# Patient Record
Sex: FEMALE | Race: BLACK OR AFRICAN AMERICAN | NOT HISPANIC OR LATINO | Employment: OTHER | URBAN - METROPOLITAN AREA
[De-identification: names, ages, dates, MRNs, and addresses within clinical notes are randomized per-mention and may not be internally consistent; named-entity substitution may affect disease eponyms.]

---

## 2017-02-22 ENCOUNTER — HISTORICAL (OUTPATIENT)
Dept: HEMATOLOGY/ONCOLOGY | Facility: CLINIC | Age: 52
End: 2017-02-22

## 2017-03-21 ENCOUNTER — HISTORICAL (OUTPATIENT)
Dept: HEMATOLOGY/ONCOLOGY | Facility: CLINIC | Age: 52
End: 2017-03-21

## 2017-03-22 ENCOUNTER — HISTORICAL (OUTPATIENT)
Dept: HEMATOLOGY/ONCOLOGY | Facility: CLINIC | Age: 52
End: 2017-03-22

## 2017-05-10 ENCOUNTER — HISTORICAL (OUTPATIENT)
Dept: HEMATOLOGY/ONCOLOGY | Facility: CLINIC | Age: 52
End: 2017-05-10

## 2017-05-10 LAB
ABS NEUT (OLG): 1.62 X10(3)/MCL (ref 2.1–9.2)
ANION GAP SERPL CALC-SCNC: 18 MMOL/L
BASOPHILS # BLD AUTO: 0 X10(3)/MCL (ref 0–0.2)
BASOPHILS NFR BLD AUTO: 0.7 %
BUN SERPL-MCNC: 12 MG/DL (ref 7–18)
CHLORIDE SERPL-SCNC: 99 MMOL/L (ref 98–109)
CREAT SERPL-MCNC: 1.3 MG/DL (ref 0.6–1.3)
EOSINOPHIL # BLD AUTO: 0.4 X10(3)/MCL (ref 0–0.9)
EOSINOPHIL NFR BLD AUTO: 8.9 %
ERYTHROCYTE [DISTWIDTH] IN BLOOD BY AUTOMATED COUNT: 13.6 % (ref 11.5–17)
GLUCOSE SERPL-MCNC: 109 MG/DL (ref 70–105)
HCT VFR BLD AUTO: 40.4 % (ref 37–47)
HCT VFR BLD CALC: 41 % (ref 38–51)
HGB BLD-MCNC: 12.7 GM/DL (ref 12–16)
HGB BLD-MCNC: 13.9 MG/DL (ref 12–17)
LYMPHOCYTES # BLD AUTO: 1.8 X10(3)/MCL (ref 0.6–4.6)
LYMPHOCYTES NFR BLD AUTO: 40.8 %
MCH RBC QN AUTO: 28.4 PG (ref 27–31)
MCHC RBC AUTO-ENTMCNC: 31.4 GM/DL (ref 33–36)
MCV RBC AUTO: 90.4 FL (ref 80–94)
MONOCYTES # BLD AUTO: 0.5 X10(3)/MCL (ref 0.1–1.3)
MONOCYTES NFR BLD AUTO: 11.9 %
NEUTROPHILS # BLD AUTO: 1.6 X10(3)/MCL (ref 2.1–9.2)
NEUTROPHILS NFR BLD AUTO: 37.7 %
PLATELET # BLD AUTO: 188 X10(3)/MCL (ref 130–400)
PMV BLD AUTO: 8.9 FL (ref 9.4–12.4)
POC IONIZED CALCIUM: 1.23 MMOL/L (ref 1.12–1.32)
POC TCO2: 27 MMOL/L (ref 22–27)
POTASSIUM BLD-SCNC: 3.3 MMOL/L (ref 3.5–4.9)
RBC # BLD AUTO: 4.47 X10(6)/MCL (ref 4.2–5.4)
SODIUM BLD-SCNC: 140 MMOL/L (ref 138–146)
WBC # SPEC AUTO: 4.3 X10(3)/MCL (ref 4.5–11.5)

## 2017-05-24 ENCOUNTER — HISTORICAL (OUTPATIENT)
Dept: ADMINISTRATIVE | Facility: HOSPITAL | Age: 52
End: 2017-05-24

## 2017-05-24 LAB
ABS NEUT (OLG): 1.42 X10(3)/MCL (ref 2.1–9.2)
ANION GAP SERPL CALC-SCNC: 20 MMOL/L
BASOPHILS # BLD AUTO: 0 X10(3)/MCL (ref 0–0.2)
BASOPHILS NFR BLD AUTO: 0.3 %
BUN SERPL-MCNC: 18 MG/DL (ref 7–18)
CHLORIDE SERPL-SCNC: 102 MMOL/L (ref 98–109)
CREAT SERPL-MCNC: 1.4 MG/DL (ref 0.6–1.3)
EOSINOPHIL # BLD AUTO: 0.2 X10(3)/MCL (ref 0–0.9)
EOSINOPHIL NFR BLD AUTO: 5.5 %
ERYTHROCYTE [DISTWIDTH] IN BLOOD BY AUTOMATED COUNT: 13.8 % (ref 11.5–17)
GLUCOSE SERPL-MCNC: 89 MG/DL (ref 70–105)
HCT VFR BLD AUTO: 41.5 % (ref 37–47)
HCT VFR BLD CALC: 42 % (ref 38–51)
HGB BLD-MCNC: 12.7 GM/DL (ref 12–16)
HGB BLD-MCNC: 14.3 MG/DL (ref 12–17)
LYMPHOCYTES # BLD AUTO: 1.2 X10(3)/MCL (ref 0.6–4.6)
LYMPHOCYTES NFR BLD AUTO: 37.5 %
MCH RBC QN AUTO: 28.2 PG (ref 27–31)
MCHC RBC AUTO-ENTMCNC: 30.6 GM/DL (ref 33–36)
MCV RBC AUTO: 92 FL (ref 80–94)
MONOCYTES # BLD AUTO: 0.3 X10(3)/MCL (ref 0.1–1.3)
MONOCYTES NFR BLD AUTO: 10.4 %
NEUTROPHILS # BLD AUTO: 1.4 X10(3)/MCL (ref 2.1–9.2)
NEUTROPHILS NFR BLD AUTO: 46.3 %
PLATELET # BLD AUTO: 212 X10(3)/MCL (ref 130–400)
PMV BLD AUTO: 8.4 FL (ref 9.4–12.4)
POC IONIZED CALCIUM: 1.19 MMOL/L (ref 1.12–1.32)
POC TCO2: 24 MMOL/L (ref 22–27)
POTASSIUM BLD-SCNC: 3.7 MMOL/L (ref 3.5–4.9)
RBC # BLD AUTO: 4.51 X10(6)/MCL (ref 4.2–5.4)
SODIUM BLD-SCNC: 141 MMOL/L (ref 138–146)
WBC # SPEC AUTO: 3.1 X10(3)/MCL (ref 4.5–11.5)

## 2017-07-06 ENCOUNTER — HISTORICAL (OUTPATIENT)
Dept: HEMATOLOGY/ONCOLOGY | Facility: CLINIC | Age: 52
End: 2017-07-06

## 2017-07-06 LAB
ABS NEUT (OLG): 1.14 X10(3)/MCL (ref 2.1–9.2)
ALBUMIN SERPL-MCNC: 2.8 GM/DL (ref 3.4–5)
ALBUMIN/GLOB SERPL: 0.8 {RATIO}
ALP SERPL-CCNC: 149 UNIT/L (ref 38–126)
ALT SERPL-CCNC: 26 UNIT/L (ref 12–78)
AST SERPL-CCNC: 22 UNIT/L (ref 15–37)
BASOPHILS # BLD AUTO: 0 X10(3)/MCL (ref 0–0.2)
BASOPHILS NFR BLD AUTO: 0.6 %
BILIRUB SERPL-MCNC: 0.6 MG/DL (ref 0.2–1)
BILIRUBIN DIRECT+TOT PNL SERPL-MCNC: 0.1 MG/DL (ref 0–0.2)
BILIRUBIN DIRECT+TOT PNL SERPL-MCNC: 0.5 MG/DL (ref 0–0.8)
BUN SERPL-MCNC: 11 MG/DL (ref 7–18)
CALCIUM SERPL-MCNC: 9.1 MG/DL (ref 8.5–10.1)
CHLORIDE SERPL-SCNC: 100 MMOL/L (ref 98–107)
CO2 SERPL-SCNC: 34 MMOL/L (ref 21–32)
CREAT SERPL-MCNC: 1.33 MG/DL (ref 0.55–1.02)
EOSINOPHIL # BLD AUTO: 0.5 X10(3)/MCL (ref 0–0.9)
EOSINOPHIL NFR BLD AUTO: 15.8 %
EOSINOPHIL NFR BLD MANUAL: 10 % (ref 0–8)
ERYTHROCYTE [DISTWIDTH] IN BLOOD BY AUTOMATED COUNT: 14.2 % (ref 11.5–17)
GLOBULIN SER-MCNC: 3.6 GM/DL (ref 2.4–3.5)
GLUCOSE SERPL-MCNC: 75 MG/DL (ref 74–106)
HCT VFR BLD AUTO: 34.8 % (ref 37–47)
HGB BLD-MCNC: 10.5 GM/DL (ref 12–16)
LYMPHOCYTES # BLD AUTO: 1.2 X10(3)/MCL (ref 0.6–4.6)
LYMPHOCYTES NFR BLD AUTO: 36.3 %
LYMPHOCYTES NFR BLD MANUAL: 32 % (ref 13–40)
MCH RBC QN AUTO: 28.2 PG (ref 27–31)
MCHC RBC AUTO-ENTMCNC: 30.2 GM/DL (ref 33–36)
MCV RBC AUTO: 93.5 FL (ref 80–94)
METAMYELOCYTES NFR BLD MANUAL: 4 %
MONOCYTES # BLD AUTO: 0.4 X10(3)/MCL (ref 0.1–1.3)
MONOCYTES NFR BLD AUTO: 11.4 %
MONOCYTES NFR BLD MANUAL: 8 % (ref 2–11)
NEUTROPHILS # BLD AUTO: 1.1 X10(3)/MCL (ref 2.1–9.2)
NEUTROPHILS NFR BLD AUTO: 35.9 %
NEUTROPHILS NFR BLD MANUAL: 46 % (ref 47–80)
PLATELET # BLD AUTO: 166 X10(3)/MCL (ref 130–400)
PLATELET # BLD EST: NORMAL 10*3/UL
PMV BLD AUTO: 8.1 FL (ref 9.4–12.4)
POTASSIUM SERPL-SCNC: 3.4 MMOL/L (ref 3.5–5.1)
PROT SERPL-MCNC: 6.4 GM/DL (ref 6.4–8.2)
RBC # BLD AUTO: 3.72 X10(6)/MCL (ref 4.2–5.4)
RBC MORPH BLD: NORMAL
SODIUM SERPL-SCNC: 140 MMOL/L (ref 136–145)
WBC # SPEC AUTO: 3.2 X10(3)/MCL (ref 4.5–11.5)

## 2017-07-12 ENCOUNTER — HISTORICAL (OUTPATIENT)
Dept: INFUSION THERAPY | Facility: HOSPITAL | Age: 52
End: 2017-07-12

## 2017-07-12 LAB
ABS NEUT (OLG): 0.53 X10(3)/MCL (ref 2.1–9.2)
ANION GAP SERPL CALC-SCNC: 17 MMOL/L
BASOPHILS # BLD AUTO: 0 X10(3)/MCL (ref 0–0.2)
BASOPHILS NFR BLD AUTO: 1.3 %
BUN SERPL-MCNC: 8 MG/DL (ref 7–18)
CHLORIDE SERPL-SCNC: 97 MMOL/L (ref 98–109)
CREAT SERPL-MCNC: 1.5 MG/DL (ref 0.6–1.3)
EOSINOPHIL # BLD AUTO: 0.4 X10(3)/MCL (ref 0–0.9)
EOSINOPHIL NFR BLD AUTO: 15.4 %
ERYTHROCYTE [DISTWIDTH] IN BLOOD BY AUTOMATED COUNT: 14.3 % (ref 11.5–17)
GLUCOSE SERPL-MCNC: 96 MG/DL (ref 70–105)
HCT VFR BLD AUTO: 35.2 % (ref 37–47)
HCT VFR BLD CALC: 35 % (ref 38–51)
HGB BLD-MCNC: 10.7 GM/DL (ref 12–16)
HGB BLD-MCNC: 11.9 MG/DL (ref 12–17)
LYMPHOCYTES # BLD AUTO: 1.2 X10(3)/MCL (ref 0.6–4.6)
LYMPHOCYTES NFR BLD AUTO: 49.6 %
MCH RBC QN AUTO: 28.2 PG (ref 27–31)
MCHC RBC AUTO-ENTMCNC: 30.4 GM/DL (ref 33–36)
MCV RBC AUTO: 92.6 FL (ref 80–94)
MONOCYTES # BLD AUTO: 0.3 X10(3)/MCL (ref 0.1–1.3)
MONOCYTES NFR BLD AUTO: 11.7 %
NEUTROPHILS # BLD AUTO: 0.5 X10(3)/MCL (ref 2.1–9.2)
NEUTROPHILS NFR BLD AUTO: 22 %
PLATELET # BLD AUTO: 214 X10(3)/MCL (ref 130–400)
PMV BLD AUTO: 8.4 FL (ref 9.4–12.4)
POC IONIZED CALCIUM: 1.21 MMOL/L (ref 1.12–1.32)
POC TCO2: 28 MMOL/L (ref 22–27)
POTASSIUM BLD-SCNC: 3.7 MMOL/L (ref 3.5–4.9)
RBC # BLD AUTO: 3.8 X10(6)/MCL (ref 4.2–5.4)
SODIUM BLD-SCNC: 137 MMOL/L (ref 138–146)
WBC # SPEC AUTO: 2.4 X10(3)/MCL (ref 4.5–11.5)

## 2017-07-19 ENCOUNTER — HISTORICAL (OUTPATIENT)
Dept: ADMINISTRATIVE | Facility: HOSPITAL | Age: 52
End: 2017-07-19

## 2017-07-19 LAB
ABS NEUT (OLG): 1.61 X10(3)/MCL (ref 2.1–9.2)
ANION GAP SERPL CALC-SCNC: 16 MMOL/L
BUN SERPL-MCNC: 20 MG/DL (ref 7–18)
CHLORIDE SERPL-SCNC: 101 MMOL/L (ref 98–109)
CREAT SERPL-MCNC: 1.4 MG/DL (ref 0.6–1.3)
EOSINOPHIL # BLD AUTO: 0 X10(3)/MCL (ref 0–0.9)
EOSINOPHIL NFR BLD AUTO: 0.5 %
ERYTHROCYTE [DISTWIDTH] IN BLOOD BY AUTOMATED COUNT: 14.4 % (ref 11.5–17)
GLUCOSE SERPL-MCNC: 124 MG/DL (ref 70–105)
HCT VFR BLD AUTO: 39.4 % (ref 37–47)
HCT VFR BLD CALC: 40 % (ref 38–51)
HGB BLD-MCNC: 12.1 GM/DL (ref 12–16)
HGB BLD-MCNC: 13.6 MG/DL (ref 12–17)
LYMPHOCYTES # BLD AUTO: 0.5 X10(3)/MCL (ref 0.6–4.6)
LYMPHOCYTES NFR BLD AUTO: 22.4 %
MCH RBC QN AUTO: 28.5 PG (ref 27–31)
MCHC RBC AUTO-ENTMCNC: 30.7 GM/DL (ref 33–36)
MCV RBC AUTO: 92.9 FL (ref 80–94)
MONOCYTES # BLD AUTO: 0 X10(3)/MCL (ref 0.1–1.3)
MONOCYTES NFR BLD AUTO: 1.9 %
NEUTROPHILS # BLD AUTO: 1.6 X10(3)/MCL (ref 2.1–9.2)
NEUTROPHILS NFR BLD AUTO: 75.2 %
PLATELET # BLD AUTO: 261 X10(3)/MCL (ref 130–400)
PMV BLD AUTO: 8.6 FL (ref 9.4–12.4)
POC IONIZED CALCIUM: 1.28 MMOL/L (ref 1.12–1.32)
POC TCO2: 27 MMOL/L (ref 22–27)
POTASSIUM BLD-SCNC: 5.1 MMOL/L (ref 3.5–4.9)
RBC # BLD AUTO: 4.24 X10(6)/MCL (ref 4.2–5.4)
SODIUM BLD-SCNC: 137 MMOL/L (ref 138–146)
WBC # SPEC AUTO: 2.1 X10(3)/MCL (ref 4.5–11.5)

## 2017-07-26 ENCOUNTER — HISTORICAL (OUTPATIENT)
Dept: HEMATOLOGY/ONCOLOGY | Facility: CLINIC | Age: 52
End: 2017-07-26

## 2017-07-26 LAB
ABS NEUT (OLG): 1.02 X10(3)/MCL (ref 2.1–9.2)
ANION GAP SERPL CALC-SCNC: 17 MMOL/L
BASOPHILS # BLD AUTO: 0 X10(3)/MCL (ref 0–0.2)
BASOPHILS NFR BLD AUTO: 0.3 %
BUN SERPL-MCNC: 15 MG/DL (ref 7–18)
CHLORIDE SERPL-SCNC: 101 MMOL/L (ref 98–109)
CREAT SERPL-MCNC: 1.3 MG/DL (ref 0.6–1.3)
EOSINOPHIL # BLD AUTO: 0.4 X10(3)/MCL (ref 0–0.9)
EOSINOPHIL NFR BLD AUTO: 13.9 %
ERYTHROCYTE [DISTWIDTH] IN BLOOD BY AUTOMATED COUNT: 14.9 % (ref 11.5–17)
GLUCOSE SERPL-MCNC: 119 MG/DL (ref 70–105)
HCT VFR BLD AUTO: 38.7 % (ref 37–47)
HGB BLD-MCNC: 11.8 GM/DL (ref 12–16)
LYMPHOCYTES # BLD AUTO: 1.2 X10(3)/MCL (ref 0.6–4.6)
LYMPHOCYTES NFR BLD AUTO: 40.9 %
MCH RBC QN AUTO: 28.7 PG (ref 27–31)
MCHC RBC AUTO-ENTMCNC: 30.5 GM/DL (ref 33–36)
MCV RBC AUTO: 94.2 FL (ref 80–94)
MONOCYTES # BLD AUTO: 0.3 X10(3)/MCL (ref 0.1–1.3)
MONOCYTES NFR BLD AUTO: 10.5 %
NEUTROPHILS # BLD AUTO: 1 X10(3)/MCL (ref 2.1–9.2)
NEUTROPHILS NFR BLD AUTO: 34.4 %
PLATELET # BLD AUTO: 159 X10(3)/MCL (ref 130–400)
PMV BLD AUTO: 8.6 FL (ref 9.4–12.4)
POC CREATININE: 1.3 MG/DL (ref 0.6–1.3)
POC IONIZED CALCIUM: 1.2 MMOL/L (ref 1.12–1.32)
POC TCO2: 28 MMOL/L (ref 22–27)
POTASSIUM BLD-SCNC: 4.2 MMOL/L (ref 3.5–4.9)
RBC # BLD AUTO: 4.11 X10(6)/MCL (ref 4.2–5.4)
SODIUM BLD-SCNC: 141 MMOL/L (ref 138–146)
WBC # SPEC AUTO: 3 X10(3)/MCL (ref 4.5–11.5)

## 2017-08-02 ENCOUNTER — HISTORICAL (OUTPATIENT)
Dept: HEMATOLOGY/ONCOLOGY | Facility: CLINIC | Age: 52
End: 2017-08-02

## 2017-08-02 LAB
ABS NEUT (OLG): 2.52 X10(3)/MCL (ref 2.1–9.2)
ANION GAP SERPL CALC-SCNC: 18 MMOL/L
BUN SERPL-MCNC: 14 MG/DL (ref 7–18)
CHLORIDE SERPL-SCNC: 100 MMOL/L (ref 98–109)
CREAT SERPL-MCNC: 1.4 MG/DL (ref 0.6–1.3)
EOSINOPHIL # BLD AUTO: 0 X10(3)/MCL (ref 0–0.9)
EOSINOPHIL NFR BLD AUTO: 0.9 %
ERYTHROCYTE [DISTWIDTH] IN BLOOD BY AUTOMATED COUNT: 15.1 % (ref 11.5–17)
GLUCOSE SERPL-MCNC: 104 MG/DL (ref 70–105)
HCT VFR BLD AUTO: 39.3 % (ref 37–47)
HCT VFR BLD CALC: 38 % (ref 38–51)
HGB BLD-MCNC: 11.9 GM/DL (ref 12–16)
HGB BLD-MCNC: 12.9 MG/DL (ref 12–17)
LYMPHOCYTES # BLD AUTO: 0.6 X10(3)/MCL (ref 0.6–4.6)
LYMPHOCYTES NFR BLD AUTO: 19.4 %
MCH RBC QN AUTO: 28.8 PG (ref 27–31)
MCHC RBC AUTO-ENTMCNC: 30.3 GM/DL (ref 33–36)
MCV RBC AUTO: 95.2 FL (ref 80–94)
MONOCYTES # BLD AUTO: 0.1 X10(3)/MCL (ref 0.1–1.3)
MONOCYTES NFR BLD AUTO: 2.2 %
NEUTROPHILS # BLD AUTO: 2.5 X10(3)/MCL (ref 2.1–9.2)
NEUTROPHILS NFR BLD AUTO: 77.5 %
PLATELET # BLD AUTO: 172 X10(3)/MCL (ref 130–400)
PMV BLD AUTO: 8.7 FL (ref 9.4–12.4)
POC IONIZED CALCIUM: 1.21 MMOL/L (ref 1.12–1.32)
POC TCO2: 28 MMOL/L (ref 22–27)
POTASSIUM BLD-SCNC: 4.3 MMOL/L (ref 3.5–4.9)
RBC # BLD AUTO: 4.13 X10(6)/MCL (ref 4.2–5.4)
SODIUM BLD-SCNC: 141 MMOL/L (ref 138–146)
WBC # SPEC AUTO: 3.2 X10(3)/MCL (ref 4.5–11.5)

## 2017-08-11 ENCOUNTER — HISTORICAL (OUTPATIENT)
Dept: HEMATOLOGY/ONCOLOGY | Facility: CLINIC | Age: 52
End: 2017-08-11

## 2017-08-11 LAB
ABS NEUT (OLG): 1.72 X10(3)/MCL (ref 2.1–9.2)
ANION GAP SERPL CALC-SCNC: 16 MMOL/L
BUN SERPL-MCNC: 20 MG/DL (ref 7–18)
CHLORIDE SERPL-SCNC: 103 MMOL/L (ref 98–109)
CREAT SERPL-MCNC: 1.2 MG/DL (ref 0.6–1.3)
EOSINOPHIL # BLD AUTO: 0.1 X10(3)/MCL (ref 0–0.9)
EOSINOPHIL NFR BLD AUTO: 1.4 %
ERYTHROCYTE [DISTWIDTH] IN BLOOD BY AUTOMATED COUNT: 15.6 % (ref 11.5–17)
GLUCOSE SERPL-MCNC: 90 MG/DL (ref 70–105)
HCT VFR BLD AUTO: 37 % (ref 37–47)
HCT VFR BLD CALC: 38 % (ref 38–51)
HGB BLD-MCNC: 11.5 GM/DL (ref 12–16)
HGB BLD-MCNC: 12.9 MG/DL (ref 12–17)
LYMPHOCYTES # BLD AUTO: 1.9 X10(3)/MCL (ref 0.6–4.6)
LYMPHOCYTES NFR BLD AUTO: 42.7 %
MCH RBC QN AUTO: 29.3 PG (ref 27–31)
MCHC RBC AUTO-ENTMCNC: 31.1 GM/DL (ref 33–36)
MCV RBC AUTO: 94.4 FL (ref 80–94)
MONOCYTES # BLD AUTO: 0.8 X10(3)/MCL (ref 0.1–1.3)
MONOCYTES NFR BLD AUTO: 17.2 %
NEUTROPHILS # BLD AUTO: 1.7 X10(3)/MCL (ref 2.1–9.2)
NEUTROPHILS NFR BLD AUTO: 38.7 %
PLATELET # BLD AUTO: 193 X10(3)/MCL (ref 130–400)
PMV BLD AUTO: 8.9 FL (ref 9.4–12.4)
POC IONIZED CALCIUM: 1.17 MMOL/L (ref 1.12–1.32)
POC TCO2: 28 MMOL/L (ref 22–27)
POTASSIUM BLD-SCNC: 3.6 MMOL/L (ref 3.5–4.9)
RBC # BLD AUTO: 3.92 X10(6)/MCL (ref 4.2–5.4)
SODIUM BLD-SCNC: 143 MMOL/L (ref 138–146)
WBC # SPEC AUTO: 4.4 X10(3)/MCL (ref 4.5–11.5)

## 2017-08-18 ENCOUNTER — HISTORICAL (OUTPATIENT)
Dept: HEMATOLOGY/ONCOLOGY | Facility: CLINIC | Age: 52
End: 2017-08-18

## 2017-08-18 LAB
ABS NEUT (OLG): 1.99 X10(3)/MCL (ref 2.1–9.2)
ANION GAP SERPL CALC-SCNC: 17 MMOL/L
BASOPHILS # BLD AUTO: 0 X10(3)/MCL (ref 0–0.2)
BASOPHILS NFR BLD AUTO: 0.2 %
BUN SERPL-MCNC: 18 MG/DL (ref 7–18)
CHLORIDE SERPL-SCNC: 105 MMOL/L (ref 98–109)
CREAT SERPL-MCNC: 1.2 MG/DL (ref 0.6–1.3)
EOSINOPHIL # BLD AUTO: 0 X10(3)/MCL (ref 0–0.9)
EOSINOPHIL NFR BLD AUTO: 1.1 %
ERYTHROCYTE [DISTWIDTH] IN BLOOD BY AUTOMATED COUNT: 16.1 % (ref 11.5–17)
GLUCOSE SERPL-MCNC: 78 MG/DL (ref 70–105)
HCT VFR BLD AUTO: 35.3 % (ref 37–47)
HCT VFR BLD CALC: 33 % (ref 38–51)
HGB BLD-MCNC: 10.7 GM/DL (ref 12–16)
HGB BLD-MCNC: 11.2 MG/DL (ref 12–17)
LYMPHOCYTES # BLD AUTO: 1.8 X10(3)/MCL (ref 0.6–4.6)
LYMPHOCYTES NFR BLD AUTO: 39.3 %
MCH RBC QN AUTO: 29.2 PG (ref 27–31)
MCHC RBC AUTO-ENTMCNC: 30.3 GM/DL (ref 33–36)
MCV RBC AUTO: 96.2 FL (ref 80–94)
MONOCYTES # BLD AUTO: 0.7 X10(3)/MCL (ref 0.1–1.3)
MONOCYTES NFR BLD AUTO: 15.6 %
NEUTROPHILS # BLD AUTO: 2 X10(3)/MCL (ref 2.1–9.2)
NEUTROPHILS NFR BLD AUTO: 43.8 %
PLATELET # BLD AUTO: 164 X10(3)/MCL (ref 130–400)
PMV BLD AUTO: 8.2 FL (ref 9.4–12.4)
POC IONIZED CALCIUM: 1.2 MMOL/L (ref 1.12–1.32)
POC TCO2: 28 MMOL/L (ref 22–27)
POTASSIUM BLD-SCNC: 3.5 MMOL/L (ref 3.5–4.9)
RBC # BLD AUTO: 3.67 X10(6)/MCL (ref 4.2–5.4)
SODIUM BLD-SCNC: 145 MMOL/L (ref 138–146)
WBC # SPEC AUTO: 4.6 X10(3)/MCL (ref 4.5–11.5)

## 2017-09-15 ENCOUNTER — HISTORICAL (OUTPATIENT)
Dept: HEMATOLOGY/ONCOLOGY | Facility: CLINIC | Age: 52
End: 2017-09-15

## 2017-09-15 LAB
ABS NEUT (OLG): 1.42 X10(3)/MCL (ref 2.1–9.2)
ALBUMIN SERPL-MCNC: 3 GM/DL (ref 3.4–5)
ALBUMIN/GLOB SERPL: 1.1 RATIO (ref 1.1–2)
ALP SERPL-CCNC: 142 UNIT/L (ref 38–126)
ALT SERPL-CCNC: 38 UNIT/L (ref 12–78)
AST SERPL-CCNC: 14 UNIT/L (ref 15–37)
BASOPHILS # BLD AUTO: 0 X10(3)/MCL (ref 0–0.2)
BASOPHILS NFR BLD AUTO: 0.3 %
BILIRUB SERPL-MCNC: 0.2 MG/DL (ref 0.2–1)
BILIRUBIN DIRECT+TOT PNL SERPL-MCNC: 0.1 MG/DL (ref 0–0.5)
BILIRUBIN DIRECT+TOT PNL SERPL-MCNC: 0.1 MG/DL (ref 0–0.8)
BUN SERPL-MCNC: 13 MG/DL (ref 7–18)
CALCIUM SERPL-MCNC: 8.3 MG/DL (ref 8.5–10.1)
CHLORIDE SERPL-SCNC: 108 MMOL/L (ref 98–107)
CO2 SERPL-SCNC: 27 MMOL/L (ref 21–32)
CREAT SERPL-MCNC: 1.24 MG/DL (ref 0.55–1.02)
EOSINOPHIL # BLD AUTO: 0.2 X10(3)/MCL (ref 0–0.9)
EOSINOPHIL NFR BLD AUTO: 3.8 %
ERYTHROCYTE [DISTWIDTH] IN BLOOD BY AUTOMATED COUNT: 15 % (ref 11.5–17)
GLOBULIN SER-MCNC: 2.8 GM/DL (ref 2.4–3.5)
GLUCOSE SERPL-MCNC: 81 MG/DL (ref 74–106)
HCT VFR BLD AUTO: 35.2 % (ref 37–47)
HGB BLD-MCNC: 10.7 GM/DL (ref 12–16)
LYMPHOCYTES # BLD AUTO: 1.6 X10(3)/MCL (ref 0.6–4.6)
LYMPHOCYTES NFR BLD AUTO: 41 %
MCH RBC QN AUTO: 29.9 PG (ref 27–31)
MCHC RBC AUTO-ENTMCNC: 30.4 GM/DL (ref 33–36)
MCV RBC AUTO: 98.3 FL (ref 80–94)
MONOCYTES # BLD AUTO: 0.8 X10(3)/MCL (ref 0.1–1.3)
MONOCYTES NFR BLD AUTO: 19.3 %
NEUTROPHILS # BLD AUTO: 1.4 X10(3)/MCL (ref 2.1–9.2)
NEUTROPHILS NFR BLD AUTO: 35.6 %
PLATELET # BLD AUTO: 152 X10(3)/MCL (ref 130–400)
PMV BLD AUTO: 8.7 FL (ref 9.4–12.4)
POTASSIUM SERPL-SCNC: 3.4 MMOL/L (ref 3.5–5.1)
PROT SERPL-MCNC: 5.8 GM/DL (ref 6.4–8.2)
RBC # BLD AUTO: 3.58 X10(6)/MCL (ref 4.2–5.4)
SODIUM SERPL-SCNC: 145 MMOL/L (ref 136–145)
WBC # SPEC AUTO: 4 X10(3)/MCL (ref 4.5–11.5)

## 2017-10-13 ENCOUNTER — HISTORICAL (OUTPATIENT)
Dept: HEMATOLOGY/ONCOLOGY | Facility: CLINIC | Age: 52
End: 2017-10-13

## 2017-10-13 LAB
ABS NEUT (OLG): 0.89 X10(3)/MCL (ref 2.1–9.2)
ALBUMIN SERPL-MCNC: 3.2 GM/DL (ref 3.4–5)
ALBUMIN/GLOB SERPL: 1.1 {RATIO}
ALP SERPL-CCNC: 138 UNIT/L (ref 38–126)
ALT SERPL-CCNC: 28 UNIT/L (ref 12–78)
AST SERPL-CCNC: 12 UNIT/L (ref 15–37)
BASOPHILS # BLD AUTO: 0 X10(3)/MCL (ref 0–0.2)
BASOPHILS NFR BLD AUTO: 0.3 %
BILIRUB SERPL-MCNC: 0.2 MG/DL (ref 0.2–1)
BILIRUBIN DIRECT+TOT PNL SERPL-MCNC: 0.1 MG/DL (ref 0–0.2)
BILIRUBIN DIRECT+TOT PNL SERPL-MCNC: 0.1 MG/DL (ref 0–0.8)
BUN SERPL-MCNC: 19 MG/DL (ref 7–18)
CALCIUM SERPL-MCNC: 8.6 MG/DL (ref 8.5–10.1)
CHLORIDE SERPL-SCNC: 106 MMOL/L (ref 98–107)
CO2 SERPL-SCNC: 28 MMOL/L (ref 21–32)
CREAT SERPL-MCNC: 1.16 MG/DL (ref 0.55–1.02)
EOSINOPHIL # BLD AUTO: 0 X10(3)/MCL (ref 0–0.9)
EOSINOPHIL NFR BLD AUTO: 0.9 %
ERYTHROCYTE [DISTWIDTH] IN BLOOD BY AUTOMATED COUNT: 13.9 % (ref 11.5–17)
GLOBULIN SER-MCNC: 3 GM/DL (ref 2.4–3.5)
GLUCOSE SERPL-MCNC: 80 MG/DL (ref 74–106)
HCT VFR BLD AUTO: 38.7 % (ref 37–47)
HGB BLD-MCNC: 12.1 GM/DL (ref 12–16)
LYMPHOCYTES # BLD AUTO: 2.1 X10(3)/MCL (ref 0.6–4.6)
LYMPHOCYTES NFR BLD AUTO: 61.3 %
MCH RBC QN AUTO: 30.9 PG (ref 27–31)
MCHC RBC AUTO-ENTMCNC: 31.3 GM/DL (ref 33–36)
MCV RBC AUTO: 98.7 FL (ref 80–94)
MONOCYTES # BLD AUTO: 0.4 X10(3)/MCL (ref 0.1–1.3)
MONOCYTES NFR BLD AUTO: 11.8 %
NEUTROPHILS # BLD AUTO: 0.9 X10(3)/MCL (ref 2.1–9.2)
NEUTROPHILS NFR BLD AUTO: 25.7 %
PLATELET # BLD AUTO: 160 X10(3)/MCL (ref 130–400)
PMV BLD AUTO: 8.8 FL (ref 9.4–12.4)
POTASSIUM SERPL-SCNC: 3.9 MMOL/L (ref 3.5–5.1)
PROT SERPL-MCNC: 6.2 GM/DL (ref 6.4–8.2)
RBC # BLD AUTO: 3.92 X10(6)/MCL (ref 4.2–5.4)
SODIUM SERPL-SCNC: 143 MMOL/L (ref 136–145)
WBC # SPEC AUTO: 3.5 X10(3)/MCL (ref 4.5–11.5)

## 2017-10-26 ENCOUNTER — HISTORICAL (OUTPATIENT)
Dept: HEMATOLOGY/ONCOLOGY | Facility: CLINIC | Age: 52
End: 2017-10-26

## 2017-10-26 LAB
ABS NEUT (OLG): 1.64 X10(3)/MCL (ref 2.1–9.2)
ALBUMIN SERPL-MCNC: 2.9 GM/DL (ref 3.4–5)
ALBUMIN/GLOB SERPL: 1 {RATIO}
ALP SERPL-CCNC: 152 UNIT/L (ref 38–126)
ALT SERPL-CCNC: 36 UNIT/L (ref 12–78)
AST SERPL-CCNC: 18 UNIT/L (ref 15–37)
BASOPHILS # BLD AUTO: 0 X10(3)/MCL (ref 0–0.2)
BASOPHILS NFR BLD AUTO: 0.8 %
BILIRUB SERPL-MCNC: 0.3 MG/DL (ref 0.2–1)
BILIRUBIN DIRECT+TOT PNL SERPL-MCNC: 0.1 MG/DL (ref 0–0.2)
BILIRUBIN DIRECT+TOT PNL SERPL-MCNC: 0.2 MG/DL (ref 0–0.8)
BUN SERPL-MCNC: 12 MG/DL (ref 7–18)
CALCIUM SERPL-MCNC: 8.5 MG/DL (ref 8.5–10.1)
CHLORIDE SERPL-SCNC: 104 MMOL/L (ref 98–107)
CO2 SERPL-SCNC: 30 MMOL/L (ref 21–32)
CREAT SERPL-MCNC: 1.32 MG/DL (ref 0.55–1.02)
EOSINOPHIL # BLD AUTO: 0.3 X10(3)/MCL (ref 0–0.9)
EOSINOPHIL NFR BLD AUTO: 7.8 %
ERYTHROCYTE [DISTWIDTH] IN BLOOD BY AUTOMATED COUNT: 13.7 % (ref 11.5–17)
GLOBULIN SER-MCNC: 2.8 GM/DL (ref 2.4–3.5)
GLUCOSE SERPL-MCNC: 91 MG/DL (ref 74–106)
HCT VFR BLD AUTO: 38.6 % (ref 37–47)
HGB BLD-MCNC: 12 GM/DL (ref 12–16)
LYMPHOCYTES # BLD AUTO: 1.2 X10(3)/MCL (ref 0.6–4.6)
LYMPHOCYTES NFR BLD AUTO: 32.1 %
MCH RBC QN AUTO: 30.6 PG (ref 27–31)
MCHC RBC AUTO-ENTMCNC: 31.1 GM/DL (ref 33–36)
MCV RBC AUTO: 98.5 FL (ref 80–94)
MONOCYTES # BLD AUTO: 0.5 X10(3)/MCL (ref 0.1–1.3)
MONOCYTES NFR BLD AUTO: 13.4 %
NEUTROPHILS # BLD AUTO: 1.6 X10(3)/MCL (ref 2.1–9.2)
NEUTROPHILS NFR BLD AUTO: 45.9 %
PLATELET # BLD AUTO: 157 X10(3)/MCL (ref 130–400)
PMV BLD AUTO: 8.8 FL (ref 9.4–12.4)
POTASSIUM SERPL-SCNC: 4 MMOL/L (ref 3.5–5.1)
PROT SERPL-MCNC: 5.7 GM/DL (ref 6.4–8.2)
RBC # BLD AUTO: 3.92 X10(6)/MCL (ref 4.2–5.4)
SODIUM SERPL-SCNC: 142 MMOL/L (ref 136–145)
WBC # SPEC AUTO: 3.6 X10(3)/MCL (ref 4.5–11.5)

## 2017-11-15 ENCOUNTER — HISTORICAL (OUTPATIENT)
Dept: HEMATOLOGY/ONCOLOGY | Facility: CLINIC | Age: 52
End: 2017-11-15

## 2017-11-15 LAB
ABS NEUT (OLG): 5.14 X10(3)/MCL (ref 2.1–9.2)
ALBUMIN SERPL-MCNC: 2.9 GM/DL (ref 3.4–5)
ALBUMIN/GLOB SERPL: 0.9 RATIO (ref 1.1–2)
ALP SERPL-CCNC: 141 UNIT/L (ref 38–126)
ALT SERPL-CCNC: 38 UNIT/L (ref 12–78)
AST SERPL-CCNC: 28 UNIT/L (ref 15–37)
BASOPHILS # BLD AUTO: 0 X10(3)/MCL (ref 0–0.2)
BASOPHILS NFR BLD AUTO: 0.5 %
BILIRUB SERPL-MCNC: 0.4 MG/DL (ref 0.2–1)
BILIRUBIN DIRECT+TOT PNL SERPL-MCNC: 0.1 MG/DL (ref 0–0.5)
BILIRUBIN DIRECT+TOT PNL SERPL-MCNC: 0.3 MG/DL (ref 0–0.8)
BUN SERPL-MCNC: 18 MG/DL (ref 7–18)
CALCIUM SERPL-MCNC: 8.8 MG/DL (ref 8.5–10.1)
CHLORIDE SERPL-SCNC: 105 MMOL/L (ref 98–107)
CO2 SERPL-SCNC: 29 MMOL/L (ref 21–32)
CREAT SERPL-MCNC: 1.43 MG/DL (ref 0.55–1.02)
EOSINOPHIL # BLD AUTO: 0.2 X10(3)/MCL (ref 0–0.9)
EOSINOPHIL NFR BLD AUTO: 3.9 %
ERYTHROCYTE [DISTWIDTH] IN BLOOD BY AUTOMATED COUNT: 13.2 % (ref 11.5–17)
GLOBULIN SER-MCNC: 3.3 GM/DL (ref 2.4–3.5)
GLUCOSE SERPL-MCNC: 84 MG/DL (ref 74–106)
HCT VFR BLD AUTO: 37.7 % (ref 37–47)
HGB BLD-MCNC: 11.9 GM/DL (ref 12–16)
LYMPHOCYTES # BLD AUTO: 0.6 X10(3)/MCL (ref 0.6–4.6)
LYMPHOCYTES NFR BLD AUTO: 10 %
MCH RBC QN AUTO: 31 PG (ref 27–31)
MCHC RBC AUTO-ENTMCNC: 31.6 GM/DL (ref 33–36)
MCV RBC AUTO: 98.2 FL (ref 80–94)
MONOCYTES # BLD AUTO: 0.3 X10(3)/MCL (ref 0.1–1.3)
MONOCYTES NFR BLD AUTO: 5.2 %
NEUTROPHILS # BLD AUTO: 5.1 X10(3)/MCL (ref 2.1–9.2)
NEUTROPHILS NFR BLD AUTO: 80.4 %
PLATELET # BLD AUTO: 190 X10(3)/MCL (ref 130–400)
PMV BLD AUTO: 8.8 FL (ref 9.4–12.4)
POTASSIUM SERPL-SCNC: 3.9 MMOL/L (ref 3.5–5.1)
PROT SERPL-MCNC: 6.2 GM/DL (ref 6.4–8.2)
RBC # BLD AUTO: 3.84 X10(6)/MCL (ref 4.2–5.4)
SODIUM SERPL-SCNC: 142 MMOL/L (ref 136–145)
WBC # SPEC AUTO: 6.4 X10(3)/MCL (ref 4.5–11.5)

## 2017-11-27 ENCOUNTER — HISTORICAL (OUTPATIENT)
Dept: HEMATOLOGY/ONCOLOGY | Facility: CLINIC | Age: 52
End: 2017-11-27

## 2017-11-27 LAB
ABS NEUT (OLG): 1.49 X10(3)/MCL (ref 2.1–9.2)
ALBUMIN SERPL-MCNC: 2.9 GM/DL (ref 3.4–5)
ALBUMIN/GLOB SERPL: 0.9 RATIO (ref 1.1–2)
ALP SERPL-CCNC: 130 UNIT/L (ref 38–126)
ALT SERPL-CCNC: 30 UNIT/L (ref 12–78)
AST SERPL-CCNC: 15 UNIT/L (ref 15–37)
BASOPHILS # BLD AUTO: 0 X10(3)/MCL (ref 0–0.2)
BASOPHILS NFR BLD AUTO: 0.6 %
BILIRUB SERPL-MCNC: 0.2 MG/DL (ref 0.2–1)
BILIRUBIN DIRECT+TOT PNL SERPL-MCNC: 0.1 MG/DL (ref 0–0.5)
BILIRUBIN DIRECT+TOT PNL SERPL-MCNC: 0.1 MG/DL (ref 0–0.8)
BUN SERPL-MCNC: 16 MG/DL (ref 7–18)
CALCIUM SERPL-MCNC: 8.6 MG/DL (ref 8.5–10.1)
CHLORIDE SERPL-SCNC: 108 MMOL/L (ref 98–107)
CO2 SERPL-SCNC: 29 MMOL/L (ref 21–32)
CREAT SERPL-MCNC: 1.66 MG/DL (ref 0.55–1.02)
EOSINOPHIL # BLD AUTO: 0.4 X10(3)/MCL (ref 0–0.9)
EOSINOPHIL NFR BLD AUTO: 7.2 %
ERYTHROCYTE [DISTWIDTH] IN BLOOD BY AUTOMATED COUNT: 13.5 % (ref 11.5–17)
GLOBULIN SER-MCNC: 3.2 GM/DL (ref 2.4–3.5)
GLUCOSE SERPL-MCNC: 73 MG/DL (ref 74–106)
HCT VFR BLD AUTO: 34.8 % (ref 37–47)
HGB BLD-MCNC: 10.5 GM/DL (ref 12–16)
LYMPHOCYTES # BLD AUTO: 2.3 X10(3)/MCL (ref 0.6–4.6)
LYMPHOCYTES NFR BLD AUTO: 46.4 %
MCH RBC QN AUTO: 30.4 PG (ref 27–31)
MCHC RBC AUTO-ENTMCNC: 30.2 GM/DL (ref 33–36)
MCV RBC AUTO: 100.9 FL (ref 80–94)
MONOCYTES # BLD AUTO: 0.8 X10(3)/MCL (ref 0.1–1.3)
MONOCYTES NFR BLD AUTO: 15.9 %
NEUTROPHILS # BLD AUTO: 1.5 X10(3)/MCL (ref 2.1–9.2)
NEUTROPHILS NFR BLD AUTO: 29.9 %
PLATELET # BLD AUTO: 239 X10(3)/MCL (ref 130–400)
PMV BLD AUTO: 8.6 FL (ref 9.4–12.4)
POTASSIUM SERPL-SCNC: 3.9 MMOL/L (ref 3.5–5.1)
PROT SERPL-MCNC: 6.1 GM/DL (ref 6.4–8.2)
RBC # BLD AUTO: 3.45 X10(6)/MCL (ref 4.2–5.4)
SODIUM SERPL-SCNC: 144 MMOL/L (ref 136–145)
WBC # SPEC AUTO: 5 X10(3)/MCL (ref 4.5–11.5)

## 2017-12-08 ENCOUNTER — HISTORICAL (OUTPATIENT)
Dept: HEMATOLOGY/ONCOLOGY | Facility: CLINIC | Age: 52
End: 2017-12-08

## 2017-12-08 LAB
ABS NEUT (OLG): 3.53 X10(3)/MCL (ref 2.1–9.2)
ALBUMIN SERPL-MCNC: 3.3 GM/DL (ref 3.4–5)
ALBUMIN/GLOB SERPL: 1.1 RATIO (ref 1.1–2)
ALP SERPL-CCNC: 138 UNIT/L (ref 38–126)
ALT SERPL-CCNC: 38 UNIT/L (ref 12–78)
AST SERPL-CCNC: 27 UNIT/L (ref 15–37)
BILIRUB SERPL-MCNC: 0.4 MG/DL (ref 0.2–1)
BILIRUBIN DIRECT+TOT PNL SERPL-MCNC: 0.2 MG/DL (ref 0–0.5)
BILIRUBIN DIRECT+TOT PNL SERPL-MCNC: 0.2 MG/DL (ref 0–0.8)
BUN SERPL-MCNC: 24 MG/DL (ref 7–18)
CALCIUM SERPL-MCNC: 9.1 MG/DL (ref 8.5–10.1)
CHLORIDE SERPL-SCNC: 104 MMOL/L (ref 98–107)
CO2 SERPL-SCNC: 32 MMOL/L (ref 21–32)
CREAT SERPL-MCNC: 1.34 MG/DL (ref 0.55–1.02)
ERYTHROCYTE [DISTWIDTH] IN BLOOD BY AUTOMATED COUNT: 13.7 % (ref 11.5–17)
GLOBULIN SER-MCNC: 3.1 GM/DL (ref 2.4–3.5)
GLUCOSE SERPL-MCNC: 91 MG/DL (ref 74–106)
HCT VFR BLD AUTO: 36.8 % (ref 37–47)
HGB BLD-MCNC: 11.3 GM/DL (ref 12–16)
LYMPHOCYTES # BLD AUTO: 1.2 X10(3)/MCL (ref 0.6–4.6)
LYMPHOCYTES NFR BLD AUTO: 22.7 %
MCH RBC QN AUTO: 30.3 PG (ref 27–31)
MCHC RBC AUTO-ENTMCNC: 30.7 GM/DL (ref 33–36)
MCV RBC AUTO: 98.7 FL (ref 80–94)
MONOCYTES # BLD AUTO: 0.5 X10(3)/MCL (ref 0.1–1.3)
MONOCYTES NFR BLD AUTO: 9.9 %
NEUTROPHILS # BLD AUTO: 3.5 X10(3)/MCL (ref 2.1–9.2)
NEUTROPHILS NFR BLD AUTO: 67.4 %
PLATELET # BLD AUTO: 223 X10(3)/MCL (ref 130–400)
PMV BLD AUTO: 8.6 FL (ref 9.4–12.4)
POTASSIUM SERPL-SCNC: 4.2 MMOL/L (ref 3.5–5.1)
PROT SERPL-MCNC: 6.4 GM/DL (ref 6.4–8.2)
RBC # BLD AUTO: 3.73 X10(6)/MCL (ref 4.2–5.4)
SODIUM SERPL-SCNC: 142 MMOL/L (ref 136–145)
WBC # SPEC AUTO: 5.2 X10(3)/MCL (ref 4.5–11.5)

## 2017-12-20 ENCOUNTER — HISTORICAL (OUTPATIENT)
Dept: HEMATOLOGY/ONCOLOGY | Facility: CLINIC | Age: 52
End: 2017-12-20

## 2017-12-20 LAB
ABS NEUT (OLG): 1.44 X10(3)/MCL (ref 2.1–9.2)
ANION GAP SERPL CALC-SCNC: 16 MMOL/L
BASOPHILS # BLD AUTO: 0 X10(3)/MCL (ref 0–0.2)
BASOPHILS NFR BLD AUTO: 0.7 %
BUN SERPL-MCNC: 14 MG/DL (ref 7–18)
CHLORIDE SERPL-SCNC: 102 MMOL/L (ref 98–109)
CREAT SERPL-MCNC: 1.6 MG/DL (ref 0.6–1.3)
EOSINOPHIL # BLD AUTO: 0.3 X10(3)/MCL (ref 0–0.9)
EOSINOPHIL NFR BLD AUTO: 7.9 %
ERYTHROCYTE [DISTWIDTH] IN BLOOD BY AUTOMATED COUNT: 13.5 % (ref 11.5–17)
GLUCOSE SERPL-MCNC: 93 MG/DL (ref 70–105)
HCT VFR BLD AUTO: 37.4 % (ref 37–47)
HCT VFR BLD CALC: 36 % (ref 38–51)
HGB BLD-MCNC: 11.9 GM/DL (ref 12–16)
HGB BLD-MCNC: 12.2 MG/DL (ref 12–17)
LYMPHOCYTES # BLD AUTO: 1.8 X10(3)/MCL (ref 0.6–4.6)
LYMPHOCYTES NFR BLD AUTO: 44.1 %
MCH RBC QN AUTO: 30.9 PG (ref 27–31)
MCHC RBC AUTO-ENTMCNC: 31.8 GM/DL (ref 33–36)
MCV RBC AUTO: 97.1 FL (ref 80–94)
MONOCYTES # BLD AUTO: 0.5 X10(3)/MCL (ref 0.1–1.3)
MONOCYTES NFR BLD AUTO: 11.6 %
NEUTROPHILS # BLD AUTO: 1.4 X10(3)/MCL (ref 2.1–9.2)
NEUTROPHILS NFR BLD AUTO: 35.7 %
PLATELET # BLD AUTO: 173 X10(3)/MCL (ref 130–400)
PMV BLD AUTO: 8.4 FL (ref 9.4–12.4)
POC IONIZED CALCIUM: 1.23 MMOL/L (ref 1.12–1.32)
POC TCO2: 30 MMOL/L (ref 22–27)
POTASSIUM BLD-SCNC: 3.4 MMOL/L (ref 3.5–4.9)
RBC # BLD AUTO: 3.85 X10(6)/MCL (ref 4.2–5.4)
SODIUM BLD-SCNC: 143 MMOL/L (ref 138–146)
WBC # SPEC AUTO: 4 X10(3)/MCL (ref 4.5–11.5)

## 2018-01-05 LAB
INFLUENZA A ANTIGEN, POC: NEGATIVE
INFLUENZA B ANTIGEN, POC: NEGATIVE
RAPID GROUP A STREP (OHS): POSITIVE

## 2018-01-22 ENCOUNTER — HISTORICAL (OUTPATIENT)
Dept: HEMATOLOGY/ONCOLOGY | Facility: CLINIC | Age: 53
End: 2018-01-22

## 2018-01-22 LAB
ABS NEUT (OLG): 1.22 X10(3)/MCL (ref 2.1–9.2)
ANION GAP SERPL CALC-SCNC: 17 MMOL/L
BASOPHILS # BLD AUTO: 0 X10(3)/MCL (ref 0–0.2)
BASOPHILS NFR BLD AUTO: 1.1 %
BUN SERPL-MCNC: 19 MG/DL (ref 7–18)
CHLORIDE SERPL-SCNC: 102 MMOL/L (ref 98–109)
CREAT SERPL-MCNC: 1.6 MG/DL (ref 0.6–1.3)
EOSINOPHIL # BLD AUTO: 0.4 X10(3)/MCL (ref 0–0.9)
EOSINOPHIL NFR BLD AUTO: 7.7 %
ERYTHROCYTE [DISTWIDTH] IN BLOOD BY AUTOMATED COUNT: 14.3 % (ref 11.5–17)
GLUCOSE SERPL-MCNC: 63 MG/DL (ref 70–105)
HCT VFR BLD AUTO: 36.5 % (ref 37–47)
HCT VFR BLD CALC: 36 % (ref 38–51)
HGB BLD-MCNC: 11.4 GM/DL (ref 12–16)
HGB BLD-MCNC: 12.2 MG/DL (ref 12–17)
LYMPHOCYTES # BLD AUTO: 2.4 X10(3)/MCL (ref 0.6–4.6)
LYMPHOCYTES NFR BLD AUTO: 50.9 %
MCH RBC QN AUTO: 30.6 PG (ref 27–31)
MCHC RBC AUTO-ENTMCNC: 31.2 GM/DL (ref 33–36)
MCV RBC AUTO: 98.1 FL (ref 80–94)
MONOCYTES # BLD AUTO: 0.7 X10(3)/MCL (ref 0.1–1.3)
MONOCYTES NFR BLD AUTO: 14.3 %
NEUTROPHILS # BLD AUTO: 1.2 X10(3)/MCL (ref 2.1–9.2)
NEUTROPHILS NFR BLD AUTO: 26 %
PLATELET # BLD AUTO: 168 X10(3)/MCL (ref 130–400)
PMV BLD AUTO: 8.3 FL (ref 9.4–12.4)
POC IONIZED CALCIUM: 1.18 MMOL/L (ref 1.12–1.32)
POC TCO2: 30 MMOL/L (ref 22–27)
POTASSIUM BLD-SCNC: 3.3 MMOL/L (ref 3.5–4.9)
RBC # BLD AUTO: 3.72 X10(6)/MCL (ref 4.2–5.4)
SODIUM BLD-SCNC: 144 MMOL/L (ref 138–146)
WBC # SPEC AUTO: 4.7 X10(3)/MCL (ref 4.5–11.5)

## 2018-02-15 ENCOUNTER — HISTORICAL (OUTPATIENT)
Dept: ADMINISTRATIVE | Facility: HOSPITAL | Age: 53
End: 2018-02-15

## 2018-02-15 LAB
ABS NEUT (OLG): 1.94 X10(3)/MCL (ref 2.1–9.2)
ANION GAP SERPL CALC-SCNC: 15 MMOL/L
BASOPHILS # BLD AUTO: 0 X10(3)/MCL (ref 0–0.2)
BASOPHILS NFR BLD AUTO: 0.5 %
BUN SERPL-MCNC: 17 MG/DL (ref 7–18)
CHLORIDE SERPL-SCNC: 103 MMOL/L (ref 98–109)
CREAT SERPL-MCNC: 1.6 MG/DL (ref 0.6–1.3)
EOSINOPHIL # BLD AUTO: 0.2 X10(3)/MCL (ref 0–0.9)
EOSINOPHIL NFR BLD AUTO: 5.2 %
ERYTHROCYTE [DISTWIDTH] IN BLOOD BY AUTOMATED COUNT: 13.6 % (ref 11.5–17)
GLUCOSE SERPL-MCNC: 81 MG/DL (ref 70–105)
HCT VFR BLD AUTO: 36.7 % (ref 37–47)
HCT VFR BLD CALC: 33 % (ref 38–51)
HGB BLD-MCNC: 11.2 MG/DL (ref 12–17)
HGB BLD-MCNC: 11.6 GM/DL (ref 12–16)
LYMPHOCYTES # BLD AUTO: 1.4 X10(3)/MCL (ref 0.6–4.6)
LYMPHOCYTES NFR BLD AUTO: 35.4 %
MCH RBC QN AUTO: 30.1 PG (ref 27–31)
MCHC RBC AUTO-ENTMCNC: 31.6 GM/DL (ref 33–36)
MCV RBC AUTO: 95.1 FL (ref 80–94)
MONOCYTES # BLD AUTO: 0.3 X10(3)/MCL (ref 0.1–1.3)
MONOCYTES NFR BLD AUTO: 8.3 %
NEUTROPHILS # BLD AUTO: 1.9 X10(3)/MCL (ref 2.1–9.2)
NEUTROPHILS NFR BLD AUTO: 50.6 %
PLATELET # BLD AUTO: 209 X10(3)/MCL (ref 130–400)
PMV BLD AUTO: 8.4 FL (ref 9.4–12.4)
POC IONIZED CALCIUM: 1.29 MMOL/L (ref 1.12–1.32)
POC TCO2: 30 MMOL/L (ref 22–27)
POTASSIUM BLD-SCNC: 3.6 MMOL/L (ref 3.5–4.9)
RBC # BLD AUTO: 3.86 X10(6)/MCL (ref 4.2–5.4)
SODIUM BLD-SCNC: 143 MMOL/L (ref 138–146)
WBC # SPEC AUTO: 3.8 X10(3)/MCL (ref 4.5–11.5)

## 2018-02-19 ENCOUNTER — HISTORICAL (OUTPATIENT)
Dept: CARDIOLOGY | Facility: HOSPITAL | Age: 53
End: 2018-02-19

## 2018-04-19 ENCOUNTER — HISTORICAL (OUTPATIENT)
Dept: INFUSION THERAPY | Facility: HOSPITAL | Age: 53
End: 2018-04-19

## 2018-04-19 LAB
ABS NEUT (OLG): 4.21 X10(3)/MCL (ref 2.1–9.2)
ANION GAP SERPL CALC-SCNC: 14 MMOL/L
BASOPHILS # BLD AUTO: 0 X10(3)/MCL (ref 0–0.2)
BASOPHILS NFR BLD AUTO: 0.1 %
BUN SERPL-MCNC: 20 MG/DL (ref 7–18)
CHLORIDE SERPL-SCNC: 108 MMOL/L (ref 98–109)
CREAT SERPL-MCNC: 1.3 MG/DL (ref 0.6–1.3)
EOSINOPHIL # BLD AUTO: 0.2 X10(3)/MCL (ref 0–0.9)
EOSINOPHIL NFR BLD AUTO: 2.4 %
ERYTHROCYTE [DISTWIDTH] IN BLOOD BY AUTOMATED COUNT: 13.9 % (ref 11.5–17)
GLUCOSE SERPL-MCNC: 100 MG/DL (ref 70–105)
HCT VFR BLD AUTO: 37.9 % (ref 37–47)
HCT VFR BLD CALC: 36 % (ref 38–51)
HGB BLD-MCNC: 11.8 GM/DL (ref 12–16)
HGB BLD-MCNC: 12.2 MG/DL (ref 12–17)
LYMPHOCYTES # BLD AUTO: 2.8 X10(3)/MCL (ref 0.6–4.6)
LYMPHOCYTES NFR BLD AUTO: 35.1 %
MCH RBC QN AUTO: 30.1 PG (ref 27–31)
MCHC RBC AUTO-ENTMCNC: 31.1 GM/DL (ref 33–36)
MCV RBC AUTO: 96.7 FL (ref 80–94)
MONOCYTES # BLD AUTO: 0.8 X10(3)/MCL (ref 0.1–1.3)
MONOCYTES NFR BLD AUTO: 10.3 %
NEUTROPHILS # BLD AUTO: 4.2 X10(3)/MCL (ref 2.1–9.2)
NEUTROPHILS NFR BLD AUTO: 52.1 %
PLATELET # BLD AUTO: 196 X10(3)/MCL (ref 130–400)
PMV BLD AUTO: 8.5 FL (ref 9.4–12.4)
POC IONIZED CALCIUM: 1.17 MMOL/L (ref 1.12–1.32)
POC TCO2: 24 MMOL/L (ref 22–27)
POTASSIUM BLD-SCNC: 3.8 MMOL/L (ref 3.5–4.9)
RBC # BLD AUTO: 3.92 X10(6)/MCL (ref 4.2–5.4)
SODIUM BLD-SCNC: 141 MMOL/L (ref 138–146)
WBC # SPEC AUTO: 8.1 X10(3)/MCL (ref 4.5–11.5)

## 2018-06-07 ENCOUNTER — HISTORICAL (OUTPATIENT)
Dept: HEMATOLOGY/ONCOLOGY | Facility: CLINIC | Age: 53
End: 2018-06-07

## 2018-06-07 LAB
ABS NEUT (OLG): 2.22 X10(3)/MCL (ref 2.1–9.2)
ALBUMIN SERPL-MCNC: 2.9 GM/DL (ref 3.4–5)
ALBUMIN/GLOB SERPL: 1 RATIO (ref 1.1–2)
ALP SERPL-CCNC: 101 UNIT/L (ref 38–126)
ALT SERPL-CCNC: 32 UNIT/L (ref 12–78)
AST SERPL-CCNC: 14 UNIT/L (ref 15–37)
BASOPHILS # BLD AUTO: 0 X10(3)/MCL (ref 0–0.2)
BASOPHILS NFR BLD AUTO: 1.1 %
BILIRUB SERPL-MCNC: 0.2 MG/DL (ref 0.2–1)
BILIRUBIN DIRECT+TOT PNL SERPL-MCNC: 0.1 MG/DL (ref 0–0.5)
BILIRUBIN DIRECT+TOT PNL SERPL-MCNC: 0.1 MG/DL (ref 0–0.8)
BUN SERPL-MCNC: 20 MG/DL (ref 7–18)
CALCIUM SERPL-MCNC: 9.5 MG/DL (ref 8.5–10.1)
CHLORIDE SERPL-SCNC: 105 MMOL/L (ref 98–107)
CO2 SERPL-SCNC: 31 MMOL/L (ref 21–32)
CREAT SERPL-MCNC: 1.78 MG/DL (ref 0.55–1.02)
EOSINOPHIL # BLD AUTO: 0.2 X10(3)/MCL (ref 0–0.9)
EOSINOPHIL NFR BLD AUTO: 4 %
ERYTHROCYTE [DISTWIDTH] IN BLOOD BY AUTOMATED COUNT: 12.9 % (ref 11.5–17)
GLOBULIN SER-MCNC: 2.9 GM/DL (ref 2.4–3.5)
GLUCOSE SERPL-MCNC: 86 MG/DL (ref 74–106)
HCT VFR BLD AUTO: 37.2 % (ref 37–47)
HGB BLD-MCNC: 11.8 GM/DL (ref 12–16)
LYMPHOCYTES # BLD AUTO: 1.4 X10(3)/MCL (ref 0.6–4.6)
LYMPHOCYTES NFR BLD AUTO: 32.1 %
MCH RBC QN AUTO: 30.2 PG (ref 27–31)
MCHC RBC AUTO-ENTMCNC: 31.7 GM/DL (ref 33–36)
MCV RBC AUTO: 95.1 FL (ref 80–94)
MONOCYTES # BLD AUTO: 0.6 X10(3)/MCL (ref 0.1–1.3)
MONOCYTES NFR BLD AUTO: 13.4 %
NEUTROPHILS # BLD AUTO: 2.2 X10(3)/MCL (ref 2.1–9.2)
NEUTROPHILS NFR BLD AUTO: 49.4 %
PLATELET # BLD AUTO: 171 X10(3)/MCL (ref 130–400)
PMV BLD AUTO: 8.4 FL (ref 9.4–12.4)
POTASSIUM SERPL-SCNC: 4.2 MMOL/L (ref 3.5–5.1)
PROT SERPL-MCNC: 5.8 GM/DL (ref 6.4–8.2)
RBC # BLD AUTO: 3.91 X10(6)/MCL (ref 4.2–5.4)
SODIUM SERPL-SCNC: 140 MMOL/L (ref 136–145)
WBC # SPEC AUTO: 4.5 X10(3)/MCL (ref 4.5–11.5)

## 2018-06-14 ENCOUNTER — HISTORICAL (OUTPATIENT)
Dept: ADMINISTRATIVE | Facility: HOSPITAL | Age: 53
End: 2018-06-14

## 2018-06-14 LAB
ABS NEUT (OLG): 5.4 X10(3)/MCL (ref 2.1–9.2)
ANION GAP SERPL CALC-SCNC: 17 MMOL/L
BASOPHILS # BLD AUTO: 0 X10(3)/MCL (ref 0–0.2)
BASOPHILS NFR BLD AUTO: 0.1 %
BUN SERPL-MCNC: 24 MG/DL (ref 7–18)
CHLORIDE SERPL-SCNC: 103 MMOL/L (ref 98–109)
CREAT SERPL-MCNC: 1.5 MG/DL (ref 0.6–1.3)
ERYTHROCYTE [DISTWIDTH] IN BLOOD BY AUTOMATED COUNT: 13.1 % (ref 11.5–17)
GLUCOSE SERPL-MCNC: 81 MG/DL (ref 70–105)
HCT VFR BLD AUTO: 34.7 % (ref 37–47)
HCT VFR BLD CALC: 32 % (ref 38–51)
HGB BLD-MCNC: 10.9 GM/DL (ref 12–16)
HGB BLD-MCNC: 10.9 MG/DL (ref 12–17)
LYMPHOCYTES # BLD AUTO: 1.8 X10(3)/MCL (ref 0.6–4.6)
LYMPHOCYTES NFR BLD AUTO: 21.2 %
MCH RBC QN AUTO: 29.9 PG (ref 27–31)
MCHC RBC AUTO-ENTMCNC: 31.4 GM/DL (ref 33–36)
MCV RBC AUTO: 95.3 FL (ref 80–94)
MONOCYTES # BLD AUTO: 1.2 X10(3)/MCL (ref 0.1–1.3)
MONOCYTES NFR BLD AUTO: 14.4 %
NEUTROPHILS # BLD AUTO: 5.4 X10(3)/MCL (ref 2.1–9.2)
NEUTROPHILS NFR BLD AUTO: 64.3 %
PLATELET # BLD AUTO: 174 X10(3)/MCL (ref 130–400)
PMV BLD AUTO: 8.7 FL (ref 9.4–12.4)
POC IONIZED CALCIUM: 1.22 MMOL/L (ref 1.12–1.32)
POC TCO2: 26 MMOL/L (ref 22–27)
POTASSIUM BLD-SCNC: 3.8 MMOL/L (ref 3.5–4.9)
RBC # BLD AUTO: 3.64 X10(6)/MCL (ref 4.2–5.4)
SODIUM BLD-SCNC: 141 MMOL/L (ref 138–146)
WBC # SPEC AUTO: 8.4 X10(3)/MCL (ref 4.5–11.5)

## 2018-06-20 ENCOUNTER — HISTORICAL (OUTPATIENT)
Dept: HEMATOLOGY/ONCOLOGY | Facility: CLINIC | Age: 53
End: 2018-06-20

## 2018-06-20 LAB
ABS NEUT (OLG): 1.52 X10(3)/MCL (ref 2.1–9.2)
ALBUMIN SERPL-MCNC: 3 GM/DL (ref 3.4–5)
ALBUMIN/GLOB SERPL: 1 {RATIO}
ALP SERPL-CCNC: 110 UNIT/L (ref 38–126)
ALT SERPL-CCNC: 26 UNIT/L (ref 12–78)
AST SERPL-CCNC: 18 UNIT/L (ref 15–37)
BASOPHILS # BLD AUTO: 0 X10(3)/MCL (ref 0–0.2)
BASOPHILS NFR BLD AUTO: 0.6 %
BILIRUB SERPL-MCNC: 0.3 MG/DL (ref 0.2–1)
BILIRUBIN DIRECT+TOT PNL SERPL-MCNC: 0.1 MG/DL (ref 0–0.2)
BILIRUBIN DIRECT+TOT PNL SERPL-MCNC: 0.2 MG/DL (ref 0–0.8)
BUN SERPL-MCNC: 16 MG/DL (ref 7–18)
CALCIUM SERPL-MCNC: 9.3 MG/DL (ref 8.5–10.1)
CHLORIDE SERPL-SCNC: 106 MMOL/L (ref 98–107)
CO2 SERPL-SCNC: 31 MMOL/L (ref 21–32)
CREAT SERPL-MCNC: 1.48 MG/DL (ref 0.55–1.02)
EOSINOPHIL # BLD AUTO: 0.2 X10(3)/MCL (ref 0–0.9)
EOSINOPHIL NFR BLD AUTO: 7.1 %
ERYTHROCYTE [DISTWIDTH] IN BLOOD BY AUTOMATED COUNT: 13.3 % (ref 11.5–17)
GLOBULIN SER-MCNC: 3 GM/DL (ref 2.4–3.5)
GLUCOSE SERPL-MCNC: 95 MG/DL (ref 74–106)
HCT VFR BLD AUTO: 38.7 % (ref 37–47)
HGB BLD-MCNC: 12.1 GM/DL (ref 12–16)
LYMPHOCYTES # BLD AUTO: 1.2 X10(3)/MCL (ref 0.6–4.6)
LYMPHOCYTES NFR BLD AUTO: 33.1 %
MCH RBC QN AUTO: 30.3 PG (ref 27–31)
MCHC RBC AUTO-ENTMCNC: 31.3 GM/DL (ref 33–36)
MCV RBC AUTO: 96.8 FL (ref 80–94)
MONOCYTES # BLD AUTO: 0.6 X10(3)/MCL (ref 0.1–1.3)
MONOCYTES NFR BLD AUTO: 15.7 %
NEUTROPHILS # BLD AUTO: 1.5 X10(3)/MCL (ref 2.1–9.2)
NEUTROPHILS NFR BLD AUTO: 43.5 %
PLATELET # BLD AUTO: 161 X10(3)/MCL (ref 130–400)
PMV BLD AUTO: 8.9 FL (ref 9.4–12.4)
POTASSIUM SERPL-SCNC: 3.5 MMOL/L (ref 3.5–5.1)
PROT SERPL-MCNC: 6 GM/DL (ref 6.4–8.2)
RBC # BLD AUTO: 4 X10(6)/MCL (ref 4.2–5.4)
SODIUM SERPL-SCNC: 144 MMOL/L (ref 136–145)
WBC # SPEC AUTO: 3.5 X10(3)/MCL (ref 4.5–11.5)

## 2018-07-05 ENCOUNTER — HISTORICAL (OUTPATIENT)
Dept: ADMINISTRATIVE | Facility: HOSPITAL | Age: 53
End: 2018-07-05

## 2018-07-05 LAB
ABS NEUT (OLG): 1.51 X10(3)/MCL (ref 2.1–9.2)
ANION GAP SERPL CALC-SCNC: 18 MMOL/L
ANISOCYTOSIS BLD QL SMEAR: 0
BASOPHILS # BLD AUTO: 0 X10(3)/MCL (ref 0–0.2)
BASOPHILS NFR BLD AUTO: 0 %
BASOPHILS NFR BLD MANUAL: 1 % (ref 0–2)
BUN SERPL-MCNC: 20 MG/DL (ref 8–26)
CHLORIDE SERPL-SCNC: 104 MMOL/L (ref 98–109)
CREAT SERPL-MCNC: 1.8 MG/DL (ref 0.6–1.3)
EOSINOPHIL # BLD AUTO: 0.2 X10(3)/MCL (ref 0–0.9)
EOSINOPHIL NFR BLD AUTO: 5 %
EOSINOPHIL NFR BLD MANUAL: 6 % (ref 0–8)
ERYTHROCYTE [DISTWIDTH] IN BLOOD BY AUTOMATED COUNT: 13.2 % (ref 11.5–17)
GLUCOSE SERPL-MCNC: 91 MG/DL (ref 70–105)
HCT VFR BLD AUTO: 36.4 % (ref 37–47)
HCT VFR BLD CALC: 33 % (ref 38–51)
HGB BLD-MCNC: 11.2 MG/DL (ref 12–17)
HGB BLD-MCNC: 11.5 GM/DL (ref 12–16)
HYPOCHROMIA BLD QL SMEAR: 0
LYMPHOCYTES # BLD AUTO: 1 X10(3)/MCL (ref 0.6–4.6)
LYMPHOCYTES NFR BLD AUTO: 31.8 %
LYMPHOCYTES NFR BLD MANUAL: 33 % (ref 13–40)
MACROCYTES BLD QL SMEAR: 0
MCH RBC QN AUTO: 30.2 PG (ref 27–31)
MCHC RBC AUTO-ENTMCNC: 31.6 GM/DL (ref 33–36)
MCV RBC AUTO: 95.5 FL (ref 80–94)
MICROCYTES BLD QL SMEAR: 0
MONOCYTES # BLD AUTO: 0.4 X10(3)/MCL (ref 0.1–1.3)
MONOCYTES NFR BLD AUTO: 11.9 %
MONOCYTES NFR BLD MANUAL: 10 % (ref 2–11)
NEUTROPHILS # BLD AUTO: 1.5 X10(3)/MCL (ref 2.1–9.2)
NEUTROPHILS NFR BLD AUTO: 48.6 %
NEUTROPHILS NFR BLD MANUAL: 49 % (ref 47–80)
PLATELET # BLD AUTO: 210 X10(3)/MCL (ref 130–400)
PLATELET # BLD EST: NORMAL 10*3/UL
PMV BLD AUTO: 9.3 FL (ref 9.4–12.4)
POC IONIZED CALCIUM: 1.15 MMOL/L (ref 1.12–1.32)
POC TCO2: 24 MMOL/L (ref 24–29)
POIKILOCYTOSIS BLD QL SMEAR: 0
POLYCHROMASIA BLD QL SMEAR: 0
POTASSIUM BLD-SCNC: 3.4 MMOL/L (ref 3.5–4.9)
RBC # BLD AUTO: 3.81 X10(6)/MCL (ref 4.2–5.4)
SODIUM BLD-SCNC: 142 MMOL/L (ref 138–146)
WBC # SPEC AUTO: 3.1 X10(3)/MCL (ref 4.5–11.5)

## 2018-07-12 ENCOUNTER — HISTORICAL (OUTPATIENT)
Dept: HEMATOLOGY/ONCOLOGY | Facility: CLINIC | Age: 53
End: 2018-07-12

## 2018-07-12 LAB
ABS NEUT (OLG): 1.87 X10(3)/MCL (ref 2.1–9.2)
ALBUMIN SERPL-MCNC: 2.9 GM/DL (ref 3.4–5)
ALBUMIN/GLOB SERPL: 1 {RATIO}
ALP SERPL-CCNC: 108 UNIT/L (ref 38–126)
ALT SERPL-CCNC: 26 UNIT/L (ref 12–78)
AST SERPL-CCNC: 18 UNIT/L (ref 15–37)
BASOPHILS # BLD AUTO: 0 X10(3)/MCL (ref 0–0.2)
BASOPHILS NFR BLD AUTO: 0.7 %
BILIRUB SERPL-MCNC: 0.5 MG/DL (ref 0.2–1)
BILIRUBIN DIRECT+TOT PNL SERPL-MCNC: 0.1 MG/DL (ref 0–0.2)
BILIRUBIN DIRECT+TOT PNL SERPL-MCNC: 0.4 MG/DL (ref 0–0.8)
BUN SERPL-MCNC: 16 MG/DL (ref 7–18)
CALCIUM SERPL-MCNC: 8.8 MG/DL (ref 8.5–10.1)
CHLORIDE SERPL-SCNC: 108 MMOL/L (ref 98–107)
CO2 SERPL-SCNC: 29 MMOL/L (ref 21–32)
CREAT SERPL-MCNC: 1.66 MG/DL (ref 0.55–1.02)
EOSINOPHIL # BLD AUTO: 0.1 X10(3)/MCL (ref 0–0.9)
EOSINOPHIL NFR BLD AUTO: 3.2 %
ERYTHROCYTE [DISTWIDTH] IN BLOOD BY AUTOMATED COUNT: 13.3 % (ref 11.5–17)
GLOBULIN SER-MCNC: 3 GM/DL (ref 2.4–3.5)
GLUCOSE SERPL-MCNC: 78 MG/DL (ref 74–106)
HCT VFR BLD AUTO: 38.3 % (ref 37–47)
HGB BLD-MCNC: 11.9 GM/DL (ref 12–16)
LYMPHOCYTES # BLD AUTO: 1.4 X10(3)/MCL (ref 0.6–4.6)
LYMPHOCYTES NFR BLD AUTO: 35 %
MCH RBC QN AUTO: 29.8 PG (ref 27–31)
MCHC RBC AUTO-ENTMCNC: 31.1 GM/DL (ref 33–36)
MCV RBC AUTO: 96 FL (ref 80–94)
MONOCYTES # BLD AUTO: 0.6 X10(3)/MCL (ref 0.1–1.3)
MONOCYTES NFR BLD AUTO: 15.2 %
NEUTROPHILS # BLD AUTO: 1.9 X10(3)/MCL (ref 2.1–9.2)
NEUTROPHILS NFR BLD AUTO: 45.9 %
PLATELET # BLD AUTO: 203 X10(3)/MCL (ref 130–400)
PMV BLD AUTO: 9.2 FL (ref 9.4–12.4)
POTASSIUM SERPL-SCNC: 3.6 MMOL/L (ref 3.5–5.1)
PROT SERPL-MCNC: 5.9 GM/DL (ref 6.4–8.2)
RBC # BLD AUTO: 3.99 X10(6)/MCL (ref 4.2–5.4)
SODIUM SERPL-SCNC: 146 MMOL/L (ref 136–145)
WBC # SPEC AUTO: 4.1 X10(3)/MCL (ref 4.5–11.5)

## 2018-07-19 ENCOUNTER — HISTORICAL (OUTPATIENT)
Dept: HEMATOLOGY/ONCOLOGY | Facility: CLINIC | Age: 53
End: 2018-07-19

## 2018-07-19 LAB
ABS NEUT (OLG): 2.66 X10(3)/MCL (ref 2.1–9.2)
ALBUMIN SERPL-MCNC: 2.7 GM/DL (ref 3.4–5)
ALBUMIN/GLOB SERPL: 0.9 {RATIO}
ALP SERPL-CCNC: 100 UNIT/L (ref 38–126)
ALT SERPL-CCNC: 25 UNIT/L (ref 12–78)
AST SERPL-CCNC: 13 UNIT/L (ref 15–37)
BASOPHILS # BLD AUTO: 0 X10(3)/MCL (ref 0–0.2)
BASOPHILS NFR BLD AUTO: 0.5 %
BILIRUB SERPL-MCNC: 0.3 MG/DL (ref 0.2–1)
BILIRUBIN DIRECT+TOT PNL SERPL-MCNC: 0.1 MG/DL (ref 0–0.2)
BILIRUBIN DIRECT+TOT PNL SERPL-MCNC: 0.2 MG/DL (ref 0–0.8)
BUN SERPL-MCNC: 14 MG/DL (ref 7–18)
CALCIUM SERPL-MCNC: 8.9 MG/DL (ref 8.5–10.1)
CHLORIDE SERPL-SCNC: 109 MMOL/L (ref 98–107)
CO2 SERPL-SCNC: 31 MMOL/L (ref 21–32)
CREAT SERPL-MCNC: 1.62 MG/DL (ref 0.55–1.02)
EOSINOPHIL # BLD AUTO: 0.2 X10(3)/MCL (ref 0–0.9)
EOSINOPHIL NFR BLD AUTO: 3.8 %
ERYTHROCYTE [DISTWIDTH] IN BLOOD BY AUTOMATED COUNT: 13.2 % (ref 11.5–17)
GLOBULIN SER-MCNC: 2.9 GM/DL (ref 2.4–3.5)
GLUCOSE SERPL-MCNC: 94 MG/DL (ref 74–106)
HCT VFR BLD AUTO: 35.9 % (ref 37–47)
HGB BLD-MCNC: 11.4 GM/DL (ref 12–16)
LYMPHOCYTES # BLD AUTO: 1 X10(3)/MCL (ref 0.6–4.6)
LYMPHOCYTES NFR BLD AUTO: 24.8 %
MCH RBC QN AUTO: 30.2 PG (ref 27–31)
MCHC RBC AUTO-ENTMCNC: 31.8 GM/DL (ref 33–36)
MCV RBC AUTO: 95 FL (ref 80–94)
MONOCYTES # BLD AUTO: 0.3 X10(3)/MCL (ref 0.1–1.3)
MONOCYTES NFR BLD AUTO: 7.4 %
NEUTROPHILS # BLD AUTO: 2.7 X10(3)/MCL (ref 2.1–9.2)
NEUTROPHILS NFR BLD AUTO: 63.5 %
PLATELET # BLD AUTO: 189 X10(3)/MCL (ref 130–400)
PMV BLD AUTO: 9.1 FL (ref 9.4–12.4)
POTASSIUM SERPL-SCNC: 3.8 MMOL/L (ref 3.5–5.1)
PROT SERPL-MCNC: 5.6 GM/DL (ref 6.4–8.2)
PROT SERPL-MCNC: 5.7 GM/DL
RBC # BLD AUTO: 3.78 X10(6)/MCL (ref 4.2–5.4)
SODIUM SERPL-SCNC: 144 MMOL/L (ref 136–145)
WBC # SPEC AUTO: 4.2 X10(3)/MCL (ref 4.5–11.5)

## 2018-07-27 ENCOUNTER — HISTORICAL (OUTPATIENT)
Dept: HEMATOLOGY/ONCOLOGY | Facility: CLINIC | Age: 53
End: 2018-07-27

## 2018-07-27 LAB
ABS NEUT (OLG): 2.34 X10(3)/MCL (ref 2.1–9.2)
ALBUMIN SERPL-MCNC: 3.2 GM/DL (ref 3.4–5)
ALBUMIN/GLOB SERPL: 1.1 RATIO (ref 1.1–2)
ALP SERPL-CCNC: 96 UNIT/L (ref 38–126)
ALT SERPL-CCNC: 25 UNIT/L (ref 12–78)
AST SERPL-CCNC: 16 UNIT/L (ref 15–37)
BASOPHILS # BLD AUTO: 0 X10(3)/MCL (ref 0–0.2)
BASOPHILS NFR BLD AUTO: 0.2 %
BILIRUB SERPL-MCNC: 0.3 MG/DL (ref 0.2–1)
BILIRUBIN DIRECT+TOT PNL SERPL-MCNC: 0.1 MG/DL (ref 0–0.5)
BILIRUBIN DIRECT+TOT PNL SERPL-MCNC: 0.2 MG/DL (ref 0–0.8)
BUN SERPL-MCNC: 30 MG/DL (ref 7–18)
CALCIUM SERPL-MCNC: 9.2 MG/DL (ref 8.5–10.1)
CHLORIDE SERPL-SCNC: 108 MMOL/L (ref 98–107)
CO2 SERPL-SCNC: 29 MMOL/L (ref 21–32)
CREAT SERPL-MCNC: 1.95 MG/DL (ref 0.55–1.02)
EOSINOPHIL # BLD AUTO: 0.1 X10(3)/MCL (ref 0–0.9)
EOSINOPHIL NFR BLD AUTO: 1.4 %
ERYTHROCYTE [DISTWIDTH] IN BLOOD BY AUTOMATED COUNT: 13.5 % (ref 11.5–17)
GLOBULIN SER-MCNC: 2.9 GM/DL (ref 2.4–3.5)
GLUCOSE SERPL-MCNC: 62 MG/DL (ref 74–106)
HCT VFR BLD AUTO: 35.3 % (ref 37–47)
HGB BLD-MCNC: 11 GM/DL (ref 12–16)
LYMPHOCYTES # BLD AUTO: 1.9 X10(3)/MCL (ref 0.6–4.6)
LYMPHOCYTES NFR BLD AUTO: 37.1 %
MCH RBC QN AUTO: 30.1 PG (ref 27–31)
MCHC RBC AUTO-ENTMCNC: 31.2 GM/DL (ref 33–36)
MCV RBC AUTO: 96.4 FL (ref 80–94)
MONOCYTES # BLD AUTO: 0.8 X10(3)/MCL (ref 0.1–1.3)
MONOCYTES NFR BLD AUTO: 16.2 %
NEUTROPHILS # BLD AUTO: 2.3 X10(3)/MCL (ref 2.1–9.2)
NEUTROPHILS NFR BLD AUTO: 45.1 %
PLATELET # BLD AUTO: 200 X10(3)/MCL (ref 130–400)
PMV BLD AUTO: 8.6 FL (ref 9.4–12.4)
POTASSIUM SERPL-SCNC: 3.7 MMOL/L (ref 3.5–5.1)
PROT SERPL-MCNC: 6.1 GM/DL (ref 6.4–8.2)
RBC # BLD AUTO: 3.66 X10(6)/MCL (ref 4.2–5.4)
SODIUM SERPL-SCNC: 145 MMOL/L (ref 136–145)
WBC # SPEC AUTO: 5.2 X10(3)/MCL (ref 4.5–11.5)

## 2018-08-08 ENCOUNTER — HISTORICAL (OUTPATIENT)
Dept: HEMATOLOGY/ONCOLOGY | Facility: CLINIC | Age: 53
End: 2018-08-08

## 2018-08-08 LAB
ABS NEUT (OLG): 1.58 X10(3)/MCL (ref 2.1–9.2)
ALBUMIN SERPL-MCNC: 3 GM/DL (ref 3.4–5)
ALBUMIN/GLOB SERPL: 0.9 RATIO (ref 1.1–2)
ALP SERPL-CCNC: 88 UNIT/L (ref 38–126)
ALT SERPL-CCNC: 20 UNIT/L (ref 12–78)
AST SERPL-CCNC: 14 UNIT/L (ref 15–37)
BASOPHILS # BLD AUTO: 0 X10(3)/MCL (ref 0–0.2)
BASOPHILS NFR BLD AUTO: 0.6 %
BILIRUB SERPL-MCNC: 0.3 MG/DL (ref 0.2–1)
BILIRUBIN DIRECT+TOT PNL SERPL-MCNC: 0.1 MG/DL (ref 0–0.5)
BILIRUBIN DIRECT+TOT PNL SERPL-MCNC: 0.2 MG/DL (ref 0–0.8)
BUN SERPL-MCNC: 26 MG/DL (ref 7–18)
CALCIUM SERPL-MCNC: 9.2 MG/DL (ref 8.5–10.1)
CHLORIDE SERPL-SCNC: 107 MMOL/L (ref 98–107)
CO2 SERPL-SCNC: 31 MMOL/L (ref 21–32)
CREAT SERPL-MCNC: 1.48 MG/DL (ref 0.55–1.02)
EOSINOPHIL # BLD AUTO: 0.1 X10(3)/MCL (ref 0–0.9)
EOSINOPHIL NFR BLD AUTO: 3 %
ERYTHROCYTE [DISTWIDTH] IN BLOOD BY AUTOMATED COUNT: 13.4 % (ref 11.5–17)
GLOBULIN SER-MCNC: 3.2 GM/DL (ref 2.4–3.5)
GLUCOSE SERPL-MCNC: 80 MG/DL (ref 74–106)
HCT VFR BLD AUTO: 36.3 % (ref 37–47)
HGB BLD-MCNC: 11.5 GM/DL (ref 12–16)
LYMPHOCYTES # BLD AUTO: 2 X10(3)/MCL (ref 0.6–4.6)
LYMPHOCYTES NFR BLD AUTO: 42.6 %
MCH RBC QN AUTO: 30.1 PG (ref 27–31)
MCHC RBC AUTO-ENTMCNC: 31.7 GM/DL (ref 33–36)
MCV RBC AUTO: 95 FL (ref 80–94)
MONOCYTES # BLD AUTO: 0.9 X10(3)/MCL (ref 0.1–1.3)
MONOCYTES NFR BLD AUTO: 20 %
NEUTROPHILS # BLD AUTO: 1.6 X10(3)/MCL (ref 2.1–9.2)
NEUTROPHILS NFR BLD AUTO: 33.8 %
PLATELET # BLD AUTO: 221 X10(3)/MCL (ref 130–400)
PMV BLD AUTO: 8.6 FL (ref 9.4–12.4)
POTASSIUM SERPL-SCNC: 3.8 MMOL/L (ref 3.5–5.1)
PROT SERPL-MCNC: 6.2 GM/DL (ref 6.4–8.2)
RBC # BLD AUTO: 3.82 X10(6)/MCL (ref 4.2–5.4)
SODIUM SERPL-SCNC: 143 MMOL/L (ref 136–145)
WBC # SPEC AUTO: 4.7 X10(3)/MCL (ref 4.5–11.5)

## 2018-08-15 ENCOUNTER — HISTORICAL (OUTPATIENT)
Dept: ADMINISTRATIVE | Facility: HOSPITAL | Age: 53
End: 2018-08-15

## 2018-08-15 LAB
ABS NEUT (OLG): 1.54 X10(3)/MCL (ref 2.1–9.2)
ALBUMIN SERPL-MCNC: 3.1 GM/DL (ref 3.4–5)
ALBUMIN/GLOB SERPL: 0.9 RATIO (ref 1.1–2)
ALP SERPL-CCNC: 88 UNIT/L (ref 38–126)
ALT SERPL-CCNC: 20 UNIT/L (ref 12–78)
AST SERPL-CCNC: 16 UNIT/L (ref 15–37)
BASOPHILS # BLD AUTO: 0 X10(3)/MCL (ref 0–0.2)
BASOPHILS NFR BLD AUTO: 1 %
BILIRUB SERPL-MCNC: 0.4 MG/DL (ref 0.2–1)
BILIRUBIN DIRECT+TOT PNL SERPL-MCNC: 0.1 MG/DL (ref 0–0.5)
BILIRUBIN DIRECT+TOT PNL SERPL-MCNC: 0.3 MG/DL (ref 0–0.8)
BUN SERPL-MCNC: 16 MG/DL (ref 7–18)
CALCIUM SERPL-MCNC: 9.3 MG/DL (ref 8.5–10.1)
CHLORIDE SERPL-SCNC: 108 MMOL/L (ref 98–107)
CO2 SERPL-SCNC: 32 MMOL/L (ref 21–32)
CREAT SERPL-MCNC: 1.59 MG/DL (ref 0.55–1.02)
EOSINOPHIL # BLD AUTO: 0.3 X10(3)/MCL (ref 0–0.9)
EOSINOPHIL NFR BLD AUTO: 6.9 %
ERYTHROCYTE [DISTWIDTH] IN BLOOD BY AUTOMATED COUNT: 13.4 % (ref 11.5–17)
GLOBULIN SER-MCNC: 3.6 GM/DL (ref 2.4–3.5)
GLUCOSE SERPL-MCNC: 82 MG/DL (ref 74–106)
HCT VFR BLD AUTO: 40.8 % (ref 37–47)
HGB BLD-MCNC: 12.8 GM/DL (ref 12–16)
LYMPHOCYTES # BLD AUTO: 1.5 X10(3)/MCL (ref 0.6–4.6)
LYMPHOCYTES NFR BLD AUTO: 37.7 %
MCH RBC QN AUTO: 29.9 PG (ref 27–31)
MCHC RBC AUTO-ENTMCNC: 31.4 GM/DL (ref 33–36)
MCV RBC AUTO: 95.3 FL (ref 80–94)
MONOCYTES # BLD AUTO: 0.6 X10(3)/MCL (ref 0.1–1.3)
MONOCYTES NFR BLD AUTO: 14.9 %
NEUTROPHILS # BLD AUTO: 1.5 X10(3)/MCL (ref 2.1–9.2)
NEUTROPHILS NFR BLD AUTO: 39.5 %
PLATELET # BLD AUTO: 196 X10(3)/MCL (ref 130–400)
PMV BLD AUTO: 8.7 FL (ref 9.4–12.4)
POTASSIUM SERPL-SCNC: 4.2 MMOL/L (ref 3.5–5.1)
PROT SERPL-MCNC: 6.7 GM/DL (ref 6.4–8.2)
RBC # BLD AUTO: 4.28 X10(6)/MCL (ref 4.2–5.4)
SODIUM SERPL-SCNC: 145 MMOL/L (ref 136–145)
WBC # SPEC AUTO: 3.9 X10(3)/MCL (ref 4.5–11.5)

## 2018-08-24 ENCOUNTER — HISTORICAL (OUTPATIENT)
Dept: HEMATOLOGY/ONCOLOGY | Facility: CLINIC | Age: 53
End: 2018-08-24

## 2018-08-24 LAB
ABS NEUT (OLG): 3.68 X10(3)/MCL (ref 2.1–9.2)
ALBUMIN SERPL-MCNC: 3.2 GM/DL (ref 3.4–5)
ALBUMIN/GLOB SERPL: 1 {RATIO}
ALP SERPL-CCNC: 106 UNIT/L (ref 38–126)
ALT SERPL-CCNC: 54 UNIT/L (ref 12–78)
AST SERPL-CCNC: 38 UNIT/L (ref 15–37)
BILIRUB SERPL-MCNC: 0.3 MG/DL (ref 0.2–1)
BILIRUBIN DIRECT+TOT PNL SERPL-MCNC: 0.1 MG/DL (ref 0–0.2)
BILIRUBIN DIRECT+TOT PNL SERPL-MCNC: 0.2 MG/DL (ref 0–0.8)
BUN SERPL-MCNC: 25 MG/DL (ref 7–18)
CALCIUM SERPL-MCNC: 9.2 MG/DL (ref 8.5–10.1)
CHLORIDE SERPL-SCNC: 103 MMOL/L (ref 98–107)
CO2 SERPL-SCNC: 25 MMOL/L (ref 21–32)
CREAT SERPL-MCNC: 1.75 MG/DL (ref 0.55–1.02)
ERYTHROCYTE [DISTWIDTH] IN BLOOD BY AUTOMATED COUNT: 13.3 % (ref 11.5–17)
GLOBULIN SER-MCNC: 3.3 GM/DL (ref 2.4–3.5)
GLUCOSE SERPL-MCNC: 165 MG/DL (ref 74–106)
HCT VFR BLD AUTO: 39.1 % (ref 37–47)
HGB BLD-MCNC: 12.5 GM/DL (ref 12–16)
LYMPHOCYTES # BLD AUTO: 0.6 X10(3)/MCL (ref 0.6–4.6)
LYMPHOCYTES NFR BLD AUTO: 13.2 %
MCH RBC QN AUTO: 29.8 PG (ref 27–31)
MCHC RBC AUTO-ENTMCNC: 32 GM/DL (ref 33–36)
MCV RBC AUTO: 93.1 FL (ref 80–94)
MONOCYTES # BLD AUTO: 0.1 X10(3)/MCL (ref 0.1–1.3)
MONOCYTES NFR BLD AUTO: 3.2 %
NEUTROPHILS # BLD AUTO: 3.7 X10(3)/MCL (ref 2.1–9.2)
NEUTROPHILS NFR BLD AUTO: 83.6 %
PLATELET # BLD AUTO: 230 X10(3)/MCL (ref 130–400)
PMV BLD AUTO: 8.9 FL (ref 9.4–12.4)
POTASSIUM SERPL-SCNC: 4.2 MMOL/L (ref 3.5–5.1)
PROT SERPL-MCNC: 6.5 GM/DL (ref 6.4–8.2)
RBC # BLD AUTO: 4.2 X10(6)/MCL (ref 4.2–5.4)
SODIUM SERPL-SCNC: 140 MMOL/L (ref 136–145)
WBC # SPEC AUTO: 4.4 X10(3)/MCL (ref 4.5–11.5)

## 2018-08-28 ENCOUNTER — HISTORICAL (OUTPATIENT)
Dept: ADMINISTRATIVE | Facility: HOSPITAL | Age: 53
End: 2018-08-28

## 2018-08-28 LAB
ABS NEUT (OLG): 2.14 X10(3)/MCL (ref 2.1–9.2)
ALBUMIN SERPL-MCNC: 3 GM/DL (ref 3.4–5)
ALBUMIN/GLOB SERPL: 1 {RATIO}
ALP SERPL-CCNC: 125 UNIT/L (ref 38–126)
ALT SERPL-CCNC: 36 UNIT/L (ref 12–78)
AST SERPL-CCNC: 19 UNIT/L (ref 15–37)
BASOPHILS # BLD AUTO: 0.1 X10(3)/MCL (ref 0–0.2)
BASOPHILS NFR BLD AUTO: 1.3 %
BILIRUB SERPL-MCNC: 0.3 MG/DL (ref 0.2–1)
BILIRUBIN DIRECT+TOT PNL SERPL-MCNC: 0.1 MG/DL (ref 0–0.2)
BILIRUBIN DIRECT+TOT PNL SERPL-MCNC: 0.2 MG/DL (ref 0–0.8)
BUN SERPL-MCNC: 17 MG/DL (ref 7–18)
CALCIUM SERPL-MCNC: 9.3 MG/DL (ref 8.5–10.1)
CHLORIDE SERPL-SCNC: 105 MMOL/L (ref 98–107)
CO2 SERPL-SCNC: 32 MMOL/L (ref 21–32)
CREAT SERPL-MCNC: 1.56 MG/DL (ref 0.55–1.02)
EOSINOPHIL # BLD AUTO: 0.4 X10(3)/MCL (ref 0–0.9)
EOSINOPHIL NFR BLD AUTO: 8.1 %
ERYTHROCYTE [DISTWIDTH] IN BLOOD BY AUTOMATED COUNT: 13.5 % (ref 11.5–17)
GLOBULIN SER-MCNC: 3.1 GM/DL (ref 2.4–3.5)
GLUCOSE SERPL-MCNC: 76 MG/DL (ref 74–106)
HCT VFR BLD AUTO: 39.2 % (ref 37–47)
HGB BLD-MCNC: 12.5 GM/DL (ref 12–16)
LYMPHOCYTES # BLD AUTO: 1.5 X10(3)/MCL (ref 0.6–4.6)
LYMPHOCYTES NFR BLD AUTO: 32.6 %
MCH RBC QN AUTO: 30.1 PG (ref 27–31)
MCHC RBC AUTO-ENTMCNC: 31.9 GM/DL (ref 33–36)
MCV RBC AUTO: 94.5 FL (ref 80–94)
MONOCYTES # BLD AUTO: 0.6 X10(3)/MCL (ref 0.1–1.3)
MONOCYTES NFR BLD AUTO: 12.4 %
NEUTROPHILS # BLD AUTO: 2.1 X10(3)/MCL (ref 2.1–9.2)
NEUTROPHILS NFR BLD AUTO: 45.6 %
PLATELET # BLD AUTO: 215 X10(3)/MCL (ref 130–400)
PMV BLD AUTO: 9.3 FL (ref 9.4–12.4)
POTASSIUM SERPL-SCNC: 3.8 MMOL/L (ref 3.5–5.1)
PROT SERPL-MCNC: 6.1 GM/DL (ref 6.4–8.2)
RBC # BLD AUTO: 4.15 X10(6)/MCL (ref 4.2–5.4)
SODIUM SERPL-SCNC: 142 MMOL/L (ref 136–145)
WBC # SPEC AUTO: 4.7 X10(3)/MCL (ref 4.5–11.5)

## 2018-09-14 ENCOUNTER — HISTORICAL (OUTPATIENT)
Dept: HEMATOLOGY/ONCOLOGY | Facility: CLINIC | Age: 53
End: 2018-09-14

## 2018-09-14 LAB
ABS NEUT (OLG): 1.72 X10(3)/MCL (ref 2.1–9.2)
ALBUMIN SERPL-MCNC: 2.7 GM/DL (ref 3.4–5)
ALBUMIN/GLOB SERPL: 0.9 RATIO (ref 1.1–2)
ALP SERPL-CCNC: 88 UNIT/L (ref 38–126)
ALT SERPL-CCNC: 28 UNIT/L (ref 12–78)
AST SERPL-CCNC: 21 UNIT/L (ref 15–37)
BASOPHILS # BLD AUTO: 0 X10(3)/MCL (ref 0–0.2)
BASOPHILS NFR BLD AUTO: 0.5 %
BILIRUB SERPL-MCNC: 0.2 MG/DL (ref 0.2–1)
BILIRUBIN DIRECT+TOT PNL SERPL-MCNC: 0.1 MG/DL (ref 0–0.5)
BILIRUBIN DIRECT+TOT PNL SERPL-MCNC: 0.1 MG/DL (ref 0–0.8)
BUN SERPL-MCNC: 15 MG/DL (ref 7–18)
CALCIUM SERPL-MCNC: 8.8 MG/DL (ref 8.5–10.1)
CHLORIDE SERPL-SCNC: 107 MMOL/L (ref 98–107)
CO2 SERPL-SCNC: 30 MMOL/L (ref 21–32)
CREAT SERPL-MCNC: 1.63 MG/DL (ref 0.55–1.02)
EOSINOPHIL # BLD AUTO: 0.1 X10(3)/MCL (ref 0–0.9)
EOSINOPHIL NFR BLD AUTO: 3.2 %
ERYTHROCYTE [DISTWIDTH] IN BLOOD BY AUTOMATED COUNT: 13.3 % (ref 11.5–17)
GLOBULIN SER-MCNC: 3.1 GM/DL (ref 2.4–3.5)
GLUCOSE SERPL-MCNC: 85 MG/DL (ref 74–106)
HCT VFR BLD AUTO: 37.1 % (ref 37–47)
HGB BLD-MCNC: 11.7 GM/DL (ref 12–16)
LYMPHOCYTES # BLD AUTO: 1.9 X10(3)/MCL (ref 0.6–4.6)
LYMPHOCYTES NFR BLD AUTO: 43.1 %
MCH RBC QN AUTO: 30 PG (ref 27–31)
MCHC RBC AUTO-ENTMCNC: 31.5 GM/DL (ref 33–36)
MCV RBC AUTO: 95.1 FL (ref 80–94)
MONOCYTES # BLD AUTO: 0.6 X10(3)/MCL (ref 0.1–1.3)
MONOCYTES NFR BLD AUTO: 14.4 %
NEUTROPHILS # BLD AUTO: 1.7 X10(3)/MCL (ref 2.1–9.2)
NEUTROPHILS NFR BLD AUTO: 38.8 %
PLATELET # BLD AUTO: 201 X10(3)/MCL (ref 130–400)
PMV BLD AUTO: 8.9 FL (ref 9.4–12.4)
POTASSIUM SERPL-SCNC: 4.1 MMOL/L (ref 3.5–5.1)
PROT SERPL-MCNC: 5.7 GM/DL
PROT SERPL-MCNC: 5.8 GM/DL (ref 6.4–8.2)
RBC # BLD AUTO: 3.9 X10(6)/MCL (ref 4.2–5.4)
SODIUM SERPL-SCNC: 143 MMOL/L (ref 136–145)
WBC # SPEC AUTO: 4.4 X10(3)/MCL (ref 4.5–11.5)

## 2018-10-03 ENCOUNTER — HISTORICAL (OUTPATIENT)
Dept: INFUSION THERAPY | Facility: HOSPITAL | Age: 53
End: 2018-10-03

## 2018-10-03 LAB
ANION GAP SERPL CALC-SCNC: 17 MMOL/L
BUN SERPL-MCNC: 23 MG/DL (ref 8–26)
CHLORIDE SERPL-SCNC: 104 MMOL/L (ref 98–109)
CREAT SERPL-MCNC: 1.5 MG/DL (ref 0.6–1.3)
GLUCOSE SERPL-MCNC: 113 MG/DL (ref 70–105)
HCT VFR BLD CALC: 33 % (ref 38–51)
HGB BLD-MCNC: 11.2 MG/DL (ref 12–17)
POC IONIZED CALCIUM: 1.19 MMOL/L (ref 1.12–1.32)
POC TCO2: 26 MMOL/L (ref 24–29)
POTASSIUM BLD-SCNC: 3 MMOL/L (ref 3.5–4.9)
SODIUM BLD-SCNC: 143 MMOL/L (ref 138–146)

## 2018-10-17 ENCOUNTER — HISTORICAL (OUTPATIENT)
Dept: HEMATOLOGY/ONCOLOGY | Facility: CLINIC | Age: 53
End: 2018-10-17

## 2018-10-17 LAB
ABS NEUT (OLG): 1.67 X10(3)/MCL (ref 2.1–9.2)
ALBUMIN SERPL-MCNC: 2.9 GM/DL (ref 3.4–5)
ALBUMIN/GLOB SERPL: 0.9 {RATIO}
ALP SERPL-CCNC: 80 UNIT/L (ref 38–126)
ALT SERPL-CCNC: 23 UNIT/L (ref 12–78)
AST SERPL-CCNC: 19 UNIT/L (ref 15–37)
BASOPHILS # BLD AUTO: 0 X10(3)/MCL (ref 0–0.2)
BASOPHILS NFR BLD AUTO: 1.4 %
BILIRUB SERPL-MCNC: 0.4 MG/DL (ref 0.2–1)
BILIRUBIN DIRECT+TOT PNL SERPL-MCNC: 0.1 MG/DL (ref 0–0.2)
BILIRUBIN DIRECT+TOT PNL SERPL-MCNC: 0.3 MG/DL (ref 0–0.8)
BUN SERPL-MCNC: 16 MG/DL (ref 7–18)
CALCIUM SERPL-MCNC: 9.6 MG/DL (ref 8.5–10.1)
CHLORIDE SERPL-SCNC: 107 MMOL/L (ref 98–107)
CO2 SERPL-SCNC: 30 MMOL/L (ref 21–32)
CREAT SERPL-MCNC: 1.35 MG/DL (ref 0.55–1.02)
EOSINOPHIL # BLD AUTO: 0.1 X10(3)/MCL (ref 0–0.9)
EOSINOPHIL NFR BLD AUTO: 3.7 %
ERYTHROCYTE [DISTWIDTH] IN BLOOD BY AUTOMATED COUNT: 13.8 % (ref 11.5–17)
GLOBULIN SER-MCNC: 3.1 GM/DL (ref 2.4–3.5)
GLUCOSE SERPL-MCNC: 92 MG/DL (ref 74–106)
HCT VFR BLD AUTO: 37.2 % (ref 37–47)
HGB BLD-MCNC: 11.7 GM/DL (ref 12–16)
LYMPHOCYTES # BLD AUTO: 1.1 X10(3)/MCL (ref 0.6–4.6)
LYMPHOCYTES NFR BLD AUTO: 32.9 %
MCH RBC QN AUTO: 29.8 PG (ref 27–31)
MCHC RBC AUTO-ENTMCNC: 31.5 GM/DL (ref 33–36)
MCV RBC AUTO: 94.9 FL (ref 80–94)
MONOCYTES # BLD AUTO: 0.5 X10(3)/MCL (ref 0.1–1.3)
MONOCYTES NFR BLD AUTO: 13.8 %
NEUTROPHILS # BLD AUTO: 1.7 X10(3)/MCL (ref 2.1–9.2)
NEUTROPHILS NFR BLD AUTO: 48.2 %
PLATELET # BLD AUTO: 205 X10(3)/MCL (ref 130–400)
PMV BLD AUTO: 8.5 FL (ref 9.4–12.4)
POTASSIUM SERPL-SCNC: 4 MMOL/L (ref 3.5–5.1)
PROT SERPL-MCNC: 6 GM/DL
PROT SERPL-MCNC: 6 GM/DL (ref 6.4–8.2)
RBC # BLD AUTO: 3.92 X10(6)/MCL (ref 4.2–5.4)
SODIUM SERPL-SCNC: 144 MMOL/L (ref 136–145)
WBC # SPEC AUTO: 3.5 X10(3)/MCL (ref 4.5–11.5)

## 2018-10-31 ENCOUNTER — HISTORICAL (OUTPATIENT)
Dept: INFUSION THERAPY | Facility: HOSPITAL | Age: 53
End: 2018-10-31

## 2018-10-31 LAB
ANION GAP SERPL CALC-SCNC: 16 MMOL/L
BUN SERPL-MCNC: 22 MG/DL (ref 8–26)
CHLORIDE SERPL-SCNC: 104 MMOL/L (ref 98–109)
CREAT SERPL-MCNC: 1.5 MG/DL (ref 0.6–1.3)
GLUCOSE SERPL-MCNC: 97 MG/DL (ref 70–105)
HCT VFR BLD CALC: 35 % (ref 38–51)
HGB BLD-MCNC: 11.9 MG/DL (ref 12–17)
POC IONIZED CALCIUM: 1.21 MMOL/L (ref 1.12–1.32)
POC TCO2: 24 MMOL/L (ref 24–29)
POTASSIUM BLD-SCNC: 4.3 MMOL/L (ref 3.5–4.9)
SODIUM BLD-SCNC: 139 MMOL/L (ref 138–146)

## 2018-11-12 ENCOUNTER — HISTORICAL (OUTPATIENT)
Dept: HEMATOLOGY/ONCOLOGY | Facility: CLINIC | Age: 53
End: 2018-11-12

## 2018-11-12 LAB
ABS NEUT (OLG): 2.65 X10(3)/MCL (ref 2.1–9.2)
ALBUMIN SERPL-MCNC: 3.1 GM/DL (ref 3.4–5)
ALBUMIN/GLOB SERPL: 1 RATIO (ref 1.1–2)
ALP SERPL-CCNC: 83 UNIT/L (ref 38–126)
ALT SERPL-CCNC: 29 UNIT/L (ref 12–78)
AST SERPL-CCNC: 15 UNIT/L (ref 15–37)
BASOPHILS # BLD AUTO: 0 X10(3)/MCL (ref 0–0.2)
BASOPHILS NFR BLD AUTO: 0.6 %
BILIRUB SERPL-MCNC: 0.2 MG/DL (ref 0.2–1)
BILIRUBIN DIRECT+TOT PNL SERPL-MCNC: 0.1 MG/DL (ref 0–0.5)
BILIRUBIN DIRECT+TOT PNL SERPL-MCNC: 0.1 MG/DL (ref 0–0.8)
BUN SERPL-MCNC: 24 MG/DL (ref 7–18)
CALCIUM SERPL-MCNC: 9 MG/DL (ref 8.5–10.1)
CHLORIDE SERPL-SCNC: 106 MMOL/L (ref 98–107)
CO2 SERPL-SCNC: 29 MMOL/L (ref 21–32)
CREAT SERPL-MCNC: 1.36 MG/DL (ref 0.55–1.02)
EOSINOPHIL # BLD AUTO: 0.1 X10(3)/MCL (ref 0–0.9)
EOSINOPHIL NFR BLD AUTO: 2.5 %
ERYTHROCYTE [DISTWIDTH] IN BLOOD BY AUTOMATED COUNT: 13.4 % (ref 11.5–17)
GLOBULIN SER-MCNC: 3.1 GM/DL (ref 2.4–3.5)
GLUCOSE SERPL-MCNC: 86 MG/DL (ref 74–106)
HCT VFR BLD AUTO: 38.2 % (ref 37–47)
HGB BLD-MCNC: 12.1 GM/DL (ref 12–16)
LYMPHOCYTES # BLD AUTO: 1.8 X10(3)/MCL (ref 0.6–4.6)
LYMPHOCYTES NFR BLD AUTO: 34.7 %
MCH RBC QN AUTO: 30.1 PG (ref 27–31)
MCHC RBC AUTO-ENTMCNC: 31.7 GM/DL (ref 33–36)
MCV RBC AUTO: 95 FL (ref 80–94)
MONOCYTES # BLD AUTO: 0.6 X10(3)/MCL (ref 0.1–1.3)
MONOCYTES NFR BLD AUTO: 11.6 %
NEUTROPHILS # BLD AUTO: 2.6 X10(3)/MCL (ref 2.1–9.2)
NEUTROPHILS NFR BLD AUTO: 50.4 %
PLATELET # BLD AUTO: 208 X10(3)/MCL (ref 130–400)
PMV BLD AUTO: 9 FL (ref 9.4–12.4)
POTASSIUM SERPL-SCNC: 3.7 MMOL/L (ref 3.5–5.1)
PROT SERPL-MCNC: 6 GM/DL
PROT SERPL-MCNC: 6.2 GM/DL (ref 6.4–8.2)
RBC # BLD AUTO: 4.02 X10(6)/MCL (ref 4.2–5.4)
SODIUM SERPL-SCNC: 144 MMOL/L (ref 136–145)
WBC # SPEC AUTO: 5.2 X10(3)/MCL (ref 4.5–11.5)

## 2018-12-11 LAB — CRC RECOMMENDATION EXT: NORMAL

## 2018-12-19 ENCOUNTER — HISTORICAL (OUTPATIENT)
Dept: ADMINISTRATIVE | Facility: HOSPITAL | Age: 53
End: 2018-12-19

## 2018-12-19 LAB
ANION GAP SERPL CALC-SCNC: 16 MMOL/L
BUN SERPL-MCNC: 21 MG/DL (ref 8–26)
CHLORIDE SERPL-SCNC: 103 MMOL/L (ref 98–109)
CREAT SERPL-MCNC: 1.6 MG/DL (ref 0.6–1.3)
GLUCOSE SERPL-MCNC: 91 MG/DL (ref 70–105)
HCT VFR BLD CALC: 35 % (ref 38–51)
HGB BLD-MCNC: 11.9 MG/DL (ref 12–17)
POC IONIZED CALCIUM: 1.23 MMOL/L (ref 1.12–1.32)
POC TCO2: 28 MMOL/L (ref 24–29)
POTASSIUM BLD-SCNC: 4.4 MMOL/L (ref 3.5–4.9)
SODIUM BLD-SCNC: 143 MMOL/L (ref 138–146)

## 2018-12-26 ENCOUNTER — HISTORICAL (OUTPATIENT)
Dept: INFUSION THERAPY | Facility: HOSPITAL | Age: 53
End: 2018-12-26

## 2018-12-26 LAB
ANION GAP SERPL CALC-SCNC: 13 MMOL/L
BUN SERPL-MCNC: 24 MG/DL (ref 8–26)
CHLORIDE SERPL-SCNC: 106 MMOL/L (ref 98–109)
CREAT SERPL-MCNC: 2 MG/DL (ref 0.6–1.3)
GLUCOSE SERPL-MCNC: 110 MG/DL (ref 70–105)
HCT VFR BLD CALC: 41 % (ref 38–51)
HGB BLD-MCNC: 13.9 MG/DL (ref 12–17)
POC IONIZED CALCIUM: 1.14 MMOL/L (ref 1.12–1.32)
POC TCO2: 26 MMOL/L (ref 24–29)
POTASSIUM BLD-SCNC: 4.9 MMOL/L (ref 3.5–4.9)
SODIUM BLD-SCNC: 139 MMOL/L (ref 138–146)

## 2019-01-07 ENCOUNTER — HISTORICAL (OUTPATIENT)
Dept: HEMATOLOGY/ONCOLOGY | Facility: CLINIC | Age: 54
End: 2019-01-07

## 2019-01-07 LAB
ABS NEUT (OLG): 2.15 X10(3)/MCL (ref 2.1–9.2)
ALBUMIN SERPL-MCNC: 2.8 GM/DL (ref 3.4–5)
ALBUMIN/GLOB SERPL: 0.9 {RATIO}
ALP SERPL-CCNC: 96 UNIT/L (ref 38–126)
ALT SERPL-CCNC: 50 UNIT/L (ref 12–78)
AST SERPL-CCNC: 17 UNIT/L (ref 15–37)
BASOPHILS # BLD AUTO: 0 X10(3)/MCL (ref 0–0.2)
BASOPHILS NFR BLD AUTO: 0.2 %
BILIRUB SERPL-MCNC: 0.3 MG/DL (ref 0.2–1)
BILIRUBIN DIRECT+TOT PNL SERPL-MCNC: 0.1 MG/DL (ref 0–0.2)
BILIRUBIN DIRECT+TOT PNL SERPL-MCNC: 0.2 MG/DL (ref 0–0.8)
BUN SERPL-MCNC: 20 MG/DL (ref 7–18)
CALCIUM SERPL-MCNC: 8.3 MG/DL (ref 8.5–10.1)
CHLORIDE SERPL-SCNC: 110 MMOL/L (ref 98–107)
CO2 SERPL-SCNC: 27 MMOL/L (ref 21–32)
CREAT SERPL-MCNC: 1.54 MG/DL (ref 0.55–1.02)
EOSINOPHIL # BLD AUTO: 0.2 X10(3)/MCL (ref 0–0.9)
EOSINOPHIL NFR BLD AUTO: 5.6 %
EOSINOPHIL NFR BLD MANUAL: 3 % (ref 0–8)
ERYTHROCYTE [DISTWIDTH] IN BLOOD BY AUTOMATED COUNT: 13.7 % (ref 11.5–17)
GLOBULIN SER-MCNC: 3 GM/DL (ref 2.4–3.5)
GLUCOSE SERPL-MCNC: 95 MG/DL (ref 74–106)
HCT VFR BLD AUTO: 36.3 % (ref 37–47)
HGB BLD-MCNC: 11.3 GM/DL (ref 12–16)
LYMPHOCYTES # BLD AUTO: 1.2 X10(3)/MCL (ref 0.6–4.6)
LYMPHOCYTES NFR BLD AUTO: 29.4 %
LYMPHOCYTES NFR BLD MANUAL: 2 /MCL
LYMPHOCYTES NFR BLD MANUAL: 28 % (ref 13–40)
MCH RBC QN AUTO: 29.7 PG (ref 27–31)
MCHC RBC AUTO-ENTMCNC: 31.1 GM/DL (ref 33–36)
MCV RBC AUTO: 95.3 FL (ref 80–94)
MONOCYTES # BLD AUTO: 0.5 X10(3)/MCL (ref 0.1–1.3)
MONOCYTES NFR BLD AUTO: 12.1 %
MONOCYTES NFR BLD MANUAL: 8 % (ref 2–11)
NEUTROPHILS # BLD AUTO: 2.2 X10(3)/MCL (ref 2.1–9.2)
NEUTROPHILS NFR BLD AUTO: 52.2 %
NEUTROPHILS NFR BLD MANUAL: 61 % (ref 47–80)
PLATELET # BLD AUTO: 205 X10(3)/MCL (ref 130–400)
PLATELET # BLD EST: NORMAL 10*3/UL
PMV BLD AUTO: 8.6 FL (ref 9.4–12.4)
POTASSIUM SERPL-SCNC: 3.6 MMOL/L (ref 3.5–5.1)
PROT SERPL-MCNC: 5.8 GM/DL
PROT SERPL-MCNC: 5.8 GM/DL (ref 6.4–8.2)
RBC # BLD AUTO: 3.81 X10(6)/MCL (ref 4.2–5.4)
RBC MORPH BLD: NORMAL
SODIUM SERPL-SCNC: 142 MMOL/L (ref 136–145)
WBC # SPEC AUTO: 4.1 X10(3)/MCL (ref 4.5–11.5)

## 2019-01-23 ENCOUNTER — HISTORICAL (OUTPATIENT)
Dept: INFUSION THERAPY | Facility: HOSPITAL | Age: 54
End: 2019-01-23

## 2019-01-23 LAB
ANION GAP SERPL CALC-SCNC: 15 MMOL/L
BUN SERPL-MCNC: 24 MG/DL (ref 8–26)
CHLORIDE SERPL-SCNC: 103 MMOL/L (ref 98–109)
CREAT SERPL-MCNC: 1.6 MG/DL (ref 0.6–1.3)
GLUCOSE SERPL-MCNC: 92 MG/DL (ref 70–105)
HCT VFR BLD CALC: 36 % (ref 38–51)
HGB BLD-MCNC: 12.2 MG/DL (ref 12–17)
POC IONIZED CALCIUM: 1.19 MMOL/L (ref 1.12–1.32)
POC TCO2: 26 MMOL/L (ref 24–29)
POTASSIUM BLD-SCNC: 3.7 MMOL/L (ref 3.5–4.9)
SODIUM BLD-SCNC: 140 MMOL/L (ref 138–146)

## 2019-02-07 ENCOUNTER — HISTORICAL (OUTPATIENT)
Dept: HEMATOLOGY/ONCOLOGY | Facility: CLINIC | Age: 54
End: 2019-02-07

## 2019-02-07 LAB
ABS NEUT (OLG): 2.41 X10(3)/MCL (ref 2.1–9.2)
ALBUMIN SERPL-MCNC: 3.1 GM/DL (ref 3.4–5)
ALBUMIN/GLOB SERPL: 1 RATIO (ref 1.1–2)
ALP SERPL-CCNC: 100 UNIT/L (ref 38–126)
ALT SERPL-CCNC: 25 UNIT/L (ref 12–78)
AST SERPL-CCNC: 12 UNIT/L (ref 15–37)
BASOPHILS # BLD AUTO: 0 X10(3)/MCL (ref 0–0.2)
BASOPHILS NFR BLD AUTO: 0.4 %
BILIRUB SERPL-MCNC: 0.2 MG/DL (ref 0.2–1)
BILIRUBIN DIRECT+TOT PNL SERPL-MCNC: 0.1 MG/DL (ref 0–0.5)
BILIRUBIN DIRECT+TOT PNL SERPL-MCNC: 0.1 MG/DL (ref 0–0.8)
BUN SERPL-MCNC: 25 MG/DL (ref 7–18)
CALCIUM SERPL-MCNC: 9.4 MG/DL (ref 8.5–10.1)
CHLORIDE SERPL-SCNC: 105 MMOL/L (ref 98–107)
CO2 SERPL-SCNC: 31 MMOL/L (ref 21–32)
CREAT SERPL-MCNC: 1.74 MG/DL (ref 0.55–1.02)
EOSINOPHIL # BLD AUTO: 0.2 X10(3)/MCL (ref 0–0.9)
EOSINOPHIL NFR BLD AUTO: 3.7 %
ERYTHROCYTE [DISTWIDTH] IN BLOOD BY AUTOMATED COUNT: 13.9 % (ref 11.5–17)
GLOBULIN SER-MCNC: 3.2 GM/DL (ref 2.4–3.5)
GLUCOSE SERPL-MCNC: 73 MG/DL (ref 74–106)
HCT VFR BLD AUTO: 38 % (ref 37–47)
HGB BLD-MCNC: 11.5 GM/DL (ref 12–16)
LYMPHOCYTES # BLD AUTO: 2 X10(3)/MCL (ref 0.6–4.6)
LYMPHOCYTES NFR BLD AUTO: 36.2 %
MCH RBC QN AUTO: 29.5 PG (ref 27–31)
MCHC RBC AUTO-ENTMCNC: 30.3 GM/DL (ref 33–36)
MCV RBC AUTO: 97.4 FL (ref 80–94)
MONOCYTES # BLD AUTO: 0.8 X10(3)/MCL (ref 0.1–1.3)
MONOCYTES NFR BLD AUTO: 14.9 %
NEUTROPHILS # BLD AUTO: 2.4 X10(3)/MCL (ref 2.1–9.2)
NEUTROPHILS NFR BLD AUTO: 44.2 %
PLATELET # BLD AUTO: 213 X10(3)/MCL (ref 130–400)
PMV BLD AUTO: 8.8 FL (ref 9.4–12.4)
POTASSIUM SERPL-SCNC: 3.6 MMOL/L (ref 3.5–5.1)
PROT SERPL-MCNC: 6.1 GM/DL
PROT SERPL-MCNC: 6.3 GM/DL (ref 6.4–8.2)
RBC # BLD AUTO: 3.9 X10(6)/MCL (ref 4.2–5.4)
SODIUM SERPL-SCNC: 144 MMOL/L (ref 136–145)
WBC # SPEC AUTO: 5.4 X10(3)/MCL (ref 4.5–11.5)

## 2019-02-25 ENCOUNTER — HISTORICAL (OUTPATIENT)
Dept: INFUSION THERAPY | Facility: HOSPITAL | Age: 54
End: 2019-02-25

## 2019-04-08 ENCOUNTER — HISTORICAL (OUTPATIENT)
Dept: HEMATOLOGY/ONCOLOGY | Facility: CLINIC | Age: 54
End: 2019-04-08

## 2019-04-08 LAB
ABS NEUT (OLG): 1.62 X10(3)/MCL (ref 2.1–9.2)
ALBUMIN SERPL-MCNC: 3.1 GM/DL (ref 3.4–5)
ALBUMIN/GLOB SERPL: 1 {RATIO}
ALP SERPL-CCNC: 80 UNIT/L (ref 38–126)
ALT SERPL-CCNC: 23 UNIT/L (ref 12–78)
AST SERPL-CCNC: 19 UNIT/L (ref 15–37)
BASOPHILS # BLD AUTO: 0 X10(3)/MCL (ref 0–0.2)
BASOPHILS NFR BLD AUTO: 0.3 %
BILIRUB SERPL-MCNC: 0.3 MG/DL (ref 0.2–1)
BILIRUBIN DIRECT+TOT PNL SERPL-MCNC: 0 MG/DL (ref 0–0.2)
BILIRUBIN DIRECT+TOT PNL SERPL-MCNC: 0.3 MG/DL (ref 0–0.8)
BUN SERPL-MCNC: 12 MG/DL (ref 7–18)
CALCIUM SERPL-MCNC: 9.1 MG/DL (ref 8.5–10.1)
CHLORIDE SERPL-SCNC: 107 MMOL/L (ref 98–107)
CO2 SERPL-SCNC: 27 MMOL/L (ref 21–32)
CREAT SERPL-MCNC: 1.79 MG/DL (ref 0.55–1.02)
EOSINOPHIL # BLD AUTO: 0.2 X10(3)/MCL (ref 0–0.9)
EOSINOPHIL NFR BLD AUTO: 6.8 %
ERYTHROCYTE [DISTWIDTH] IN BLOOD BY AUTOMATED COUNT: 13.1 % (ref 11.5–17)
GLOBULIN SER-MCNC: 3.1 GM/DL (ref 2.4–3.5)
GLUCOSE SERPL-MCNC: 85 MG/DL (ref 74–106)
HCT VFR BLD AUTO: 35.9 % (ref 37–47)
HGB BLD-MCNC: 11.2 GM/DL (ref 12–16)
LYMPHOCYTES # BLD AUTO: 1 X10(3)/MCL (ref 0.6–4.6)
LYMPHOCYTES NFR BLD AUTO: 30.2 %
MCH RBC QN AUTO: 29.9 PG (ref 27–31)
MCHC RBC AUTO-ENTMCNC: 31.2 GM/DL (ref 33–36)
MCV RBC AUTO: 96 FL (ref 80–94)
MONOCYTES # BLD AUTO: 0.4 X10(3)/MCL (ref 0.1–1.3)
MONOCYTES NFR BLD AUTO: 12 %
NEUTROPHILS # BLD AUTO: 1.6 X10(3)/MCL (ref 2.1–9.2)
NEUTROPHILS NFR BLD AUTO: 50.1 %
PLATELET # BLD AUTO: 194 X10(3)/MCL (ref 130–400)
PMV BLD AUTO: 8.8 FL (ref 9.4–12.4)
POTASSIUM SERPL-SCNC: 3.5 MMOL/L (ref 3.5–5.1)
PROT SERPL-MCNC: 6.2 GM/DL
PROT SERPL-MCNC: 6.2 GM/DL (ref 6.4–8.2)
RBC # BLD AUTO: 3.74 X10(6)/MCL (ref 4.2–5.4)
SODIUM SERPL-SCNC: 142 MMOL/L (ref 136–145)
WBC # SPEC AUTO: 3.2 X10(3)/MCL (ref 4.5–11.5)

## 2019-05-13 ENCOUNTER — HISTORICAL (OUTPATIENT)
Dept: ADMINISTRATIVE | Facility: HOSPITAL | Age: 54
End: 2019-05-13

## 2019-05-13 LAB
ABS NEUT (OLG): 2.38 X10(3)/MCL (ref 2.1–9.2)
ALBUMIN SERPL-MCNC: 2.8 GM/DL (ref 3.4–5)
ALBUMIN/GLOB SERPL: 0.9 RATIO (ref 1.1–2)
ALP SERPL-CCNC: 72 UNIT/L (ref 38–126)
ALT SERPL-CCNC: 31 UNIT/L (ref 12–78)
AST SERPL-CCNC: 25 UNIT/L (ref 15–37)
BASOPHILS # BLD AUTO: 0 X10(3)/MCL (ref 0–0.2)
BASOPHILS NFR BLD AUTO: 0.5 %
BILIRUB SERPL-MCNC: 0.2 MG/DL (ref 0.2–1)
BILIRUBIN DIRECT+TOT PNL SERPL-MCNC: 0.1 MG/DL (ref 0–0.5)
BILIRUBIN DIRECT+TOT PNL SERPL-MCNC: 0.1 MG/DL (ref 0–0.8)
BUN SERPL-MCNC: 16 MG/DL (ref 7–18)
CALCIUM SERPL-MCNC: 9.4 MG/DL (ref 8.5–10.1)
CHLORIDE SERPL-SCNC: 108 MMOL/L (ref 98–107)
CO2 SERPL-SCNC: 29 MMOL/L (ref 21–32)
CREAT SERPL-MCNC: 1.51 MG/DL (ref 0.55–1.02)
EOSINOPHIL # BLD AUTO: 0.2 X10(3)/MCL (ref 0–0.9)
EOSINOPHIL NFR BLD AUTO: 5.9 %
EOSINOPHIL NFR BLD MANUAL: 7 % (ref 0–8)
ERYTHROCYTE [DISTWIDTH] IN BLOOD BY AUTOMATED COUNT: 13.5 % (ref 11.5–17)
GLOBULIN SER-MCNC: 3.2 GM/DL (ref 2.4–3.5)
GLUCOSE SERPL-MCNC: 91 MG/DL (ref 74–106)
HCT VFR BLD AUTO: 34.2 % (ref 37–47)
HGB BLD-MCNC: 10.6 GM/DL (ref 12–16)
LYMPHOCYTES # BLD AUTO: 0.9 X10(3)/MCL (ref 0.6–4.6)
LYMPHOCYTES NFR BLD AUTO: 20.1 %
LYMPHOCYTES NFR BLD MANUAL: 30 % (ref 13–40)
MCH RBC QN AUTO: 29.9 PG (ref 27–31)
MCHC RBC AUTO-ENTMCNC: 31 GM/DL (ref 33–36)
MCV RBC AUTO: 96.6 FL (ref 80–94)
MONOCYTES # BLD AUTO: 0.7 X10(3)/MCL (ref 0.1–1.3)
MONOCYTES NFR BLD AUTO: 17.3 %
MONOCYTES NFR BLD MANUAL: 13 % (ref 2–11)
NEUTROPHILS # BLD AUTO: 2.4 X10(3)/MCL (ref 2.1–9.2)
NEUTROPHILS NFR BLD AUTO: 55.7 %
NEUTROPHILS NFR BLD MANUAL: 50 % (ref 47–80)
PLATELET # BLD AUTO: 184 X10(3)/MCL (ref 130–400)
PLATELET # BLD EST: NORMAL 10*3/UL
PMV BLD AUTO: 8.5 FL (ref 9.4–12.4)
POTASSIUM SERPL-SCNC: 3.3 MMOL/L (ref 3.5–5.1)
PROT SERPL-MCNC: 6 GM/DL (ref 6.4–8.2)
RBC # BLD AUTO: 3.54 X10(6)/MCL (ref 4.2–5.4)
RBC MORPH BLD: NORMAL
SODIUM SERPL-SCNC: 143 MMOL/L (ref 136–145)
WBC # SPEC AUTO: 4.3 X10(3)/MCL (ref 4.5–11.5)

## 2019-05-28 ENCOUNTER — HISTORICAL (OUTPATIENT)
Dept: INFUSION THERAPY | Facility: HOSPITAL | Age: 54
End: 2019-05-28

## 2019-05-28 LAB
ANION GAP SERPL CALC-SCNC: 17 MMOL/L
BUN SERPL-MCNC: 18 MG/DL (ref 8–26)
CHLORIDE SERPL-SCNC: 107 MMOL/L (ref 98–109)
CREAT SERPL-MCNC: 1.7 MG/DL (ref 0.6–1.3)
GLUCOSE SERPL-MCNC: 109 MG/DL (ref 70–105)
HCT VFR BLD CALC: 35 % (ref 38–51)
HGB BLD-MCNC: 11.9 MG/DL (ref 12–17)
POC IONIZED CALCIUM: 1.2 MMOL/L (ref 1.12–1.32)
POC TCO2: 22 MMOL/L (ref 24–29)
POTASSIUM BLD-SCNC: 4.1 MMOL/L (ref 3.5–4.9)
SODIUM BLD-SCNC: 141 MMOL/L (ref 138–146)

## 2019-06-03 ENCOUNTER — HISTORICAL (OUTPATIENT)
Dept: HEMATOLOGY/ONCOLOGY | Facility: CLINIC | Age: 54
End: 2019-06-03

## 2019-06-03 LAB
ABS NEUT (OLG): 4.61 X10(3)/MCL (ref 2.1–9.2)
ALBUMIN SERPL-MCNC: 3.1 GM/DL (ref 3.4–5)
ALBUMIN/GLOB SERPL: 0.9 {RATIO}
ALP SERPL-CCNC: 99 UNIT/L (ref 38–126)
ALT SERPL-CCNC: 55 UNIT/L (ref 12–78)
AST SERPL-CCNC: 39 UNIT/L (ref 15–37)
BASOPHILS # BLD AUTO: 0 X10(3)/MCL (ref 0–0.2)
BASOPHILS NFR BLD AUTO: 0.2 %
BILIRUB SERPL-MCNC: 0.2 MG/DL (ref 0.2–1)
BILIRUBIN DIRECT+TOT PNL SERPL-MCNC: 0.1 MG/DL (ref 0–0.2)
BILIRUBIN DIRECT+TOT PNL SERPL-MCNC: 0.1 MG/DL (ref 0–0.8)
BUN SERPL-MCNC: 15 MG/DL (ref 7–18)
CALCIUM SERPL-MCNC: 9 MG/DL (ref 8.5–10.1)
CHLORIDE SERPL-SCNC: 108 MMOL/L (ref 98–107)
CO2 SERPL-SCNC: 26 MMOL/L (ref 21–32)
CREAT SERPL-MCNC: 1.47 MG/DL (ref 0.55–1.02)
ERYTHROCYTE [DISTWIDTH] IN BLOOD BY AUTOMATED COUNT: 13.4 % (ref 11.5–17)
GLOBULIN SER-MCNC: 3.6 GM/DL (ref 2.4–3.5)
GLUCOSE SERPL-MCNC: 102 MG/DL (ref 74–106)
HCT VFR BLD AUTO: 36.2 % (ref 37–47)
HGB BLD-MCNC: 11.1 GM/DL (ref 12–16)
LYMPHOCYTES # BLD AUTO: 0.4 X10(3)/MCL (ref 0.6–4.6)
LYMPHOCYTES NFR BLD AUTO: 8.3 %
MCH RBC QN AUTO: 29.1 PG (ref 27–31)
MCHC RBC AUTO-ENTMCNC: 30.7 GM/DL (ref 33–36)
MCV RBC AUTO: 95 FL (ref 80–94)
MONOCYTES # BLD AUTO: 0.2 X10(3)/MCL (ref 0.1–1.3)
MONOCYTES NFR BLD AUTO: 3.6 %
NEUTROPHILS # BLD AUTO: 4.6 X10(3)/MCL (ref 2.1–9.2)
NEUTROPHILS NFR BLD AUTO: 87.3 %
PLATELET # BLD AUTO: 244 X10(3)/MCL (ref 130–400)
PMV BLD AUTO: 8.8 FL (ref 9.4–12.4)
POTASSIUM SERPL-SCNC: 4.7 MMOL/L (ref 3.5–5.1)
PROT SERPL-MCNC: 6.7 GM/DL
PROT SERPL-MCNC: 6.7 GM/DL (ref 6.4–8.2)
RBC # BLD AUTO: 3.81 X10(6)/MCL (ref 4.2–5.4)
SODIUM SERPL-SCNC: 139 MMOL/L (ref 136–145)
WBC # SPEC AUTO: 5.3 X10(3)/MCL (ref 4.5–11.5)

## 2019-06-25 ENCOUNTER — HISTORICAL (OUTPATIENT)
Dept: INFUSION THERAPY | Facility: HOSPITAL | Age: 54
End: 2019-06-25

## 2019-06-25 LAB
ANION GAP SERPL CALC-SCNC: 16 MMOL/L
BUN SERPL-MCNC: 18 MG/DL (ref 8–26)
CHLORIDE SERPL-SCNC: 103 MMOL/L (ref 98–109)
CREAT SERPL-MCNC: 1.6 MG/DL (ref 0.6–1.3)
GLUCOSE SERPL-MCNC: 70 MG/DL (ref 70–105)
HCT VFR BLD CALC: 33 % (ref 38–51)
HGB BLD-MCNC: 11.2 MG/DL (ref 12–17)
POC IONIZED CALCIUM: 1.31 MMOL/L (ref 1.12–1.32)
POC TCO2: 28 MMOL/L (ref 24–29)
POTASSIUM BLD-SCNC: 3.6 MMOL/L (ref 3.5–4.9)
SODIUM BLD-SCNC: 143 MMOL/L (ref 138–146)

## 2019-07-02 ENCOUNTER — HISTORICAL (OUTPATIENT)
Dept: HEMATOLOGY/ONCOLOGY | Facility: CLINIC | Age: 54
End: 2019-07-02

## 2019-07-02 LAB
ABS NEUT (OLG): 2.34 X10(3)/MCL (ref 2.1–9.2)
ALBUMIN SERPL-MCNC: 3.4 GM/DL (ref 3.4–5)
ALBUMIN/GLOB SERPL: 1.1 RATIO (ref 1.1–2)
ALP SERPL-CCNC: 74 UNIT/L (ref 38–126)
ALT SERPL-CCNC: 25 UNIT/L (ref 12–78)
AST SERPL-CCNC: 20 UNIT/L (ref 15–37)
BASOPHILS # BLD AUTO: 0 X10(3)/MCL (ref 0–0.2)
BASOPHILS NFR BLD AUTO: 0.2 %
BILIRUB SERPL-MCNC: 0.2 MG/DL (ref 0.2–1)
BILIRUBIN DIRECT+TOT PNL SERPL-MCNC: 0.1 MG/DL (ref 0–0.5)
BILIRUBIN DIRECT+TOT PNL SERPL-MCNC: 0.1 MG/DL (ref 0–0.8)
BUN SERPL-MCNC: 19 MG/DL (ref 7–18)
CALCIUM SERPL-MCNC: 9.4 MG/DL (ref 8.5–10.1)
CHLORIDE SERPL-SCNC: 107 MMOL/L (ref 98–107)
CO2 SERPL-SCNC: 30 MMOL/L (ref 21–32)
CREAT SERPL-MCNC: 1.67 MG/DL (ref 0.55–1.02)
EOSINOPHIL # BLD AUTO: 0.1 X10(3)/MCL (ref 0–0.9)
EOSINOPHIL NFR BLD AUTO: 1.7 %
EOSINOPHIL NFR BLD MANUAL: 1 % (ref 0–8)
ERYTHROCYTE [DISTWIDTH] IN BLOOD BY AUTOMATED COUNT: 13.5 % (ref 11.5–17)
GLOBULIN SER-MCNC: 3.2 GM/DL (ref 2.4–3.5)
GLUCOSE SERPL-MCNC: 68 MG/DL (ref 74–106)
HCT VFR BLD AUTO: 35.8 % (ref 37–47)
HGB BLD-MCNC: 10.8 GM/DL (ref 12–16)
LYMPHOCYTES # BLD AUTO: 1.4 X10(3)/MCL (ref 0.6–4.6)
LYMPHOCYTES NFR BLD AUTO: 29.8 %
LYMPHOCYTES NFR BLD MANUAL: 29 % (ref 13–40)
MCH RBC QN AUTO: 29.1 PG (ref 27–31)
MCHC RBC AUTO-ENTMCNC: 30.2 GM/DL (ref 33–36)
MCV RBC AUTO: 96.5 FL (ref 80–94)
MONOCYTES # BLD AUTO: 0.8 X10(3)/MCL (ref 0.1–1.3)
MONOCYTES NFR BLD AUTO: 17.8 %
MONOCYTES NFR BLD MANUAL: 16 % (ref 2–11)
NEUTROPHILS # BLD AUTO: 2.3 X10(3)/MCL (ref 2.1–9.2)
NEUTROPHILS NFR BLD AUTO: 50.1 %
NEUTROPHILS NFR BLD MANUAL: 54 % (ref 47–80)
PLATELET # BLD AUTO: 223 X10(3)/MCL (ref 130–400)
PLATELET # BLD EST: NORMAL 10*3/UL
PMV BLD AUTO: 8.8 FL (ref 9.4–12.4)
POTASSIUM SERPL-SCNC: 3.3 MMOL/L (ref 3.5–5.1)
PROT SERPL-MCNC: 6.5 GM/DL
PROT SERPL-MCNC: 6.6 GM/DL (ref 6.4–8.2)
RBC # BLD AUTO: 3.71 X10(6)/MCL (ref 4.2–5.4)
RBC MORPH BLD: NORMAL
SODIUM SERPL-SCNC: 143 MMOL/L (ref 136–145)
WBC # SPEC AUTO: 4.7 X10(3)/MCL (ref 4.5–11.5)

## 2019-07-16 ENCOUNTER — HISTORICAL (OUTPATIENT)
Dept: INFUSION THERAPY | Facility: HOSPITAL | Age: 54
End: 2019-07-16

## 2019-08-12 ENCOUNTER — HISTORICAL (OUTPATIENT)
Dept: INFUSION THERAPY | Facility: HOSPITAL | Age: 54
End: 2019-08-12

## 2019-08-12 LAB
ANION GAP SERPL CALC-SCNC: 18 MMOL/L
BUN SERPL-MCNC: 17 MG/DL (ref 8–26)
CHLORIDE SERPL-SCNC: 105 MMOL/L (ref 98–109)
CREAT SERPL-MCNC: 1.7 MG/DL (ref 0.6–1.3)
GLUCOSE SERPL-MCNC: 76 MG/DL (ref 70–105)
HCT VFR BLD CALC: 31 % (ref 38–51)
HGB BLD-MCNC: 10.5 MG/DL (ref 12–17)
POC IONIZED CALCIUM: 1.13 MMOL/L (ref 1.12–1.32)
POC TCO2: 23 MMOL/L (ref 24–29)
POTASSIUM BLD-SCNC: 3.5 MMOL/L (ref 3.5–4.9)
SODIUM BLD-SCNC: 142 MMOL/L (ref 138–146)

## 2019-08-23 ENCOUNTER — HISTORICAL (OUTPATIENT)
Dept: HEPATOLOGY | Facility: HOSPITAL | Age: 54
End: 2019-08-23

## 2019-09-09 ENCOUNTER — HISTORICAL (OUTPATIENT)
Dept: HEMATOLOGY/ONCOLOGY | Facility: CLINIC | Age: 54
End: 2019-09-09

## 2019-09-10 ENCOUNTER — HISTORICAL (OUTPATIENT)
Dept: HEMATOLOGY/ONCOLOGY | Facility: CLINIC | Age: 54
End: 2019-09-10

## 2019-09-10 LAB
ABS NEUT (OLG): 4.04 X10(3)/MCL (ref 2.1–9.2)
ALBUMIN SERPL-MCNC: 3.2 GM/DL (ref 3.4–5)
ALBUMIN/GLOB SERPL: 0.9 RATIO (ref 1.1–2)
ALP SERPL-CCNC: 98 UNIT/L (ref 38–126)
ALT SERPL-CCNC: 37 UNIT/L (ref 12–78)
AST SERPL-CCNC: 28 UNIT/L (ref 15–37)
BILIRUB SERPL-MCNC: 0.2 MG/DL (ref 0.2–1)
BILIRUBIN DIRECT+TOT PNL SERPL-MCNC: 0.1 MG/DL (ref 0–0.5)
BILIRUBIN DIRECT+TOT PNL SERPL-MCNC: 0.1 MG/DL (ref 0–0.8)
BUN SERPL-MCNC: 19 MG/DL (ref 7–18)
CALCIUM SERPL-MCNC: 9.8 MG/DL (ref 8.5–10.1)
CHLORIDE SERPL-SCNC: 107 MMOL/L (ref 98–107)
CO2 SERPL-SCNC: 28 MMOL/L (ref 21–32)
CREAT SERPL-MCNC: 1.88 MG/DL (ref 0.55–1.02)
ERYTHROCYTE [DISTWIDTH] IN BLOOD BY AUTOMATED COUNT: 14.6 % (ref 11.5–17)
GLOBULIN SER-MCNC: 3.4 GM/DL (ref 2.4–3.5)
GLUCOSE SERPL-MCNC: 107 MG/DL (ref 74–106)
HCT VFR BLD AUTO: 34.2 % (ref 37–47)
HGB BLD-MCNC: 10.4 GM/DL (ref 12–16)
LYMPHOCYTES # BLD AUTO: 0.5 X10(3)/MCL (ref 0.6–4.6)
LYMPHOCYTES NFR BLD AUTO: 10.5 %
MCH RBC QN AUTO: 28.8 PG (ref 27–31)
MCHC RBC AUTO-ENTMCNC: 30.4 GM/DL (ref 33–36)
MCV RBC AUTO: 94.7 FL (ref 80–94)
MONOCYTES # BLD AUTO: 0.4 X10(3)/MCL (ref 0.1–1.3)
MONOCYTES NFR BLD AUTO: 7.3 %
NEUTROPHILS # BLD AUTO: 4 X10(3)/MCL (ref 2.1–9.2)
NEUTROPHILS NFR BLD AUTO: 81.4 %
PLATELET # BLD AUTO: 223 X10(3)/MCL (ref 130–400)
PMV BLD AUTO: 8.8 FL (ref 9.4–12.4)
POTASSIUM SERPL-SCNC: 4 MMOL/L (ref 3.5–5.1)
PROT SERPL-MCNC: 6.6 GM/DL (ref 6.4–8.2)
RBC # BLD AUTO: 3.61 X10(6)/MCL (ref 4.2–5.4)
SODIUM SERPL-SCNC: 145 MMOL/L (ref 136–145)
WBC # SPEC AUTO: 5 X10(3)/MCL (ref 4.5–11.5)

## 2019-09-16 ENCOUNTER — HISTORICAL (OUTPATIENT)
Dept: INFUSION THERAPY | Facility: HOSPITAL | Age: 54
End: 2019-09-16

## 2019-09-19 ENCOUNTER — HISTORICAL (OUTPATIENT)
Dept: ADMINISTRATIVE | Facility: HOSPITAL | Age: 54
End: 2019-09-19

## 2019-10-08 ENCOUNTER — HISTORICAL (OUTPATIENT)
Dept: ADMINISTRATIVE | Facility: HOSPITAL | Age: 54
End: 2019-10-08

## 2019-10-08 LAB
ABS NEUT (OLG): 2.45 X10(3)/MCL (ref 2.1–9.2)
ALBUMIN SERPL-MCNC: 3 GM/DL (ref 3.4–5)
ALBUMIN/GLOB SERPL: 0.9 RATIO (ref 1.1–2)
ALP SERPL-CCNC: 93 UNIT/L (ref 38–126)
ALT SERPL-CCNC: 26 UNIT/L (ref 12–78)
ANISOCYTOSIS BLD QL SMEAR: 0
AST SERPL-CCNC: 29 UNIT/L (ref 15–37)
BASOPHILS # BLD AUTO: 0 X10(3)/MCL (ref 0–0.2)
BASOPHILS NFR BLD AUTO: 0.4 %
BASOPHILS NFR BLD MANUAL: 1 % (ref 0–2)
BILIRUB SERPL-MCNC: 0.1 MG/DL (ref 0.2–1)
BILIRUBIN DIRECT+TOT PNL SERPL-MCNC: 0 MG/DL (ref 0–0.8)
BILIRUBIN DIRECT+TOT PNL SERPL-MCNC: 0.1 MG/DL (ref 0–0.5)
BUN SERPL-MCNC: 22 MG/DL (ref 7–18)
CALCIUM SERPL-MCNC: 9 MG/DL (ref 8.5–10.1)
CHLORIDE SERPL-SCNC: 107 MMOL/L (ref 98–107)
CO2 SERPL-SCNC: 31 MMOL/L (ref 21–32)
CREAT SERPL-MCNC: 1.58 MG/DL (ref 0.55–1.02)
EOSINOPHIL # BLD AUTO: 0.1 X10(3)/MCL (ref 0–0.9)
EOSINOPHIL NFR BLD AUTO: 2 %
EOSINOPHIL NFR BLD MANUAL: 1 % (ref 0–8)
ERYTHROCYTE [DISTWIDTH] IN BLOOD BY AUTOMATED COUNT: 14.5 % (ref 11.5–17)
GLOBULIN SER-MCNC: 3.2 GM/DL (ref 2.4–3.5)
GLUCOSE SERPL-MCNC: 84 MG/DL (ref 74–106)
HCT VFR BLD AUTO: 34 % (ref 37–47)
HGB BLD-MCNC: 10.4 GM/DL (ref 12–16)
HYPOCHROMIA BLD QL SMEAR: 0
LYMPHOCYTES # BLD AUTO: 1.6 X10(3)/MCL (ref 0.6–4.6)
LYMPHOCYTES NFR BLD AUTO: 30.9 %
LYMPHOCYTES NFR BLD MANUAL: 35 % (ref 13–40)
MACROCYTES BLD QL SMEAR: 1
MCH RBC QN AUTO: 29.5 PG (ref 27–31)
MCHC RBC AUTO-ENTMCNC: 30.6 GM/DL (ref 33–36)
MCV RBC AUTO: 96.6 FL (ref 80–94)
MICROCYTES BLD QL SMEAR: 0
MONOCYTES # BLD AUTO: 0.9 X10(3)/MCL (ref 0.1–1.3)
MONOCYTES NFR BLD AUTO: 17.5 %
MONOCYTES NFR BLD MANUAL: 11 % (ref 2–11)
NEUTROPHILS # BLD AUTO: 2.4 X10(3)/MCL (ref 2.1–9.2)
NEUTROPHILS NFR BLD AUTO: 48.8 %
NEUTROPHILS NFR BLD MANUAL: 52 % (ref 47–80)
PLATELET # BLD AUTO: 219 X10(3)/MCL (ref 130–400)
PLATELET # BLD EST: NORMAL 10*3/UL
PMV BLD AUTO: 9 FL (ref 9.4–12.4)
POIKILOCYTOSIS BLD QL SMEAR: 0
POLYCHROMASIA BLD QL SMEAR: 0
POTASSIUM SERPL-SCNC: 3.7 MMOL/L (ref 3.5–5.1)
PROT SERPL-MCNC: 6.1 GM/DL
PROT SERPL-MCNC: 6.2 GM/DL (ref 6.4–8.2)
RBC # BLD AUTO: 3.52 X10(6)/MCL (ref 4.2–5.4)
SCHISTOCYTES BLD QL AUTO: 0
SODIUM SERPL-SCNC: 144 MMOL/L (ref 136–145)
SPHEROCYTES BLD QL SMEAR: 0
TARGETS BLD QL SMEAR: 0
WBC # SPEC AUTO: 5 X10(3)/MCL (ref 4.5–11.5)

## 2019-12-03 ENCOUNTER — HISTORICAL (OUTPATIENT)
Dept: HEMATOLOGY/ONCOLOGY | Facility: CLINIC | Age: 54
End: 2019-12-03

## 2019-12-03 LAB
ABS NEUT (OLG): 2.62 X10(3)/MCL (ref 2.1–9.2)
ALBUMIN SERPL-MCNC: 3.4 GM/DL (ref 3.4–5)
ALBUMIN/GLOB SERPL: 1 RATIO (ref 1.1–2)
ALP SERPL-CCNC: 96 UNIT/L (ref 38–126)
ALT SERPL-CCNC: 37 UNIT/L (ref 12–78)
AST SERPL-CCNC: 27 UNIT/L (ref 15–37)
BASOPHILS # BLD AUTO: 0 X10(3)/MCL (ref 0–0.2)
BASOPHILS NFR BLD AUTO: 0.2 %
BILIRUB SERPL-MCNC: 0.1 MG/DL (ref 0.2–1)
BILIRUBIN DIRECT+TOT PNL SERPL-MCNC: 0 MG/DL (ref 0–0.8)
BILIRUBIN DIRECT+TOT PNL SERPL-MCNC: 0.1 MG/DL (ref 0–0.5)
BUN SERPL-MCNC: 25 MG/DL (ref 7–18)
CALCIUM SERPL-MCNC: 9.3 MG/DL (ref 8.5–10.1)
CHLORIDE SERPL-SCNC: 107 MMOL/L (ref 98–107)
CO2 SERPL-SCNC: 27 MMOL/L (ref 21–32)
CREAT SERPL-MCNC: 1.69 MG/DL (ref 0.55–1.02)
EOSINOPHIL # BLD AUTO: 0.1 X10(3)/MCL (ref 0–0.9)
EOSINOPHIL NFR BLD AUTO: 1.9 %
ERYTHROCYTE [DISTWIDTH] IN BLOOD BY AUTOMATED COUNT: 13.3 % (ref 11.5–17)
GLOBULIN SER-MCNC: 3.5 GM/DL (ref 2.4–3.5)
GLUCOSE SERPL-MCNC: 88 MG/DL (ref 74–106)
HCT VFR BLD AUTO: 38 % (ref 37–47)
HGB BLD-MCNC: 11.7 GM/DL (ref 12–16)
LYMPHOCYTES # BLD AUTO: 1.8 X10(3)/MCL (ref 0.6–4.6)
LYMPHOCYTES NFR BLD AUTO: 33.8 %
MCH RBC QN AUTO: 29.7 PG (ref 27–31)
MCHC RBC AUTO-ENTMCNC: 30.8 GM/DL (ref 33–36)
MCV RBC AUTO: 96.4 FL (ref 80–94)
MONOCYTES # BLD AUTO: 0.7 X10(3)/MCL (ref 0.1–1.3)
MONOCYTES NFR BLD AUTO: 13.8 %
NEUTROPHILS # BLD AUTO: 2.6 X10(3)/MCL (ref 2.1–9.2)
NEUTROPHILS NFR BLD AUTO: 50.1 %
PLATELET # BLD AUTO: 232 X10(3)/MCL (ref 130–400)
PMV BLD AUTO: 8.9 FL (ref 9.4–12.4)
POTASSIUM SERPL-SCNC: 3.8 MMOL/L (ref 3.5–5.1)
PROT SERPL-MCNC: 6.9 GM/DL (ref 6.4–8.2)
RBC # BLD AUTO: 3.94 X10(6)/MCL (ref 4.2–5.4)
SODIUM SERPL-SCNC: 143 MMOL/L (ref 136–145)
WBC # SPEC AUTO: 5.2 X10(3)/MCL (ref 4.5–11.5)

## 2020-01-10 ENCOUNTER — HISTORICAL (OUTPATIENT)
Dept: ADMINISTRATIVE | Facility: HOSPITAL | Age: 55
End: 2020-01-10

## 2020-01-10 LAB
ABS NEUT (OLG): 2 X10(3)/MCL (ref 2.1–9.2)
ALBUMIN SERPL-MCNC: 3.1 GM/DL (ref 3.4–5)
ALBUMIN/GLOB SERPL: 0.9 {RATIO}
ALP SERPL-CCNC: 96 UNIT/L (ref 38–126)
ALT SERPL-CCNC: 28 UNIT/L (ref 12–78)
AST SERPL-CCNC: 20 UNIT/L (ref 15–37)
BASOPHILS # BLD AUTO: 0 X10(3)/MCL (ref 0–0.2)
BASOPHILS NFR BLD AUTO: 1 %
BILIRUB SERPL-MCNC: 0.2 MG/DL (ref 0.2–1)
BILIRUBIN DIRECT+TOT PNL SERPL-MCNC: 0.1 MG/DL (ref 0–0.2)
BILIRUBIN DIRECT+TOT PNL SERPL-MCNC: 0.1 MG/DL (ref 0–0.8)
BUN SERPL-MCNC: 16 MG/DL (ref 7–18)
CALCIUM SERPL-MCNC: 9.3 MG/DL (ref 8.5–10.1)
CHLORIDE SERPL-SCNC: 108 MMOL/L (ref 98–107)
CO2 SERPL-SCNC: 26 MMOL/L (ref 21–32)
CREAT SERPL-MCNC: 1.45 MG/DL (ref 0.55–1.02)
EOSINOPHIL # BLD AUTO: 0.2 X10(3)/MCL (ref 0–0.9)
EOSINOPHIL NFR BLD AUTO: 4.6 %
ERYTHROCYTE [DISTWIDTH] IN BLOOD BY AUTOMATED COUNT: 13.3 % (ref 11.5–17)
GLOBULIN SER-MCNC: 3.3 GM/DL (ref 2.4–3.5)
GLUCOSE SERPL-MCNC: 81 MG/DL (ref 74–106)
HCT VFR BLD AUTO: 38 % (ref 37–47)
HGB BLD-MCNC: 11.9 GM/DL (ref 12–16)
LYMPHOCYTES # BLD AUTO: 1 X10(3)/MCL (ref 0.6–4.6)
LYMPHOCYTES NFR BLD AUTO: 25.1 %
MCH RBC QN AUTO: 29.2 PG (ref 27–31)
MCHC RBC AUTO-ENTMCNC: 31.3 GM/DL (ref 33–36)
MCV RBC AUTO: 93.4 FL (ref 80–94)
MONOCYTES # BLD AUTO: 0.7 X10(3)/MCL (ref 0.1–1.3)
MONOCYTES NFR BLD AUTO: 17.2 %
NEUTROPHILS # BLD AUTO: 2 X10(3)/MCL (ref 2.1–9.2)
NEUTROPHILS NFR BLD AUTO: 51.3 %
PLATELET # BLD AUTO: 218 X10(3)/MCL (ref 130–400)
PMV BLD AUTO: 9 FL (ref 9.4–12.4)
POTASSIUM SERPL-SCNC: 3.3 MMOL/L (ref 3.5–5.1)
PROT SERPL-MCNC: 6.4 GM/DL
PROT SERPL-MCNC: 6.4 GM/DL (ref 6.4–8.2)
RBC # BLD AUTO: 4.07 X10(6)/MCL (ref 4.2–5.4)
SODIUM SERPL-SCNC: 141 MMOL/L (ref 136–145)
WBC # SPEC AUTO: 3.9 X10(3)/MCL (ref 4.5–11.5)

## 2020-02-10 ENCOUNTER — HISTORICAL (OUTPATIENT)
Dept: HEMATOLOGY/ONCOLOGY | Facility: CLINIC | Age: 55
End: 2020-02-10

## 2020-02-10 LAB
ABS NEUT (OLG): 3.46 X10(3)/MCL (ref 2.1–9.2)
ALBUMIN SERPL-MCNC: 3.1 GM/DL (ref 3.4–5)
ALBUMIN/GLOB SERPL: 1 {RATIO}
ALP SERPL-CCNC: 113 UNIT/L (ref 38–126)
ALT SERPL-CCNC: 53 UNIT/L (ref 12–78)
AST SERPL-CCNC: 28 UNIT/L (ref 15–37)
BASOPHILS # BLD AUTO: 0 X10(3)/MCL (ref 0–0.2)
BASOPHILS NFR BLD AUTO: 0.3 %
BILIRUB SERPL-MCNC: 0.5 MG/DL (ref 0.2–1)
BILIRUBIN DIRECT+TOT PNL SERPL-MCNC: 0.1 MG/DL (ref 0–0.2)
BILIRUBIN DIRECT+TOT PNL SERPL-MCNC: 0.4 MG/DL (ref 0–0.8)
BUN SERPL-MCNC: 15 MG/DL (ref 7–18)
CALCIUM SERPL-MCNC: 9 MG/DL (ref 8.5–10.1)
CHLORIDE SERPL-SCNC: 108 MMOL/L (ref 98–107)
CO2 SERPL-SCNC: 27 MMOL/L (ref 21–32)
CREAT SERPL-MCNC: 1.66 MG/DL (ref 0.55–1.02)
ERYTHROCYTE [DISTWIDTH] IN BLOOD BY AUTOMATED COUNT: 13.8 % (ref 11.5–17)
GLOBULIN SER-MCNC: 3.1 GM/DL (ref 2.4–3.5)
GLUCOSE SERPL-MCNC: 173 MG/DL (ref 74–106)
HCT VFR BLD AUTO: 35.9 % (ref 37–47)
HGB BLD-MCNC: 11.2 GM/DL (ref 12–16)
LYMPHOCYTES # BLD AUTO: 0.3 X10(3)/MCL (ref 0.6–4.6)
LYMPHOCYTES NFR BLD AUTO: 7.8 %
MCH RBC QN AUTO: 29.2 PG (ref 27–31)
MCHC RBC AUTO-ENTMCNC: 31.2 GM/DL (ref 33–36)
MCV RBC AUTO: 93.7 FL (ref 80–94)
MONOCYTES # BLD AUTO: 0.1 X10(3)/MCL (ref 0.1–1.3)
MONOCYTES NFR BLD AUTO: 1.8 %
NEUTROPHILS # BLD AUTO: 3.5 X10(3)/MCL (ref 2.1–9.2)
NEUTROPHILS NFR BLD AUTO: 89.6 %
PLATELET # BLD AUTO: 236 X10(3)/MCL (ref 130–400)
PMV BLD AUTO: 8.3 FL (ref 9.4–12.4)
POTASSIUM SERPL-SCNC: 3.3 MMOL/L (ref 3.5–5.1)
PROT SERPL-MCNC: 6.2 GM/DL
PROT SERPL-MCNC: 6.2 GM/DL (ref 6.4–8.2)
RBC # BLD AUTO: 3.83 X10(6)/MCL (ref 4.2–5.4)
SODIUM SERPL-SCNC: 142 MMOL/L (ref 136–145)
WBC # SPEC AUTO: 3.9 X10(3)/MCL (ref 4.5–11.5)

## 2020-02-18 LAB
HPV 16: NEGATIVE
HPV 18: NEGATIVE
HPV16+18+H RISK 12 DNA CVX-IMP: NEGATIVE
PAP RECOMMENDATION EXT: NORMAL
PAP SMEAR: NORMAL

## 2020-06-10 ENCOUNTER — HISTORICAL (OUTPATIENT)
Dept: HEMATOLOGY/ONCOLOGY | Facility: CLINIC | Age: 55
End: 2020-06-10

## 2020-06-10 LAB
ABS NEUT (OLG): 3.74 X10(3)/MCL (ref 2.1–9.2)
ALBUMIN SERPL-MCNC: 3.4 GM/DL (ref 3.5–5)
ALBUMIN/GLOB SERPL: 1.2 RATIO (ref 1.1–2)
ALP SERPL-CCNC: 84 UNIT/L (ref 40–150)
ALT SERPL-CCNC: 22 UNIT/L (ref 0–55)
AST SERPL-CCNC: 20 UNIT/L (ref 5–34)
BASOPHILS # BLD AUTO: 0 X10(3)/MCL (ref 0–0.2)
BASOPHILS NFR BLD AUTO: 0.1 %
BILIRUB SERPL-MCNC: 0.2 MG/DL
BILIRUBIN DIRECT+TOT PNL SERPL-MCNC: 0.1 MG/DL (ref 0–0.5)
BILIRUBIN DIRECT+TOT PNL SERPL-MCNC: 0.1 MG/DL (ref 0–0.8)
BUN SERPL-MCNC: 24 MG/DL (ref 9.8–20.1)
CALCIUM SERPL-MCNC: 9 MG/DL (ref 8.4–10.2)
CHLORIDE SERPL-SCNC: 109 MMOL/L (ref 98–107)
CO2 SERPL-SCNC: 23 MMOL/L (ref 22–29)
CREAT SERPL-MCNC: 1.59 MG/DL (ref 0.55–1.02)
EOSINOPHIL # BLD AUTO: 0.1 X10(3)/MCL (ref 0–0.9)
EOSINOPHIL NFR BLD AUTO: 2 %
ERYTHROCYTE [DISTWIDTH] IN BLOOD BY AUTOMATED COUNT: 13.5 % (ref 11.5–17)
GLOBULIN SER-MCNC: 2.9 GM/DL (ref 2.4–3.5)
GLUCOSE SERPL-MCNC: 86 MG/DL (ref 74–100)
HCT VFR BLD AUTO: 34 % (ref 37–47)
HGB BLD-MCNC: 10.6 GM/DL (ref 12–16)
LYMPHOCYTES # BLD AUTO: 1.7 X10(3)/MCL (ref 0.6–4.6)
LYMPHOCYTES NFR BLD AUTO: 24.9 %
MCH RBC QN AUTO: 29.6 PG (ref 27–31)
MCHC RBC AUTO-ENTMCNC: 31.2 GM/DL (ref 33–36)
MCV RBC AUTO: 95 FL (ref 80–94)
MONOCYTES # BLD AUTO: 1.2 X10(3)/MCL (ref 0.1–1.3)
MONOCYTES NFR BLD AUTO: 17.7 %
NEUTROPHILS # BLD AUTO: 3.7 X10(3)/MCL (ref 2.1–9.2)
NEUTROPHILS NFR BLD AUTO: 54.9 %
PLATELET # BLD AUTO: 224 X10(3)/MCL (ref 130–400)
PMV BLD AUTO: 8.7 FL (ref 9.4–12.4)
POTASSIUM SERPL-SCNC: 3 MMOL/L (ref 3.5–5.1)
PROT SERPL-MCNC: 6.2 GM/DL
PROT SERPL-MCNC: 6.3 GM/DL (ref 6.4–8.3)
RBC # BLD AUTO: 3.58 X10(6)/MCL (ref 4.2–5.4)
SODIUM SERPL-SCNC: 144 MMOL/L (ref 136–145)
WBC # SPEC AUTO: 6.8 X10(3)/MCL (ref 4.5–11.5)

## 2020-07-16 ENCOUNTER — HISTORICAL (OUTPATIENT)
Dept: HEMATOLOGY/ONCOLOGY | Facility: CLINIC | Age: 55
End: 2020-07-16

## 2020-07-17 ENCOUNTER — HISTORICAL (OUTPATIENT)
Dept: HEMATOLOGY/ONCOLOGY | Facility: CLINIC | Age: 55
End: 2020-07-17

## 2020-07-17 LAB — POTASSIUM SERPL-SCNC: 4.8 MMOL/L (ref 3.5–5.1)

## 2020-09-14 ENCOUNTER — HISTORICAL (OUTPATIENT)
Dept: INFUSION THERAPY | Facility: HOSPITAL | Age: 55
End: 2020-09-14

## 2020-09-14 LAB
ABS NEUT (OLG): 3.95 X10(3)/MCL (ref 2.1–9.2)
ALBUMIN SERPL-MCNC: 3.1 GM/DL (ref 3.5–5)
ALP SERPL-CCNC: 87 UNIT/L (ref 40–150)
ALT SERPL-CCNC: 20 UNIT/L (ref 0–55)
ANION GAP SERPL CALC-SCNC: 15 MMOL/L
AST SERPL-CCNC: 18 UNIT/L (ref 5–34)
BILIRUB SERPL-MCNC: 0.2 MG/DL
BILIRUBIN DIRECT+TOT PNL SERPL-MCNC: 0.1 MG/DL (ref 0–0.5)
BILIRUBIN DIRECT+TOT PNL SERPL-MCNC: 0.1 MG/DL (ref 0–0.8)
BUN SERPL-MCNC: 22 MG/DL (ref 8–26)
CHLORIDE SERPL-SCNC: 108 MMOL/L (ref 98–109)
CREAT SERPL-MCNC: 1.7 MG/DL (ref 0.6–1.3)
EOSINOPHIL # BLD AUTO: 0 X10(3)/MCL (ref 0–0.9)
EOSINOPHIL NFR BLD AUTO: 0.2 %
ERYTHROCYTE [DISTWIDTH] IN BLOOD BY AUTOMATED COUNT: 14.1 % (ref 11.5–17)
GLUCOSE SERPL-MCNC: 127 MG/DL (ref 70–105)
HCT VFR BLD AUTO: 34.1 % (ref 37–47)
HCT VFR BLD CALC: 33 % (ref 38–51)
HGB BLD-MCNC: 10.7 GM/DL (ref 12–16)
HGB BLD-MCNC: 11.2 MG/DL (ref 12–17)
LIVER PROFILE INTERP: ABNORMAL
LYMPHOCYTES # BLD AUTO: 0.3 X10(3)/MCL (ref 0.6–4.6)
LYMPHOCYTES NFR BLD AUTO: 7.3 %
MCH RBC QN AUTO: 29.6 PG (ref 27–31)
MCHC RBC AUTO-ENTMCNC: 31.4 GM/DL (ref 33–36)
MCV RBC AUTO: 94.5 FL (ref 80–94)
MONOCYTES # BLD AUTO: 0 X10(3)/MCL (ref 0.1–1.3)
MONOCYTES NFR BLD AUTO: 1.1 %
NEUTROPHILS # BLD AUTO: 4 X10(3)/MCL (ref 2.1–9.2)
NEUTROPHILS NFR BLD AUTO: 90.7 %
PLATELET # BLD AUTO: 171 X10(3)/MCL (ref 130–400)
PMV BLD AUTO: 9.9 FL (ref 9.4–12.4)
POC IONIZED CALCIUM: 1.16 MMOL/L (ref 1.12–1.32)
POC TCO2: 23 MMOL/L (ref 24–29)
POTASSIUM BLD-SCNC: 4.5 MMOL/L (ref 3.5–4.9)
PROT SERPL-MCNC: 6 GM/DL
PROT SERPL-MCNC: 6.3 GM/DL (ref 6.4–8.3)
RBC # BLD AUTO: 3.61 X10(6)/MCL (ref 4.2–5.4)
SODIUM BLD-SCNC: 141 MMOL/L (ref 138–146)
WBC # SPEC AUTO: 4.4 X10(3)/MCL (ref 4.5–11.5)

## 2020-10-08 ENCOUNTER — HISTORICAL (OUTPATIENT)
Dept: ADMINISTRATIVE | Facility: HOSPITAL | Age: 55
End: 2020-10-08

## 2020-10-08 LAB
ABS NEUT (OLG): 1.54 X10(3)/MCL (ref 2.1–9.2)
ALBUMIN SERPL-MCNC: 3.1 GM/DL (ref 3.5–5)
ALBUMIN/GLOB SERPL: 1 RATIO (ref 1.1–2)
ALP SERPL-CCNC: 98 UNIT/L (ref 40–150)
ALT SERPL-CCNC: 25 UNIT/L (ref 0–55)
ANION GAP SERPL CALC-SCNC: 14 MMOL/L
AST SERPL-CCNC: 24 UNIT/L (ref 5–34)
B2 MICROGLOB SERPL-MCNC: 4.66 MG/L
BASOPHILS # BLD AUTO: 0 X10(3)/MCL (ref 0–0.2)
BASOPHILS NFR BLD AUTO: 0.8 %
BILIRUB SERPL-MCNC: 0.1 MG/DL
BILIRUBIN DIRECT+TOT PNL SERPL-MCNC: 0 MG/DL (ref 0–0.8)
BILIRUBIN DIRECT+TOT PNL SERPL-MCNC: 0.1 MG/DL (ref 0–0.5)
BUN SERPL-MCNC: 24 MG/DL (ref 8–26)
BUN SERPL-MCNC: 24.7 MG/DL (ref 9.8–20.1)
CALCIUM SERPL-MCNC: 8.3 MG/DL (ref 8.4–10.2)
CHLORIDE SERPL-SCNC: 104 MMOL/L (ref 98–109)
CHLORIDE SERPL-SCNC: 106 MMOL/L (ref 98–107)
CO2 SERPL-SCNC: 30 MMOL/L (ref 22–29)
CREAT SERPL-MCNC: 2.13 MG/DL (ref 0.55–1.02)
CREAT SERPL-MCNC: 2.3 MG/DL (ref 0.6–1.3)
EOSINOPHIL # BLD AUTO: 0.1 X10(3)/MCL (ref 0–0.9)
EOSINOPHIL NFR BLD AUTO: 3.9 %
ERYTHROCYTE [DISTWIDTH] IN BLOOD BY AUTOMATED COUNT: 13.7 % (ref 11.5–17)
GLOBULIN SER-MCNC: 3.2 GM/DL (ref 2.4–3.5)
GLUCOSE SERPL-MCNC: 85 MG/DL (ref 74–100)
GLUCOSE SERPL-MCNC: 90 MG/DL (ref 70–105)
HCT VFR BLD AUTO: 32.4 % (ref 37–47)
HCT VFR BLD CALC: 33 % (ref 38–51)
HGB BLD-MCNC: 10.3 GM/DL (ref 12–16)
HGB BLD-MCNC: 11.2 MG/DL (ref 12–17)
LYMPHOCYTES # BLD AUTO: 0.5 X10(3)/MCL (ref 0.6–4.6)
LYMPHOCYTES NFR BLD AUTO: 20.3 %
MCH RBC QN AUTO: 30 PG (ref 27–31)
MCHC RBC AUTO-ENTMCNC: 31.8 GM/DL (ref 33–36)
MCV RBC AUTO: 94.5 FL (ref 80–94)
MONOCYTES # BLD AUTO: 0.4 X10(3)/MCL (ref 0.1–1.3)
MONOCYTES NFR BLD AUTO: 14.1 %
NEUTROPHILS # BLD AUTO: 1.5 X10(3)/MCL (ref 2.1–9.2)
NEUTROPHILS NFR BLD AUTO: 60.1 %
PLATELET # BLD AUTO: 182 X10(3)/MCL (ref 130–400)
PMV BLD AUTO: 9 FL (ref 9.4–12.4)
POC IONIZED CALCIUM: 1.21 MMOL/L (ref 1.12–1.32)
POC TCO2: 26 MMOL/L (ref 24–29)
POTASSIUM BLD-SCNC: 3.6 MMOL/L (ref 3.5–4.9)
POTASSIUM SERPL-SCNC: 3.9 MMOL/L (ref 3.5–5.1)
PROT SERPL-MCNC: 6.1 GM/DL
PROT SERPL-MCNC: 6.3 GM/DL (ref 6.4–8.3)
RBC # BLD AUTO: 3.43 X10(6)/MCL (ref 4.2–5.4)
SODIUM BLD-SCNC: 140 MMOL/L (ref 138–146)
SODIUM SERPL-SCNC: 144 MMOL/L (ref 136–145)
WBC # SPEC AUTO: 2.6 X10(3)/MCL (ref 4.5–11.5)

## 2020-11-04 ENCOUNTER — HISTORICAL (OUTPATIENT)
Dept: HEMATOLOGY/ONCOLOGY | Facility: CLINIC | Age: 55
End: 2020-11-04

## 2020-11-04 LAB
ABS NEUT (OLG): 2.56 X10(3)/MCL (ref 2.1–9.2)
ALBUMIN SERPL-MCNC: 3.7 GM/DL (ref 3.5–5)
ALBUMIN/GLOB SERPL: 1.2 RATIO (ref 1.1–2)
ALP SERPL-CCNC: 80 UNIT/L (ref 40–150)
ALT SERPL-CCNC: 15 UNIT/L (ref 0–55)
AST SERPL-CCNC: 17 UNIT/L (ref 5–34)
B2 MICROGLOB SERPL-MCNC: 3.43 MG/L
BASOPHILS # BLD AUTO: 0 X10(3)/MCL (ref 0–0.2)
BASOPHILS NFR BLD AUTO: 0.2 %
BILIRUB SERPL-MCNC: 0.2 MG/DL
BILIRUBIN DIRECT+TOT PNL SERPL-MCNC: 0.1 MG/DL (ref 0–0.5)
BILIRUBIN DIRECT+TOT PNL SERPL-MCNC: 0.1 MG/DL (ref 0–0.8)
BUN SERPL-MCNC: 29 MG/DL (ref 9.8–20.1)
CALCIUM SERPL-MCNC: 9.3 MG/DL (ref 8.4–10.2)
CEA SERPL-MCNC: 3.24 NG/ML (ref 0–3)
CHLORIDE SERPL-SCNC: 99 MMOL/L (ref 98–107)
CO2 SERPL-SCNC: 32 MMOL/L (ref 22–29)
CREAT SERPL-MCNC: 1.98 MG/DL (ref 0.55–1.02)
EOSINOPHIL # BLD AUTO: 0.1 X10(3)/MCL (ref 0–0.9)
EOSINOPHIL NFR BLD AUTO: 1.8 %
EOSINOPHIL NFR BLD MANUAL: 2 % (ref 0–8)
ERYTHROCYTE [DISTWIDTH] IN BLOOD BY AUTOMATED COUNT: 14.5 % (ref 11.5–17)
GLOBULIN SER-MCNC: 3 GM/DL (ref 2.4–3.5)
GLUCOSE SERPL-MCNC: 69 MG/DL (ref 74–100)
GRANULOCYTES NFR BLD MANUAL: 62 % (ref 47–80)
HCT VFR BLD AUTO: 34.6 % (ref 37–47)
HGB BLD-MCNC: 10.7 GM/DL (ref 12–16)
LYMPHOCYTES # BLD AUTO: 1.1 X10(3)/MCL (ref 0.6–4.6)
LYMPHOCYTES NFR BLD AUTO: 22.8 %
LYMPHOCYTES NFR BLD MANUAL: 19 % (ref 13–40)
MCH RBC QN AUTO: 29.7 PG (ref 27–31)
MCHC RBC AUTO-ENTMCNC: 30.9 GM/DL (ref 33–36)
MCV RBC AUTO: 96.1 FL (ref 80–94)
MONOCYTES # BLD AUTO: 1.1 X10(3)/MCL (ref 0.1–1.3)
MONOCYTES NFR BLD AUTO: 22 %
MONOCYTES NFR BLD MANUAL: 17 % (ref 2–11)
NEUTROPHILS # BLD AUTO: 2.6 X10(3)/MCL (ref 2.1–9.2)
NEUTROPHILS NFR BLD AUTO: 52.6 %
NEUTS BAND NFR BLD MANUAL: 0 % (ref 0–11)
PLATELET # BLD AUTO: 239 X10(3)/MCL (ref 130–400)
PMV BLD AUTO: 9.4 FL (ref 9.4–12.4)
POTASSIUM SERPL-SCNC: 3.1 MMOL/L (ref 3.5–5.1)
PROT SERPL-MCNC: 6.4 GM/DL
PROT SERPL-MCNC: 6.7 GM/DL (ref 6.4–8.3)
RBC # BLD AUTO: 3.6 X10(6)/MCL (ref 4.2–5.4)
SODIUM SERPL-SCNC: 144 MMOL/L (ref 136–145)
WBC # SPEC AUTO: 4.9 X10(3)/MCL (ref 4.5–11.5)

## 2021-01-08 ENCOUNTER — HISTORICAL (OUTPATIENT)
Dept: ADMINISTRATIVE | Facility: HOSPITAL | Age: 56
End: 2021-01-08

## 2021-01-08 LAB
FLUAV AG UPPER RESP QL IA.RAPID: NEGATIVE
FLUBV AG UPPER RESP QL IA.RAPID: NEGATIVE

## 2021-01-12 ENCOUNTER — HISTORICAL (OUTPATIENT)
Dept: ADMINISTRATIVE | Facility: HOSPITAL | Age: 56
End: 2021-01-12

## 2021-01-12 LAB
ABS NEUT (OLG): 2.77 X10(3)/MCL (ref 2.1–9.2)
ALBUMIN SERPL-MCNC: 3.5 GM/DL (ref 3.5–5)
ALBUMIN/GLOB SERPL: 1.1 RATIO (ref 1.1–2)
ALP SERPL-CCNC: 95 UNIT/L (ref 40–150)
ALT SERPL-CCNC: 27 UNIT/L (ref 0–55)
AST SERPL-CCNC: 28 UNIT/L (ref 5–34)
B2 MICROGLOB SERPL-MCNC: 4.1 MG/L
BASOPHILS # BLD AUTO: 0 X10(3)/MCL (ref 0–0.2)
BASOPHILS NFR BLD AUTO: 0.4 %
BILIRUB SERPL-MCNC: 0.1 MG/DL
BILIRUBIN DIRECT+TOT PNL SERPL-MCNC: 0 MG/DL (ref 0–0.8)
BILIRUBIN DIRECT+TOT PNL SERPL-MCNC: 0.1 MG/DL (ref 0–0.5)
BUN SERPL-MCNC: 26.1 MG/DL (ref 9.8–20.1)
CALCIUM SERPL-MCNC: 9.5 MG/DL (ref 8.4–10.2)
CHLORIDE SERPL-SCNC: 99 MMOL/L (ref 98–107)
CO2 SERPL-SCNC: 31 MMOL/L (ref 22–29)
CREAT SERPL-MCNC: 1.89 MG/DL (ref 0.55–1.02)
EOSINOPHIL # BLD AUTO: 0.2 X10(3)/MCL (ref 0–0.9)
EOSINOPHIL NFR BLD AUTO: 2.8 %
ERYTHROCYTE [DISTWIDTH] IN BLOOD BY AUTOMATED COUNT: 14 % (ref 11.5–17)
GLOBULIN SER-MCNC: 3.2 GM/DL (ref 2.4–3.5)
GLUCOSE SERPL-MCNC: 68 MG/DL (ref 74–100)
HCT VFR BLD AUTO: 36.5 % (ref 37–47)
HGB BLD-MCNC: 11.5 GM/DL (ref 12–16)
LYMPHOCYTES # BLD AUTO: 1.6 X10(3)/MCL (ref 0.6–4.6)
LYMPHOCYTES NFR BLD AUTO: 30.2 %
MCH RBC QN AUTO: 29.9 PG (ref 27–31)
MCHC RBC AUTO-ENTMCNC: 31.5 GM/DL (ref 33–36)
MCV RBC AUTO: 94.8 FL (ref 80–94)
MONOCYTES # BLD AUTO: 0.7 X10(3)/MCL (ref 0.1–1.3)
MONOCYTES NFR BLD AUTO: 13.8 %
NEUTROPHILS # BLD AUTO: 2.8 X10(3)/MCL (ref 2.1–9.2)
NEUTROPHILS NFR BLD AUTO: 52.2 %
PLATELET # BLD AUTO: 208 X10(3)/MCL (ref 130–400)
PMV BLD AUTO: 9.9 FL (ref 9.4–12.4)
POTASSIUM SERPL-SCNC: 2.7 MMOL/L (ref 3.5–5.1)
PROT SERPL-MCNC: 6.7 GM/DL (ref 6.4–8.3)
RBC # BLD AUTO: 3.85 X10(6)/MCL (ref 4.2–5.4)
SODIUM SERPL-SCNC: 145 MMOL/L (ref 136–145)
WBC # SPEC AUTO: 5.3 X10(3)/MCL (ref 4.5–11.5)

## 2021-01-15 ENCOUNTER — HISTORICAL (OUTPATIENT)
Dept: INFUSION THERAPY | Facility: HOSPITAL | Age: 56
End: 2021-01-15

## 2021-01-18 ENCOUNTER — HISTORICAL (OUTPATIENT)
Dept: HEMATOLOGY/ONCOLOGY | Facility: CLINIC | Age: 56
End: 2021-01-18

## 2021-01-18 LAB — PROT 24H UR-MCNC: 131.2 MG/24HR (ref 0–165)

## 2021-02-26 ENCOUNTER — HISTORICAL (OUTPATIENT)
Dept: HEMATOLOGY/ONCOLOGY | Facility: CLINIC | Age: 56
End: 2021-02-26

## 2021-02-26 LAB
ABS NEUT (OLG): 2.93 X10(3)/MCL (ref 2.1–9.2)
ALBUMIN SERPL-MCNC: 3.6 GM/DL (ref 3.5–5)
ALBUMIN/GLOB SERPL: 1.1 RATIO (ref 1.1–2)
ALP SERPL-CCNC: 125 UNIT/L (ref 40–150)
ALT SERPL-CCNC: 20 UNIT/L (ref 0–55)
AST SERPL-CCNC: 20 UNIT/L (ref 5–34)
B2 MICROGLOB SERPL-MCNC: 3.74 MG/L
BASOPHILS # BLD AUTO: 0 X10(3)/MCL (ref 0–0.2)
BASOPHILS NFR BLD AUTO: 0.2 %
BILIRUB SERPL-MCNC: 0.2 MG/DL
BILIRUBIN DIRECT+TOT PNL SERPL-MCNC: 0.1 MG/DL (ref 0–0.5)
BILIRUBIN DIRECT+TOT PNL SERPL-MCNC: 0.1 MG/DL (ref 0–0.8)
BUN SERPL-MCNC: 30.1 MG/DL (ref 9.8–20.1)
CALCIUM SERPL-MCNC: 9.3 MG/DL (ref 8.4–10.2)
CHLORIDE SERPL-SCNC: 97 MMOL/L (ref 98–107)
CO2 SERPL-SCNC: 33 MMOL/L (ref 22–29)
CREAT SERPL-MCNC: 2.19 MG/DL (ref 0.55–1.02)
EOSINOPHIL # BLD AUTO: 0.2 X10(3)/MCL (ref 0–0.9)
EOSINOPHIL NFR BLD AUTO: 4.3 %
ERYTHROCYTE [DISTWIDTH] IN BLOOD BY AUTOMATED COUNT: 14.2 % (ref 11.5–17)
GLOBULIN SER-MCNC: 3.3 GM/DL (ref 2.4–3.5)
GLUCOSE SERPL-MCNC: 117 MG/DL (ref 74–100)
HCT VFR BLD AUTO: 38.5 % (ref 37–47)
HGB BLD-MCNC: 12.1 GM/DL (ref 12–16)
LYMPHOCYTES # BLD AUTO: 0.7 X10(3)/MCL (ref 0.6–4.6)
LYMPHOCYTES NFR BLD AUTO: 16.1 %
MCH RBC QN AUTO: 30.5 PG (ref 27–31)
MCHC RBC AUTO-ENTMCNC: 31.4 GM/DL (ref 33–36)
MCV RBC AUTO: 97 FL (ref 80–94)
MONOCYTES # BLD AUTO: 0.6 X10(3)/MCL (ref 0.1–1.3)
MONOCYTES NFR BLD AUTO: 13.2 %
NEUTROPHILS # BLD AUTO: 2.9 X10(3)/MCL (ref 2.1–9.2)
NEUTROPHILS NFR BLD AUTO: 65.5 %
PLATELET # BLD AUTO: 256 X10(3)/MCL (ref 130–400)
PMV BLD AUTO: 8.9 FL (ref 9.4–12.4)
POTASSIUM SERPL-SCNC: 3 MMOL/L (ref 3.5–5.1)
PROT SERPL-MCNC: 6.9 GM/DL
PROT SERPL-MCNC: 6.9 GM/DL (ref 6.4–8.3)
RBC # BLD AUTO: 3.97 X10(6)/MCL (ref 4.2–5.4)
SODIUM SERPL-SCNC: 141 MMOL/L (ref 136–145)
WBC # SPEC AUTO: 4.5 X10(3)/MCL (ref 4.5–11.5)

## 2021-03-04 ENCOUNTER — HISTORICAL (OUTPATIENT)
Dept: INFUSION THERAPY | Facility: HOSPITAL | Age: 56
End: 2021-03-04

## 2021-04-27 ENCOUNTER — HISTORICAL (OUTPATIENT)
Dept: ADMINISTRATIVE | Facility: HOSPITAL | Age: 56
End: 2021-04-27

## 2021-04-27 LAB
ABS NEUT (OLG): 1.61 X10(3)/MCL (ref 2.1–9.2)
ALBUMIN % SPEP (OHS): 49.37 % (ref 48.1–59.5)
ALBUMIN SERPL-MCNC: 3.3 GM/DL (ref 3.5–5)
ALBUMIN SERPL-MCNC: 3.3 GM/DL (ref 3.5–5)
ALBUMIN/GLOB SERPL: 1.2 RATIO (ref 1.1–2)
ALBUMIN/GLOB SERPL: 1.3 RATIO (ref 1.1–2)
ALP SERPL-CCNC: 69 UNIT/L (ref 40–150)
ALPHA 1 GLOB (OHS): 0.23 GM/DL (ref 0–0.4)
ALPHA 1 GLOB% (OHS): 4.03 % (ref 2.3–4.9)
ALPHA 2 GLOB % (OHS): 16.27 % (ref 6.9–13)
ALPHA 2 GLOB (OHS): 0.94 GM/DL (ref 0.4–1)
ALT SERPL-CCNC: 8 UNIT/L (ref 0–55)
AST SERPL-CCNC: 18 UNIT/L (ref 5–34)
B2 MICROGLOB SERPL-MCNC: 3.77 MG/L
BASOPHILS # BLD AUTO: 0 X10(3)/MCL (ref 0–0.2)
BASOPHILS NFR BLD AUTO: 0.3 %
BETA GLOB (OHS): 0.89 GM/DL (ref 0.7–1.3)
BETA GLOB% (OHS): 15.36 % (ref 13.8–19.7)
BILIRUB SERPL-MCNC: 0.2 MG/DL
BILIRUBIN DIRECT+TOT PNL SERPL-MCNC: 0.1 MG/DL (ref 0–0.5)
BILIRUBIN DIRECT+TOT PNL SERPL-MCNC: 0.1 MG/DL (ref 0–0.8)
BUN SERPL-MCNC: 15.1 MG/DL (ref 9.8–20.1)
CALCIUM SERPL-MCNC: 9.1 MG/DL (ref 8.4–10.2)
CHLORIDE SERPL-SCNC: 107 MMOL/L (ref 98–107)
CO2 SERPL-SCNC: 30 MMOL/L (ref 22–29)
CREAT SERPL-MCNC: 1.81 MG/DL (ref 0.55–1.02)
EOSINOPHIL # BLD AUTO: 0.1 X10(3)/MCL (ref 0–0.9)
EOSINOPHIL NFR BLD AUTO: 2.9 %
EOSINOPHIL NFR BLD MANUAL: 2 % (ref 0–8)
ERYTHROCYTE [DISTWIDTH] IN BLOOD BY AUTOMATED COUNT: 14 % (ref 11.5–17)
GAMMA GLOBULIN % (OHS): 14.96 % (ref 10.1–21.9)
GAMMA GLOBULIN (OHS): 0.87 GM/DL (ref 0.4–1.8)
GLOBULIN SER-MCNC: 2.5 GM/DL (ref 2.4–3.5)
GLOBULIN SER-MCNC: 2.7 GM/DL (ref 2.4–3.5)
GLUCOSE SERPL-MCNC: 74 MG/DL (ref 74–100)
HCT VFR BLD AUTO: 31.7 % (ref 37–47)
HGB BLD-MCNC: 10 GM/DL (ref 12–16)
IFE INTERPRETATION (OHS): ABNORMAL
IGA SERPL-MCNC: 115 MG/DL (ref 65–421)
IGG SERPL-MCNC: 920 MG/DL (ref 552–1632)
IGM SERPL-MCNC: 14 MG/DL (ref 33–293)
LYMPHOCYTES # BLD AUTO: 1 X10(3)/MCL (ref 0.6–4.6)
LYMPHOCYTES NFR BLD AUTO: 29.7 %
LYMPHOCYTES NFR BLD MANUAL: 31 % (ref 13–40)
M SPIKE % (OHS): ABNORMAL
M SPIKE (OHS): ABNORMAL
MCH RBC QN AUTO: 29.9 PG (ref 27–31)
MCHC RBC AUTO-ENTMCNC: 31.5 GM/DL (ref 33–36)
MCV RBC AUTO: 94.9 FL (ref 80–94)
MONOCYTES # BLD AUTO: 0.6 X10(3)/MCL (ref 0.1–1.3)
MONOCYTES NFR BLD AUTO: 18.8 %
MONOCYTES NFR BLD MANUAL: 19 % (ref 2–11)
NEUTROPHILS # BLD AUTO: 1.6 X10(3)/MCL (ref 2.1–9.2)
NEUTROPHILS NFR BLD AUTO: 47.4 %
NEUTROPHILS NFR BLD MANUAL: 48 % (ref 47–80)
PEP GRAPH (OHS): ABNORMAL
PLATELET # BLD AUTO: 179 X10(3)/MCL (ref 130–400)
PLATELET # BLD EST: NORMAL 10*3/UL
PMV BLD AUTO: 8.9 FL (ref 9.4–12.4)
POTASSIUM SERPL-SCNC: 3.6 MMOL/L (ref 3.5–5.1)
PROT SERPL-MCNC: 5.8 GM/DL (ref 6.4–8.3)
PROT SERPL-MCNC: 6 GM/DL (ref 6.4–8.3)
RBC # BLD AUTO: 3.34 X10(6)/MCL (ref 4.2–5.4)
RBC MORPH BLD: NORMAL
SODIUM SERPL-SCNC: 145 MMOL/L (ref 136–145)
WBC # SPEC AUTO: 3.4 X10(3)/MCL (ref 4.5–11.5)

## 2021-06-21 ENCOUNTER — HISTORICAL (OUTPATIENT)
Dept: ADMINISTRATIVE | Facility: HOSPITAL | Age: 56
End: 2021-06-21

## 2021-06-21 LAB
ABS NEUT (OLG): 2.15 X10(3)/MCL (ref 2.1–9.2)
ALBUMIN % SPEP (OHS): 46.15 % (ref 48.1–59.5)
ALBUMIN SERPL-MCNC: 3 GM/DL (ref 3.5–5)
ALBUMIN SERPL-MCNC: 3.1 GM/DL (ref 3.5–5)
ALBUMIN/GLOB SERPL: 0.8 RATIO (ref 1.1–2)
ALBUMIN/GLOB SERPL: 0.9 RATIO (ref 1.1–2)
ALP SERPL-CCNC: 77 UNIT/L (ref 40–150)
ALPHA 1 GLOB (OHS): 0.22 GM/DL (ref 0–0.4)
ALPHA 1 GLOB% (OHS): 3.32 % (ref 2.3–4.9)
ALPHA 2 GLOB % (OHS): 15.4 % (ref 6.9–13)
ALPHA 2 GLOB (OHS): 1.03 GM/DL (ref 0.4–1)
ALT SERPL-CCNC: 12 UNIT/L (ref 0–55)
AST SERPL-CCNC: 25 UNIT/L (ref 5–34)
BASOPHILS # BLD AUTO: 0 X10(3)/MCL (ref 0–0.2)
BASOPHILS NFR BLD AUTO: 0.3 %
BETA GLOB (OHS): 0.98 GM/DL (ref 0.7–1.3)
BETA GLOB% (OHS): 14.59 % (ref 13.8–19.7)
BILIRUB SERPL-MCNC: 0.2 MG/DL
BILIRUBIN DIRECT+TOT PNL SERPL-MCNC: 0.1 MG/DL (ref 0–0.5)
BILIRUBIN DIRECT+TOT PNL SERPL-MCNC: 0.1 MG/DL (ref 0–0.8)
BUN SERPL-MCNC: 15.7 MG/DL (ref 9.8–20.1)
CALCIUM SERPL-MCNC: 9.5 MG/DL (ref 8.4–10.2)
CHLORIDE SERPL-SCNC: 106 MMOL/L (ref 98–107)
CO2 SERPL-SCNC: 29 MMOL/L (ref 22–29)
CREAT SERPL-MCNC: 1.79 MG/DL (ref 0.55–1.02)
EOSINOPHIL # BLD AUTO: 0.1 X10(3)/MCL (ref 0–0.9)
EOSINOPHIL NFR BLD AUTO: 2.6 %
ERYTHROCYTE [DISTWIDTH] IN BLOOD BY AUTOMATED COUNT: 13.6 % (ref 11.5–17)
GAMMA GLOBULIN % (OHS): 20.55 % (ref 10.1–21.9)
GAMMA GLOBULIN (OHS): 1.38 GM/DL (ref 0.4–1.8)
GLOBULIN SER-MCNC: 3.6 GM/DL (ref 2.4–3.5)
GLOBULIN SER-MCNC: 3.7 GM/DL (ref 2.4–3.5)
GLUCOSE SERPL-MCNC: 75 MG/DL (ref 74–100)
HCT VFR BLD AUTO: 33.1 % (ref 37–47)
HGB BLD-MCNC: 10.5 GM/DL (ref 12–16)
IFE INTERPRETATION (OHS): ABNORMAL
IGA SERPL-MCNC: 149 MG/DL (ref 65–421)
IGG SERPL-MCNC: 1347 MG/DL (ref 552–1632)
IGM SERPL-MCNC: <25 MG/DL (ref 33–293)
LYMPHOCYTES # BLD AUTO: 0.8 X10(3)/MCL (ref 0.6–4.6)
LYMPHOCYTES NFR BLD AUTO: 21.6 %
M SPIKE % (OHS): ABNORMAL
M SPIKE (OHS): ABNORMAL
MCH RBC QN AUTO: 28.5 PG (ref 27–31)
MCHC RBC AUTO-ENTMCNC: 31.7 GM/DL (ref 33–36)
MCV RBC AUTO: 89.9 FL (ref 80–94)
MONOCYTES # BLD AUTO: 0.5 X10(3)/MCL (ref 0.1–1.3)
MONOCYTES NFR BLD AUTO: 13.3 %
NEUTROPHILS # BLD AUTO: 2.2 X10(3)/MCL (ref 2.1–9.2)
NEUTROPHILS NFR BLD AUTO: 61.9 %
PEP GRAPH (OHS): ABNORMAL
PLATELET # BLD AUTO: 228 X10(3)/MCL (ref 130–400)
PMV BLD AUTO: 8.5 FL (ref 9.4–12.4)
POTASSIUM SERPL-SCNC: 3.4 MMOL/L (ref 3.5–5.1)
PROT SERPL-MCNC: 6.7 GM/DL (ref 6.4–8.3)
PROT SERPL-MCNC: 6.7 GM/DL (ref 6.4–8.3)
RBC # BLD AUTO: 3.68 X10(6)/MCL (ref 4.2–5.4)
SODIUM SERPL-SCNC: 143 MMOL/L (ref 136–145)
WBC # SPEC AUTO: 3.5 X10(3)/MCL (ref 4.5–11.5)

## 2021-09-01 ENCOUNTER — HISTORICAL (OUTPATIENT)
Dept: ADMINISTRATIVE | Facility: HOSPITAL | Age: 56
End: 2021-09-01

## 2021-09-01 LAB
ABS NEUT (OLG): 1.65 X10(3)/MCL (ref 2.1–9.2)
ALBUMIN % SPEP (OHS): 42.99 % (ref 48.1–59.5)
ALBUMIN SERPL-MCNC: 3.2 GM/DL (ref 3.5–5)
ALBUMIN SERPL-MCNC: 3.2 GM/DL (ref 3.5–5)
ALBUMIN/GLOB SERPL: 0.9 RATIO (ref 1.1–2)
ALBUMIN/GLOB SERPL: 0.9 RATIO (ref 1.1–2)
ALP SERPL-CCNC: 79 UNIT/L (ref 40–150)
ALPHA 1 GLOB (OHS): 0.22 GM/DL (ref 0–0.4)
ALPHA 1 GLOB% (OHS): 3.16 % (ref 2.3–4.9)
ALPHA 2 GLOB % (OHS): 13.82 % (ref 6.9–13)
ALPHA 2 GLOB (OHS): 0.95 GM/DL (ref 0.4–1)
ALT SERPL-CCNC: 13 UNIT/L (ref 0–55)
AST SERPL-CCNC: 24 UNIT/L (ref 5–34)
B2 MICROGLOB SERPL-MCNC: 5.02 MG/L
BASOPHILS # BLD AUTO: 0 X10(3)/MCL (ref 0–0.2)
BASOPHILS NFR BLD AUTO: 1 %
BETA GLOB (OHS): 1.04 GM/DL (ref 0.7–1.3)
BETA GLOB% (OHS): 15.14 % (ref 13.8–19.7)
BILIRUB SERPL-MCNC: 0.1 MG/DL
BILIRUBIN DIRECT+TOT PNL SERPL-MCNC: <0.1 MG/DL (ref 0–0.5)
BILIRUBIN DIRECT+TOT PNL SERPL-MCNC: >0 MG/DL (ref 0–0.8)
BUN SERPL-MCNC: 16.9 MG/DL (ref 9.8–20.1)
CALCIUM SERPL-MCNC: 9.2 MG/DL (ref 8.4–10.2)
CHLORIDE SERPL-SCNC: 99 MMOL/L (ref 98–107)
CO2 SERPL-SCNC: 32 MMOL/L (ref 22–29)
CREAT SERPL-MCNC: 2 MG/DL (ref 0.55–1.02)
EOSINOPHIL # BLD AUTO: 0.2 X10(3)/MCL (ref 0–0.9)
EOSINOPHIL NFR BLD AUTO: 5 %
ERYTHROCYTE [DISTWIDTH] IN BLOOD BY AUTOMATED COUNT: 14.6 % (ref 11.5–17)
GAMMA GLOBULIN % (OHS): 24.88 % (ref 10.1–21.9)
GAMMA GLOBULIN (OHS): 1.72 GM/DL (ref 0.4–1.8)
GLOBULIN SER-MCNC: 3.7 GM/DL (ref 2.4–3.5)
GLOBULIN SER-MCNC: 3.7 GM/DL (ref 2.4–3.5)
GLUCOSE SERPL-MCNC: 86 MG/DL (ref 74–100)
HCT VFR BLD AUTO: 32.9 % (ref 37–47)
HGB BLD-MCNC: 10.2 GM/DL (ref 12–16)
IFE INTERPRETATION (OHS): ABNORMAL
IGA SERPL-MCNC: 180 MG/DL (ref 65–421)
IGG SERPL-MCNC: 1636 MG/DL (ref 552–1632)
IGM SERPL-MCNC: 21 MG/DL (ref 33–293)
LYMPHOCYTES # BLD AUTO: 0.8 X10(3)/MCL (ref 0.6–4.6)
LYMPHOCYTES NFR BLD AUTO: 25 %
M SPIKE % (OHS): ABNORMAL
M SPIKE (OHS): ABNORMAL
MCH RBC QN AUTO: 28.7 PG (ref 27–31)
MCHC RBC AUTO-ENTMCNC: 31 GM/DL (ref 33–36)
MCV RBC AUTO: 92.7 FL (ref 80–94)
MONOCYTES # BLD AUTO: 0.4 X10(3)/MCL (ref 0.1–1.3)
MONOCYTES NFR BLD AUTO: 14 %
NEUTROPHILS # BLD AUTO: 1.65 X10(3)/MCL (ref 2.1–9.2)
NEUTROPHILS NFR BLD AUTO: 55 %
PEP GRAPH (OHS): ABNORMAL
PLATELET # BLD AUTO: 222 X10(3)/MCL (ref 130–400)
PMV BLD AUTO: 10.2 FL (ref 9.4–12.4)
POTASSIUM SERPL-SCNC: 3.2 MMOL/L (ref 3.5–5.1)
PROT SERPL-MCNC: 6.9 GM/DL (ref 6.4–8.3)
PROT SERPL-MCNC: 6.9 GM/DL (ref 6.4–8.3)
RBC # BLD AUTO: 3.55 X10(6)/MCL (ref 4.2–5.4)
SODIUM SERPL-SCNC: 143 MMOL/L (ref 136–145)
WBC # SPEC AUTO: 3 X10(3)/MCL (ref 4.5–11.5)

## 2021-09-24 ENCOUNTER — HOSPITAL ENCOUNTER (OUTPATIENT)
Dept: EMERGENCY MEDICINE | Facility: HOSPITAL | Age: 56
End: 2021-09-24
Attending: INTERNAL MEDICINE | Admitting: INTERNAL MEDICINE

## 2021-09-24 LAB
ABS NEUT (OLG): 2.25 X10(3)/MCL (ref 2.1–9.2)
ALBUMIN SERPL-MCNC: 3.1 GM/DL (ref 3.5–5)
ALBUMIN/GLOB SERPL: 0.7 RATIO (ref 1.1–2)
ALP SERPL-CCNC: 86 UNIT/L (ref 40–150)
ALT SERPL-CCNC: 17 UNIT/L (ref 0–55)
AST SERPL-CCNC: 33 UNIT/L (ref 5–34)
BASOPHILS # BLD AUTO: 0 X10(3)/MCL (ref 0–0.2)
BASOPHILS NFR BLD AUTO: 1 %
BILIRUB SERPL-MCNC: 0.1 MG/DL
BILIRUBIN DIRECT+TOT PNL SERPL-MCNC: <0.1 MG/DL (ref 0–0.5)
BILIRUBIN DIRECT+TOT PNL SERPL-MCNC: >0 MG/DL (ref 0–0.8)
BUN SERPL-MCNC: 25.6 MG/DL (ref 9.8–20.1)
CALCIUM SERPL-MCNC: 10.3 MG/DL (ref 8.4–10.2)
CHLORIDE SERPL-SCNC: 99 MMOL/L (ref 98–107)
CK MB SERPL-MCNC: 1.6 NG/ML
CK SERPL-CCNC: 145 U/L (ref 29–168)
CO2 SERPL-SCNC: 30 MMOL/L (ref 22–29)
CREAT SERPL-MCNC: 1.71 MG/DL (ref 0.55–1.02)
EOSINOPHIL # BLD AUTO: 0.1 X10(3)/MCL (ref 0–0.9)
EOSINOPHIL NFR BLD AUTO: 3 %
ERYTHROCYTE [DISTWIDTH] IN BLOOD BY AUTOMATED COUNT: 14.4 % (ref 11.5–17)
GLOBULIN SER-MCNC: 4.7 GM/DL (ref 2.4–3.5)
GLUCOSE SERPL-MCNC: 112 MG/DL (ref 74–100)
HCT VFR BLD AUTO: 33.7 % (ref 37–47)
HGB BLD-MCNC: 10.5 GM/DL (ref 12–16)
LYMPHOCYTES # BLD AUTO: 0.7 X10(3)/MCL (ref 0.6–4.6)
LYMPHOCYTES NFR BLD AUTO: 19 %
MCH RBC QN AUTO: 28.9 PG (ref 27–31)
MCHC RBC AUTO-ENTMCNC: 31.2 GM/DL (ref 33–36)
MCV RBC AUTO: 92.8 FL (ref 80–94)
MONOCYTES # BLD AUTO: 0.5 X10(3)/MCL (ref 0.1–1.3)
MONOCYTES NFR BLD AUTO: 13 %
NEUTROPHILS # BLD AUTO: 2.25 X10(3)/MCL (ref 2.1–9.2)
NEUTROPHILS NFR BLD AUTO: 64 %
PLATELET # BLD AUTO: 229 X10(3)/MCL (ref 130–400)
PMV BLD AUTO: 11.2 FL (ref 9.4–12.4)
POC TROPONIN: 0.01 NG/ML (ref 0–0.08)
POTASSIUM SERPL-SCNC: 3 MMOL/L (ref 3.5–5.1)
PROT SERPL-MCNC: 7.8 GM/DL (ref 6.4–8.3)
RBC # BLD AUTO: 3.63 X10(6)/MCL (ref 4.2–5.4)
SARS-COV-2 AG RESP QL IA.RAPID: NEGATIVE
SODIUM SERPL-SCNC: 141 MMOL/L (ref 136–145)
TROPONIN I SERPL-MCNC: 0.01 NG/ML (ref 0–0.04)
TROPONIN I SERPL-MCNC: 0.01 NG/ML (ref 0–0.04)
WBC # SPEC AUTO: 3.5 X10(3)/MCL (ref 4.5–11.5)

## 2021-10-06 ENCOUNTER — HISTORICAL (OUTPATIENT)
Dept: ADMINISTRATIVE | Facility: HOSPITAL | Age: 56
End: 2021-10-06

## 2021-11-15 ENCOUNTER — HISTORICAL (OUTPATIENT)
Dept: HEMATOLOGY/ONCOLOGY | Facility: CLINIC | Age: 56
End: 2021-11-15

## 2021-11-15 LAB
ABS NEUT (OLG): 1.48 X10(3)/MCL (ref 2.1–9.2)
ALBUMIN % SPEP (OHS): 41.64 % (ref 48.1–59.5)
ALBUMIN SERPL-MCNC: 3.2 GM/DL (ref 3.5–5)
ALBUMIN SERPL-MCNC: 3.2 GM/DL (ref 3.5–5)
ALBUMIN/GLOB SERPL: 0.8 RATIO (ref 1.1–2)
ALBUMIN/GLOB SERPL: 0.9 RATIO (ref 1.1–2)
ALP SERPL-CCNC: 72 UNIT/L (ref 40–150)
ALPHA 1 GLOB (OHS): 0.22 GM/DL (ref 0–0.4)
ALPHA 1 GLOB% (OHS): 3.29 % (ref 2.3–4.9)
ALPHA 2 GLOB % (OHS): 15.1 % (ref 6.9–13)
ALPHA 2 GLOB (OHS): 1.03 GM/DL (ref 0.4–1)
ALT SERPL-CCNC: 14 UNIT/L (ref 0–55)
AST SERPL-CCNC: 23 UNIT/L (ref 5–34)
B2 MICROGLOB SERPL-MCNC: 4.17 MG/L
BASOPHILS # BLD AUTO: 0 X10(3)/MCL (ref 0–0.2)
BASOPHILS NFR BLD AUTO: 0.4 %
BETA GLOB (OHS): 1.01 GM/DL (ref 0.7–1.3)
BETA GLOB% (OHS): 14.86 % (ref 13.8–19.7)
BILIRUB SERPL-MCNC: 0.2 MG/DL
BILIRUBIN DIRECT+TOT PNL SERPL-MCNC: 0.1 MG/DL (ref 0–0.5)
BILIRUBIN DIRECT+TOT PNL SERPL-MCNC: 0.1 MG/DL (ref 0–0.8)
BUN SERPL-MCNC: 15.3 MG/DL (ref 9.8–20.1)
CALCIUM SERPL-MCNC: 9 MG/DL (ref 8.7–10.5)
CHLORIDE SERPL-SCNC: 103 MMOL/L (ref 98–107)
CO2 SERPL-SCNC: 29 MMOL/L (ref 22–29)
CREAT SERPL-MCNC: 1.84 MG/DL (ref 0.55–1.02)
EOSINOPHIL # BLD AUTO: 0.1 X10(3)/MCL (ref 0–0.9)
EOSINOPHIL NFR BLD AUTO: 3.5 %
ERYTHROCYTE [DISTWIDTH] IN BLOOD BY AUTOMATED COUNT: 13.7 % (ref 11.5–17)
GAMMA GLOBULIN % (OHS): 25.11 % (ref 10.1–21.9)
GAMMA GLOBULIN (OHS): 1.71 GM/DL (ref 0.4–1.8)
GLOBULIN SER-MCNC: 3.6 GM/DL (ref 2.4–3.5)
GLOBULIN SER-MCNC: 3.9 GM/DL (ref 2.4–3.5)
GLUCOSE SERPL-MCNC: 84 MG/DL (ref 74–100)
HCT VFR BLD AUTO: 34.9 % (ref 37–47)
HGB BLD-MCNC: 10.8 GM/DL (ref 12–16)
IFE INTERPRETATION (OHS): ABNORMAL
IGA SERPL-MCNC: 218 MG/DL (ref 65–421)
IGA SERPL-MCNC: 228 MG/DL (ref 65–421)
IGG SERPL-MCNC: 1618 MG/DL (ref 552–1632)
IGG SERPL-MCNC: 1714 MG/DL (ref 552–1632)
IGM SERPL-MCNC: 25 MG/DL (ref 33–293)
IGM SERPL-MCNC: 26 MG/DL (ref 33–293)
LYMPHOCYTES # BLD AUTO: 0.8 X10(3)/MCL (ref 0.6–4.6)
LYMPHOCYTES NFR BLD AUTO: 29.3 %
M SPIKE % (OHS): ABNORMAL
M SPIKE (OHS): ABNORMAL
MCH RBC QN AUTO: 29.8 PG (ref 27–31)
MCHC RBC AUTO-ENTMCNC: 30.9 GM/DL (ref 33–36)
MCV RBC AUTO: 96.4 FL (ref 80–94)
MONOCYTES # BLD AUTO: 0.4 X10(3)/MCL (ref 0.1–1.3)
MONOCYTES NFR BLD AUTO: 14.5 %
NEUTROPHILS # BLD AUTO: 1.5 X10(3)/MCL (ref 2.1–9.2)
NEUTROPHILS NFR BLD AUTO: 52.3 %
PEP GRAPH (OHS): ABNORMAL
PLATELET # BLD AUTO: 177 X10(3)/MCL (ref 130–400)
PMV BLD AUTO: 8.3 FL (ref 9.4–12.4)
POTASSIUM SERPL-SCNC: 3.3 MMOL/L (ref 3.5–5.1)
PROT SERPL-MCNC: 6.8 GM/DL (ref 6.4–8.3)
PROT SERPL-MCNC: 7.1 GM/DL (ref 6.4–8.3)
RBC # BLD AUTO: 3.62 X10(6)/MCL (ref 4.2–5.4)
SODIUM SERPL-SCNC: 143 MMOL/L (ref 136–145)
WBC # SPEC AUTO: 2.8 X10(3)/MCL (ref 4.5–11.5)

## 2021-12-06 ENCOUNTER — HISTORICAL (OUTPATIENT)
Dept: INFUSION THERAPY | Facility: HOSPITAL | Age: 56
End: 2021-12-06

## 2021-12-06 LAB
ANION GAP SERPL CALC-SCNC: 17 MMOL/L
BUN SERPL-MCNC: 20 MG/DL (ref 8–26)
CHLORIDE SERPL-SCNC: 97 MMOL/L (ref 98–109)
CREAT SERPL-MCNC: 2.2 MG/DL (ref 0.6–1.3)
GLUCOSE SERPL-MCNC: 88 MG/DL (ref 70–105)
HCT VFR BLD CALC: 33 % (ref 38–51)
HGB BLD-MCNC: 11.2 MG/DL (ref 12–17)
POC IONIZED CALCIUM: 1.26 MMOL/L (ref 1.12–1.32)
POC TCO2: 31 MMOL/L (ref 24–29)
POTASSIUM BLD-SCNC: 2.7 MMOL/L (ref 3.5–4.9)
SODIUM BLD-SCNC: 141 MMOL/L (ref 138–146)

## 2021-12-07 ENCOUNTER — HISTORICAL (OUTPATIENT)
Dept: INFUSION THERAPY | Facility: HOSPITAL | Age: 56
End: 2021-12-07

## 2022-02-28 ENCOUNTER — HISTORICAL (OUTPATIENT)
Dept: HEMATOLOGY/ONCOLOGY | Facility: CLINIC | Age: 57
End: 2022-02-28

## 2022-02-28 LAB
ABS NEUT (OLG): 1.55 (ref 2.1–9.2)
ALBUMIN SERPL-MCNC: 3.1 G/DL (ref 3.5–5)
ALBUMIN/GLOB SERPL: 0.8 {RATIO} (ref 1.1–2)
ALP SERPL-CCNC: 85 U/L (ref 40–150)
ALT SERPL-CCNC: 9 U/L (ref 0–55)
AST SERPL-CCNC: 22 U/L (ref 5–34)
B2 MICROGLOB SERPL-MCNC: 5.11
BASOPHILS # BLD AUTO: 0 10*3/UL (ref 0–0.2)
BASOPHILS NFR BLD AUTO: 0.4 %
BILIRUB SERPL-MCNC: 0.2 MG/DL
BILIRUBIN DIRECT+TOT PNL SERPL-MCNC: <0.1 (ref 0–0.5)
BILIRUBIN DIRECT+TOT PNL SERPL-MCNC: >0.1 (ref 0–0.8)
BUN SERPL-MCNC: 19.2 MG/DL (ref 9.8–20.1)
CALCIUM SERPL-MCNC: 8.9 MG/DL (ref 8.7–10.5)
CHLORIDE SERPL-SCNC: 103 MMOL/L (ref 98–107)
CO2 SERPL-SCNC: 31 MMOL/L (ref 22–29)
CREAT SERPL-MCNC: 2.33 MG/DL (ref 0.55–1.02)
EOSINOPHIL # BLD AUTO: 0.1 10*3/UL (ref 0–0.9)
EOSINOPHIL NFR BLD AUTO: 2.5 %
ERYTHROCYTE [DISTWIDTH] IN BLOOD BY AUTOMATED COUNT: 14 % (ref 11.5–17)
GLOBULIN SER-MCNC: 4 G/DL (ref 2.4–3.5)
GLUCOSE SERPL-MCNC: 96 MG/DL (ref 74–100)
HCT VFR BLD AUTO: 32.9 % (ref 37–47)
HEMOLYSIS INTERF INDEX SERPL-ACNC: 0
HGB BLD-MCNC: 10.4 G/DL (ref 12–16)
ICTERIC INTERF INDEX SERPL-ACNC: 0
LIPEMIC INTERF INDEX SERPL-ACNC: 40
LYMPHOCYTES # BLD AUTO: 0.7 10*3/UL (ref 0.6–4.6)
LYMPHOCYTES NFR BLD AUTO: 25.3 %
MANUAL DIFF? (OHS): YES
MCH RBC QN AUTO: 29.5 PG (ref 27–31)
MCHC RBC AUTO-ENTMCNC: 31.6 G/DL (ref 33–36)
MCV RBC AUTO: 93.5 FL (ref 80–94)
MONOCYTES # BLD AUTO: 0.5 10*3/UL (ref 0.1–1.3)
MONOCYTES NFR BLD AUTO: 17.5 %
NEUTROPHILS # BLD AUTO: 1.6 10*3/UL (ref 2.1–9.2)
NEUTROPHILS NFR BLD AUTO: 54.3 %
PLATELET # BLD AUTO: 195 10*3/UL (ref 130–400)
PMV BLD AUTO: 9.7 FL (ref 9.4–12.4)
POTASSIUM SERPL-SCNC: 3.8 MMOL/L (ref 3.5–5.1)
PROT SERPL-MCNC: 7.1 G/DL (ref 6.4–8.3)
RBC # BLD AUTO: 3.52 10*6/UL (ref 4.2–5.4)
SODIUM SERPL-SCNC: 144 MMOL/L (ref 136–145)
WBC # SPEC AUTO: 2.8 10*3/UL (ref 4.5–11.5)

## 2022-03-16 ENCOUNTER — HISTORICAL (OUTPATIENT)
Dept: HEMATOLOGY/ONCOLOGY | Facility: CLINIC | Age: 57
End: 2022-03-16

## 2022-03-17 LAB
ABS NEUT (OLG): 1.69 (ref 2.1–9.2)
BASOPHILS # BLD AUTO: 0 10*3/UL (ref 0–0.2)
BASOPHILS NFR BLD AUTO: 0.3 %
EOSINOPHIL # BLD AUTO: 0.1 10*3/UL (ref 0–0.9)
EOSINOPHIL NFR BLD AUTO: 2.3 %
ERYTHROCYTE [DISTWIDTH] IN BLOOD BY AUTOMATED COUNT: 14.2 % (ref 11.5–17)
HCT VFR BLD AUTO: 36 % (ref 37–47)
HGB BLD-MCNC: 11.3 G/DL (ref 12–16)
LYMPHOCYTES # BLD AUTO: 0.8 10*3/UL (ref 0.6–4.6)
LYMPHOCYTES NFR BLD AUTO: 26.7 %
MANUAL DIFF? (OHS): NO
MCH RBC QN AUTO: 29.6 PG (ref 27–31)
MCHC RBC AUTO-ENTMCNC: 31.4 G/DL (ref 33–36)
MCV RBC AUTO: 94.2 FL (ref 80–94)
MONOCYTES # BLD AUTO: 0.5 10*3/UL (ref 0.1–1.3)
MONOCYTES NFR BLD AUTO: 15.6 %
NEUTROPHILS # BLD AUTO: 1.7 10*3/UL (ref 2.1–9.2)
NEUTROPHILS NFR BLD AUTO: 55.1 %
PLATELET # BLD AUTO: 168 10*3/UL (ref 130–400)
PMV BLD AUTO: 9.6 FL (ref 9.4–12.4)
RBC # BLD AUTO: 3.82 10*6/UL (ref 4.2–5.4)
WBC # SPEC AUTO: 3.1 10*3/UL (ref 4.5–11.5)

## 2022-03-21 LAB — BCS RECOMMENDATION EXT: NORMAL

## 2022-04-11 ENCOUNTER — HISTORICAL (OUTPATIENT)
Dept: ADMINISTRATIVE | Facility: HOSPITAL | Age: 57
End: 2022-04-11
Payer: COMMERCIAL

## 2022-04-20 ENCOUNTER — HISTORICAL (OUTPATIENT)
Dept: HEMATOLOGY/ONCOLOGY | Facility: CLINIC | Age: 57
End: 2022-04-20
Payer: COMMERCIAL

## 2022-04-20 LAB
ABS NEUT (OLG): 1.44 (ref 2.1–9.2)
ALBUMIN SERPL-MCNC: 3.2 G/DL (ref 3.5–5)
ALBUMIN/GLOB SERPL: 0.8 {RATIO} (ref 1.1–2)
ALP SERPL-CCNC: 78 U/L (ref 40–150)
ALT SERPL-CCNC: 9 U/L (ref 0–55)
AST SERPL-CCNC: 19 U/L (ref 5–34)
B2 MICROGLOB SERPL-MCNC: 4.97
BASOPHILS # BLD AUTO: 0 10*3/UL (ref 0–0.2)
BASOPHILS NFR BLD AUTO: 0.4 %
BILIRUB SERPL-MCNC: 0.3 MG/DL
BILIRUBIN DIRECT+TOT PNL SERPL-MCNC: 0.1 (ref 0–0.5)
BILIRUBIN DIRECT+TOT PNL SERPL-MCNC: 0.2 (ref 0–0.8)
BUN SERPL-MCNC: 24.8 MG/DL (ref 9.8–20.1)
CALCIUM SERPL-MCNC: 9.3 MG/DL (ref 8.7–10.5)
CHLORIDE SERPL-SCNC: 100 MMOL/L (ref 98–107)
CO2 SERPL-SCNC: 30 MMOL/L (ref 22–29)
CREAT SERPL-MCNC: 2.17 MG/DL (ref 0.55–1.02)
EOSINOPHIL # BLD AUTO: 0.1 10*3/UL (ref 0–0.9)
EOSINOPHIL NFR BLD AUTO: 3.5 %
ERYTHROCYTE [DISTWIDTH] IN BLOOD BY AUTOMATED COUNT: 14.1 % (ref 11.5–17)
GLOBULIN SER-MCNC: 4.1 G/DL (ref 2.4–3.5)
GLUCOSE SERPL-MCNC: 85 MG/DL (ref 74–100)
HCT VFR BLD AUTO: 33.3 % (ref 37–47)
HEMOLYSIS INTERF INDEX SERPL-ACNC: 13
HGB BLD-MCNC: 10.6 G/DL (ref 12–16)
ICTERIC INTERF INDEX SERPL-ACNC: 0
LIPEMIC INTERF INDEX SERPL-ACNC: 15
LYMPHOCYTES # BLD AUTO: 0.5 10*3/UL (ref 0.6–4.6)
LYMPHOCYTES NFR BLD AUTO: 20.1 %
MANUAL DIFF? (OHS): NO
MCH RBC QN AUTO: 29.9 PG (ref 27–31)
MCHC RBC AUTO-ENTMCNC: 31.8 G/DL (ref 33–36)
MCV RBC AUTO: 94.1 FL (ref 80–94)
MONOCYTES # BLD AUTO: 0.5 10*3/UL (ref 0.1–1.3)
MONOCYTES NFR BLD AUTO: 19.3 %
NEUTROPHILS # BLD AUTO: 1.4 10*3/UL (ref 2.1–9.2)
NEUTROPHILS NFR BLD AUTO: 56.7 %
PLATELET # BLD AUTO: 162 10*3/UL (ref 130–400)
PMV BLD AUTO: 9.2 FL (ref 9.4–12.4)
POTASSIUM SERPL-SCNC: 3.6 MMOL/L (ref 3.5–5.1)
PROT SERPL-MCNC: 7.3 G/DL (ref 6.4–8.3)
RBC # BLD AUTO: 3.54 10*6/UL (ref 4.2–5.4)
SODIUM SERPL-SCNC: 139 MMOL/L (ref 136–145)
WBC # SPEC AUTO: 2.5 10*3/UL (ref 4.5–11.5)

## 2022-04-24 VITALS
WEIGHT: 156.75 LBS | SYSTOLIC BLOOD PRESSURE: 112 MMHG | DIASTOLIC BLOOD PRESSURE: 78 MMHG | BODY MASS INDEX: 27.77 KG/M2 | HEIGHT: 63 IN | OXYGEN SATURATION: 95 %

## 2022-04-30 NOTE — PROGRESS NOTES
Patient:   Ninfa Laws            MRN: [id number]            FIN: [id number]               Age:   52 years     Sex:  Female     :  1965   Associated Diagnoses:   None   Author:   Heide Kamara NP      Referring Physician: DR. Cardona  PCP: Dr. Chacon  GI: Dr. Johnny Bhardwaj  Rheumatologist: Dr. Luis Rea  Immunologist: Dr. Daniel Nava  ENT: Dr. Brice Williamson  Pain management: Dr. Arben Xie  [facility] Transplant: Dr. Adrian Naylor  [facility] Med Onc: Dr. 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 Sx risks do not outweigh benefits. Pain meds given and encouraged Physical Therpay  8) Amyloid plaques, evaluated by Derm at [facility], recommend watchful waiting. Patient to follow up 2017  9) Newly diagnosed Stage IA grade 3, ER+, HER2 BERNA +,  IDC/DCIS of the left breast --- 18 at [facility]; s/p lumpectomy with SLNB , pathology pending   10) Progressive swelling of R lower extremity--- US NIVA done 18 negative for evidence of DVT  11). Paroxysmal Afib (18) diagnosed at [facility]; ECHO noted EF 58%  12. Mild CKD with GFR 55 - managed per [facility] nephrology  13). Pulmonary nodules noted on pre-operative chest CT scan (3/12/18) noted new bilateral pulmonary nodules are nonspecific -- seen by Pulmonology at [facility] (3/21/18) thought to be inflammatory/infectious in nature, recommended close CT follow  up in 3 months   14) Sinusitis       Current Treatment:   Maintenance Revlimid 5mg PO QOD, started 02/16/17-- CURRENTLY ON HOLD  Dexamethasone 20 mg po weekly added early July 2017--> reduced to 12 mg PO weekly October 4, 2017---> increased to 16mg PO weekly 2/15/18--- CURRENTLY ON HOLD  Left breast lumpectomy with SLNB at [facility] 4/12/18-- pathology pending with f/u scheduled for 4/24/18    Treatment History:   1) CyBorD x5 cycles 09/28/15-12/24/15  2) Autologous stem cell transplant 02/08/16, done at [facility]  3) Exploratory laparotomy with subtotal colectomy and end ileostomy done August 2, 2016--pathology specimen showed advanced colonic amyloidosis extensive involving the muscular wall submucosa and mucosa. Appendix also involvement by amyloidosis with fibrous obliteration of the distal lumen.  4) Maintenance therapy with Revlimid 5mg-started 06/01/16--Discontinued shortly after 2/2 cytopenias    HPI/Clinical History:  Ms. Cabrera Joseph was admitted on 08/16/15 for ileus versus partial SBO. CT A/P done 08/17/15 showed sigmoid colitis and a zone of transition at the junction of the descending and sigmoid colon which could indicate some degree of obstruction and an obstructing mass cannot be excluded. Numerous skeletal lytic lesions noted. CT chest done 08/19/15 showed Multiple skeletal lytic lesions involving the thoracic spine, left rib sternum consistent with either metastases or multiple myeloma. There is no evidence of pulmonary or mediastinal mass. Dr. Farr was consulted for possible MM/anemia.     Nuclear Bone scan done 8/20/15 showed mild to moderate increased activity in left rib and right second rib which correlates with fractures seen on CT.  Skeletal survey done 8/20/15showed lytic lesions in the calvarium and probably a lytic lesion in the left scapula. Lytic areas in the spine and pelvis seen on recent CT are not well defined on skeletal survey. Work up labs done 08/20/15: Negative for sickle cell  and Thalessemia (reported history of thalessemia). Iron 54, Transferrin 118.0, Iron sat 34.6%, Folate 26.5, Vit B12 1,779  Peripheral smear resulted slightly macrocytic normochromic anemia without signifcant anisocytosis may reflect early B12/folate deficiency or marrow dysfunction, among others. No immature myeloid cells or blasts. Platlets adequate. Beta 2 mircoglobulin = 3.2.  SPEP/NADIA: M-spike in the beta region. The monoclonal protein peak accounts for 1.13 g/dL. Hypogammaglobulinemia. NADIA pattern shows the presence of a free lambda light chain monoclonal protein with additional faint band in IgA.  All immunoglobulin levels decreased. IgA=26, IgG=307, IgM<5.  Kappa Qnt 0.16, Lambda Qnt 4600.00, Ratio <0.01.  24hr Urine for Bence Mendez: Kappa 2.75, Lambda 1250.00, Ratio <0.01. NADIA: Urine is POSITIVE for monoclonal Free Lambda Light chains    Patient then opted to go to [facility] where she underwent a bone marrow biopsy on 09/18/15. BMBx showed 80% plasma cells, 13p deletion and normal karyotype. She underwent fat pad biopsy which came back positive for Congo red consistent with amyloidosis. Cardiac workup revealed no amyloid deposition within the heart.  PET/CT and skeletal survey done September 18, 2015 showed multiple lytic osseous lesions  The patient was started on Velcade while at [facility] with the plan to transition to autologous stem cell transplantation. They asked if we could give her could continue treatment here.   She completed CyborD x5 cycles on 12/24/15    Patient admitted 01/06/16 for colitis and C. Diff. Pt treated with Flagyl. Discharged home 01/08/16    Repeat BMBx done at [facility] 01/2016 did not show any morphologic or immnophenotypic evidence of plasama cell neoplasm. Patient underwent Autologous stem cell transplant with Busulfan and melphalan on 02/08/16 at [facility]. The only complication was recurrent C.diff infection for which she completed 10 days of Fidaxomycin.    Follow-up bone marrow biopsy  done at [facility] done 5/16/16 showed cellular 30-40% bone marrow with trilineage hematopoiesis. No morphologic or immunophenotypic support for plasma cell neoplasm. Started maintenance Revlimid 5mg. Unable to continue therapy due to cytopenias.    On 08/02/16 patient underwent  Exploratory laparotomy with subtotal colectomy and end ileostomy for what was thought to be toxic megacolon. Pathology showed extensive advanced colonic amyloidosis involving the muscular wall, submucosa and mucosa: With the appendix also involved with amyloidosis.  Positive lambda light chains were noted.    Follow-up at [facility] 8/31/16, Per Dr. Adrian Naylor, 6 months post autologous transplant. She has engrafted. Based on the latest restaging she is near complete remission with possible IgA lambda on serum immunofixation. Patient was instructed to discontinue prophylactic antibiotics. She received her 1st series of immunizations while at [facility]. Patient had a bilateral venous ultrasound to rule out DVT, negative B/L. In regards to amyloidosis the patient feels that her tongue is smaller in size and her BNP has come down some.  Patient had a follow-up appointment at [facility] November 30, 2016.  Dr. Parnell documented the patient's free lambda light chain remains at a lower level.  Therefore in light of her recent surgery they will continue with observation only.  The plan to see the patient back in 2-3 months with repeat restaging labs.  They recommended regular CBC checks to monitor anemia.  Patient returned to [facility] in December 2016 to meet with dermatology for numerous papillary lesions on eyelids, chest and posterior neck consistent with amyloid deposits. Recommendations were for watchful waiting.  Patient is less than 1 year out from bone marrow transplant and further improvement can be expected.  She will see the patient back in 1 year to discuss possible surgical resection of the plaques especially around the  eyelid region.  Rogaine recommended for persistent hair loss. Retin-A recommended for acne vulgaris.  Patient returned to [facility] on 2/8/2017 for neurosurgery consult for chronic low back pain and difficulty walking.  Imaging showed extensive DJD with discovertebral bulges and thickening of the spinal laminar tissues creating spinal stenosis throughout, but greatest at L2/L3.  Neurosurgery felt that surgery risks outweighed the benefits.  Patient was prescribed pain medicine and encouraged to participate in physical therapy.  Patient also met with Dr. Parnell on 02/08/17, who noted an elevation in free light chains (kappa 52.60/lambda 27.77/ratio 1.89).  Recommendation is to resume maintenance therapy with Revlimid at a low dose of 5 mg every other day.  Patient went back to [facility] first week of April 2017.  I do not have these notes.  Reportedly she was told to continue on every other day Revlimid at 5 mg.  She reportedly had repeat myeloma labs done as well.  Again I do not have these results.  Patient went back to [facility] and saw Dr. Parnell June 29, 2017.  Myeloma labs were done with serum protein electrophoresis showing irregularity in the fast gamma region.  IgG of 1291.  Free kappa light chains of 84.6 with lambda light chains of 66.9.  Beta-2 microglobulin of 4.5  CT scan of lumbar spine showed multiple lytic lesions once again in the lumbar spine and bony pelvis.  Ultrasound of the left leg showed no evidence of DVT.  It was recommended based on the slight increase in her And lambda light chains to start weekly dexamethasone 20 mg p.o. weekly.  Follow-up visit and labs at [facility] on September 29, 2017 with Dr. Parnell.  Repeat beta-2 microglobulin came back at 2.4.  Serum IgG of 761, IgA 117 and IgM of 29.  Serum free kappa light chains of 24.9 and lambda of 23.5.  Counts were stable with hemoglobin of 11.1, WBC 4.9 and platelets of 210,000.  Recommendations were for continued Revlimid every  other day with weekly dexamethasone along with aspirin for DVT prophylaxis.  Bilateral diagnostic MMG 12/19/17  noted 1.6cm coarse heterogeneous calcifications in posterior region of L breast at 3 o'clock position. Patient was scheduled for FNA which confirmed ID grade 3, single focus measuring 1.7cm with 2nd focus microinvasion DCIS grade 2 measuring 0.3cm. Left axillary LN biopsy showed no evidence of metastatic carcinoma. Complete staging: Stage IA, grade 3 ER+, Her 2 Niki + IDC/DCIS of left breast. Ki-67 <17%.  Repeat myeloma labs showed rise in free lambda light chains noted at 68.69, kappa light chains at 27.22,  beta 2 microglobulin came back at 2.9. Serum protein electrophoresis showed no definitive evidence of an M protein peak. The pattern is consistent with an acute inflammatory reaction. Recommendations were made to maintain Revlimid at current dose of 5mg every other day to be taken continuously increase weekly dexamethasone to 16 mg.   Patient seen at [facility] with tentative plan for L breast lumpectomy on 3/13/18. 2/16/18- Prior to surgery she was seen by genetic counselor who ran genetic testing per patient reques, all of which were negative. She was then seen by cardiology at [facility] 2/16/18 for further evaluation of persistent bilateral lower extremity swelling-ECHO noted EF of 58%; diagnosed with paroxysmal atrial fibrillation and instructed to begin daily ASA. During preoperative asssessment per Rad/Onc 3/12/18, corresponding chest CT noted new bilateral pulmonary nodules concerning for metastatic disease- surgery was subsequently postponed pending pulmonary evaluation. Seen by Pulmonology on 3/21/18, PFTs within normal limits and cleared patient for surgery with recommended close CT follow up of nodules in 3 months. 3/21/18 seen by nephrology for management of mild CKD (GFR 55)-cleared for surgery with recommended follow up in 3 months. Left breast lumpectomy and SLNB performed per Dr. Washburn  on 4/12/18- plan for follow up in clinic on 4/24/18  for postoperative assessment. Follow up with Dr. Poole (Med/Onc) on 4/25/18. Follow up with Dr. Parnell 4/26/18.           PMHx: Anemia, IBS, carpal tunnel, sciatica, hypertension, hyperlipidemia, acid reflux, atrial fibrillation, chronic constipation, UTI, lumbar disc problem, thalassemia  PSHx: Bone marrow transplant, Carpal tunnel release, excision of lipoma left elbow  Social Hx: Lives at home with his sister.  Works in the  post office.  Regular diet.  No exercise.  Denies alcohol, substance and tobacco use.  Family Hx: Fatheresophageal cancer         Chief Complaint   Follow Up Schedule      Interval History   Patient here for scheduled follow up- recently seen at [facility]. She is s/p L breast lumpectomy 4/12/18 per [facility] breast surgical oncology. Details of events leading up to her procedure noted in above HPI. Recovering well overall, pain is well controlled at this time. She denies fever, chills or other signs of infection. She was reported placed back on daily ASA despite her CKD for anticoagulation secondary to her recently diagnosed paroxsymal Afib. She denies increased bruising or bleeding noted. Maintenance Revlimid is currently on HOLD due to recent surgery. She notes she has been experiencing some intermittent reflux, diarrhea, and GI upset lately but admits that she had stopped taking her PPIs as prescribed- she has since restarted and has noticed some improvement in her symptoms and her stools have become more formed over last 24hrs. Denies HA, SOB, CP, N/V/C. Bilateral lower extremity swelling has improved significantly since previous clinic visit. US NIVA done 2/19/18 was negative for evidence of DVT. She reports some nasal congestion and productive cough over the last few days. She has chronic allergic rhinitis and is prescribed a daily antihistamine but has been unable to get her prescription filled the last month due to  insurance changes. Labs today are unremarkable. Appetite is stable.She is scheduled to return to [facility] on 4/24/18 to discuss surgical pathology results and discuss plan of care with regards to both her multiple myeloma and her breast cancer moving forward.          Review of Systems   14 point review of systems done in full with pertinent positives as described in interval history. Remainder of review of systems done in full and unremarkable.      Health Status   Allergies:    Allergic Reactions (Selected)  Severity Not Documented  Ace Inhibotors- No reactions were documented.  Baclofen- No reactions were documented.  Contrast Dye- No reactions were documented.  Mobic- No reactions were documented.  NSAIDs- No reactions were documented.  PCN- No reactions were documented.  Penicillins- Rash.  Shrimp- No reactions were documented.  Tramadol- No reactions were documented.  Zythromycin- No reactions were documented.,    Allergies (10) Active Reaction  Ace Inhibotors None Documented  baclofen None Documented  Contrast Dye None Documented  Mobic None Documented  NSAIDs None Documented  PCN None Documented  penicillins rash  shrimp None Documented  Tramadol None Documented  Zythromycin None Documented     Current medications:  (Selected)   Outpatient Medications  Ordered  Zofran: 4 mg, form: Injection, IV Push, Once, first dose 01/09/17 20:27:00 CST, stop date 01/09/17 20:27:00 CST, STAT, 24  Prescriptions  Prescribed  Augmentin 875 mg-125 mg oral tablet: = 1 tab(s), Oral, q12hr, with food or milk, X 7 day(s), # 14 tab(s), 0 Refill(s), Pharmacy: [facility]  Lomotil 2.5 mg-0.025 mg oral tablet: 1 tab(s), Oral, QID, PRN PRN for loose stool, # 24 tab(s), 0 Refill(s), [id number]  Revlimid 5 mg oral capsule: See Instructions, TAKE 1 CAPSULE BY MOUTH EVERY OTHER DAY FOR 21 DAYS, # 11 cap(s), eRx: [facility]  carbinoxamine 4 mg oral tablet: 4 mg = 1 tab(s), Oral, BID, PRN PRN  allergy symptoms, # 60 tab(s), 1 Refill(s), Pharmacy: [id number]  dexamethasone 4 mg oral tablet: See Instructions, Take 4 tab(s) by mouth weekly, # 16 tab(s), 3 Refill(s), Pharmacy: [facility] #[id number]  diphenhydrAMINE/lidocaine/nystatin 0.2 g-1.6 g-1.6 g/237 mL mucous membrane suspension: 1 dylan, TOP, QID, Swish and swallow, # 240 mL, 0 Refill(s), Pharmacy: [id number]  linaclotide 145 mcg oral capsule: 145 mcg = 1 cap(s), Oral, q24hr, PRN PRN constipation, # 30 cap(s), 1 Refill(s), Pharmacy: [id number]  Documented Medications  Documented  Bactrim  mg-160 mg oral tablet: one po bid x 5 days strength unknown, 0 Refill(s)  Caltrate 600 + D oral tablet: 1 tab(s), Oral, BID, # 60 tab(s), 0 Refill(s)  Colace 100 mg oral capsule: 100 mg = 1 cap(s), Oral, Daily, PRN PRN for constipation, 0 Refill(s)  Fe C: 1 tab(s), Oral, Daily, 0 Refill(s)  Mugard: Mugard, as directed on bottle, 0 Refill(s)  Pantoprazole 40 mg ORAL EC-Tablet: 40 mg = 1 tab(s), Oral, Daily, # 30 tab(s), 0 Refill(s)  Zofran 4 mg oral tablet: 4 mg = 1 tab(s), Oral, q6hr, 0 Refill(s)  aspirin 81 mg oral tablet: 81 mg = 1 tab(s), Oral, Daily, 0 Refill(s)  carvedilol 6.25 mg oral tablet: 6.25 mg = 1 tab(s), Oral, BID, # 180 tab(s), 0 Refill(s)  dexamethasone: 12 mg, Oral, qWeek, 0 Refill(s)  fentanyl 12 mcg/hr transdermal film, extended release: = 1 patch(es), TOP, q72hr, 0 Refill(s)  gabapentin 300 mg oral capsule: 600 mg = 2 cap(s), Oral, TID, 0 Refill(s)  hydrocortisone: cream apply to rectum PRN, 0 Refill(s)  oxyCODONE 10 mg oral tablet: 10 mg = 1 tab(s), Oral, BID, prn, 0 Refill(s)   Problem list:    All Problems  Amyloidosis / SNOMED CT 70291722 / Confirmed  Anemia / SNOMED CT 655999670 / Confirmed  Constipation / SNOMED CT [id number] / Confirmed  Impaired mobility / SNOMED CT 698402467 / Confirmed  Lytic lesion of bone on x-ray / SNOMED CT 8189376485 / Confirmed  Multiple  myeloma / SNOMED CT 972122190 / Confirmed  Primary cancer of left female breast / SNOMED CT 717474244 / Confirmed  Resolved: Acid reflux / SNOMED CT 1306278449  Resolved: Acute myeloid leukemia / SNOMED CT [id number]  Resolved: Alteration in nutrition / SNOMED CT 186003488  Problem automatically updated based on documentation on Nutritional Risk Factors or  Unintentional Weight Loss.  Resolved: At risk for aspiration / SNOMED CT 3691726825  Problem automatically updated based on documentation on Cough, Oxygen Therapy, Aspiration Risk, CN IX, X Swallowing Gag Reflex, GI Symptoms, Nutrition Risk Factors, and/or Enteral Tube Type.  Resolved: At risk for falls / SNOMED CT 368696038  Problem automatically updated based on documentation on History of Falls, History of Falls in last 3 months Krishnamurthy, Krishnamurthy Fall Risk Score and/or ADLs.  Problem automatically updated based on documentation on History of Falls, History of Falls in last 3 months Krishnamurthy, Krishnamurthy Fall Risk Score and/or ADLs.  Resolved: At risk for infection / SNOMED CT 034922022  Problem automatically updated based on documentation on WBC and/or Neutro Auto.  Resolved: At risk for nutritional problem / SNOMED CT 167626347  Problem automatically updated based on documentation on Nutritional Risk Factors or  Unintentional Weight Loss.  Resolved: At risk of pressure sore / SNOMED CT 625058288  Resolved: Bowel dysfunction / SNOMED CT 280516800  Problem added automatically by system based on documentation of Bowel Sounds as Hyperactive, Hyperactive, or Absent;  GI Symptoms as Hyperactive, Constipation, or Diarrhea and/or Passing Flatus as No.  Resolved: Cancer chemotherapy / SNOMED CT 259031462  Resolved: Decreased cardiac output / SNOMED CT 091916391  Problem automatically updated based on documentation on Jugular Venous Distention, Heart Sounds ICU, Edema Anasarca, Generalized Edema, Bilateral Lower Leg Edema, Bilateral Pretibial Edema,  Bilateral Ankle Edema, Bilateral Pedal Edema, and/or Cardiovascular Symptoms.  Resolved: Disorder of fluid balance / SNOMED CT 685828966  Problem automatically updated based on documentation on Edema, Anasarca, Edema, Generalized, Edema, Bilateral Periorbital, Edema, Face, Edema,, Bilateral Arm, Edema, Left Arm, Edema, Right Arm, Edema, Bilateral Hand, Edema, Left Hand, Edema, Right Hand, Edema, Labia, Edema, Scrotum, Edema, Penile, Edema, Bilateral Upper Leg, Edema, Bilateral Lower Leg, Edema, Bilateral Pretibial, Edema, Bilateral Ankle, Edema, Bilateral Pedal, GI Symptoms, Skin Turgor General, and/or Gestational Age Method.  Problem automatically updated based on documentation on Edema, Anasarca, Edema, Generalized, Edema, Bilateral Periorbital, Edema, Face, Edema,, Bilateral Arm, Edema, Left Arm, Edema, Right Arm, Edema, Bilateral Hand, Edema, Left Hand, Edema, Right Hand, Edema, Labia, Edema, Scrotum, Edema, Penile, Edema, Bilateral Upper Leg, Edema, Bilateral Lower Leg, Edema, Bilateral Pretibial, Edema, Bilateral Ankle, Edema, Bilateral Pedal, GI Symptoms, Skin Turgor General, and/or Gestational Age Method.  Resolved: FHx: multiple myeloma / SNOMED CT [id number]  Resolved: Glasses / SNOMED CT 6791930593  reading  Resolved: H/O: rheumatoid arthritis / SNOMED CT 056042269  Resolved: Hoarseness / SNOMED CT 98108325  Resolved: HT - Hypertension / SNOMED CT 585863452  Resolved: Hx: UTI (urinary tract infection) / SNOMED CT [id number]  Resolved: Hyperlipidemia / SNOMED CT 80776405  Resolved: IBS - Irritable bowel syndrome / SNOMED CT 5927931361  Resolved: Review of care plan / SNOMED CT 7672618757  Resolved: Sciatica / SNOMED CT 13593444  Resolved: Tendonitis / SNOMED CT [id number]  rt hand,    Active Problems (7)  Amyloidosis   Anemia   Constipation   Impaired mobility   Lytic lesion of bone on x-ray   Multiple myeloma   Primary cancer of  left female breast         Physical Examination   Vital Signs   4/19/2018 15:02 CDT      Temperature Oral          36.4 DegC                             Temperature Oral (calculated)             97.52 DegF                             Peripheral Pulse Rate     90 bpm                             Systolic Blood Pressure   115 mmHg                             Diastolic Blood Pressure  70 mmHg     Measurements from flowsheet : Measurements   4/19/2018 15:02 CDT      Weight Dosing             73.5 kg                             Weight Measured           73.5 kg                             Weight Measured and Calculated in Lbs     162.04 lb                             Weight Estimated          73.5 kg                             Height/Length Dosing      160 cm                             Height/Length Measured    160 cm                             Height/Length Estimated   160 cm                             BSA Measured              1.81 m2                             BSA Estimated             1.81 m2                             Body Mass Index Estimated 28.71 kg/m2                             Body Mass Index Measured  28.71 kg/m2     General:  Alert and oriented, No acute distress.    Eye:  Extraocular movements are intact, Normal conjunctiva.    HENT:  Normocephalic, No pharyngeal erythema, macroglossia, No significant erythema or exudate, positive sinus tenderness.    Neck:  Supple.    Respiratory:  Respirations are non-labored, moderate wheezing noted bilaterally with inspiration.    Cardiovascular:  Normal rate, Regular rhythm,  1+ edema to bilateral lower extremities.    Breast:  No tenderness, s/p lumpectomy site to left breast healing well- no redness, drainage or swelling present.    Gastrointestinal:  Soft, Non-tender, Non-distended, Ostomy unable to be visualized- no redness or tenderness to area.    Musculoskeletal:  Generalized weakness, Ambulating with walker.    Integumentary:  Warm, Dry, plaques to  eyelids.    Neurologic:  Alert, Oriented.    Cognition and Speech:  Oriented.    Psychiatric:  Cooperative.    ECOG Performance Scale: 2 - Capable of all self-care but unable to carry out any work activities. Up and about greater than 50 percent of waking hours.      Review / Management   Results review:  Lab results   4/19/2018 15:05 CDT      POC TCO2                  24.0 mmol/L                             POC Hb                    12.2 mg/dL                             POC Hct                   36.0 %  LOW                             POC Sodium                141 mmol/L                             POC Potassium             3.8 mmol/L                             POC Chloride              108 mmol/L                             POC Ion Calcium           1.17 mmol/L                             POC Glucose               100 mg/dL                             POC BUN                   20.0 mg/dL  HI                             POC Creatinine            1.3 mg/dL                             POC AGAP                  14.0  NA    4/19/2018 14:55 CDT      WBC                       8.1 x10(3)/mcL                             RBC                       3.92 x10(6)/mcL  LOW                             Hgb                       11.8 gm/dL  LOW                             Hct                       37.9 %                             Platelet                  196 x10(3)/mcL                             MCV                       96.7 fL  HI                             MCH                       30.1 pg                             MCHC                      31.1 gm/dL  LOW                             RDW                       13.9 %                             MPV                       8.5 fL  LOW                             Abs Neut                  4.21 x10(3)/mcL                             NEUT%                     52.1 %  NA                             NEUT#                     4.2 x10(3)/mcL                             LYMPH%                     35.1 %  NA                             LYMPH#                    2.8 x10(3)/mcL                             MONO%                     10.3 %  NA                             MONO#                     0.8 x10(3)/mcL                             EOS%                      2.4 %  NA                             EOS#                      0.2 x10(3)/mcL                             BASO%                     0.1 %  NA                             BASO#                     0.0 x10(3)/mcL  .       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 Sx risks do not outweigh benefits. Pain meds given and encouraged Physical Therpay  8) Amyloid plaques, evaluated by Derm at [facility], recommend watchful waiting. Patient to follow up 12/2017  9) URI- currently on antibiotics   10) Newly diagnosed Stage IA grade 3, ER+, HER2 BERNA +,  IDC/DCIS of the left breast --- 2/7/18 at [facility]; s/p lumpectomy with SLNB 4/12, pathology pending   11) Progressive swelling of R lower extremity--- US NIVA done 2/19/18 negative for evidence of DVT  12). Paroxysmal Afib (2/18/18) diagnosed at [facility]; ECHO noted EF 58%  13. Mild CKD with GFR 55 - managed per [facility] nephrology  14). Pulmonary nodules noted on pre-operative chest CT scan (3/12/18) noted new bilateral pulmonary nodules are nonspecific -- seen by Pulmonology at [facility] (3/21/18) thought to be inflammatory/infectious in nature, recommended close CT follow up in 3 months   15) Sinusitis        Plan  Prescription sent for Augmentin 875 PO daily x7 days   Neb treatment to be give today x1  Follow up at [facility] next week for scheduled appts to determine plan of care in managing both her breast cancer as well as her myeloma  Continue daily ASA for anticoagulation for paroxysmal Afib per [facility] Cardiology- follow up as scheduled  Follow up with [facility] Nephrology as scheduled  Follow up with [facility] Pulmonology as scheduled- plans for short term chest CT for follow up of lung nodules  Continue to hold Revlimid 5 mg while she heals from surgery per [facility] recommendation  RTC in 3 months for OV  CBC, CMP prior to appt  Instructed to call clinic with any questions or concerns      Heide LINDSEY, FNP-C

## 2022-04-30 NOTE — ED PROVIDER NOTES
Patient:   Ninfa Laws            MRN: [id number]            FIN: [id number]               Age:   55 years     Sex:  Female     :  1965   Associated Diagnoses:   Chest pain, rule out acute myocardial infarction   Author:   Meri Clifford DO      Basic Information   Time seen: Date & time 2021 00:57:00.   History source: Patient.   Arrival mode: Ambulance.   History limitation: None.   Additional information: Patient's physician(s): Luigi LONG, Marlene Deras MD, Adry Acosta, Chief Complaint from Nursing Triage Note : Chief Complaint   2021 0:46 CDT       Chief Complaint           Pt. c/o CP. Pt. currently being treated for Multiple Myeloma.  .      History of Present Illness   The patient presents with     56 y/o female, with a history of HTN and being treated for multiple myeloma, presents to the ED via EMS with complaints of chest pressure since 1700 along with anxiety and mild SOB. Pt notes the pain began while sitting and the pain fluctuates in intensity, rating her current chest pain as 4/10. She denies nausea/vomiting and notes chronic LE swelling, more in left. .  The onset was 2021 17:00:00 .  The course/duration of symptoms is constant and fluctuating in intensity.  Location: chest. Radiating pain: none. The character of symptoms is pressure.  The degree at onset was minimal.  The degree at maximum was moderate.  The degree at present is 4 /10.  The exacerbating factor is none.  The relieving factor is none.  Risk factors consist of hypertension.  Prior episodes: none.  Therapy today EMS.  Associated symptoms: shortness of breath, anxiety, denies nausea and denies vomiting.        Review of Systems   Constitutional symptoms:  No fever, no chills.    Skin symptoms:  No rash, no lesion.    Eye symptoms:  Vision unchanged.   ENMT symptoms:  No sore throat, no nasal congestion.    Respiratory symptoms:  Shortness of breath, No cough,    Cardiovascular symptoms:   Chest pain, pressure, no palpitations, no syncope, no diaphoresis, no peripheral edema.    Gastrointestinal symptoms:  No abdominal pain, no nausea, no vomiting, no diarrhea.    Genitourinary symptoms:  No dysuria, no hematuria.    Neurologic symptoms:  No headache, no numbness, no weakness.    Psychiatric symptoms:  Anxiety.   Allergy/immunologic symptoms:  No recurrent infections, no impaired immunity.       Health Status   Allergies:    Allergic Reactions (Selected)  Severity Not Documented  Ace Inhibotors- Cough.  Baclofen- No reactions were documented.  Contrast Dye- No reactions were documented.  Mobic- No reactions were documented.  NSAIDs- No reactions were documented.  PCN- No reactions were documented.  Penicillins- Rash.  Shrimp- No reactions were documented.  Tramadol- No reactions were documented.  Zythromycin- No reactions were documented..   Medications:  (Selected)   Inpatient Medications  Ordered  Zofran: 4 mg, form: Injection, IV Push, Once, first dose 01/09/17 20:27:00 CST, stop date 01/09/17 20:27:00 CST, STAT, 24  nitroglycerin 0.4 mg sublingual TAB: 0.4 mg, form: Tab-SL, SL, q5min PRN for chest pain, first dose 09/24/21 1:05:00 CDT, STAT, hold for SBP < 100  Future  CCA FLU VACCINE for IM inj. (Pt. > 5 y/o): 0.5 mL, form: Injection, IM, Once-chemo, *Est. first dose 12/04/20 9:37:00 CST, *Est. stop date 12/04/20 9:37:00 CST, Future Order, Waste Code BKC  Zometa: 3 mg, form: Injection, IV Piggyback, Once-chemo, Infuse over: 20 minute(s), *Est. first dose 06/04/21 8:00:00 CDT, *Est. stop date 06/04/21 8:00:00 CDT, Routine, Months 4, Future Order  Zometa: 3 mg, form: Injection, IV Piggyback, Once-chemo, Infuse over: 20 minute(s), *Est. first dose 09/04/21 8:00:00 CDT, *Est. stop date 09/04/21 8:00:00 CDT, Routine, Months 7, Future Order  Zometa: 3.3 mg, form: Injection, IV Piggyback, Once-chemo, Infuse over: 20 minute(s), first dose 03/04/21 13:00:00 CST, stop date 03/04/21 13:00:00 CST, Routine,  Months 7, Future Order  Zometa: 3.3 mg, form: Injection, IV Piggyback, Once-chemo, Infuse over: 20 minute(s), first dose 12/10/20 14:00:00 CST, stop date 12/10/20 14:00:00 CST, Routine, Months 4, Future Order  Zometa: 4 mg, form: Injection, IV Piggyback, Once-chemo, Infuse over: 20 minute(s), *Est. first dose 03/01/21 8:00:00 CST, *Est. stop date 03/01/21 8:00:00 CST, Routine, Months 7, Future Order  Zometa: 4 mg, form: Injection, IV Piggyback, Once-chemo, Infuse over: 20 minute(s), *Est. first dose 12/07/20 8:00:00 CST, *Est. stop date 12/07/20 8:00:00 CST, Routine, Months 4, Future Order  Prescriptions  Prescribed  Anusol-HC 2.5% rectal cream with applicator: See Instructions, apply to affected area 2-4 times daily prn hemorrhoids, # 30 gm, 0 Refill(s), Pharmacy: [id number]  Ativan 0.5 mg oral tablet: See Instructions, 1 tab(s) po 30 minutes before scan; may repeat dose if needed, # 2 tab(s), 0 Refill(s), Pharmacy: [facility] #[id number]  Decadron 4 mg oral tablet: See Instructions, TAKE 4 TABLETS BY MOUTH WEEKLY, # 16 tab(s), 2 Refill(s), Pharmacy: [facility] #[id number], 162, cm, Height/Length Dosing, 01/15/21 9:40:00 CST, 82.8, kg, Weight Dosing, 01/15/21 9:40:00 CST  Eliquis 2.5 mg oral tablet: 2.5 mg = 1 tab(s), Oral, BID, # 60 tab(s), 5 Refill(s), Pharmacy: [facility] #[id number], 162, cm, Height/Length Dosing, 10/08/20 11:17:00 CDT, 81.6, kg, Weight Dosing, 10/08/20 11:17:00 CDT  Femara 2.5 mg oral tablet: 2.5 mg = 1 tab(s), Oral, Daily, # 30 tab(s), 11 Refill(s), other reason (Rx)  Ninlaro 3 mg oral capsule: 3 mg = 1 cap(s), Oral, q7day, on day 1, 8 and 15 of a 28-day cycle, # 3 cap(s), 2 Refill(s), Pharmacy: [facility], 162, cm, Height/Length Dosing, 04/27/21 11:37:00 CDT, 84.6, kg, Weight Dosing, 04/27/21 11:37:00 CDT  Ninlaro 4 mg oral capsule: 4 mg = 1 cap(s), Oral, q7day, on day 1, 8 and 15 of a 28-day cycle, # 3 cap(s), 4 Refill(s), Pharmacy: [facility], 162, cm, Height/Length Dosing, 01/15/21 9:40:00 CST, 82.8, kg, Weight Dosing, 01/15/21 9:40:00 CST  Ninlaro 4 mg oral capsule: 4 mg = 1 cap(s), Oral, q7day, on day 1, 8 and 15 of a 28-day cycle, # 3 cap(s), 4 Refill(s), Pharmacy: [facility], 162, cm, Height/Length Dosing, 08/25/20 10:29:00 CDT, 81.6, kg, Weight Dosing, 08/25/20 10:29:00 CDT  Ninlaro 4 mg oral capsule: 4 mg = 1 cap(s), Oral, q7day, on day 1, 8 and 15 of a 28-day cycle, # 3 cap(s), 7 Refill(s), Pharmacy: [facility], 162, cm, Height/Length Dosing, 04/27/21 11:37:00 CDT, 77.6, kg, Weight Dosing, 06/21/21 15:48:00 CDT  Pantoprazole 40 mg ORAL EC-Tablet: 40 mg = 1 tab(s), Oral, Daily, # 30 tab(s), 2 Refill(s), [id number], 162, cm, Height/Length Dosing, 01/15/20 13:00:00 CST, 81.1, kg, Weight Dosing, 01/15/20 13:00:00 CST  Retin-A 0.05% topical cream: 1 dylan, TOP, Once a day (at bedtime), # 20 gm, 1 Refill(s), Pharmacy: [facility] #[id number], 162, cm, Height/Length Dosing, 01/15/20 13:00:00 CST, 81.1, kg, Weight Dosing, 01/15/20 13:00:00 CST  Revlimid 5 mg oral capsule: See Instructions, 1 cap po every other day . [facility] number-[id number]. adult female not of reproductive potential, # 14 cap(s), 0 Refill(s), Pharmacy: [facility], 162, cm, Height/Length Dosing, 04/27/21 11:37:00 CDT, 84.6, kg, Weight Dosing,...  Revlimid 5 mg oral capsule: See Instructions, 1 cap po every other day. [facility] number-[id number]. adult femaile not of reproductive potential, # 14 cap(s), 0 Refill(s), Pharmacy: [facility], 162, cm, Height/Length Dosing, 04/27/21 11:37:00 CDT, 77.6, kg, Weight Dosing,...  Revlimid 5 mg oral capsule: See Instructions, 1 tab every other day. [facility] number-[id number] adult female not of reproductive potential, # 14 cap(s), 0 Refill(s), Pharmacy: [facility], 162, cm, Height/Length Dosing, 04/27/21 11:37:00 CDT, 77.6, kg, Weight Dosing, 06/2...  Revlimid 5 mg  oral capsule: See Instructions, 1cap every other day. [facility]-[id number]. adult female not of reproductive status, # 14 cap(s), 2 Refill(s), Pharmacy: [facility], 162, cm, Height/Length Dosing, 04/27/21 11:37:00 CDT, 84.6, kg, Weight Dosing, 04/27...  Revlimid 5 mg oral capsule: See Instructions, take 1 po every other day. [facility] number-[id number]. adult female not of child bearing potential, # 14 cap(s), 0 Refill(s), Pharmacy: [facility], 162, cm, Height/Length Dosing, 04/27/21 11:37:00 CDT, 77.6, kg, Weight Dosin...  benzoyl peroxide 2.5% topical liquid: See Instructions, 1 dylan TOP 1-2 times daily.   If excessive drying, reduce dose.  keep away from eyes and mucous membranes  clean affected area before application, # 240 mL, 0 Refill(s), Pharmacy: [facility] #[id number], 162, cm, Height/Length Do...  carbinoxamine 4 mg oral tablet: 8 mg = 2 tab(s), Oral, BID, PRN PRN for sinus symptoms, # 60 tab(s), 1 Refill(s), Pharmacy: [facility] #[id number], 162, cm, Height/Length Dosing, 10/08/20 11:17:00 CDT, 81.6, kg, Weight Dosing, 10/08/20 11:17:00 CDT  diphenhydrAMINE/lidocaine/nystatin 0.2 g-1.6 g-1.6 g/237 mL mucous membrane suspension: 1 dylan, TOP, QID, Swish and swallow, # 240 mL, 0 Refill(s), Pharmacy: [facility] [id number]  fluticasone 50 mcg/inh nasal spray: See Instructions, SHAKE LIQUID AND USE 1 SPRAY IN EACH NOSTRIL TWICE DAILY AS NEEDED FOR ALLERGY SYMPTOMS, # 16 gm, 3 Refill(s), Pharmacy: [facility] #[id number], 162, cm, Height/Length Dosing, 01/15/21 9:40:00 CST, 82.8, kg, Weight Dosing, 01/15...  furosemide 20 mg oral tablet: See Instructions, TAKE 1 TABLET BY MOUTH DAILY, # 90 tab(s), 0 Refill(s), Pharmacy: [facility] #[id number], 162, cm, Height/Length Dosing, 01/15/21 9:40:00 CST, 82.8, kg, Weight Dosing, 01/15/21 9:40:00 CST  gabapentin 100 mg oral capsule: 200 mg = 2 cap(s), Oral, TID, # 180 cap(s), 4 Refill(s), [facility]  [id number], 162, cm, Height/Length Dosing, 10/08/20 11:17:00 CDT, 81.6, kg, Weight Dosing, 10/08/20 11:17:00 CDT  hydrochlorothiazide 12.5 mg oral tablet: 12.5 mg = 1 tab(s), Oral, Daily, # 90 tab(s), 1 Refill(s), Pharmacy: [id number], 162, cm, Height/Length Dosing, 10/08/20 11:17:00 CDT, 81.6, kg, Weight Dosing, 10/08/20 11:17:00 CDT  losartan 50 mg oral tablet: 50 mg = 1 tab(s), Oral, Daily, # 30 tab(s), 2 Refill(s), Pharmacy: [facility] #[id number], 162, cm, Height/Length Dosing, 03/16/20 15:08:00 CDT, 81.6, kg, Weight Dosing, 03/16/20 15:08:00 CDT  ondansetron 8 mg oral tablet: See Instructions, TAKE 1 TABLET BY MOUTH THREE TIMES DAILY AS NEEDED FOR NAUSEA AND VOMITING, # 30 tab(s), 0 Refill(s), [id number], 162, cm, Height/Length Dosing, 03/16/20 15:08:00 CDT, 81.6, kg, Weight Dosing, 03/16/20 15:0...  potassium chloride 20 mEq oral TABLET extended release: 20 mEq = 1 tab(s), Oral, Daily, # 30 tab(s), 2 Refill(s), Pharmacy: [facility] #[id number], 162, cm, Height/Length Dosing, 03/16/20 15:08:00 CDT, 81.6, kg, Weight Dosing, 03/16/20 15:08:00 CDT  Documented Medications  Documented  Flucelvax PF Quadrivalent [id number] intramuscular suspension: 0.5 mL, IM, Once  Shingrix intramuscular injection: 0.5 mL, IM, As Directed  benzonatate 200 mg oral capsule: 200 mg = 1 cap(s), Oral, TID  carvedilol 25 mg oral tablet: 25 mg = 1 tab(s), Oral, BID  chlorhexidine 0.12% mucous membrane liquid: 15 mL, Oral, BID  hydrochlorothiazide 25 mg oral tablet: 25 mg = 1 tab(s), Oral, Daily  losartan 25 mg oral tablet: 25 mg = 1 tab(s), Oral, Daily  methadone 10 mg oral tablet: 10 mg = 1 tab(s), Oral, BID  oxyCODONE 10 mg oral tablet: 10 mg = 1 tab(s), Oral, BID, prn, 0 Refill(s)  pregabalin 50 mg oral capsule: 50 mg = 1 cap(s), Oral, BID  rosuvastatin 10 mg oral capsule: 10 mg = 1 cap(s), Oral, Daily, # 30 cap(s), 0 Refill(s)  venlafaxine 150 mg oral capsule, extended release: 150 mg =  1 cap(s), Oral, Daily.      Past Medical/ Family/ Social History   Medical history:    Active  Anemia ([id number])  Resolved  Glasses (8920440958):  Resolved.  Comments:  2/19/2015 CST 9:46 Shannon Reddy RN  reading  HT - Hypertension ([id number]):  Resolved.  Hyperlipidemia (47412303):  Resolved.  Acid reflux (2351236652):  Resolved.  Hoarseness (73756064):  Resolved.  Hx: UTI (urinary tract infection) ([id number]):  Resolved.  H/O: rheumatoid arthritis (892620101):  Resolved.  Sciatica (24117625):  Resolved.  Tendonitis ([id number]):  Resolved.  Comments:  2/19/2015 CST 9:56 Shannon Reddy RN  rt hand  IBS - Irritable bowel syndrome ([id number]):  Resolved.  FHx: multiple myeloma ([id number]):  Resolved.  Acute myeloid leukemia ([id number]):  Resolved.  Cancer chemotherapy (200766845):  Resolved.  Neuropathy (6219515367):  Resolved..   Surgical history:    Mammogram ([id number]) on 12/1/2020 at 55 Years.  Comments:  3/3/2021 14:13 Doreen Steiner MD [place]  Exploration Laparotomy (.) on 8/2/2016 at 50 Years.  Comments:  8/2/2016 14:37 ADRIANNA Guillory RN, Edwar SANCHEZ  auto-populated from documented surgical case  Medi-port placement and removal on 3/15/2016 at 50 Years.  Bone marrow transplant on 2/8/2016 at 50 Years.  Esophageal Motility on 5/18/2015 at 49 Years.  Comments:  5/18/2015 8:34 Tim Golden RN  auto-populated from documented surgical case  PH Study 24 Hour on 2/25/2015 at 49 Years.  Comments:  2/25/2015 12:41 Shannon Reddy RN B.  auto-populated from documented surgical case  Carpal tunnel ([id number]).  Release of trigger finger ([id number]).  Excision Lipoma left elbow.  Flex Sig- 06/2019..   Family history:    Cancer  Father  Hypertension  Mother  Sister  Brother  Sister  .   Social history: Alcohol use: Denies, Tobacco use: Denies, Occupation: On  disability.      Physical Examination               Vital Signs   Vital Signs   9/24/2021 1:15 CDT       Systolic Blood Pressure   130 mmHg                             Diastolic Blood Pressure  64 mmHg    9/24/2021 0:46 CDT       Temperature Oral          36.4 DegC                             Temperature Oral (calculated)             97.52 DegF                             Peripheral Pulse Rate     67 bpm                             Respiratory Rate          20 br/min                             SpO2                      99 %                             Oxygen Therapy            Room air                             Systolic Blood Pressure   112 mmHg                             Diastolic Blood Pressure  63 mmHg  .      Vital Signs (last 24 hrs)_____  Last Charted___________  Temp Oral     36.4 DegC  (SEP 24 00:46)  Heart Rate Peripheral   67 bpm  (SEP 24 00:46)  Resp Rate         20 br/min  (SEP 24 00:46)  SBP      130 mmHg  (SEP 24 01:15)  DBP      64 mmHg  (SEP 24 01:15)  SpO2      99 %  (SEP 24 00:46)  .   Basic Oxygen Information   9/24/2021 0:46 CDT       SpO2                      99 %                             Oxygen Therapy            Room air  .   General:  Alert, no acute distress.    [place] coma scale:  Eye response: 4 /4, verbal response: 5 /5, motor response: 6 /6, Total score: Total score: 15.    Neurological:  Alert and oriented to person, place, time, and situation, No focal neurological deficit observed, normal motor observed, normal speech observed.    Skin:  Warm, dry, no pallor.    Head:  Normocephalic, atraumatic.    Neck:  Supple, trachea midline.    Eye:  Pupils are equal, round and reactive to light, extraocular movements are intact, normal conjunctiva.    Ears, nose, mouth and throat:  External ear: Bilateral, normal, Nose: Bilateral nares, normal, Mouth: Normal, oral mucosa moist.    Cardiovascular:  Regular rate and rhythm, No murmur, Normal peripheral perfusion, No edema, Arterial pulses:  Trace pitting edema in RLE; 1+ pitting edema in LLE, chronic discrepancy per patient .    Respiratory:  Lungs are clear to auscultation, respirations are non-labored.    Gastrointestinal:  Soft, Nontender, Non distended, Guarding: Negative, Rebound: Negative.    Musculoskeletal:  Normal ROM.   Lymphatics:  No lymphadenopathy.   Psychiatric:  Cooperative, appropriate mood & affect.       Medical Decision Making   Documents reviewed:  Emergency department nurses' notes.   Orders  Launch Order Profile (Selected)   Inpatient Orders  Ordered  Cardiac Monitorin21 1:05:00 CDT, Constant Order  EK21 1:05:00 CDT, Stat, Once, 21 1:05:00 CDT, Ambulatory, Patient Has IV, Standard Precautions, -1, -1, 21 1:05:00 CDT  nitroglycerin 0.4 mg sublingual TAB: 0.4 mg, form: Tab-SL, SL, q5min PRN for chest pain, first dose 21 1:05:00 CDT, STAT, hold for SBP < 100  Ordered (Dispatched)  CBC - includes Diff: Stat collect, 21 1:05:00 CDT, Blood, Stop date 21 1:05:00 CDT, Lab Collect, Print Label By Order Location, 21 1:05:00 CDT  CMP: Now collect, 21 1:05:00 CDT, Blood, Once, Stop date 21 1:05:00 CDT, Lab Collect, Print Label By Order Location, 21 1:05:00 CDT  Troponin-I: Stat collect, 21 1:05:00 CDT, Blood, Stop date 21 1:05:00 CDT, Lab Collect, Print Label By Order Location, 21 1:05:00 CDT  Ordered (Exam Started)  CXR 1 View: Stat, 21 1:05:00 CDT, Chest Pain, None, Ambulatory, Patient Has IV?, Rad Type, Not Scheduled, 21 1:05:00 CDT  Ordered (In-Lab)  POC iSTAT ER Troponin request: Blood, Stat collect, 21 1:05:00 CDT, Stop date 21 1:05:00 CDT, Lab Collect, Print Label By Order Location  Completed  aspirin 81 mg oral tablet, CHEWABLE: 324 mg, form: Tab-Chew, Oral, Once, first dose 21 1:05:00 CDT, stop date 21 1:05:00 CDT, STAT, 4 chew tab = 5 grain ASA  aspirin enteric coated: 324 mg, 4 tab(s), Tab-EC, N/A, Once,  Stop date 09/24/21 1:14:36 CDT, Physician Stop, 09/24/21 1:14:36 CDT  nitroglycerin: 0.4 mg, 1 tab(s), Tab-SL, N/A, Once, Stop date 09/24/21 1:14:24 CDT, Physician Stop, 09/24/21 1:14:24 CDT.   Electrocardiogram:  Time 9/24/2021 00:47:00, rate 62, normal sinus rhythm, No ST-T changes, no ectopy, EP Interp, P wave and ID interval 1st degree atrioventricular block.    Cardiac monitor:  Time 9/24/2021 02:52:00, Rate 62, normal sinus rhythm, cardiac monitor in place to monitor for ST changes due to pt's reported chest pain, interpreted by myself .    Results review:  Lab results : Lab View   9/24/2021 1:11 CDT       POC Troponin              0.01 ng/mL    9/24/2021 1:06 CDT       Sodium Lvl                141 mmol/L                             Potassium Lvl             3.0 mmol/L  LOW                             Chloride                  99 mmol/L                             CO2                       30 mmol/L  HI                             Calcium Lvl               10.3 mg/dL  HI                             Glucose Lvl               112 mg/dL  HI                             BUN                       25.6 mg/dL  HI                             Creatinine                1.71 mg/dL  HI                             eGFR-AA                   40  NA                             eGFR-CHRIS                  33 mL/min/1.73 m2  NA                             Bili Total                0.1 mg/dL                             Bili Direct               <0.1 mg/dL                             Bili Indirect             >0.00 mg/dL                             AST                       33 unit/L                             ALT                       17 unit/L                             Alk Phos                  86 unit/L                             Total Protein             7.8 gm/dL                             Albumin Lvl               3.1 gm/dL  LOW                             Globulin                  4.7 gm/dL  HI                              A/G Ratio                 0.7 ratio  LOW                             Troponin-I                0.010 ng/mL    9/24/2021 1:05 CDT       WBC                       3.5 x10(3)/mcL  LOW                             RBC                       3.63 x10(6)/mcL  LOW                             Hgb                       10.5 gm/dL  LOW                             Hct                       33.7 %  LOW                             Platelet                  229 x10(3)/mcL                             MCV                       92.8 fL                             MCH                       28.9 pg                             MCHC                      31.2 gm/dL  LOW                             RDW                       14.4 %                             MPV                       11.2 fL                             Abs Neut                  2.25 x10(3)/mcL                             Neutro Auto               64 %  NA                             Lymph Auto                19 %  NA                             Mono Auto                 13 %  NA                             Eos Auto                  3 %  NA                             Abs Eos                   0.1 x10(3)/mcL                             Basophil Auto             1 %  NA                             Abs Neutro                2.25 x10(3)/mcL                             Abs Lymph                 0.7 x10(3)/mcL                             Abs Mono                  0.5 x10(3)/mcL                             Abs Baso                  0.0 x10(3)/mcL  .   Chest X-Ray:  No acute disease process, interpretation by Emergency Physician.    Notes:  55-year-old female presenting with chest pressure.  Given aspirin and nitroglycerin with relief of pain.  Labs, chest x-ray and EKG are unremarkable.  We will admit for her primary care physician for serial cardiac enzymes..      Impression and Plan   Diagnosis   Chest pain, rule out acute myocardial infarction ([id number] R07.9)   Plan    Disposition: Place in Observation Telemetry Unit, Marlene LONG, Adry Acosta.    Counseled: Patient, Regarding diagnosis, Regarding diagnostic results, Regarding treatment plan, Patient indicated understanding of instructions.    Notes: I, Amarilys Hernadez, acted solely as a scribe for and in the presence of Dr. Clifford who performed this service., I, Dr. Clifford, personally performed the services described in this documentation as scribed in my presence, and it is both accurate and complete..

## 2022-05-04 ENCOUNTER — HOSPITAL ENCOUNTER (OUTPATIENT)
Dept: RADIOLOGY | Facility: HOSPITAL | Age: 57
Discharge: HOME | End: 2022-05-04
Attending: INTERNAL MEDICINE
Payer: MEDICARE

## 2022-05-04 DIAGNOSIS — Z78.0 ASYMPTOMATIC AGE-RELATED POSTMENOPAUSAL STATE: ICD-10-CM

## 2022-05-04 PROCEDURE — 77080 DEXA BONE DENSITY SPINE HIP: ICD-10-PCS | Mod: 26,,, | Performed by: STUDENT IN AN ORGANIZED HEALTH CARE EDUCATION/TRAINING PROGRAM

## 2022-05-04 PROCEDURE — 77080 DXA BONE DENSITY AXIAL: CPT | Mod: 26,,, | Performed by: STUDENT IN AN ORGANIZED HEALTH CARE EDUCATION/TRAINING PROGRAM

## 2022-05-04 PROCEDURE — 77080 DXA BONE DENSITY AXIAL: CPT | Mod: TC

## 2022-05-06 DIAGNOSIS — C90.00 MULTIPLE MYELOMA, REMISSION STATUS UNSPECIFIED: Primary | ICD-10-CM

## 2022-05-06 RX ORDER — POTASSIUM CHLORIDE 750 MG/1
TABLET, EXTENDED RELEASE ORAL
Qty: 180 TABLET | Refills: 3 | Status: SHIPPED | OUTPATIENT
Start: 2022-05-06 | End: 2023-05-22 | Stop reason: SDUPTHER

## 2022-05-11 ENCOUNTER — OFFICE VISIT (OUTPATIENT)
Dept: HEMATOLOGY/ONCOLOGY | Facility: CLINIC | Age: 57
End: 2022-05-11
Payer: COMMERCIAL

## 2022-05-11 DIAGNOSIS — R19.7 DIARRHEA, UNSPECIFIED TYPE: ICD-10-CM

## 2022-05-11 DIAGNOSIS — C90.01 MULTIPLE MYELOMA IN REMISSION: Primary | ICD-10-CM

## 2022-05-11 PROCEDURE — 99212 PR OFFICE/OUTPT VISIT, EST, LEVL II, 10-19 MIN: ICD-10-PCS | Mod: 95,,, | Performed by: NURSE PRACTITIONER

## 2022-05-11 PROCEDURE — 99212 OFFICE O/P EST SF 10 MIN: CPT | Mod: 95,,, | Performed by: NURSE PRACTITIONER

## 2022-05-11 RX ORDER — CARVEDILOL 3.12 MG/1
3.12 TABLET ORAL
COMMUNITY
Start: 2021-10-04 | End: 2022-06-30 | Stop reason: SDUPTHER

## 2022-05-11 RX ORDER — BENZONATATE 200 MG/1
200 CAPSULE ORAL
COMMUNITY
Start: 2021-03-04 | End: 2022-06-30

## 2022-05-11 RX ORDER — FUROSEMIDE 20 MG/1
TABLET ORAL
COMMUNITY
Start: 2021-02-18 | End: 2022-06-30

## 2022-05-11 RX ORDER — HYDROCHLOROTHIAZIDE 12.5 MG/1
TABLET ORAL
Status: ON HOLD | COMMUNITY
Start: 2021-09-28 | End: 2024-02-29 | Stop reason: HOSPADM

## 2022-05-11 RX ORDER — FLUTICASONE PROPIONATE 50 MCG
SPRAY, SUSPENSION (ML) NASAL
COMMUNITY
Start: 2022-03-28 | End: 2023-04-25

## 2022-05-11 RX ORDER — DULOXETIN HYDROCHLORIDE 60 MG/1
60 CAPSULE, DELAYED RELEASE ORAL
COMMUNITY
Start: 2022-03-29

## 2022-05-11 RX ORDER — FAMOTIDINE 40 MG/1
TABLET, FILM COATED ORAL
COMMUNITY
Start: 2021-10-04 | End: 2022-10-03

## 2022-05-11 RX ORDER — GABAPENTIN 300 MG/1
300 CAPSULE ORAL
COMMUNITY
Start: 2022-03-24 | End: 2023-02-14

## 2022-05-11 RX ORDER — CARBINOXAMINE MALEATE 4 MG/1
8 TABLET ORAL
COMMUNITY
Start: 2021-10-04 | End: 2022-06-30

## 2022-05-11 NOTE — PROGRESS NOTES
"Audio Visit     The patient location is: her home  The chief complaint leading to consultation is: "lab results"  Visit type: Virtual visit with audio only (telephone)  Total time spent with patient: 12 minutes       The reason for the audio only service rather than synchronous audio and video virtual visit was related to technical difficulties or patient preference/necessity.     Each patient to whom I provide medical services by telemedicine is:  (1) informed of the relationship between the physician and patient and the respective role of any other health care provider with respect to management of the patient; and (2) notified that they may decline to receive medical services by telemedicine and may withdraw from such care at any time. Patient verbally consented to receive this service via voice-only telephone call.       HPI:     Referring Physician: DR. Cardona  PCP: Dr. Chacon  GI: Dr. Johnny Bhardwaj  Rheumatologist: Dr. Luis Rea  Immunologist: Dr. Daniel Nava  ENT: Dr. Brice Williamson  Pain management: Dr. Arben Rivera Transplant: Dr. Adrian Rivera Med Onc: Dr. Zulema Rivera Breast Surg Onc: Dr.DeSnyder [place]: Dr. 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 Sx risks do not outweigh benefits. Pain meds given and encouraged Physical Therpay  8) Amyloid plaques, evaluated by Derm at [facility], recommend " watchful waiting. Patient to follow up 12/2017  9) Stage IA grade 3, ER+, HER2 BERNA +,  IDC/DCIS of the left breast --- 2/7/18 at [facility]; s/p lumpectomy with SLNB 4/12, Grade 2 IDC measuring 4mm with second focus of microinvasive carcinoma measuring  0.4mm .IG DCIS, 0.3mm to posterior margin; 2 SLN negative for malignancy    10) Progressive swelling of R lower extremity--- US NIVA done 2/19/18 negative for evidence of DVT  11). Paroxysmal Afib (2/18/18) diagnosed at [facility]; ECHO noted EF 58%  12. Mild CKD with GFR 55 - managed per [facility] nephrology  13). Pulmonary nodules noted on pre-operative chest CT scan (3/12/18) noted new bilateral pulmonary nodules are nonspecific -- seen by Pulmonology at [facility] (3/21/18) thought to be inflammatory/infectious in nature, repeat Chest CT 6/28/18 noted resolution of nodules  14). Treatment induced neutropenia    Current Treatment:   Maintenance Revlimid 5mg PO QOD, started 02/16/17-- was held for a few months while undergoing treatment for her breast cancer 02/18-05/18; Restarted 6/15/18   Dexamethasone 20 mg po weekly added early July 2017--> reduced to 12 mg PO weekly October 4, 2017---> increased to 16mg PO weekly 2/15/18---restarted 6/15/18   Ninlaro day 1,8,15 q28 days.   Femara 2.5 mg PO daily   Eliquis 2.5mg PO BID    Treatment History:   1) CyBorD x5 cycles 09/28/15-12/24/15  2) Autologous stem cell transplant 02/08/16, done at [facility]  3) Exploratory laparotomy with subtotal colectomy and end ileostomy done August 2, 2016--pathology specimen showed advanced colonic amyloidosis extensive involving the muscular wall submucosa and mucosa.   Appendix also involvement by amyloidosis with fibrous obliteration of the distal lumen.  4) Maintenance therapy with Revlimid 5mg-started 06/01/16--Discontinued shortly after 2/2 cytopenias  5) Left breast lumpectomy with SLNB at [facility] 4/12/18  6) Left partial breast RT x10 fractions  5/21/18-6/4/18     PMHx: Anemia, IBS, carpal tunnel,  sciatica, hypertension, hyperlipidemia, acid reflux, atrial fibrillation, chronic constipation, UTI, lumbar disc problem, thalassemia  PSHx: Bone marrow transplant, Carpal tunnel release, excision of lipoma left elbow  Social Hx: Lives at home with his sister.  Works in the [email or web address].  Regular diet.  No exercise.  Denies alcohol, substance and tobacco use.  Family Hx: Father-esophageal cancer      5/11/22   Interval history:  Patient's visit today via telephone.  She had repeat multiple myeloma labs on 04/22/2022.  No M spike.  Polyclonal gammopathy on immunofixation.  Her creatinine has been above 2 for the past 3 months.  She is following closely with Dr. Mendieta , nephrology at [facility].  She will see them later this month (May 2022).  She is complaining of diarrhea x1 week, up to 4 times per day.         Assessment and plan:     1) Multiple Myeloma, IgA Lambda, del13p, normal karyotype, ISS stg II; S/p Autologous stem cell transplant 02/08/16--slowly progressing  2) Amyloidosis, Lambda light chain type, organ involvement with macroglossia and colon  and heart ? 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 Anemia of chronic disease/malignancy and treatment related  6) deconditioning  7) DJD creating spinal stenosis, evaluated by neurosurgery at [facility]. Sx risks do not outweigh benefits.  8) Amyloid plaques, evaluated by Derm at [facility], recommend watchful waiting.   9) Stage IA grade 3, ER+, HER2 BERNA +,  IDC/DCIS of the left breast ---2/17/18  at [facility]; s/p lumpectomy with SLNB 4/12; Grade 2 IDC measuring 4mm with second focus of microinvasive carcinoma measuring  0.4mm .IG DCIS, 0.3mm to posterior margin; 2 SLN negative for malignancy    10). Paroxysmal Afib (2/18/18) diagnosed at [facility]; ECHO noted EF 58%  11. Mild CKD with GFR 55 - managed per [facility]  nephrology and was seen 3/21/22  12). Pulmonary nodules noted on pre-operative chest CT scan (3/12/18) noted new bilateral pulmonary nodules are nonspecific -- seen by Pulmonology at [facility] (3/21/18) thought to be inflammatory/infectious in nature- Repeat Chest CT 6/28/18 noted resolution    Plan    For c/o diarrhea x 7 days, ordered stool culture. She will  stool sample containers tomorrow.     Continue present treatment and management.   Continue Nilaro 4mg days 1,8,15 q 28 days  Cont low dose Revlimid 5 mg q other day  Dexamethasone on hold by MD Rivera  Cont Femara  Bone density- done on 5/4/22- shows osteopenia.   Mammograms done at [facility]  Keep appts at [facility]- next is on 5/25/22  RTC in 8 weeks with MM work up prior to OV         This service was not originating from a related E/M service provided within the previous 7 days nor will  to an E/M service or procedure within the next 24 hours or my soonest available appointment.  Prevailing standard of care was able to be met in this audio-only visit.

## 2022-05-16 ENCOUNTER — IMMUNIZATION (OUTPATIENT)
Dept: PRIMARY CARE CLINIC | Facility: CLINIC | Age: 57
End: 2022-05-16
Payer: COMMERCIAL

## 2022-05-16 DIAGNOSIS — Z23 NEED FOR VACCINATION: Primary | ICD-10-CM

## 2022-05-16 PROCEDURE — 91305 COVID-19, MRNA, LNP-S, PF, 30 MCG/0.3 ML DOSE VACCINE (PFIZER): CPT | Mod: PBBFAC | Performed by: INTERNAL MEDICINE

## 2022-05-19 ENCOUNTER — TELEPHONE (OUTPATIENT)
Dept: HEMATOLOGY/ONCOLOGY | Facility: CLINIC | Age: 57
End: 2022-05-19
Payer: COMMERCIAL

## 2022-05-25 ENCOUNTER — DOCUMENTATION ONLY (OUTPATIENT)
Dept: HEMATOLOGY/ONCOLOGY | Facility: CLINIC | Age: 57
End: 2022-05-25
Payer: COMMERCIAL

## 2022-05-25 DIAGNOSIS — C90.01 MULTIPLE MYELOMA IN REMISSION: Primary | ICD-10-CM

## 2022-05-25 RX ORDER — LENALIDOMIDE 5 MG/1
5 CAPSULE ORAL EVERY OTHER DAY
COMMUNITY
Start: 2021-03-16 | End: 2022-05-25 | Stop reason: SDUPTHER

## 2022-05-25 NOTE — PROGRESS NOTES
Patient's sister left voicemail wanting to s/w team about stool cup. She sent her daughter to get one today and stated that no one knew what she was talking about. Voice message forwarded to Dr. Meyers's team.

## 2022-05-26 RX ORDER — LENALIDOMIDE 5 MG/1
5 CAPSULE ORAL EVERY OTHER DAY
Qty: 28 EACH | Refills: 0 | Status: SHIPPED | OUTPATIENT
Start: 2022-05-26 | End: 2022-06-22 | Stop reason: SDUPTHER

## 2022-05-31 ENCOUNTER — LAB VISIT (OUTPATIENT)
Dept: LAB | Facility: HOSPITAL | Age: 57
End: 2022-05-31
Attending: INTERNAL MEDICINE
Payer: MEDICARE

## 2022-05-31 DIAGNOSIS — R19.7 DIARRHEA, UNSPECIFIED TYPE: ICD-10-CM

## 2022-05-31 DIAGNOSIS — C90.01 MULTIPLE MYELOMA IN REMISSION: ICD-10-CM

## 2022-05-31 DIAGNOSIS — R19.7 DIARRHEA, UNSPECIFIED TYPE: Primary | ICD-10-CM

## 2022-05-31 PROCEDURE — 87045 FECES CULTURE AEROBIC BACT: CPT

## 2022-06-03 LAB — BACTERIA SPEC CULT: NORMAL

## 2022-06-06 ENCOUNTER — PATIENT MESSAGE (OUTPATIENT)
Dept: HEMATOLOGY/ONCOLOGY | Facility: CLINIC | Age: 57
End: 2022-06-06
Payer: COMMERCIAL

## 2022-06-20 ENCOUNTER — PATIENT MESSAGE (OUTPATIENT)
Dept: HEMATOLOGY/ONCOLOGY | Facility: CLINIC | Age: 57
End: 2022-06-20
Payer: COMMERCIAL

## 2022-06-21 ENCOUNTER — PATIENT OUTREACH (OUTPATIENT)
Dept: ADMINISTRATIVE | Facility: HOSPITAL | Age: 57
End: 2022-06-21
Payer: COMMERCIAL

## 2022-06-21 DIAGNOSIS — C90.01 MULTIPLE MYELOMA IN REMISSION: ICD-10-CM

## 2022-06-21 NOTE — PROGRESS NOTES
Population Health Outreach.Records Received, hyper-linked into chart at this time. The following record(s)  below were uploaded for Health Maintenance .              12/11/2018 COLONOSCOPY

## 2022-06-22 RX ORDER — LENALIDOMIDE 5 MG/1
5 CAPSULE ORAL EVERY OTHER DAY
Qty: 14 EACH | Refills: 0 | Status: SHIPPED | OUTPATIENT
Start: 2022-06-22 | End: 2022-07-19 | Stop reason: SDUPTHER

## 2022-06-24 ENCOUNTER — TELEPHONE (OUTPATIENT)
Dept: HEMATOLOGY/ONCOLOGY | Facility: CLINIC | Age: 57
End: 2022-06-24
Payer: COMMERCIAL

## 2022-06-24 NOTE — TELEPHONE ENCOUNTER
Spoke with pt, she stated Dr. Parnell is recommending a medication to help with diarrhea and wanted to make sure Dr. Meyers was okay with it. She also stated Dr. Parnell spoke with Dr. Meyers on 5/31/22 regarding this.  I advised pt Dr. Meyers was out of office today, working at another location, but I'd send her a message and discuss with her on Monday when I see her. Pt stated understanding and that she'd get Dr. Parnell to call her again.

## 2022-06-28 PROBLEM — I10 HYPERTENSION: Status: ACTIVE | Noted: 2017-04-05

## 2022-06-28 PROBLEM — N18.9 CHRONIC KIDNEY DISEASE: Status: ACTIVE | Noted: 2018-03-21

## 2022-06-28 PROBLEM — E78.2 MIXED HYPERLIPIDEMIA: Status: ACTIVE | Noted: 2021-03-02

## 2022-06-28 PROBLEM — R91.8 MULTIPLE NODULES OF LUNG: Status: ACTIVE | Noted: 2018-03-21

## 2022-06-28 PROBLEM — I48.0 PAROXYSMAL ATRIAL FIBRILLATION: Status: ACTIVE | Noted: 2017-04-05

## 2022-06-28 PROBLEM — G89.29 CHRONIC LOW BACK PAIN: Status: ACTIVE | Noted: 2017-09-29

## 2022-06-28 PROBLEM — M54.50 CHRONIC LOW BACK PAIN: Status: ACTIVE | Noted: 2017-09-29

## 2022-06-28 PROBLEM — E85.9 AMYLOIDOSIS: Status: ACTIVE | Noted: 2022-06-28

## 2022-06-28 PROBLEM — Z79.891 LONG TERM CURRENT USE OF OPIATE ANALGESIC: Status: ACTIVE | Noted: 2017-09-29

## 2022-06-28 PROBLEM — C50.819 OVERLAPPING MALIGNANT NEOPLASM OF FEMALE BREAST: Status: ACTIVE | Noted: 2022-06-28

## 2022-06-28 PROBLEM — F41.8 MIXED ANXIETY DEPRESSIVE DISORDER: Status: ACTIVE | Noted: 2022-06-28

## 2022-06-28 NOTE — TELEPHONE ENCOUNTER
I called the patient to let her know that I spoke with Dr perry. She did not  so I left voicemail. Dr Perry and I  will be talking again Thursday but in the meantime I placed the order for Darzalex. She will cont Revlimid and Dex 20 mg weekly. She is schedule for first week of July. This may change Thursday after I talk to Dr Perry again, but so far that is the plan.

## 2022-06-29 ENCOUNTER — TELEPHONE (OUTPATIENT)
Dept: HEMATOLOGY/ONCOLOGY | Facility: CLINIC | Age: 57
End: 2022-06-29
Payer: COMMERCIAL

## 2022-06-30 ENCOUNTER — LAB VISIT (OUTPATIENT)
Dept: LAB | Facility: HOSPITAL | Age: 57
End: 2022-06-30
Attending: INTERNAL MEDICINE
Payer: MEDICARE

## 2022-06-30 ENCOUNTER — OFFICE VISIT (OUTPATIENT)
Dept: INTERNAL MEDICINE | Facility: CLINIC | Age: 57
End: 2022-06-30
Payer: MEDICARE

## 2022-06-30 VITALS
HEART RATE: 74 BPM | OXYGEN SATURATION: 95 % | BODY MASS INDEX: 28.71 KG/M2 | SYSTOLIC BLOOD PRESSURE: 121 MMHG | DIASTOLIC BLOOD PRESSURE: 74 MMHG | WEIGHT: 156 LBS | TEMPERATURE: 98 F | HEIGHT: 62 IN

## 2022-06-30 DIAGNOSIS — D63.8 ANEMIA OF CHRONIC DISEASE: ICD-10-CM

## 2022-06-30 DIAGNOSIS — I48.0 PAROXYSMAL ATRIAL FIBRILLATION: ICD-10-CM

## 2022-06-30 DIAGNOSIS — C90.01 MULTIPLE MYELOMA IN REMISSION: ICD-10-CM

## 2022-06-30 DIAGNOSIS — I10 HYPERTENSION, UNSPECIFIED TYPE: ICD-10-CM

## 2022-06-30 DIAGNOSIS — K21.9 GASTROESOPHAGEAL REFLUX DISEASE, UNSPECIFIED WHETHER ESOPHAGITIS PRESENT: ICD-10-CM

## 2022-06-30 DIAGNOSIS — C90.00 MULTIPLE MYELOMA, REMISSION STATUS UNSPECIFIED: Primary | ICD-10-CM

## 2022-06-30 DIAGNOSIS — Z79.891 LONG TERM CURRENT USE OF OPIATE ANALGESIC: ICD-10-CM

## 2022-06-30 DIAGNOSIS — C90.00 MULTIPLE MYELOMA, REMISSION STATUS UNSPECIFIED: ICD-10-CM

## 2022-06-30 DIAGNOSIS — Z91.09 ENVIRONMENTAL ALLERGIES: ICD-10-CM

## 2022-06-30 DIAGNOSIS — Z00.00 MEDICARE ANNUAL WELLNESS VISIT, SUBSEQUENT: Primary | ICD-10-CM

## 2022-06-30 DIAGNOSIS — E78.2 MIXED HYPERLIPIDEMIA: ICD-10-CM

## 2022-06-30 DIAGNOSIS — F41.8 MIXED ANXIETY DEPRESSIVE DISORDER: ICD-10-CM

## 2022-06-30 LAB
ALBUMIN SERPL-MCNC: 3.5 GM/DL (ref 3.5–5)
ALBUMIN/GLOB SERPL: 0.9 RATIO (ref 1.1–2)
ALP SERPL-CCNC: 81 UNIT/L (ref 40–150)
ALT SERPL-CCNC: 11 UNIT/L (ref 0–55)
AST SERPL-CCNC: 22 UNIT/L (ref 5–34)
BASOPHILS # BLD AUTO: 0.02 X10(3)/MCL (ref 0–0.2)
BASOPHILS NFR BLD AUTO: 0.8 %
BILIRUBIN DIRECT+TOT PNL SERPL-MCNC: 0.2 MG/DL
BUN SERPL-MCNC: 19.3 MG/DL (ref 9.8–20.1)
CALCIUM SERPL-MCNC: 9.5 MG/DL (ref 8.4–10.2)
CHLORIDE SERPL-SCNC: 103 MMOL/L (ref 98–107)
CO2 SERPL-SCNC: 27 MMOL/L (ref 22–29)
CREAT SERPL-MCNC: 2.21 MG/DL (ref 0.55–1.02)
EOSINOPHIL # BLD AUTO: 0.12 X10(3)/MCL (ref 0–0.9)
EOSINOPHIL NFR BLD AUTO: 4.6 %
ERYTHROCYTE [DISTWIDTH] IN BLOOD BY AUTOMATED COUNT: 14.2 % (ref 11.5–17)
GLOBULIN SER-MCNC: 3.8 GM/DL (ref 2.4–3.5)
GLUCOSE SERPL-MCNC: 79 MG/DL (ref 74–100)
HCT VFR BLD AUTO: 33.8 % (ref 37–47)
HGB BLD-MCNC: 10.9 GM/DL (ref 12–16)
IGA SERPL-MCNC: 210 MG/DL (ref 65–421)
IGG SERPL-MCNC: 1739 MG/DL (ref 522–1631)
IGM SERPL-MCNC: 20 MG/DL (ref 33–293)
IMM GRANULOCYTES # BLD AUTO: 0.01 X10(3)/MCL (ref 0–0.04)
IMM GRANULOCYTES NFR BLD AUTO: 0.4 %
LYMPHOCYTES # BLD AUTO: 0.61 X10(3)/MCL (ref 0.6–4.6)
LYMPHOCYTES NFR BLD AUTO: 23.6 %
MCH RBC QN AUTO: 30.4 PG (ref 27–31)
MCHC RBC AUTO-ENTMCNC: 32.2 MG/DL (ref 33–36)
MCV RBC AUTO: 94.4 FL (ref 80–94)
MONOCYTES # BLD AUTO: 0.46 X10(3)/MCL (ref 0.1–1.3)
MONOCYTES NFR BLD AUTO: 17.8 %
NEUTROPHILS # BLD AUTO: 1.4 X10(3)/MCL (ref 2.1–9.2)
NEUTROPHILS NFR BLD AUTO: 52.8 %
PLATELET # BLD AUTO: 230 X10(3)/MCL (ref 130–400)
PMV BLD AUTO: 9.2 FL (ref 7.4–10.4)
POTASSIUM SERPL-SCNC: 3.7 MMOL/L (ref 3.5–5.1)
PROT SERPL-MCNC: 7.3 GM/DL (ref 6.4–8.3)
RBC # BLD AUTO: 3.58 X10(6)/MCL (ref 4.2–5.4)
SODIUM SERPL-SCNC: 141 MMOL/L (ref 136–145)
WBC # SPEC AUTO: 2.6 X10(3)/MCL (ref 4.5–11.5)

## 2022-06-30 PROCEDURE — 82784 ASSAY IGA/IGD/IGG/IGM EACH: CPT

## 2022-06-30 PROCEDURE — 85025 COMPLETE CBC W/AUTO DIFF WBC: CPT

## 2022-06-30 PROCEDURE — 84165 PROTEIN E-PHORESIS SERUM: CPT | Mod: 91

## 2022-06-30 PROCEDURE — 80053 COMPREHEN METABOLIC PANEL: CPT

## 2022-06-30 PROCEDURE — 99396 PREV VISIT EST AGE 40-64: CPT | Mod: GY,,,

## 2022-06-30 PROCEDURE — 99396 PR PREVENTIVE VISIT,EST,40-64: ICD-10-PCS | Mod: GY,,,

## 2022-06-30 PROCEDURE — 82784 ASSAY IGA/IGD/IGG/IGM EACH: CPT | Mod: 91

## 2022-06-30 PROCEDURE — 36415 COLL VENOUS BLD VENIPUNCTURE: CPT

## 2022-06-30 PROCEDURE — 83883 ASSAY NEPHELOMETRY NOT SPEC: CPT | Mod: 91

## 2022-06-30 RX ORDER — LEVOCETIRIZINE DIHYDROCHLORIDE 5 MG/1
5 TABLET, FILM COATED ORAL NIGHTLY
Qty: 30 TABLET | Refills: 11 | Status: SHIPPED | OUTPATIENT
Start: 2022-06-30 | End: 2023-06-30

## 2022-06-30 RX ORDER — OMEPRAZOLE 20 MG/1
20 CAPSULE, DELAYED RELEASE ORAL DAILY
Qty: 30 CAPSULE | Refills: 11 | Status: SHIPPED | OUTPATIENT
Start: 2022-06-30 | End: 2023-06-21

## 2022-06-30 RX ORDER — CARVEDILOL 3.12 MG/1
3.12 TABLET ORAL 2 TIMES DAILY WITH MEALS
Qty: 180 TABLET | Refills: 2 | Status: SHIPPED | OUTPATIENT
Start: 2022-06-30 | End: 2024-04-01

## 2022-06-30 NOTE — PROGRESS NOTES
Subjective:       Patient ID: Ninfa Candelario is a 56 y.o. female.    Chief Complaint: Annual Exam    Ninfa is a 56-year-old female with HTN, multiple myeloma, breast cancer, amyloidosis, anemia of chronic disease, poorly controlled hypertension, hyperlipidemia, chronic back pain, atrial fibrillation and lumbar disc problems that is seen seen today for Medicare wellness visit. 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  Being followed by oncology closely.  Since last visit patient reports she has been fairly well without any recent injuries or illnesses.  Is requesting new reflux medicine, currently utilizing Pepcid.  Also with worsened seasonal allergies as of lately, requested allergy medication.  Of note patient currently utilizing Claritin and fluticasone nasal.  Did not obtain wellness labs prior to visit, however reports will be obtaining after visit.  No other acute medical concerns noted.    MCW:  Today 06/30/2022    Rheumatologist: Dr. Luis Rea   Immunologist: Dr. Daniel Nava   ENT: Dr. Brice Williamson   Pain management: Dr. Arben Xie   ENT: Dr. Brice Williamson MD Miguel : Dr Poole   Oncologist: Dr. Natalie Meyers          Health Maintenance Due   Topic Date Due    Hepatitis C Screening  Never done    HIV Screening  Never done    TETANUS VACCINE  08/22/2013    Shingles Vaccine (1 of 2) 02/12/2021         Review of Systems   Constitutional: Negative for activity change, chills, fatigue and fever.   HENT: Negative for nasal congestion, ear discharge, ear pain, hearing loss, postnasal drip, rhinorrhea, sinus pressure/congestion, sneezing, sore throat and trouble swallowing.    Eyes: Negative for photophobia, pain, discharge, redness, itching and visual disturbance.   Respiratory: Negative for cough, chest tightness, shortness of breath and wheezing.    Cardiovascular: Negative for chest pain, palpitations and leg swelling.    Gastrointestinal: Negative for abdominal distention, abdominal pain, change in bowel habit, constipation, diarrhea, nausea, vomiting and change in bowel habit.   Endocrine: Negative.  Negative for cold intolerance, heat intolerance, polydipsia, polyphagia and polyuria.   Genitourinary: Negative for difficulty urinating, dysuria, frequency, hematuria and urgency.   Musculoskeletal: Positive for back pain (Chronic) and gait problem (Rolling walker). Negative for arthralgias, joint swelling, leg pain and myalgias.   Integumentary:  Negative for color change, rash, wound and mole/lesion.   Neurological: Negative for dizziness, tremors, speech difficulty, weakness, light-headedness, numbness, headaches, disturbances in coordination, memory loss and coordination difficulties.   Psychiatric/Behavioral: Negative for agitation, behavioral problems, confusion, decreased concentration, hallucinations and sleep disturbance. The patient is not nervous/anxious.          Objective:      Physical Exam  Constitutional:       General: She is not in acute distress.     Appearance: Normal appearance.   HENT:      Right Ear: Tympanic membrane, ear canal and external ear normal.      Left Ear: Tympanic membrane, ear canal and external ear normal.      Nose: Nose normal.      Mouth/Throat:      Mouth: Mucous membranes are moist.      Pharynx: Oropharynx is clear.   Eyes:      Extraocular Movements: Extraocular movements intact.      Conjunctiva/sclera: Conjunctivae normal.      Pupils: Pupils are equal, round, and reactive to light.   Cardiovascular:      Rate and Rhythm: Normal rate and regular rhythm.      Pulses: Normal pulses.      Heart sounds: Normal heart sounds. No murmur heard.    No gallop.   Pulmonary:      Effort: Pulmonary effort is normal.      Breath sounds: Normal breath sounds. No wheezing.   Abdominal:      General: Bowel sounds are normal. There is no distension.      Palpations: Abdomen is soft. There is no mass.       Tenderness: There is no abdominal tenderness. There is no guarding.   Musculoskeletal:         General: Normal range of motion.   Skin:     General: Skin is warm and dry.   Neurological:      Mental Status: She is alert. Mental status is at baseline.      Sensory: No sensory deficit.      Motor: No weakness.         Assessment:       Problem List Items Addressed This Visit        Psychiatric    Long term current use of opiate analgesic     -established with Pain Management  -currently on methadone, oxycodone, gabapentin, and Cymbalta           Mixed anxiety depressive disorder     -currently on Cymbalta              Cardiac/Vascular    Hypertension     -/74  -currently on HCTZ 12.5, carvedilol 3.125, continue  - low-sodium diet           Relevant Medications    carvediloL (COREG) 3.125 MG tablet    Mixed hyperlipidemia     -currently on rosuvastatin 10 mg daily, continue  -low-cholesterol diet avoid fatty and fried foods           Paroxysmal atrial fibrillation     -asymptomatic currently  -currently on carvedilol 3.125 and Eliquis 2.5 b.i.d., continue              Oncology    Anemia of chronic disease       GI    Gastroesophageal reflux disease     -currently on famotidine 40 mg daily, continue  -reports persistent reflux still, and initiate on omeprazole 20 mg daily as well  -avoid food triggers  -last meal a day to 3 hours before bedtime           Relevant Medications    omeprazole (PRILOSEC) 20 MG capsule       Other    Medicare annual wellness visit, subsequent - Primary     -patient feeling generally well today  -will obtain wellness labs within next few days  -age-appropriate screenings up-to-date  -immunizations up-to-date  -encourage routine aerobic exercise 2 to 3 times a week  -increase fluid hydration               Other Visit Diagnoses     Environmental allergies        Relevant Medications    levocetirizine (XYZAL) 5 MG tablet          Plan:   Follow up in about 4 months (around 10/30/2022) for  with Dr Rosario.    Orders Placed This Encounter    omeprazole (PRILOSEC) 20 MG capsule    levocetirizine (XYZAL) 5 MG tablet    carvediloL (COREG) 3.125 MG tablet

## 2022-06-30 NOTE — ASSESSMENT & PLAN NOTE
-patient feeling generally well today  -will obtain wellness labs within next few days  -age-appropriate screenings up-to-date  -immunizations up-to-date  -encourage routine aerobic exercise 2 to 3 times a week  -increase fluid hydration

## 2022-06-30 NOTE — ASSESSMENT & PLAN NOTE
-currently on famotidine 40 mg daily, continue  -reports persistent reflux still, and initiate on omeprazole 20 mg daily as well  -avoid food triggers  -last meal a day to 3 hours before bedtime

## 2022-06-30 NOTE — ASSESSMENT & PLAN NOTE
-currently on rosuvastatin 10 mg daily, continue  -low-cholesterol diet avoid fatty and fried foods

## 2022-07-01 LAB
ALBUMIN % SPEP (OHS): 42.9 (ref 48.1–59.5)
ALBUMIN SERPL-MCNC: 3.4 GM/DL (ref 3.5–5)
ALBUMIN/GLOB SERPL: 0.9 RATIO (ref 1.1–2)
ALPHA 1 GLOB (OHS): 0.23 GM/DL (ref 0–0.4)
ALPHA 1 GLOB% (OHS): 3.13 (ref 2.3–4.9)
ALPHA 2 GLOB % (OHS): 13.51 (ref 6.9–13)
ALPHA 2 GLOB (OHS): 0.97 GM/DL (ref 0.4–1)
BETA GLOB (OHS): 1.12 GM/DL (ref 0.7–1.3)
BETA GLOB% (OHS): 15.57 (ref 13.8–19.7)
GAMMA GLOBULIN % (OHS): 24.88 (ref 10.1–21.9)
GAMMA GLOBULIN (OHS): 1.79 GM/DL (ref 0.4–1.8)
GLOBULIN SER-MCNC: 3.8 GM/DL (ref 2.4–3.5)
M SPIKE % (OHS): ABNORMAL
M SPIKE (OHS): ABNORMAL
PATH REV: NORMAL
PROT SERPL-MCNC: 7.2 GM/DL (ref 6.4–8.3)

## 2022-07-05 LAB
ALBUMIN SERPL ELPH-MCNC: 3.52 G/DL
ALPHA1 GLOB SERPL ELPH-MCNC: 0.33 G/DL
ALPHA2 GLOB SERPL ELPH-MCNC: 1.07 G/DL
AR EER MONOCLONAL PROTEIN AND FLC, SERUM: ABNORMAL
B-GLOBULIN SERPL ELPH-MCNC: 0.82 G/DL
GAMMA GLOB SERPL ELPH-MCNC: 1.67 G/DL
IGA SERPL-MCNC: 196 MG/DL
IGG SERPL-MCNC: 1685 MG/DL
IGM SERPL-MCNC: <10 MG/DL
INTERPRETATION SERPL IFE-IMP: ABNORMAL
INTERPRETATION SERPL IFE-IMP: ABNORMAL
KAPPA LC FREE SER-MCNC: 10.6 MG/DL (ref 0.33–1.94)
KAPPA LC FREE SER-MCNC: 130.47 MG/L
KAPPA LC FREE/LAMBDA FREE SER NEPH: 3.66 {RATIO}
KAPPA LC FREE/LAMBDA FREE SER: 3.29 {RATIO} (ref 0.26–1.65)
LAMBDA LC FREE SERPL-MCNC: 3.22 MG/DL (ref 0.57–2.63)
LAMBDA LC FREE SERPL-MCNC: 35.66 MG/L
PROT SERPL-MCNC: 7.4 G/DL

## 2022-07-06 ENCOUNTER — OFFICE VISIT (OUTPATIENT)
Dept: HEMATOLOGY/ONCOLOGY | Facility: CLINIC | Age: 57
End: 2022-07-06
Payer: MEDICARE

## 2022-07-06 VITALS
BODY MASS INDEX: 30.97 KG/M2 | SYSTOLIC BLOOD PRESSURE: 112 MMHG | WEIGHT: 169.31 LBS | OXYGEN SATURATION: 99 % | DIASTOLIC BLOOD PRESSURE: 70 MMHG | TEMPERATURE: 98 F | HEART RATE: 77 BPM

## 2022-07-06 DIAGNOSIS — E85.9 AMYLOIDOSIS, UNSPECIFIED TYPE: ICD-10-CM

## 2022-07-06 DIAGNOSIS — D63.8 ANEMIA OF CHRONIC DISEASE: ICD-10-CM

## 2022-07-06 DIAGNOSIS — C50.819 OVERLAPPING MALIGNANT NEOPLASM OF FEMALE BREAST, UNSPECIFIED ESTROGEN RECEPTOR STATUS, UNSPECIFIED LATERALITY: ICD-10-CM

## 2022-07-06 DIAGNOSIS — Z79.891 LONG TERM CURRENT USE OF OPIATE ANALGESIC: ICD-10-CM

## 2022-07-06 DIAGNOSIS — C90.00 MULTIPLE MYELOMA, REMISSION STATUS UNSPECIFIED: Primary | ICD-10-CM

## 2022-07-06 DIAGNOSIS — N18.30 STAGE 3 CHRONIC KIDNEY DISEASE, UNSPECIFIED WHETHER STAGE 3A OR 3B CKD: ICD-10-CM

## 2022-07-06 LAB
ALBUMIN 24H UR ELPH-MRATE: 22.1 MG/24 H
ALBUMIN/GLOB 24H UR ELPH: 0.21 {RATIO}
ALPHA1 GLOB 24H UR ELPH-MRATE: 3.9 MG/24 H
ALPHA2 GLOB 24H UR ELPH-MRATE: 24.7 MG/24 H
B-GLOBULIN 24H UR ELPH-MRATE: 26 MG/24 H
COLLECT DURATION TIME UR: 24 H
GAMMA GLOB 24H UR ELPH-MRATE: 52 MG/24 H
PROT 24H UR-MRATE: 130 MG/24 H
PROT PATTERN 24H UR ELPH-IMP: NORMAL
SPECIMEN VOL ?TM UR: 650 ML

## 2022-07-06 PROCEDURE — 99215 OFFICE O/P EST HI 40 MIN: CPT | Mod: PBBFAC | Performed by: INTERNAL MEDICINE

## 2022-07-06 PROCEDURE — 99999 PR PBB SHADOW E&M-EST. PATIENT-LVL V: ICD-10-PCS | Mod: PBBFAC,,, | Performed by: INTERNAL MEDICINE

## 2022-07-06 PROCEDURE — 99215 OFFICE O/P EST HI 40 MIN: CPT | Mod: S$PBB,,, | Performed by: INTERNAL MEDICINE

## 2022-07-06 PROCEDURE — 99999 PR PBB SHADOW E&M-EST. PATIENT-LVL V: CPT | Mod: PBBFAC,,, | Performed by: INTERNAL MEDICINE

## 2022-07-06 PROCEDURE — 99215 PR OFFICE/OUTPT VISIT, EST, LEVL V, 40-54 MIN: ICD-10-PCS | Mod: S$PBB,,, | Performed by: INTERNAL MEDICINE

## 2022-07-06 NOTE — PROGRESS NOTES
HEMATOLOGY/ONCOLOGY OFFICE CLINIC VISIT    NO SHOW 08/21/2018--> Rescheduled        11/05/2019--> No Show/Reschedule       06/24/2020--> No Show/Reschedule       09/24/2020--> No Show/Reschedule       03/15/2021--> No Show/Reschedule       03/23/2021--> No Show/Reschedule       04/13/2021--> No Show       05/27/2021--> No Show       08/19/2021--> No Show       08/26/2021--> No Show/Rescheduled       04/26/2022--> No Show     Visit Information:  Referring Physician: DR. Cardona  PCP: Dr. Chacon  GI: Dr. Johnny Bhardwaj  Rheumatologist: Dr. Luis Rea  Immunologist: Dr. Daniel Nava  ENT: Dr. Brice Williamson  [facility] Pain management: Dr.Chai [facility] Transplant: Dr. Adrian Naylor [facility] Med Onc: Dr. Zulema Rivera Breast Surg Onc: Dr.DeSnyder LONG [place]: Dr. 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 Sx risks do not outweigh benefits. Pain meds given and encouraged Physical Therpay  8) Amyloid plaques, evaluated by Derm at [facility], recommend watchful waiting. Patient to follow up 12/2017  9) Stage IA grade 3, ER+, HER2 BERNA +,  IDC/DCIS of the left breast --- 2/7/18 at [facility]; s/p lumpectomy with SLNB 4/12, Grade 2 IDC measuring 4mm with second focus of microinvasive carcinoma measuring  0.4mm .IG DCIS, 0.3mm to posterior margin; 2 SLN negative for  malignancy    10) Progressive swelling of R lower extremity--- US NIVA done 2/19/18 negative for evidence of DVT  11). Paroxysmal Afib (2/18/18) diagnosed at [facility]; ECHO noted EF 58%  12. Mild CKD with GFR 55 - managed per [facility] nephrology  13). Pulmonary nodules noted on pre-operative chest CT scan (3/12/18) noted new bilateral pulmonary nodules are nonspecific -- seen by Pulmonology at [facility] (3/21/18) thought to be inflammatory/infectious in nature, repeat Chest CT 6/28/18 noted resolution of nodules  14). Treatment induced neutropenia        Present treatment:  Maintenance Revlimid 5mg PO QOD, started 02/16/17-- was held for a few months while undergoing treatment for her breast cancer 02/18-05/18; Restarted 6/15/18   Dexamethasone 20 mg po weekly added early July 2017--> reduced to 12 mg PO weekly October 4, 2017---> increased to 16mg PO weekly 2/15/18---restarted 6/15/18   Femara 2.5 mg PO daily   Eliquis 2.5mg PO BID      Treatment/Oncology history:  1) CyBorD x5 cycles 09/28/15-12/24/15  2) Autologous stem cell transplant 02/08/16, done at [facility]  3) Exploratory laparotomy with subtotal colectomy and end ileostomy done August 2, 2016--pathology specimen showed advanced colonic amyloidosis extensive involving the muscular wall submucosa and mucosa.   Appendix also involvement by amyloidosis with fibrous obliteration of the distal lumen.  4) Maintenance therapy with Revlimid 5mg-started 06/01/16--Discontinued shortly after 2/2 cytopenias  5) Left breast lumpectomy with SLNB at [facility] 4/12/18  6) Left partial breast RT x10 fractions  5/21/18-6/4/1      Plan of care:     Imaging:    Pathology:      CLINICAL HISTORY:       Patient: Ninfa Candelario is a 56 y.o. female.  Ms. Cabrera Joseph was admitted on 08/16/15 for ileus versus partial SBO. CT A/P done 08/17/15 showed sigmoid colitis and a zone of transition at the junction of the descending and sigmoid colon which could indicate some degree of obstruction and  an obstructing mass cannot be excluded. Numerous skeletal lytic lesions noted. CT chest done 08/19/15 showed Multiple skeletal lytic lesions involving the thoracic spine, left rib sternum consistent with either metastases or multiple myeloma. There is no evidence of pulmonary or mediastinal mass. Dr. Farr was consulted for possible MM/anemia.     Nuclear Bone scan done 8/20/15 showed mild to moderate increased activity in left rib and right second rib which correlates with fractures seen on CT.  Skeletal survey done 8/20/15showed lytic lesions in the calvarium and probably a lytic lesion in the left scapula. Lytic areas in the spine and pelvis seen on recent CT are not well defined on skeletal survey. Work up labs done 08/20/15: Negative for sickle cell and Thalessemia (reported history of thalessemia). Iron 54, Transferrin 118.0, Iron sat 34.6%, Folate 26.5, Vit B12 1,779  Peripheral smear resulted slightly macrocytic normochromic anemia without signifcant anisocytosis may reflect early B12/folate deficiency or marrow dysfunction, among others. No immature myeloid cells or blasts. Platlets adequate. Beta 2 mircoglobulin = 3.2.  SPEP/NADIA: M-spike in the beta region. The monoclonal protein peak accounts for 1.13 g/dL. Hypogammaglobulinemia. NADIA pattern shows the presence of a free lambda light chain monoclonal protein with additional faint band in IgA.  All immunoglobulin levels decreased. IgA=26, IgG=307, IgM<5.  Kappa Qnt 0.16, Lambda Qnt 4600.00, Ratio <0.01.  24hr Urine for Bence Mendez: Kappa 2.75, Lambda 1250.00, Ratio <0.01. NADIA: Urine is POSITIVE for monoclonal Free Lambda Light chains    [facility] on [facility]/15. BMBx showed 80% plasma cells, 13p deletion and normal karyotype. She underwent fat pad biopsy which came back positive for Congo red consistent with amyloidosis. Cardiac workup revealed no amyloid deposition within the  heart.  PET/CT and skeletal survey done September 18, 2015 showed multiple lytic osseous lesions  The patient was started on Velcade while at [facility] with the plan to transition to autologous stem cell transplantation. They asked if we could give her could continue treatment here.   She completed CyborD x5 cycles on 12/24/15    Patient admitted 01/06/16 for colitis and C. Diff. Pt treated with Flagyl. Discharged home 01/08/16    Repeat BMBx done at [facility] 01/2016 did not show any morphologic or immnophenotypic evidence of plasama cell neoplasm. Patient underwent Autologous stem cell transplant with Busulfan and melphalan on 02/08/16 at [facility]. The only complication was recurrent C.diff infection for which she completed 10 days of Fidaxomycin.    Follow-up bone marrow biopsy done at [facility] done 5/16/16 showed cellular 30-40% bone marrow with trilineage hematopoiesis. No morphologic or immunophenotypic support for plasma cell neoplasm. Started maintenance Revlimid 5mg. Unable to continue therapy due to cytopenias.    On 08/02/16 patient underwent  Exploratory laparotomy with subtotal colectomy and end ileostomy for what was thought to be toxic megacolon. Pathology showed extensive advanced colonic amyloidosis involving the muscular wall, submucosa and mucosa: With the appendix also involved with amyloidosis.  Positive lambda light chains were noted.    Follow-up at [facility] 8/31/16, Per Dr. Adrian Naylor, 6 months post autologous transplant. She has engrafted. Based on the latest restaging she is near complete remission with possible IgA lambda on serum immunofixation. Patient was instructed to discontinue prophylactic antibiotics. She received her 1st series of immunizations while at [facility]. Patient had a bilateral venous ultrasound to rule out DVT, negative B/L. In regards to amyloidosis the patient feels that her tongue is smaller in size and her BNP has come down some.  Patient had a follow-up appointment at  [facility] November 30, 2016.  Dr. Parnell documented the patient's free lambda light chain remains at a lower level.  Therefore in light of her recent surgery they will continue with observation only.  The plan to see the patient back in 2-3 months with repeat restaging labs.  They recommended regular CBC checks to monitor anemia.  Patient returned to [facility] in December 2016 to meet with dermatology for numerous papillary lesions on eyelids, chest and posterior neck consistent with amyloid deposits. Recommendations were for watchful waiting.  Patient is less than 1 year out from bone marrow transplant and further improvement can be expected.  She will see the patient back in 1 year to discuss possible surgical resection of the plaques especially around the eyelid region.  Rogaine recommended for persistent hair loss. Retin-A recommended for acne vulgaris.  Patient returned to [facility] on 2/8/2017 for neurosurgery consult for chronic low back pain and difficulty walking.  Imaging showed extensive DJD with discovertebral bulges and thickening of the spinal laminar tissues creating spinal stenosis throughout, but greatest at L2/L3.  Neurosurgery felt that surgery risks outweighed the benefits.  Patient was prescribed pain medicine and encouraged to participate in physical therapy.  Patient also met with Dr. Parnell on 02/08/17, who noted an elevation in free light chains (kappa 52.60/lambda 27.77/ratio 1.89).  Recommendation is to resume maintenance therapy with Revlimid at a low dose of 5 mg every other day.  Patient went back to [facility] first week of April 2017.  I do not have these notes.  Reportedly she was told to continue on every other day Revlimid at 5 mg.  She reportedly had repeat myeloma labs done as well.  Again I do not have these results.  Patient went back to [facility] and saw Dr. Parnell June 29, 2017.  Myeloma labs were done with serum protein electrophoresis showing irregularity in the fast  gamma region.  IgG of 1291.  Free kappa light chains of 84.6 with lambda light chains of 66.9.  Beta-2 microglobulin of 4.5  CT scan of lumbar spine showed multiple lytic lesions once again in the lumbar spine and bony pelvis.  Ultrasound of the left leg showed no evidence of DVT.  It was recommended based on the slight increase in her And lambda light chains to start weekly dexamethasone 20 mg p.o. weekly.  Follow-up visit and labs at [facility] on September 29, 2017 with Dr. Parnell.  Repeat beta-2 microglobulin came back at 2.4.  Serum IgG of 761, IgA 117 and IgM of 29.  Serum free kappa light chains of 24.9 and lambda of 23.5.  Counts were stable with hemoglobin of 11.1, WBC 4.9 and platelets of 210,000.  Recommendations were for continued Revlimid every other day with weekly dexamethasone along with aspirin for DVT prophylaxis.  Bilateral diagnostic MMG 12/19/17  noted 1.6cm coarse heterogeneous calcifications in posterior region of L breast at 3 o'clock position. Patient was scheduled for FNA which confirmed ID grade 3, single focus measuring 1.7cm with 2nd focus microinvasion DCIS grade 2 measuring 0.3cm. Left axillary LN biopsy showed no evidence of metastatic carcinoma. Complete staging: Stage IA, grade 3 ER+, Her 2 Niki + IDC/DCIS of left breast. Ki-67 <17%.  Repeat myeloma labs showed rise in free lambda light chains noted at 68.69, kappa light chains at 27.22,  beta 2 microglobulin came back at 2.9. Serum protein electrophoresis showed no definitive evidence of an M protein peak. The pattern is consistent with an acute inflammatory reaction. Recommendations were made to maintain Revlimid at current dose of 5mg every other day to be taken continuously increase weekly dexamethasone to 16 mg.   Patient seen at [facility] with tentative plan for L breast lumpectomy on 3/13/18. 2/16/18- Prior to surgery she was seen by genetic counselor who ran genetic testing per patient reques, all of which were negative.  She was then seen by cardiology at [facility] 2/16/18 for further evaluation of persistent bilateral lower extremity swelling-ECHO noted EF of 58%; diagnosed with paroxysmal atrial fibrillation and instructed to begin daily ASA. During preoperative asssessment per Rad/Onc 3/12/18, corresponding chest CT noted new bilateral pulmonary nodules concerning for metastatic disease- surgery was subsequently postponed pending pulmonary evaluation. Seen by Pulmonology on 3/21/18, PFTs within normal limits and cleared patient for surgery with recommended close CT follow up of nodules in 3 months. 3/21/18 seen by nephrology for management of mild CKD (GFR 55)-cleared for surgery with recommended follow up in 3 months. Left breast lumpectomy and SLNB performed per Dr. Washburn on 4/12/18- plan for follow up in clinic on 4/24/18  for postoperative assessment. Follow up with Dr. Poole (Med/Onc) on 4/25/18. Follow up with Dr. Parnell 4/26/18.   Patient seen at [facility] s/p Left breast lumpectomy with SLNB on 4/12/18. Following surgery she completed Left partial breast radiation x 10 fractions. She was then started on endocrine therapy with Femara after been deemedan inappropriate candidate for systemic chemotherapy/Herceptin.     She was seen by neurosugery at [facility] on 4/26/18 following repeat MRI of cervical thoracic lumbar spine which noted focal enhancement of T2 hyperintensity at the C2 level which may be due to degenerative changes. Signal abnormality within  the T12 and L1 may be secondary to myeloma. Plan to continue with observation for now with F/U scheduled in October 2018.     She was seen by Dr. Parnell at [facility] for management of her MM on 4/26/18. Patient was recommended to restart Revlimid 5mg PO every other day with notes that these recommendations would be communicated to collaborating Oncologist. Patient was also recommended to start on Zometa for her bone disease.     Seen by Dr. Parnell at [facility] for  "management of her MM on 6/28/18. Repeat light chains noted lambda 33.36 (previously 80.80), K/L ratio 0.91 (previously 0.49). Due to slight improvement in light chains, plan to continue on lenalidomide maintenance. Recommendations to continue anticoagulation with Eliquis. BLE venous doppler negative for DVT.   Seen by pulmonolgy on 6/28/18- repeat CT chest done 6/28/18 noted resolution of previous pulmonary nodules. Thought to be infectious in nature. Recommendations for discharge from pulmonary clinic.  Should MRI of her cervical thoracic lumbar spine on 2/12/2019 that demonstrated cervical spondylosis with canal stenosis most notable at C1 and 2. Cord signal abnormality at C1 and 2 with enhancement is stable. Lumbar spondylosis with central canal stenosis at multiple levels. No imaging features of bony metastatic disease.    For amyloidosis (Light chain type).    Patient presented with macroglossia and now with improvement of the swelling of her tongue. Her cardiac parameters are also better.   8/10/2020 proBNP  250   2/17/2020    616  8/9/2019     190               Chief Complaint: Follow-up      Interval History:  Patient is here today in follow-up of her above problems.  She is currently on Femara for breast cancer and Ninlaro/Rev  for MM s/p transplant in 2016. She has neuropathy symptoms that she contributes to Ninlaro but this is tolerable at this time. Patient was seen at [facility] and planned was to wean off Effexor and start cymbalta to see if helps with neuropathy. She missed her appointment often because does not "feels good". She can  not be more specific though.  I explained to her that if she feels bad that is difficult for her to come to the visit to call us and we can convert her visit to telemedicine.  She is okay with this.    No fever, chills, sweats.  No chest pain or shortness of breath.  She uses a walker for mobility.  She continues to do her mammograms and other imaging studies at [facility] " and had it about 3-4  Months ago.  Discussed in detail labs.  Discussed with her importance of compliance.    Her kidney function with a creatinine of 2.21.  She goes to [facility] every 3 to 4 months. She says that nephrologist, cardiologist, pain management and myeloma doctor are all  at [facility].     Explained to the patient that I spoke with Dr Parnell and her free light chain are increasing and her amyloidosis is worsening. She is recommending to add daratumumab to her regimen and add Dexamethasone 20 mg weekly with daratumumab.    She is also having diarrhea and she was prescribed a medication that she will like me to prescribe for her. She will get the name and let me know.      Past Medical History:   Diagnosis Date    Amyloidosis     Anemia     Anxiety and depression     Diplopia     Hyperlipidemia     Hypertension     Impaired mobility     Lytic lesion of bone on x-ray     Multiple myeloma     Neuropathy     Obesity, unspecified     Paroxysmal atrial fibrillation     Primary cancer of left female breast       Past Surgical History:   Procedure Laterality Date    BONE MARROW TRANSPLANT  02/08/2016    CARPAL TUNNEL RELEASE      COLONOSCOPY  12/11/2018    Dr. Eddie Jimenez    ENDOSCOPIC RELEASE OF TRIGGER FINGER      ESOPHAGEAL MOTILITY STUDY  2015    Excision Lipoma left elbow      Exploration Laparotomy  2016    FLEXIBLE SIGMOIDOSCOPY      MEDIPORT INSERTION, SINGLE  03/15/2016    PH Study 24 Hour  2015     Family History   Problem Relation Age of Onset    Hypertension Mother     Cancer Father     Hypertension Sister     Hypertension Brother      Social Connections: Not on file       Review of patient's allergies indicates:   Allergen Reactions    Baclofen Shortness Of Breath     Difficulty breathing      Penicillins Hives, Rash and Swelling    Shellfish containing products Hives, Rash and Swelling     shell  She can shrimp      Nsaids (non-steroidal anti-inflammatory drug)      Penicillin      Other reaction(s): Unknown    Ace inhibitors Other (See Comments)     cough    Azithromycin Nausea Only     Upset stomach    Meloxicam Tinitus    Prochlorperazine Anxiety     Restlessness/anxious    Tramadol Other (See Comments)     Increased Heart Rate        Current Outpatient Medications on File Prior to Visit   Medication Sig Dispense Refill    apixaban (ELIQUIS) 2.5 mg Tab Take 1 tablet by mouth 2 (two) times daily.      carvediloL (COREG) 3.125 MG tablet Take 1 tablet (3.125 mg total) by mouth 2 (two) times daily with meals. 180 tablet 2    DULoxetine (CYMBALTA) 60 MG capsule Take 60 mg by mouth.      famotidine (PEPCID) 40 MG tablet   See Instructions, TAKE 1 TABLET BY MOUTH EVERY DAY AT BEDTIME, # 90 tab(s), 1 Refill(s), Pharmacy: [facility] ([place] STORE #[id number], 161, cm, Height/Length Dosing, 03/30/22 15:00:00 CDT, 74.2, kg, Weight Dosing, 03/30/22 15:00:00 CDT      fluticasone propionate (FLONASE) 50 mcg/actuation nasal spray   See Instructions, SHAKE LIQUID AND USE 1 SPRAY IN EACH NOSTRIL TWICE DAILY AS NEEDED FOR ALLERGY SYMPTOMS, # 16 gm, 0 Refill(s), Pharmacy: [facility] #[id number], 161, cm, Height/Length Dosing, 02/07/22 8:48:00 CST, 74.2, kg, Weight Dosing, 02/07...      gabapentin (NEURONTIN) 300 MG capsule Take 300 mg by mouth.      hydroCHLOROthiazide (HYDRODIURIL) 12.5 MG Tab TAKE 1 TABLET(12.5 MG) BY MOUTH DAILY      ixazomib (NINLARO) 3 mg Cap Take 3 mg by mouth every 7 days.      lenalidomide 5 mg Cap Take 5 mg by mouth every other day Adult Female [id number]. 14 each 0    letrozole (FEMARA) 2.5 mg Tab Take 1 tablet by mouth once daily.      levocetirizine (XYZAL) 5 MG tablet Take 1 tablet (5 mg total) by mouth every evening. 30 tablet 11    methadone (DOLOPHINE) 10 MG tablet Take 10 mg by mouth every 8 (eight) hours while awake.      multivitamin (THERAGRAN) per tablet Take 1 tablet by mouth once daily.      omeprazole (PRILOSEC) 20 MG capsule Take 1  capsule (20 mg total) by mouth once daily. 30 capsule 11    ondansetron (ZOFRAN) 8 MG tablet Take 8 mg by mouth every 8 (eight) hours as needed.      oxyCODONE (ROXICODONE) 10 mg Tab immediate release tablet Take 10 mg by mouth 3 (three) times daily as needed.      potassium chloride (KLOR-CON) 10 MEQ TbSR TAKE 1 TABLET BY MOUTH TWICE DAILY 180 tablet 3    rosuvastatin (CRESTOR) 10 MG tablet Take 10 mg by mouth Daily.      tretinoin (RETIN-A) 0.1 % cream Apply 1 application topically every evening.       No current facility-administered medications on file prior to visit.      Review of Systems   Constitutional: Positive for fatigue. Negative for appetite change and unexpected weight change.   HENT: Negative for mouth sores.    Eyes: Negative for visual disturbance.   Respiratory: Negative for cough and shortness of breath.    Cardiovascular: Negative for chest pain.   Gastrointestinal: Positive for diarrhea. Negative for abdominal pain.   Genitourinary: Negative for frequency.   Musculoskeletal: Positive for back pain.   Integumentary:  Negative for rash.   Neurological: Positive for weakness. Negative for headaches.   Hematological: Negative for adenopathy.   Psychiatric/Behavioral: The patient is not nervous/anxious.               Vitals:    07/06/22 1543   BP: 112/70   Pulse: 77   Temp: 98.2 °F (36.8 °C)   SpO2: 99%   Weight: 76.8 kg (169 lb 4.8 oz)      Physical Exam  Vitals and nursing note reviewed.   Constitutional:       General: She is not in acute distress.     Appearance: She is ill-appearing.   HENT:      Head: Normocephalic and atraumatic.      Mouth/Throat:      Mouth: Mucous membranes are moist.   Eyes:      General: No scleral icterus.     Extraocular Movements: Extraocular movements intact.      Conjunctiva/sclera: Conjunctivae normal.      Pupils: Pupils are equal, round, and reactive to light.   Neck:      Vascular: No JVD.   Cardiovascular:      Rate and Rhythm: Normal rate and regular  rhythm.      Heart sounds: No murmur heard.  Pulmonary:      Effort: Pulmonary effort is normal.      Breath sounds: Decreased breath sounds present. No wheezing or rhonchi.   Chest:      Chest wall: No deformity or tenderness.   Breasts:      Right: No axillary adenopathy or supraclavicular adenopathy.      Left: No axillary adenopathy or supraclavicular adenopathy.       Abdominal:      General: Bowel sounds are normal. There is no distension.      Palpations: Abdomen is soft. There is no mass.      Tenderness: There is no abdominal tenderness.   Musculoskeletal:         General: No swelling or deformity.      Cervical back: Neck supple.   Lymphadenopathy:      Cervical: No cervical adenopathy.      Upper Body:      Right upper body: No supraclavicular or axillary adenopathy.      Left upper body: No supraclavicular or axillary adenopathy.      Lower Body: No right inguinal adenopathy. No left inguinal adenopathy.   Skin:     General: Skin is warm.      Coloration: Skin is not jaundiced.      Findings: No lesion or rash.      Nails: There is no clubbing.   Neurological:      General: No focal deficit present.      Mental Status: She is alert and oriented to person, place, and time.      Sensory: Sensation is intact.      Motor: Motor function is intact.      Gait: Gait abnormal.      Comments: Uses walker for mobility   Psychiatric:         Attention and Perception: Attention normal.         Mood and Affect: Mood and affect normal.         Speech: Speech normal.         Behavior: Behavior is cooperative.         Thought Content: Thought content normal.         Cognition and Memory: Cognition normal.         Judgment: Judgment normal.       ECOG SCORE    2 - Capable of all selfcare but unable to carry out any work activities, active > 50% of hours         Laboratory:  CBC with Differential:  Lab Results   Component Value Date    WBC 2.6 (L) 06/30/2022    RBC 3.58 (L) 06/30/2022    HGB 10.9 (L) 06/30/2022    HCT  33.8 (L) 06/30/2022    MCV 94.4 (H) 06/30/2022    MCH 30.4 06/30/2022    MCHC 32.2 (L) 06/30/2022    RDW 14.2 06/30/2022     06/30/2022    MPV 9.2 06/30/2022        CMP:  Sodium Level   Date Value Ref Range Status   06/30/2022 141 136 - 145 mmol/L Final     Potassium Level   Date Value Ref Range Status   06/30/2022 3.7 3.5 - 5.1 mmol/L Final     Carbon Dioxide   Date Value Ref Range Status   06/30/2022 27 22 - 29 mmol/L Final     Blood Urea Nitrogen   Date Value Ref Range Status   06/30/2022 19.3 9.8 - 20.1 mg/dL Final     Creatinine   Date Value Ref Range Status   06/30/2022 2.21 (H) 0.55 - 1.02 mg/dL Final     Calcium Level Total   Date Value Ref Range Status   06/30/2022 9.5 8.4 - 10.2 mg/dL Final     Albumin Level   Date Value Ref Range Status   06/30/2022 3.5 3.5 - 5.0 gm/dL Final   06/30/2022 3.4 (L) 3.5 - 5.0 gm/dL Final     Bilirubin Total   Date Value Ref Range Status   06/30/2022 0.2 <=1.5 mg/dL Final     Alkaline Phosphatase   Date Value Ref Range Status   06/30/2022 81 40 - 150 unit/L Final     Aspartate Aminotransferase   Date Value Ref Range Status   06/30/2022 22 5 - 34 unit/L Final     Alanine Aminotransferase   Date Value Ref Range Status   06/30/2022 11 0 - 55 unit/L Final     Estimated GFR-Non    Date Value Ref Range Status   04/20/2022 25               Assessment:       1. Multiple myeloma, remission status unspecified    2. Overlapping malignant neoplasm of female breast, unspecified estrogen receptor status, unspecified laterality    3. Amyloidosis, unspecified type    4. Anemia of chronic disease    5. Stage 3 chronic kidney disease, unspecified whether stage 3a or 3b CKD    6. 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 Anemia of chronic disease/malignancy and treatment related  6) deconditioning  7) DJD creating spinal stenosis, evaluated by neurosurgery at [facility]. Sx risks do not outweigh benefits.  8) Amyloid plaques, evaluated by Derm at [facility], recommend watchful waiting.   9) Stage IA grade 3, ER+, HER2 BERNA +,  IDC/DCIS of the left breast --- 2/7/18 at [facility]; s/p lumpectomy with SLNB 4/12; Grade 2 IDC measuring 4mm with second focus of microinvasive carcinoma measuring  0.4mm .IG DCIS, 0.3mm to posterior margin; 2 SLN negative for malignancy  -- On Femara  10). Paroxysmal Afib (2/18/18) diagnosed at [facility]; ECHO noted EF 58%  11. Mild CKD with GFR 55 - managed per [facility] nephrology and was seen 8/20  12). Pulmonary nodules noted on pre-operative chest CT scan (3/12/18) noted new bilateral pulmonary nodules are nonspecific -- seen by Pulmonology at [facility] (3/21/18) thought to be inflammatory/infectious in nature- Repeat Chest CT 6/28/18 noted resolution  13) Chronic pain-- On methadone, oxycodone, Pregabalin, and Duloxetine. Follows with pain clinic.  14) Osteopenia: seen on DXA scan. Recommend dental clearance prior to starting bone targeted therapy. Consider use of Denosumab.             Plan:         Continue present treatment and management.  Encouraged to be compliant with her visits and her labs.  I-STAT today with a potassium of 2.7.  We will replace.    RTC in 4 weeks with MM work up prior to OV  Continue Nilaro 4mg days 1,8,15 q 28 days  Cont Zometa Q3 months -on hold for now  Cont low dose Revlimid 5 mg q other day  Dexamethasone 20 mg weekly with daratumumab  Patient with cell daratumumab next week-has been approved by her insurance.  Cont Femara  Keep appt at [facility]  Instructed to call clinic with any questions or concerns    Mammograms  done at [facility]    Encourage to call or message us for any questions or problems  The patient was given ample opportunity to ask questions, and to the best of my abilities, all questions answered to satisfaction; patient demonstrated understanding of what we discussed and agreeable to the plan.           EMEKA RAMIREZ MD

## 2022-07-11 ENCOUNTER — LAB VISIT (OUTPATIENT)
Dept: LAB | Facility: HOSPITAL | Age: 57
End: 2022-07-11
Attending: INTERNAL MEDICINE
Payer: MEDICARE

## 2022-07-11 ENCOUNTER — PATIENT MESSAGE (OUTPATIENT)
Dept: HEMATOLOGY/ONCOLOGY | Facility: CLINIC | Age: 57
End: 2022-07-11
Payer: COMMERCIAL

## 2022-07-11 ENCOUNTER — LAB VISIT (OUTPATIENT)
Dept: FAMILY MEDICINE | Facility: CLINIC | Age: 57
End: 2022-07-11
Payer: MEDICARE

## 2022-07-11 ENCOUNTER — INFUSION (OUTPATIENT)
Dept: INFUSION THERAPY | Facility: HOSPITAL | Age: 57
End: 2022-07-11
Attending: INTERNAL MEDICINE
Payer: MEDICARE

## 2022-07-11 VITALS
WEIGHT: 169.31 LBS | BODY MASS INDEX: 31.16 KG/M2 | DIASTOLIC BLOOD PRESSURE: 71 MMHG | RESPIRATION RATE: 20 BRPM | SYSTOLIC BLOOD PRESSURE: 131 MMHG | TEMPERATURE: 98 F | HEART RATE: 71 BPM | HEIGHT: 62 IN

## 2022-07-11 DIAGNOSIS — E85.9 AMYLOIDOSIS, UNSPECIFIED TYPE: Primary | ICD-10-CM

## 2022-07-11 DIAGNOSIS — E85.9 AMYLOIDOSIS, UNSPECIFIED TYPE: ICD-10-CM

## 2022-07-11 LAB
CTP QC/QA: YES
SARS-COV-2 AG RESP QL IA.RAPID: NEGATIVE

## 2022-07-11 PROCEDURE — 86704 HEP B CORE ANTIBODY TOTAL: CPT

## 2022-07-11 PROCEDURE — 87340 HEPATITIS B SURFACE AG IA: CPT

## 2022-07-11 PROCEDURE — 36415 COLL VENOUS BLD VENIPUNCTURE: CPT

## 2022-07-11 RX ORDER — FAMOTIDINE 20 MG/1
20 TABLET, FILM COATED ORAL
Status: DISCONTINUED | OUTPATIENT
Start: 2022-07-11 | End: 2022-07-11

## 2022-07-11 RX ORDER — DIPHENHYDRAMINE HYDROCHLORIDE 50 MG/ML
50 INJECTION INTRAMUSCULAR; INTRAVENOUS ONCE AS NEEDED
Status: CANCELLED | OUTPATIENT
Start: 2022-07-11

## 2022-07-11 RX ORDER — DEXAMETHASONE 4 MG/1
20 TABLET ORAL
Status: DISCONTINUED | OUTPATIENT
Start: 2022-07-11 | End: 2022-07-11

## 2022-07-11 RX ORDER — SODIUM CHLORIDE 0.9 % (FLUSH) 0.9 %
10 SYRINGE (ML) INJECTION
Status: CANCELLED | OUTPATIENT
Start: 2022-07-11

## 2022-07-11 RX ORDER — ACETAMINOPHEN 325 MG/1
650 TABLET ORAL
Status: DISCONTINUED | OUTPATIENT
Start: 2022-07-11 | End: 2022-07-11

## 2022-07-11 RX ORDER — DIPHENHYDRAMINE HCL 25 MG
25 CAPSULE ORAL
Status: DISCONTINUED | OUTPATIENT
Start: 2022-07-11 | End: 2022-07-11

## 2022-07-11 RX ORDER — MONTELUKAST SODIUM 10 MG/1
10 TABLET ORAL
Status: DISCONTINUED | OUTPATIENT
Start: 2022-07-11 | End: 2022-07-11

## 2022-07-11 RX ORDER — HEPARIN 100 UNIT/ML
500 SYRINGE INTRAVENOUS
Status: CANCELLED | OUTPATIENT
Start: 2022-07-11

## 2022-07-11 RX ORDER — EPINEPHRINE 0.3 MG/.3ML
0.3 INJECTION SUBCUTANEOUS ONCE AS NEEDED
Status: CANCELLED | OUTPATIENT
Start: 2022-07-11

## 2022-07-12 ENCOUNTER — PATIENT MESSAGE (OUTPATIENT)
Dept: HEMATOLOGY/ONCOLOGY | Facility: CLINIC | Age: 57
End: 2022-07-12
Payer: COMMERCIAL

## 2022-07-12 ENCOUNTER — TELEPHONE (OUTPATIENT)
Dept: HEMATOLOGY/ONCOLOGY | Facility: CLINIC | Age: 57
End: 2022-07-12
Payer: COMMERCIAL

## 2022-07-12 LAB — HBV CORE AB SERPL QL IA: NONREACTIVE

## 2022-07-13 LAB — HBV SURFACE AG SERPL QL IA: NONREACTIVE

## 2022-07-13 RX ORDER — DEXAMETHASONE 4 MG/1
TABLET ORAL
Qty: 16 TABLET | OUTPATIENT
Start: 2022-07-13

## 2022-07-14 ENCOUNTER — PATIENT MESSAGE (OUTPATIENT)
Dept: HEMATOLOGY/ONCOLOGY | Facility: CLINIC | Age: 57
End: 2022-07-14
Payer: COMMERCIAL

## 2022-07-15 DIAGNOSIS — R19.7 DIARRHEA, UNSPECIFIED TYPE: ICD-10-CM

## 2022-07-15 DIAGNOSIS — C90.00 MULTIPLE MYELOMA, REMISSION STATUS UNSPECIFIED: Primary | ICD-10-CM

## 2022-07-15 DIAGNOSIS — C50.819 OVERLAPPING MALIGNANT NEOPLASM OF FEMALE BREAST, UNSPECIFIED ESTROGEN RECEPTOR STATUS, UNSPECIFIED LATERALITY: ICD-10-CM

## 2022-07-15 DIAGNOSIS — C90.00 MULTIPLE MYELOMA, REMISSION STATUS UNSPECIFIED: ICD-10-CM

## 2022-07-15 RX ORDER — MONTELUKAST SODIUM 4 MG/1
1 TABLET, CHEWABLE ORAL 2 TIMES DAILY
Qty: 60 TABLET | Refills: 0 | Status: SHIPPED | OUTPATIENT
Start: 2022-07-15 | End: 2022-08-29

## 2022-07-15 RX ORDER — MONTELUKAST SODIUM 4 MG/1
1 TABLET, CHEWABLE ORAL 2 TIMES DAILY
Qty: 60 TABLET | Refills: 0 | Status: SHIPPED | OUTPATIENT
Start: 2022-07-15 | End: 2022-07-15 | Stop reason: CLARIF

## 2022-07-18 ENCOUNTER — INFUSION (OUTPATIENT)
Dept: INFUSION THERAPY | Facility: HOSPITAL | Age: 57
End: 2022-07-18
Attending: INTERNAL MEDICINE
Payer: MEDICARE

## 2022-07-18 VITALS
TEMPERATURE: 98 F | SYSTOLIC BLOOD PRESSURE: 111 MMHG | HEIGHT: 62 IN | BODY MASS INDEX: 31.16 KG/M2 | WEIGHT: 169.31 LBS | RESPIRATION RATE: 20 BRPM | HEART RATE: 71 BPM | DIASTOLIC BLOOD PRESSURE: 78 MMHG

## 2022-07-18 DIAGNOSIS — E85.9 AMYLOIDOSIS, UNSPECIFIED TYPE: Primary | ICD-10-CM

## 2022-07-18 PROCEDURE — 63600175 PHARM REV CODE 636 W HCPCS: Performed by: INTERNAL MEDICINE

## 2022-07-18 PROCEDURE — 25000003 PHARM REV CODE 250: Performed by: INTERNAL MEDICINE

## 2022-07-18 PROCEDURE — 96401 CHEMO ANTI-NEOPL SQ/IM: CPT

## 2022-07-18 RX ORDER — MONTELUKAST SODIUM 10 MG/1
10 TABLET ORAL
Status: CANCELLED | OUTPATIENT
Start: 2022-07-25

## 2022-07-18 RX ORDER — SODIUM CHLORIDE 0.9 % (FLUSH) 0.9 %
10 SYRINGE (ML) INJECTION
Status: CANCELLED | OUTPATIENT
Start: 2022-07-25

## 2022-07-18 RX ORDER — DIPHENHYDRAMINE HCL 25 MG
25 CAPSULE ORAL
Status: COMPLETED | OUTPATIENT
Start: 2022-07-18 | End: 2022-07-18

## 2022-07-18 RX ORDER — DEXAMETHASONE 4 MG/1
20 TABLET ORAL
Status: CANCELLED | OUTPATIENT
Start: 2022-07-25

## 2022-07-18 RX ORDER — EPINEPHRINE 0.3 MG/.3ML
0.3 INJECTION SUBCUTANEOUS ONCE AS NEEDED
Status: CANCELLED | OUTPATIENT
Start: 2022-07-18

## 2022-07-18 RX ORDER — HEPARIN 100 UNIT/ML
500 SYRINGE INTRAVENOUS
Status: CANCELLED | OUTPATIENT
Start: 2022-07-25

## 2022-07-18 RX ORDER — HEPARIN 100 UNIT/ML
500 SYRINGE INTRAVENOUS
Status: CANCELLED | OUTPATIENT
Start: 2022-07-18

## 2022-07-18 RX ORDER — SODIUM CHLORIDE 0.9 % (FLUSH) 0.9 %
10 SYRINGE (ML) INJECTION
Status: CANCELLED | OUTPATIENT
Start: 2022-07-18

## 2022-07-18 RX ORDER — MONTELUKAST SODIUM 10 MG/1
10 TABLET ORAL
Status: COMPLETED | OUTPATIENT
Start: 2022-07-18 | End: 2022-07-18

## 2022-07-18 RX ORDER — SODIUM CHLORIDE 0.9 % (FLUSH) 0.9 %
10 SYRINGE (ML) INJECTION
Status: DISCONTINUED | OUTPATIENT
Start: 2022-07-18 | End: 2022-07-18 | Stop reason: HOSPADM

## 2022-07-18 RX ORDER — FAMOTIDINE 20 MG/1
20 TABLET, FILM COATED ORAL
Status: COMPLETED | OUTPATIENT
Start: 2022-07-18 | End: 2022-07-18

## 2022-07-18 RX ORDER — DIPHENHYDRAMINE HCL 25 MG
25 CAPSULE ORAL
Status: CANCELLED | OUTPATIENT
Start: 2022-07-25

## 2022-07-18 RX ORDER — DIPHENHYDRAMINE HYDROCHLORIDE 50 MG/ML
50 INJECTION INTRAMUSCULAR; INTRAVENOUS ONCE AS NEEDED
Status: DISCONTINUED | OUTPATIENT
Start: 2022-07-18 | End: 2022-07-18 | Stop reason: HOSPADM

## 2022-07-18 RX ORDER — EPINEPHRINE 0.3 MG/.3ML
0.3 INJECTION SUBCUTANEOUS ONCE AS NEEDED
Status: CANCELLED | OUTPATIENT
Start: 2022-07-25

## 2022-07-18 RX ORDER — ACETAMINOPHEN 325 MG/1
650 TABLET ORAL
Status: COMPLETED | OUTPATIENT
Start: 2022-07-18 | End: 2022-07-18

## 2022-07-18 RX ORDER — DIPHENHYDRAMINE HYDROCHLORIDE 50 MG/ML
50 INJECTION INTRAMUSCULAR; INTRAVENOUS ONCE AS NEEDED
Status: CANCELLED | OUTPATIENT
Start: 2022-07-25

## 2022-07-18 RX ORDER — ACETAMINOPHEN 325 MG/1
650 TABLET ORAL
Status: CANCELLED | OUTPATIENT
Start: 2022-07-18

## 2022-07-18 RX ORDER — DEXAMETHASONE 4 MG/1
20 TABLET ORAL
Status: CANCELLED | OUTPATIENT
Start: 2022-07-18

## 2022-07-18 RX ORDER — DIPHENHYDRAMINE HYDROCHLORIDE 50 MG/ML
50 INJECTION INTRAMUSCULAR; INTRAVENOUS ONCE AS NEEDED
Status: CANCELLED | OUTPATIENT
Start: 2022-07-18

## 2022-07-18 RX ORDER — MONTELUKAST SODIUM 10 MG/1
10 TABLET ORAL
Status: CANCELLED | OUTPATIENT
Start: 2022-07-18

## 2022-07-18 RX ORDER — FAMOTIDINE 20 MG/1
20 TABLET, FILM COATED ORAL
Status: CANCELLED | OUTPATIENT
Start: 2022-07-18

## 2022-07-18 RX ORDER — EPINEPHRINE 0.3 MG/.3ML
0.3 INJECTION SUBCUTANEOUS ONCE AS NEEDED
Status: DISCONTINUED | OUTPATIENT
Start: 2022-07-18 | End: 2022-07-18 | Stop reason: HOSPADM

## 2022-07-18 RX ORDER — DEXAMETHASONE 4 MG/1
20 TABLET ORAL
Status: COMPLETED | OUTPATIENT
Start: 2022-07-18 | End: 2022-07-18

## 2022-07-18 RX ORDER — DIPHENHYDRAMINE HCL 25 MG
25 CAPSULE ORAL
Status: CANCELLED | OUTPATIENT
Start: 2022-07-18

## 2022-07-18 RX ORDER — ACETAMINOPHEN 325 MG/1
650 TABLET ORAL
Status: CANCELLED | OUTPATIENT
Start: 2022-07-25

## 2022-07-18 RX ORDER — HEPARIN 100 UNIT/ML
500 SYRINGE INTRAVENOUS
Status: DISCONTINUED | OUTPATIENT
Start: 2022-07-18 | End: 2022-07-18 | Stop reason: HOSPADM

## 2022-07-18 RX ADMIN — MONTELUKAST SODIUM 10 MG: 10 TABLET, COATED ORAL at 10:07

## 2022-07-18 RX ADMIN — DEXAMETHASONE 20 MG: 4 TABLET ORAL at 10:07

## 2022-07-18 RX ADMIN — ACETAMINOPHEN 650 MG: 325 TABLET, FILM COATED ORAL at 10:07

## 2022-07-18 RX ADMIN — DARATUMUMAB AND HYALURONIDASE-FIHJ (HUMAN RECOMBINANT) 1800 MG: 1800; 30000 INJECTION SUBCUTANEOUS at 10:07

## 2022-07-18 RX ADMIN — FAMOTIDINE 20 MG: 20 TABLET, FILM COATED ORAL at 10:07

## 2022-07-18 RX ADMIN — DIPHENHYDRAMINE HYDROCHLORIDE 25 MG: 25 CAPSULE ORAL at 10:07

## 2022-07-19 ENCOUNTER — TELEPHONE (OUTPATIENT)
Dept: INTERNAL MEDICINE | Facility: CLINIC | Age: 57
End: 2022-07-19

## 2022-07-19 DIAGNOSIS — C50.819 OVERLAPPING MALIGNANT NEOPLASM OF FEMALE BREAST, UNSPECIFIED ESTROGEN RECEPTOR STATUS, UNSPECIFIED LATERALITY: ICD-10-CM

## 2022-07-19 DIAGNOSIS — C90.00 MULTIPLE MYELOMA, REMISSION STATUS UNSPECIFIED: Primary | ICD-10-CM

## 2022-07-19 DIAGNOSIS — C90.01 MULTIPLE MYELOMA IN REMISSION: ICD-10-CM

## 2022-07-19 RX ORDER — LENALIDOMIDE 5 MG/1
5 CAPSULE ORAL EVERY OTHER DAY
Qty: 14 EACH | Refills: 0 | Status: SHIPPED | OUTPATIENT
Start: 2022-07-19 | End: 2022-08-18

## 2022-07-19 NOTE — TELEPHONE ENCOUNTER
----- Message from Refugio Aguiar MA sent at 7/19/2022  8:51 AM CDT -----  Regarding: FW: referral  Please advise.  ----- Message -----  From: Gladys Velazquez  Sent: 7/19/2022   8:34 AM CDT  To: [facility]  Subject: referral                                         CANCER PATIENT    ONCOLOGIST IN [place] WANTS HER TO GET A REFERRAL TO A LOCAL CARDIOLOGIST    DX; A FIB, HBP

## 2022-07-25 DIAGNOSIS — C50.819 OVERLAPPING MALIGNANT NEOPLASM OF FEMALE BREAST, UNSPECIFIED ESTROGEN RECEPTOR STATUS, UNSPECIFIED LATERALITY: ICD-10-CM

## 2022-07-25 DIAGNOSIS — C90.00 MULTIPLE MYELOMA, REMISSION STATUS UNSPECIFIED: Primary | ICD-10-CM

## 2022-07-28 ENCOUNTER — INFUSION (OUTPATIENT)
Dept: INFUSION THERAPY | Facility: HOSPITAL | Age: 57
End: 2022-07-28
Attending: INTERNAL MEDICINE
Payer: MEDICARE

## 2022-07-28 ENCOUNTER — TELEPHONE (OUTPATIENT)
Dept: HEMATOLOGY/ONCOLOGY | Facility: HOSPITAL | Age: 57
End: 2022-07-28
Payer: COMMERCIAL

## 2022-07-28 ENCOUNTER — LAB VISIT (OUTPATIENT)
Dept: LAB | Facility: HOSPITAL | Age: 57
End: 2022-07-28
Attending: INTERNAL MEDICINE
Payer: MEDICARE

## 2022-07-28 VITALS
WEIGHT: 174 LBS | HEIGHT: 63 IN | SYSTOLIC BLOOD PRESSURE: 114 MMHG | HEART RATE: 100 BPM | DIASTOLIC BLOOD PRESSURE: 74 MMHG | RESPIRATION RATE: 18 BRPM | OXYGEN SATURATION: 95 % | TEMPERATURE: 98 F | BODY MASS INDEX: 30.83 KG/M2

## 2022-07-28 DIAGNOSIS — E85.9 AMYLOIDOSIS, UNSPECIFIED TYPE: Primary | ICD-10-CM

## 2022-07-28 DIAGNOSIS — C50.819 OVERLAPPING MALIGNANT NEOPLASM OF FEMALE BREAST, UNSPECIFIED ESTROGEN RECEPTOR STATUS, UNSPECIFIED LATERALITY: ICD-10-CM

## 2022-07-28 DIAGNOSIS — C90.00 MULTIPLE MYELOMA, REMISSION STATUS UNSPECIFIED: ICD-10-CM

## 2022-07-28 LAB
ALBUMIN SERPL-MCNC: 3.4 GM/DL (ref 3.5–5)
ALBUMIN/GLOB SERPL: 0.9 RATIO (ref 1.1–2)
ALP SERPL-CCNC: 82 UNIT/L (ref 40–150)
ALT SERPL-CCNC: 10 UNIT/L (ref 0–55)
AST SERPL-CCNC: 20 UNIT/L (ref 5–34)
B2 MICROGLOB SERPL-MCNC: 4.69 MG/L
BASOPHILS # BLD AUTO: 0.01 X10(3)/MCL (ref 0–0.2)
BASOPHILS NFR BLD AUTO: 0.3 %
BILIRUBIN DIRECT+TOT PNL SERPL-MCNC: 0.3 MG/DL
BUN SERPL-MCNC: 27.1 MG/DL (ref 9.8–20.1)
CALCIUM SERPL-MCNC: 9.3 MG/DL (ref 8.4–10.2)
CHLORIDE SERPL-SCNC: 99 MMOL/L (ref 98–107)
CO2 SERPL-SCNC: 26 MMOL/L (ref 22–29)
CREAT SERPL-MCNC: 2.51 MG/DL (ref 0.55–1.02)
EOSINOPHIL # BLD AUTO: 0.12 X10(3)/MCL (ref 0–0.9)
EOSINOPHIL NFR BLD AUTO: 3.4 %
ERYTHROCYTE [DISTWIDTH] IN BLOOD BY AUTOMATED COUNT: 14.1 % (ref 11.5–17)
GLOBULIN SER-MCNC: 3.7 GM/DL (ref 2.4–3.5)
GLUCOSE SERPL-MCNC: 108 MG/DL (ref 74–100)
HCT VFR BLD AUTO: 34.9 % (ref 37–47)
HGB BLD-MCNC: 11.4 GM/DL (ref 12–16)
IGA SERPL-MCNC: 113 MG/DL (ref 65–421)
IGG SERPL-MCNC: 1319 MG/DL (ref 522–1631)
IGM SERPL-MCNC: 15 MG/DL (ref 33–293)
IMM GRANULOCYTES # BLD AUTO: 0.01 X10(3)/MCL (ref 0–0.04)
IMM GRANULOCYTES NFR BLD AUTO: 0.3 %
LYMPHOCYTES # BLD AUTO: 0.51 X10(3)/MCL (ref 0.6–4.6)
LYMPHOCYTES NFR BLD AUTO: 14.7 %
MCH RBC QN AUTO: 30.3 PG (ref 27–31)
MCHC RBC AUTO-ENTMCNC: 32.7 MG/DL (ref 33–36)
MCV RBC AUTO: 92.8 FL (ref 80–94)
MONOCYTES # BLD AUTO: 0.57 X10(3)/MCL (ref 0.1–1.3)
MONOCYTES NFR BLD AUTO: 16.4 %
NEUTROPHILS # BLD AUTO: 2.3 X10(3)/MCL (ref 2.1–9.2)
NEUTROPHILS NFR BLD AUTO: 64.9 %
PLATELET # BLD AUTO: 232 X10(3)/MCL (ref 130–400)
PMV BLD AUTO: 8.8 FL (ref 7.4–10.4)
POTASSIUM SERPL-SCNC: 3.4 MMOL/L (ref 3.5–5.1)
PROT SERPL-MCNC: 7.1 GM/DL (ref 6.4–8.3)
RBC # BLD AUTO: 3.76 X10(6)/MCL (ref 4.2–5.4)
SODIUM SERPL-SCNC: 139 MMOL/L (ref 136–145)
WBC # SPEC AUTO: 3.5 X10(3)/MCL (ref 4.5–11.5)

## 2022-07-28 PROCEDURE — 80053 COMPREHEN METABOLIC PANEL: CPT

## 2022-07-28 PROCEDURE — 84165 PROTEIN E-PHORESIS SERUM: CPT

## 2022-07-28 PROCEDURE — 96401 CHEMO ANTI-NEOPL SQ/IM: CPT

## 2022-07-28 PROCEDURE — 86146 BETA-2 GLYCOPROTEIN ANTIBODY: CPT

## 2022-07-28 PROCEDURE — 63600175 PHARM REV CODE 636 W HCPCS: Mod: TB | Performed by: INTERNAL MEDICINE

## 2022-07-28 PROCEDURE — 85025 COMPLETE CBC W/AUTO DIFF WBC: CPT

## 2022-07-28 PROCEDURE — 96417 CHEMO IV INFUS EACH ADDL SEQ: CPT

## 2022-07-28 PROCEDURE — 25000003 PHARM REV CODE 250: Performed by: INTERNAL MEDICINE

## 2022-07-28 PROCEDURE — 36415 COLL VENOUS BLD VENIPUNCTURE: CPT

## 2022-07-28 PROCEDURE — 82784 ASSAY IGA/IGD/IGG/IGM EACH: CPT

## 2022-07-28 RX ORDER — DIPHENHYDRAMINE HCL 25 MG
25 CAPSULE ORAL
Status: COMPLETED | OUTPATIENT
Start: 2022-07-28 | End: 2022-07-28

## 2022-07-28 RX ORDER — ACETAMINOPHEN 325 MG/1
650 TABLET ORAL
Status: COMPLETED | OUTPATIENT
Start: 2022-07-28 | End: 2022-07-28

## 2022-07-28 RX ORDER — EPINEPHRINE 0.3 MG/.3ML
0.3 INJECTION SUBCUTANEOUS ONCE AS NEEDED
Status: DISCONTINUED | OUTPATIENT
Start: 2022-07-28 | End: 2022-07-28 | Stop reason: HOSPADM

## 2022-07-28 RX ORDER — DIPHENHYDRAMINE HYDROCHLORIDE 50 MG/ML
50 INJECTION INTRAMUSCULAR; INTRAVENOUS ONCE AS NEEDED
Status: DISCONTINUED | OUTPATIENT
Start: 2022-07-28 | End: 2022-07-28 | Stop reason: HOSPADM

## 2022-07-28 RX ORDER — HEPARIN 100 UNIT/ML
500 SYRINGE INTRAVENOUS
Status: DISCONTINUED | OUTPATIENT
Start: 2022-07-28 | End: 2022-07-28 | Stop reason: HOSPADM

## 2022-07-28 RX ORDER — SODIUM CHLORIDE 0.9 % (FLUSH) 0.9 %
10 SYRINGE (ML) INJECTION
Status: DISCONTINUED | OUTPATIENT
Start: 2022-07-28 | End: 2022-07-28 | Stop reason: HOSPADM

## 2022-07-28 RX ORDER — MONTELUKAST SODIUM 10 MG/1
10 TABLET ORAL
Status: COMPLETED | OUTPATIENT
Start: 2022-07-28 | End: 2022-07-28

## 2022-07-28 RX ORDER — DEXAMETHASONE 4 MG/1
20 TABLET ORAL
Status: COMPLETED | OUTPATIENT
Start: 2022-07-28 | End: 2022-07-28

## 2022-07-28 RX ADMIN — ACETAMINOPHEN 650 MG: 325 TABLET, FILM COATED ORAL at 12:07

## 2022-07-28 RX ADMIN — DIPHENHYDRAMINE HYDROCHLORIDE 25 MG: 25 CAPSULE ORAL at 12:07

## 2022-07-28 RX ADMIN — DEXAMETHASONE 20 MG: 4 TABLET ORAL at 12:07

## 2022-07-28 RX ADMIN — DARATUMUMAB AND HYALURONIDASE-FIHJ (HUMAN RECOMBINANT) 1800 MG: 1800; 30000 INJECTION SUBCUTANEOUS at 12:07

## 2022-07-28 RX ADMIN — MONTELUKAST SODIUM 10 MG: 10 TABLET, COATED ORAL at 12:07

## 2022-07-28 NOTE — TELEPHONE ENCOUNTER
----- Message from Whitley May RN sent at 7/28/2022 11:44 AM CDT -----  Regarding: TD on 8/4  This patient is here today for C1D8. Her next treatment will be due when she sees you on 8/4. Her appt with you is at 3:20. Do we need to have her scheduled for treatment on the Friday 8/5?    Whitley CAMPBELL RN inf

## 2022-07-29 LAB — PATH REV: NORMAL

## 2022-08-01 LAB
KAPPA LC FREE SER-MCNC: 3.51 MG/DL (ref 0.33–1.94)
KAPPA LC FREE/LAMBDA FREE SER: 2.44 {RATIO} (ref 0.26–1.65)
LAMBDA LC FREE SERPL-MCNC: 1.44 MG/DL (ref 0.57–2.63)

## 2022-08-03 LAB
ALBUMIN % SPEP (OHS): 43.18 (ref 48.1–59.5)
ALBUMIN SERPL-MCNC: 3.1 GM/DL (ref 3.5–5)
ALBUMIN/GLOB SERPL: 0.8 RATIO (ref 1.1–2)
ALPHA 1 GLOB (OHS): 0.23 GM/DL (ref 0–0.4)
ALPHA 1 GLOB% (OHS): 3.27 (ref 2.3–4.9)
ALPHA 2 GLOB % (OHS): 17.96 (ref 6.9–13)
ALPHA 2 GLOB (OHS): 1.28 GM/DL (ref 0.4–1)
BETA GLOB (OHS): 1.09 GM/DL (ref 0.7–1.3)
BETA GLOB% (OHS): 15.4 (ref 13.8–19.7)
GAMMA GLOBULIN % (OHS): 20.19 (ref 10.1–21.9)
GAMMA GLOBULIN (OHS): 1.43 GM/DL (ref 0.4–1.8)
GLOBULIN SER-MCNC: 4 GM/DL (ref 2.4–3.5)
M SPIKE % (OHS): ABNORMAL
M SPIKE (OHS): ABNORMAL
PROT SERPL-MCNC: 7.1 GM/DL (ref 6.4–8.3)

## 2022-08-04 ENCOUNTER — OFFICE VISIT (OUTPATIENT)
Dept: HEMATOLOGY/ONCOLOGY | Facility: CLINIC | Age: 57
End: 2022-08-04
Payer: MEDICARE

## 2022-08-04 ENCOUNTER — PATIENT MESSAGE (OUTPATIENT)
Dept: HEMATOLOGY/ONCOLOGY | Facility: CLINIC | Age: 57
End: 2022-08-04

## 2022-08-04 DIAGNOSIS — Z79.811 AROMATASE INHIBITOR USE: ICD-10-CM

## 2022-08-04 DIAGNOSIS — M85.80 OSTEOPENIA, UNSPECIFIED LOCATION: ICD-10-CM

## 2022-08-04 DIAGNOSIS — D63.8 ANEMIA OF CHRONIC DISEASE: ICD-10-CM

## 2022-08-04 DIAGNOSIS — C90.00 MULTIPLE MYELOMA, REMISSION STATUS UNSPECIFIED: ICD-10-CM

## 2022-08-04 DIAGNOSIS — E85.9 AMYLOIDOSIS, UNSPECIFIED TYPE: ICD-10-CM

## 2022-08-04 DIAGNOSIS — C50.819 OVERLAPPING MALIGNANT NEOPLASM OF FEMALE BREAST, UNSPECIFIED ESTROGEN RECEPTOR STATUS, UNSPECIFIED LATERALITY: Primary | ICD-10-CM

## 2022-08-04 PROCEDURE — 99215 OFFICE O/P EST HI 40 MIN: CPT | Mod: 95,,, | Performed by: INTERNAL MEDICINE

## 2022-08-04 PROCEDURE — 99215 PR OFFICE/OUTPT VISIT, EST, LEVL V, 40-54 MIN: ICD-10-PCS | Mod: 95,,, | Performed by: INTERNAL MEDICINE

## 2022-08-04 RX ORDER — MONTELUKAST SODIUM 10 MG/1
10 TABLET ORAL
Status: CANCELLED | OUTPATIENT
Start: 2022-08-05

## 2022-08-04 RX ORDER — DIPHENHYDRAMINE HCL 25 MG
25 CAPSULE ORAL
Status: CANCELLED | OUTPATIENT
Start: 2022-08-05

## 2022-08-04 RX ORDER — SODIUM CHLORIDE 0.9 % (FLUSH) 0.9 %
10 SYRINGE (ML) INJECTION
Status: CANCELLED | OUTPATIENT
Start: 2022-08-05

## 2022-08-04 RX ORDER — EPINEPHRINE 0.3 MG/.3ML
0.3 INJECTION SUBCUTANEOUS ONCE AS NEEDED
Status: CANCELLED | OUTPATIENT
Start: 2022-08-05

## 2022-08-04 RX ORDER — ACETAMINOPHEN 325 MG/1
650 TABLET ORAL
Status: CANCELLED | OUTPATIENT
Start: 2022-08-05

## 2022-08-04 RX ORDER — DEXAMETHASONE 4 MG/1
12 TABLET ORAL
Status: CANCELLED | OUTPATIENT
Start: 2022-08-05

## 2022-08-04 RX ORDER — DIPHENHYDRAMINE HYDROCHLORIDE 50 MG/ML
50 INJECTION INTRAMUSCULAR; INTRAVENOUS ONCE AS NEEDED
Status: CANCELLED | OUTPATIENT
Start: 2022-08-05

## 2022-08-04 RX ORDER — HEPARIN 100 UNIT/ML
500 SYRINGE INTRAVENOUS
Status: CANCELLED | OUTPATIENT
Start: 2022-08-05

## 2022-08-04 NOTE — PROGRESS NOTES
HEMATOLOGY/ONCOLOGY OFFICE CLINIC VISIT    This is a telemedicine note.  Patient was treated using telemedicine, real-time audio and video, according to [facility] protocols.      IDr. Natalie, distant provided, conducted the visit from location identified below.  The patient participated in the visit at a non-[facility] location selected by the patient (or patient's representative), identified below.  I am licensed in the [facility] where the patient stated they are located.  The patient, (or patient's representative) stated that they understood and accepted privacy and security risk to the information and her location.   I have reviewed the patient name and date of birth  I have verbally reviewed the past medical history, active medication list, and allergies with the patient (parent/ legal guardian for a patient under the age of 18 years old) and verified with picture ID if applicable . I have verified that this patient is a resident in the [facility].  I have verbally obtained review of systems    Patient was located at [ ]  I, Dr. Natalie Meyers, distant provider, was located at [facility] .    Face-to-face time spent with the patient exceeds 25 minutes, over 50% of which was used for education and counseling regarding medical conditions, current medications including risk/benefits and side effects/adverse events, over-the-counter medications uses/doses, home self-care and contact precautions, red flags and indication for immediate medical attention.  The patient is receptive, expresses understanding and is agreeable to the plan.  All questions answered.        Visit Information:    NO SHOW  08/21/2018--> Rescheduled                              11/05/2019--> No Show/Reschedule                           06/24/2020--> No Show/Reschedule                           09/24/2020--> No Show/Reschedule                           03/15/2021--> No Show/Reschedule                           03/23/2021--> No  Show/Reschedule                           04/13/2021--> No Show                           05/27/2021--> No Show                           08/19/2021--> No Show                           08/26/2021--> No Show/Rescheduled                           04/26/2022--> No Show  Referring Physician: DR. Cardona  PCP: Dr. Chacon  GI: Dr. Johnny Bhardwaj  Rheumatologist: Dr. Luis Rea  Immunologist: Dr. Daniel Nava  ENT: Dr. Brice Williamson  [facility] Pain management: Dr.Chai LONG [place] Transplant: Dr. Adrian Naylor MD [facility] Med Onc: Dr. Zulema LONG [place] Breast Surg Onc: Dr.DeSnyder LONG [place]: Dr. 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 Sx risks do not outweigh benefits. Pain meds given and encouraged Physical Therpay  8) Amyloid plaques, evaluated by Derm at [facility], recommend watchful waiting. Patient to follow up 12/2017  9) Stage IA grade 3, ER+, HER2 BERNA +,  IDC/DCIS of the left breast --- 2/7/18 at [facility]; s/p lumpectomy with SLNB 4/12, Grade 2 IDC measuring 4mm with second focus of microinvasive carcinoma measuring  0.4mm .IG DCIS, 0.3mm to posterior margin; 2 SLN negative for malignancy    10) Progressive swelling of R lower extremity--- US NIVA done 2/19/18 negative for evidence of DVT  11). Paroxysmal Afib (2/18/18) diagnosed at [facility]; ECHO noted EF 58%  12. Mild CKD with GFR 55 - managed per [facility] nephrology  13). Pulmonary  nodules noted on pre-operative chest CT scan (3/12/18) noted new bilateral pulmonary nodules are nonspecific -- seen by Pulmonology at [facility] (3/21/18) thought to be inflammatory/infectious in nature, repeat Chest CT 6/28/18 noted resolution of nodules  14). Treatment induced neutropenia           Present treatment:  Maintenance Revlimid 5mg PO QOD, started 02/16/17-- was held for a few months while undergoing treatment for her breast cancer 02/18-05/18; Restarted 6/15/18   Dexamethasone 20 mg po weekly added early July 2017--> reduced to 12 mg PO weekly October 4, 2017---> increased to 16mg PO weekly 2/15/18---restarted 6/15/18 --> 20 mg weekly 7//8/2022  Darzalex 7/8/2022-present  Femara 2.5 mg PO daily   Eliquis 2.5mg PO BID        Treatment/Oncology history:  1) CyBorD x5 cycles 09/28/15-12/24/15  2) Autologous stem cell transplant 02/08/16, done at [facility]  3) Exploratory laparotomy with subtotal colectomy and end ileostomy done August 2, 2016--pathology specimen showed advanced colonic amyloidosis extensive involving the muscular wall submucosa and mucosa.  Appendix also involvement by amyloidosis with fibrous obliteration of the distal lumen.  4) Maintenance therapy with Revlimid 5mg-started 06/01/16--Discontinued shortly after 2/2 cytopenias  5) Left breast lumpectomy with SLNB at [facility] 4/12/18  6) Left partial breast RT x10 fractions  5/21/18-6/4/1    Plan of care:       Imaging:      Pathology:    CLINICAL HISTORY:       Patient: Ninfa Candelario is a 56 y.o. female.  Ms. Cabrera Joseph was admitted on 08/16/15 for ileus versus partial SBO. CT A/P done 08/17/15 showed sigmoid colitis and a zone of transition at the junction of the descending and sigmoid colon which could indicate some degree of obstruction and an obstructing mass cannot be excluded. Numerous skeletal lytic lesions noted. CT chest done 08/19/15 showed Multiple skeletal lytic lesions involving the thoracic spine, left rib sternum consistent  with either metastases or multiple myeloma. There is no evidence of pulmonary or mediastinal mass. Dr. Farr was consulted for possible MM/anemia.     Nuclear Bone scan done 8/20/15 showed mild to moderate increased activity in left rib and right second rib which correlates with fractures seen on CT.  Skeletal survey done 8/20/15showed lytic lesions in the calvarium and probably a lytic lesion in the left scapula. Lytic areas in the spine and pelvis seen on recent CT are not well defined on skeletal survey. Work up labs done 08/20/15: Negative for sickle cell and Thalessemia (reported history of thalessemia). Iron 54, Transferrin 118.0, Iron sat 34.6%, Folate 26.5, Vit B12 1,779  Peripheral smear resulted slightly macrocytic normochromic anemia without signifcant anisocytosis may reflect early B12/folate deficiency or marrow dysfunction, among others. No immature myeloid cells or blasts. Platlets adequate. Beta 2 mircoglobulin = 3.2.  SPEP/NADIA: M-spike in the beta region. The monoclonal protein peak accounts for 1.13 g/dL. Hypogammaglobulinemia. NADIA pattern shows the presence of a free lambda light chain monoclonal protein with additional faint band in IgA.  All immunoglobulin levels decreased. IgA=26, IgG=307, IgM<5.  Kappa Qnt 0.16, Lambda Qnt 4600.00, Ratio <0.01.  24hr Urine for Bence Mendez: Kappa 2.75, Lambda 1250.00, Ratio <0.01. NADIA: Urine is POSITIVE for monoclonal Free Lambda Light chains     [facility] she underwent a bone marrow biopsy on 09/18/15. BMBx showed 80% plasma cells, 13p deletion and normal karyotype. She underwent fat pad biopsy which came back positive for Congo red consistent with amyloidosis. Cardiac workup revealed no amyloid deposition within the heart.  PET/CT and skeletal survey done September 18, 2015 showed multiple lytic osseous lesions  The patient was started on Velcade while at [facility] with the plan to transition to autologous stem cell  transplantation. They asked if we could give her could continue treatment here.   She completed CyborD x5 cycles on 12/24/15     Patient admitted 01/06/16 for colitis and C. Diff. Pt treated with Flagyl. Discharged home 01/08/16     Repeat BMBx done at [facility] 01/2016 did not show any morphologic or immnophenotypic evidence of plasama cell neoplasm. Patient underwent Autologous stem cell transplant with Busulfan and melphalan on 02/08/16 at [facility]. The only complication was recurrent C.diff infection for which she completed 10 days of Fidaxomycin.     Follow-up bone marrow biopsy done at [facility] done 5/16/16 showed cellular 30-40% bone marrow with trilineage hematopoiesis. No morphologic or immunophenotypic support for plasma cell neoplasm. Started maintenance Revlimid 5mg. Unable to continue therapy due to cytopenias.     On 08/02/16 patient underwent  Exploratory laparotomy with subtotal colectomy and end ileostomy for what was thought to be toxic megacolon. Pathology showed extensive advanced colonic amyloidosis involving the muscular wall, submucosa and mucosa: With the appendix also involved with amyloidosis.  Positive lambda light chains were noted.     Follow-up at [facility] 8/31/16, Per Dr. Adrian Naylor, 6 months post autologous transplant. She has engrafted. Based on the latest restaging she is near complete remission with possible IgA lambda on serum immunofixation. Patient was instructed to discontinue prophylactic antibiotics. She received her 1st series of immunizations while at [facility]. Patient had a bilateral venous ultrasound to rule out DVT, negative B/L. In regards to amyloidosis the patient feels that her tongue is smaller in size and her BNP has come down some.  Patient had a follow-up appointment at [facility] November 30, 2016.  Dr. Parnell documented the patient's free lambda light chain remains at a lower level.  Therefore in light of her recent surgery they will continue with observation  only.  The plan to see the patient back in 2-3 months with repeat restaging labs.  They recommended regular CBC checks to monitor anemia.  Patient returned to [facility] in December 2016 to meet with dermatology for numerous papillary lesions on eyelids, chest and posterior neck consistent with amyloid deposits. Recommendations were for watchful waiting.  Patient is less than 1 year out from bone marrow transplant and further improvement can be expected.  She will see the patient back in 1 year to discuss possible surgical resection of the plaques especially around the eyelid region.  Rogaine recommended for persistent hair loss. Retin-A recommended for acne vulgaris.  Patient returned to [facility] on 2/8/2017 for neurosurgery consult for chronic low back pain and difficulty walking.  Imaging showed extensive DJD with discovertebral bulges and thickening of the spinal laminar tissues creating spinal stenosis throughout, but greatest at L2/L3.  Neurosurgery felt that surgery risks outweighed the benefits.  Patient was prescribed pain medicine and encouraged to participate in physical therapy.  Patient also met with Dr. Parnell on 02/08/17, who noted an elevation in free light chains (kappa 52.60/lambda 27.77/ratio 1.89).  Recommendation is to resume maintenance therapy with Revlimid at a low dose of 5 mg every other day.  Patient went back to [facility] first week of April 2017.  I do not have these notes.  Reportedly she was told to continue on every other day Revlimid at 5 mg.  She reportedly had repeat myeloma labs done as well.  Again I do not have these results.  Patient went back to [facility] and saw Dr. Parnell June 29, 2017.  Myeloma labs were done with serum protein electrophoresis showing irregularity in the fast gamma region.  IgG of 1291.  Free kappa light chains of 84.6 with lambda light chains of 66.9.  Beta-2 microglobulin of 4.5  CT scan of lumbar spine showed multiple lytic lesions once again in  the lumbar spine and bony pelvis.  Ultrasound of the left leg showed no evidence of DVT.  It was recommended based on the slight increase in her And lambda light chains to start weekly dexamethasone 20 mg p.o. weekly.  Follow-up visit and labs at [facility] on September 29, 2017 with Dr. Parnell.  Repeat beta-2 microglobulin came back at 2.4.  Serum IgG of 761, IgA 117 and IgM of 29.  Serum free kappa light chains of 24.9 and lambda of 23.5.  Counts were stable with hemoglobin of 11.1, WBC 4.9 and platelets of 210,000.  Recommendations were for continued Revlimid every other day with weekly dexamethasone along with aspirin for DVT prophylaxis.  Bilateral diagnostic MMG 12/19/17  noted 1.6cm coarse heterogeneous calcifications in posterior region of L breast at 3 o'clock position. Patient was scheduled for FNA which confirmed ID grade 3, single focus measuring 1.7cm with 2nd focus microinvasion DCIS grade 2 measuring 0.3cm. Left axillary LN biopsy showed no evidence of metastatic carcinoma. Complete staging: Stage IA, grade 3 ER+, Her 2 Niki + IDC/DCIS of left breast. Ki-67 <17%.  Repeat myeloma labs showed rise in free lambda light chains noted at 68.69, kappa light chains at 27.22,  beta 2 microglobulin came back at 2.9. Serum protein electrophoresis showed no definitive evidence of an M protein peak. The pattern is consistent with an acute inflammatory reaction. Recommendations were made to maintain Revlimid at current dose of 5mg every other day to be taken continuously increase weekly dexamethasone to 16 mg.   Patient seen at [facility] with tentative plan for L breast lumpectomy on 3/13/18. 2/16/18- Prior to surgery she was seen by genetic counselor who ran genetic testing per patient reques, all of which were negative. She was then seen by cardiology at [facility] 2/16/18 for further evaluation of persistent bilateral lower extremity swelling-ECHO noted EF of 58%; diagnosed with paroxysmal atrial fibrillation and  instructed to begin daily ASA. During preoperative asssessment per Rad/Onc 3/12/18, corresponding chest CT noted new bilateral pulmonary nodules concerning for metastatic disease- surgery was subsequently postponed pending pulmonary evaluation. Seen by Pulmonology on 3/21/18, PFTs within normal limits and cleared patient for surgery with recommended close CT follow up of nodules in 3 months. 3/21/18 seen by nephrology for management of mild CKD (GFR 55)-cleared for surgery with recommended follow up in 3 months. Left breast lumpectomy and SLNB performed per Dr. Washburn on 4/12/18- plan for follow up in clinic on 4/24/18  for postoperative assessment. Follow up with Dr. Poole (Med/Onc) on 4/25/18. Follow up with Dr. Parnell 4/26/18.   Patient seen at [facility] s/p Left breast lumpectomy with SLNB on 4/12/18. Following surgery she completed Left partial breast radiation x 10 fractions. She was then started on endocrine therapy with Femara after been deemedan inappropriate candidate for systemic chemotherapy/Herceptin.      She was seen by neurosugery at [facility] on 4/26/18 following repeat MRI of cervical thoracic lumbar spine which noted focal enhancement of T2 hyperintensity at the C2 level which may be due to degenerative changes. Signal abnormality within  the T12 and L1 may be secondary to myeloma. Plan to continue with observation for now with F/U scheduled in October 2018.      She was seen by Dr. Parnell at [facility] for management of her MM on 4/26/18. Patient was recommended to restart Revlimid 5mg PO every other day with notes that these recommendations would be communicated to collaborating Oncologist. Patient was also recommended to start on Zometa for her bone disease.      Seen by Dr. Parnell at [facility] for management of her MM on 6/28/18. Repeat light chains noted lambda 33.36 (previously 80.80), K/L ratio 0.91 (previously 0.49). Due to slight improvement in light chains, plan to continue on  lenalidomide maintenance. Recommendations to continue anticoagulation with Eliquis. BLE venous doppler negative for DVT.   Seen by pulmonolgy on 6/28/18- repeat CT chest done 6/28/18 noted resolution of previous pulmonary nodules. Thought to be infectious in nature. Recommendations for discharge from pulmonary clinic.  Should MRI of her cervical thoracic lumbar spine on 2/12/2019 that demonstrated cervical spondylosis with canal stenosis most notable at C1 and 2. Cord signal abnormality at C1 and 2 with enhancement is stable. Lumbar spondylosis with central canal stenosis at multiple levels. No imaging features of bony metastatic disease.     For amyloidosis (Light chain type).    Patient presented with macroglossia and now with improvement of the swelling of her tongue. Her cardiac parameters are also better.   8/10/2020 proBNP  250   2/17/2020                616  8/9/2019                  190             Chief Complaint: Virtual Visit      Interval History:  Patient is evaluated via telemedicine today in follow-up of her above problems.  She is currently on Femara for breast cancer and Ninlaro/Rev/Dex/Darzalex  for MM  s/p transplant in 2016. She has neuropathy symptoms that she contributes to Ninlaro but this is tolerable at this time.No fever, chills, sweats.  No chest pain or shortness of breath.  She uses a walker for mobility.  She continues to do her mammograms and other imaging studies at [facility].  Discussed in detail labs.  Discussed with her importance of compliance.  She is due for treatment tomorrow         Explained to the patient that I spoke with Dr Parnell and her free light chain are increasing and her amyloidosis is worsening. She is recommending to add daratumumab to her regimen and add Dexamethasone 20 mg weekly with daratumumab.    She is also having diarrhea and she was prescribed a medication that she will like me to prescribe for her. She will get the name and let me know.     ROS:  All 14  points ROS taken and as per Interval History    Histories:  PMH/PSH/FH/SOCIAL/ALLERGIES AND MEDS REVIEWED AND UPDATED AS APPROPRIATE       There were no vitals filed for this visit.   Physical Exam  Constitutional:       Appearance: Normal appearance.   HENT:      Head: Normocephalic.   Eyes:      Extraocular Movements: Extraocular movements intact.   Pulmonary:      Effort: Pulmonary effort is normal.   Musculoskeletal:      Cervical back: Neck supple.   Neurological:      Mental Status: She is alert.   Psychiatric:         Mood and Affect: Mood normal.         Behavior: Behavior normal.         Thought Content: Thought content normal.         Judgment: Judgment normal.       ECOG SCORE    2 - Capable of all selfcare but unable to carry out any work activities, active > 50% of hours         Laboratory:  CBC with Differential:  Lab Results   Component Value Date    WBC 3.5 (L) 07/28/2022    RBC 3.76 (L) 07/28/2022    HGB 11.4 (L) 07/28/2022    HCT 34.9 (L) 07/28/2022    MCV 92.8 07/28/2022    MCH 30.3 07/28/2022    MCHC 32.7 (L) 07/28/2022    RDW 14.1 07/28/2022     07/28/2022    MPV 8.8 07/28/2022        CMP:  Sodium Level   Date Value Ref Range Status   07/28/2022 139 136 - 145 mmol/L Final     Potassium Level   Date Value Ref Range Status   07/28/2022 3.4 (L) 3.5 - 5.1 mmol/L Final     Carbon Dioxide   Date Value Ref Range Status   07/28/2022 26 22 - 29 mmol/L Final     Blood Urea Nitrogen   Date Value Ref Range Status   07/28/2022 27.1 (H) 9.8 - 20.1 mg/dL Final     Creatinine   Date Value Ref Range Status   07/28/2022 2.51 (H) 0.55 - 1.02 mg/dL Final     Calcium Level Total   Date Value Ref Range Status   07/28/2022 9.3 8.4 - 10.2 mg/dL Final     Albumin Level   Date Value Ref Range Status   07/28/2022 3.4 (L) 3.5 - 5.0 gm/dL Final   07/28/2022 3.1 (L) 3.5 - 5.0 gm/dL Final     Bilirubin Total   Date Value Ref Range Status   07/28/2022 0.3 <=1.5 mg/dL Final     Alkaline Phosphatase   Date Value Ref Range  Status   07/28/2022 82 40 - 150 unit/L Final     Aspartate Aminotransferase   Date Value Ref Range Status   07/28/2022 20 5 - 34 unit/L Final     Alanine Aminotransferase   Date Value Ref Range Status   07/28/2022 10 0 - 55 unit/L Final     Estimated GFR-Non    Date Value Ref Range Status   04/20/2022 25               Assessment:       1. Overlapping malignant neoplasm of female breast, unspecified estrogen receptor status, unspecified laterality    2. Multiple myeloma, remission status unspecified    3. Amyloidosis, unspecified type    4. 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 Sx risks do not outweigh benefits. Pain meds given and encouraged Physical Therpay  8) Amyloid plaques, evaluated by Derm at [facility], recommend watchful waiting. Patient to follow up 12/2017  9) Stage IA grade 3, ER+, HER2 BERNA +,  IDC/DCIS of the left breast --- 2/7/18 at [facility]; s/p lumpectomy with SLNB 4/12, Grade 2 IDC measuring 4mm with second focus of microinvasive carcinoma measuring  0.4mm .IG DCIS, 0.3mm to posterior margin; 2 SLN negative for malignancy    10) Progressive swelling of R lower extremity--- US NIVA done 2/19/18 negative for evidence of DVT  11). Paroxysmal Afib (2/18/18) diagnosed at [facility]; ECHO noted EF 58%  12. Mild CKD with GFR 55 - managed per [facility] nephrology  13). Pulmonary nodules noted on pre-operative chest CT scan (3/12/18) noted  new bilateral pulmonary nodules are nonspecific -- seen by Pulmonology at [facility] (3/21/18) thought to be inflammatory/infectious in nature, repeat Chest CT 6/28/18 noted resolution of nodules  14). Treatment induced neutropenia        Plan:         Continue present treatment and management.  Encouraged to be compliant with her visits and her labs.  I-STAT today with a potassium of 2.7.  We will replace.     RTC in 4 weeks with MM work up prior to OV  Continue Nilaro 4mg days 1,8,15 q 28 days  Cont Zometa Q3 months -on hold for now  Cont low dose Revlimid 5 mg q other day  Dexamethasone 20 mg weekly with daratumumab  Ok for daratumumab tomorrowCont Jackie  Keep appt at [facility]  Instructed to call clinic with any questions or concerns     Mammograms done at [facility]  Bone density with osteopenia--will start Prolia once she has dental clearance   Encourage to call or message us for any questions or problems  The patient was given ample opportunity to ask questions, and to the best of my abilities, all questions answered to satisfaction; patient demonstrated understanding of what we discussed and agreeable to the plan.     EMEKA RAMIREZ MD         Answers for HPI/ROS submitted by the patient on 8/4/2022  appetite change : Yes  unexpected weight change: No  mouth sores: No  visual disturbance: No  cough: No  shortness of breath: No  chest pain: No  abdominal pain: Yes  diarrhea: Yes  frequency: No  back pain: Yes  rash: No  headaches: No  adenopathy: No  nervous/ anxious: No

## 2022-08-05 ENCOUNTER — INFUSION (OUTPATIENT)
Dept: INFUSION THERAPY | Facility: HOSPITAL | Age: 57
End: 2022-08-05
Attending: INTERNAL MEDICINE
Payer: MEDICARE

## 2022-08-05 ENCOUNTER — LAB VISIT (OUTPATIENT)
Dept: LAB | Facility: HOSPITAL | Age: 57
End: 2022-08-05
Attending: INTERNAL MEDICINE
Payer: MEDICARE

## 2022-08-05 VITALS — DIASTOLIC BLOOD PRESSURE: 71 MMHG | SYSTOLIC BLOOD PRESSURE: 114 MMHG | TEMPERATURE: 98 F | HEART RATE: 103 BPM

## 2022-08-05 DIAGNOSIS — C50.819 OVERLAPPING MALIGNANT NEOPLASM OF FEMALE BREAST, UNSPECIFIED ESTROGEN RECEPTOR STATUS, UNSPECIFIED LATERALITY: ICD-10-CM

## 2022-08-05 DIAGNOSIS — E85.9 AMYLOIDOSIS, UNSPECIFIED TYPE: ICD-10-CM

## 2022-08-05 DIAGNOSIS — C90.00 MULTIPLE MYELOMA, REMISSION STATUS UNSPECIFIED: ICD-10-CM

## 2022-08-05 DIAGNOSIS — M85.80 OSTEOPENIA, UNSPECIFIED LOCATION: ICD-10-CM

## 2022-08-05 DIAGNOSIS — E85.9 AMYLOIDOSIS, UNSPECIFIED TYPE: Primary | ICD-10-CM

## 2022-08-05 DIAGNOSIS — D63.8 ANEMIA OF CHRONIC DISEASE: ICD-10-CM

## 2022-08-05 DIAGNOSIS — Z79.811 AROMATASE INHIBITOR USE: ICD-10-CM

## 2022-08-05 LAB
ABS NEUT CALC (OHS): 1.65 X10(3)/MCL (ref 2.1–9.2)
ALBUMIN SERPL-MCNC: 3 GM/DL (ref 3.5–5)
ALBUMIN/GLOB SERPL: 0.9 RATIO (ref 1.1–2)
ALP SERPL-CCNC: 100 UNIT/L (ref 40–150)
ALT SERPL-CCNC: 16 UNIT/L (ref 0–55)
AST SERPL-CCNC: 20 UNIT/L (ref 5–34)
BILIRUBIN DIRECT+TOT PNL SERPL-MCNC: 0.2 MG/DL
BUN SERPL-MCNC: 21.9 MG/DL (ref 9.8–20.1)
CALCIUM SERPL-MCNC: 8.5 MG/DL (ref 8.4–10.2)
CHLORIDE SERPL-SCNC: 103 MMOL/L (ref 98–107)
CO2 SERPL-SCNC: 30 MMOL/L (ref 22–29)
CREAT SERPL-MCNC: 2.23 MG/DL (ref 0.55–1.02)
EOSINOPHIL NFR BLD MANUAL: 0.14 X10(3)/MCL (ref 0–0.9)
EOSINOPHIL NFR BLD MANUAL: 5 % (ref 0–8)
ERYTHROCYTE [DISTWIDTH] IN BLOOD BY AUTOMATED COUNT: 14.1 % (ref 11.5–17)
GLOBULIN SER-MCNC: 3.3 GM/DL (ref 2.4–3.5)
GLUCOSE SERPL-MCNC: 95 MG/DL (ref 74–100)
HCT VFR BLD AUTO: 32.5 % (ref 37–47)
HGB BLD-MCNC: 10.4 GM/DL (ref 12–16)
IMM GRANULOCYTES # BLD AUTO: 0.01 X10(3)/MCL (ref 0–0.04)
IMM GRANULOCYTES NFR BLD AUTO: 0.4 %
LYMPHOCYTES NFR BLD MANUAL: 0.51 X10(3)/MCL
LYMPHOCYTES NFR BLD MANUAL: 19 % (ref 13–40)
MCH RBC QN AUTO: 30.2 PG (ref 27–31)
MCHC RBC AUTO-ENTMCNC: 32 MG/DL (ref 33–36)
MCV RBC AUTO: 94.5 FL (ref 80–94)
MONOCYTES NFR BLD MANUAL: 0.41 X10(3)/MCL (ref 0.1–1.3)
MONOCYTES NFR BLD MANUAL: 15 % (ref 2–11)
NEUTROPHILS NFR BLD MANUAL: 61 % (ref 47–80)
PLATELET # BLD AUTO: 141 X10(3)/MCL (ref 130–400)
PLATELET # BLD EST: NORMAL 10*3/UL
PMV BLD AUTO: 9.3 FL (ref 7.4–10.4)
POTASSIUM SERPL-SCNC: 4 MMOL/L (ref 3.5–5.1)
PROT SERPL-MCNC: 6.3 GM/DL (ref 6.4–8.3)
RBC # BLD AUTO: 3.44 X10(6)/MCL (ref 4.2–5.4)
RBC MORPH BLD: NORMAL
SODIUM SERPL-SCNC: 141 MMOL/L (ref 136–145)
WBC # SPEC AUTO: 2.7 X10(3)/MCL (ref 4.5–11.5)

## 2022-08-05 PROCEDURE — 80053 COMPREHEN METABOLIC PANEL: CPT

## 2022-08-05 PROCEDURE — 85027 COMPLETE CBC AUTOMATED: CPT

## 2022-08-05 PROCEDURE — 25000003 PHARM REV CODE 250: Performed by: INTERNAL MEDICINE

## 2022-08-05 PROCEDURE — 36415 COLL VENOUS BLD VENIPUNCTURE: CPT

## 2022-08-05 PROCEDURE — 63600175 PHARM REV CODE 636 W HCPCS: Mod: TB | Performed by: INTERNAL MEDICINE

## 2022-08-05 PROCEDURE — 96401 CHEMO ANTI-NEOPL SQ/IM: CPT

## 2022-08-05 RX ORDER — EPINEPHRINE 0.3 MG/.3ML
0.3 INJECTION SUBCUTANEOUS ONCE AS NEEDED
Status: DISCONTINUED | OUTPATIENT
Start: 2022-08-05 | End: 2022-08-05 | Stop reason: HOSPADM

## 2022-08-05 RX ORDER — SODIUM CHLORIDE 0.9 % (FLUSH) 0.9 %
10 SYRINGE (ML) INJECTION
Status: DISCONTINUED | OUTPATIENT
Start: 2022-08-05 | End: 2022-08-05 | Stop reason: HOSPADM

## 2022-08-05 RX ORDER — ACETAMINOPHEN 325 MG/1
650 TABLET ORAL
Status: COMPLETED | OUTPATIENT
Start: 2022-08-05 | End: 2022-08-05

## 2022-08-05 RX ORDER — DEXAMETHASONE 4 MG/1
12 TABLET ORAL
Status: COMPLETED | OUTPATIENT
Start: 2022-08-05 | End: 2022-08-05

## 2022-08-05 RX ORDER — DIPHENHYDRAMINE HCL 25 MG
25 CAPSULE ORAL
Status: COMPLETED | OUTPATIENT
Start: 2022-08-05 | End: 2022-08-05

## 2022-08-05 RX ORDER — HEPARIN 100 UNIT/ML
500 SYRINGE INTRAVENOUS
Status: DISCONTINUED | OUTPATIENT
Start: 2022-08-05 | End: 2022-08-05 | Stop reason: HOSPADM

## 2022-08-05 RX ORDER — MONTELUKAST SODIUM 10 MG/1
10 TABLET ORAL
Status: COMPLETED | OUTPATIENT
Start: 2022-08-05 | End: 2022-08-05

## 2022-08-05 RX ORDER — DIPHENHYDRAMINE HYDROCHLORIDE 50 MG/ML
50 INJECTION INTRAMUSCULAR; INTRAVENOUS ONCE AS NEEDED
Status: DISCONTINUED | OUTPATIENT
Start: 2022-08-05 | End: 2022-08-05 | Stop reason: HOSPADM

## 2022-08-05 RX ADMIN — MONTELUKAST SODIUM 10 MG: 10 TABLET, COATED ORAL at 12:08

## 2022-08-05 RX ADMIN — DEXAMETHASONE 12 MG: 4 TABLET ORAL at 12:08

## 2022-08-05 RX ADMIN — ACETAMINOPHEN 650 MG: 325 TABLET, FILM COATED ORAL at 12:08

## 2022-08-05 RX ADMIN — DIPHENHYDRAMINE HYDROCHLORIDE 25 MG: 25 CAPSULE ORAL at 12:08

## 2022-08-05 RX ADMIN — DARATUMUMAB AND HYALURONIDASE-FIHJ (HUMAN RECOMBINANT) 1800 MG: 1800; 30000 INJECTION SUBCUTANEOUS at 12:08

## 2022-08-11 DIAGNOSIS — C90.00 MULTIPLE MYELOMA, REMISSION STATUS UNSPECIFIED: Primary | ICD-10-CM

## 2022-08-11 RX ORDER — ACETAMINOPHEN 325 MG/1
650 TABLET ORAL
Status: CANCELLED | OUTPATIENT
Start: 2022-08-12

## 2022-08-11 RX ORDER — DIPHENHYDRAMINE HYDROCHLORIDE 50 MG/ML
50 INJECTION INTRAMUSCULAR; INTRAVENOUS ONCE AS NEEDED
Status: CANCELLED | OUTPATIENT
Start: 2022-08-12

## 2022-08-11 RX ORDER — HEPARIN 100 UNIT/ML
500 SYRINGE INTRAVENOUS
Status: CANCELLED | OUTPATIENT
Start: 2022-08-12

## 2022-08-11 RX ORDER — DIPHENHYDRAMINE HCL 25 MG
25 CAPSULE ORAL
Status: CANCELLED | OUTPATIENT
Start: 2022-08-12

## 2022-08-11 RX ORDER — SODIUM CHLORIDE 0.9 % (FLUSH) 0.9 %
10 SYRINGE (ML) INJECTION
Status: CANCELLED | OUTPATIENT
Start: 2022-08-12

## 2022-08-11 RX ORDER — EPINEPHRINE 0.3 MG/.3ML
0.3 INJECTION SUBCUTANEOUS ONCE AS NEEDED
Status: CANCELLED | OUTPATIENT
Start: 2022-08-12

## 2022-08-11 RX ORDER — DEXAMETHASONE 4 MG/1
12 TABLET ORAL
Status: CANCELLED | OUTPATIENT
Start: 2022-08-12

## 2022-08-12 ENCOUNTER — INFUSION (OUTPATIENT)
Dept: INFUSION THERAPY | Facility: HOSPITAL | Age: 57
End: 2022-08-12
Attending: INTERNAL MEDICINE
Payer: MEDICARE

## 2022-08-12 VITALS — DIASTOLIC BLOOD PRESSURE: 72 MMHG | SYSTOLIC BLOOD PRESSURE: 111 MMHG | HEART RATE: 83 BPM | TEMPERATURE: 98 F

## 2022-08-12 DIAGNOSIS — E85.9 AMYLOIDOSIS, UNSPECIFIED TYPE: Primary | ICD-10-CM

## 2022-08-12 LAB
BASOPHILS # BLD AUTO: 0.03 X10(3)/MCL (ref 0–0.2)
BASOPHILS NFR BLD AUTO: 0.8 %
EOSINOPHIL # BLD AUTO: 0.12 X10(3)/MCL (ref 0–0.9)
EOSINOPHIL NFR BLD AUTO: 3.1 %
ERYTHROCYTE [DISTWIDTH] IN BLOOD BY AUTOMATED COUNT: 14.3 % (ref 11.5–17)
HCT VFR BLD AUTO: 34.1 % (ref 37–47)
HGB BLD-MCNC: 10.9 GM/DL (ref 12–16)
IMM GRANULOCYTES # BLD AUTO: 0.02 X10(3)/MCL (ref 0–0.04)
IMM GRANULOCYTES NFR BLD AUTO: 0.5 %
LYMPHOCYTES # BLD AUTO: 0.66 X10(3)/MCL (ref 0.6–4.6)
LYMPHOCYTES NFR BLD AUTO: 17.3 %
MCH RBC QN AUTO: 29.7 PG (ref 27–31)
MCHC RBC AUTO-ENTMCNC: 32 MG/DL (ref 33–36)
MCV RBC AUTO: 92.9 FL (ref 80–94)
MONOCYTES # BLD AUTO: 0.62 X10(3)/MCL (ref 0.1–1.3)
MONOCYTES NFR BLD AUTO: 16.2 %
NEUTROPHILS # BLD AUTO: 2.4 X10(3)/MCL (ref 2.1–9.2)
NEUTROPHILS NFR BLD AUTO: 62.1 %
PLATELET # BLD AUTO: 149 X10(3)/MCL (ref 130–400)
PMV BLD AUTO: 10.5 FL (ref 7.4–10.4)
RBC # BLD AUTO: 3.67 X10(6)/MCL (ref 4.2–5.4)
WBC # SPEC AUTO: 3.8 X10(3)/MCL (ref 4.5–11.5)

## 2022-08-12 PROCEDURE — 96402 CHEMO HORMON ANTINEOPL SQ/IM: CPT

## 2022-08-12 PROCEDURE — 36415 COLL VENOUS BLD VENIPUNCTURE: CPT | Performed by: INTERNAL MEDICINE

## 2022-08-12 PROCEDURE — 63600175 PHARM REV CODE 636 W HCPCS: Performed by: INTERNAL MEDICINE

## 2022-08-12 PROCEDURE — 85025 COMPLETE CBC W/AUTO DIFF WBC: CPT | Performed by: INTERNAL MEDICINE

## 2022-08-12 PROCEDURE — 25000003 PHARM REV CODE 250: Performed by: INTERNAL MEDICINE

## 2022-08-12 RX ORDER — DIPHENHYDRAMINE HCL 25 MG
25 CAPSULE ORAL
Status: COMPLETED | OUTPATIENT
Start: 2022-08-12 | End: 2022-08-12

## 2022-08-12 RX ORDER — DEXAMETHASONE 4 MG/1
12 TABLET ORAL
Status: COMPLETED | OUTPATIENT
Start: 2022-08-12 | End: 2022-08-12

## 2022-08-12 RX ORDER — ACETAMINOPHEN 325 MG/1
650 TABLET ORAL
Status: COMPLETED | OUTPATIENT
Start: 2022-08-12 | End: 2022-08-12

## 2022-08-12 RX ADMIN — DIPHENHYDRAMINE HYDROCHLORIDE 25 MG: 25 CAPSULE ORAL at 01:08

## 2022-08-12 RX ADMIN — ACETAMINOPHEN 650 MG: 325 TABLET, FILM COATED ORAL at 01:08

## 2022-08-12 RX ADMIN — DEXAMETHASONE 12 MG: 4 TABLET ORAL at 01:08

## 2022-08-12 RX ADMIN — DARATUMUMAB AND HYALURONIDASE-FIHJ (HUMAN RECOMBINANT) 1800 MG: 1800; 30000 INJECTION SUBCUTANEOUS at 01:08

## 2022-08-17 DIAGNOSIS — I48.91 ATRIAL FIBRILLATION AND FLUTTER: ICD-10-CM

## 2022-08-17 DIAGNOSIS — I48.92 ATRIAL FIBRILLATION AND FLUTTER: ICD-10-CM

## 2022-08-17 DIAGNOSIS — I10 HYPERTENSION, UNSPECIFIED TYPE: Primary | ICD-10-CM

## 2022-08-18 ENCOUNTER — TELEPHONE (OUTPATIENT)
Dept: INTERNAL MEDICINE | Facility: CLINIC | Age: 57
End: 2022-08-18
Payer: COMMERCIAL

## 2022-08-18 NOTE — TELEPHONE ENCOUNTER
----- Message from Ronda Saucedo sent at 8/18/2022  3:20 PM CDT -----  Regarding: cardiology referral  Patient said she was supposed to be referred to cardiologist about a month ago and she has not heard anything back. Call her at [phone]

## 2022-08-22 ENCOUNTER — TELEPHONE (OUTPATIENT)
Dept: INFUSION THERAPY | Facility: HOSPITAL | Age: 57
End: 2022-08-22
Payer: COMMERCIAL

## 2022-08-23 ENCOUNTER — TELEPHONE (OUTPATIENT)
Dept: INTERNAL MEDICINE | Facility: CLINIC | Age: 57
End: 2022-08-23
Payer: COMMERCIAL

## 2022-08-23 NOTE — TELEPHONE ENCOUNTER
----- Message from Gladys Velazquez sent at 8/23/2022 11:16 AM CDT -----  Regarding: REFERRAL  ON LAST VISIT A DISCUSSION WAS MADE ABOUT MAKING HER A REFERRAL TO DR. GUERRERO.      PT CALLED [facility] OFFICE AND WAS TOLD NO REFERRAL WAS MADE.      PLEASE SEND AS SOON AS POSSIBLE

## 2022-08-25 ENCOUNTER — LAB VISIT (OUTPATIENT)
Dept: LAB | Facility: HOSPITAL | Age: 57
End: 2022-08-25
Attending: INTERNAL MEDICINE
Payer: MEDICARE

## 2022-08-25 ENCOUNTER — INFUSION (OUTPATIENT)
Dept: INFUSION THERAPY | Facility: HOSPITAL | Age: 57
End: 2022-08-25
Attending: INTERNAL MEDICINE
Payer: MEDICARE

## 2022-08-25 VITALS
OXYGEN SATURATION: 93 % | RESPIRATION RATE: 16 BRPM | DIASTOLIC BLOOD PRESSURE: 67 MMHG | TEMPERATURE: 98 F | WEIGHT: 174 LBS | BODY MASS INDEX: 30.82 KG/M2 | SYSTOLIC BLOOD PRESSURE: 113 MMHG | HEART RATE: 101 BPM

## 2022-08-25 DIAGNOSIS — E85.9 AMYLOIDOSIS, UNSPECIFIED TYPE: ICD-10-CM

## 2022-08-25 DIAGNOSIS — D63.8 ANEMIA OF CHRONIC DISEASE: ICD-10-CM

## 2022-08-25 DIAGNOSIS — C90.00 MULTIPLE MYELOMA, REMISSION STATUS UNSPECIFIED: ICD-10-CM

## 2022-08-25 DIAGNOSIS — C50.819 OVERLAPPING MALIGNANT NEOPLASM OF FEMALE BREAST, UNSPECIFIED ESTROGEN RECEPTOR STATUS, UNSPECIFIED LATERALITY: ICD-10-CM

## 2022-08-25 DIAGNOSIS — M85.80 OSTEOPENIA, UNSPECIFIED LOCATION: ICD-10-CM

## 2022-08-25 DIAGNOSIS — E85.9 AMYLOIDOSIS, UNSPECIFIED TYPE: Primary | ICD-10-CM

## 2022-08-25 DIAGNOSIS — Z79.811 AROMATASE INHIBITOR USE: ICD-10-CM

## 2022-08-25 LAB
ALBUMIN SERPL-MCNC: 3.2 GM/DL (ref 3.5–5)
ALBUMIN/GLOB SERPL: 1 RATIO (ref 1.1–2)
ALP SERPL-CCNC: 93 UNIT/L (ref 40–150)
ALT SERPL-CCNC: 18 UNIT/L (ref 0–55)
AST SERPL-CCNC: 24 UNIT/L (ref 5–34)
BASOPHILS # BLD AUTO: 0.03 X10(3)/MCL (ref 0–0.2)
BASOPHILS NFR BLD AUTO: 0.9 %
BILIRUBIN DIRECT+TOT PNL SERPL-MCNC: 0.2 MG/DL
BUN SERPL-MCNC: 24.7 MG/DL (ref 9.8–20.1)
CALCIUM SERPL-MCNC: 9.2 MG/DL (ref 8.4–10.2)
CHLORIDE SERPL-SCNC: 105 MMOL/L (ref 98–107)
CO2 SERPL-SCNC: 25 MMOL/L (ref 22–29)
CREAT SERPL-MCNC: 2.31 MG/DL (ref 0.55–1.02)
EOSINOPHIL # BLD AUTO: 0.13 X10(3)/MCL (ref 0–0.9)
EOSINOPHIL NFR BLD AUTO: 3.9 %
ERYTHROCYTE [DISTWIDTH] IN BLOOD BY AUTOMATED COUNT: 14.2 % (ref 11.5–17)
GFR SERPLBLD CREATININE-BSD FMLA CKD-EPI: 24 MLS/MIN/1.73/M2
GLOBULIN SER-MCNC: 3.1 GM/DL (ref 2.4–3.5)
GLUCOSE SERPL-MCNC: 91 MG/DL (ref 74–100)
HCT VFR BLD AUTO: 34 % (ref 37–47)
HGB BLD-MCNC: 10.8 GM/DL (ref 12–16)
IMM GRANULOCYTES # BLD AUTO: 0.01 X10(3)/MCL (ref 0–0.04)
IMM GRANULOCYTES NFR BLD AUTO: 0.3 %
LYMPHOCYTES # BLD AUTO: 0.57 X10(3)/MCL (ref 0.6–4.6)
LYMPHOCYTES NFR BLD AUTO: 16.9 %
MCH RBC QN AUTO: 29.9 PG (ref 27–31)
MCHC RBC AUTO-ENTMCNC: 31.8 MG/DL (ref 33–36)
MCV RBC AUTO: 94.2 FL (ref 80–94)
MONOCYTES # BLD AUTO: 0.46 X10(3)/MCL (ref 0.1–1.3)
MONOCYTES NFR BLD AUTO: 13.6 %
NEUTROPHILS # BLD AUTO: 2.2 X10(3)/MCL (ref 2.1–9.2)
NEUTROPHILS NFR BLD AUTO: 64.4 %
PLATELET # BLD AUTO: 243 X10(3)/MCL (ref 130–400)
PMV BLD AUTO: 9.9 FL (ref 7.4–10.4)
POTASSIUM SERPL-SCNC: 3.5 MMOL/L (ref 3.5–5.1)
PROT SERPL-MCNC: 6.3 GM/DL (ref 6.4–8.3)
RBC # BLD AUTO: 3.61 X10(6)/MCL (ref 4.2–5.4)
SODIUM SERPL-SCNC: 141 MMOL/L (ref 136–145)
WBC # SPEC AUTO: 3.4 X10(3)/MCL (ref 4.5–11.5)

## 2022-08-25 PROCEDURE — 36415 COLL VENOUS BLD VENIPUNCTURE: CPT | Performed by: INTERNAL MEDICINE

## 2022-08-25 PROCEDURE — 96401 CHEMO ANTI-NEOPL SQ/IM: CPT

## 2022-08-25 PROCEDURE — 84165 PROTEIN E-PHORESIS SERUM: CPT

## 2022-08-25 PROCEDURE — 80053 COMPREHEN METABOLIC PANEL: CPT

## 2022-08-25 PROCEDURE — 85025 COMPLETE CBC W/AUTO DIFF WBC: CPT

## 2022-08-25 PROCEDURE — 63600175 PHARM REV CODE 636 W HCPCS: Performed by: INTERNAL MEDICINE

## 2022-08-25 PROCEDURE — 36415 COLL VENOUS BLD VENIPUNCTURE: CPT

## 2022-08-25 PROCEDURE — 25000003 PHARM REV CODE 250: Performed by: INTERNAL MEDICINE

## 2022-08-25 RX ORDER — SODIUM CHLORIDE 0.9 % (FLUSH) 0.9 %
10 SYRINGE (ML) INJECTION
Status: DISCONTINUED | OUTPATIENT
Start: 2022-08-25 | End: 2022-08-25 | Stop reason: HOSPADM

## 2022-08-25 RX ORDER — EPINEPHRINE 0.3 MG/.3ML
0.3 INJECTION SUBCUTANEOUS ONCE AS NEEDED
Status: CANCELLED | OUTPATIENT
Start: 2022-08-25

## 2022-08-25 RX ORDER — ACETAMINOPHEN 325 MG/1
650 TABLET ORAL
Status: CANCELLED | OUTPATIENT
Start: 2022-09-21

## 2022-08-25 RX ORDER — DEXAMETHASONE 4 MG/1
12 TABLET ORAL
Status: CANCELLED | OUTPATIENT
Start: 2022-08-25

## 2022-08-25 RX ORDER — DIPHENHYDRAMINE HYDROCHLORIDE 50 MG/ML
50 INJECTION INTRAMUSCULAR; INTRAVENOUS ONCE AS NEEDED
Status: CANCELLED | OUTPATIENT
Start: 2022-08-25

## 2022-08-25 RX ORDER — HEPARIN 100 UNIT/ML
500 SYRINGE INTRAVENOUS
Status: DISCONTINUED | OUTPATIENT
Start: 2022-08-25 | End: 2022-08-25 | Stop reason: HOSPADM

## 2022-08-25 RX ORDER — DEXAMETHASONE 4 MG/1
12 TABLET ORAL
Status: CANCELLED | OUTPATIENT
Start: 2022-09-08

## 2022-08-25 RX ORDER — EPINEPHRINE 0.3 MG/.3ML
0.3 INJECTION SUBCUTANEOUS ONCE AS NEEDED
Status: DISCONTINUED | OUTPATIENT
Start: 2022-08-25 | End: 2022-08-25 | Stop reason: HOSPADM

## 2022-08-25 RX ORDER — SODIUM CHLORIDE 0.9 % (FLUSH) 0.9 %
10 SYRINGE (ML) INJECTION
Status: CANCELLED | OUTPATIENT
Start: 2022-09-01

## 2022-08-25 RX ORDER — ACETAMINOPHEN 325 MG/1
650 TABLET ORAL
Status: COMPLETED | OUTPATIENT
Start: 2022-08-25 | End: 2022-08-25

## 2022-08-25 RX ORDER — HEPARIN 100 UNIT/ML
500 SYRINGE INTRAVENOUS
Status: CANCELLED | OUTPATIENT
Start: 2022-09-21

## 2022-08-25 RX ORDER — ACETAMINOPHEN 325 MG/1
650 TABLET ORAL
Status: CANCELLED | OUTPATIENT
Start: 2022-09-01

## 2022-08-25 RX ORDER — SODIUM CHLORIDE 0.9 % (FLUSH) 0.9 %
10 SYRINGE (ML) INJECTION
Status: CANCELLED | OUTPATIENT
Start: 2022-08-25

## 2022-08-25 RX ORDER — DEXAMETHASONE 4 MG/1
12 TABLET ORAL
Status: CANCELLED | OUTPATIENT
Start: 2022-09-15

## 2022-08-25 RX ORDER — DIPHENHYDRAMINE HYDROCHLORIDE 50 MG/ML
50 INJECTION INTRAMUSCULAR; INTRAVENOUS ONCE AS NEEDED
Status: DISCONTINUED | OUTPATIENT
Start: 2022-08-25 | End: 2022-08-25 | Stop reason: HOSPADM

## 2022-08-25 RX ORDER — EPINEPHRINE 0.3 MG/.3ML
0.3 INJECTION SUBCUTANEOUS ONCE AS NEEDED
Status: CANCELLED | OUTPATIENT
Start: 2022-09-08

## 2022-08-25 RX ORDER — DEXAMETHASONE 4 MG/1
12 TABLET ORAL
Status: CANCELLED | OUTPATIENT
Start: 2022-09-01

## 2022-08-25 RX ORDER — DIPHENHYDRAMINE HYDROCHLORIDE 50 MG/ML
50 INJECTION INTRAMUSCULAR; INTRAVENOUS ONCE AS NEEDED
Status: CANCELLED | OUTPATIENT
Start: 2022-09-21

## 2022-08-25 RX ORDER — ACETAMINOPHEN 325 MG/1
650 TABLET ORAL
Status: CANCELLED | OUTPATIENT
Start: 2022-08-25

## 2022-08-25 RX ORDER — HEPARIN 100 UNIT/ML
500 SYRINGE INTRAVENOUS
Status: CANCELLED | OUTPATIENT
Start: 2022-09-01

## 2022-08-25 RX ORDER — HEPARIN 100 UNIT/ML
500 SYRINGE INTRAVENOUS
Status: CANCELLED | OUTPATIENT
Start: 2022-08-25

## 2022-08-25 RX ORDER — ACETAMINOPHEN 325 MG/1
650 TABLET ORAL
Status: CANCELLED | OUTPATIENT
Start: 2022-09-08

## 2022-08-25 RX ORDER — DEXAMETHASONE 4 MG/1
12 TABLET ORAL
Status: COMPLETED | OUTPATIENT
Start: 2022-08-25 | End: 2022-08-25

## 2022-08-25 RX ORDER — DIPHENHYDRAMINE HCL 25 MG
25 CAPSULE ORAL
Status: CANCELLED | OUTPATIENT
Start: 2022-09-21

## 2022-08-25 RX ORDER — SODIUM CHLORIDE 0.9 % (FLUSH) 0.9 %
10 SYRINGE (ML) INJECTION
Status: CANCELLED | OUTPATIENT
Start: 2022-09-08

## 2022-08-25 RX ORDER — EPINEPHRINE 0.3 MG/.3ML
0.3 INJECTION SUBCUTANEOUS ONCE AS NEEDED
Status: CANCELLED | OUTPATIENT
Start: 2022-09-01

## 2022-08-25 RX ORDER — DIPHENHYDRAMINE HYDROCHLORIDE 50 MG/ML
50 INJECTION INTRAMUSCULAR; INTRAVENOUS ONCE AS NEEDED
Status: CANCELLED | OUTPATIENT
Start: 2022-09-08

## 2022-08-25 RX ORDER — DIPHENHYDRAMINE HCL 25 MG
25 CAPSULE ORAL
Status: CANCELLED | OUTPATIENT
Start: 2022-09-08

## 2022-08-25 RX ORDER — DIPHENHYDRAMINE HCL 25 MG
25 CAPSULE ORAL
Status: COMPLETED | OUTPATIENT
Start: 2022-08-25 | End: 2022-08-25

## 2022-08-25 RX ORDER — DIPHENHYDRAMINE HCL 25 MG
25 CAPSULE ORAL
Status: CANCELLED | OUTPATIENT
Start: 2022-08-25

## 2022-08-25 RX ORDER — DIPHENHYDRAMINE HYDROCHLORIDE 50 MG/ML
50 INJECTION INTRAMUSCULAR; INTRAVENOUS ONCE AS NEEDED
Status: CANCELLED | OUTPATIENT
Start: 2022-09-01

## 2022-08-25 RX ORDER — EPINEPHRINE 0.3 MG/.3ML
0.3 INJECTION SUBCUTANEOUS ONCE AS NEEDED
Status: CANCELLED | OUTPATIENT
Start: 2022-09-21

## 2022-08-25 RX ORDER — HEPARIN 100 UNIT/ML
500 SYRINGE INTRAVENOUS
Status: CANCELLED | OUTPATIENT
Start: 2022-09-08

## 2022-08-25 RX ORDER — DIPHENHYDRAMINE HCL 25 MG
25 CAPSULE ORAL
Status: CANCELLED | OUTPATIENT
Start: 2022-09-01

## 2022-08-25 RX ORDER — SODIUM CHLORIDE 0.9 % (FLUSH) 0.9 %
10 SYRINGE (ML) INJECTION
Status: CANCELLED | OUTPATIENT
Start: 2022-09-21

## 2022-08-25 RX ADMIN — DIPHENHYDRAMINE HYDROCHLORIDE 25 MG: 25 CAPSULE ORAL at 04:08

## 2022-08-25 RX ADMIN — DEXAMETHASONE 12 MG: 4 TABLET ORAL at 04:08

## 2022-08-25 RX ADMIN — ACETAMINOPHEN 650 MG: 325 TABLET, FILM COATED ORAL at 04:08

## 2022-08-25 RX ADMIN — DARATUMUMAB AND HYALURONIDASE-FIHJ (HUMAN RECOMBINANT) 1800 MG: 1800; 30000 INJECTION SUBCUTANEOUS at 04:08

## 2022-08-25 NOTE — NURSING
"Ambulatory with walker  Speech clear aaox4  States "has a cold"  Coughing  Here for chemo    Chips and coke provided    "

## 2022-08-26 LAB
ALBUMIN % SPEP (OHS): 50.6 (ref 48.1–59.5)
ALBUMIN SERPL-MCNC: 3.1 GM/DL (ref 3.5–5)
ALBUMIN/GLOB SERPL: 1 RATIO (ref 1.1–2)
ALPHA 1 GLOB (OHS): 0.27 GM/DL (ref 0–0.4)
ALPHA 1 GLOB% (OHS): 4.24 (ref 2.3–4.9)
ALPHA 2 GLOB % (OHS): 19.56 (ref 6.9–13)
ALPHA 2 GLOB (OHS): 1.23 GM/DL (ref 0.4–1)
BETA GLOB (OHS): 0.92 GM/DL (ref 0.7–1.3)
BETA GLOB% (OHS): 14.53 (ref 13.8–19.7)
GAMMA GLOBULIN % (OHS): 11.07 (ref 10.1–21.9)
GAMMA GLOBULIN (OHS): 0.7 GM/DL (ref 0.4–1.8)
GLOBULIN SER-MCNC: 3.2 GM/DL (ref 2.4–3.5)
M SPIKE % (OHS): ABNORMAL
M SPIKE (OHS): ABNORMAL
PATH REV: NORMAL
PROT SERPL-MCNC: 6.3 GM/DL (ref 6.4–8.3)

## 2022-09-01 DIAGNOSIS — D63.8 ANEMIA OF CHRONIC DISEASE: Primary | ICD-10-CM

## 2022-09-01 DIAGNOSIS — C90.00 MULTIPLE MYELOMA, REMISSION STATUS UNSPECIFIED: ICD-10-CM

## 2022-09-02 ENCOUNTER — TELEPHONE (OUTPATIENT)
Dept: HEMATOLOGY/ONCOLOGY | Facility: CLINIC | Age: 57
End: 2022-09-02
Payer: COMMERCIAL

## 2022-09-02 NOTE — TELEPHONE ENCOUNTER
Late entry for 9/1/2022:    I called patient because she had an appt to be evaluated prior to her treatment as she has missed some of the appts because she was not feeling good.    She called us to see if can do via Telemedicine but her request was denied as she has missed/no show  few times. She was due for treatment same day as well.  (9/1/2022)  She wanted her treatment next day 9/2/2022 but infusion was booked due to holiday weekend.    She told me that she can not come because she has an appt at 1:30 with people that will put counter tops in her house and can not come. I asked if her sister can help her in that regard and she comes for her visit but she refused.    Will reschedule for 9/6/2022.    She knows that if she miss appts. She will need to be seen and physical exam done before any further treatment as we need to be sure she is OK to receive Tx. She verbalized understanding.    Natalie Meyers MD.

## 2022-09-06 ENCOUNTER — OFFICE VISIT (OUTPATIENT)
Dept: HEMATOLOGY/ONCOLOGY | Facility: CLINIC | Age: 57
End: 2022-09-06
Payer: MEDICARE

## 2022-09-06 ENCOUNTER — INFUSION (OUTPATIENT)
Dept: INFUSION THERAPY | Facility: HOSPITAL | Age: 57
End: 2022-09-06
Attending: NURSE PRACTITIONER
Payer: MEDICARE

## 2022-09-06 VITALS
HEIGHT: 65 IN | SYSTOLIC BLOOD PRESSURE: 130 MMHG | BODY MASS INDEX: 28.49 KG/M2 | HEART RATE: 98 BPM | OXYGEN SATURATION: 97 % | DIASTOLIC BLOOD PRESSURE: 81 MMHG | TEMPERATURE: 99 F | WEIGHT: 171 LBS

## 2022-09-06 DIAGNOSIS — C90.00 MULTIPLE MYELOMA NOT HAVING ACHIEVED REMISSION: Primary | ICD-10-CM

## 2022-09-06 DIAGNOSIS — C90.00 MULTIPLE MYELOMA NOT HAVING ACHIEVED REMISSION: ICD-10-CM

## 2022-09-06 DIAGNOSIS — C50.819 OVERLAPPING MALIGNANT NEOPLASM OF FEMALE BREAST, UNSPECIFIED ESTROGEN RECEPTOR STATUS, UNSPECIFIED LATERALITY: ICD-10-CM

## 2022-09-06 DIAGNOSIS — E85.9 AMYLOIDOSIS, UNSPECIFIED TYPE: Primary | ICD-10-CM

## 2022-09-06 PROCEDURE — 99215 OFFICE O/P EST HI 40 MIN: CPT | Mod: S$PBB,,, | Performed by: NURSE PRACTITIONER

## 2022-09-06 PROCEDURE — 25000003 PHARM REV CODE 250: Performed by: INTERNAL MEDICINE

## 2022-09-06 PROCEDURE — 96401 CHEMO ANTI-NEOPL SQ/IM: CPT

## 2022-09-06 PROCEDURE — 63600175 PHARM REV CODE 636 W HCPCS: Mod: TB | Performed by: INTERNAL MEDICINE

## 2022-09-06 PROCEDURE — 99215 PR OFFICE/OUTPT VISIT, EST, LEVL V, 40-54 MIN: ICD-10-PCS | Mod: S$PBB,,, | Performed by: NURSE PRACTITIONER

## 2022-09-06 PROCEDURE — 99999 PR PBB SHADOW E&M-EST. PATIENT-LVL V: CPT | Mod: PBBFAC,,, | Performed by: NURSE PRACTITIONER

## 2022-09-06 PROCEDURE — 99999 PR PBB SHADOW E&M-EST. PATIENT-LVL V: ICD-10-PCS | Mod: PBBFAC,,, | Performed by: NURSE PRACTITIONER

## 2022-09-06 PROCEDURE — 99215 OFFICE O/P EST HI 40 MIN: CPT | Mod: PBBFAC,25 | Performed by: NURSE PRACTITIONER

## 2022-09-06 PROCEDURE — 63600175 PHARM REV CODE 636 W HCPCS: Performed by: NURSE PRACTITIONER

## 2022-09-06 RX ORDER — DEXAMETHASONE 4 MG/1
20 TABLET ORAL
Status: CANCELLED | OUTPATIENT
Start: 2022-09-06

## 2022-09-06 RX ORDER — DIPHENHYDRAMINE HCL 25 MG
25 CAPSULE ORAL
Status: COMPLETED | OUTPATIENT
Start: 2022-09-06 | End: 2022-09-06

## 2022-09-06 RX ORDER — ACETAMINOPHEN 325 MG/1
650 TABLET ORAL
Status: COMPLETED | OUTPATIENT
Start: 2022-09-06 | End: 2022-09-06

## 2022-09-06 RX ORDER — DEXAMETHASONE 4 MG/1
20 TABLET ORAL
Status: COMPLETED | OUTPATIENT
Start: 2022-09-06 | End: 2022-09-06

## 2022-09-06 RX ORDER — SODIUM CHLORIDE 0.9 % (FLUSH) 0.9 %
10 SYRINGE (ML) INJECTION
Status: DISCONTINUED | OUTPATIENT
Start: 2022-09-06 | End: 2022-09-06 | Stop reason: HOSPADM

## 2022-09-06 RX ORDER — DIPHENHYDRAMINE HYDROCHLORIDE 50 MG/ML
50 INJECTION INTRAMUSCULAR; INTRAVENOUS ONCE AS NEEDED
Status: DISCONTINUED | OUTPATIENT
Start: 2022-09-06 | End: 2022-09-06 | Stop reason: HOSPADM

## 2022-09-06 RX ORDER — EPINEPHRINE 0.3 MG/.3ML
0.3 INJECTION SUBCUTANEOUS ONCE AS NEEDED
Status: DISCONTINUED | OUTPATIENT
Start: 2022-09-06 | End: 2022-09-06 | Stop reason: HOSPADM

## 2022-09-06 RX ORDER — HEPARIN 100 UNIT/ML
500 SYRINGE INTRAVENOUS
Status: DISCONTINUED | OUTPATIENT
Start: 2022-09-06 | End: 2022-09-06 | Stop reason: HOSPADM

## 2022-09-06 RX ADMIN — DARATUMUMAB AND HYALURONIDASE-FIHJ (HUMAN RECOMBINANT) 1800 MG: 1800; 30000 INJECTION SUBCUTANEOUS at 12:09

## 2022-09-06 RX ADMIN — DEXAMETHASONE 20 MG: 4 TABLET ORAL at 12:09

## 2022-09-06 RX ADMIN — DIPHENHYDRAMINE HYDROCHLORIDE 25 MG: 25 CAPSULE ORAL at 12:09

## 2022-09-06 RX ADMIN — ACETAMINOPHEN 650 MG: 325 TABLET, FILM COATED ORAL at 12:09

## 2022-09-06 NOTE — PROGRESS NOTES
HEMATOLOGY/ONCOLOGY OFFICE CLINIC VISIT    Visit Information:    NO SHOW  08/21/2018--> Rescheduled                              11/05/2019--> No Show/Reschedule                           06/24/2020--> No Show/Reschedule                           09/24/2020--> No Show/Reschedule                           03/15/2021--> No Show/Reschedule                           03/23/2021--> No Show/Reschedule                           04/13/2021--> No Show                           05/27/2021--> No Show                           08/19/2021--> No Show                           08/26/2021--> No Show/Rescheduled                           04/26/2022--> No Show  Referring Physician: DR. Cardona  PCP: Dr. Chacon  GI: Dr. Johnny Bhardwaj  Rheumatologist: Dr. Luis Rea  Immunologist: Dr. Daniel Nava  ENT: Dr. Brice Williamson  [facility] Pain management: Dr.Chai [facility] Transplant: Dr. Adrian Naylor [facility] Med Onc: Dr. Zulema Rivera Breast Surg Onc: Dr.DeSnyder LONG [place]: Dr. 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 Sx risks do not outweigh benefits. Pain meds given and encouraged Physical Therpay  8) Amyloid plaques, evaluated by Derm at [facility], recommend watchful waiting. Patient to follow up 12/2017  9) Stage IA grade 3, ER+, HER2 BERNA +,  IDC/DCIS of the left breast --- 2/7/18 at [facility]; s/p lumpectomy with SLNB 4/12, Grade 2 IDC measuring  4mm with second focus of microinvasive carcinoma measuring  0.4mm .IG DCIS, 0.3mm to posterior margin; 2 SLN negative for malignancy    10) Progressive swelling of R lower extremity--- US NIVA done 2/19/18 negative for evidence of DVT  11). Paroxysmal Afib (2/18/18) diagnosed at [facility]; ECHO noted EF 58%  12. Mild CKD with GFR 55 - managed per [facility] nephrology  13). Pulmonary nodules noted on pre-operative chest CT scan (3/12/18) noted new bilateral pulmonary nodules are nonspecific -- seen by Pulmonology at [facility] (3/21/18) thought to be inflammatory/infectious in nature, repeat Chest CT 6/28/18 noted resolution of nodules  14). Treatment induced neutropenia           Present treatment:  Maintenance Revlimid 5mg PO QOD, started 02/16/17-- was held for a few months while undergoing treatment for her breast cancer 02/18-05/18; Restarted 6/15/18   Dexamethasone 20 mg po weekly added early July 2017--> reduced to 12 mg PO weekly October 4, 2017---> increased to 16mg PO weekly 2/15/18---restarted 6/15/18 --> 20 mg weekly 7//8/2022  Darzalex 7/8/2022-present  Femara 2.5 mg PO daily   Eliquis 2.5mg PO BID        Treatment/Oncology history:  1) CyBorD x5 cycles 09/28/15-12/24/15  2) Autologous stem cell transplant 02/08/16, done at [facility]  3) Exploratory laparotomy with subtotal colectomy and end ileostomy done August 2, 2016--pathology specimen showed advanced colonic amyloidosis extensive involving the muscular wall submucosa and mucosa.  Appendix also involvement by amyloidosis with fibrous obliteration of the distal lumen.  4) Maintenance therapy with Revlimid 5mg-started 06/01/16--Discontinued shortly after 2/2 cytopenias  5) Left breast lumpectomy with SLNB at [facility] 4/12/18  6) Left partial breast RT x10 fractions  5/21/18-6/4/1    Plan of care:       Imaging:      Pathology:    CLINICAL HISTORY:       Patient: Ninfa Candelario is a 56 y.o. female.  Ms. Cabrera Joseph was admitted on 08/16/15 for ileus versus partial  SBO. CT A/P done 08/17/15 showed sigmoid colitis and a zone of transition at the junction of the descending and sigmoid colon which could indicate some degree of obstruction and an obstructing mass cannot be excluded. Numerous skeletal lytic lesions noted. CT chest done 08/19/15 showed Multiple skeletal lytic lesions involving the thoracic spine, left rib sternum consistent with either metastases or multiple myeloma. There is no evidence of pulmonary or mediastinal mass. Dr. Farr was consulted for possible MM/anemia.     Nuclear Bone scan done 8/20/15 showed mild to moderate increased activity in left rib and right second rib which correlates with fractures seen on CT.  Skeletal survey done 8/20/15showed lytic lesions in the calvarium and probably a lytic lesion in the left scapula. Lytic areas in the spine and pelvis seen on recent CT are not well defined on skeletal survey. Work up labs done 08/20/15: Negative for sickle cell and Thalessemia (reported history of thalessemia). Iron 54, Transferrin 118.0, Iron sat 34.6%, Folate 26.5, Vit B12 1,779  Peripheral smear resulted slightly macrocytic normochromic anemia without signifcant anisocytosis may reflect early B12/folate deficiency or marrow dysfunction, among others. No immature myeloid cells or blasts. Platlets adequate. Beta 2 mircoglobulin = 3.2.  SPEP/NADIA: M-spike in the beta region. The monoclonal protein peak accounts for 1.13 g/dL. Hypogammaglobulinemia. NADIA pattern shows the presence of a free lambda light chain monoclonal protein with additional faint band in IgA.  All immunoglobulin levels decreased. IgA=26, IgG=307, IgM<5.  Kappa Qnt 0.16, Lambda Qnt 4600.00, Ratio <0.01.  24hr Urine for Bence Mendez: Kappa 2.75, Lambda 1250.00, Ratio <0.01. NADIA: Urine is POSITIVE for monoclonal Free Lambda Light chains     Patient then opted to go to [facility] where she underwent a bone marrow biopsy on 09/18/15. BMBx showed 80% plasma cells, 13p deletion and  normal karyotype. She underwent fat pad biopsy which came back positive for Congo red consistent with amyloidosis. Cardiac workup revealed no amyloid deposition within the heart.  PET/CT and skeletal survey done September 18, 2015 showed multiple lytic osseous lesions  The patient was started on Velcade while at [facility] with the plan to transition to autologous stem cell transplantation. They asked if we could give her could continue treatment here.   She completed CyborD x5 cycles on 12/24/15     Patient admitted 01/06/16 for colitis and C. Diff. Pt treated with Flagyl. Discharged home 01/08/16     Repeat BMBx done at [facility] 01/2016 did not show any morphologic or immnophenotypic evidence of plasama cell neoplasm. Patient underwent Autologous stem cell transplant with Busulfan and melphalan on 02/08/16 at [facility]. The only complication was recurrent C.diff infection for which she completed 10 days of Fidaxomycin.     Follow-up bone marrow biopsy done at [facility] done 5/16/16 showed cellular 30-40% bone marrow with trilineage hematopoiesis. No morphologic or immunophenotypic support for plasma cell neoplasm. Started maintenance Revlimid 5mg. Unable to continue therapy due to cytopenias.     On 08/02/16 patient underwent  Exploratory laparotomy with subtotal colectomy and end ileostomy for what was thought to be toxic megacolon. Pathology showed extensive advanced colonic amyloidosis involving the muscular wall, submucosa and mucosa: With the appendix also involved with amyloidosis.  Positive lambda light chains were noted.     Follow-up at [facility] 8/31/16, Per Dr. Adrian Naylor, 6 months post autologous transplant. She has engrafted. Based on the latest restaging she is near complete remission with possible IgA lambda on serum immunofixation. Patient was instructed to discontinue prophylactic antibiotics. She received her 1st series of immunizations while at [facility]. Patient had a bilateral venous ultrasound to  rule out DVT, negative B/L. In regards to amyloidosis the patient feels that her tongue is smaller in size and her BNP has come down some.  Patient had a follow-up appointment at [facility] November 30, 2016.  Dr. Parnell documented the patient's free lambda light chain remains at a lower level.  Therefore in light of her recent surgery they will continue with observation only.  The plan to see the patient back in 2-3 months with repeat restaging labs.  They recommended regular CBC checks to monitor anemia.  Patient returned to [facility] in December 2016 to meet with dermatology for numerous papillary lesions on eyelids, chest and posterior neck consistent with amyloid deposits. Recommendations were for watchful waiting.  Patient is less than 1 year out from bone marrow transplant and further improvement can be expected.  She will see the patient back in 1 year to discuss possible surgical resection of the plaques especially around the eyelid region.  Rogaine recommended for persistent hair loss. Retin-A recommended for acne vulgaris.  Patient returned to [facility] on 2/8/2017 for neurosurgery consult for chronic low back pain and difficulty walking.  Imaging showed extensive DJD with discovertebral bulges and thickening of the spinal laminar tissues creating spinal stenosis throughout, but greatest at L2/L3.  Neurosurgery felt that surgery risks outweighed the benefits.  Patient was prescribed pain medicine and encouraged to participate in physical therapy.  Patient also met with Dr. Parnell on 02/08/17, who noted an elevation in free light chains (kappa 52.60/lambda 27.77/ratio 1.89).  Recommendation is to resume maintenance therapy with Revlimid at a low dose of 5 mg every other day.  Patient went back to [facility] first week of April 2017.  I do not have these notes.  Reportedly she was told to continue on every other day Revlimid at 5 mg.  She reportedly had repeat myeloma labs done as well.  Again I do not  have these results.  Patient went back to [facility] and saw Dr. Parnell June 29, 2017.  Myeloma labs were done with serum protein electrophoresis showing irregularity in the fast gamma region.  IgG of 1291.  Free kappa light chains of 84.6 with lambda light chains of 66.9.  Beta-2 microglobulin of 4.5  CT scan of lumbar spine showed multiple lytic lesions once again in the lumbar spine and bony pelvis.  Ultrasound of the left leg showed no evidence of DVT.  It was recommended based on the slight increase in her And lambda light chains to start weekly dexamethasone 20 mg p.o. weekly.  Follow-up visit and labs at [facility] on September 29, 2017 with Dr. Parnell.  Repeat beta-2 microglobulin came back at 2.4.  Serum IgG of 761, IgA 117 and IgM of 29.  Serum free kappa light chains of 24.9 and lambda of 23.5.  Counts were stable with hemoglobin of 11.1, WBC 4.9 and platelets of 210,000.  Recommendations were for continued Revlimid every other day with weekly dexamethasone along with aspirin for DVT prophylaxis.  Bilateral diagnostic MMG 12/19/17  noted 1.6cm coarse heterogeneous calcifications in posterior region of L breast at 3 o'clock position. Patient was scheduled for FNA which confirmed ID grade 3, single focus measuring 1.7cm with 2nd focus microinvasion DCIS grade 2 measuring 0.3cm. Left axillary LN biopsy showed no evidence of metastatic carcinoma. Complete staging: Stage IA, grade 3 ER+, Her 2 Niki + IDC/DCIS of left breast. Ki-67 <17%.  Repeat myeloma labs showed rise in free lambda light chains noted at 68.69, kappa light chains at 27.22,  beta 2 microglobulin came back at 2.9. Serum protein electrophoresis showed no definitive evidence of an M protein peak. The pattern is consistent with an acute inflammatory reaction. Recommendations were made to maintain Revlimid at current dose of 5mg every other day to be taken continuously increase weekly dexamethasone to 16 mg.   Patient seen at [facility] with  tentative plan for L breast lumpectomy on 3/13/18. 2/16/18- Prior to surgery she was seen by genetic counselor who ran genetic testing per patient reques, all of which were negative. She was then seen by cardiology at [facility] 2/16/18 for further evaluation of persistent bilateral lower extremity swelling-ECHO noted EF of 58%; diagnosed with paroxysmal atrial fibrillation and instructed to begin daily ASA. During preoperative asssessment per Rad/Onc 3/12/18, corresponding chest CT noted new bilateral pulmonary nodules concerning for metastatic disease- surgery was subsequently postponed pending pulmonary evaluation. Seen by Pulmonology on 3/21/18, PFTs within normal limits and cleared patient for surgery with recommended close CT follow up of nodules in 3 months. 3/21/18 seen by nephrology for management of mild CKD (GFR 55)-cleared for surgery with recommended follow up in 3 months. Left breast lumpectomy and SLNB performed per Dr. Washburn on 4/12/18- plan for follow up in clinic on 4/24/18  for postoperative assessment. Follow up with Dr. Poole (Med/Onc) on 4/25/18. Follow up with Dr. Parnell 4/26/18.   Patient seen at [facility] s/p Left breast lumpectomy with SLNB on 4/12/18. Following surgery she completed Left partial breast radiation x 10 fractions. She was then started on endocrine therapy with Femara after been deemedan inappropriate candidate for systemic chemotherapy/Herceptin.      She was seen by neurosugery at [facility] on 4/26/18 following repeat MRI of cervical thoracic lumbar spine which noted focal enhancement of T2 hyperintensity at the C2 level which may be due to degenerative changes. Signal abnormality within  the T12 and L1 may be secondary to myeloma. Plan to continue with observation for now with F/U scheduled in October 2018.      She was seen by Dr. Parnell at [facility] for management of her MM on 4/26/18. Patient was recommended to restart Revlimid 5mg PO every other day with notes that  these recommendations would be communicated to collaborating Oncologist. Patient was also recommended to start on Zometa for her bone disease.      Seen by Dr. Parnell at [facility] for management of her MM on 6/28/18. Repeat light chains noted lambda 33.36 (previously 80.80), K/L ratio 0.91 (previously 0.49). Due to slight improvement in light chains, plan to continue on lenalidomide maintenance. Recommendations to continue anticoagulation with Eliquis. BLE venous doppler negative for DVT.   Seen by pulmonolgy on 6/28/18- repeat CT chest done 6/28/18 noted resolution of previous pulmonary nodules. Thought to be infectious in nature. Recommendations for discharge from pulmonary clinic.  Should MRI of her cervical thoracic lumbar spine on 2/12/2019 that demonstrated cervical spondylosis with canal stenosis most notable at C1 and 2. Cord signal abnormality at C1 and 2 with enhancement is stable. Lumbar spondylosis with central canal stenosis at multiple levels. No imaging features of bony metastatic disease.     For amyloidosis (Light chain type).    Patient presented with macroglossia and now with improvement of the swelling of her tongue. Her cardiac parameters are also better.   8/10/2020 proBNP  250   2/17/2020                616  8/9/2019                  190             Chief Complaint: No Concerns today      Interval History:  Patient presents today for TD and labs for 6 of 8 weekly daratumumab. (Then she will get daratumumab q2 weeks x 4 doses then q4 weeks). This and Dex 20 mg weekly was added to her maintenance regimen in 7/2022 due to an increase in her serum light chains. She is complaining of swelling to her left mandible/cervical area. I offered to order imaging but she is going to [facility] next week and would like to wait until she sees them.     She is currently on Femara for breast cancer and Ninlaro/Rev/Dex/Darzalex  for MM  s/p transplant in 2016. She has neuropathy symptoms that she contributes to  "Ninlaro but this is tolerable at this time.No fever, chills, sweats.  No chest pain or shortness of breath.  She uses a walker for mobility.  She continues to do her mammograms and other imaging studies at [facility].            ROS:  All 14 points ROS taken and as per Interval History    Histories:  PMH/PSH/FH/SOCIAL/ALLERGIES AND MEDS REVIEWED AND UPDATED AS APPROPRIATE       Vitals:    09/06/22 1149   BP: 130/81   BP Location: Left arm   Patient Position: Sitting   Pulse: 98   Temp: 98.6 °F (37 °C)   TempSrc: Oral   SpO2: 97%   Weight: 77.6 kg (171 lb)   Height: 5' 5" (1.651 m)      Physical Exam  Constitutional:       Appearance: Normal appearance. She is ill-appearing.   HENT:      Head: Normocephalic.   Eyes:      Extraocular Movements: Extraocular movements intact.   Neck:        Comments: swelling  Pulmonary:      Effort: Pulmonary effort is normal.   Musculoskeletal:      Cervical back: Neck supple.   Neurological:      Mental Status: She is alert.   Psychiatric:         Mood and Affect: Mood normal.         Behavior: Behavior normal.         Thought Content: Thought content normal.         Judgment: Judgment normal.     ECOG SCORE    2 - Capable of all selfcare but unable to carry out any work activities, active > 50% of hours         Laboratory:  CBC with Differential:  Lab Results   Component Value Date    WBC 3.4 (L) 09/06/2022    RBC 3.66 (L) 09/06/2022    HGB 10.8 (L) 09/06/2022    HCT 35.1 (L) 09/06/2022    MCV 95.9 (H) 09/06/2022    MCH 29.5 09/06/2022    MCHC 30.8 (L) 09/06/2022    RDW 14.2 09/06/2022     09/06/2022    MPV 8.7 09/06/2022        CMP:  Sodium Level   Date Value Ref Range Status   08/25/2022 141 136 - 145 mmol/L Final     Potassium Level   Date Value Ref Range Status   08/25/2022 3.5 3.5 - 5.1 mmol/L Final     Carbon Dioxide   Date Value Ref Range Status   08/25/2022 25 22 - 29 mmol/L Final     Blood Urea Nitrogen   Date Value Ref Range Status   08/25/2022 24.7 (H) 9.8 - 20.1 " mg/dL Final     Creatinine   Date Value Ref Range Status   08/25/2022 2.31 (H) 0.55 - 1.02 mg/dL Final     Calcium Level Total   Date Value Ref Range Status   08/25/2022 9.2 8.4 - 10.2 mg/dL Final     Albumin Level   Date Value Ref Range Status   08/25/2022 3.2 (L) 3.5 - 5.0 gm/dL Final   08/25/2022 3.1 (L) 3.5 - 5.0 gm/dL Final     Bilirubin Total   Date Value Ref Range Status   08/25/2022 0.2 <=1.5 mg/dL Final     Alkaline Phosphatase   Date Value Ref Range Status   08/25/2022 93 40 - 150 unit/L Final     Aspartate Aminotransferase   Date Value Ref Range Status   08/25/2022 24 5 - 34 unit/L Final     Alanine Aminotransferase   Date Value Ref Range Status   08/25/2022 18 0 - 55 unit/L Final     Estimated GFR-Non    Date Value Ref Range Status   04/20/2022 25               Assessment:       1. Multiple myeloma not having achieved remission    2. 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 Sx risks do not outweigh benefits. Pain meds given and encouraged Physical Therpay  8) Amyloid plaques, evaluated by Derm at [facility], recommend watchful waiting. Patient to follow up 12/2017  9) Stage IA grade 3, ER+, HER2 BERNA +,  IDC/DCIS of the left breast --- 2/7/18 at [facility]; s/p lumpectomy with SLNB 4/12, Grade 2 IDC measuring 4mm with second focus of microinvasive  carcinoma measuring  0.4mm .IG DCIS, 0.3mm to posterior margin; 2 SLN negative for malignancy    10) Progressive swelling of R lower extremity--- US NIVA done 2/19/18 negative for evidence of DVT  11). Paroxysmal Afib (2/18/18) diagnosed at [facility]; ECHO noted EF 58%  12. Mild CKD with GFR 55 - managed per [facility] nephrology  13). Pulmonary nodules noted on pre-operative chest CT scan (3/12/18) noted new bilateral pulmonary nodules are nonspecific -- seen by Pulmonology at [facility] (3/21/18) thought to be inflammatory/infectious in nature, repeat Chest CT 6/28/18 noted resolution of nodules  14). Treatment induced neutropenia        Plan:         Daratumumab weekly x 8 weeks then q2 weeks x 4 doses then q4 weeks. May proceed with #6/8 weekly doses today. Next week, she will be at [facility] so she will not be here for #7   Continue Nilaro 4mg days 1,8,15 q 28 days  Zometa is on hold for now  Cont low dose Revlimid 5 mg q other day  Dexamethasone 20 mg weekly with daratumumab    Keep appt at [facility]  Instructed to call clinic with any questions or concerns     Mammograms done at [facility]  Bone density with osteopenia--will start Prolia once she has dental clearance.She is going to [facility] next week and will see dentist.     Encourage to call or message us for any questions or problems  The patient was given ample opportunity to ask questions, and to the best of my abilities, all questions answered to satisfaction; patient demonstrated understanding of what we discussed and agreeable to the plan.     CARLEEN Huddleston

## 2022-09-20 ENCOUNTER — LAB VISIT (OUTPATIENT)
Dept: LAB | Facility: HOSPITAL | Age: 57
End: 2022-09-20
Attending: INTERNAL MEDICINE
Payer: COMMERCIAL

## 2022-09-20 ENCOUNTER — INFUSION (OUTPATIENT)
Dept: INFUSION THERAPY | Facility: HOSPITAL | Age: 57
End: 2022-09-20
Attending: INTERNAL MEDICINE
Payer: COMMERCIAL

## 2022-09-20 VITALS — SYSTOLIC BLOOD PRESSURE: 108 MMHG | HEART RATE: 89 BPM | TEMPERATURE: 99 F | DIASTOLIC BLOOD PRESSURE: 65 MMHG

## 2022-09-20 DIAGNOSIS — C50.819 OVERLAPPING MALIGNANT NEOPLASM OF FEMALE BREAST, UNSPECIFIED ESTROGEN RECEPTOR STATUS, UNSPECIFIED LATERALITY: ICD-10-CM

## 2022-09-20 DIAGNOSIS — C90.00 MULTIPLE MYELOMA NOT HAVING ACHIEVED REMISSION: ICD-10-CM

## 2022-09-20 DIAGNOSIS — E85.9 AMYLOIDOSIS, UNSPECIFIED TYPE: Primary | ICD-10-CM

## 2022-09-20 LAB
ALBUMIN SERPL-MCNC: 3.5 GM/DL (ref 3.5–5)
ALBUMIN/GLOB SERPL: 1.3 RATIO (ref 1.1–2)
ALP SERPL-CCNC: 114 UNIT/L (ref 40–150)
ALT SERPL-CCNC: 10 UNIT/L (ref 0–55)
AST SERPL-CCNC: 18 UNIT/L (ref 5–34)
BASOPHILS # BLD AUTO: 0.02 X10(3)/MCL (ref 0–0.2)
BASOPHILS NFR BLD AUTO: 0.5 %
BILIRUBIN DIRECT+TOT PNL SERPL-MCNC: 0.2 MG/DL
BUN SERPL-MCNC: 22.7 MG/DL (ref 9.8–20.1)
CALCIUM SERPL-MCNC: 9.2 MG/DL (ref 8.4–10.2)
CHLORIDE SERPL-SCNC: 105 MMOL/L (ref 98–107)
CO2 SERPL-SCNC: 28 MMOL/L (ref 22–29)
CREAT SERPL-MCNC: 2.19 MG/DL (ref 0.55–1.02)
EOSINOPHIL # BLD AUTO: 0.13 X10(3)/MCL (ref 0–0.9)
EOSINOPHIL NFR BLD AUTO: 3.5 %
ERYTHROCYTE [DISTWIDTH] IN BLOOD BY AUTOMATED COUNT: 14.4 % (ref 11.5–17)
GFR SERPLBLD CREATININE-BSD FMLA CKD-EPI: 26 MLS/MIN/1.73/M2
GLOBULIN SER-MCNC: 2.8 GM/DL (ref 2.4–3.5)
GLUCOSE SERPL-MCNC: 86 MG/DL (ref 74–100)
HCT VFR BLD AUTO: 33.7 % (ref 37–47)
HGB BLD-MCNC: 10.5 GM/DL (ref 12–16)
IMM GRANULOCYTES # BLD AUTO: 0.02 X10(3)/MCL (ref 0–0.04)
IMM GRANULOCYTES NFR BLD AUTO: 0.5 %
LYMPHOCYTES # BLD AUTO: 0.49 X10(3)/MCL (ref 0.6–4.6)
LYMPHOCYTES NFR BLD AUTO: 13.3 %
MCH RBC QN AUTO: 29.7 PG (ref 27–31)
MCHC RBC AUTO-ENTMCNC: 31.2 MG/DL (ref 33–36)
MCV RBC AUTO: 95.5 FL (ref 80–94)
MONOCYTES # BLD AUTO: 0.46 X10(3)/MCL (ref 0.1–1.3)
MONOCYTES NFR BLD AUTO: 12.5 %
NEUTROPHILS # BLD AUTO: 2.6 X10(3)/MCL (ref 2.1–9.2)
NEUTROPHILS NFR BLD AUTO: 69.7 %
PLATELET # BLD AUTO: 215 X10(3)/MCL (ref 130–400)
PMV BLD AUTO: 10 FL (ref 7.4–10.4)
POTASSIUM SERPL-SCNC: 3.9 MMOL/L (ref 3.5–5.1)
PROT SERPL-MCNC: 6.3 GM/DL (ref 6.4–8.3)
RBC # BLD AUTO: 3.53 X10(6)/MCL (ref 4.2–5.4)
SODIUM SERPL-SCNC: 141 MMOL/L (ref 136–145)
WBC # SPEC AUTO: 3.7 X10(3)/MCL (ref 4.5–11.5)

## 2022-09-20 PROCEDURE — 85025 COMPLETE CBC W/AUTO DIFF WBC: CPT

## 2022-09-20 PROCEDURE — 80053 COMPREHEN METABOLIC PANEL: CPT

## 2022-09-20 PROCEDURE — 83883 ASSAY NEPHELOMETRY NOT SPEC: CPT

## 2022-09-20 PROCEDURE — 82784 ASSAY IGA/IGD/IGG/IGM EACH: CPT

## 2022-09-20 PROCEDURE — 96401 CHEMO ANTI-NEOPL SQ/IM: CPT

## 2022-09-20 PROCEDURE — 63600175 PHARM REV CODE 636 W HCPCS: Mod: TB | Performed by: INTERNAL MEDICINE

## 2022-09-20 PROCEDURE — 36415 COLL VENOUS BLD VENIPUNCTURE: CPT

## 2022-09-20 PROCEDURE — 25000003 PHARM REV CODE 250: Performed by: INTERNAL MEDICINE

## 2022-09-20 RX ORDER — EPINEPHRINE 0.3 MG/.3ML
0.3 INJECTION SUBCUTANEOUS ONCE AS NEEDED
Status: DISCONTINUED | OUTPATIENT
Start: 2022-09-20 | End: 2022-09-20 | Stop reason: HOSPADM

## 2022-09-20 RX ORDER — DIPHENHYDRAMINE HYDROCHLORIDE 50 MG/ML
50 INJECTION INTRAMUSCULAR; INTRAVENOUS ONCE AS NEEDED
Status: DISCONTINUED | OUTPATIENT
Start: 2022-09-20 | End: 2022-09-20 | Stop reason: HOSPADM

## 2022-09-20 RX ORDER — ACETAMINOPHEN 325 MG/1
650 TABLET ORAL
Status: COMPLETED | OUTPATIENT
Start: 2022-09-20 | End: 2022-09-20

## 2022-09-20 RX ORDER — DIPHENHYDRAMINE HCL 25 MG
25 CAPSULE ORAL
Status: COMPLETED | OUTPATIENT
Start: 2022-09-20 | End: 2022-09-20

## 2022-09-20 RX ORDER — HEPARIN 100 UNIT/ML
500 SYRINGE INTRAVENOUS
Status: DISCONTINUED | OUTPATIENT
Start: 2022-09-20 | End: 2022-09-20 | Stop reason: HOSPADM

## 2022-09-20 RX ORDER — DEXAMETHASONE 4 MG/1
20 TABLET ORAL
Status: COMPLETED | OUTPATIENT
Start: 2022-09-20 | End: 2022-09-20

## 2022-09-20 RX ORDER — SODIUM CHLORIDE 0.9 % (FLUSH) 0.9 %
10 SYRINGE (ML) INJECTION
Status: DISCONTINUED | OUTPATIENT
Start: 2022-09-20 | End: 2022-09-20 | Stop reason: HOSPADM

## 2022-09-20 RX ADMIN — DIPHENHYDRAMINE HYDROCHLORIDE 25 MG: 25 CAPSULE ORAL at 02:09

## 2022-09-20 RX ADMIN — DEXAMETHASONE 20 MG: 4 TABLET ORAL at 02:09

## 2022-09-20 RX ADMIN — ACETAMINOPHEN 650 MG: 325 TABLET, FILM COATED ORAL at 02:09

## 2022-09-20 RX ADMIN — DARATUMUMAB AND HYALURONIDASE-FIHJ (HUMAN RECOMBINANT) 1800 MG: 1800; 30000 INJECTION SUBCUTANEOUS at 02:09

## 2022-09-21 ENCOUNTER — HISTORICAL (OUTPATIENT)
Dept: ADMINISTRATIVE | Facility: HOSPITAL | Age: 57
End: 2022-09-21
Payer: COMMERCIAL

## 2022-09-21 RX ORDER — DEXAMETHASONE 4 MG/1
20 TABLET ORAL
Status: CANCELLED | OUTPATIENT
Start: 2022-09-21

## 2022-09-27 ENCOUNTER — TELEPHONE (OUTPATIENT)
Dept: HEMATOLOGY/ONCOLOGY | Facility: CLINIC | Age: 57
End: 2022-09-27
Payer: MEDICARE

## 2022-09-27 NOTE — TELEPHONE ENCOUNTER
PT SISTER CALLED TO CANCEL PT'S LAB AND INFUSION APPT TODAY DUE TO BAD DIARRHEA AND WEAKNESS. SHE STATED SHE WILL BRING HER TO ER FOR EVAL. I ADVISED HER I'D LET MD KNOW AND WILL CALL HER BACK TO R/S APPTS. PT DOES PLAN ON KEEPING APPT WITH MD ON 10/3.    MD NOTIFIED, PT WILL BE EVALUATED ON OV 10/3/22

## 2022-09-30 DIAGNOSIS — C90.00 MULTIPLE MYELOMA NOT HAVING ACHIEVED REMISSION: Primary | ICD-10-CM

## 2022-10-03 ENCOUNTER — TELEPHONE (OUTPATIENT)
Dept: HEMATOLOGY/ONCOLOGY | Facility: CLINIC | Age: 57
End: 2022-10-03
Payer: MEDICARE

## 2022-10-03 PROBLEM — Z00.00 MEDICARE ANNUAL WELLNESS VISIT, SUBSEQUENT: Status: RESOLVED | Noted: 2022-06-30 | Resolved: 2022-10-03

## 2022-10-03 NOTE — TELEPHONE ENCOUNTER
Spoke with patient's sister, Stephanie. States she has been having continuous diarrhea and decreased urinary output. Advised to take patient into ED. She voiced understanding. Appointment today as well as infusion tomorrow has been cancelled.

## 2022-10-04 ENCOUNTER — OFFICE VISIT (OUTPATIENT)
Dept: URGENT CARE | Facility: CLINIC | Age: 57
End: 2022-10-04
Payer: MEDICARE

## 2022-10-04 VITALS
DIASTOLIC BLOOD PRESSURE: 72 MMHG | OXYGEN SATURATION: 100 % | RESPIRATION RATE: 16 BRPM | HEIGHT: 63 IN | WEIGHT: 171 LBS | HEART RATE: 94 BPM | SYSTOLIC BLOOD PRESSURE: 115 MMHG | BODY MASS INDEX: 30.3 KG/M2 | TEMPERATURE: 98 F

## 2022-10-04 DIAGNOSIS — K52.1 DIARRHEA DUE TO DRUG: Primary | ICD-10-CM

## 2022-10-04 DIAGNOSIS — R39.11 URINARY HESITANCY: ICD-10-CM

## 2022-10-04 LAB
ABS NEUT (OLG): 2.06 X10(3)/MCL (ref 2.1–9.2)
ALBUMIN SERPL-MCNC: 3.2 GM/DL (ref 3.5–5)
ALBUMIN/GLOB SERPL: 1.1 RATIO (ref 1.1–2)
ALP SERPL-CCNC: 75 UNIT/L (ref 40–150)
ALT SERPL-CCNC: 9 UNIT/L (ref 0–55)
AST SERPL-CCNC: 20 UNIT/L (ref 5–34)
BILIRUB UR QL STRIP: NEGATIVE
BILIRUBIN DIRECT+TOT PNL SERPL-MCNC: 0.2 MG/DL
BUN SERPL-MCNC: 17 MG/DL (ref 9.8–20.1)
CALCIUM SERPL-MCNC: 9.2 MG/DL (ref 8.4–10.2)
CHLORIDE SERPL-SCNC: 106 MMOL/L (ref 98–107)
CO2 SERPL-SCNC: 22 MMOL/L (ref 22–29)
CREAT SERPL-MCNC: 2.23 MG/DL (ref 0.55–1.02)
ERYTHROCYTE [DISTWIDTH] IN BLOOD BY AUTOMATED COUNT: 14.8 % (ref 11.5–17)
GFR SERPLBLD CREATININE-BSD FMLA CKD-EPI: 25 MLS/MIN/1.73/M2
GLOBULIN SER-MCNC: 2.9 GM/DL (ref 2.4–3.5)
GLUCOSE SERPL-MCNC: 104 MG/DL (ref 74–100)
GLUCOSE UR QL STRIP: NEGATIVE
HCT VFR BLD AUTO: 37.2 % (ref 37–47)
HGB BLD-MCNC: 11.6 GM/DL (ref 12–16)
IMM GRANULOCYTES # BLD AUTO: 0.01 X10(3)/MCL (ref 0–0.04)
IMM GRANULOCYTES NFR BLD AUTO: 0.3 %
INSTRUMENT WBC (OLG): 2.9 X10(3)/MCL
KETONES UR QL STRIP: NEGATIVE
LEUKOCYTE ESTERASE UR QL STRIP: NEGATIVE
LYMPHOCYTES NFR BLD MANUAL: 0.46 X10(3)/MCL
LYMPHOCYTES NFR BLD MANUAL: 16 %
MCH RBC QN AUTO: 29.7 PG (ref 27–31)
MCHC RBC AUTO-ENTMCNC: 31.2 MG/DL (ref 33–36)
MCV RBC AUTO: 95.4 FL (ref 80–94)
MONOCYTES NFR BLD MANUAL: 0.38 X10(3)/MCL (ref 0.1–1.3)
MONOCYTES NFR BLD MANUAL: 13 %
NEUTROPHILS NFR BLD MANUAL: 71 %
NRBC BLD AUTO-RTO: 0 %
PH, POC UA: 5
PLATELET # BLD AUTO: 152 X10(3)/MCL (ref 130–400)
PLATELET # BLD EST: NORMAL 10*3/UL
PMV BLD AUTO: 11.1 FL (ref 7.4–10.4)
POC BLOOD, URINE: POSITIVE
POC NITRATES, URINE: NEGATIVE
POTASSIUM SERPL-SCNC: 3.7 MMOL/L (ref 3.5–5.1)
PROT SERPL-MCNC: 6.1 GM/DL (ref 6.4–8.3)
PROT UR QL STRIP: POSITIVE
RBC # BLD AUTO: 3.9 X10(6)/MCL (ref 4.2–5.4)
RBC MORPH BLD: NORMAL
SODIUM SERPL-SCNC: 138 MMOL/L (ref 136–145)
SP GR UR STRIP: 1.03 (ref 1–1.03)
UROBILINOGEN UR STRIP-ACNC: ABNORMAL (ref 0.1–1.1)
WBC # SPEC AUTO: 2.9 X10(3)/MCL (ref 4.5–11.5)

## 2022-10-04 PROCEDURE — 99214 PR OFFICE/OUTPT VISIT, EST, LEVL IV, 30-39 MIN: ICD-10-PCS | Mod: S$PBB,25,, | Performed by: FAMILY MEDICINE

## 2022-10-04 PROCEDURE — 85027 COMPLETE CBC AUTOMATED: CPT | Performed by: FAMILY MEDICINE

## 2022-10-04 PROCEDURE — 81003 POCT URINALYSIS, DIPSTICK, MANUAL, W/O SCOPE: ICD-10-PCS | Mod: QW,,, | Performed by: FAMILY MEDICINE

## 2022-10-04 PROCEDURE — 36415 COLL VENOUS BLD VENIPUNCTURE: CPT | Performed by: FAMILY MEDICINE

## 2022-10-04 PROCEDURE — 80053 COMPREHEN METABOLIC PANEL: CPT | Performed by: FAMILY MEDICINE

## 2022-10-04 PROCEDURE — 85025 COMPLETE CBC W/AUTO DIFF WBC: CPT | Performed by: FAMILY MEDICINE

## 2022-10-04 PROCEDURE — 81003 URINALYSIS AUTO W/O SCOPE: CPT | Mod: QW,,, | Performed by: FAMILY MEDICINE

## 2022-10-04 PROCEDURE — 96372 PR INJECTION,THERAP/PROPH/DIAG2ST, IM OR SUBCUT: ICD-10-PCS | Mod: S$PBB,,, | Performed by: FAMILY MEDICINE

## 2022-10-04 PROCEDURE — 99214 OFFICE O/P EST MOD 30 MIN: CPT | Mod: S$PBB,25,, | Performed by: FAMILY MEDICINE

## 2022-10-04 PROCEDURE — 96372 THER/PROPH/DIAG INJ SC/IM: CPT | Mod: S$PBB,,, | Performed by: FAMILY MEDICINE

## 2022-10-04 RX ORDER — SODIUM CHLORIDE, SODIUM LACTATE, POTASSIUM CHLORIDE, CALCIUM CHLORIDE 600; 310; 30; 20 MG/100ML; MG/100ML; MG/100ML; MG/100ML
INJECTION, SOLUTION INTRAVENOUS
Status: COMPLETED | OUTPATIENT
Start: 2022-10-04 | End: 2022-10-04

## 2022-10-04 RX ADMIN — SODIUM CHLORIDE, SODIUM LACTATE, POTASSIUM CHLORIDE, CALCIUM CHLORIDE: 600; 310; 30; 20 INJECTION, SOLUTION INTRAVENOUS at 04:10

## 2022-10-04 NOTE — PATIENT INSTRUCTIONS
Monitor urine output and bowel movements.  Continue to check for blood or mucus in the bowel movements.  Will call with blood work results in 1 day.    ER precautions for continued or worsening symptoms.

## 2022-10-06 ENCOUNTER — TELEPHONE (OUTPATIENT)
Dept: URGENT CARE | Facility: CLINIC | Age: 57
End: 2022-10-06
Payer: MEDICARE

## 2022-10-11 ENCOUNTER — TELEPHONE (OUTPATIENT)
Dept: HEMATOLOGY/ONCOLOGY | Facility: CLINIC | Age: 57
End: 2022-10-11
Payer: MEDICARE

## 2022-10-11 NOTE — TELEPHONE ENCOUNTER
Patient was schedule to see me today but she cancelled and is on her way to [facility].     Of note: PT R/S APPT ON 9/27/22, THEN CANCELED APPTS ON 10/3/22 AND 10/4/22 (SISTER CALLED, STATED PT WAS NOT FEELING WELL AND WAS GOING TO ER).      This happen often . At one point she was not coming to doctor appt at all but was coming to her treatment. I had a long conversation with her regarding importance of doctors appts and how important this appts are as is the way we can examined and evaluate the patient and response/toxicities to treatment. I told her that before every cycle of I will need to see her and if she does not come because was not feeling good, or any other reason, then treatment will be held and reschedule as well until evaluated by provider. This includes prescription medications as well. She was not happy but verbalized understanding.      Since that conversation she has cancelled, rescheduled and cancel again     Natalie Meyers MD  Hematology/Oncology

## 2022-10-11 NOTE — TELEPHONE ENCOUNTER
RECEIVED MESSAGE FROM  THAT PT CALLED TO CANCEL APPT TODAY BECAUSE SHE WAS ON HER WAY TO [facility].    PT R/S APPT ON 9/27/22, THEN CANCELED APPTS ON 10/3/22 AND 10/4/22 (SISTER CALLED, STATED PT WAS NOT FEELING WELL AND WAS GOING TO ER).

## 2022-10-14 LAB
ALBUMIN SERPL ELPH-MCNC: 3.42 G/DL
ALPHA1 GLOB SERPL ELPH-MCNC: 0.33 G/DL
ALPHA2 GLOB SERPL ELPH-MCNC: 1.17 G/DL
AR EER MONOCLONAL PROTEIN AND FLC, SERUM: NORMAL
B-GLOBULIN SERPL ELPH-MCNC: 0.68 G/DL
GAMMA GLOB SERPL ELPH-MCNC: 0.6 G/DL
IGA SERPL-MCNC: 35 MG/DL
IGG SERPL-MCNC: 595 MG/DL
IGM SERPL-MCNC: <10 MG/DL
INTERPRETATION SERPL IFE-IMP: NORMAL
INTERPRETATION SERPL IFE-IMP: NORMAL
KAPPA LC FREE SER-MCNC: 21.9 MG/L
KAPPA LC FREE/LAMBDA FREE SER NEPH: 2.25 {RATIO}
LAMBDA LC FREE SERPL-MCNC: 9.75 MG/L
PROT SERPL-MCNC: 6.2 G/DL

## 2022-10-18 DIAGNOSIS — C90.00 MULTIPLE MYELOMA NOT HAVING ACHIEVED REMISSION: Primary | ICD-10-CM

## 2022-10-18 RX ORDER — ONDANSETRON HYDROCHLORIDE 8 MG/1
8 TABLET, FILM COATED ORAL EVERY 8 HOURS PRN
Qty: 90 TABLET | Refills: 0 | Status: SHIPPED | OUTPATIENT
Start: 2022-10-18 | End: 2022-11-21

## 2022-10-24 ENCOUNTER — OFFICE VISIT (OUTPATIENT)
Dept: HEMATOLOGY/ONCOLOGY | Facility: CLINIC | Age: 57
End: 2022-10-24
Payer: MEDICARE

## 2022-10-24 ENCOUNTER — LAB VISIT (OUTPATIENT)
Dept: LAB | Facility: HOSPITAL | Age: 57
End: 2022-10-24
Attending: INTERNAL MEDICINE
Payer: COMMERCIAL

## 2022-10-24 VITALS
TEMPERATURE: 98 F | SYSTOLIC BLOOD PRESSURE: 96 MMHG | OXYGEN SATURATION: 97 % | WEIGHT: 175 LBS | BODY MASS INDEX: 31.01 KG/M2 | HEIGHT: 63 IN | DIASTOLIC BLOOD PRESSURE: 62 MMHG | HEART RATE: 89 BPM

## 2022-10-24 DIAGNOSIS — C50.819 OVERLAPPING MALIGNANT NEOPLASM OF FEMALE BREAST, UNSPECIFIED ESTROGEN RECEPTOR STATUS, UNSPECIFIED LATERALITY: ICD-10-CM

## 2022-10-24 DIAGNOSIS — C90.00 MULTIPLE MYELOMA NOT HAVING ACHIEVED REMISSION: ICD-10-CM

## 2022-10-24 DIAGNOSIS — C90.00 MULTIPLE MYELOMA NOT HAVING ACHIEVED REMISSION: Primary | ICD-10-CM

## 2022-10-24 LAB
ABS NEUT CALC (OHS): 1.49 X10(3)/MCL (ref 2.1–9.2)
BASOPHILS NFR BLD MANUAL: 0.02 X10(3)/MCL (ref 0–0.2)
BASOPHILS NFR BLD MANUAL: 1 % (ref 0–2)
EOSINOPHIL NFR BLD MANUAL: 0.1 X10(3)/MCL (ref 0–0.9)
EOSINOPHIL NFR BLD MANUAL: 4 % (ref 0–8)
ERYTHROCYTE [DISTWIDTH] IN BLOOD BY AUTOMATED COUNT: 14.2 % (ref 11.5–17)
HCT VFR BLD AUTO: 33 % (ref 37–47)
HGB BLD-MCNC: 10 GM/DL (ref 12–16)
IMM GRANULOCYTES # BLD AUTO: 0 X10(3)/MCL (ref 0–0.04)
IMM GRANULOCYTES NFR BLD AUTO: 0 %
LYMPHOCYTES NFR BLD MANUAL: 0.58 X10(3)/MCL
LYMPHOCYTES NFR BLD MANUAL: 24 % (ref 13–40)
MCH RBC QN AUTO: 28.8 PG (ref 27–31)
MCHC RBC AUTO-ENTMCNC: 30.3 MG/DL (ref 33–36)
MCV RBC AUTO: 95.1 FL (ref 80–94)
MONOCYTES NFR BLD MANUAL: 0.22 X10(3)/MCL (ref 0.1–1.3)
MONOCYTES NFR BLD MANUAL: 9 % (ref 2–11)
NEUTROPHILS NFR BLD MANUAL: 62 % (ref 47–80)
PLATELET # BLD AUTO: 173 X10(3)/MCL (ref 130–400)
PLATELET # BLD EST: NORMAL 10*3/UL
PMV BLD AUTO: 9.3 FL (ref 7.4–10.4)
RBC # BLD AUTO: 3.47 X10(6)/MCL (ref 4.2–5.4)
RBC MORPH BLD: NORMAL
WBC # SPEC AUTO: 2.4 X10(3)/MCL (ref 4.5–11.5)

## 2022-10-24 PROCEDURE — 36415 COLL VENOUS BLD VENIPUNCTURE: CPT

## 2022-10-24 PROCEDURE — 99215 OFFICE O/P EST HI 40 MIN: CPT | Mod: PBBFAC | Performed by: INTERNAL MEDICINE

## 2022-10-24 PROCEDURE — 99999 PR PBB SHADOW E&M-EST. PATIENT-LVL V: CPT | Mod: PBBFAC,,, | Performed by: INTERNAL MEDICINE

## 2022-10-24 PROCEDURE — 85025 COMPLETE CBC W/AUTO DIFF WBC: CPT

## 2022-10-24 PROCEDURE — 99215 OFFICE O/P EST HI 40 MIN: CPT | Mod: S$PBB,,, | Performed by: INTERNAL MEDICINE

## 2022-10-24 PROCEDURE — 99215 PR OFFICE/OUTPT VISIT, EST, LEVL V, 40-54 MIN: ICD-10-PCS | Mod: S$PBB,,, | Performed by: INTERNAL MEDICINE

## 2022-10-24 PROCEDURE — 85027 COMPLETE CBC AUTOMATED: CPT

## 2022-10-24 PROCEDURE — 99999 PR PBB SHADOW E&M-EST. PATIENT-LVL V: ICD-10-PCS | Mod: PBBFAC,,, | Performed by: INTERNAL MEDICINE

## 2022-10-24 RX ORDER — DEXAMETHASONE 4 MG/1
12 TABLET ORAL
Status: CANCELLED | OUTPATIENT
Start: 2022-11-23

## 2022-10-24 RX ORDER — EPINEPHRINE 0.3 MG/.3ML
0.3 INJECTION SUBCUTANEOUS ONCE AS NEEDED
Status: CANCELLED | OUTPATIENT
Start: 2022-11-23

## 2022-10-24 RX ORDER — DIPHENHYDRAMINE HCL 25 MG
25 CAPSULE ORAL
Status: CANCELLED | OUTPATIENT
Start: 2022-11-02

## 2022-10-24 RX ORDER — SODIUM CHLORIDE 0.9 % (FLUSH) 0.9 %
10 SYRINGE (ML) INJECTION
Status: CANCELLED | OUTPATIENT
Start: 2022-11-02

## 2022-10-24 RX ORDER — DIPHENHYDRAMINE HYDROCHLORIDE 50 MG/ML
50 INJECTION INTRAMUSCULAR; INTRAVENOUS ONCE AS NEEDED
Status: CANCELLED | OUTPATIENT
Start: 2022-11-23

## 2022-10-24 RX ORDER — DEXAMETHASONE 4 MG/1
12 TABLET ORAL
Status: CANCELLED | OUTPATIENT
Start: 2022-11-02

## 2022-10-24 RX ORDER — DIPHENHYDRAMINE HCL 25 MG
25 CAPSULE ORAL
Status: CANCELLED | OUTPATIENT
Start: 2022-11-23

## 2022-10-24 RX ORDER — ACETAMINOPHEN 325 MG/1
650 TABLET ORAL
Status: CANCELLED | OUTPATIENT
Start: 2022-11-02

## 2022-10-24 RX ORDER — SODIUM CHLORIDE 0.9 % (FLUSH) 0.9 %
10 SYRINGE (ML) INJECTION
Status: CANCELLED | OUTPATIENT
Start: 2022-11-23

## 2022-10-24 RX ORDER — DIPHENHYDRAMINE HYDROCHLORIDE 50 MG/ML
50 INJECTION INTRAMUSCULAR; INTRAVENOUS ONCE AS NEEDED
Status: CANCELLED | OUTPATIENT
Start: 2022-11-02

## 2022-10-24 RX ORDER — HEPARIN 100 UNIT/ML
500 SYRINGE INTRAVENOUS
Status: CANCELLED | OUTPATIENT
Start: 2022-11-23

## 2022-10-24 RX ORDER — HEPARIN 100 UNIT/ML
500 SYRINGE INTRAVENOUS
Status: CANCELLED | OUTPATIENT
Start: 2022-11-02

## 2022-10-24 RX ORDER — ACETAMINOPHEN 325 MG/1
650 TABLET ORAL
Status: CANCELLED | OUTPATIENT
Start: 2022-11-23

## 2022-10-24 RX ORDER — EPINEPHRINE 0.3 MG/.3ML
0.3 INJECTION SUBCUTANEOUS ONCE AS NEEDED
Status: CANCELLED | OUTPATIENT
Start: 2022-11-02

## 2022-10-24 NOTE — PROGRESS NOTES
HEMATOLOGY/ONCOLOGY OFFICE CLINIC VISIT    Visit Information:    NO SHOW  08/21/2018--> Rescheduled                              11/05/2019--> No Show/Reschedule                           06/24/2020--> No Show/Reschedule                           09/24/2020--> No Show/Reschedule                           03/15/2021--> No Show/Reschedule                           03/23/2021--> No Show/Reschedule                           04/13/2021--> No Show                           05/27/2021--> No Show                           08/19/2021--> No Show                           08/26/2021--> No Show/Rescheduled                           04/26/2022--> No Show                           10/03/2022--> Rescheduled                           10/11/2022--> Rescheduled    Referring Physician: DR. Cardona  PCP: Dr. Chacon  GI: Dr. Johnny Bhardwaj  Rheumatologist: Dr. Luis Rea  Immunologist: Dr. Daniel Nava  ENT: Dr. Brice Williamson  [facility] Pain management: Dr.Chai LONG [place] Transplant: Dr. Adrian Naylor MD [place] [facility] Onc: Dr. Zulema LONG [facility]: Dr.DeSnyder LONG [place]: Dr. Carrasco     Present treatment:  Maintenance Revlimid 5mg PO QOD, started 02/16/17-- was held for a few months while undergoing treatment for her breast cancer 02/18-05/18; Restarted 6/15/18   Dexamethasone 20 mg po weekly added early July 2017--> reduced to 12 mg PO weekly October 4, 2017---> increased to 16mg PO weekly 2/15/18---restarted 6/15/18 --> 20 mg weekly 7//8/2022  Darzalex 7/8/2022-present  Femara 2.5 mg PO daily   Eliquis 2.5mg PO BID      Treatment/Oncology history:  1) CyBorD x5 cycles 09/28/15-12/24/15  2) Autologous stem cell transplant 02/08/16, done at [facility]  3) Exploratory laparotomy with subtotal colectomy and end ileostomy done August 2, 2016--pathology specimen showed advanced colonic amyloidosis extensive involving the muscular wall submucosa and mucosa.  Appendix also involvement by amyloidosis with fibrous  obliteration of the distal lumen.  4) Maintenance therapy with Revlimid 5mg-started 06/01/16--Discontinued shortly after 2/2 cytopenias  5) Left breast lumpectomy with SLNB at [facility] 4/12/18  6) Left partial breast RT x10 fractions  5/21/18-6/4/1    Plan of care:       Imaging:      Pathology:    CLINICAL HISTORY:       Patient: Ninfa Candelario is a 57 y.o. female.  Ms. Cabrera Joseph was admitted on 08/16/15 for ileus versus partial SBO. CT A/P done 08/17/15 showed sigmoid colitis and a zone of transition at the junction of the descending and sigmoid colon which could indicate some degree of obstruction and an obstructing mass cannot be excluded. Numerous skeletal lytic lesions noted. CT chest done 08/19/15 showed Multiple skeletal lytic lesions involving the thoracic spine, left rib sternum consistent with either metastases or multiple myeloma. There is no evidence of pulmonary or mediastinal mass. Dr. Farr was consulted for possible MM/anemia.     Nuclear Bone scan done 8/20/15 showed mild to moderate increased activity in left rib and right second rib which correlates with fractures seen on CT.  Skeletal survey done 8/20/15showed lytic lesions in the calvarium and probably a lytic lesion in the left scapula. Lytic areas in the spine and pelvis seen on recent CT are not well defined on skeletal survey. Work up labs done 08/20/15: Negative for sickle cell and Thalessemia (reported history of thalessemia). Iron 54, Transferrin 118.0, Iron sat 34.6%, Folate 26.5, Vit B12 1,779  Peripheral smear resulted slightly macrocytic normochromic anemia without signifcant anisocytosis may reflect early B12/folate deficiency or marrow dysfunction, among others. No immature myeloid cells or blasts. Platlets adequate. Beta 2 mircoglobulin = 3.2.  SPEP/NADIA: M-spike in the beta region. The monoclonal protein peak accounts for 1.13 g/dL. Hypogammaglobulinemia. NADIA pattern shows the presence of a free lambda light chain monoclonal  protein with additional faint band in IgA.  All immunoglobulin levels decreased. IgA=26, IgG=307, IgM<5.  Kappa Qnt 0.16, Lambda Qnt 4600.00, Ratio <0.01.  24hr Urine for Bence Mendez: Kappa 2.75, Lambda 1250.00, Ratio <0.01. NADIA: Urine is POSITIVE for monoclonal Free Lambda Light chains     Patient then opted to go to [facility] where she underwent a bone marrow biopsy on 09/18/15. BMBx showed 80% plasma cells, 13p deletion and normal karyotype. She underwent fat pad biopsy which came back positive for Congo red consistent with amyloidosis. Cardiac workup revealed no amyloid deposition within the heart.  PET/CT and skeletal survey done September 18, 2015 showed multiple lytic osseous lesions  The patient was started on Velcade while at [facility] with the plan to transition to autologous stem cell transplantation. They asked if we could give her could continue treatment here.   She completed CyborD x5 cycles on 12/24/15     Patient admitted 01/06/16 for colitis and C. Diff. Pt treated with Flagyl. Discharged home 01/08/16     Repeat BMBx done at [facility] 01/2016 did not show any morphologic or immnophenotypic evidence of plasama cell neoplasm. Patient underwent Autologous stem cell transplant with Busulfan and melphalan on 02/08/16 at [facility]. The only complication was recurrent C.diff infection for which she completed 10 days of Fidaxomycin.     Follow-up bone marrow biopsy done at [facility] done 5/16/16 showed cellular 30-40% bone marrow with trilineage hematopoiesis. No morphologic or immunophenotypic support for plasma cell neoplasm. Started maintenance Revlimid 5mg. Unable to continue therapy due to cytopenias.     On 08/02/16 patient underwent  Exploratory laparotomy with subtotal colectomy and end ileostomy for what was thought to be toxic megacolon. Pathology showed extensive advanced colonic amyloidosis involving the muscular wall, submucosa and mucosa: With the appendix also involved with amyloidosis.  Positive lambda light  chains were noted.     Follow-up at [facility] 8/31/16, Per Dr. Adrian Naylor, 6 months post autologous transplant. She has engrafted. Based on the latest restaging she is near complete remission with possible IgA lambda on serum immunofixation. Patient was instructed to discontinue prophylactic antibiotics. She received her 1st series of immunizations while at [facility]. Patient had a bilateral venous ultrasound to rule out DVT, negative B/L. In regards to amyloidosis the patient feels that her tongue is smaller in size and her BNP has come down some.  Patient had a follow-up appointment at [facility] November 30, 2016.  Dr. Parnell documented the patient's free lambda light chain remains at a lower level.  Therefore in light of her recent surgery they will continue with observation only.  The plan to see the patient back in 2-3 months with repeat restaging labs.  They recommended regular CBC checks to monitor anemia.  Patient returned to [facility] in December 2016 to meet with dermatology for numerous papillary lesions on eyelids, chest and posterior neck consistent with amyloid deposits. Recommendations were for watchful waiting.  Patient is less than 1 year out from bone marrow transplant and further improvement can be expected.  She will see the patient back in 1 year to discuss possible surgical resection of the plaques especially around the eyelid region.  Rogaine recommended for persistent hair loss. Retin-A recommended for acne vulgaris.  Patient returned to [facility] on 2/8/2017 for neurosurgery consult for chronic low back pain and difficulty walking.  Imaging showed extensive DJD with discovertebral bulges and thickening of the spinal laminar tissues creating spinal stenosis throughout, but greatest at L2/L3.  Neurosurgery felt that surgery risks outweighed the benefits.  Patient was prescribed pain medicine and encouraged to participate in physical therapy.  Patient also met with   Parmjit on 02/08/17, who noted an elevation in free light chains (kappa 52.60/lambda 27.77/ratio 1.89).  Recommendation is to resume maintenance therapy with Revlimid at a low dose of 5 mg every other day.  Patient went back to [facility] first week of April 2017.  I do not have these notes.  Reportedly she was told to continue on every other day Revlimid at 5 mg.  She reportedly had repeat myeloma labs done as well.  Again I do not have these results.  Patient went back to [facility] and saw Dr. Parnell June 29, 2017.  Myeloma labs were done with serum protein electrophoresis showing irregularity in the fast gamma region.  IgG of 1291.  Free kappa light chains of 84.6 with lambda light chains of 66.9.  Beta-2 microglobulin of 4.5  CT scan of lumbar spine showed multiple lytic lesions once again in the lumbar spine and bony pelvis.  Ultrasound of the left leg showed no evidence of DVT.  It was recommended based on the slight increase in her And lambda light chains to start weekly dexamethasone 20 mg p.o. weekly.  Follow-up visit and labs at [facility] on September 29, 2017 with Dr. Parnell.  Repeat beta-2 microglobulin came back at 2.4.  Serum IgG of 761, IgA 117 and IgM of 29.  Serum free kappa light chains of 24.9 and lambda of 23.5.  Counts were stable with hemoglobin of 11.1, WBC 4.9 and platelets of 210,000.  Recommendations were for continued Revlimid every other day with weekly dexamethasone along with aspirin for DVT prophylaxis.  Bilateral diagnostic MMG 12/19/17  noted 1.6cm coarse heterogeneous calcifications in posterior region of L breast at 3 o'clock position. Patient was scheduled for FNA which confirmed ID grade 3, single focus measuring 1.7cm with 2nd focus microinvasion DCIS grade 2 measuring 0.3cm. Left axillary LN biopsy showed no evidence of metastatic carcinoma. Complete staging: Stage IA, grade 3 ER+, Her 2 Niki + IDC/DCIS of left breast. Ki-67 <17%.  Repeat myeloma labs showed rise in free lambda  light chains noted at 68.69, kappa light chains at 27.22,  beta 2 microglobulin came back at 2.9. Serum protein electrophoresis showed no definitive evidence of an M protein peak. The pattern is consistent with an acute inflammatory reaction. Recommendations were made to maintain Revlimid at current dose of 5mg every other day to be taken continuously increase weekly dexamethasone to 16 mg.   Patient seen at [facility] with tentative plan for L breast lumpectomy on 3/13/18. 2/16/18- Prior to surgery she was seen by genetic counselor who ran genetic testing per patient reques, all of which were negative. She was then seen by cardiology at [facility] 2/16/18 for further evaluation of persistent bilateral lower extremity swelling-ECHO noted EF of 58%; diagnosed with paroxysmal atrial fibrillation and instructed to begin daily ASA. During preoperative asssessment per Rad/Onc 3/12/18, corresponding chest CT noted new bilateral pulmonary nodules concerning for metastatic disease- surgery was subsequently postponed pending pulmonary evaluation. Seen by Pulmonology on 3/21/18, PFTs within normal limits and cleared patient for surgery with recommended close CT follow up of nodules in 3 months. 3/21/18 seen by nephrology for management of mild CKD (GFR 55)-cleared for surgery with recommended follow up in 3 months. Left breast lumpectomy and SLNB performed per Dr. Washburn on 4/12/18- plan for follow up in clinic on 4/24/18  for postoperative assessment. Follow up with Dr. Poole (Med/Onc) on 4/25/18. Follow up with Dr. Parnell 4/26/18.   Patient seen at [facility] s/p Left breast lumpectomy with SLNB on 4/12/18. Following surgery she completed Left partial breast radiation x 10 fractions. She was then started on endocrine therapy with Femara after been deemedan inappropriate candidate for systemic chemotherapy/Herceptin.      She was seen by neurosugery at [facility] on 4/26/18 following repeat MRI of cervical thoracic lumbar  spine which noted focal enhancement of T2 hyperintensity at the C2 level which may be due to degenerative changes. Signal abnormality within  the T12 and L1 may be secondary to myeloma. Plan to continue with observation for now with F/U scheduled in October 2018.      She was seen by Dr. Parnell at [facility] for management of her MM on 4/26/18. Patient was recommended to restart Revlimid 5mg PO every other day with notes that these recommendations would be communicated to collaborating Oncologist. Patient was also recommended to start on Zometa for her bone disease.      Seen by Dr. Parnell at [facility] for management of her MM on 6/28/18. Repeat light chains noted lambda 33.36 (previously 80.80), K/L ratio 0.91 (previously 0.49). Due to slight improvement in light chains, plan to continue on lenalidomide maintenance. Recommendations to continue anticoagulation with Eliquis. BLE venous doppler negative for DVT.   Seen by pulmonolgy on 6/28/18- repeat CT chest done 6/28/18 noted resolution of previous pulmonary nodules. Thought to be infectious in nature. Recommendations for discharge from pulmonary clinic.  Should MRI of her cervical thoracic lumbar spine on 2/12/2019 that demonstrated cervical spondylosis with canal stenosis most notable at C1 and 2. Cord signal abnormality at C1 and 2 with enhancement is stable. Lumbar spondylosis with central canal stenosis at multiple levels. No imaging features of bony metastatic disease.     For amyloidosis (Light chain type).    Patient presented with macroglossia and now with improvement of the swelling of her tongue. Her cardiac parameters are also better.   8/10/2020 proBNP  250   2/17/2020                616  8/9/2019                  190        Chief Complaint: Requesting Physical Therapy      Interval History:  Patient presents today for TD and labs for daratumumab. (Then she will get daratumumab q2 weeks x 4 doses then q4 weeks). She is currently on Femara for breast  "cancer and Ninlaro/Rev/Dex/Darzalex  for MM  s/p transplant in 2016. Her last visit at [facility] was on 10/12/22, Ninlaro was discontinued due to it causing severe diarrhea and leukopenia. She went to urgent care for IVF. She looks better today and is doing well. Diarrhea resolved after stopping Ninlaro (she admits to stopping prior to visit @ [facility]). No fever, chills or sweats.  No chest pain or shortness of breath.  She uses a walker for mobility.  She continues to do her mammograms and other imaging studies at [facility].          ROS:  All 14 points ROS taken and as per Interval History    Histories:  PMH/PSH/FH/SOCIAL/ALLERGIES AND MEDS REVIEWED AND UPDATED AS APPROPRIATE       Vitals:    10/24/22 1506   BP: 96/62   BP Location: Left arm   Patient Position: Sitting   Pulse: 89   Temp: 98 °F (36.7 °C)   TempSrc: Oral   SpO2: 97%   Weight: 79.4 kg (175 lb)   Height: 5' 3" (1.6 m)        Physical Exam  Constitutional:       Appearance: She is ill-appearing.      Comments: Uses walker for mobility   HENT:      Head: Normocephalic.   Eyes:      Extraocular Movements: Extraocular movements intact.   Neck:      Comments: swelling  Cardiovascular:      Rate and Rhythm: Normal rate and regular rhythm.   Pulmonary:      Effort: Pulmonary effort is normal.      Breath sounds: Normal breath sounds.   Abdominal:      General: Bowel sounds are normal.      Palpations: Abdomen is soft.      Tenderness: There is no abdominal tenderness.   Musculoskeletal:      Cervical back: Neck supple.   Skin:     Coloration: Skin is not jaundiced.      Findings: No rash.   Neurological:      Mental Status: She is alert.      Cranial Nerves: Cranial nerves 2-12 are intact.      Motor: Weakness present.      Gait: Gait abnormal.   Psychiatric:         Attention and Perception: Attention normal.         Cognition and Memory: Cognition normal.     ECOG SCORE    2 - Capable of all selfcare but unable to carry out any work activities, active > 50% " of hours         Laboratory:  CBC with Differential:  Lab Results   Component Value Date    WBC 2.4 (L) 10/24/2022    RBC 3.47 (L) 10/24/2022    HGB 10.0 (L) 10/24/2022    HCT 33.0 (L) 10/24/2022    MCV 95.1 (H) 10/24/2022    MCH 28.8 10/24/2022    MCHC 30.3 (L) 10/24/2022    RDW 14.2 10/24/2022     10/24/2022    MPV 9.3 10/24/2022        CMP:  Sodium Level   Date Value Ref Range Status   10/04/2022 138 136 - 145 mmol/L Final     Potassium Level   Date Value Ref Range Status   10/04/2022 3.7 3.5 - 5.1 mmol/L Final     Carbon Dioxide   Date Value Ref Range Status   10/04/2022 22 22 - 29 mmol/L Final     Blood Urea Nitrogen   Date Value Ref Range Status   10/04/2022 17.0 9.8 - 20.1 mg/dL Final     Creatinine   Date Value Ref Range Status   10/04/2022 2.23 (H) 0.55 - 1.02 mg/dL Final     Calcium Level Total   Date Value Ref Range Status   10/04/2022 9.2 8.4 - 10.2 mg/dL Final     Albumin Level   Date Value Ref Range Status   10/04/2022 3.2 (L) 3.5 - 5.0 gm/dL Final     Bilirubin Total   Date Value Ref Range Status   10/04/2022 0.2 <=1.5 mg/dL Final     Alkaline Phosphatase   Date Value Ref Range Status   10/04/2022 75 40 - 150 unit/L Final     Aspartate Aminotransferase   Date Value Ref Range Status   10/04/2022 20 5 - 34 unit/L Final     Alanine Aminotransferase   Date Value Ref Range Status   10/04/2022 9 0 - 55 unit/L Final     Estimated GFR-Non    Date Value Ref Range Status   04/20/2022 25               Assessment:       1. Multiple myeloma not having achieved remission    2. Overlapping malignant neoplasm of female breast, unspecified estrogen receptor status, unspecified laterality      1) Multiple Myeloma, IgA Lambda, FISH with del 13p, normal karyotype, ISS stg II Dx 2015; S/p Autologous stem cell transplant 02/08/16-remission-->slight rise in free light chains 02/08/17  2) Amyloidosis, Lambda light chain type, organ involvement with macroglossia and colon involvement. 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. Sx risks do not outweigh benefits. Pain meds given and encouraged Physical Therpay  8) Amyloid plaques, evaluated by Derm at [facility], recommend watchful waiting. Patient to follow up 12/2017  9) Stage IA grade 3, ER+, HER2 BERNA +,  IDC/DCIS of the left breast --- 2/7/18 at [facility]; s/p lumpectomy with SLNB 4/12, Grade 2 IDC measuring 4mm with second focus of microinvasive carcinoma measuring  0.4mm .IG DCIS, 0.3mm to posterior margin; 2 SLN negative for malignancy    10) Progressive swelling of R lower extremity--- US NIVA done 2/19/18 negative for evidence of DVT  11). Paroxysmal Afib (2/18/18) diagnosed at [facility]; ECHO noted EF 58%  12. Mild CKD with GFR 55 - managed per [facility] nephrology  13). Pulmonary nodules noted on pre-operative chest CT scan (3/12/18) noted new bilateral pulmonary nodules are nonspecific -- seen by Pulmonology at [facility] (3/21/18) thought to be inflammatory/infectious in nature, repeat Chest CT 6/28/18 noted resolution of nodules  14). Treatment induced neutropenia        Plan:       Daratumumab weekly x 8 weeks then q2 weeks x 4 doses then q4 weeks. May proceed with #6/8 weekly doses today. Next week, she will be at [facility] so she will not be here for #7   Agree with [facility] to discontinue Ninlaro as it caused diarrhea, diarrhea now resolved  Zometa is on hold for now  Cont low dose Revlimid 5 mg q other day  Dexamethasone 20 mg weekly with daratumumab  RTC in 4 weeks for TD with labs  MM labs + urine  Will need to be scheduled for Darzalex this week, then every 2 weeks  Keep appts at [facility]  Instructed to call clinic with any questions or concerns   Mammograms done at [facility]  Bone density with osteopenia--will start Prolia once she  has dental clearance.She is going to [facility] next week and will see dentist.   Patient to contact office via phone or patient portal with name of outpatient PT facility  Encourage to call or message us for any questions or problems  The patient was given ample opportunity to ask questions, and to the best of my abilities, all questions answered to satisfaction; patient demonstrated understanding of what we discussed and agreeable to the plan.     Natalie Meyers MD    Professional Services   I, Jagruti Booker LPN, acted solely as a scribe for and in the presence of Dr. Natalie Meyers, who performed these services.

## 2022-10-26 ENCOUNTER — INFUSION (OUTPATIENT)
Dept: INFUSION THERAPY | Facility: HOSPITAL | Age: 57
End: 2022-10-26
Attending: INTERNAL MEDICINE
Payer: COMMERCIAL

## 2022-10-26 VITALS
DIASTOLIC BLOOD PRESSURE: 72 MMHG | HEART RATE: 98 BPM | SYSTOLIC BLOOD PRESSURE: 124 MMHG | RESPIRATION RATE: 16 BRPM | TEMPERATURE: 99 F | OXYGEN SATURATION: 96 %

## 2022-10-26 DIAGNOSIS — E85.9 AMYLOIDOSIS, UNSPECIFIED TYPE: Primary | ICD-10-CM

## 2022-10-26 DIAGNOSIS — C90.00 MULTIPLE MYELOMA NOT HAVING ACHIEVED REMISSION: ICD-10-CM

## 2022-10-26 PROCEDURE — 25000003 PHARM REV CODE 250: Performed by: INTERNAL MEDICINE

## 2022-10-26 PROCEDURE — 63600175 PHARM REV CODE 636 W HCPCS: Mod: TB | Performed by: INTERNAL MEDICINE

## 2022-10-26 PROCEDURE — 63600175 PHARM REV CODE 636 W HCPCS: Performed by: NURSE PRACTITIONER

## 2022-10-26 PROCEDURE — 96401 CHEMO ANTI-NEOPL SQ/IM: CPT

## 2022-10-26 RX ORDER — HEPARIN 100 UNIT/ML
500 SYRINGE INTRAVENOUS
Status: DISCONTINUED | OUTPATIENT
Start: 2022-10-26 | End: 2022-10-26 | Stop reason: HOSPADM

## 2022-10-26 RX ORDER — ACETAMINOPHEN 325 MG/1
650 TABLET ORAL
Status: COMPLETED | OUTPATIENT
Start: 2022-10-26 | End: 2022-10-26

## 2022-10-26 RX ORDER — DIPHENHYDRAMINE HCL 25 MG
25 CAPSULE ORAL
Status: COMPLETED | OUTPATIENT
Start: 2022-10-26 | End: 2022-10-26

## 2022-10-26 RX ORDER — EPINEPHRINE 0.3 MG/.3ML
0.3 INJECTION SUBCUTANEOUS ONCE AS NEEDED
Status: DISCONTINUED | OUTPATIENT
Start: 2022-10-26 | End: 2022-10-26 | Stop reason: HOSPADM

## 2022-10-26 RX ORDER — DIPHENHYDRAMINE HYDROCHLORIDE 50 MG/ML
50 INJECTION INTRAMUSCULAR; INTRAVENOUS ONCE AS NEEDED
Status: DISCONTINUED | OUTPATIENT
Start: 2022-10-26 | End: 2022-10-26 | Stop reason: HOSPADM

## 2022-10-26 RX ORDER — DEXAMETHASONE 4 MG/1
20 TABLET ORAL
Status: COMPLETED | OUTPATIENT
Start: 2022-10-26 | End: 2022-10-26

## 2022-10-26 RX ORDER — SODIUM CHLORIDE 0.9 % (FLUSH) 0.9 %
10 SYRINGE (ML) INJECTION
Status: DISCONTINUED | OUTPATIENT
Start: 2022-10-26 | End: 2022-10-26 | Stop reason: HOSPADM

## 2022-10-26 RX ADMIN — DIPHENHYDRAMINE HYDROCHLORIDE 25 MG: 25 CAPSULE ORAL at 03:10

## 2022-10-26 RX ADMIN — DARATUMUMAB AND HYALURONIDASE-FIHJ (HUMAN RECOMBINANT) 1800 MG: 1800; 30000 INJECTION SUBCUTANEOUS at 03:10

## 2022-10-26 RX ADMIN — DEXAMETHASONE 20 MG: 4 TABLET ORAL at 03:10

## 2022-10-26 RX ADMIN — ACETAMINOPHEN 650 MG: 325 TABLET, FILM COATED ORAL at 03:10

## 2022-10-28 ENCOUNTER — TELEPHONE (OUTPATIENT)
Dept: ADMINISTRATIVE | Facility: HOSPITAL | Age: 57
End: 2022-10-28
Payer: MEDICARE

## 2022-11-05 ENCOUNTER — DOCUMENTATION ONLY (OUTPATIENT)
Dept: INTERNAL MEDICINE | Facility: CLINIC | Age: 57
End: 2022-11-05
Payer: MEDICARE

## 2022-11-08 ENCOUNTER — TELEPHONE (OUTPATIENT)
Dept: ADMINISTRATIVE | Facility: HOSPITAL | Age: 57
End: 2022-11-08
Payer: MEDICARE

## 2022-11-11 ENCOUNTER — LAB VISIT (OUTPATIENT)
Dept: LAB | Facility: HOSPITAL | Age: 57
End: 2022-11-11
Attending: INTERNAL MEDICINE
Payer: COMMERCIAL

## 2022-11-11 ENCOUNTER — INFUSION (OUTPATIENT)
Dept: INFUSION THERAPY | Facility: HOSPITAL | Age: 57
End: 2022-11-11
Attending: INTERNAL MEDICINE
Payer: COMMERCIAL

## 2022-11-11 VITALS
HEART RATE: 99 BPM | OXYGEN SATURATION: 95 % | DIASTOLIC BLOOD PRESSURE: 86 MMHG | RESPIRATION RATE: 16 BRPM | SYSTOLIC BLOOD PRESSURE: 159 MMHG | TEMPERATURE: 98 F

## 2022-11-11 DIAGNOSIS — E85.9 AMYLOIDOSIS, UNSPECIFIED TYPE: Primary | ICD-10-CM

## 2022-11-11 DIAGNOSIS — C50.819 OVERLAPPING MALIGNANT NEOPLASM OF FEMALE BREAST, UNSPECIFIED ESTROGEN RECEPTOR STATUS, UNSPECIFIED LATERALITY: ICD-10-CM

## 2022-11-11 DIAGNOSIS — C90.00 MULTIPLE MYELOMA NOT HAVING ACHIEVED REMISSION: ICD-10-CM

## 2022-11-11 LAB
ALBUMIN SERPL-MCNC: 3.3 GM/DL (ref 3.5–5)
ALBUMIN/GLOB SERPL: 1.1 RATIO (ref 1.1–2)
ALP SERPL-CCNC: 94 UNIT/L (ref 40–150)
ALT SERPL-CCNC: 15 UNIT/L (ref 0–55)
AST SERPL-CCNC: 23 UNIT/L (ref 5–34)
BASOPHILS # BLD AUTO: 0.01 X10(3)/MCL (ref 0–0.2)
BASOPHILS NFR BLD AUTO: 0.4 %
BILIRUBIN DIRECT+TOT PNL SERPL-MCNC: 0.2 MG/DL
BUN SERPL-MCNC: 16 MG/DL (ref 9.8–20.1)
CALCIUM SERPL-MCNC: 9.2 MG/DL (ref 8.4–10.2)
CHLORIDE SERPL-SCNC: 104 MMOL/L (ref 98–107)
CO2 SERPL-SCNC: 30 MMOL/L (ref 22–29)
CREAT SERPL-MCNC: 2.38 MG/DL (ref 0.55–1.02)
EOSINOPHIL # BLD AUTO: 0.14 X10(3)/MCL (ref 0–0.9)
EOSINOPHIL NFR BLD AUTO: 5.4 %
ERYTHROCYTE [DISTWIDTH] IN BLOOD BY AUTOMATED COUNT: 13.7 % (ref 11.5–17)
GFR SERPLBLD CREATININE-BSD FMLA CKD-EPI: 23 MLS/MIN/1.73/M2
GLOBULIN SER-MCNC: 2.9 GM/DL (ref 2.4–3.5)
GLUCOSE SERPL-MCNC: 96 MG/DL (ref 74–100)
HCT VFR BLD AUTO: 33.6 % (ref 37–47)
HGB BLD-MCNC: 10.5 GM/DL (ref 12–16)
IGA SERPL-MCNC: 47 MG/DL (ref 65–421)
IGG SERPL-MCNC: 709 MG/DL (ref 522–1631)
IGM SERPL-MCNC: 7 MG/DL (ref 33–293)
IMM GRANULOCYTES # BLD AUTO: 0.01 X10(3)/MCL (ref 0–0.04)
IMM GRANULOCYTES NFR BLD AUTO: 0.4 %
LYMPHOCYTES # BLD AUTO: 0.61 X10(3)/MCL (ref 0.6–4.6)
LYMPHOCYTES NFR BLD AUTO: 23.6 %
MCH RBC QN AUTO: 29.6 PG (ref 27–31)
MCHC RBC AUTO-ENTMCNC: 31.3 MG/DL (ref 33–36)
MCV RBC AUTO: 94.6 FL (ref 80–94)
MONOCYTES # BLD AUTO: 0.45 X10(3)/MCL (ref 0.1–1.3)
MONOCYTES NFR BLD AUTO: 17.4 %
NEUTROPHILS # BLD AUTO: 1.4 X10(3)/MCL (ref 2.1–9.2)
NEUTROPHILS NFR BLD AUTO: 52.8 %
PLATELET # BLD AUTO: 186 X10(3)/MCL (ref 130–400)
PMV BLD AUTO: 8.6 FL (ref 7.4–10.4)
POTASSIUM SERPL-SCNC: 3.2 MMOL/L (ref 3.5–5.1)
PROT SERPL-MCNC: 6.2 GM/DL (ref 6.4–8.3)
RBC # BLD AUTO: 3.55 X10(6)/MCL (ref 4.2–5.4)
SODIUM SERPL-SCNC: 142 MMOL/L (ref 136–145)
WBC # SPEC AUTO: 2.6 X10(3)/MCL (ref 4.5–11.5)

## 2022-11-11 PROCEDURE — 36415 COLL VENOUS BLD VENIPUNCTURE: CPT

## 2022-11-11 PROCEDURE — 96401 CHEMO ANTI-NEOPL SQ/IM: CPT

## 2022-11-11 PROCEDURE — 84165 PROTEIN E-PHORESIS SERUM: CPT

## 2022-11-11 PROCEDURE — 82784 ASSAY IGA/IGD/IGG/IGM EACH: CPT

## 2022-11-11 PROCEDURE — 83521 IG LIGHT CHAINS FREE EACH: CPT | Mod: 91

## 2022-11-11 PROCEDURE — 25000003 PHARM REV CODE 250: Performed by: INTERNAL MEDICINE

## 2022-11-11 PROCEDURE — 85025 COMPLETE CBC W/AUTO DIFF WBC: CPT

## 2022-11-11 PROCEDURE — 63600175 PHARM REV CODE 636 W HCPCS: Performed by: INTERNAL MEDICINE

## 2022-11-11 PROCEDURE — 80053 COMPREHEN METABOLIC PANEL: CPT

## 2022-11-11 RX ORDER — DEXAMETHASONE 4 MG/1
12 TABLET ORAL
Status: COMPLETED | OUTPATIENT
Start: 2022-11-11 | End: 2022-11-11

## 2022-11-11 RX ORDER — ACETAMINOPHEN 325 MG/1
650 TABLET ORAL
Status: COMPLETED | OUTPATIENT
Start: 2022-11-11 | End: 2022-11-11

## 2022-11-11 RX ORDER — DIPHENHYDRAMINE HCL 25 MG
25 CAPSULE ORAL
Status: COMPLETED | OUTPATIENT
Start: 2022-11-11 | End: 2022-11-11

## 2022-11-11 RX ORDER — SODIUM CHLORIDE 0.9 % (FLUSH) 0.9 %
10 SYRINGE (ML) INJECTION
Status: DISCONTINUED | OUTPATIENT
Start: 2022-11-11 | End: 2022-11-11 | Stop reason: HOSPADM

## 2022-11-11 RX ORDER — EPINEPHRINE 0.3 MG/.3ML
0.3 INJECTION SUBCUTANEOUS ONCE AS NEEDED
Status: DISCONTINUED | OUTPATIENT
Start: 2022-11-11 | End: 2022-11-11 | Stop reason: HOSPADM

## 2022-11-11 RX ORDER — DIPHENHYDRAMINE HYDROCHLORIDE 50 MG/ML
50 INJECTION INTRAMUSCULAR; INTRAVENOUS ONCE AS NEEDED
Status: DISCONTINUED | OUTPATIENT
Start: 2022-11-11 | End: 2022-11-11 | Stop reason: HOSPADM

## 2022-11-11 RX ORDER — HEPARIN 100 UNIT/ML
500 SYRINGE INTRAVENOUS
Status: DISCONTINUED | OUTPATIENT
Start: 2022-11-11 | End: 2022-11-11 | Stop reason: HOSPADM

## 2022-11-11 RX ADMIN — DEXAMETHASONE 12 MG: 4 TABLET ORAL at 11:11

## 2022-11-11 RX ADMIN — ACETAMINOPHEN 650 MG: 325 TABLET, FILM COATED ORAL at 11:11

## 2022-11-11 RX ADMIN — DIPHENHYDRAMINE HYDROCHLORIDE 25 MG: 25 CAPSULE ORAL at 11:11

## 2022-11-11 RX ADMIN — DARATUMUMAB AND HYALURONIDASE-FIHJ (HUMAN RECOMBINANT) 1800 MG: 1800; 30000 INJECTION SUBCUTANEOUS at 11:11

## 2022-11-12 ENCOUNTER — TELEPHONE (OUTPATIENT)
Dept: HEMATOLOGY/ONCOLOGY | Facility: HOSPITAL | Age: 57
End: 2022-11-12
Payer: MEDICARE

## 2022-11-12 NOTE — TELEPHONE ENCOUNTER
Call the patient and instructed to take extra 10 mEq of Kcl today and tomorrow (3 doses) and Monday go back to 10 mEq bid

## 2022-11-14 LAB
ALBUMIN % SPEP (OHS): 48.62 (ref 48.1–59.5)
ALBUMIN SERPL-MCNC: 3 GM/DL (ref 3.5–5)
ALBUMIN/GLOB SERPL: 1 RATIO (ref 1.1–2)
ALPHA 1 GLOB (OHS): 0.22 GM/DL (ref 0–0.4)
ALPHA 1 GLOB% (OHS): 3.62 (ref 2.3–4.9)
ALPHA 2 GLOB % (OHS): 18.79 (ref 6.9–13)
ALPHA 2 GLOB (OHS): 1.15 GM/DL (ref 0.4–1)
BETA GLOB (OHS): 1.12 GM/DL (ref 0.7–1.3)
BETA GLOB% (OHS): 18.32 (ref 13.8–19.7)
GAMMA GLOBULIN % (OHS): 10.65 (ref 10.1–21.9)
GAMMA GLOBULIN (OHS): 0.65 GM/DL (ref 0.4–1.8)
GLOBULIN SER-MCNC: 3.1 GM/DL (ref 2.4–3.5)
KAPPA LC FREE SER-MCNC: 2.86 MG/DL (ref 0.33–1.94)
KAPPA LC FREE/LAMBDA FREE SER: 1.8 {RATIO} (ref 0.26–1.65)
LAMBDA LC FREE SERPL-MCNC: 1.59 MG/DL (ref 0.57–2.63)
M SPIKE % (OHS): ABNORMAL
M SPIKE (OHS): ABNORMAL
PATH REV: NORMAL
PROT SERPL-MCNC: 6.1 GM/DL (ref 6.4–8.3)

## 2022-11-23 ENCOUNTER — INFUSION (OUTPATIENT)
Dept: INFUSION THERAPY | Facility: HOSPITAL | Age: 57
End: 2022-11-23
Attending: INTERNAL MEDICINE
Payer: MEDICARE

## 2022-11-23 ENCOUNTER — OFFICE VISIT (OUTPATIENT)
Dept: HEMATOLOGY/ONCOLOGY | Facility: CLINIC | Age: 57
End: 2022-11-23
Payer: MEDICARE

## 2022-11-23 VITALS — WEIGHT: 217 LBS | BODY MASS INDEX: 38.45 KG/M2 | HEIGHT: 63 IN

## 2022-11-23 VITALS
BODY MASS INDEX: 38.45 KG/M2 | SYSTOLIC BLOOD PRESSURE: 124 MMHG | WEIGHT: 217 LBS | OXYGEN SATURATION: 97 % | TEMPERATURE: 98 F | HEART RATE: 100 BPM | DIASTOLIC BLOOD PRESSURE: 77 MMHG | HEIGHT: 63 IN

## 2022-11-23 DIAGNOSIS — C50.819 OVERLAPPING MALIGNANT NEOPLASM OF FEMALE BREAST, UNSPECIFIED ESTROGEN RECEPTOR STATUS, UNSPECIFIED LATERALITY: ICD-10-CM

## 2022-11-23 DIAGNOSIS — C90.00 MULTIPLE MYELOMA NOT HAVING ACHIEVED REMISSION: Primary | ICD-10-CM

## 2022-11-23 DIAGNOSIS — E85.9 AMYLOIDOSIS, UNSPECIFIED TYPE: Primary | ICD-10-CM

## 2022-11-23 PROCEDURE — 99215 PR OFFICE/OUTPT VISIT, EST, LEVL V, 40-54 MIN: ICD-10-PCS | Mod: S$PBB,,, | Performed by: NURSE PRACTITIONER

## 2022-11-23 PROCEDURE — 25000003 PHARM REV CODE 250: Performed by: INTERNAL MEDICINE

## 2022-11-23 PROCEDURE — 63600175 PHARM REV CODE 636 W HCPCS: Mod: TB | Performed by: INTERNAL MEDICINE

## 2022-11-23 PROCEDURE — 99215 OFFICE O/P EST HI 40 MIN: CPT | Mod: S$PBB,,, | Performed by: NURSE PRACTITIONER

## 2022-11-23 PROCEDURE — 99999 PR PBB SHADOW E&M-EST. PATIENT-LVL V: CPT | Mod: PBBFAC,,, | Performed by: NURSE PRACTITIONER

## 2022-11-23 PROCEDURE — 96401 CHEMO ANTI-NEOPL SQ/IM: CPT

## 2022-11-23 PROCEDURE — 99215 OFFICE O/P EST HI 40 MIN: CPT | Mod: PBBFAC,25 | Performed by: NURSE PRACTITIONER

## 2022-11-23 PROCEDURE — 99999 PR PBB SHADOW E&M-EST. PATIENT-LVL V: ICD-10-PCS | Mod: PBBFAC,,, | Performed by: NURSE PRACTITIONER

## 2022-11-23 RX ORDER — ONDANSETRON HYDROCHLORIDE 8 MG/1
8 TABLET, FILM COATED ORAL EVERY 8 HOURS PRN
Qty: 90 TABLET | Refills: 0 | Status: SHIPPED | OUTPATIENT
Start: 2022-11-23 | End: 2023-01-10 | Stop reason: SDUPTHER

## 2022-11-23 RX ORDER — ACETAMINOPHEN 325 MG/1
650 TABLET ORAL
Status: COMPLETED | OUTPATIENT
Start: 2022-11-23 | End: 2022-11-23

## 2022-11-23 RX ORDER — HEPARIN 100 UNIT/ML
500 SYRINGE INTRAVENOUS
Status: DISCONTINUED | OUTPATIENT
Start: 2022-11-23 | End: 2022-11-23 | Stop reason: HOSPADM

## 2022-11-23 RX ORDER — EPINEPHRINE 0.3 MG/.3ML
0.3 INJECTION SUBCUTANEOUS ONCE AS NEEDED
Status: DISCONTINUED | OUTPATIENT
Start: 2022-11-23 | End: 2022-11-23 | Stop reason: HOSPADM

## 2022-11-23 RX ORDER — DIPHENHYDRAMINE HCL 25 MG
25 CAPSULE ORAL
Status: COMPLETED | OUTPATIENT
Start: 2022-11-23 | End: 2022-11-23

## 2022-11-23 RX ORDER — SODIUM CHLORIDE 0.9 % (FLUSH) 0.9 %
10 SYRINGE (ML) INJECTION
Status: DISCONTINUED | OUTPATIENT
Start: 2022-11-23 | End: 2022-11-23 | Stop reason: HOSPADM

## 2022-11-23 RX ORDER — DIPHENHYDRAMINE HYDROCHLORIDE 50 MG/ML
50 INJECTION INTRAMUSCULAR; INTRAVENOUS ONCE AS NEEDED
Status: DISCONTINUED | OUTPATIENT
Start: 2022-11-23 | End: 2022-11-23 | Stop reason: HOSPADM

## 2022-11-23 RX ADMIN — DARATUMUMAB AND HYALURONIDASE-FIHJ (HUMAN RECOMBINANT) 1800 MG: 1800; 30000 INJECTION SUBCUTANEOUS at 03:11

## 2022-11-23 RX ADMIN — DIPHENHYDRAMINE HYDROCHLORIDE 25 MG: 25 CAPSULE ORAL at 03:11

## 2022-11-23 RX ADMIN — ACETAMINOPHEN 650 MG: 325 TABLET, FILM COATED ORAL at 03:11

## 2022-11-23 NOTE — PROGRESS NOTES
HEMATOLOGY/ONCOLOGY OFFICE CLINIC VISIT    Visit Information:    NO SHOW  08/21/2018--> Rescheduled                              11/05/2019--> No Show/Reschedule                           06/24/2020--> No Show/Reschedule                           09/24/2020--> No Show/Reschedule                           03/15/2021--> No Show/Reschedule                           03/23/2021--> No Show/Reschedule                           04/13/2021--> No Show                           05/27/2021--> No Show                           08/19/2021--> No Show                           08/26/2021--> No Show/Rescheduled                           04/26/2022--> No Show                           10/03/2022--> Rescheduled                           10/11/2022--> Rescheduled    Referring Physician: DR. Cardona  PCP: Dr. Chacon  GI: Dr. Johnny Bhardwaj  Rheumatologist: Dr. Luis Rea  Immunologist: Dr. Daniel Nava  ENT: Dr. Brice Williamson  [facility] Pain management: Dr.Chai LONG Bogard Transplant: Dr. Adrian Naylor MD Bogard Med Onc: Dr. Zulema LONG Miguel Breast Surg Onc: Dr.DeSnyder LONG Bogard: Dr. Carrasco     Present treatment:  Maintenance Revlimid 5mg PO QOD, started 02/16/17-- was held for a few months while undergoing treatment for her breast cancer 02/18-05/18; Restarted 6/15/18   Dexamethasone 20 mg po weekly added early July 2017--> reduced to 12 mg PO weekly October 4, 2017---> increased to 16mg PO weekly 2/15/18---restarted 6/15/18 --> 20 mg weekly 7//8/2022  Darzalex 7/8/2022-present  Femara 2.5 mg PO daily   Eliquis 2.5mg PO BID      Treatment/Oncology history:  1) CyBorD x5 cycles 09/28/15-12/24/15  2) Autologous stem cell transplant 02/08/16, done at [facility]  3) Exploratory laparotomy with subtotal colectomy and end ileostomy done August 2, 2016--pathology specimen showed advanced colonic amyloidosis extensive involving the muscular wall submucosa and mucosa.  Appendix also involvement by amyloidosis with fibrous  obliteration of the distal lumen.  4) Maintenance therapy with Revlimid 5mg-started 06/01/16--Discontinued shortly after 2/2 cytopenias  5) Left breast lumpectomy with SLNB at [facility] 4/12/18  6) Left partial breast RT x10 fractions  5/21/18-6/4/1    Plan of care:       Imaging:      Pathology:    CLINICAL HISTORY:       Patient: Ninfa Candelario is a 57 y.o. female.  Ms. Cabrera Joseph was admitted on 08/16/15 for ileus versus partial SBO. CT A/P done 08/17/15 showed sigmoid colitis and a zone of transition at the junction of the descending and sigmoid colon which could indicate some degree of obstruction and an obstructing mass cannot be excluded. Numerous skeletal lytic lesions noted. CT chest done 08/19/15 showed Multiple skeletal lytic lesions involving the thoracic spine, left rib sternum consistent with either metastases or multiple myeloma. There is no evidence of pulmonary or mediastinal mass. Dr. Farr was consulted for possible MM/anemia.     Nuclear Bone scan done 8/20/15 showed mild to moderate increased activity in left rib and right second rib which correlates with fractures seen on CT.  Skeletal survey done 8/20/15showed lytic lesions in the calvarium and probably a lytic lesion in the left scapula. Lytic areas in the spine and pelvis seen on recent CT are not well defined on skeletal survey. Work up labs done 08/20/15: Negative for sickle cell and Thalessemia (reported history of thalessemia). Iron 54, Transferrin 118.0, Iron sat 34.6%, Folate 26.5, Vit B12 1,779  Peripheral smear resulted slightly macrocytic normochromic anemia without signifcant anisocytosis may reflect early B12/folate deficiency or marrow dysfunction, among others. No immature myeloid cells or blasts. Platlets adequate. Beta 2 mircoglobulin = 3.2.  SPEP/NADIA: M-spike in the beta region. The monoclonal protein peak accounts for 1.13 g/dL. Hypogammaglobulinemia. NADIA pattern shows the presence of a free lambda light chain monoclonal  protein with additional faint band in IgA.  All immunoglobulin levels decreased. IgA=26, IgG=307, IgM<5.  Kappa Qnt 0.16, Lambda Qnt 4600.00, Ratio <0.01.  24hr Urine for Bence Mendez: Kappa 2.75, Lambda 1250.00, Ratio <0.01. NADIA: Urine is POSITIVE for monoclonal Free Lambda Light chains     Patient then opted to go to [facility] where she underwent a bone marrow biopsy on 09/18/15. BMBx showed 80% plasma cells, 13p deletion and normal karyotype. She underwent fat pad biopsy which came back positive for Congo red consistent with amyloidosis. Cardiac workup revealed no amyloid deposition within the heart.  PET/CT and skeletal survey done September 18, 2015 showed multiple lytic osseous lesions  The patient was started on Velcade while at [facility] with the plan to transition to autologous stem cell transplantation. They asked if we could give her could continue treatment here.   She completed CyborD x5 cycles on 12/24/15     Patient admitted 01/06/16 for colitis and C. Diff. Pt treated with Flagyl. Discharged home 01/08/16     Repeat BMBx done at [facility] 01/2016 did not show any morphologic or immnophenotypic evidence of plasama cell neoplasm. Patient underwent Autologous stem cell transplant with Busulfan and melphalan on 02/08/16 at [facility]. The only complication was recurrent C.diff infection for which she completed 10 days of Fidaxomycin.     Follow-up bone marrow biopsy done at [facility] done 5/16/16 showed cellular 30-40% bone marrow with trilineage hematopoiesis. No morphologic or immunophenotypic support for plasma cell neoplasm. Started maintenance Revlimid 5mg. Unable to continue therapy due to cytopenias.     On 08/02/16 patient underwent  Exploratory laparotomy with subtotal colectomy and end ileostomy for what was thought to be toxic megacolon. Pathology showed extensive advanced colonic amyloidosis involving the muscular wall, submucosa and mucosa: With the appendix also involved with amyloidosis.  Positive lambda light  chains were noted.     Follow-up at [facility] 8/31/16, Per Dr. Adrian Naylor, 6 months post autologous transplant. She has engrafted. Based on the latest restaging she is near complete remission with possible IgA lambda on serum immunofixation. Patient was instructed to discontinue prophylactic antibiotics. She received her 1st series of immunizations while at [facility]. Patient had a bilateral venous ultrasound to rule out DVT, negative B/L. In regards to amyloidosis the patient feels that her tongue is smaller in size and her BNP has come down some.  Patient had a follow-up appointment at [facility] November 30, 2016.  Dr. Parnell documented the patient's free lambda light chain remains at a lower level.  Therefore in light of her recent surgery they will continue with observation only.  The plan to see the patient back in 2-3 months with repeat restaging labs.  They recommended regular CBC checks to monitor anemia.  Patient returned to [facility] in December 2016 to meet with dermatology for numerous papillary lesions on eyelids, chest and posterior neck consistent with amyloid deposits. Recommendations were for watchful waiting.  Patient is less than 1 year out from bone marrow transplant and further improvement can be expected.  She will see the patient back in 1 year to discuss possible surgical resection of the plaques especially around the eyelid region.  Rogaine recommended for persistent hair loss. Retin-A recommended for acne vulgaris.  Patient returned to [facility] on 2/8/2017 for neurosurgery consult for chronic low back pain and difficulty walking.  Imaging showed extensive DJD with discovertebral bulges and thickening of the spinal laminar tissues creating spinal stenosis throughout, but greatest at L2/L3.  Neurosurgery felt that surgery risks outweighed the benefits.  Patient was prescribed pain medicine and encouraged to participate in physical therapy.  Patient also met with   Parmjit on 02/08/17, who noted an elevation in free light chains (kappa 52.60/lambda 27.77/ratio 1.89).  Recommendation is to resume maintenance therapy with Revlimid at a low dose of 5 mg every other day.  Patient went back to [facility] first week of April 2017.  I do not have these notes.  Reportedly she was told to continue on every other day Revlimid at 5 mg.  She reportedly had repeat myeloma labs done as well.  Again I do not have these results.  Patient went back to [facility] and saw Dr. Parnell June 29, 2017.  Myeloma labs were done with serum protein electrophoresis showing irregularity in the fast gamma region.  IgG of 1291.  Free kappa light chains of 84.6 with lambda light chains of 66.9.  Beta-2 microglobulin of 4.5  CT scan of lumbar spine showed multiple lytic lesions once again in the lumbar spine and bony pelvis.  Ultrasound of the left leg showed no evidence of DVT.  It was recommended based on the slight increase in her And lambda light chains to start weekly dexamethasone 20 mg p.o. weekly.  Follow-up visit and labs at [facility] on September 29, 2017 with Dr. Parnell.  Repeat beta-2 microglobulin came back at 2.4.  Serum IgG of 761, IgA 117 and IgM of 29.  Serum free kappa light chains of 24.9 and lambda of 23.5.  Counts were stable with hemoglobin of 11.1, WBC 4.9 and platelets of 210,000.  Recommendations were for continued Revlimid every other day with weekly dexamethasone along with aspirin for DVT prophylaxis.  Bilateral diagnostic MMG 12/19/17  noted 1.6cm coarse heterogeneous calcifications in posterior region of L breast at 3 o'clock position. Patient was scheduled for FNA which confirmed ID grade 3, single focus measuring 1.7cm with 2nd focus microinvasion DCIS grade 2 measuring 0.3cm. Left axillary LN biopsy showed no evidence of metastatic carcinoma. Complete staging: Stage IA, grade 3 ER+, Her 2 Niki + IDC/DCIS of left breast. Ki-67 <17%.  Repeat myeloma labs showed rise in free lambda  light chains noted at 68.69, kappa light chains at 27.22,  beta 2 microglobulin came back at 2.9. Serum protein electrophoresis showed no definitive evidence of an M protein peak. The pattern is consistent with an acute inflammatory reaction. Recommendations were made to maintain Revlimid at current dose of 5mg every other day to be taken continuously increase weekly dexamethasone to 16 mg.   Patient seen at [facility] with tentative plan for L breast lumpectomy on 3/13/18. 2/16/18- Prior to surgery she was seen by genetic counselor who ran genetic testing per patient reques, all of which were negative. She was then seen by cardiology at [facility] 2/16/18 for further evaluation of persistent bilateral lower extremity swelling-ECHO noted EF of 58%; diagnosed with paroxysmal atrial fibrillation and instructed to begin daily ASA. During preoperative asssessment per Rad/Onc 3/12/18, corresponding chest CT noted new bilateral pulmonary nodules concerning for metastatic disease- surgery was subsequently postponed pending pulmonary evaluation. Seen by Pulmonology on 3/21/18, PFTs within normal limits and cleared patient for surgery with recommended close CT follow up of nodules in 3 months. 3/21/18 seen by nephrology for management of mild CKD (GFR 55)-cleared for surgery with recommended follow up in 3 months. Left breast lumpectomy and SLNB performed per Dr. Washburn on 4/12/18- plan for follow up in clinic on 4/24/18  for postoperative assessment. Follow up with Dr. Poole (Med/Onc) on 4/25/18. Follow up with Dr. Parnell 4/26/18.   Patient seen at [facility] s/p Left breast lumpectomy with SLNB on 4/12/18. Following surgery she completed Left partial breast radiation x 10 fractions. She was then started on endocrine therapy with Femara after been deemedan inappropriate candidate for systemic chemotherapy/Herceptin.      She was seen by neurosugery at [facility] on 4/26/18 following repeat MRI of cervical thoracic lumbar  spine which noted focal enhancement of T2 hyperintensity at the C2 level which may be due to degenerative changes. Signal abnormality within  the T12 and L1 may be secondary to myeloma. Plan to continue with observation for now with F/U scheduled in October 2018.      She was seen by Dr. Parnell at [facility] for management of her MM on 4/26/18. Patient was recommended to restart Revlimid 5mg PO every other day with notes that these recommendations would be communicated to collaborating Oncologist. Patient was also recommended to start on Zometa for her bone disease.      Seen by Dr. Parnell at [facility] for management of her MM on 6/28/18. Repeat light chains noted lambda 33.36 (previously 80.80), K/L ratio 0.91 (previously 0.49). Due to slight improvement in light chains, plan to continue on lenalidomide maintenance. Recommendations to continue anticoagulation with Eliquis. BLE venous doppler negative for DVT.   Seen by pulmonolgy on 6/28/18- repeat CT chest done 6/28/18 noted resolution of previous pulmonary nodules. Thought to be infectious in nature. Recommendations for discharge from pulmonary clinic.  Should MRI of her cervical thoracic lumbar spine on 2/12/2019 that demonstrated cervical spondylosis with canal stenosis most notable at C1 and 2. Cord signal abnormality at C1 and 2 with enhancement is stable. Lumbar spondylosis with central canal stenosis at multiple levels. No imaging features of bony metastatic disease.     For amyloidosis (Light chain type).    Patient presented with macroglossia and now with improvement of the swelling of her tongue. Her cardiac parameters are also better.   8/10/2020 proBNP  250   2/17/2020                616  8/9/2019                  190        Chief Complaint: Would like to start PT at [facility] physical therapy.      Interval History:  Patient presents today for TD and labs for daratumumab. (Then she will get daratumumab q2 weeks x 4 doses then q4 weeks). She is  "currently on Femara for breast cancer and Ninlaro/Rev/Dex/Darzalex  for MM  s/p transplant in 2016. Her last visit at [facility] was on 10/12/22, Ninlaro was discontinued due to it causing severe diarrhea and leukopenia. She went to urgent care for IVF. She looks better today and is doing well. Diarrhea resolved after stopping Ninlaro (she admits to stopping prior to visit @ [facility]). No fever, chills or sweats.  No chest pain or shortness of breath.  She uses a walker for mobility.  She continues to do her mammograms and other imaging studies at [facility].          ROS:  All 14 points ROS taken and as per Interval History    Histories:  PMH/PSH/FH/SOCIAL/ALLERGIES AND MEDS REVIEWED AND UPDATED AS APPROPRIATE       Vitals:    11/23/22 1417   BP: 124/77   BP Location: Left arm   Patient Position: Sitting   Pulse: 100   Temp: 98.2 °F (36.8 °C)   TempSrc: Oral   SpO2: 97%   Weight: 98.4 kg (217 lb)   Height: 5' 3" (1.6 m)        Physical Exam  Constitutional:       Appearance: She is ill-appearing.      Comments: Uses walker for mobility   HENT:      Head: Normocephalic.   Eyes:      Extraocular Movements: Extraocular movements intact.   Neck:      Comments: swelling  Cardiovascular:      Rate and Rhythm: Normal rate and regular rhythm.   Pulmonary:      Effort: Pulmonary effort is normal.      Breath sounds: Normal breath sounds.   Abdominal:      General: Bowel sounds are normal.      Palpations: Abdomen is soft.      Tenderness: There is no abdominal tenderness.   Musculoskeletal:      Cervical back: Neck supple.   Skin:     Coloration: Skin is not jaundiced.      Findings: No rash.   Neurological:      Mental Status: She is alert.      Cranial Nerves: Cranial nerves 2-12 are intact.      Motor: Weakness present.      Gait: Gait abnormal.   Psychiatric:         Attention and Perception: Attention normal.         Cognition and Memory: Cognition normal.     ECOG SCORE             Laboratory:  CBC with Differential:  Lab " Results   Component Value Date    WBC 3.6 (L) 11/23/2022    RBC 3.61 (L) 11/23/2022    HGB 10.6 (L) 11/23/2022    HCT 34.4 (L) 11/23/2022    MCV 95.3 (H) 11/23/2022    MCH 29.4 11/23/2022    MCHC 30.8 (L) 11/23/2022    RDW 13.7 11/23/2022     11/23/2022    MPV 8.7 11/23/2022        CMP:  Sodium Level   Date Value Ref Range Status   11/11/2022 142 136 - 145 mmol/L Final     Potassium Level   Date Value Ref Range Status   11/11/2022 3.2 (L) 3.5 - 5.1 mmol/L Final     Carbon Dioxide   Date Value Ref Range Status   11/11/2022 30 (H) 22 - 29 mmol/L Final     Blood Urea Nitrogen   Date Value Ref Range Status   11/11/2022 16.0 9.8 - 20.1 mg/dL Final     Creatinine   Date Value Ref Range Status   11/11/2022 2.38 (H) 0.55 - 1.02 mg/dL Final     Calcium Level Total   Date Value Ref Range Status   11/11/2022 9.2 8.4 - 10.2 mg/dL Final     Albumin Level   Date Value Ref Range Status   11/11/2022 3.3 (L) 3.5 - 5.0 gm/dL Final   11/11/2022 3.0 (L) 3.5 - 5.0 gm/dL Final     Bilirubin Total   Date Value Ref Range Status   11/11/2022 0.2 <=1.5 mg/dL Final     Alkaline Phosphatase   Date Value Ref Range Status   11/11/2022 94 40 - 150 unit/L Final     Aspartate Aminotransferase   Date Value Ref Range Status   11/11/2022 23 5 - 34 unit/L Final     Alanine Aminotransferase   Date Value Ref Range Status   11/11/2022 15 0 - 55 unit/L Final     Estimated GFR-Non    Date Value Ref Range Status   04/20/2022 25               Assessment:       1. Multiple myeloma not having achieved remission    2. 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 Sx risks do not outweigh benefits. Pain meds given and encouraged Physical Therpay  8) Amyloid plaques, evaluated by Derm at [facility], recommend watchful waiting. Patient to follow up 12/2017  9) Stage IA grade 3, ER+, HER2 BERNA +,  IDC/DCIS of the left breast --- 2/7/18 at [facility]; s/p lumpectomy with SLNB 4/12, Grade 2 IDC measuring 4mm with second focus of microinvasive carcinoma measuring  0.4mm .IG DCIS, 0.3mm to posterior margin; 2 SLN negative for malignancy    10) Progressive swelling of R lower extremity--- US NIVA done 2/19/18 negative for evidence of DVT  11). Paroxysmal Afib (2/18/18) diagnosed at [facility]; ECHO noted EF 58%  12. Mild CKD with GFR 55 - managed per [facility] nephrology  13). Pulmonary nodules noted on pre-operative chest CT scan (3/12/18) noted new bilateral pulmonary nodules are nonspecific -- seen by Pulmonology at [facility] (3/21/18) thought to be inflammatory/infectious in nature, repeat Chest CT 6/28/18 noted resolution of nodules  14). Treatment induced neutropenia        Plan:       Daratumumab weekly x 8 weeks then q2 weeks x 4 doses (8 weeks) then q4 weeks.   TWO more q2week doses (after today) then q4 weeks  Agree with [facility] to discontinue Ninlaro as it caused diarrhea, diarrhea now resolved  Zometa is on hold for now  Cont low dose Revlimid 5 mg q other day  Dexamethasone 20 mg weekly - eprescribed.   RTC in 8 weeks for TD with labs- she will be at [facility] next month.    Mammograms done at [facility]  Bone density with osteopenia--will start Prolia once she has dental clearance. Will see dentist at [facility]. Still needs another root canal- has to pay out of pocket for it.     Encourage to call or message us for any questions or  problems  The patient was given ample opportunity to ask questions, and to the best of my abilities, all questions answered to satisfaction; patient demonstrated understanding of what we discussed and agreeable to the plan.     RORY HuddlestonP

## 2022-12-05 RX ORDER — ACETAMINOPHEN 325 MG/1
650 TABLET ORAL
Status: CANCELLED | OUTPATIENT
Start: 2023-01-09

## 2022-12-05 RX ORDER — DIPHENHYDRAMINE HCL 25 MG
25 CAPSULE ORAL
Status: CANCELLED | OUTPATIENT
Start: 2022-12-07

## 2022-12-05 RX ORDER — HEPARIN 100 UNIT/ML
500 SYRINGE INTRAVENOUS
Status: CANCELLED | OUTPATIENT
Start: 2023-01-09

## 2022-12-05 RX ORDER — EPINEPHRINE 0.3 MG/.3ML
0.3 INJECTION SUBCUTANEOUS ONCE AS NEEDED
Status: CANCELLED | OUTPATIENT
Start: 2022-12-07

## 2022-12-05 RX ORDER — SODIUM CHLORIDE 0.9 % (FLUSH) 0.9 %
10 SYRINGE (ML) INJECTION
Status: CANCELLED | OUTPATIENT
Start: 2022-12-07

## 2022-12-05 RX ORDER — HEPARIN 100 UNIT/ML
500 SYRINGE INTRAVENOUS
Status: CANCELLED | OUTPATIENT
Start: 2022-12-07

## 2022-12-05 RX ORDER — DIPHENHYDRAMINE HCL 25 MG
25 CAPSULE ORAL
Status: CANCELLED | OUTPATIENT
Start: 2023-01-09

## 2022-12-05 RX ORDER — DIPHENHYDRAMINE HYDROCHLORIDE 50 MG/ML
50 INJECTION INTRAMUSCULAR; INTRAVENOUS ONCE AS NEEDED
Status: CANCELLED | OUTPATIENT
Start: 2022-12-07

## 2022-12-05 RX ORDER — ACETAMINOPHEN 325 MG/1
650 TABLET ORAL
Status: CANCELLED | OUTPATIENT
Start: 2022-12-07

## 2022-12-05 RX ORDER — DIPHENHYDRAMINE HYDROCHLORIDE 50 MG/ML
50 INJECTION INTRAMUSCULAR; INTRAVENOUS ONCE AS NEEDED
Status: CANCELLED | OUTPATIENT
Start: 2023-01-09

## 2022-12-05 RX ORDER — SODIUM CHLORIDE 0.9 % (FLUSH) 0.9 %
10 SYRINGE (ML) INJECTION
Status: CANCELLED | OUTPATIENT
Start: 2023-01-09

## 2022-12-05 RX ORDER — EPINEPHRINE 0.3 MG/.3ML
0.3 INJECTION SUBCUTANEOUS ONCE AS NEEDED
Status: CANCELLED | OUTPATIENT
Start: 2023-01-09

## 2022-12-07 ENCOUNTER — TELEPHONE (OUTPATIENT)
Dept: INFUSION THERAPY | Facility: HOSPITAL | Age: 57
End: 2022-12-07

## 2022-12-09 ENCOUNTER — INFUSION (OUTPATIENT)
Dept: INFUSION THERAPY | Facility: HOSPITAL | Age: 57
End: 2022-12-09
Attending: INTERNAL MEDICINE
Payer: MEDICARE

## 2022-12-09 ENCOUNTER — LAB VISIT (OUTPATIENT)
Dept: LAB | Facility: HOSPITAL | Age: 57
End: 2022-12-09
Attending: INTERNAL MEDICINE
Payer: COMMERCIAL

## 2022-12-09 VITALS
HEIGHT: 63 IN | WEIGHT: 216.94 LBS | DIASTOLIC BLOOD PRESSURE: 77 MMHG | SYSTOLIC BLOOD PRESSURE: 119 MMHG | HEART RATE: 100 BPM | TEMPERATURE: 98 F | BODY MASS INDEX: 38.44 KG/M2 | RESPIRATION RATE: 18 BRPM

## 2022-12-09 DIAGNOSIS — C90.00 MULTIPLE MYELOMA NOT HAVING ACHIEVED REMISSION: ICD-10-CM

## 2022-12-09 DIAGNOSIS — C50.819 OVERLAPPING MALIGNANT NEOPLASM OF FEMALE BREAST, UNSPECIFIED ESTROGEN RECEPTOR STATUS, UNSPECIFIED LATERALITY: ICD-10-CM

## 2022-12-09 DIAGNOSIS — C90.00 MULTIPLE MYELOMA NOT HAVING ACHIEVED REMISSION: Primary | ICD-10-CM

## 2022-12-09 LAB
ALBUMIN SERPL-MCNC: 3.4 GM/DL (ref 3.5–5)
ALBUMIN/GLOB SERPL: 1.2 RATIO (ref 1.1–2)
ALP SERPL-CCNC: 96 UNIT/L (ref 40–150)
ALT SERPL-CCNC: 17 UNIT/L (ref 0–55)
AST SERPL-CCNC: 23 UNIT/L (ref 5–34)
BASOPHILS # BLD AUTO: 0.03 X10(3)/MCL (ref 0–0.2)
BASOPHILS NFR BLD AUTO: 0.8 %
BILIRUBIN DIRECT+TOT PNL SERPL-MCNC: 0.3 MG/DL
BUN SERPL-MCNC: 23.6 MG/DL (ref 9.8–20.1)
CALCIUM SERPL-MCNC: 9.6 MG/DL (ref 8.4–10.2)
CHLORIDE SERPL-SCNC: 102 MMOL/L (ref 98–107)
CO2 SERPL-SCNC: 24 MMOL/L (ref 22–29)
CREAT SERPL-MCNC: 2.8 MG/DL (ref 0.55–1.02)
EOSINOPHIL # BLD AUTO: 0.2 X10(3)/MCL (ref 0–0.9)
EOSINOPHIL NFR BLD AUTO: 5.3 %
ERYTHROCYTE [DISTWIDTH] IN BLOOD BY AUTOMATED COUNT: 13.4 % (ref 11.5–17)
GFR SERPLBLD CREATININE-BSD FMLA CKD-EPI: 19 MLS/MIN/1.73/M2
GLOBULIN SER-MCNC: 2.8 GM/DL (ref 2.4–3.5)
GLUCOSE SERPL-MCNC: 107 MG/DL (ref 74–100)
HCT VFR BLD AUTO: 36.2 % (ref 37–47)
HGB BLD-MCNC: 11.2 GM/DL (ref 12–16)
IMM GRANULOCYTES # BLD AUTO: 0.01 X10(3)/MCL (ref 0–0.04)
IMM GRANULOCYTES NFR BLD AUTO: 0.3 %
LYMPHOCYTES # BLD AUTO: 0.6 X10(3)/MCL (ref 0.6–4.6)
LYMPHOCYTES NFR BLD AUTO: 15.9 %
MCH RBC QN AUTO: 29.1 PG (ref 27–31)
MCHC RBC AUTO-ENTMCNC: 30.9 MG/DL (ref 33–36)
MCV RBC AUTO: 94 FL (ref 80–94)
MONOCYTES # BLD AUTO: 0.86 X10(3)/MCL (ref 0.1–1.3)
MONOCYTES NFR BLD AUTO: 22.8 %
NEUTROPHILS # BLD AUTO: 2.1 X10(3)/MCL (ref 2.1–9.2)
NEUTROPHILS NFR BLD AUTO: 54.9 %
PLATELET # BLD AUTO: 208 X10(3)/MCL (ref 130–400)
PMV BLD AUTO: 8.9 FL (ref 7.4–10.4)
POTASSIUM SERPL-SCNC: 3.9 MMOL/L (ref 3.5–5.1)
PROT SERPL-MCNC: 6.2 GM/DL (ref 6.4–8.3)
RBC # BLD AUTO: 3.85 X10(6)/MCL (ref 4.2–5.4)
SODIUM SERPL-SCNC: 139 MMOL/L (ref 136–145)
WBC # SPEC AUTO: 3.8 X10(3)/MCL (ref 4.5–11.5)

## 2022-12-09 PROCEDURE — 85025 COMPLETE CBC W/AUTO DIFF WBC: CPT

## 2022-12-09 PROCEDURE — 36415 COLL VENOUS BLD VENIPUNCTURE: CPT

## 2022-12-09 PROCEDURE — 96401 CHEMO ANTI-NEOPL SQ/IM: CPT

## 2022-12-09 PROCEDURE — 63600175 PHARM REV CODE 636 W HCPCS: Mod: TB | Performed by: NURSE PRACTITIONER

## 2022-12-09 PROCEDURE — 80053 COMPREHEN METABOLIC PANEL: CPT

## 2022-12-09 PROCEDURE — 25000003 PHARM REV CODE 250: Performed by: NURSE PRACTITIONER

## 2022-12-09 RX ORDER — DIPHENHYDRAMINE HCL 25 MG
25 CAPSULE ORAL
Status: COMPLETED | OUTPATIENT
Start: 2022-12-09 | End: 2022-12-09

## 2022-12-09 RX ORDER — HEPARIN 100 UNIT/ML
500 SYRINGE INTRAVENOUS
Status: DISCONTINUED | OUTPATIENT
Start: 2022-12-09 | End: 2022-12-09 | Stop reason: HOSPADM

## 2022-12-09 RX ORDER — ACETAMINOPHEN 325 MG/1
650 TABLET ORAL
Status: COMPLETED | OUTPATIENT
Start: 2022-12-09 | End: 2022-12-09

## 2022-12-09 RX ORDER — DIPHENHYDRAMINE HYDROCHLORIDE 50 MG/ML
50 INJECTION INTRAMUSCULAR; INTRAVENOUS ONCE AS NEEDED
Status: DISCONTINUED | OUTPATIENT
Start: 2022-12-09 | End: 2022-12-09 | Stop reason: HOSPADM

## 2022-12-09 RX ORDER — EPINEPHRINE 0.3 MG/.3ML
0.3 INJECTION SUBCUTANEOUS ONCE AS NEEDED
Status: DISCONTINUED | OUTPATIENT
Start: 2022-12-09 | End: 2022-12-09 | Stop reason: HOSPADM

## 2022-12-09 RX ORDER — SODIUM CHLORIDE 0.9 % (FLUSH) 0.9 %
10 SYRINGE (ML) INJECTION
Status: DISCONTINUED | OUTPATIENT
Start: 2022-12-09 | End: 2022-12-09 | Stop reason: HOSPADM

## 2022-12-09 RX ADMIN — ACETAMINOPHEN 650 MG: 325 TABLET, FILM COATED ORAL at 01:12

## 2022-12-09 RX ADMIN — DIPHENHYDRAMINE HYDROCHLORIDE 25 MG: 25 CAPSULE ORAL at 01:12

## 2022-12-09 RX ADMIN — DARATUMUMAB AND HYALURONIDASE-FIHJ (HUMAN RECOMBINANT) 1800 MG: 1800; 30000 INJECTION SUBCUTANEOUS at 01:12

## 2022-12-09 NOTE — PLAN OF CARE
Day 1 Cycle 4 Daratumumab sq. Tolerated treatment without adverse events. Discharged in stable condition.

## 2022-12-22 ENCOUNTER — TELEPHONE (OUTPATIENT)
Dept: HEMATOLOGY/ONCOLOGY | Facility: CLINIC | Age: 57
End: 2022-12-22
Payer: MEDICARE

## 2022-12-22 NOTE — TELEPHONE ENCOUNTER
Patient not feeling well. I have cancelled her infusion appt today. She will keep the infusion appt on 01/04/23.

## 2023-01-09 ENCOUNTER — INFUSION (OUTPATIENT)
Dept: INFUSION THERAPY | Facility: HOSPITAL | Age: 58
End: 2023-01-09
Attending: INTERNAL MEDICINE
Payer: MEDICARE

## 2023-01-09 ENCOUNTER — LAB VISIT (OUTPATIENT)
Dept: LAB | Facility: HOSPITAL | Age: 58
End: 2023-01-09
Attending: INTERNAL MEDICINE
Payer: MEDICARE

## 2023-01-09 VITALS
DIASTOLIC BLOOD PRESSURE: 81 MMHG | RESPIRATION RATE: 18 BRPM | TEMPERATURE: 98 F | OXYGEN SATURATION: 99 % | HEART RATE: 66 BPM | SYSTOLIC BLOOD PRESSURE: 134 MMHG

## 2023-01-09 DIAGNOSIS — C50.819 OVERLAPPING MALIGNANT NEOPLASM OF FEMALE BREAST, UNSPECIFIED ESTROGEN RECEPTOR STATUS, UNSPECIFIED LATERALITY: ICD-10-CM

## 2023-01-09 DIAGNOSIS — C90.00 MULTIPLE MYELOMA NOT HAVING ACHIEVED REMISSION: Primary | ICD-10-CM

## 2023-01-09 DIAGNOSIS — E85.9 AMYLOIDOSIS, UNSPECIFIED TYPE: ICD-10-CM

## 2023-01-09 DIAGNOSIS — C90.00 MULTIPLE MYELOMA NOT HAVING ACHIEVED REMISSION: ICD-10-CM

## 2023-01-09 LAB
ALBUMIN SERPL-MCNC: 3 G/DL (ref 3.5–5)
ALBUMIN/GLOB SERPL: 1 RATIO (ref 1.1–2)
ALP SERPL-CCNC: 86 UNIT/L (ref 40–150)
ALT SERPL-CCNC: 20 UNIT/L (ref 0–55)
AST SERPL-CCNC: 20 UNIT/L (ref 5–34)
BASOPHILS # BLD AUTO: 0.01 X10(3)/MCL (ref 0–0.2)
BASOPHILS NFR BLD AUTO: 0.3 %
BILIRUBIN DIRECT+TOT PNL SERPL-MCNC: 0.2 MG/DL
BUN SERPL-MCNC: 20.9 MG/DL (ref 9.8–20.1)
CALCIUM SERPL-MCNC: 9.2 MG/DL (ref 8.4–10.2)
CHLORIDE SERPL-SCNC: 107 MMOL/L (ref 98–107)
CO2 SERPL-SCNC: 27 MMOL/L (ref 22–29)
CREAT SERPL-MCNC: 1.99 MG/DL (ref 0.55–1.02)
EOSINOPHIL # BLD AUTO: 0 X10(3)/MCL (ref 0–0.9)
EOSINOPHIL NFR BLD AUTO: 0 %
ERYTHROCYTE [DISTWIDTH] IN BLOOD BY AUTOMATED COUNT: 13.7 % (ref 11.5–17)
GFR SERPLBLD CREATININE-BSD FMLA CKD-EPI: 28 MLS/MIN/1.73/M2
GLOBULIN SER-MCNC: 2.9 GM/DL (ref 2.4–3.5)
GLUCOSE SERPL-MCNC: 134 MG/DL (ref 74–100)
HCT VFR BLD AUTO: 33 % (ref 37–47)
HGB BLD-MCNC: 10.4 GM/DL (ref 12–16)
IGA SERPL-MCNC: 31 MG/DL (ref 65–421)
IGG SERPL-MCNC: 565 MG/DL (ref 522–1631)
IGM SERPL-MCNC: 5 MG/DL (ref 33–293)
IMM GRANULOCYTES # BLD AUTO: 0.02 X10(3)/MCL (ref 0–0.04)
IMM GRANULOCYTES NFR BLD AUTO: 0.7 %
LYMPHOCYTES # BLD AUTO: 0.22 X10(3)/MCL (ref 0.6–4.6)
LYMPHOCYTES NFR BLD AUTO: 7.5 %
MCH RBC QN AUTO: 29.2 PG
MCHC RBC AUTO-ENTMCNC: 31.5 MG/DL (ref 33–36)
MCV RBC AUTO: 92.7 FL (ref 80–94)
MONOCYTES # BLD AUTO: 0.09 X10(3)/MCL (ref 0.1–1.3)
MONOCYTES NFR BLD AUTO: 3.1 %
NEUTROPHILS # BLD AUTO: 2.6 X10(3)/MCL (ref 2.1–9.2)
NEUTROPHILS NFR BLD AUTO: 88.4 %
PLATELET # BLD AUTO: 197 X10(3)/MCL (ref 130–400)
PMV BLD AUTO: 9.3 FL (ref 7.4–10.4)
POTASSIUM SERPL-SCNC: 4.5 MMOL/L (ref 3.5–5.1)
PROT SERPL-MCNC: 5.9 GM/DL (ref 6.4–8.3)
RBC # BLD AUTO: 3.56 X10(6)/MCL (ref 4.2–5.4)
SODIUM SERPL-SCNC: 141 MMOL/L (ref 136–145)
WBC # SPEC AUTO: 2.9 X10(3)/MCL (ref 4.5–11.5)

## 2023-01-09 PROCEDURE — 83521 IG LIGHT CHAINS FREE EACH: CPT

## 2023-01-09 PROCEDURE — 85025 COMPLETE CBC W/AUTO DIFF WBC: CPT

## 2023-01-09 PROCEDURE — 36415 COLL VENOUS BLD VENIPUNCTURE: CPT

## 2023-01-09 PROCEDURE — 63600175 PHARM REV CODE 636 W HCPCS: Mod: TB | Performed by: NURSE PRACTITIONER

## 2023-01-09 PROCEDURE — 82784 ASSAY IGA/IGD/IGG/IGM EACH: CPT

## 2023-01-09 PROCEDURE — 80053 COMPREHEN METABOLIC PANEL: CPT

## 2023-01-09 PROCEDURE — 86334 IMMUNOFIX E-PHORESIS SERUM: CPT

## 2023-01-09 PROCEDURE — 96409 CHEMO IV PUSH SNGL DRUG: CPT

## 2023-01-09 PROCEDURE — 25000003 PHARM REV CODE 250: Performed by: NURSE PRACTITIONER

## 2023-01-09 PROCEDURE — 84165 PROTEIN E-PHORESIS SERUM: CPT

## 2023-01-09 RX ORDER — ACETAMINOPHEN 325 MG/1
650 TABLET ORAL
Status: COMPLETED | OUTPATIENT
Start: 2023-01-09 | End: 2023-01-09

## 2023-01-09 RX ORDER — EPINEPHRINE 0.3 MG/.3ML
0.3 INJECTION SUBCUTANEOUS ONCE AS NEEDED
Status: DISCONTINUED | OUTPATIENT
Start: 2023-01-09 | End: 2023-01-09 | Stop reason: HOSPADM

## 2023-01-09 RX ORDER — DIPHENHYDRAMINE HCL 25 MG
25 CAPSULE ORAL
Status: COMPLETED | OUTPATIENT
Start: 2023-01-09 | End: 2023-01-09

## 2023-01-09 RX ORDER — SODIUM CHLORIDE 0.9 % (FLUSH) 0.9 %
10 SYRINGE (ML) INJECTION
Status: DISCONTINUED | OUTPATIENT
Start: 2023-01-09 | End: 2023-01-09 | Stop reason: HOSPADM

## 2023-01-09 RX ORDER — DIPHENHYDRAMINE HYDROCHLORIDE 50 MG/ML
50 INJECTION INTRAMUSCULAR; INTRAVENOUS ONCE AS NEEDED
Status: DISCONTINUED | OUTPATIENT
Start: 2023-01-09 | End: 2023-01-09 | Stop reason: HOSPADM

## 2023-01-09 RX ORDER — HEPARIN 100 UNIT/ML
500 SYRINGE INTRAVENOUS
Status: DISCONTINUED | OUTPATIENT
Start: 2023-01-09 | End: 2023-01-09 | Stop reason: HOSPADM

## 2023-01-09 RX ADMIN — ACETAMINOPHEN 650 MG: 325 TABLET, FILM COATED ORAL at 03:01

## 2023-01-09 RX ADMIN — DARATUMUMAB AND HYALURONIDASE-FIHJ (HUMAN RECOMBINANT) 1800 MG: 1800; 30000 INJECTION SUBCUTANEOUS at 03:01

## 2023-01-09 RX ADMIN — DIPHENHYDRAMINE HYDROCHLORIDE 25 MG: 25 CAPSULE ORAL at 03:01

## 2023-01-10 DIAGNOSIS — C90.00 MULTIPLE MYELOMA NOT HAVING ACHIEVED REMISSION: ICD-10-CM

## 2023-01-10 LAB
ALBUMIN % SPEP (OHS): 47.04
ALBUMIN SERPL-MCNC: 2.8 G/DL (ref 3.5–5)
ALBUMIN/GLOB SERPL: 0.9 RATIO (ref 1.1–2)
ALPHA 1 GLOB (OHS): 0.35 GM/DL
ALPHA 1 GLOB% (OHS): 5.86
ALPHA 2 GLOB % (OHS): 19.86
ALPHA 2 GLOB (OHS): 1.17 GM/DL
BETA GLOB (OHS): 1.04 GM/DL
BETA GLOB% (OHS): 17.69
GAMMA GLOBULIN % (OHS): 9.55
GAMMA GLOBULIN (OHS): 0.56 GM/DL
GLOBULIN SER-MCNC: 3.1 GM/DL (ref 2.4–3.5)
M SPIKE % (OHS): ABNORMAL
M SPIKE (OHS): ABNORMAL
PATH REV: NORMAL
PROT SERPL-MCNC: 5.9 GM/DL (ref 6.4–8.3)

## 2023-01-10 RX ORDER — ONDANSETRON HYDROCHLORIDE 8 MG/1
8 TABLET, FILM COATED ORAL EVERY 8 HOURS PRN
Qty: 90 TABLET | Refills: 0 | Status: ON HOLD | OUTPATIENT
Start: 2023-01-10 | End: 2023-01-27 | Stop reason: HOSPADM

## 2023-01-11 LAB
KAPPA LC FREE SER-MCNC: 1.49 MG/DL (ref 0.33–1.94)
KAPPA LC FREE/LAMBDA FREE SER: 2.26 {RATIO} (ref 0.26–1.65)
LAMBDA LC FREE SERPL-MCNC: 0.66 MG/DL (ref 0.57–2.63)

## 2023-01-13 NOTE — PROGRESS NOTES
"Subjective:       Patient ID: Ninfa Candelario is a 57 y.o. female.    Vitals:  height is 5' 3" (1.6 m) and weight is 77.6 kg (171 lb). Her temperature is 97.7 °F (36.5 °C). Her blood pressure is 115/72 and her pulse is 94. Her respiration is 16 and oxygen saturation is 100%.     Chief Complaint: Diarrhea (Recent changes to chemo treatment. Since Sunday diarrhea, weak, stomach discomfort, nausea, vomit x1, not expelling much urine. Tried taking Imodium. )    2 days of loose BM after chemotherapy, about 10 loose BM originally seen in bag.  Started Imodium with reduced BM to about 5 per day with increased generalized abd pain and x 1 vomit, She also reports reduced appetite due to pressure sensation when eating and reduced urine output.  About 2 urination today.  Multiple chronic problems including MM, amyloidosis, Afib.  No melena or hematochezia. No fever. No syncope, no SOB, no chest pain, no leg swelling.     This has happened with treatment in the recent past. Main concern today is the increased frequency of BM and reduced urine output.        Constitution: Positive for fatigue. Negative for fever.   Neck: Negative for neck pain.   Cardiovascular:  Negative for chest pain.   Gastrointestinal:  Positive for abdominal pain, nausea, vomiting and diarrhea. Negative for bright red blood in stool, dark colored stools and rectal bleeding.   Skin:  Negative for rash.   Neurological:  Negative for dizziness and altered mental status.   Psychiatric/Behavioral:  Negative for altered mental status.      Objective:      Physical Exam   Constitutional: She is oriented to person, place, and time. She appears well-developed.   HENT:   Head: Normocephalic and atraumatic.   Ears:   Right Ear: External ear normal.   Left Ear: External ear normal.   Nose: Nose normal.   Mouth/Throat: Mucous membranes are dry. No oropharyngeal exudate or posterior oropharyngeal erythema.   Eyes: Conjunctivae and lids are normal.   Neck: Trachea " Lab Results   Component Value Date    EGFR 29 10/04/2022    EGFR 30 10/03/2022    EGFR 28 10/02/2022    CREATININE 1 65 (H) 10/04/2022    CREATININE 1 62 (H) 10/03/2022    CREATININE 1 69 (H) 10/02/2022   Appears that renal function is at its baseline  Personally reviewed graph of renal function trends with patient  Patient is not on an ACE-I, ARB or SGLT-2 inhibitor  Patient was advised to avoid nephrotoxins  Continue management of healthy weight, blood glucose and blood pressures  Please renally dose medication for a creatinine clearance of 29 L/min  Patient has not yet undergone [facility] education  Refer today  She reports that she makes medical decisions  No medical POA listed on [facility] paperwork  Continue to monitor renal function, anemia and bone-mineral parameters  normal. Neck supple.   Cardiovascular: Normal rate, regular rhythm and normal heart sounds.   Pulmonary/Chest: Effort normal and breath sounds normal. No respiratory distress.   Abdominal: Normal appearance and bowel sounds are normal. She exhibits no distension and no mass. Soft. There is no abdominal tenderness.   Musculoskeletal: Normal range of motion.         General: Normal range of motion.   Neurological: She is alert and oriented to person, place, and time. She has normal strength.   Skin: Skin is warm, dry, intact, not diaphoretic and not pale.   Psychiatric: Her speech is normal and behavior is normal. Judgment and thought content normal.   Nursing note and vitals reviewed.      Assessment:       1. Diarrhea due to drug    2. Urinary hesitancy          Plan:         Diarrhea due to drug  -     lactated ringers infusion  -     CBC Auto Differential  -     Comprehensive Metabolic Panel    Urinary hesitancy  -     POCT Urinalysis, Dipstick, Manual, W/O Scope          IV fluids given today. Took 1 hour to complete.  Improved after completed.  Left in stable fashion.

## 2023-01-19 ENCOUNTER — OFFICE VISIT (OUTPATIENT)
Dept: INTERNAL MEDICINE | Facility: CLINIC | Age: 58
End: 2023-01-19
Payer: MEDICARE

## 2023-01-19 VITALS — WEIGHT: 171 LBS | HEIGHT: 62 IN | BODY MASS INDEX: 31.47 KG/M2

## 2023-01-19 DIAGNOSIS — R09.81 SINUS CONGESTION: ICD-10-CM

## 2023-01-19 DIAGNOSIS — J20.9 ACUTE BRONCHITIS, UNSPECIFIED ORGANISM: Primary | ICD-10-CM

## 2023-01-19 PROCEDURE — 1159F MED LIST DOCD IN RCRD: CPT | Mod: CPTII,95,,

## 2023-01-19 PROCEDURE — 1160F RVW MEDS BY RX/DR IN RCRD: CPT | Mod: CPTII,95,,

## 2023-01-19 PROCEDURE — 1160F PR REVIEW ALL MEDS BY PRESCRIBER/CLIN PHARMACIST DOCUMENTED: ICD-10-PCS | Mod: CPTII,95,,

## 2023-01-19 PROCEDURE — 3008F BODY MASS INDEX DOCD: CPT | Mod: CPTII,95,,

## 2023-01-19 PROCEDURE — 99213 PR OFFICE/OUTPT VISIT, EST, LEVL III, 20-29 MIN: ICD-10-PCS | Mod: 95,,,

## 2023-01-19 PROCEDURE — 1159F PR MEDICATION LIST DOCUMENTED IN MEDICAL RECORD: ICD-10-PCS | Mod: CPTII,95,,

## 2023-01-19 PROCEDURE — 99213 OFFICE O/P EST LOW 20 MIN: CPT | Mod: 95,,,

## 2023-01-19 PROCEDURE — 3008F PR BODY MASS INDEX (BMI) DOCUMENTED: ICD-10-PCS | Mod: CPTII,95,,

## 2023-01-19 RX ORDER — BENZONATATE 100 MG/1
100 CAPSULE ORAL 3 TIMES DAILY PRN
Qty: 30 CAPSULE | Refills: 0 | Status: SHIPPED | OUTPATIENT
Start: 2023-01-19 | End: 2023-01-29

## 2023-01-19 RX ORDER — METHYLPREDNISOLONE 4 MG/1
TABLET ORAL
Qty: 21 EACH | Refills: 0 | Status: ON HOLD | OUTPATIENT
Start: 2023-01-19 | End: 2024-02-29 | Stop reason: HOSPADM

## 2023-01-20 PROBLEM — J20.9 ACUTE BRONCHITIS: Status: ACTIVE | Noted: 2023-01-20

## 2023-01-20 NOTE — ASSESSMENT & PLAN NOTE
-s/p recent URI   -afebrile with mild symptoms  -currently utilizing OTC antihistamine, continue   -okay to incorporate OTC fluticasone nasal b.i.d.  -encourage vitamin-C, vitamin-D, and increase the hydration  -worsening dry cough, initiate Medrol Dosepak and Tessalon Perles

## 2023-01-20 NOTE — ASSESSMENT & PLAN NOTE
-s/p recent URI   -afebrile with mild symptoms  -currently utilizing OTC antihistamine, continue   -worsening dry cough, initiate Medrol Dosepak and Tessalon Perles

## 2023-01-23 ENCOUNTER — HOSPITAL ENCOUNTER (INPATIENT)
Facility: HOSPITAL | Age: 58
LOS: 3 days | Discharge: HOME HEALTH | DRG: 194 | End: 2023-01-27
Attending: EMERGENCY MEDICINE | Admitting: INTERNAL MEDICINE
Payer: MEDICARE

## 2023-01-23 DIAGNOSIS — J18.9 PNEUMONIA OF BOTH LOWER LOBES DUE TO INFECTIOUS ORGANISM: ICD-10-CM

## 2023-01-23 DIAGNOSIS — A41.9 ACUTE ONSET SEPSIS: Primary | ICD-10-CM

## 2023-01-23 DIAGNOSIS — R06.02 SOB (SHORTNESS OF BREATH): ICD-10-CM

## 2023-01-23 LAB
ABS NEUT (OLG): 4.16 X10(3)/MCL (ref 2.1–9.2)
ALBUMIN SERPL-MCNC: 2.7 G/DL (ref 3.5–5)
ALBUMIN/GLOB SERPL: 0.7 RATIO (ref 1.1–2)
ALP SERPL-CCNC: 225 UNIT/L (ref 40–150)
ALT SERPL-CCNC: 335 UNIT/L (ref 0–55)
AST SERPL-CCNC: 179 UNIT/L (ref 5–34)
BILIRUBIN DIRECT+TOT PNL SERPL-MCNC: 0.4 MG/DL
BNP BLD-MCNC: 39.5 PG/ML
BUN SERPL-MCNC: 36.4 MG/DL (ref 9.8–20.1)
CALCIUM SERPL-MCNC: 9.6 MG/DL (ref 8.4–10.2)
CHLORIDE SERPL-SCNC: 102 MMOL/L (ref 98–107)
CO2 SERPL-SCNC: 27 MMOL/L (ref 22–29)
CREAT SERPL-MCNC: 2.47 MG/DL (ref 0.55–1.02)
ERYTHROCYTE [DISTWIDTH] IN BLOOD BY AUTOMATED COUNT: 15.1 % (ref 11.5–17)
FLUAV AG UPPER RESP QL IA.RAPID: NOT DETECTED
FLUBV AG UPPER RESP QL IA.RAPID: NOT DETECTED
GFR SERPLBLD CREATININE-BSD FMLA CKD-EPI: 22 MLS/MIN/1.73/M2
GLOBULIN SER-MCNC: 3.7 GM/DL (ref 2.4–3.5)
GLUCOSE SERPL-MCNC: 87 MG/DL (ref 74–100)
HCT VFR BLD AUTO: 32.4 % (ref 37–47)
HGB BLD-MCNC: 10.2 GM/DL (ref 12–16)
IMM GRANULOCYTES # BLD AUTO: 0.14 X10(3)/MCL (ref 0–0.04)
IMM GRANULOCYTES NFR BLD AUTO: 2.6 %
INR BLD: 1.27 (ref 0–1.3)
INSTRUMENT WBC (OLG): 5.4 X10(3)/MCL
LYMPHOCYTES NFR BLD MANUAL: 0.7 X10(3)/MCL
LYMPHOCYTES NFR BLD MANUAL: 13 %
MCH RBC QN AUTO: 28.9 PG
MCHC RBC AUTO-ENTMCNC: 31.5 MG/DL (ref 33–36)
MCV RBC AUTO: 91.8 FL (ref 80–94)
METAMYELOCYTES NFR BLD MANUAL: 2 %
MONOCYTES NFR BLD MANUAL: 0.54 X10(3)/MCL (ref 0.1–1.3)
MONOCYTES NFR BLD MANUAL: 10 %
NEUTROPHILS NFR BLD MANUAL: 75 %
NRBC BLD AUTO-RTO: 0.7 %
NRBC BLD MANUAL-RTO: 2 %
PLATELET # BLD AUTO: 277 X10(3)/MCL (ref 130–400)
PLATELET # BLD EST: NORMAL 10*3/UL
PMV BLD AUTO: 10.3 FL (ref 7.4–10.4)
POTASSIUM SERPL-SCNC: 4.2 MMOL/L (ref 3.5–5.1)
PROT SERPL-MCNC: 6.4 GM/DL (ref 6.4–8.3)
PROTHROMBIN TIME: 15.8 SECONDS (ref 12.5–14.5)
RBC # BLD AUTO: 3.53 X10(6)/MCL (ref 4.2–5.4)
RBC MORPH BLD: NORMAL
SARS-COV-2 RNA RESP QL NAA+PROBE: NOT DETECTED
SODIUM SERPL-SCNC: 142 MMOL/L (ref 136–145)
TROPONIN I SERPL-MCNC: <0.01 NG/ML (ref 0–0.04)
WBC # SPEC AUTO: 5.4 X10(3)/MCL (ref 4.5–11.5)

## 2023-01-23 PROCEDURE — 0240U COVID/FLU A&B PCR: CPT | Performed by: STUDENT IN AN ORGANIZED HEALTH CARE EDUCATION/TRAINING PROGRAM

## 2023-01-23 PROCEDURE — 96375 TX/PRO/DX INJ NEW DRUG ADDON: CPT

## 2023-01-23 PROCEDURE — 85610 PROTHROMBIN TIME: CPT | Performed by: STUDENT IN AN ORGANIZED HEALTH CARE EDUCATION/TRAINING PROGRAM

## 2023-01-23 PROCEDURE — 93010 EKG 12-LEAD: ICD-10-PCS | Mod: ,,, | Performed by: STUDENT IN AN ORGANIZED HEALTH CARE EDUCATION/TRAINING PROGRAM

## 2023-01-23 PROCEDURE — 84484 ASSAY OF TROPONIN QUANT: CPT | Performed by: STUDENT IN AN ORGANIZED HEALTH CARE EDUCATION/TRAINING PROGRAM

## 2023-01-23 PROCEDURE — 80053 COMPREHEN METABOLIC PANEL: CPT | Performed by: STUDENT IN AN ORGANIZED HEALTH CARE EDUCATION/TRAINING PROGRAM

## 2023-01-23 PROCEDURE — 93005 ELECTROCARDIOGRAM TRACING: CPT

## 2023-01-23 PROCEDURE — 93010 ELECTROCARDIOGRAM REPORT: CPT | Mod: ,,, | Performed by: STUDENT IN AN ORGANIZED HEALTH CARE EDUCATION/TRAINING PROGRAM

## 2023-01-23 PROCEDURE — 96365 THER/PROPH/DIAG IV INF INIT: CPT

## 2023-01-23 PROCEDURE — 83880 ASSAY OF NATRIURETIC PEPTIDE: CPT | Performed by: STUDENT IN AN ORGANIZED HEALTH CARE EDUCATION/TRAINING PROGRAM

## 2023-01-23 PROCEDURE — 85027 COMPLETE CBC AUTOMATED: CPT | Performed by: STUDENT IN AN ORGANIZED HEALTH CARE EDUCATION/TRAINING PROGRAM

## 2023-01-23 PROCEDURE — 99285 EMERGENCY DEPT VISIT HI MDM: CPT | Mod: 25

## 2023-01-24 PROBLEM — J18.9 PNEUMONIA OF BOTH LUNGS DUE TO INFECTIOUS ORGANISM: Status: ACTIVE | Noted: 2023-01-24

## 2023-01-24 LAB
LACTATE SERPL-SCNC: 1.7 MMOL/L (ref 0.5–2.2)
PROCALCITONIN SERPL-MCNC: 0.72 NG/ML

## 2023-01-24 PROCEDURE — 83605 ASSAY OF LACTIC ACID: CPT | Performed by: EMERGENCY MEDICINE

## 2023-01-24 PROCEDURE — 99223 PR INITIAL HOSPITAL CARE,LEVL III: ICD-10-PCS | Mod: ,,, | Performed by: INTERNAL MEDICINE

## 2023-01-24 PROCEDURE — 21400001 HC TELEMETRY ROOM

## 2023-01-24 PROCEDURE — 84145 PROCALCITONIN (PCT): CPT | Performed by: EMERGENCY MEDICINE

## 2023-01-24 PROCEDURE — 25000003 PHARM REV CODE 250: Performed by: INTERNAL MEDICINE

## 2023-01-24 PROCEDURE — 25000003 PHARM REV CODE 250: Performed by: EMERGENCY MEDICINE

## 2023-01-24 PROCEDURE — 87040 BLOOD CULTURE FOR BACTERIA: CPT | Performed by: EMERGENCY MEDICINE

## 2023-01-24 PROCEDURE — 11000001 HC ACUTE MED/SURG PRIVATE ROOM

## 2023-01-24 PROCEDURE — 63600175 PHARM REV CODE 636 W HCPCS: Performed by: EMERGENCY MEDICINE

## 2023-01-24 PROCEDURE — 99223 1ST HOSP IP/OBS HIGH 75: CPT | Mod: ,,, | Performed by: INTERNAL MEDICINE

## 2023-01-24 RX ORDER — LEVOCETIRIZINE DIHYDROCHLORIDE 5 MG/1
1 TABLET, FILM COATED ORAL NIGHTLY
COMMUNITY
Start: 2022-08-26 | End: 2023-07-21

## 2023-01-24 RX ORDER — ACETAMINOPHEN 325 MG/1
650 TABLET ORAL EVERY 8 HOURS PRN
Status: DISCONTINUED | OUTPATIENT
Start: 2023-01-24 | End: 2023-01-27 | Stop reason: HOSPADM

## 2023-01-24 RX ORDER — LENALIDOMIDE 5 MG/1
5 CAPSULE ORAL DAILY
Status: DISCONTINUED | OUTPATIENT
Start: 2023-01-24 | End: 2023-01-27 | Stop reason: HOSPADM

## 2023-01-24 RX ORDER — FAMOTIDINE 20 MG/1
20 TABLET, FILM COATED ORAL DAILY
Status: DISCONTINUED | OUTPATIENT
Start: 2023-01-24 | End: 2023-01-27 | Stop reason: HOSPADM

## 2023-01-24 RX ORDER — ROSUVASTATIN CALCIUM 10 MG/1
10 TABLET, COATED ORAL NIGHTLY
Status: ON HOLD | COMMUNITY
Start: 2022-11-15 | End: 2023-01-27 | Stop reason: HOSPADM

## 2023-01-24 RX ORDER — ONDANSETRON 2 MG/ML
4 INJECTION INTRAMUSCULAR; INTRAVENOUS EVERY 6 HOURS PRN
Status: DISCONTINUED | OUTPATIENT
Start: 2023-01-24 | End: 2023-01-27 | Stop reason: HOSPADM

## 2023-01-24 RX ORDER — METHADONE HYDROCHLORIDE 10 MG/1
10 TABLET ORAL EVERY 8 HOURS
COMMUNITY
Start: 2023-01-04 | End: 2024-02-07 | Stop reason: SDUPTHER

## 2023-01-24 RX ORDER — CODEINE PHOSPHATE AND GUAIFENESIN 10; 100 MG/5ML; MG/5ML
5 SOLUTION ORAL EVERY 4 HOURS PRN
Status: DISCONTINUED | OUTPATIENT
Start: 2023-01-24 | End: 2023-01-27 | Stop reason: HOSPADM

## 2023-01-24 RX ORDER — MONTELUKAST SODIUM 4 MG/1
1 TABLET, CHEWABLE ORAL 2 TIMES DAILY
Status: DISCONTINUED | OUTPATIENT
Start: 2023-01-24 | End: 2023-01-27 | Stop reason: HOSPADM

## 2023-01-24 RX ORDER — CARVEDILOL 3.12 MG/1
3.12 TABLET ORAL 2 TIMES DAILY
Status: ON HOLD | COMMUNITY
Start: 2022-12-13 | End: 2023-01-27 | Stop reason: HOSPADM

## 2023-01-24 RX ORDER — HYDROCHLOROTHIAZIDE 12.5 MG/1
12.5 TABLET ORAL DAILY
Status: ON HOLD | COMMUNITY
Start: 2022-12-13 | End: 2023-01-27 | Stop reason: HOSPADM

## 2023-01-24 RX ORDER — ONDANSETRON HYDROCHLORIDE 8 MG/1
1 TABLET, FILM COATED ORAL EVERY 8 HOURS PRN
Status: ON HOLD | COMMUNITY
Start: 2022-09-13 | End: 2023-01-27 | Stop reason: HOSPADM

## 2023-01-24 RX ORDER — OFLOXACIN 3 MG/ML
1 SOLUTION/ DROPS OPHTHALMIC 4 TIMES DAILY
Status: DISCONTINUED | OUTPATIENT
Start: 2023-01-24 | End: 2023-01-27 | Stop reason: HOSPADM

## 2023-01-24 RX ORDER — LETROZOLE 2.5 MG/1
2.5 TABLET, FILM COATED ORAL DAILY
Status: DISCONTINUED | OUTPATIENT
Start: 2023-01-24 | End: 2023-01-27 | Stop reason: HOSPADM

## 2023-01-24 RX ORDER — BENZONATATE 100 MG/1
100 CAPSULE ORAL 3 TIMES DAILY PRN
Status: DISCONTINUED | OUTPATIENT
Start: 2023-01-24 | End: 2023-01-27 | Stop reason: HOSPADM

## 2023-01-24 RX ORDER — ACETAMINOPHEN 325 MG/1
650 TABLET ORAL
Status: COMPLETED | OUTPATIENT
Start: 2023-01-24 | End: 2023-01-24

## 2023-01-24 RX ORDER — SODIUM CHLORIDE 0.9 % (FLUSH) 0.9 %
10 SYRINGE (ML) INJECTION
Status: DISCONTINUED | OUTPATIENT
Start: 2023-01-24 | End: 2023-01-27 | Stop reason: HOSPADM

## 2023-01-24 RX ORDER — DULOXETIN HYDROCHLORIDE 30 MG/1
60 CAPSULE, DELAYED RELEASE ORAL DAILY
Status: DISCONTINUED | OUTPATIENT
Start: 2023-01-24 | End: 2023-01-27 | Stop reason: HOSPADM

## 2023-01-24 RX ORDER — HYDROCHLOROTHIAZIDE 12.5 MG/1
12.5 TABLET ORAL DAILY
Status: DISCONTINUED | OUTPATIENT
Start: 2023-01-24 | End: 2023-01-27 | Stop reason: HOSPADM

## 2023-01-24 RX ORDER — DULOXETIN HYDROCHLORIDE 60 MG/1
60 CAPSULE, DELAYED RELEASE ORAL DAILY
Status: ON HOLD | COMMUNITY
Start: 2022-07-26 | End: 2023-01-27 | Stop reason: HOSPADM

## 2023-01-24 RX ORDER — TALC
6 POWDER (GRAM) TOPICAL NIGHTLY PRN
Status: DISCONTINUED | OUTPATIENT
Start: 2023-01-24 | End: 2023-01-27 | Stop reason: HOSPADM

## 2023-01-24 RX ORDER — DEXAMETHASONE 4 MG/1
4 TABLET ORAL WEEKLY
Status: ON HOLD | COMMUNITY
Start: 2022-11-27 | End: 2023-01-27 | Stop reason: HOSPADM

## 2023-01-24 RX ORDER — GABAPENTIN 300 MG/1
300 CAPSULE ORAL 2 TIMES DAILY
Status: DISCONTINUED | OUTPATIENT
Start: 2023-01-24 | End: 2023-01-27 | Stop reason: HOSPADM

## 2023-01-24 RX ORDER — CARVEDILOL 3.12 MG/1
3.12 TABLET ORAL 2 TIMES DAILY WITH MEALS
Status: DISCONTINUED | OUTPATIENT
Start: 2023-01-24 | End: 2023-01-27 | Stop reason: HOSPADM

## 2023-01-24 RX ADMIN — OFLOXACIN 1 DROP: 3 SOLUTION OPHTHALMIC at 02:01

## 2023-01-24 RX ADMIN — BENZONATATE 100 MG: 100 CAPSULE ORAL at 08:01

## 2023-01-24 RX ADMIN — BENZONATATE 100 MG: 100 CAPSULE ORAL at 09:01

## 2023-01-24 RX ADMIN — DOXYCYCLINE 100 MG: 100 INJECTION, POWDER, LYOPHILIZED, FOR SOLUTION INTRAVENOUS at 08:01

## 2023-01-24 RX ADMIN — CARVEDILOL 3.12 MG: 3.12 TABLET, FILM COATED ORAL at 05:01

## 2023-01-24 RX ADMIN — MONTELUKAST SODIUM 1 G: 4 TABLET, CHEWABLE ORAL at 08:01

## 2023-01-24 RX ADMIN — FAMOTIDINE 20 MG: 20 TABLET, FILM COATED ORAL at 02:01

## 2023-01-24 RX ADMIN — CEFTRIAXONE 1 G: 1 INJECTION, POWDER, FOR SOLUTION INTRAMUSCULAR; INTRAVENOUS at 06:01

## 2023-01-24 RX ADMIN — CEFTRIAXONE 1 G: 1 INJECTION, POWDER, FOR SOLUTION INTRAMUSCULAR; INTRAVENOUS at 11:01

## 2023-01-24 RX ADMIN — HYDROCHLOROTHIAZIDE 12.5 MG: 12.5 TABLET ORAL at 02:01

## 2023-01-24 RX ADMIN — OFLOXACIN 1 DROP: 3 SOLUTION OPHTHALMIC at 08:01

## 2023-01-24 RX ADMIN — DOXYCYCLINE 100 MG: 100 INJECTION, POWDER, LYOPHILIZED, FOR SOLUTION INTRAVENOUS at 09:01

## 2023-01-24 RX ADMIN — SODIUM CHLORIDE, POTASSIUM CHLORIDE, SODIUM LACTATE AND CALCIUM CHLORIDE 1000 ML: 600; 310; 30; 20 INJECTION, SOLUTION INTRAVENOUS at 06:01

## 2023-01-24 RX ADMIN — GUAIFENESIN AND CODEINE PHOSPHATE 5 ML: 10; 100 LIQUID ORAL at 08:01

## 2023-01-24 RX ADMIN — BENZONATATE 100 MG: 100 CAPSULE ORAL at 02:01

## 2023-01-24 RX ADMIN — DULOXETINE 60 MG: 30 CAPSULE, DELAYED RELEASE ORAL at 02:01

## 2023-01-24 RX ADMIN — LETROZOLE 2.5 MG: 2.5 TABLET ORAL at 02:01

## 2023-01-24 RX ADMIN — GUAIFENESIN AND CODEINE PHOSPHATE 5 ML: 10; 100 LIQUID ORAL at 02:01

## 2023-01-24 RX ADMIN — BENZONATATE 100 MG: 100 CAPSULE ORAL at 12:01

## 2023-01-24 RX ADMIN — APIXABAN 2.5 MG: 2.5 TABLET, FILM COATED ORAL at 08:01

## 2023-01-24 RX ADMIN — ACETAMINOPHEN 650 MG: 325 TABLET, FILM COATED ORAL at 06:01

## 2023-01-24 NOTE — H&P
[facility] Medicine History & Physical Examination       Patient Name: Ninfa Candelario  MRN: [id number]  Patient Class: IP- Inpatient   Admission Date: 1/23/2023   Admitting Physician: AG Service   Length of Stay: 0  Attending Physician: Adry Rosario MD  Primary Care Provider: Adry Rosario MD  Face-to-Face encounter date: 01/24/2023  Code Status:Full    Chief Complaint: Shortness of Breath (Everyone in household has covid/flu symp - pt w/ productive cough fever shortness of breath chills, aasi reports intial spo2 94% - pt currently 100% on 4L nc, currently on oral chemo for breast ca)        Patient information was obtained from patient, patient's family, past medical records and ER records.     HISTORY OF PRESENT ILLNESS:   Ninfa Candelario is a 57 y.o. female who  has a past medical history of Amyloidosis, Anemia, Anxiety and depression, Diplopia, Hyperlipidemia, Hypertension, Impaired mobility, Lytic lesion of bone on x-ray, Multiple myeloma, Neuropathy, Obesity, unspecified, Paroxysmal atrial fibrillation, and Primary cancer of left female breast.. The patient presented to [facility] on 1/23/2023 with a primary complaint of cough and SOB    Ninfa is a 57-year-old female known to me with history of breast cancer on oral chemotherapy, atrial fibrillation on Eliquis  , mellitus is an anemia of chronic disease as well as atrial fibrillation that presented to the hospital with fever, chills, cough and a small amount of sputum production.  Also has ongoing shortness of breath.  Other family members affected with similar symptoms.   Recently seen by nurse practitioner via telemedicine and given a dose of Medrol Dosepak.  At the time patient did not have any complaints of fever or chills.  She had completed a course of antibiotics with levofloxacin on December 14th.    Workup in the ED included a CT chest which showed a right greater than left lower lobe  opacification consistent with an infectious process.  Cultures were drawn and patient was initiated on Rocephin and doxycycline.    Because of transaminitis noted on the labs, abdominal ultrasound was completed which was noted to be negative.   At the time of my visit this morning, patient was still boarding in the ED with her nephew at bedside.  She had an intractable cough for which cough suppressant with codeine was ordered as well as Tessalon Perles.    PAST MEDICAL HISTORY:     Past Medical History:   Diagnosis Date    Amyloidosis     Anemia     Anxiety and depression     Diplopia     Hyperlipidemia     Hypertension     Impaired mobility     Lytic lesion of bone on x-ray     Multiple myeloma     Neuropathy     Obesity, unspecified     Paroxysmal atrial fibrillation     Primary cancer of left female breast        PAST SURGICAL HISTORY:     Past Surgical History:   Procedure Laterality Date    BONE MARROW TRANSPLANT  02/08/2016    CARPAL TUNNEL RELEASE      COLONOSCOPY  12/11/2018    Dr. Eddie Jimenez    ENDOSCOPIC RELEASE OF TRIGGER FINGER      ESOPHAGEAL MOTILITY STUDY  2015    Excision Lipoma left elbow      Exploration Laparotomy  2016    FLEXIBLE SIGMOIDOSCOPY      MEDIPORT INSERTION, SINGLE  03/15/2016    PH Study 24 Hour  2015       ALLERGIES:   Baclofen, Penicillins, Shellfish containing products, Nsaids (non-steroidal anti-inflammatory drug), Penicillin, Ace inhibitors, Azithromycin, Meloxicam, Prochlorperazine, and Tramadol    FAMILY HISTORY:   Reviewed and negative    SOCIAL HISTORY:     Social History     Tobacco Use    Smoking status: Never    Smokeless tobacco: Never   Substance Use Topics    Alcohol use: Never        HOME MEDICATIONS:     Prior to Admission medications    Medication Sig Start Date End Date Taking? Authorizing Provider   apixaban (ELIQUIS) 2.5 mg Tab Take 1 tablet by mouth 2 (two) times daily. 8/25/21  Yes Historical Provider   benzonatate (TESSALON) 100 MG capsule Take 1 capsule  (100 mg total) by mouth 3 (three) times daily as needed for Cough. 1/19/23 1/29/23 Yes AVA Eddy   carvediloL (COREG) 3.125 MG tablet Take 1 tablet (3.125 mg total) by mouth 2 (two) times daily with meals. 6/30/22 3/27/23 Yes AVA Eddy   carvediloL (COREG) 3.125 MG tablet Take 3.125 mg by mouth 2 (two) times a day. 12/13/22  Yes Historical Provider   colestipoL (COLESTID) 1 gram Tab TAKE 1 TABLET TWICE A DAY 12/28/22  Yes AVA Carmen   dexAMETHasone 20 mg Tab Take 20 mg by mouth once a week. 11/23/22  Yes AVA Carmen   DULoxetine (CYMBALTA) 60 MG capsule Take 60 mg by mouth once daily. 7/26/22  Yes Historical Provider   hydroCHLOROthiazide (HYDRODIURIL) 12.5 MG Tab TAKE 1 TABLET(12.5 MG) BY MOUTH DAILY 9/28/21  Yes Historical Provider   hydroCHLOROthiazide (HYDRODIURIL) 12.5 MG Tab Take 12.5 mg by mouth once daily. 12/13/22  Yes Historical Provider   levocetirizine (XYZAL) 5 MG tablet Take 1 tablet (5 mg total) by mouth every evening. 6/30/22 6/30/23 Yes AVA Eddy   levocetirizine (XYZAL) 5 MG tablet Take 1 tablet by mouth every evening. 8/26/22  Yes Historical Provider   methadone (DOLOPHINE) 10 MG tablet Take 10 mg by mouth every 8 (eight) hours while awake. 10/4/21  Yes Historical Provider   methadone (DOLOPHINE) 10 MG tablet Take 10 mg by mouth every 8 (eight) hours. 1/4/23  Yes Historical Provider   methylPREDNISolone (MEDROL DOSEPACK) 4 mg tablet use as directed 1/19/23  Yes AVA Eddy   omeprazole (PRILOSEC) 20 MG capsule Take 1 capsule (20 mg total) by mouth once daily. 6/30/22 6/30/23 Yes AVA Eddy   ondansetron (ZOFRAN) 8 MG tablet Take 1 tablet (8 mg total) by mouth every 8 (eight) hours as needed for Nausea. 1/10/23  Yes AVA Carmen   ondansetron (ZOFRAN) 8 MG tablet Take 1 tablet by mouth every 8 (eight) hours as needed. 9/13/22  Yes Historical Provider   oxyCODONE (ROXICODONE) 10 mg Tab immediate release tablet Take 10 mg  by mouth 3 (three) times daily as needed. 10/4/21  Yes Historical Provider   potassium chloride (KLOR-CON) 10 MEQ TbSR TAKE 1 TABLET BY MOUTH TWICE DAILY 5/6/22  Yes Natalie Meyers MD   REVLIMID 5 mg Cap TAKE 1 CAPSULE BY MOUTH EVERY OTHER DAY 12/19/22  Yes AVA Carmen   rosuvastatin (CRESTOR) 10 MG tablet Take 10 mg by mouth Daily. 8/25/21  Yes Historical Provider   rosuvastatin (CRESTOR) 10 MG tablet Take 10 mg by mouth every evening. 11/15/22  Yes Historical Provider   tretinoin (RETIN-A) 0.1 % cream Apply 1 application topically every evening. 1/4/22  Yes Historical Provider   dexAMETHasone (DECADRON) 4 MG Tab Take 4 mg by mouth once a week. 11/27/22   Historical Provider   DULoxetine (CYMBALTA) 60 MG capsule Take 60 mg by mouth. 3/29/22   Historical Provider   fluticasone propionate (FLONASE) 50 mcg/actuation nasal spray   See Instructions, SHAKE LIQUID AND USE 1 SPRAY IN EACH NOSTRIL TWICE DAILY AS NEEDED FOR ALLERGY SYMPTOMS, # 16 gm, 0 Refill(s), Pharmacy: [id number], 161, cm, Height/Length Dosing, 02/07/22 8:48:00 CST, 74.2, kg, Weight Dosing, 02/07... 3/28/22   Historical Provider   gabapentin (NEURONTIN) 300 MG capsule Take 300 mg by mouth. 3/24/22   Historical Provider   ixazomib (NINLARO) 4 mg Cap Take 4 mg by mouth every 7 days. 9/30/22   AVA Carmen   letrozole (FEMARA) 2.5 mg Tab Take 1 tablet by mouth once daily. 8/23/21   Historical Provider   multivitamin (THERAGRAN) per tablet Take 1 tablet by mouth once daily.    Historical Provider   ondansetron (ZOFRAN) 8 MG tablet Take 8 mg by mouth every 8 (eight) hours as needed. 4/20/22   Historical Provider       REVIEW OF SYSTEMS:   Except as documented, all other systems reviewed and negative     PHYSICAL EXAM:     VITAL SIGNS: 24 HRS MIN & MAX LAST   Temp  Min: 99 °F (37.2 °C)  Max: 100 °F (37.8 °C) 100 °F (37.8 °C)   BP  Min: 131/87  Max: 156/101 131/87   Pulse  Min: 85  Max: 120  85   Resp  Min: 16  Max: 24 (!)  21     SpO2  Min: 95 %  Max: 100 % 95 %       General appearance:   Alert oriented, moderate distress, sitting up in bed in the ED   HENT: Atraumatic head. Moist mucous membranes of oral cavity.  Eyes: Normal extraocular movements.   Neck: Supple.   Lungs: Scattered rhonchi noted in bilateral lung fields, left lower lobe more than right, no wheeze or crackles  Heart:  he tachycardia, regular rhythm, no murmurs   Abdomen: Soft, non-distended, non-tender. No rebound tenderness/guarding. Bowel sounds are normal.   Extremities: No cyanosis, clubbing, or edema.  Skin: No Rash.   Neuro: Alert and oriented, nonfocal   Psych/mental status: Appropriate mood and affect. Responds appropriately to questions.     LABS AND IMAGING:     Recent Labs   Lab 01/23/23 2046   WBC 5.4   RBC 3.53*   HGB 10.2*   HCT 32.4*   MCV 91.8   MCH 28.9   MCHC 31.5*   RDW 15.1      MPV 10.3       Recent Labs   Lab 01/23/23 2046      K 4.2   CO2 27   BUN 36.4*   CREATININE 2.47*   CALCIUM 9.6   ALBUMIN 2.7*   ALKPHOS 225*   *   *   BILITOT 0.4       Microbiology Results (last 7 days)       Procedure Component Value Units Date/Time    Blood Culture #1 **CANNOT BE ORDERED STAT** [[id number]] Collected: 01/24/23 0654    Order Status: Resulted Specimen: Blood Updated: 01/24/23 0657    Blood Culture #2 **CANNOT BE ORDERED STAT** [[id number]] Collected: 01/24/23 0640    Order Status: Resulted Specimen: Blood Updated: 01/24/23 0648             US Abdomen Limited  Narrative: EXAMINATION:  US ABDOMEN LIMITED    CLINICAL HISTORY:  transaminitis;, .    TECHNIQUE:  Multiple transverse and sagittal grayscale sonographic images were acquired through the abdomen.    FINDINGS:  Liver: Unremarkable appearing liver which measures 13.7 cmin greatest dimension.    Biliary ducts:    Billiary ducts: The common bile duct measures 3.1 mmin diameter.    Gallbladder: The gallbladder appears unremarkable. The sonographic Miller's sign is negative.  The gallbladder wall measures 1.8 mm in thickness which is within normal limits.    Pancreas: The pancreas is obscured by gas.    IVC: The IVC is not visualized.    Portal vein: There is normal hepatopetal flow.    Kidneys:    Right kidney: Unremarkable appearing right kidney.    Dimensions:    the right kidney measures 9.1 x 5.5 x 4.2 cm  Impression: No significant abnormalities identified    Electronically signed by: Prakash Vásquez  Date:    01/24/2023  Time:    12:47  CT Chest Without Contrast  Narrative: EXAMINATION:  CT CHEST WITHOUT CONTRAST    CLINICAL HISTORY:  Cough, persistent;    TECHNIQUE:  Axial images of the chest were obtained without contrast. Sagittal and coronal reconstructed images were available for review.    Automatic dose control was utilized to reduce patient radiation dose.    DLP = 478    COMPARISON:  08/19/2015    FINDINGS:  AORTA: Limited evaluation secondary to lack of IV contrast.  Grossly normal in course and caliber.    THYROID GLAND: The visualized thyroid is unremarkable.    AIRWAYS: Trachea is midline and tracheobronchial tree is patent.    HEART: Right greater than left lower lobe opacification consistent with infectious process.    LUNGS: There are no new masses or nodules identified. No pleural effusion or pneumothorax.    LYMPH NODES: There is no significant mediastinal, axillary or hilar lymphadenopathy.    BONES: Lucent lesions throughout the vertebral body similar to 2015.    UPPER ABDOMEN:  The visualized abdomen is unremarkable.  Impression: Right greater than left lower lobe opacification consistent with infectious process.  Lucent lesions throughout the osseous structures similar to 2015.    Electronically signed by: Glen Galindo  Date:    01/24/2023  Time:    08:06        ASSESSMENT & PLAN:   1.  Community-acquired pneumonia, present on admission   -Monitor blood cultures   -Bibiana p.r.n.   -Oxygen to keep sats over 90 percent   - continue with Rocephin and  doxycycline, patient allergic to azithromycin   -Recently completed steroids     2.Immunocompromised status   -Monitor  hemodynamic status closely  - COVID-19 negative     3.  History of amyloidosis    4.  History of breast cancer   - on oral Femara, continue     5.  Atrial fibrillation   -anticoagulated with Eliquis, being renally dosed, continue     6.  CKD 4  -Monitor renal indices closely, avoid nephrotoxic medications          VTE Prophylaxis: Eliquis, renal dose     Patient condition:  Fair __________________________________________________________________________  INPATIENT LIST OF MEDICATIONS     Scheduled Meds:   apixaban  2.5 mg Oral BID    carvediloL  3.125 mg Oral BID WM    [START ON 1/25/2023] cefTRIAXone (ROCEPHIN) IVPB  1 g Intravenous Q24H    colestipoL  1 g Oral BID    doxycycline (VIBRAMYCIN) IVPB  100 mg Intravenous Q12H    DULoxetine  60 mg Oral Daily    famotidine  20 mg Oral BID    gabapentin  300 mg Oral BID    hydroCHLOROthiazide  12.5 mg Oral Daily    lenalidomide  5 mg Oral Daily    letrozole  2.5 mg Oral Daily    ofloxacin  1 drop Both Eyes QID     Continuous Infusions:  PRN Meds:.acetaminophen, acetaminophen, benzonatate, benzonatate, guaiFENesin-codeine 100-10 mg/5 ml, iopamidoL, melatonin, ondansetron, sodium chloride 0.9%      All diagnosis and differential diagnosis have been reviewed; assessment and plan has been documented; I have personally reviewed the labs and test results that are presently available; I have reviewed the patients medication list; I have reviewed the consulting providers response and recommendations. I have reviewed or attempted to review medical records based upon their availability.    All of the patient and family questions have been addressed and answered. Patient's is agreeable to the above stated plan. I will continue to monitor closely and make adjustments to medical management as needed.      Adry Rosario MD   01/24/2023

## 2023-01-24 NOTE — ED NOTES
Pt to ed from home for cough, weakness, and eye swelling. IV start. 2 blood cultures. Slightly febrile. Tylenol given. IVF started and abx started

## 2023-01-24 NOTE — H&P
[facility] Medicine History & Physical Examination       Patient Name: Ninfa Candelario  MRN: [id number]  Patient Class: IP- Inpatient   Admission Date: 1/23/2023   Admitting Physician:  Service   Length of Stay: 0  Attending Physician: Adry Rosario MD  Primary Care Provider: Adry Rosario MD  Face-to-Face encounter date: 01/24/2023  Code Status: Full     Chief Complaint: Shortness of Breath (Everyone in household has covid/flu symp - pt w/ productive cough fever shortness of breath chills, aasi reports intial spo2 94% - pt currently 100% on 4L nc, currently on oral chemo for breast ca)        Patient information was obtained from patient, patient's family, past medical records and ER records.     HISTORY OF PRESENT ILLNESS:   Ninfa Candelario is a 57 y.o. female who  has a past medical history of Amyloidosis, Anemia, Anxiety and depression, Diplopia, Hyperlipidemia, Hypertension, Impaired mobility, Lytic lesion of bone on x-ray, Multiple myeloma, Neuropathy, Obesity, unspecified, Paroxysmal atrial fibrillation, and Primary cancer of left female breast.. The patient presented to [facility] on 1/23/2023 with a primary complaint of cough and weakness        PAST MEDICAL HISTORY:     Past Medical History:   Diagnosis Date    Amyloidosis     Anemia     Anxiety and depression     Diplopia     Hyperlipidemia     Hypertension     Impaired mobility     Lytic lesion of bone on x-ray     Multiple myeloma     Neuropathy     Obesity, unspecified     Paroxysmal atrial fibrillation     Primary cancer of left female breast        PAST SURGICAL HISTORY:     Past Surgical History:   Procedure Laterality Date    BONE MARROW TRANSPLANT  02/08/2016    CARPAL TUNNEL RELEASE      COLONOSCOPY  12/11/2018    Dr. Eddie Jimenez    ENDOSCOPIC RELEASE OF TRIGGER FINGER      ESOPHAGEAL MOTILITY STUDY  2015    Excision Lipoma left elbow      Exploration Laparotomy  2016    FLEXIBLE  SIGMOIDOSCOPY      MEDIPORT INSERTION, SINGLE  03/15/2016    PH Study 24 Hour  2015       ALLERGIES:   Baclofen, Penicillins, Shellfish containing products, Nsaids (non-steroidal anti-inflammatory drug), Penicillin, Ace inhibitors, Azithromycin, Meloxicam, Prochlorperazine, and Tramadol    FAMILY HISTORY:   Reviewed and negative    SOCIAL HISTORY:     Social History     Tobacco Use    Smoking status: Never    Smokeless tobacco: Never   Substance Use Topics    Alcohol use: Never        HOME MEDICATIONS:     Prior to Admission medications    Medication Sig Start Date End Date Taking? Authorizing Provider   apixaban (ELIQUIS) 2.5 mg Tab Take 1 tablet by mouth 2 (two) times daily. 8/25/21  Yes Historical Provider   benzonatate (TESSALON) 100 MG capsule Take 1 capsule (100 mg total) by mouth 3 (three) times daily as needed for Cough. 1/19/23 1/29/23 Yes AVA Eddy   carvediloL (COREG) 3.125 MG tablet Take 1 tablet (3.125 mg total) by mouth 2 (two) times daily with meals. 6/30/22 3/27/23 Yes AVA Eddy   carvediloL (COREG) 3.125 MG tablet Take 3.125 mg by mouth 2 (two) times a day. 12/13/22  Yes Historical Provider   colestipoL (COLESTID) 1 gram Tab TAKE 1 TABLET TWICE A DAY 12/28/22  Yes AVA Carmen   dexAMETHasone 20 mg Tab Take 20 mg by mouth once a week. 11/23/22  Yes AVA Carmen   DULoxetine (CYMBALTA) 60 MG capsule Take 60 mg by mouth once daily. 7/26/22  Yes Historical Provider   hydroCHLOROthiazide (HYDRODIURIL) 12.5 MG Tab TAKE 1 TABLET(12.5 MG) BY MOUTH DAILY 9/28/21  Yes Historical Provider   hydroCHLOROthiazide (HYDRODIURIL) 12.5 MG Tab Take 12.5 mg by mouth once daily. 12/13/22  Yes Historical Provider   levocetirizine (XYZAL) 5 MG tablet Take 1 tablet (5 mg total) by mouth every evening. 6/30/22 6/30/23 Yes AVA Eddy   levocetirizine (XYZAL) 5 MG tablet Take 1 tablet by mouth every evening. 8/26/22  Yes Historical Provider   methadone (DOLOPHINE) 10 MG tablet  Take 10 mg by mouth every 8 (eight) hours while awake. 10/4/21  Yes Historical Provider   methadone (DOLOPHINE) 10 MG tablet Take 10 mg by mouth every 8 (eight) hours. 1/4/23  Yes Historical Provider   methylPREDNISolone (MEDROL DOSEPACK) 4 mg tablet use as directed 1/19/23  Yes AVA Eddy   omeprazole (PRILOSEC) 20 MG capsule Take 1 capsule (20 mg total) by mouth once daily. 6/30/22 6/30/23 Yes AVA Eddy   ondansetron (ZOFRAN) 8 MG tablet Take 1 tablet (8 mg total) by mouth every 8 (eight) hours as needed for Nausea. 1/10/23  Yes AVA Carmen   ondansetron (ZOFRAN) 8 MG tablet Take 1 tablet by mouth every 8 (eight) hours as needed. 9/13/22  Yes Historical Provider   oxyCODONE (ROXICODONE) 10 mg Tab immediate release tablet Take 10 mg by mouth 3 (three) times daily as needed. 10/4/21  Yes Historical Provider   potassium chloride (KLOR-CON) 10 MEQ TbSR TAKE 1 TABLET BY MOUTH TWICE DAILY 5/6/22  Yes Natalie Meyers MD   REVLIMID 5 mg Cap TAKE 1 CAPSULE BY MOUTH EVERY OTHER DAY 12/19/22  Yes AVA Carmen   rosuvastatin (CRESTOR) 10 MG tablet Take 10 mg by mouth Daily. 8/25/21  Yes Historical Provider   rosuvastatin (CRESTOR) 10 MG tablet Take 10 mg by mouth every evening. 11/15/22  Yes Historical Provider   tretinoin (RETIN-A) 0.1 % cream Apply 1 application topically every evening. 1/4/22  Yes Historical Provider   dexAMETHasone (DECADRON) 4 MG Tab Take 4 mg by mouth once a week. 11/27/22   Historical Provider   DULoxetine (CYMBALTA) 60 MG capsule Take 60 mg by mouth. 3/29/22   Historical Provider   fluticasone propionate (FLONASE) 50 mcg/actuation nasal spray   See Instructions, SHAKE LIQUID AND USE 1 SPRAY IN EACH NOSTRIL TWICE DAILY AS NEEDED FOR ALLERGY SYMPTOMS, # 16 gm, 0 Refill(s), Pharmacy: [id number], 161, cm, Height/Length Dosing, 02/07/22 8:48:00 CST, 74.2, kg, Weight Dosing, 02/07... 3/28/22   Historical Provider   gabapentin (NEURONTIN) 300 MG  capsule Take 300 mg by mouth. 3/24/22   Historical Provider   ixazomib (NINLARO) 4 mg Cap Take 4 mg by mouth every 7 days. 9/30/22   AVA Carmen   letrozole (FEMARA) 2.5 mg Tab Take 1 tablet by mouth once daily. 8/23/21   Historical Provider   multivitamin (THERAGRAN) per tablet Take 1 tablet by mouth once daily.    Historical Provider   ondansetron (ZOFRAN) 8 MG tablet Take 8 mg by mouth every 8 (eight) hours as needed. 4/20/22   Historical Provider       REVIEW OF SYSTEMS:   Except as documented, all other systems reviewed and negative     PHYSICAL EXAM:     VITAL SIGNS: 24 HRS MIN & MAX LAST   Temp  Min: 99 °F (37.2 °C)  Max: 100 °F (37.8 °C) 100 °F (37.8 °C)   BP  Min: 131/87  Max: 156/101 131/87   Pulse  Min: 85  Max: 120  85   Resp  Min: 16  Max: 24 (!) 21     SpO2  Min: 95 %  Max: 100 % 95 %       General appearance: Well-developed, well-nourished female in no apparent distress.  HENT: Atraumatic head. Moist mucous membranes of oral cavity.  Eyes: Normal extraocular movements.   Neck: Supple.   Lungs: Clear to auscultation bilaterally. No wheezing present.   Heart: Regular rate and rhythm. S1 and S2 present with no murmurs/gallop/rub. No pedal edema. No JVD present.   Abdomen: Soft, non-distended, non-tender. No rebound tenderness/guarding. Bowel sounds are normal.   Extremities: No cyanosis, clubbing, or edema.  Skin: No Rash.   Neuro: Motor and sensory exams grossly intact. Good tone. Muscle strength 5/5 in all 4 extremities  Psych/mental status: Appropriate mood and affect. Responds appropriately to questions.     LABS AND IMAGING:     Recent Labs   Lab 01/23/23 2046   WBC 5.4   RBC 3.53*   HGB 10.2*   HCT 32.4*   MCV 91.8   MCH 28.9   MCHC 31.5*   RDW 15.1      MPV 10.3       Recent Labs   Lab 01/23/23 2046      K 4.2   CO2 27   BUN 36.4*   CREATININE 2.47*   CALCIUM 9.6   ALBUMIN 2.7*   ALKPHOS 225*   *   *   BILITOT 0.4       Microbiology Results (last 7 days)        Procedure Component Value Units Date/Time    Blood Culture #1 **CANNOT BE ORDERED STAT** [[id number]] Collected: 01/24/23 0654    Order Status: Resulted Specimen: Blood Updated: 01/24/23 0657    Blood Culture #2 **CANNOT BE ORDERED STAT** [[id number]] Collected: 01/24/23 0640    Order Status: Resulted Specimen: Blood Updated: 01/24/23 0648             US Abdomen Limited  Narrative: EXAMINATION:  US ABDOMEN LIMITED    CLINICAL HISTORY:  transaminitis;, .    TECHNIQUE:  Multiple transverse and sagittal grayscale sonographic images were acquired through the abdomen.    FINDINGS:  Liver: Unremarkable appearing liver which measures 13.7 cmin greatest dimension.    Biliary ducts:    Billiary ducts: The common bile duct measures 3.1 mmin diameter.    Gallbladder: The gallbladder appears unremarkable. The sonographic Miller's sign is negative. The gallbladder wall measures 1.8 mm in thickness which is within normal limits.    Pancreas: The pancreas is obscured by gas.    IVC: The IVC is not visualized.    Portal vein: There is normal hepatopetal flow.    Kidneys:    Right kidney: Unremarkable appearing right kidney.    Dimensions:    the right kidney measures 9.1 x 5.5 x 4.2 cm  Impression: No significant abnormalities identified    Electronically signed by: Prakash Vásquez  Date:    01/24/2023  Time:    12:47  CT Chest Without Contrast  Narrative: EXAMINATION:  CT CHEST WITHOUT CONTRAST    CLINICAL HISTORY:  Cough, persistent;    TECHNIQUE:  Axial images of the chest were obtained without contrast. Sagittal and coronal reconstructed images were available for review.    Automatic dose control was utilized to reduce patient radiation dose.    DLP = 478    COMPARISON:  08/19/2015    FINDINGS:  AORTA: Limited evaluation secondary to lack of IV contrast.  Grossly normal in course and caliber.    THYROID GLAND: The visualized thyroid is unremarkable.    AIRWAYS: Trachea is midline and tracheobronchial tree is patent.    HEART:  Right greater than left lower lobe opacification consistent with infectious process.    LUNGS: There are no new masses or nodules identified. No pleural effusion or pneumothorax.    LYMPH NODES: There is no significant mediastinal, axillary or hilar lymphadenopathy.    BONES: Lucent lesions throughout the vertebral body similar to 2015.    UPPER ABDOMEN:  The visualized abdomen is unremarkable.  Impression: Right greater than left lower lobe opacification consistent with infectious process.  Lucent lesions throughout the osseous structures similar to 2015.    Electronically signed by: Glen Galindo  Date:    01/24/2023  Time:    08:06        ASSESSMENT & PLAN:           VTE Prophylaxis: will be placed on Heparin/Lovenox/ Xarelto/ SCD for DVT prophylaxis and will be advised to be as mobile as possible and sit in a chair as tolerated    Patient condition:  Stable/Fair/Gaurded/ Serious/ Critical    __________________________________________________________________________  INPATIENT LIST OF MEDICATIONS     Scheduled Meds:   apixaban  2.5 mg Oral BID    carvediloL  3.125 mg Oral BID WM    [START ON 1/25/2023] cefTRIAXone (ROCEPHIN) IVPB  1 g Intravenous Q24H    colestipoL  1 g Oral BID    doxycycline (VIBRAMYCIN) IVPB  100 mg Intravenous Q12H    DULoxetine  60 mg Oral Daily    famotidine  20 mg Oral BID    gabapentin  300 mg Oral BID    hydroCHLOROthiazide  12.5 mg Oral Daily    lenalidomide  5 mg Oral Daily    letrozole  2.5 mg Oral Daily    ofloxacin  1 drop Both Eyes QID     Continuous Infusions:  PRN Meds:.acetaminophen, acetaminophen, benzonatate, benzonatate, guaiFENesin-codeine 100-10 mg/5 ml, iopamidoL, melatonin, ondansetron, sodium chloride 0.9%      All diagnosis and differential diagnosis have been reviewed; assessment and plan has been documented; I have personally reviewed the labs and test results that are presently available; I have reviewed the patients medication list; I have reviewed the consulting  providers response and recommendations. I have reviewed or attempted to review medical records based upon their availability.    All of the patient and family questions have been addressed and answered. Patient's is agreeable to the above stated plan. I will continue to monitor closely and make adjustments to medical management as needed.      Adry Rosario MD   01/24/2023

## 2023-01-24 NOTE — ED PROVIDER NOTES
Encounter Date: 1/23/2023       History     Chief Complaint   Patient presents with    Shortness of Breath     Everyone in household has covid/flu symp - pt w/ productive cough fever shortness of breath chills, aasi reports intial spo2 94% - pt currently 100% on 4L nc, currently on oral chemo for breast ca     56 yo female with hx of breast cancer on oral chemo, A Fib on Eliquis presenting with fever, chills, cough with small amount of sputum and shortness of breath x 2 weeks.     The history is provided by the patient.   Review of patient's allergies indicates:   Allergen Reactions    Baclofen Shortness Of Breath     Difficulty breathing      Penicillins Hives, Rash and Swelling    Shellfish containing products Hives, Rash and Swelling     shell  She can shrimp      Nsaids (non-steroidal anti-inflammatory drug)     Penicillin      Other reaction(s): Unknown    Ace inhibitors Other (See Comments)     cough    Azithromycin Nausea Only     Upset stomach    Meloxicam Tinitus    Prochlorperazine Anxiety     Restlessness/anxious    Tramadol Other (See Comments)     Increased Heart Rate       Past Medical History:   Diagnosis Date    Amyloidosis     Anemia     Anxiety and depression     Diplopia     Hyperlipidemia     Hypertension     Impaired mobility     Lytic lesion of bone on x-ray     Multiple myeloma     Neuropathy     Obesity, unspecified     Paroxysmal atrial fibrillation     Primary cancer of left female breast      Past Surgical History:   Procedure Laterality Date    BONE MARROW TRANSPLANT  02/08/2016    CARPAL TUNNEL RELEASE      COLONOSCOPY  12/11/2018    Dr. Eddie Jimenez    ENDOSCOPIC RELEASE OF TRIGGER FINGER      ESOPHAGEAL MOTILITY STUDY  2015    Excision Lipoma left elbow      Exploration Laparotomy  2016    FLEXIBLE SIGMOIDOSCOPY      MEDIPORT INSERTION, SINGLE  03/15/2016    PH Study 24 Hour  2015     Family History   Problem Relation Age of Onset    Hypertension Mother     Cancer Father      Hypertension Sister     Hypertension Brother      Social History     Tobacco Use    Smoking status: Never    Smokeless tobacco: Never   Substance Use Topics    Alcohol use: Never    Drug use: Never     Review of Systems   Constitutional:  Positive for chills and fever.   Respiratory:  Positive for cough and shortness of breath.    Cardiovascular:  Negative for chest pain and leg swelling.   Gastrointestinal:  Negative for nausea and vomiting.   Allergic/Immunologic: Positive for immunocompromised state.   Neurological:  Negative for syncope.     Physical Exam     Initial Vitals [01/23/23 1943]   BP Pulse Resp Temp SpO2   (!) 140/70 110 (!) 24 99 °F (37.2 °C) 100 %      MAP       --         Physical Exam    Nursing note and vitals reviewed.  Constitutional: She appears well-developed and well-nourished. She is not diaphoretic. She does not appear ill.   Moaning    HENT:   Head: Normocephalic and atraumatic.   Right Ear: External ear normal.   Left Ear: External ear normal.   Mouth/Throat: Oropharynx is clear and moist.   Eyes: EOM are normal. Pupils are equal, round, and reactive to light.   Small amount of discharge in left eye    Neck: Neck supple. No tracheal deviation present.   Cardiovascular:  Regular rhythm, normal heart sounds and intact distal pulses.           No murmur heard.  Tachycardic    Pulmonary/Chest: No respiratory distress. She has no wheezes. She has rhonchi in the left lower field. She has no rales.   Abdominal: Abdomen is soft. Bowel sounds are normal. She exhibits no distension. There is no abdominal tenderness.   No right CVA tenderness.  No left CVA tenderness.   Musculoskeletal:         General: No edema. Normal range of motion.      Cervical back: Neck supple.     Neurological: She is alert and oriented to person, place, and time. She has normal strength. No cranial nerve deficit or sensory deficit. GCS score is 15. GCS eye subscore is 4. GCS verbal subscore is 5. GCS motor subscore is 6.    Skin: Skin is warm and dry. Capillary refill takes less than 2 seconds. No rash noted. No pallor.   Psychiatric: She has a normal mood and affect. Her behavior is normal.       ED Course   Procedures  Labs Reviewed   COMPREHENSIVE METABOLIC PANEL - Abnormal; Notable for the following components:       Result Value    Blood Urea Nitrogen 36.4 (*)     Creatinine 2.47 (*)     Albumin Level 2.7 (*)     Globulin 3.7 (*)     Albumin/Globulin Ratio 0.7 (*)     Alkaline Phosphatase 225 (*)     Alanine Aminotransferase 335 (*)     Aspartate Aminotransferase 179 (*)     All other components within normal limits   PROTIME-INR - Abnormal; Notable for the following components:    PT 15.8 (*)     All other components within normal limits   CBC WITH DIFFERENTIAL - Abnormal; Notable for the following components:    RBC 3.53 (*)     Hgb 10.2 (*)     Hct 32.4 (*)     MCHC 31.5 (*)     IG# 0.14 (*)     All other components within normal limits   TROPONIN I - Normal   B-TYPE NATRIURETIC PEPTIDE - Normal   COVID/FLU A&B PCR - Normal    Narrative:     The Xpert Xpress SARS-CoV-2/FLU/RSV plus is a rapid, multiplexed real-time PCR test intended for the simultaneous qualitative detection and differentiation of SARS-CoV-2, Influenza A, Influenza B, and respiratory syncytial virus (RSV) viral RNA in either nasopharyngeal swab or nasal swab specimens.         LACTIC ACID, PLASMA - Normal   BLOOD CULTURE OLG   BLOOD CULTURE [facility]   CBC W/ AUTO DIFFERENTIAL    Narrative:     The following orders were created for panel order CBC auto differential.  Procedure                               Abnormality         Status                     ---------                               -----------         ------                     CBC with Differential[[id number]]        Abnormal            Final result               Manual Differential[[id number]]                              Final result                 Please view results for these tests on the individual  orders.   MANUAL DIFFERENTIAL   PROCALCITONIN (PCT)     EKG Readings: (Independently Interpreted)   Initial Reading: No STEMI. Rhythm: Normal Sinus Rhythm. Heart Rate: 100. Ectopy: No Ectopy. Conduction: Normal. ST Segments: Normal ST Segments. T Waves: Normal. Axis: Normal.   Performed 01/23/2023 at 7:47 p.m.   ECG Results              EKG 12-lead (Final result)  Result time 01/23/23 21:34:10      Final result by Interface, Lab In [facility] (01/23/23 21:34:10)                   Narrative:    Test Reason : R06.02,    Vent. Rate : 100 BPM     Atrial Rate : 100 BPM     P-R Int : 150 ms          QRS Dur : 070 ms      QT Int : 328 ms       P-R-T Axes : 066 026 036 degrees     QTc Int : 423 ms    Normal sinus rhythm  Normal ECG  No previous ECGs available  Confirmed by Elvis Sheikh MD ([id number]) on 1/23/2023 9:34:03 PM    Referred By:             Confirmed By:Elvis Sheikh MD                                  Imaging Results              CT Chest Without Contrast (Final result)  Result time 01/24/23 08:06:08      Final result by Glen Galindo MD (01/24/23 08:06:08)                   Impression:      Right greater than left lower lobe opacification consistent with infectious process.  Lucent lesions throughout the osseous structures similar to 2015.      Electronically signed by: Glen Galindo  Date:    01/24/2023  Time:    08:06               Narrative:    EXAMINATION:  CT CHEST WITHOUT CONTRAST    CLINICAL HISTORY:  Cough, persistent;    TECHNIQUE:  Axial images of the chest were obtained without contrast. Sagittal and coronal reconstructed images were available for review.    Automatic dose control was utilized to reduce patient radiation dose.    DLP = 478    COMPARISON:  08/19/2015    FINDINGS:  AORTA: Limited evaluation secondary to lack of IV contrast.  Grossly normal in course and caliber.    THYROID GLAND: The visualized thyroid is unremarkable.    AIRWAYS: Trachea is midline and tracheobronchial tree is  patent.    HEART: Right greater than left lower lobe opacification consistent with infectious process.    LUNGS: There are no new masses or nodules identified. No pleural effusion or pneumothorax.    LYMPH NODES: There is no significant mediastinal, axillary or hilar lymphadenopathy.    BONES: Lucent lesions throughout the vertebral body similar to 2015.    UPPER ABDOMEN:  The visualized abdomen is unremarkable.                                         US Abdomen Limited (Preliminary result)  Result time 01/24/23 03:57:37      Preliminary result by Hayden Bernstein MD (01/24/23 03:57:37)                   Narrative:    START OF REPORT:  Technique: Limited abdominal ultrasound.    Comparison: None.    Clinical history: Transaminitis.    Findings:  Liver: Unremarkable appearing liver which measures 13.7 cmin greatest dimension.  Biliary ducts:  Billiary ducts: The common bile duct measures 3.1 mmin diameter.  Gallbladder: The gallbladder appears unremarkable. The sonographic Miller's sign is negative. The gallbladder wall measures 1.8 mm in thickness which is within normal limits.  Pancreas: The pancreas is obscured by gas.  IVC: The IVC is not visualized.  Portal vein: There is normal hepatopetal flow.  Kidneys:  Right kidney: Unremarkable appearing right kidney.  Dimensions:  the right kidney measures X by X by X centimeters Sagittal: 9.1 cm.      Impression:  1. Unremarkable limited abdominal ultrasound. Details as above.                          Preliminary result by thesweetlink, Rad Results In (01/24/23 03:57:37)                   Narrative:    START OF REPORT:  Technique: Limited abdominal ultrasound.    Comparison: None.    Clinical history: Transaminitis.    Findings:  Liver: Unremarkable appearing liver which measures 13.7 cmin greatest dimension.  Biliary ducts:  Billiary ducts: The common bile duct measures 3.1 mmin diameter.  Gallbladder: The gallbladder appears unremarkable. The sonographic Miller's sign is  negative. The gallbladder wall measures 1.8 mm in thickness which is within normal limits.  Pancreas: The pancreas is obscured by gas.  IVC: The IVC is not visualized.  Portal vein: There is normal hepatopetal flow.  Kidneys:  Right kidney: Unremarkable appearing right kidney.  Dimensions:  the right kidney measures X by X by X centimeters Sagittal: 9.1 cm.      Impression:  1. Unremarkable limited abdominal ultrasound. Details as above.                                         X-Ray Chest AP Portable (Final result)  Result time 01/23/23 20:18:18      Final result by Elvis Mckeon MD (01/23/23 20:18:18)                   Impression:      No acute cardiopulmonary abnormality.      Electronically signed by: Elvis Mckeon MD  Date:    01/23/2023  Time:    20:18               Narrative:    EXAMINATION:  Single  view chest radiograph.    CLINICAL HISTORY:  sob;    TECHNIQUE:  Single view of the chest.    COMPARISON:  Chest radiograph 09/24/2021.    FINDINGS:  The lungs are clear without consolidation or effusion.  There is no pneumothorax.  The cardiac silhouette is normal in size.  There is no acute osseous abnormality.                                    X-Rays:   Independently Interpreted Readings:   Chest X-Ray: Normal heart size. There is an infiltrate in the LLL.   Other Readings:  CT chest: bilateral lower lobe consolidations   Medications   iopamidoL (ISOVUE-370) injection 100 mL (has no administration in time range)   benzonatate capsule 100 mg (has no administration in time range)   guaiFENesin-codeine 100-10 mg/5 ml syrup 5 mL (5 mLs Oral Given 1/24/23 0841)   acetaminophen tablet 650 mg (650 mg Oral Given 1/24/23 0645)   lactated ringers bolus 1,000 mL (1,000 mLs Intravenous New Bag 1/24/23 0644)   cefTRIAXone (ROCEPHIN) 1 g in dextrose 5 % in water (D5W) 5 % 50 mL IVPB (MB+) (0 g Intravenous Stopped 1/24/23 0728)   doxycycline (VIBRAMYCIN) 100 mg in dextrose 5 % (D5W) 250 mL IVPB (100 mg Intravenous New Bag  1/24/23 0807)        Additional MDM:   Sepsis - SIRS Criteria: Temperature: Temp Normal. Heart Rate: HR > 90. Tachypnea: RR > 20. PaCO2: PaCO2 Normal. White Count: WBC Normal. .  Sepsis: Airway: The patient's airway was good (normal gag reflex and breathing). The lactic acid was: normal (<3.0). Consultants: Internal Medicine. stable Doxy and Rocephin for presumed pneumonia, 1L LR bolus         ED Course as of 01/24/23 0919 Tue Jan 24, 2023   0733 Lactate, Ramírez: 1.7 [KM]   0733 Procalcitonin: 0.72 [KM]      ED Course User Index  [KM] Meri Clifford MD          Medical Decision Making  Initial assessment: Appears to feel unwell, no respiratory distress. Patient has been in waiting room xavier 10 hour due to ED boarding.     Judging by the patient's chief complaint and pertinent history, the patient has the following possible differential diagnoses, including but not limited to the following.  Some of these are deemed to be lower likelihood and some more likely based on my physical exam and history combined with possible lab work and/or imaging studies.   Please see the pertinent studies, and refer to the HPI.  Some of these diagnoses will take further evaluation to fully rule out, perhaps as an outpatient and the patient was encouraged to follow up when discharged for more comprehensive evaluation.    ACS, pneumonia, COVID/Flu, congestive heart failure, asthma, COPD, pleural effusion, pulmonary edema, acute bronchitis, PE, pneumothorax, hemothorax, aortic dissection, electrolyte abnormalities, anemia, anxiety       ED Course:  Tylenol for fever  1L LR bolus with improvement of HR   Rocephin and Azithromycin   CXR with left side infiltrate, interpreted by myself   CT without contrast with bilateral infiltrates, interpreted by myself   Discussed with patient and I think she will benefit from admission and she is agreeable   Spoke with Nina for admission       Problems Addressed:  Acute onset sepsis: acute illness  or injury that poses a threat to life or bodily functions  Pneumonia of both lower lobes due to infectious organism: acute illness or injury that poses a threat to life or bodily functions  SOB (shortness of breath): acute illness or injury that poses a threat to life or bodily functions    Amount and/or Complexity of Data Reviewed  External Data Reviewed: notes.     Details: ED visit in [place] 12/14/23- Patient is a well-appearing 57-year-old female with improving symptoms, no true fevers, max of 99.4 at home, stable vitals here in the ER. Clear breath sounds.  +enterovirus-has URI  CXR with questionable infectious process- will cover with levaquin      Tele med visit 1/19/23-       Acute bronchitis - Primary   -s/p recent URI   -afebrile with mild symptoms  -currently utilizing OTC antihistamine, continue   -worsening dry cough, initiate Medrol Dosepak and Tessalon Perles  Labs: ordered. Decision-making details documented in ED Course.     Details: stable CKD, transaminitis  Radiology: ordered and independent interpretation performed. Decision-making details documented in ED Course.  ECG/medicine tests: ordered and independent interpretation performed. Decision-making details documented in ED Course.    Risk  Prescription drug management.  Decision regarding hospitalization.             Clinical Impression:   Final diagnoses:  [R06.02] SOB (shortness of breath)  [J18.9] Pneumonia of both lower lobes due to infectious organism  [A41.9] Acute onset sepsis (Primary)        ED Disposition Condition    Admit Stable                Meri Clifford MD  01/24/23 2377

## 2023-01-25 PROCEDURE — 63600175 PHARM REV CODE 636 W HCPCS: Performed by: EMERGENCY MEDICINE

## 2023-01-25 PROCEDURE — 99233 SBSQ HOSP IP/OBS HIGH 50: CPT | Mod: ,,, | Performed by: INTERNAL MEDICINE

## 2023-01-25 PROCEDURE — 25000003 PHARM REV CODE 250: Performed by: EMERGENCY MEDICINE

## 2023-01-25 PROCEDURE — 21400001 HC TELEMETRY ROOM

## 2023-01-25 PROCEDURE — 94761 N-INVAS EAR/PLS OXIMETRY MLT: CPT

## 2023-01-25 PROCEDURE — 25000242 PHARM REV CODE 250 ALT 637 W/ HCPCS: Performed by: INTERNAL MEDICINE

## 2023-01-25 PROCEDURE — 94640 AIRWAY INHALATION TREATMENT: CPT

## 2023-01-25 PROCEDURE — 99233 PR SUBSEQUENT HOSPITAL CARE,LEVL III: ICD-10-PCS | Mod: ,,, | Performed by: INTERNAL MEDICINE

## 2023-01-25 PROCEDURE — 25000003 PHARM REV CODE 250: Performed by: INTERNAL MEDICINE

## 2023-01-25 RX ORDER — DEXAMETHASONE SODIUM PHOSPHATE 4 MG/ML
4 INJECTION, SOLUTION INTRA-ARTICULAR; INTRALESIONAL; INTRAMUSCULAR; INTRAVENOUS; SOFT TISSUE EVERY 24 HOURS
Status: DISCONTINUED | OUTPATIENT
Start: 2023-01-26 | End: 2023-01-27 | Stop reason: HOSPADM

## 2023-01-25 RX ORDER — IPRATROPIUM BROMIDE AND ALBUTEROL SULFATE 2.5; .5 MG/3ML; MG/3ML
3 SOLUTION RESPIRATORY (INHALATION) EVERY 6 HOURS
Status: DISCONTINUED | OUTPATIENT
Start: 2023-01-25 | End: 2023-01-26

## 2023-01-25 RX ADMIN — APIXABAN 2.5 MG: 2.5 TABLET, FILM COATED ORAL at 09:01

## 2023-01-25 RX ADMIN — GUAIFENESIN AND CODEINE PHOSPHATE 5 ML: 10; 100 LIQUID ORAL at 01:01

## 2023-01-25 RX ADMIN — ACETAMINOPHEN 650 MG: 325 TABLET, FILM COATED ORAL at 09:01

## 2023-01-25 RX ADMIN — GABAPENTIN 300 MG: 300 CAPSULE ORAL at 09:01

## 2023-01-25 RX ADMIN — OFLOXACIN 1 DROP: 3 SOLUTION OPHTHALMIC at 09:01

## 2023-01-25 RX ADMIN — GABAPENTIN 300 MG: 300 CAPSULE ORAL at 10:01

## 2023-01-25 RX ADMIN — ACETAMINOPHEN 650 MG: 325 TABLET, FILM COATED ORAL at 01:01

## 2023-01-25 RX ADMIN — ONDANSETRON 4 MG: 2 INJECTION INTRAMUSCULAR; INTRAVENOUS at 02:01

## 2023-01-25 RX ADMIN — DOXYCYCLINE 100 MG: 100 INJECTION, POWDER, LYOPHILIZED, FOR SOLUTION INTRAVENOUS at 12:01

## 2023-01-25 RX ADMIN — ONDANSETRON 4 MG: 2 INJECTION INTRAMUSCULAR; INTRAVENOUS at 06:01

## 2023-01-25 RX ADMIN — ONDANSETRON 4 MG: 2 INJECTION INTRAMUSCULAR; INTRAVENOUS at 09:01

## 2023-01-25 RX ADMIN — MONTELUKAST SODIUM 1 G: 4 TABLET, CHEWABLE ORAL at 10:01

## 2023-01-25 RX ADMIN — CARVEDILOL 3.12 MG: 3.12 TABLET, FILM COATED ORAL at 07:01

## 2023-01-25 RX ADMIN — DOXYCYCLINE 100 MG: 100 INJECTION, POWDER, LYOPHILIZED, FOR SOLUTION INTRAVENOUS at 09:01

## 2023-01-25 RX ADMIN — GUAIFENESIN AND CODEINE PHOSPHATE 5 ML: 10; 100 LIQUID ORAL at 10:01

## 2023-01-25 RX ADMIN — DULOXETINE 60 MG: 30 CAPSULE, DELAYED RELEASE ORAL at 10:01

## 2023-01-25 RX ADMIN — CARVEDILOL 3.12 MG: 3.12 TABLET, FILM COATED ORAL at 05:01

## 2023-01-25 RX ADMIN — LETROZOLE 2.5 MG: 2.5 TABLET ORAL at 10:01

## 2023-01-25 RX ADMIN — GUAIFENESIN AND CODEINE PHOSPHATE 5 ML: 10; 100 LIQUID ORAL at 06:01

## 2023-01-25 RX ADMIN — HYDROCHLOROTHIAZIDE 12.5 MG: 12.5 TABLET ORAL at 10:01

## 2023-01-25 RX ADMIN — BENZONATATE 100 MG: 100 CAPSULE ORAL at 06:01

## 2023-01-25 RX ADMIN — FAMOTIDINE 20 MG: 20 TABLET, FILM COATED ORAL at 10:01

## 2023-01-25 RX ADMIN — IPRATROPIUM BROMIDE AND ALBUTEROL SULFATE 3 ML: 2.5; .5 SOLUTION RESPIRATORY (INHALATION) at 07:01

## 2023-01-25 RX ADMIN — MONTELUKAST SODIUM 1 G: 4 TABLET, CHEWABLE ORAL at 09:01

## 2023-01-25 RX ADMIN — GUAIFENESIN AND CODEINE PHOSPHATE 5 ML: 10; 100 LIQUID ORAL at 09:01

## 2023-01-25 NOTE — PROGRESS NOTES
[facility] Medicine Progress Note        Chief Complaint: Inpatient Follow-up for cough and pneumonia     HPI:      Ninfa is a 57-year-old female known to me with history of breast cancer on oral chemotherapy, atrial fibrillation on Eliquis  , mellitus is an anemia of chronic disease as well as atrial fibrillation that presented to the hospital with fever, chills, cough and a small amount of sputum production.  Also has ongoing shortness of breath.  Other family members affected with similar symptoms.  Workup in the ED included a CT chest which showed a right greater than left lower lobe opacification consistent with an infectious process.  Cultures were drawn and patient was initiated on Rocephin and doxycycline.    Because of transaminitis noted on the labs, abdominal ultrasound was completed which was noted to be negative.       Interval Hx:   The patient was seen and examined.  Her sister was at bedside at the time.  No acute needs or complaints as such except the fact that the cough is still unremitting.  We discussed getting a started on low-dose of steroids since she had had not completed her Medrol Dosepak.        Objective/physical exam:  General:  Alert, mild distress from coughing    Chest:  Bibasilar rhonchi   Heart: RRR, +S1, S2, no appreciable murmur  Abdomen: Soft, nontender, BS +  MSK: Warm, no lower extremity edema, no clubbing or cyanosis  Neurologic: Alert and oriented x4, nonfocal    VITAL SIGNS: 24 HRS MIN & MAX LAST   Temp  Min: 97.1 °F (36.2 °C)  Max: 98.4 °F (36.9 °C) 98.3 °F (36.8 °C)   BP  Min: 126/78  Max: 137/84 137/84   Pulse  Min: 83  Max: 109  109   Resp  Min: 18  Max: 22 18   SpO2  Min: 94 %  Max: 98 % (!) 94 %         Recent Labs   Lab 01/23/23 2046   WBC 5.4   RBC 3.53*   HGB 10.2*   HCT 32.4*   MCV 91.8   MCH 28.9   MCHC 31.5*   RDW 15.1      MPV 10.3       Recent Labs   Lab 01/23/23 2046      K 4.2   CO2 27   BUN 36.4*    CREATININE 2.47*   CALCIUM 9.6   ALBUMIN 2.7*   ALKPHOS 225*   *   *   BILITOT 0.4          Microbiology Results (last 7 days)       Procedure Component Value Units Date/Time    Blood Culture #1 **CANNOT BE ORDERED STAT** [[id number]]  (Normal) Collected: 01/24/23 0654    Order Status: Completed Specimen: Blood Updated: 01/25/23 1000     CULTURE, BLOOD ([facility]) No Growth At 24 Hours    Blood Culture #2 **CANNOT BE ORDERED STAT** [[id number]]  (Normal) Collected: 01/24/23 0640    Order Status: Completed Specimen: Blood Updated: 01/25/23 1000     CULTURE, BLOOD ([facility]) No Growth At 24 Hours             See below for Radiology    Scheduled Med:   albuterol-ipratropium  3 mL Nebulization Q6H    apixaban  2.5 mg Oral BID    carvediloL  3.125 mg Oral BID WM    cefTRIAXone (ROCEPHIN) IVPB  1 g Intravenous Q24H    colestipoL  1 g Oral BID    [START ON 1/26/2023] dexAMETHasone  4 mg Intravenous Daily    doxycycline (VIBRAMYCIN) IVPB  100 mg Intravenous Q12H    DULoxetine  60 mg Oral Daily    famotidine  20 mg Oral Daily    gabapentin  300 mg Oral BID    hydroCHLOROthiazide  12.5 mg Oral Daily    lenalidomide  5 mg Oral Daily    letrozole  2.5 mg Oral Daily    ofloxacin  1 drop Both Eyes QID        Continuous Infusions:       PRN Meds:  acetaminophen, acetaminophen, benzonatate, benzonatate, guaiFENesin-codeine 100-10 mg/5 ml, iopamidoL, melatonin, ondansetron, sodium chloride 0.9%       Assessment/Plan:  1.  Community-acquired pneumonia, present on admission   -Monitor blood cultures   -Bibiana p.r.n. , ordered today   -adding dexamethasone 4 mg IV daily, patient seems to be having a lot of chest tightness  -Oxygen to keep sats over 90 percent   - continue with Rocephin and doxycycline     2.Immunocompromised status   -Monitor  hemodynamic status closely  - COVID-19 negative      3.  History of amyloidosis     4.  History of breast cancer   - on oral Femara, continue      5.  Atrial fibrillation   -anticoagulated  with Eliquis, being renally dosed, continue      6.  CKD 4  -Monitor renal indices closely, avoid nephrotoxic medications       VTE prophylaxis:  Renal does Eliquis    Patient condition:  Fair          All diagnosis and differential diagnosis have been reviewed; assessment and plan has been documented; I have personally reviewed the labs and test results that are presently available; I have reviewed the patients medication list; I have reviewed the consulting providers response and recommendations. I have reviewed or attempted to review medical records based upon their availability    All of the patient's questions have been  addressed and answered. Patient's is agreeable to the above stated plan. I will continue to monitor closely and make adjustments to medical management as needed.  _____________________________________________________________________    Nutrition Status:    Radiology:  US Abdomen Limited  Narrative: EXAMINATION:  US ABDOMEN LIMITED    CLINICAL HISTORY:  transaminitis;, .    TECHNIQUE:  Multiple transverse and sagittal grayscale sonographic images were acquired through the abdomen.    FINDINGS:  Liver: Unremarkable appearing liver which measures 13.7 cmin greatest dimension.    Biliary ducts:    Billiary ducts: The common bile duct measures 3.1 mmin diameter.    Gallbladder: The gallbladder appears unremarkable. The sonographic Miller's sign is negative. The gallbladder wall measures 1.8 mm in thickness which is within normal limits.    Pancreas: The pancreas is obscured by gas.    IVC: The IVC is not visualized.    Portal vein: There is normal hepatopetal flow.    Kidneys:    Right kidney: Unremarkable appearing right kidney.    Dimensions:    the right kidney measures 9.1 x 5.5 x 4.2 cm  Impression: No significant abnormalities identified    Electronically signed by: Prakash Vásquez  Date:    01/24/2023  Time:    12:47  CT Chest Without Contrast  Narrative: EXAMINATION:  CT CHEST WITHOUT  CONTRAST    CLINICAL HISTORY:  Cough, persistent;    TECHNIQUE:  Axial images of the chest were obtained without contrast. Sagittal and coronal reconstructed images were available for review.    Automatic dose control was utilized to reduce patient radiation dose.    DLP = 478    COMPARISON:  08/19/2015    FINDINGS:  AORTA: Limited evaluation secondary to lack of IV contrast.  Grossly normal in course and caliber.    THYROID GLAND: The visualized thyroid is unremarkable.    AIRWAYS: Trachea is midline and tracheobronchial tree is patent.    HEART: Right greater than left lower lobe opacification consistent with infectious process.    LUNGS: There are no new masses or nodules identified. No pleural effusion or pneumothorax.    LYMPH NODES: There is no significant mediastinal, axillary or hilar lymphadenopathy.    BONES: Lucent lesions throughout the vertebral body similar to 2015.    UPPER ABDOMEN:  The visualized abdomen is unremarkable.  Impression: Right greater than left lower lobe opacification consistent with infectious process.  Lucent lesions throughout the osseous structures similar to 2015.    Electronically signed by: Glen Galindo  Date:    01/24/2023  Time:    08:06      Adry Rosario MD   01/25/2023

## 2023-01-25 NOTE — NURSING
Nurses Note -- 4 Eyes      1/25/2023   5:52 PM      Skin assessed during: Q Shift Change      [x] No Pressure Injuries Present    [x]Prevention Measures Documented      [] Yes- Altered Skin Integrity Present or Discovered   [] LDA Added if Not in Epic (Describe Wound)   [] New Altered Skin Integrity was Present on Admit and Documented in LDA   [] Wound Image Taken    Wound Care Consulted? No    Attending Nurse:  Jose Alfredo Luis RN     Second RN/Staff Member:       Pee

## 2023-01-25 NOTE — PROGRESS NOTES
01/25/23 1229   Discharge Assessment   Assessment Type Discharge Planning Assessment   Confirmed/corrected address, phone number and insurance Yes   Confirmed Demographics Correct on Facesheet   Source of Information patient;family   When was your last doctors appointment? 01/17/23  (PCP: Dr. Rosario)   Communicated DIONI with patient/caregiver Date not available/Unable to determine   Reason For Admission pneumonia   People in Home sibling(s)   Do you expect to return to your current living situation? Yes   Do you have help at home or someone to help you manage your care at home? Yes   Who are your caregiver(s) and their phone number(s)? sister, Stephanie Candelario [phone]   Prior to hospitilization cognitive status: Unable to Assess   Walking or Climbing Stairs ambulation difficulty, requires equipment   Mobility Management walker   Home Layout Able to live on 1st floor   Equipment Currently Used at Home walker, rolling   Readmission within 30 days? No   Patient currently being followed by outpatient case management? No   Do you currently have service(s) that help you manage your care at home? No   Do you take prescription medications? Yes  (fills rx at [facility])   Do you have prescription coverage? Yes   Coverage Medicare/BCBS   Do you have any problems affording any of your prescribed medications? No   Is the patient taking medications as prescribed? yes   Who is going to help you get home at discharge? sister   How do you get to doctors appointments? family or friend will provide   Are you on dialysis? No   Do you take coumadin? No   Discharge Plan A Home with family   Discharge Plan B Home Health   DME Needed Upon Discharge  none   Discharge Plan discussed with: Patient;Sibling   Discharge Barriers Identified None     Met with patient to conduct initial dc planning assessment. Sleeping but easily aroused, able to answer some questions but appears to be a poor historian as she cannot  remember the name of her pharmacy and states she lives alone. Called patients sister, Stephanie Rees-Louis for clarification and completed assessment with her.

## 2023-01-26 ENCOUNTER — TELEPHONE (OUTPATIENT)
Dept: ADMINISTRATIVE | Facility: HOSPITAL | Age: 58
End: 2023-01-26
Payer: MEDICARE

## 2023-01-26 LAB
ALBUMIN SERPL-MCNC: 2.2 G/DL (ref 3.5–5)
ALBUMIN/GLOB SERPL: 0.5 RATIO (ref 1.1–2)
ALP SERPL-CCNC: 237 UNIT/L (ref 40–150)
ALT SERPL-CCNC: 225 UNIT/L (ref 0–55)
AST SERPL-CCNC: 140 UNIT/L (ref 5–34)
BILIRUBIN DIRECT+TOT PNL SERPL-MCNC: 0.2 MG/DL
BUN SERPL-MCNC: 30.5 MG/DL (ref 9.8–20.1)
CALCIUM SERPL-MCNC: 10.1 MG/DL (ref 8.4–10.2)
CHLORIDE SERPL-SCNC: 101 MMOL/L (ref 98–107)
CO2 SERPL-SCNC: 23 MMOL/L (ref 22–29)
CREAT SERPL-MCNC: 2.22 MG/DL (ref 0.55–1.02)
GFR SERPLBLD CREATININE-BSD FMLA CKD-EPI: 25 MLS/MIN/1.73/M2
GLOBULIN SER-MCNC: 4.7 GM/DL (ref 2.4–3.5)
GLUCOSE SERPL-MCNC: 179 MG/DL (ref 74–100)
POTASSIUM SERPL-SCNC: 3.8 MMOL/L (ref 3.5–5.1)
PROT SERPL-MCNC: 6.9 GM/DL (ref 6.4–8.3)
SODIUM SERPL-SCNC: 138 MMOL/L (ref 136–145)

## 2023-01-26 PROCEDURE — 99900035 HC TECH TIME PER 15 MIN (STAT)

## 2023-01-26 PROCEDURE — 99233 PR SUBSEQUENT HOSPITAL CARE,LEVL III: ICD-10-PCS | Mod: ,,, | Performed by: INTERNAL MEDICINE

## 2023-01-26 PROCEDURE — 63600175 PHARM REV CODE 636 W HCPCS: Performed by: INTERNAL MEDICINE

## 2023-01-26 PROCEDURE — 21400001 HC TELEMETRY ROOM

## 2023-01-26 PROCEDURE — 94761 N-INVAS EAR/PLS OXIMETRY MLT: CPT

## 2023-01-26 PROCEDURE — 25000242 PHARM REV CODE 250 ALT 637 W/ HCPCS: Performed by: INTERNAL MEDICINE

## 2023-01-26 PROCEDURE — 99900031 HC PATIENT EDUCATION (STAT)

## 2023-01-26 PROCEDURE — 94799 UNLISTED PULMONARY SVC/PX: CPT

## 2023-01-26 PROCEDURE — 25000003 PHARM REV CODE 250: Performed by: EMERGENCY MEDICINE

## 2023-01-26 PROCEDURE — 80053 COMPREHEN METABOLIC PANEL: CPT | Performed by: INTERNAL MEDICINE

## 2023-01-26 PROCEDURE — 63600175 PHARM REV CODE 636 W HCPCS: Performed by: EMERGENCY MEDICINE

## 2023-01-26 PROCEDURE — 99233 SBSQ HOSP IP/OBS HIGH 50: CPT | Mod: ,,, | Performed by: INTERNAL MEDICINE

## 2023-01-26 PROCEDURE — 94640 AIRWAY INHALATION TREATMENT: CPT

## 2023-01-26 PROCEDURE — 25000003 PHARM REV CODE 250: Performed by: INTERNAL MEDICINE

## 2023-01-26 RX ORDER — IPRATROPIUM BROMIDE AND ALBUTEROL SULFATE 2.5; .5 MG/3ML; MG/3ML
3 SOLUTION RESPIRATORY (INHALATION) EVERY 6 HOURS
Status: DISCONTINUED | OUTPATIENT
Start: 2023-01-26 | End: 2023-01-27 | Stop reason: HOSPADM

## 2023-01-26 RX ORDER — IPRATROPIUM BROMIDE AND ALBUTEROL SULFATE 2.5; .5 MG/3ML; MG/3ML
3 SOLUTION RESPIRATORY (INHALATION) EVERY 4 HOURS
Status: DISCONTINUED | OUTPATIENT
Start: 2023-01-26 | End: 2023-01-26

## 2023-01-26 RX ADMIN — CARVEDILOL 3.12 MG: 3.12 TABLET, FILM COATED ORAL at 04:01

## 2023-01-26 RX ADMIN — ACETAMINOPHEN 650 MG: 325 TABLET, FILM COATED ORAL at 08:01

## 2023-01-26 RX ADMIN — MONTELUKAST SODIUM 1 G: 4 TABLET, CHEWABLE ORAL at 08:01

## 2023-01-26 RX ADMIN — IPRATROPIUM BROMIDE AND ALBUTEROL SULFATE 3 ML: 2.5; .5 SOLUTION RESPIRATORY (INHALATION) at 07:01

## 2023-01-26 RX ADMIN — IPRATROPIUM BROMIDE AND ALBUTEROL SULFATE 3 ML: .5; 3 SOLUTION RESPIRATORY (INHALATION) at 12:01

## 2023-01-26 RX ADMIN — DULOXETINE 60 MG: 30 CAPSULE, DELAYED RELEASE ORAL at 08:01

## 2023-01-26 RX ADMIN — APIXABAN 2.5 MG: 2.5 TABLET, FILM COATED ORAL at 08:01

## 2023-01-26 RX ADMIN — FAMOTIDINE 20 MG: 20 TABLET, FILM COATED ORAL at 08:01

## 2023-01-26 RX ADMIN — OFLOXACIN 1 DROP: 3 SOLUTION OPHTHALMIC at 08:01

## 2023-01-26 RX ADMIN — DEXAMETHASONE SODIUM PHOSPHATE 4 MG: 4 INJECTION, SOLUTION INTRA-ARTICULAR; INTRALESIONAL; INTRAMUSCULAR; INTRAVENOUS; SOFT TISSUE at 08:01

## 2023-01-26 RX ADMIN — GABAPENTIN 300 MG: 300 CAPSULE ORAL at 08:01

## 2023-01-26 RX ADMIN — GUAIFENESIN AND CODEINE PHOSPHATE 5 ML: 10; 100 LIQUID ORAL at 08:01

## 2023-01-26 RX ADMIN — IPRATROPIUM BROMIDE AND ALBUTEROL SULFATE 3 ML: 2.5; .5 SOLUTION RESPIRATORY (INHALATION) at 08:01

## 2023-01-26 RX ADMIN — ONDANSETRON 4 MG: 2 INJECTION INTRAMUSCULAR; INTRAVENOUS at 08:01

## 2023-01-26 RX ADMIN — IPRATROPIUM BROMIDE AND ALBUTEROL SULFATE 3 ML: 2.5; .5 SOLUTION RESPIRATORY (INHALATION) at 01:01

## 2023-01-26 RX ADMIN — LETROZOLE 2.5 MG: 2.5 TABLET ORAL at 08:01

## 2023-01-26 RX ADMIN — HYDROCHLOROTHIAZIDE 12.5 MG: 12.5 TABLET ORAL at 08:01

## 2023-01-26 RX ADMIN — GUAIFENESIN AND CODEINE PHOSPHATE 5 ML: 10; 100 LIQUID ORAL at 05:01

## 2023-01-26 RX ADMIN — CARVEDILOL 3.12 MG: 3.12 TABLET, FILM COATED ORAL at 08:01

## 2023-01-26 RX ADMIN — DOXYCYCLINE 100 MG: 100 INJECTION, POWDER, LYOPHILIZED, FOR SOLUTION INTRAVENOUS at 08:01

## 2023-01-26 RX ADMIN — GUAIFENESIN AND CODEINE PHOSPHATE 5 ML: 10; 100 LIQUID ORAL at 10:01

## 2023-01-26 RX ADMIN — CEFTRIAXONE 1 G: 1 INJECTION, POWDER, FOR SOLUTION INTRAMUSCULAR; INTRAVENOUS at 12:01

## 2023-01-26 RX ADMIN — GUAIFENESIN AND CODEINE PHOSPHATE 5 ML: 10; 100 LIQUID ORAL at 04:01

## 2023-01-26 RX ADMIN — BENZONATATE 100 MG: 100 CAPSULE ORAL at 08:01

## 2023-01-26 NOTE — TELEPHONE ENCOUNTER
----- Message from Dana Roberts MA sent at 1/25/2023  7:36 AM CST -----  Regarding: Dr URIEL WILKINS Thursday 2-2-23       1. Are there any outstanding Labs, imaging or referrals in the patient's chart?      Random appt. For medication change     No labs required    Last wellness 6-30-22           2. . Has the patient been seen in an ER, urgent care clinic, or any other health care    provider since their last visit? If yes when and where?

## 2023-01-26 NOTE — CONSULTS
[facility] - 4th Floor Medical Telemetry  Wound Care    Patient Name:  Ninfa Candelario   MRN:  [id number]  Date: 1/26/2023  Diagnosis: Pneumonia of both lungs due to infectious organism    History:     Past Medical History:   Diagnosis Date    Amyloidosis     Anemia     Anxiety and depression     Diplopia     Hyperlipidemia     Hypertension     Impaired mobility     Lytic lesion of bone on x-ray     Multiple myeloma     Neuropathy     Obesity, unspecified     Paroxysmal atrial fibrillation     Primary cancer of left female breast        Social History     Socioeconomic History    Marital status: Single   Tobacco Use    Smoking status: Never    Smokeless tobacco: Never   Substance and Sexual Activity    Alcohol use: Never    Drug use: Never       Precautions:     Allergies as of 01/23/2023 - Reviewed 01/20/2023   Allergen Reaction Noted    Baclofen Shortness Of Breath 02/24/2016    Penicillins Hives, Rash, and Swelling 02/24/2016    Shellfish containing products Hives, Rash, and Swelling 02/24/2016    Nsaids (non-steroidal anti-inflammatory drug)  05/11/2022    Penicillin  10/29/2013    Ace inhibitors Other (See Comments) 02/24/2016    Azithromycin Nausea Only 02/24/2016    Meloxicam Tinitus 02/24/2016    Prochlorperazine Anxiety 02/24/2016    Tramadol Other (See Comments) 02/24/2016       WOC Assessment Details/Treatment     WOCN consulted for colostomy. Patient stated that she has had it for a while and cares for it with her sister at home. Barrier dry and intact. Supplies ordered. Will follow up.   01/26/2023

## 2023-01-26 NOTE — PROGRESS NOTES
[facility] Medicine Progress Note        Chief Complaint: Inpatient Follow-up for     HPI:   Ninfa is a 57-year-old female known to me with history of breast cancer on oral chemotherapy, atrial fibrillation on Eliquis  , mellitus is an anemia of chronic disease as well as atrial fibrillation that presented to the hospital with fever, chills, cough and a small amount of sputum production.  Also has ongoing shortness of breath.  Other family members affected with similar symptoms.  Workup in the ED included a CT chest which showed a right greater than left lower lobe opacification consistent with an infectious process.  Cultures were drawn and patient was initiated on Rocephin and doxycycline.    Because of transaminitis noted on the labs, abdominal ultrasound was completed which was noted to be negative.    Interval Hx:   The patient was seen and examined.  Her sister was at bedside.  Still continues to cough however symptoms are significantly improved.  Eager to go home.  Advised on importance of incentive spirometry.  Will continue the DuoNebs, more scheduled than p.r.n..    Possible discharge with oral antibiotics if continues to stay stable and cultures remained negative at 72 hours.  No other acute needs or complaints    Objective/physical exam:  General: In no acute distress, afebrile  Chest: Clear to auscultation bilaterally  Heart: RRR, +S1, S2, no appreciable murmur  Abdomen: Soft, nontender, BS +, colostomy  MSK: Warm, no lower extremity edema, no clubbing or cyanosis  Neurologic: Alert and oriented x4, nonfocal     VITAL SIGNS: 24 HRS MIN & MAX LAST   Temp  Min: 97.7 °F (36.5 °C)  Max: 99.6 °F (37.6 °C) 98.3 °F (36.8 °C)   BP  Min: 102/75  Max: 137/84 112/69   Pulse  Min: 90  Max: 120  99   Resp  Min: 18  Max: 20 18   SpO2  Min: 92 %  Max: 99 % 99 %       Recent Labs   Lab 01/23/23 2046   WBC 5.4   RBC 3.53*   HGB 10.2*   HCT 32.4*   MCV 91.8   MCH 28.9   MCHC  31.5*   RDW 15.1      MPV 10.3       Recent Labs   Lab 01/23/23  2046      K 4.2   CO2 27   BUN 36.4*   CREATININE 2.47*   CALCIUM 9.6   ALBUMIN 2.7*   ALKPHOS 225*   *   *   BILITOT 0.4          Microbiology Results (last 7 days)       Procedure Component Value Units Date/Time    Blood Culture #1 **CANNOT BE ORDERED STAT** [[id number]]  (Normal) Collected: 01/24/23 0654    Order Status: Completed Specimen: Blood Updated: 01/26/23 1000     CULTURE, BLOOD ([facility]) No Growth At 48 Hours    Blood Culture #2 **CANNOT BE ORDERED STAT** [[id number]]  (Normal) Collected: 01/24/23 0640    Order Status: Completed Specimen: Blood Updated: 01/26/23 1000     CULTURE, BLOOD ([facility]) No Growth At 48 Hours             See below for Radiology    Scheduled Med:   albuterol-ipratropium  3 mL Nebulization Q6H    apixaban  2.5 mg Oral BID    carvediloL  3.125 mg Oral BID WM    cefTRIAXone (ROCEPHIN) IVPB  1 g Intravenous Q24H    colestipoL  1 g Oral BID    dexAMETHasone  4 mg Intravenous Daily    doxycycline (VIBRAMYCIN) IVPB  100 mg Intravenous Q12H    DULoxetine  60 mg Oral Daily    famotidine  20 mg Oral Daily    gabapentin  300 mg Oral BID    hydroCHLOROthiazide  12.5 mg Oral Daily    lenalidomide  5 mg Oral Daily    letrozole  2.5 mg Oral Daily    ofloxacin  1 drop Both Eyes QID        Continuous Infusions:       PRN Meds:  acetaminophen, acetaminophen, benzonatate, benzonatate, guaiFENesin-codeine 100-10 mg/5 ml, iopamidoL, melatonin, ondansetron, sodium chloride 0.9%       Assessment/Plan:  1.  Community-acquired pneumonia, present on admission   -Monitor blood cultures   -Bibiana p.r.nManuel , will try to keep it more scheduled today   -adding dexamethasone 4 mg IV daily, patient seems to be having a lot of chest tightness  -Oxygen to keep sats over 90 percent   - continue with Rocephin and doxycycline  -incentive spirometer is encouraged     2.Immunocompromised status   -Monitor  hemodynamic status closely  -  COVID-19 negative      3.  History of amyloidosis     4.  History of breast cancer   - on oral Femara, continue      5.  Atrial fibrillation   -anticoagulated with Eliquis, being renally dosed, continue      6.  CKD 4  -Monitor renal indices closely, avoid nephrotoxic medications  -labs in a.m.     7. Physical deconditioning   -PT to eval and treat     VTE prophylaxis:  Eliquis, renal dose    Patient condition:  Fair    Anticipated discharge and Disposition:   Home with family when stable, possibly tomorrow      All diagnosis and differential diagnosis have been reviewed; assessment and plan has been documented; I have personally reviewed the labs and test results that are presently available; I have reviewed the patients medication list; I have reviewed the consulting providers response and recommendations. I have reviewed or attempted to review medical records based upon their availability    All of the patient's questions have been  addressed and answered. Patient's is agreeable to the above stated plan. I will continue to monitor closely and make adjustments to medical management as needed.  _____________________________________________________________________    Nutrition Status:    Radiology:  US Abdomen Limited  Narrative: EXAMINATION:  US ABDOMEN LIMITED    CLINICAL HISTORY:  transaminitis;, .    TECHNIQUE:  Multiple transverse and sagittal grayscale sonographic images were acquired through the abdomen.    FINDINGS:  Liver: Unremarkable appearing liver which measures 13.7 cmin greatest dimension.    Biliary ducts:    Billiary ducts: The common bile duct measures 3.1 mmin diameter.    Gallbladder: The gallbladder appears unremarkable. The sonographic Miller's sign is negative. The gallbladder wall measures 1.8 mm in thickness which is within normal limits.    Pancreas: The pancreas is obscured by gas.    IVC: The IVC is not visualized.    Portal vein: There is normal hepatopetal flow.    Kidneys:    Right  kidney: Unremarkable appearing right kidney.    Dimensions:    the right kidney measures 9.1 x 5.5 x 4.2 cm  Impression: No significant abnormalities identified    Electronically signed by: Prakash Vásquez  Date:    01/24/2023  Time:    12:47  CT Chest Without Contrast  Narrative: EXAMINATION:  CT CHEST WITHOUT CONTRAST    CLINICAL HISTORY:  Cough, persistent;    TECHNIQUE:  Axial images of the chest were obtained without contrast. Sagittal and coronal reconstructed images were available for review.    Automatic dose control was utilized to reduce patient radiation dose.    DLP = 478    COMPARISON:  08/19/2015    FINDINGS:  AORTA: Limited evaluation secondary to lack of IV contrast.  Grossly normal in course and caliber.    THYROID GLAND: The visualized thyroid is unremarkable.    AIRWAYS: Trachea is midline and tracheobronchial tree is patent.    HEART: Right greater than left lower lobe opacification consistent with infectious process.    LUNGS: There are no new masses or nodules identified. No pleural effusion or pneumothorax.    LYMPH NODES: There is no significant mediastinal, axillary or hilar lymphadenopathy.    BONES: Lucent lesions throughout the vertebral body similar to 2015.    UPPER ABDOMEN:  The visualized abdomen is unremarkable.  Impression: Right greater than left lower lobe opacification consistent with infectious process.  Lucent lesions throughout the osseous structures similar to 2015.    Electronically signed by: Glen Galindo  Date:    01/24/2023  Time:    08:06      Adry Rosario MD   01/26/2023

## 2023-01-27 ENCOUNTER — OUTPATIENT CASE MANAGEMENT (OUTPATIENT)
Dept: ADMINISTRATIVE | Facility: OTHER | Age: 58
End: 2023-01-27
Payer: MEDICARE

## 2023-01-27 VITALS
HEART RATE: 97 BPM | OXYGEN SATURATION: 99 % | BODY MASS INDEX: 30.51 KG/M2 | TEMPERATURE: 98 F | HEIGHT: 62 IN | RESPIRATION RATE: 20 BRPM | WEIGHT: 165.81 LBS | SYSTOLIC BLOOD PRESSURE: 118 MMHG | DIASTOLIC BLOOD PRESSURE: 76 MMHG

## 2023-01-27 LAB
ABS NEUT (OLG): 4.25 X10(3)/MCL (ref 2.1–9.2)
ERYTHROCYTE [DISTWIDTH] IN BLOOD BY AUTOMATED COUNT: 15.1 % (ref 11.5–17)
HCT VFR BLD AUTO: 30.8 % (ref 37–47)
HGB BLD-MCNC: 9.7 GM/DL (ref 12–16)
IMM GRANULOCYTES # BLD AUTO: 0.05 X10(3)/MCL (ref 0–0.04)
IMM GRANULOCYTES NFR BLD AUTO: 0.9 %
INSTRUMENT WBC (OLG): 5 X10(3)/MCL
LYMPHOCYTES NFR BLD MANUAL: 0.3 X10(3)/MCL
LYMPHOCYTES NFR BLD MANUAL: 6 %
MCH RBC QN AUTO: 29 PG
MCHC RBC AUTO-ENTMCNC: 31.5 MG/DL (ref 33–36)
MCV RBC AUTO: 91.9 FL (ref 80–94)
MONOCYTES NFR BLD MANUAL: 0.45 X10(3)/MCL (ref 0.1–1.3)
MONOCYTES NFR BLD MANUAL: 9 %
NEUTROPHILS NFR BLD MANUAL: 85 %
NRBC BLD AUTO-RTO: 0 %
PLATELET # BLD AUTO: 290 X10(3)/MCL (ref 130–400)
PLATELET # BLD EST: NORMAL 10*3/UL
PMV BLD AUTO: 9.7 FL (ref 7.4–10.4)
RBC # BLD AUTO: 3.35 X10(6)/MCL (ref 4.2–5.4)
RBC MORPH BLD: NORMAL
WBC # SPEC AUTO: 5.4 X10(3)/MCL (ref 4.5–11.5)

## 2023-01-27 PROCEDURE — 63600175 PHARM REV CODE 636 W HCPCS: Performed by: INTERNAL MEDICINE

## 2023-01-27 PROCEDURE — 25000242 PHARM REV CODE 250 ALT 637 W/ HCPCS: Performed by: INTERNAL MEDICINE

## 2023-01-27 PROCEDURE — 25000003 PHARM REV CODE 250: Performed by: INTERNAL MEDICINE

## 2023-01-27 PROCEDURE — 99239 HOSP IP/OBS DSCHRG MGMT >30: CPT | Mod: ,,, | Performed by: INTERNAL MEDICINE

## 2023-01-27 PROCEDURE — 63600175 PHARM REV CODE 636 W HCPCS: Performed by: EMERGENCY MEDICINE

## 2023-01-27 PROCEDURE — 85025 COMPLETE CBC W/AUTO DIFF WBC: CPT | Performed by: INTERNAL MEDICINE

## 2023-01-27 PROCEDURE — 94761 N-INVAS EAR/PLS OXIMETRY MLT: CPT

## 2023-01-27 PROCEDURE — 90471 IMMUNIZATION ADMIN: CPT | Performed by: INTERNAL MEDICINE

## 2023-01-27 PROCEDURE — 85027 COMPLETE CBC AUTOMATED: CPT | Performed by: INTERNAL MEDICINE

## 2023-01-27 PROCEDURE — 90732 PPSV23 VACC 2 YRS+ SUBQ/IM: CPT | Performed by: INTERNAL MEDICINE

## 2023-01-27 PROCEDURE — 99239 PR HOSPITAL DISCHARGE DAY,>30 MIN: ICD-10-PCS | Mod: ,,, | Performed by: INTERNAL MEDICINE

## 2023-01-27 PROCEDURE — G0009 ADMIN PNEUMOCOCCAL VACCINE: HCPCS | Performed by: INTERNAL MEDICINE

## 2023-01-27 PROCEDURE — 97162 PT EVAL MOD COMPLEX 30 MIN: CPT

## 2023-01-27 PROCEDURE — 94640 AIRWAY INHALATION TREATMENT: CPT

## 2023-01-27 PROCEDURE — 25000003 PHARM REV CODE 250: Performed by: EMERGENCY MEDICINE

## 2023-01-27 PROCEDURE — 99900031 HC PATIENT EDUCATION (STAT)

## 2023-01-27 RX ORDER — CODEINE PHOSPHATE AND GUAIFENESIN 10; 100 MG/5ML; MG/5ML
5 SOLUTION ORAL EVERY 6 HOURS PRN
Qty: 237 ML | Refills: 0 | Status: SHIPPED | OUTPATIENT
Start: 2023-01-27 | End: 2023-01-31 | Stop reason: SDUPTHER

## 2023-01-27 RX ORDER — OFLOXACIN 3 MG/ML
1 SOLUTION/ DROPS OPHTHALMIC 4 TIMES DAILY
Qty: 10 ML | Refills: 0 | Status: ON HOLD | OUTPATIENT
Start: 2023-01-27 | End: 2024-02-29 | Stop reason: HOSPADM

## 2023-01-27 RX ORDER — DOXYCYCLINE 100 MG/1
100 CAPSULE ORAL 2 TIMES DAILY
Qty: 14 CAPSULE | Refills: 0 | Status: SHIPPED | OUTPATIENT
Start: 2023-01-27 | End: 2023-02-03

## 2023-01-27 RX ADMIN — LETROZOLE 2.5 MG: 2.5 TABLET ORAL at 09:01

## 2023-01-27 RX ADMIN — OFLOXACIN 1 DROP: 3 SOLUTION OPHTHALMIC at 09:01

## 2023-01-27 RX ADMIN — IPRATROPIUM BROMIDE AND ALBUTEROL SULFATE 3 ML: 2.5; .5 SOLUTION RESPIRATORY (INHALATION) at 01:01

## 2023-01-27 RX ADMIN — GUAIFENESIN AND CODEINE PHOSPHATE 5 ML: 10; 100 LIQUID ORAL at 01:01

## 2023-01-27 RX ADMIN — MONTELUKAST SODIUM 1 G: 4 TABLET, CHEWABLE ORAL at 09:01

## 2023-01-27 RX ADMIN — PNEUMOCOCCAL VACCINE POLYVALENT 0.5 ML
25; 25; 25; 25; 25; 25; 25; 25; 25; 25; 25; 25; 25; 25; 25; 25; 25; 25; 25; 25; 25; 25; 25 INJECTION, SOLUTION INTRAMUSCULAR; SUBCUTANEOUS at 04:01

## 2023-01-27 RX ADMIN — BENZONATATE 100 MG: 100 CAPSULE ORAL at 05:01

## 2023-01-27 RX ADMIN — APIXABAN 2.5 MG: 2.5 TABLET, FILM COATED ORAL at 09:01

## 2023-01-27 RX ADMIN — ONDANSETRON 4 MG: 2 INJECTION INTRAMUSCULAR; INTRAVENOUS at 09:01

## 2023-01-27 RX ADMIN — GABAPENTIN 300 MG: 300 CAPSULE ORAL at 09:01

## 2023-01-27 RX ADMIN — GUAIFENESIN AND CODEINE PHOSPHATE 5 ML: 10; 100 LIQUID ORAL at 05:01

## 2023-01-27 RX ADMIN — HYDROCHLOROTHIAZIDE 12.5 MG: 12.5 TABLET ORAL at 09:01

## 2023-01-27 RX ADMIN — FAMOTIDINE 20 MG: 20 TABLET, FILM COATED ORAL at 09:01

## 2023-01-27 RX ADMIN — CARVEDILOL 3.12 MG: 3.12 TABLET, FILM COATED ORAL at 09:01

## 2023-01-27 RX ADMIN — BENZONATATE 100 MG: 100 CAPSULE ORAL at 06:01

## 2023-01-27 RX ADMIN — CEFTRIAXONE 1 G: 1 INJECTION, POWDER, FOR SOLUTION INTRAMUSCULAR; INTRAVENOUS at 01:01

## 2023-01-27 RX ADMIN — DOXYCYCLINE 100 MG: 100 INJECTION, POWDER, LYOPHILIZED, FOR SOLUTION INTRAVENOUS at 09:01

## 2023-01-27 RX ADMIN — GUAIFENESIN AND CODEINE PHOSPHATE 5 ML: 10; 100 LIQUID ORAL at 06:01

## 2023-01-27 RX ADMIN — IPRATROPIUM BROMIDE AND ALBUTEROL SULFATE 3 ML: 2.5; .5 SOLUTION RESPIRATORY (INHALATION) at 08:01

## 2023-01-27 RX ADMIN — IPRATROPIUM BROMIDE AND ALBUTEROL SULFATE 3 ML: 2.5; .5 SOLUTION RESPIRATORY (INHALATION) at 12:01

## 2023-01-27 RX ADMIN — DEXAMETHASONE SODIUM PHOSPHATE 4 MG: 4 INJECTION, SOLUTION INTRA-ARTICULAR; INTRALESIONAL; INTRAMUSCULAR; INTRAVENOUS; SOFT TISSUE at 09:01

## 2023-01-27 RX ADMIN — ACETAMINOPHEN 650 MG: 325 TABLET, FILM COATED ORAL at 09:01

## 2023-01-27 RX ADMIN — DULOXETINE 60 MG: 30 CAPSULE, DELAYED RELEASE ORAL at 09:01

## 2023-01-27 NOTE — LETTER
February 1, 2023    Ninfa Candelario  [address] [place] [address]             [facility]   I am writing from the Outpatient Care Management Department at [facility].  I received a referral from Dr. Rosario to contact you regarding any needs you may have.  I have been unable to reach you by phone.   Please contact me if you would like to discuss any needs.     I can be reached at [phone] Monday through Thursday from 7:30 am to 4:30 pm and Fridays from 7:30 am to 12:00 pm.      [facility] also has a program with a nurse available 24/7 to answer questions or provide medical advice.  [facility] can be reached at [phone].     Sincerely,       Flakita Porter RN   RN Outpatient

## 2023-01-27 NOTE — PLAN OF CARE
01/27/23 1603   Final Note   Assessment Type Final Discharge Note   Anticipated Discharge Disposition Home-Health  ([facility])   Hospital Resources/Appts/Education Provided Post-Acute resouces added to AVS   Post-Acute Status   Post-Acute Authorization Home Health     Spoke to patient about home health. [place] of choice obtained. Referral send to [facility] and spoke spoke to Maritza.

## 2023-01-27 NOTE — DISCHARGE SUMMARY
[facility] Medicine Discharge Summary    Admit Date: 1/23/2023  Discharge Date and Time: 1/27/202310:55 AM  Admitting Physician:  Team  Discharging Physician: Adry Rosario MD.  Primary Care Physician: Adry Rosario MD  Consults: None    Discharge Diagnoses:  Bilateral Pneumonia with immunosupression     Hospital Course:   Ninfa is a 57-year-old female known to me with history of breast cancer on oral chemotherapy, atrial fibrillation on Eliquis  , mellitus is an anemia of chronic disease as well as atrial fibrillation that presented to the hospital with fever, chills, cough and a small amount of sputum production.  Also has ongoing shortness of breath.  Other family members affected with similar symptoms.  Workup in the ED included a CT chest which showed a right greater than left lower lobe opacification consistent with an infectious process.  Cultures were drawn and patient was initiated on Rocephin and doxycycline.    Because of transaminitis noted on the labs, abdominal ultrasound was completed which was noted to be negative.  Overall patient did well with Rocephin and doxycycline.  We had to add dexamethasone for a few days.  She is doing well and is eager to go home.  Physical therapy did work with her and I will get case management to set up with home health.  She will have family members/friends taking care of her at home.    Certainly would benefit from some home health and physical therapy.  Considering her immunocompromised status I will go ahead and give her a prescription for another 7 days of doxycycline.  Cultures have remained negative.  Her other medical comorbidities have remained stable    Pt was seen and examined on the day of discharge  Vitals:  VITAL SIGNS: 24 HRS MIN & MAX LAST   Temp  Min: 97.9 °F (36.6 °C)  Max: 98.3 °F (36.8 °C) 98.3 °F (36.8 °C)   BP  Min: 110/79  Max: 133/79 126/71   Pulse  Min: 82  Max: 108  108   Resp  Min:  18  Max: 18 18   SpO2  Min: 94 %  Max: 99 % 97 %       Physical Exam:  General: In no acute distress, afebrile  Chest: Clear to auscultation bilaterally  Heart: RRR, +S1, S2, no appreciable murmur  Abdomen: Soft, nontender, BS +, colostomy  MSK: Warm, no lower extremity edema, no clubbing or cyanosis  Neurologic: Alert and oriented x4, nonfocal     Procedures Performed: No admission procedures for hospital encounter.     Significant Diagnostic Studies: See Full reports for all details    Recent Labs   Lab 01/23/23 2046 01/27/23  0425   WBC 5.4 5.4   RBC 3.53* 3.35*   HGB 10.2* 9.7*   HCT 32.4* 30.8*   MCV 91.8 91.9   MCH 28.9 29.0   MCHC 31.5* 31.5*   RDW 15.1 15.1    290   MPV 10.3 9.7       Recent Labs   Lab 01/23/23 2046 01/26/23  1335    138   K 4.2 3.8   CO2 27 23   BUN 36.4* 30.5*   CREATININE 2.47* 2.22*   CALCIUM 9.6 10.1   ALBUMIN 2.7* 2.2*   ALKPHOS 225* 237*   * 225*   * 140*   BILITOT 0.4 0.2        Microbiology Results (last 7 days)       Procedure Component Value Units Date/Time    Blood Culture #1 **CANNOT BE ORDERED STAT** [[id number]]  (Normal) Collected: 01/24/23 0654    Order Status: Completed Specimen: Blood Updated: 01/27/23 1000     CULTURE, BLOOD ([facility]) No Growth At 72 Hours    Blood Culture #2 **CANNOT BE ORDERED STAT** [[id number]]  (Normal) Collected: 01/24/23 0640    Order Status: Completed Specimen: Blood Updated: 01/27/23 1000     CULTURE, BLOOD ([facility]) No Growth At 72 Hours             US Abdomen Limited  Narrative: EXAMINATION:  US ABDOMEN LIMITED    CLINICAL HISTORY:  transaminitis;, .    TECHNIQUE:  Multiple transverse and sagittal grayscale sonographic images were acquired through the abdomen.    FINDINGS:  Liver: Unremarkable appearing liver which measures 13.7 cmin greatest dimension.    Biliary ducts:    Billiary ducts: The common bile duct measures 3.1 mmin diameter.    Gallbladder: The gallbladder appears unremarkable. The sonographic Miller's sign is  negative. The gallbladder wall measures 1.8 mm in thickness which is within normal limits.    Pancreas: The pancreas is obscured by gas.    IVC: The IVC is not visualized.    Portal vein: There is normal hepatopetal flow.    Kidneys:    Right kidney: Unremarkable appearing right kidney.    Dimensions:    the right kidney measures 9.1 x 5.5 x 4.2 cm  Impression: No significant abnormalities identified    Electronically signed by: Prakash Vásquez  Date:    01/24/2023  Time:    12:47  CT Chest Without Contrast  Narrative: EXAMINATION:  CT CHEST WITHOUT CONTRAST    CLINICAL HISTORY:  Cough, persistent;    TECHNIQUE:  Axial images of the chest were obtained without contrast. Sagittal and coronal reconstructed images were available for review.    Automatic dose control was utilized to reduce patient radiation dose.    DLP = 478    COMPARISON:  08/19/2015    FINDINGS:  AORTA: Limited evaluation secondary to lack of IV contrast.  Grossly normal in course and caliber.    THYROID GLAND: The visualized thyroid is unremarkable.    AIRWAYS: Trachea is midline and tracheobronchial tree is patent.    HEART: Right greater than left lower lobe opacification consistent with infectious process.    LUNGS: There are no new masses or nodules identified. No pleural effusion or pneumothorax.    LYMPH NODES: There is no significant mediastinal, axillary or hilar lymphadenopathy.    BONES: Lucent lesions throughout the vertebral body similar to 2015.    UPPER ABDOMEN:  The visualized abdomen is unremarkable.  Impression: Right greater than left lower lobe opacification consistent with infectious process.  Lucent lesions throughout the osseous structures similar to 2015.    Electronically signed by: Glen Galindo  Date:    01/24/2023  Time:    08:06         Medication List        START taking these medications      doxycycline 100 MG Cap  Commonly known as: VIBRAMYCIN  Take 1 capsule (100 mg total) by mouth 2 (two) times daily. for 7  days     guaiFENesin-codeine 100-10 mg/5 ml  mg/5 mL syrup  Commonly known as: TUSSI-ORGANIDIN NR  Take 5 mLs by mouth every 6 (six) hours as needed for Cough or Congestion.     ofloxacin 0.3 % ophthalmic solution  Commonly known as: OCUFLOX  Place 1 drop into both eyes 4 (four) times daily.            CHANGE how you take these medications      carvediloL 3.125 MG tablet  Commonly known as: COREG  Take 1 tablet (3.125 mg total) by mouth 2 (two) times daily with meals.  What changed: Another medication with the same name was removed. Continue taking this medication, and follow the directions you see here.     dexAMETHasone 20 mg Tab  Take 20 mg by mouth once a week.  What changed: Another medication with the same name was removed. Continue taking this medication, and follow the directions you see here.     DULoxetine 60 MG capsule  Commonly known as: CYMBALTA  What changed: Another medication with the same name was removed. Continue taking this medication, and follow the directions you see here.     hydroCHLOROthiazide 12.5 MG Tab  Commonly known as: HYDRODIURIL  What changed: Another medication with the same name was removed. Continue taking this medication, and follow the directions you see here.     ondansetron 8 MG tablet  Commonly known as: ZOFRAN  What changed: Another medication with the same name was removed. Continue taking this medication, and follow the directions you see here.     rosuvastatin 10 MG tablet  Commonly known as: CRESTOR  What changed: Another medication with the same name was removed. Continue taking this medication, and follow the directions you see here.            CONTINUE taking these medications      apixaban 2.5 mg Tab  Commonly known as: ELIQUIS     benzonatate 100 MG capsule  Commonly known as: TESSALON  Take 1 capsule (100 mg total) by mouth 3 (three) times daily as needed for Cough.     colestipoL 1 gram Tab  Commonly known as: COLESTID  TAKE 1 TABLET TWICE A DAY      fluticasone propionate 50 mcg/actuation nasal spray  Commonly known as: FLONASE     gabapentin 300 MG capsule  Commonly known as: NEURONTIN     ixazomib 4 mg Cap  Commonly known as: NINLARO  Take 4 mg by mouth every 7 days.     letrozole 2.5 mg Tab  Commonly known as: FEMARA     * levocetirizine 5 MG tablet  Commonly known as: XYZAL  Take 1 tablet (5 mg total) by mouth every evening.     * levocetirizine 5 MG tablet  Commonly known as: XYZAL     * methadone 10 MG tablet  Commonly known as: DOLOPHINE     * methadone 10 MG tablet  Commonly known as: DOLOPHINE     methylPREDNISolone 4 mg tablet  Commonly known as: MEDROL DOSEPACK  use as directed     multivitamin per tablet  Commonly known as: THERAGRAN     omeprazole 20 MG capsule  Commonly known as: PRILOSEC  Take 1 capsule (20 mg total) by mouth once daily.     oxyCODONE 10 mg Tab immediate release tablet  Commonly known as: ROXICODONE     potassium chloride 10 MEQ Tbsr  Commonly known as: KLOR-CON  TAKE 1 TABLET BY MOUTH TWICE DAILY     REVLIMID 5 mg Cap  Generic drug: lenalidomide  TAKE 1 CAPSULE BY MOUTH EVERY OTHER DAY     tretinoin 0.1 % cream  Commonly known as: RETIN-A           * This list has 4 medication(s) that are the same as other medications prescribed for you. Read the directions carefully, and ask your doctor or other care provider to review them with you.                   Where to Get Your Medications        These medications were sent to [id number] - [address] & [address] [address]      Hours: 24-hours Phone: [phone]   doxycycline 100 MG Cap  guaiFENesin-codeine 100-10 mg/5 ml  mg/5 mL syrup  ofloxacin 0.3 % ophthalmic solution          Explained in detail to the patient about the discharge plan, medications, and follow-up visits. Pt understands and agrees with the treatment plan  Discharge Disposition:  Home with Health   Discharged  Condition: stable  Diet- Regular  Dietary Orders (From admission, onward)       Start     Ordered    01/24/23 1313  Diet heart healthy  (Diet/Nutrition [facility])  Diet effective now         01/24/23 1313                   Medications Per DC med rec  Activities as tolerated    For further questions contact Dr Rosario's office    Discharge time 33 minutes    For worsening symptoms, chest pain, shortness of breath, increased abdominal pain, high grade fever, stroke or stroke like symptoms, immediately go to the nearest Emergency Room or call 911 as soon as possible.      Adry Anguiano M.D, on 1/27/2023. at 10:55 AM.

## 2023-01-27 NOTE — PT/OT/SLP EVAL
Physical Therapy Evaluation    Patient Name:  Ninfa Candelario   MRN:  [id number]    Recommendations:     Discharge Recommendations: home health PT   Discharge Equipment Recommendations: none   Barriers to discharge: None    Assessment:     Ninfa Candelario is a 57 y.o. female admitted with a medical diagnosis of Pneumonia of both lungs due to infectious organism.  Patient reports living with sister in [facility]. She requires assistance with ADL's and ambulates with RW. She presents with the following impairments/functional limitations: weakness, impaired endurance, impaired self care skills, impaired functional mobility, gait instability, impaired balance, decreased safety awareness. She required MIN A for bed mobility. Ambulated 80 ft with RW & CGA. Poor endurance. Recommending HH PT at discharge. Progress patient as tolerated.     Rehab Prognosis: Good; patient would benefit from acute skilled PT services to address these deficits and reach maximum level of function.    Recent Surgery: * No surgery found *      Plan:     During this hospitalization, patient to be seen 3 x/week (3-5x/week) to address the identified rehab impairments via gait training, therapeutic activities, therapeutic exercises, neuromuscular re-education and progress toward the following goals:    Plan of Care Expires:  02/27/23    Subjective     Chief Complaint: none  Patient/Family Comments/goals: none  Pain/Comfort:  Pain Rating 1: 0/10    Patients cultural, spiritual, Catholic conflicts given the current situation: no    Living Environment:  Lives with sister in [facility].   Prior to admission, patient required assistance with ADL's.  Equipment used at home: walker, rolling.  DME owned (not currently used): none.  Upon discharge, patient will have assistance from [facility].    Objective:     Communicated with nurse prior to session.  Patient found supine with telemetry  upon PT entry to room.    General Precautions: Standard, fall  Orthopedic  Precautions:N/A   Braces: N/A  Respiratory Status: Room air    Exams:  Cognitive Exam:  Patient is oriented to Person, Place, Time, and Situation  Sensation:    -       Intact  RLE ROM: WFL  RLE Strength: -4/5 grossly  LLE ROM: WFL  LLE Strength: -4/5 grossly    Functional Mobility:  Bed Mobility:     Supine to Sit: minimum assistance  Transfers:     Sit to Stand:  contact guard assistance with rolling walker  Gait: 80 ft with RW & CGA  Balance: fair      AM-PAC 6 CLICK MOBILITY  Total Score:19     Patient left up in chair with all lines intact, call button in reach, and sister present.    GOALS:   Multidisciplinary Problems       Physical Therapy Goals          Problem: Physical Therapy    Goal Priority Disciplines Outcome Goal Variances Interventions   Physical Therapy Goal     PT, PT/OT Ongoing, Progressing     Description: Goals to be met by: 23     Patient will increase functional independence with mobility by performin. Supine to sit with [place]  2. Sit to supine with [place]  3. Gait  x 300 feet with Modified [place] using Rolling Walker.                          History:     Past Medical History:   Diagnosis Date    Amyloidosis     Anemia     Anxiety and depression     Diplopia     Hyperlipidemia     Hypertension     Impaired mobility     Lytic lesion of bone on x-ray     Multiple myeloma     Neuropathy     Obesity, unspecified     Paroxysmal atrial fibrillation     Primary cancer of left female breast        Past Surgical History:   Procedure Laterality Date    BONE MARROW TRANSPLANT  2016    CARPAL TUNNEL RELEASE      COLONOSCOPY  2018    Dr. Eddie Jimenez    ENDOSCOPIC RELEASE OF TRIGGER FINGER      ESOPHAGEAL MOTILITY STUDY      Excision Lipoma left elbow      Exploration Laparotomy  2016    FLEXIBLE SIGMOIDOSCOPY      MEDIPORT INSERTION, SINGLE  03/15/2016    PH Study 24 Hour         Time Tracking:     PT Received On: 23  PT Start Time: 0857     PT  Stop Time: 0914  PT Total Time (min): 17 min     Billable Minutes: Evaluation 17 minutes      01/27/2023

## 2023-01-27 NOTE — PLAN OF CARE
Problem: Physical Therapy  Goal: Physical Therapy Goal  Description: Goals to be met by: 23     Patient will increase functional independence with mobility by performin. Supine to sit with [place]  2. Sit to supine with [place]  3. Gait  x 300 feet with Modified [place] using Rolling Walker.     Outcome: Ongoing, Progressing

## 2023-01-28 RX ORDER — OXYCODONE HYDROCHLORIDE 10 MG/1
10 TABLET ORAL EVERY 12 HOURS PRN
Qty: 10 TABLET | Refills: 0 | Status: SHIPPED | OUTPATIENT
Start: 2023-01-28

## 2023-01-29 LAB
BACTERIA BLD CULT: NORMAL
BACTERIA BLD CULT: NORMAL

## 2023-01-30 ENCOUNTER — PATIENT OUTREACH (OUTPATIENT)
Dept: ADMINISTRATIVE | Facility: CLINIC | Age: 58
End: 2023-01-30
Payer: MEDICARE

## 2023-01-30 NOTE — PROGRESS NOTES
1/30/23- 1st attempt to complete initial assessment for [facility]: Left message for patient requesting a return call. Will attempt to contact patient again at a later date.      2/1/23- 2nd attempt to complete initial assessment for Outpatient Care Management: Left message for patient requesting a return call. Letter has been sent to patient with OPCM contact information. Will attempt to contact patient again at a later date.     2/7/2023- Spoke with patient and explained OPCM services. Patient declined to enroll at this time. Gave patient OPCM RN contact information and encouraged patient to call should any needs or concerns arise. Patient verbalized understanding. Message sent to PCP to inform. Closing case.

## 2023-01-31 RX ORDER — CODEINE PHOSPHATE AND GUAIFENESIN 10; 100 MG/5ML; MG/5ML
5 SOLUTION ORAL EVERY 6 HOURS PRN
Qty: 237 ML | Refills: 0 | Status: SHIPPED | OUTPATIENT
Start: 2023-01-31 | End: 2023-02-12

## 2023-01-31 NOTE — TELEPHONE ENCOUNTER
Pt sister called requesting a refill for Ms. Ocasio's cough syrup. She stated [facility] has it on back order, and she needed it sent to [place]. Ms. Ocasio has a really bad cough.

## 2023-02-03 DIAGNOSIS — C90.00 MULTIPLE MYELOMA NOT HAVING ACHIEVED REMISSION: Primary | ICD-10-CM

## 2023-02-03 RX ORDER — ONDANSETRON HYDROCHLORIDE 8 MG/1
8 TABLET, FILM COATED ORAL EVERY 8 HOURS PRN
Qty: 90 TABLET | Refills: 0 | Status: SHIPPED | OUTPATIENT
Start: 2023-02-03 | End: 2023-03-15 | Stop reason: SDUPTHER

## 2023-02-07 ENCOUNTER — TELEPHONE (OUTPATIENT)
Dept: INTERNAL MEDICINE | Facility: CLINIC | Age: 58
End: 2023-02-07

## 2023-02-07 NOTE — TELEPHONE ENCOUNTER
Noted    ----- Message from Flakita Porter RN sent at 2/7/2023  2:20 PM CST -----  Regarding: Declined OPCM  Hi Dr. Rosario,    Thank you for referring Ms. Candelario to the Outpatient Case Management program. Patient declined participation stating all her needs are being met at this time. I provided her with my contact information in the event she would have future needs. I appreciate your referral and look forward to working with future patients.     Sincerely,  Flakita Porter RN  Outpatient Case Management

## 2023-02-10 DIAGNOSIS — C90.00 MULTIPLE MYELOMA, REMISSION STATUS UNSPECIFIED: Primary | ICD-10-CM

## 2023-02-14 ENCOUNTER — INFUSION (OUTPATIENT)
Dept: INFUSION THERAPY | Facility: HOSPITAL | Age: 58
End: 2023-02-14
Attending: INTERNAL MEDICINE
Payer: MEDICARE

## 2023-02-14 ENCOUNTER — OFFICE VISIT (OUTPATIENT)
Dept: HEMATOLOGY/ONCOLOGY | Facility: CLINIC | Age: 58
End: 2023-02-14
Payer: MEDICARE

## 2023-02-14 VITALS
RESPIRATION RATE: 18 BRPM | WEIGHT: 171 LBS | DIASTOLIC BLOOD PRESSURE: 84 MMHG | SYSTOLIC BLOOD PRESSURE: 134 MMHG | BODY MASS INDEX: 31.47 KG/M2 | HEART RATE: 85 BPM | TEMPERATURE: 98 F | HEIGHT: 62 IN | OXYGEN SATURATION: 97 %

## 2023-02-14 DIAGNOSIS — E85.9 AMYLOIDOSIS, UNSPECIFIED TYPE: Primary | ICD-10-CM

## 2023-02-14 DIAGNOSIS — Z79.811 AROMATASE INHIBITOR USE: ICD-10-CM

## 2023-02-14 DIAGNOSIS — I48.0 PAROXYSMAL ATRIAL FIBRILLATION: ICD-10-CM

## 2023-02-14 DIAGNOSIS — C90.00 MULTIPLE MYELOMA, REMISSION STATUS UNSPECIFIED: Primary | ICD-10-CM

## 2023-02-14 DIAGNOSIS — C50.819 OVERLAPPING MALIGNANT NEOPLASM OF FEMALE BREAST, UNSPECIFIED ESTROGEN RECEPTOR STATUS, UNSPECIFIED LATERALITY: ICD-10-CM

## 2023-02-14 LAB
IGA SERPL-MCNC: 55 MG/DL (ref 65–421)
IGG SERPL-MCNC: 612 MG/DL (ref 522–1631)
IGM SERPL-MCNC: 10 MG/DL (ref 33–293)

## 2023-02-14 PROCEDURE — 99999 PR PBB SHADOW E&M-EST. PATIENT-LVL III: ICD-10-PCS | Mod: PBBFAC,,, | Performed by: NURSE PRACTITIONER

## 2023-02-14 PROCEDURE — 82784 ASSAY IGA/IGD/IGG/IGM EACH: CPT

## 2023-02-14 PROCEDURE — 99999 PR PBB SHADOW E&M-EST. PATIENT-LVL III: CPT | Mod: PBBFAC,,, | Performed by: NURSE PRACTITIONER

## 2023-02-14 PROCEDURE — 36415 COLL VENOUS BLD VENIPUNCTURE: CPT

## 2023-02-14 PROCEDURE — 25000003 PHARM REV CODE 250: Performed by: NURSE PRACTITIONER

## 2023-02-14 PROCEDURE — 96401 CHEMO ANTI-NEOPL SQ/IM: CPT

## 2023-02-14 PROCEDURE — 99215 OFFICE O/P EST HI 40 MIN: CPT | Mod: S$PBB,,, | Performed by: NURSE PRACTITIONER

## 2023-02-14 PROCEDURE — 63600175 PHARM REV CODE 636 W HCPCS: Mod: TB | Performed by: NURSE PRACTITIONER

## 2023-02-14 PROCEDURE — 99215 PR OFFICE/OUTPT VISIT, EST, LEVL V, 40-54 MIN: ICD-10-PCS | Mod: S$PBB,,, | Performed by: NURSE PRACTITIONER

## 2023-02-14 PROCEDURE — 99213 OFFICE O/P EST LOW 20 MIN: CPT | Mod: PBBFAC | Performed by: NURSE PRACTITIONER

## 2023-02-14 RX ORDER — HEPARIN 100 UNIT/ML
500 SYRINGE INTRAVENOUS
Status: CANCELLED | OUTPATIENT
Start: 2023-02-28

## 2023-02-14 RX ORDER — SODIUM CHLORIDE 0.9 % (FLUSH) 0.9 %
10 SYRINGE (ML) INJECTION
Status: CANCELLED | OUTPATIENT
Start: 2023-02-14

## 2023-02-14 RX ORDER — HEPARIN 100 UNIT/ML
500 SYRINGE INTRAVENOUS
Status: CANCELLED | OUTPATIENT
Start: 2023-02-14

## 2023-02-14 RX ORDER — DIPHENHYDRAMINE HCL 25 MG
25 CAPSULE ORAL
Status: COMPLETED | OUTPATIENT
Start: 2023-02-14 | End: 2023-02-14

## 2023-02-14 RX ORDER — SODIUM CHLORIDE 0.9 % (FLUSH) 0.9 %
10 SYRINGE (ML) INJECTION
Status: CANCELLED | OUTPATIENT
Start: 2023-02-28

## 2023-02-14 RX ORDER — DIPHENHYDRAMINE HYDROCHLORIDE 50 MG/ML
50 INJECTION INTRAMUSCULAR; INTRAVENOUS ONCE AS NEEDED
Status: CANCELLED | OUTPATIENT
Start: 2023-02-14

## 2023-02-14 RX ORDER — DIPHENHYDRAMINE HCL 25 MG
25 CAPSULE ORAL
Status: CANCELLED | OUTPATIENT
Start: 2023-02-14

## 2023-02-14 RX ORDER — DIPHENHYDRAMINE HYDROCHLORIDE 50 MG/ML
50 INJECTION INTRAMUSCULAR; INTRAVENOUS ONCE AS NEEDED
Status: DISCONTINUED | OUTPATIENT
Start: 2023-02-14 | End: 2023-02-14 | Stop reason: HOSPADM

## 2023-02-14 RX ORDER — SODIUM CHLORIDE 0.9 % (FLUSH) 0.9 %
10 SYRINGE (ML) INJECTION
Status: DISCONTINUED | OUTPATIENT
Start: 2023-02-14 | End: 2023-02-14 | Stop reason: HOSPADM

## 2023-02-14 RX ORDER — HEPARIN 100 UNIT/ML
500 SYRINGE INTRAVENOUS
Status: DISCONTINUED | OUTPATIENT
Start: 2023-02-14 | End: 2023-02-14 | Stop reason: HOSPADM

## 2023-02-14 RX ORDER — DIPHENHYDRAMINE HYDROCHLORIDE 50 MG/ML
50 INJECTION INTRAMUSCULAR; INTRAVENOUS ONCE AS NEEDED
Status: CANCELLED | OUTPATIENT
Start: 2023-02-28

## 2023-02-14 RX ORDER — EPINEPHRINE 0.3 MG/.3ML
0.3 INJECTION SUBCUTANEOUS ONCE AS NEEDED
Status: CANCELLED | OUTPATIENT
Start: 2023-02-14

## 2023-02-14 RX ORDER — EPINEPHRINE 0.3 MG/.3ML
0.3 INJECTION SUBCUTANEOUS ONCE AS NEEDED
Status: DISCONTINUED | OUTPATIENT
Start: 2023-02-14 | End: 2023-02-14 | Stop reason: HOSPADM

## 2023-02-14 RX ORDER — ACETAMINOPHEN 325 MG/1
650 TABLET ORAL
Status: CANCELLED | OUTPATIENT
Start: 2023-02-14

## 2023-02-14 RX ORDER — EPINEPHRINE 0.3 MG/.3ML
0.3 INJECTION SUBCUTANEOUS ONCE AS NEEDED
Status: CANCELLED | OUTPATIENT
Start: 2023-02-28

## 2023-02-14 RX ORDER — ACETAMINOPHEN 325 MG/1
650 TABLET ORAL
Status: CANCELLED | OUTPATIENT
Start: 2023-02-28

## 2023-02-14 RX ORDER — DIPHENHYDRAMINE HCL 25 MG
25 CAPSULE ORAL
Status: CANCELLED | OUTPATIENT
Start: 2023-02-28

## 2023-02-14 RX ORDER — ACETAMINOPHEN 325 MG/1
650 TABLET ORAL
Status: COMPLETED | OUTPATIENT
Start: 2023-02-14 | End: 2023-02-14

## 2023-02-14 RX ADMIN — DARATUMUMAB AND HYALURONIDASE-FIHJ (HUMAN RECOMBINANT) 1800 MG: 1800; 30000 INJECTION SUBCUTANEOUS at 02:02

## 2023-02-14 RX ADMIN — DIPHENHYDRAMINE HYDROCHLORIDE 25 MG: 25 CAPSULE ORAL at 02:02

## 2023-02-14 RX ADMIN — ACETAMINOPHEN 650 MG: 325 TABLET, FILM COATED ORAL at 02:02

## 2023-02-14 NOTE — PROGRESS NOTES
HEMATOLOGY/ONCOLOGY OFFICE CLINIC VISIT    Visit Information:    NO SHOW  08/21/2018--> Rescheduled                              11/05/2019--> No Show/Reschedule                           06/24/2020--> No Show/Reschedule                           09/24/2020--> No Show/Reschedule                           03/15/2021--> No Show/Reschedule                           03/23/2021--> No Show/Reschedule                           04/13/2021--> No Show                           05/27/2021--> No Show                           08/19/2021--> No Show                           08/26/2021--> No Show/Rescheduled                           04/26/2022--> No Show                           10/03/2022--> Rescheduled                           10/11/2022--> Rescheduled    Referring Physician: DR. Cardona  PCP: Dr. Chacon  GI: Dr. Johnny Bhardwaj  Rheumatologist: Dr. Luis Rea  Immunologist: Dr. Daniel Nava  ENT: Dr. Brice Williamson  [facility] Pain management: Dr.Chai LONG [place] Transplant: Dr. Adrian Naylor MD [place] Med Onc: Dr. Zulema LONG [place] Breast Surg Onc: Dr.DeSnyder LONG [place]: Dr. Crarasco     Present treatment:  Maintenance Revlimid 5mg PO QOD, started 02/16/17-- was held for a few months while undergoing treatment for her breast cancer 02/18-05/18; Restarted 6/15/18   Dexamethasone 20 mg po weekly added early July 2017--> reduced to 12 mg PO weekly October 4, 2017---> increased to 16mg PO weekly 2/15/18---restarted 6/15/18 --> 20 mg weekly 7//8/2022  Darzalex 7/8/2022-present  Femara 2.5 mg PO daily   Eliquis 2.5mg PO BID      Treatment/Oncology history:  1) CyBorD x5 cycles 09/28/15-12/24/15  2) Autologous stem cell transplant 02/08/16, done at [facility]  3) Exploratory laparotomy with subtotal colectomy and end ileostomy done August 2, 2016--pathology specimen showed advanced colonic amyloidosis extensive involving the muscular wall submucosa and mucosa.  Appendix also involvement by amyloidosis with fibrous  obliteration of the distal lumen.  4) Maintenance therapy with Revlimid 5mg-started 06/01/16--Discontinued shortly after 2/2 cytopenias  5) Left breast lumpectomy with SLNB at [facility] 4/12/18  6) Left partial breast RT x10 fractions  5/21/18-6/4/1    Plan of care:       Imaging:      Pathology:    CLINICAL HISTORY:       Patient: Ninfa Candelario is a 57 y.o. female.  Ms. Cabrera Joseph was admitted on 08/16/15 for ileus versus partial SBO. CT A/P done 08/17/15 showed sigmoid colitis and a zone of transition at the junction of the descending and sigmoid colon which could indicate some degree of obstruction and an obstructing mass cannot be excluded. Numerous skeletal lytic lesions noted. CT chest done 08/19/15 showed Multiple skeletal lytic lesions involving the thoracic spine, left rib sternum consistent with either metastases or multiple myeloma. There is no evidence of pulmonary or mediastinal mass. Dr. Farr was consulted for possible MM/anemia.     Nuclear Bone scan done 8/20/15 showed mild to moderate increased activity in left rib and right second rib which correlates with fractures seen on CT.  Skeletal survey done 8/20/15showed lytic lesions in the calvarium and probably a lytic lesion in the left scapula. Lytic areas in the spine and pelvis seen on recent CT are not well defined on skeletal survey. Work up labs done 08/20/15: Negative for sickle cell and Thalessemia (reported history of thalessemia). Iron 54, Transferrin 118.0, Iron sat 34.6%, Folate 26.5, Vit B12 1,779  Peripheral smear resulted slightly macrocytic normochromic anemia without signifcant anisocytosis may reflect early B12/folate deficiency or marrow dysfunction, among others. No immature myeloid cells or blasts. Platlets adequate. Beta 2 mircoglobulin = 3.2.  SPEP/NADIA: M-spike in the beta region. The monoclonal protein peak accounts for 1.13 g/dL. Hypogammaglobulinemia. NADIA pattern shows the presence of a free lambda light chain monoclonal  protein with additional faint band in IgA.  All immunoglobulin levels decreased. IgA=26, IgG=307, IgM<5.  Kappa Qnt 0.16, Lambda Qnt 4600.00, Ratio <0.01.  24hr Urine for Bence Mendez: Kappa 2.75, Lambda 1250.00, Ratio <0.01. NADIA: Urine is POSITIVE for monoclonal Free Lambda Light chains     Patient then opted to go to [facility] where she underwent a bone marrow biopsy on 09/18/15. BMBx showed 80% plasma cells, 13p deletion and normal karyotype. She underwent fat pad biopsy which came back positive for Congo red consistent with amyloidosis. Cardiac workup revealed no amyloid deposition within the heart.  PET/CT and skeletal survey done September 18, 2015 showed multiple lytic osseous lesions  The patient was started on Velcade while at [facility] with the plan to transition to autologous stem cell transplantation. They asked if we could give her could continue treatment here.   She completed CyborD x5 cycles on 12/24/15     Patient admitted 01/06/16 for colitis and C. Diff. Pt treated with Flagyl. Discharged home 01/08/16     Repeat BMBx done at [facility] 01/2016 did not show any morphologic or immnophenotypic evidence of plasama cell neoplasm. Patient underwent Autologous stem cell transplant with Busulfan and melphalan on 02/08/16 at [facility]. The only complication was recurrent C.diff infection for which she completed 10 days of Fidaxomycin.     Follow-up bone marrow biopsy done at [facility] done 5/16/16 showed cellular 30-40% bone marrow with trilineage hematopoiesis. No morphologic or immunophenotypic support for plasma cell neoplasm. Started maintenance Revlimid 5mg. Unable to continue therapy due to cytopenias.     On 08/02/16 patient underwent  Exploratory laparotomy with subtotal colectomy and end ileostomy for what was thought to be toxic megacolon. Pathology showed extensive advanced colonic amyloidosis involving the muscular wall, submucosa and mucosa: With the appendix also involved with amyloidosis.  Positive lambda light  chains were noted.     Follow-up at [facility] 8/31/16, Per Dr. Adrian Naylor, 6 months post autologous transplant. She has engrafted. Based on the latest restaging she is near complete remission with possible IgA lambda on serum immunofixation. Patient was instructed to discontinue prophylactic antibiotics. She received her 1st series of immunizations while at [facility]. Patient had a bilateral venous ultrasound to rule out DVT, negative B/L. In regards to amyloidosis the patient feels that her tongue is smaller in size and her BNP has come down some.  Patient had a follow-up appointment at [facility] November 30, 2016.  Dr. Parnell documented the patient's free lambda light chain remains at a lower level.  Therefore in light of her recent surgery they will continue with observation only.  The plan to see the patient back in 2-3 months with repeat restaging labs.  They recommended regular CBC checks to monitor anemia.  Patient returned to [facility] in December 2016 to meet with dermatology for numerous papillary lesions on eyelids, chest and posterior neck consistent with amyloid deposits. Recommendations were for watchful waiting.  Patient is less than 1 year out from bone marrow transplant and further improvement can be expected.  She will see the patient back in 1 year to discuss possible surgical resection of the plaques especially around the eyelid region.  Rogaine recommended for persistent hair loss. Retin-A recommended for acne vulgaris.  Patient returned to [facility] on 2/8/2017 for neurosurgery consult for chronic low back pain and difficulty walking.  Imaging showed extensive DJD with discovertebral bulges and thickening of the spinal laminar tissues creating spinal stenosis throughout, but greatest at L2/L3.  Neurosurgery felt that surgery risks outweighed the benefits.  Patient was prescribed pain medicine and encouraged to participate in physical therapy.  Patient also met with   Parmjit on 02/08/17, who noted an elevation in free light chains (kappa 52.60/lambda 27.77/ratio 1.89).  Recommendation is to resume maintenance therapy with Revlimid at a low dose of 5 mg every other day.  Patient went back to [facility] first week of April 2017.  I do not have these notes.  Reportedly she was told to continue on every other day Revlimid at 5 mg.  She reportedly had repeat myeloma labs done as well.  Again I do not have these results.  Patient went back to [facility] and saw Dr. Parnell June 29, 2017.  Myeloma labs were done with serum protein electrophoresis showing irregularity in the fast gamma region.  IgG of 1291.  Free kappa light chains of 84.6 with lambda light chains of 66.9.  Beta-2 microglobulin of 4.5  CT scan of lumbar spine showed multiple lytic lesions once again in the lumbar spine and bony pelvis.  Ultrasound of the left leg showed no evidence of DVT.  It was recommended based on the slight increase in her And lambda light chains to start weekly dexamethasone 20 mg p.o. weekly.  Follow-up visit and labs at [facility] on September 29, 2017 with Dr. Parnell.  Repeat beta-2 microglobulin came back at 2.4.  Serum IgG of 761, IgA 117 and IgM of 29.  Serum free kappa light chains of 24.9 and lambda of 23.5.  Counts were stable with hemoglobin of 11.1, WBC 4.9 and platelets of 210,000.  Recommendations were for continued Revlimid every other day with weekly dexamethasone along with aspirin for DVT prophylaxis.  Bilateral diagnostic MMG 12/19/17  noted 1.6cm coarse heterogeneous calcifications in posterior region of L breast at 3 o'clock position. Patient was scheduled for FNA which confirmed ID grade 3, single focus measuring 1.7cm with 2nd focus microinvasion DCIS grade 2 measuring 0.3cm. Left axillary LN biopsy showed no evidence of metastatic carcinoma. Complete staging: Stage IA, grade 3 ER+, Her 2 Niki + IDC/DCIS of left breast. Ki-67 <17%.  Repeat myeloma labs showed rise in free lambda  light chains noted at 68.69, kappa light chains at 27.22,  beta 2 microglobulin came back at 2.9. Serum protein electrophoresis showed no definitive evidence of an M protein peak. The pattern is consistent with an acute inflammatory reaction. Recommendations were made to maintain Revlimid at current dose of 5mg every other day to be taken continuously increase weekly dexamethasone to 16 mg.   Patient seen at [facility] with tentative plan for L breast lumpectomy on 3/13/18. 2/16/18- Prior to surgery she was seen by genetic counselor who ran genetic testing per patient reques, all of which were negative. She was then seen by cardiology at [facility] 2/16/18 for further evaluation of persistent bilateral lower extremity swelling-ECHO noted EF of 58%; diagnosed with paroxysmal atrial fibrillation and instructed to begin daily ASA. During preoperative asssessment per Rad/Onc 3/12/18, corresponding chest CT noted new bilateral pulmonary nodules concerning for metastatic disease- surgery was subsequently postponed pending pulmonary evaluation. Seen by Pulmonology on 3/21/18, PFTs within normal limits and cleared patient for surgery with recommended close CT follow up of nodules in 3 months. 3/21/18 seen by nephrology for management of mild CKD (GFR 55)-cleared for surgery with recommended follow up in 3 months. Left breast lumpectomy and SLNB performed per Dr. Washburn on 4/12/18- plan for follow up in clinic on 4/24/18  for postoperative assessment. Follow up with Dr. Poole (Med/Onc) on 4/25/18. Follow up with Dr. Parnell 4/26/18.   Patient seen at [facility] s/p Left breast lumpectomy with SLNB on 4/12/18. Following surgery she completed Left partial breast radiation x 10 fractions. She was then started on endocrine therapy with Femara after been deemedan inappropriate candidate for systemic chemotherapy/Herceptin.      She was seen by neurosugery at [facility] on 4/26/18 following repeat MRI of cervical thoracic lumbar  spine which noted focal enhancement of T2 hyperintensity at the C2 level which may be due to degenerative changes. Signal abnormality within  the T12 and L1 may be secondary to myeloma. Plan to continue with observation for now with F/U scheduled in October 2018.      She was seen by Dr. Parnell at [facility] for management of her MM on 4/26/18. Patient was recommended to restart Revlimid 5mg PO every other day with notes that these recommendations would be communicated to collaborating Oncologist. Patient was also recommended to start on Zometa for her bone disease.      Seen by Dr. Parnell at [facility] for management of her MM on 6/28/18. Repeat light chains noted lambda 33.36 (previously 80.80), K/L ratio 0.91 (previously 0.49). Due to slight improvement in light chains, plan to continue on lenalidomide maintenance. Recommendations to continue anticoagulation with Eliquis. BLE venous doppler negative for DVT.   Seen by pulmonolgy on 6/28/18- repeat CT chest done 6/28/18 noted resolution of previous pulmonary nodules. Thought to be infectious in nature. Recommendations for discharge from pulmonary clinic.  Should MRI of her cervical thoracic lumbar spine on 2/12/2019 that demonstrated cervical spondylosis with canal stenosis most notable at C1 and 2. Cord signal abnormality at C1 and 2 with enhancement is stable. Lumbar spondylosis with central canal stenosis at multiple levels. No imaging features of bony metastatic disease.     For amyloidosis (Light chain type).    Patient presented with macroglossia and now with improvement of the swelling of her tongue. Her cardiac parameters are also better.   8/10/2020 proBNP  250   2/17/2020                616  8/9/2019                  190        Chief Complaint: no new complaints today and requesting taking over Eliquis prescription from [facility]        Interval History:  Patient presents today for TD and labs for daratumumab. She was due on 2/6/23 but she rescheduled due to  "illness.  She is currently on Femara for breast cancer and Rev/Dex/Darzalex  for MM  s/p transplant in 2016. At her [facility] visit on 10/12/22, Ninlaro was discontinued due to it causing severe diarrhea and leukopenia. Diarrhea resolved after stopping Ninlaro (she admits to stopping prior to visit @ [facility]). In the interim, she was hospitalized for pneumonia. She did not go to [facility] in 12/2022 as planned. No fever, chills or sweats.  No chest pain or shortness of breath.  She uses a walker for mobility.  She continues to do her mammograms and other imaging studies at [facility].          ROS:  All 14 points ROS taken and as per Interval History    Histories:  PMH/PSH/FH/SOCIAL/ALLERGIES AND MEDS REVIEWED AND UPDATED AS APPROPRIATE       Vitals:    02/14/23 1340   BP: 134/84   BP Location: Left arm   Patient Position: Sitting   BP Method: Medium (Automatic)   Pulse: 85   Resp: 18   Temp: 98.1 °F (36.7 °C)   TempSrc: Oral   SpO2: 97%   Weight: 77.6 kg (171 lb)   Height: 5' 2" (1.575 m)          Physical Exam  Constitutional:       Appearance: She is ill-appearing.      Comments: Uses walker for mobility   HENT:      Head: Normocephalic.   Eyes:      Extraocular Movements: Extraocular movements intact.   Cardiovascular:      Rate and Rhythm: Normal rate and regular rhythm.   Pulmonary:      Effort: Pulmonary effort is normal.      Breath sounds: Normal breath sounds.   Abdominal:      General: Bowel sounds are normal.      Palpations: Abdomen is soft.      Tenderness: There is no abdominal tenderness.   Musculoskeletal:      Cervical back: Neck supple.   Skin:     Coloration: Skin is not jaundiced.      Findings: No rash.   Neurological:      Mental Status: She is alert.      Cranial Nerves: Cranial nerves 2-12 are intact.      Motor: Weakness present.      Gait: Gait abnormal.   Psychiatric:         Attention and Perception: Attention normal.         Cognition and Memory: Cognition normal.     ECOG SCORE       "       Laboratory:  CBC with Differential:  Lab Results   Component Value Date    WBC 5.5 02/14/2023    RBC 3.37 (L) 02/14/2023    HGB 9.8 (L) 02/14/2023    HCT 31.9 (L) 02/14/2023    MCV 94.7 (H) 02/14/2023    MCH 29.1 02/14/2023    MCHC 30.7 (L) 02/14/2023    RDW 15.3 02/14/2023     02/14/2023    MPV 9.6 02/14/2023        CMP:  Sodium Level   Date Value Ref Range Status   02/14/2023 144 136 - 145 mmol/L Final     Potassium Level   Date Value Ref Range Status   02/14/2023 3.9 3.5 - 5.1 mmol/L Final     Carbon Dioxide   Date Value Ref Range Status   02/14/2023 26 22 - 29 mmol/L Final     Blood Urea Nitrogen   Date Value Ref Range Status   02/14/2023 33.1 (H) 9.8 - 20.1 mg/dL Final     Creatinine   Date Value Ref Range Status   02/14/2023 2.17 (H) 0.55 - 1.02 mg/dL Final     Calcium Level Total   Date Value Ref Range Status   02/14/2023 9.5 8.4 - 10.2 mg/dL Final     Albumin Level   Date Value Ref Range Status   02/14/2023 3.0 (L) 3.5 - 5.0 g/dL Final     Bilirubin Total   Date Value Ref Range Status   02/14/2023 0.2 <=1.5 mg/dL Final     Alkaline Phosphatase   Date Value Ref Range Status   02/14/2023 116 40 - 150 unit/L Final     Aspartate Aminotransferase   Date Value Ref Range Status   02/14/2023 22 5 - 34 unit/L Final     Alanine Aminotransferase   Date Value Ref Range Status   02/14/2023 22 0 - 55 unit/L Final     Estimated GFR-Non    Date Value Ref Range Status   04/20/2022 25               Assessment:       1. Multiple myeloma, remission status unspecified    2. Overlapping malignant neoplasm of female breast, unspecified estrogen receptor status, unspecified laterality    3. Aromatase inhibitor use    4. Paroxysmal atrial fibrillation          1) Multiple Myeloma, IgA Lambda, FISH with del 13p, normal karyotype, ISS stg II Dx 2015; S/p Autologous stem cell transplant 02/08/16-remission-->slight rise in free light chains 02/08/17  2) Amyloidosis, Lambda light chain type, organ involvement  with macroglossia and colon involvement. 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 Sx risks do not outweigh benefits. Pain meds given and encouraged Physical Therpay  8) Amyloid plaques, evaluated by Derm at [facility], recommend watchful waiting. Patient to follow up 12/2017  9) Stage IA grade 3, ER+, HER2 BERNA +,  IDC/DCIS of the left breast --- 2/7/18 at [facility]; s/p lumpectomy with SLNB 4/12, Grade 2 IDC measuring 4mm with second focus of microinvasive carcinoma measuring  0.4mm .IG DCIS, 0.3mm to posterior margin; 2 SLN negative for malignancy    10) Progressive swelling of R lower extremity--- US NIVA done 2/19/18 negative for evidence of DVT  11). Paroxysmal Afib (2/18/18) diagnosed at [facility]; ECHO noted EF 58%  12. Mild CKD with GFR 55 - managed per [facility] nephrology  13). Pulmonary nodules noted on pre-operative chest CT scan (3/12/18) noted new bilateral pulmonary nodules are nonspecific -- seen by Pulmonology at [facility] (3/21/18) thought to be inflammatory/infectious in nature, repeat Chest CT 6/28/18 noted resolution of nodules  14). Treatment induced neutropenia        Plan:       Daratumumab weekly x 8 weeks then q2 weeks x 4 doses (8 weeks) then q4 weeks.   Startinh q4 weeks dosing today.   Zometa is on hold for now as she is undergoing dental work at [facility].   Cont low dose Revlimid 5 mg q other day  Dexamethasone 20 mg weekly   RTC in 5 weeks for TD with labs, same day infusion.  Mammograms done at [facility]    Encourage to call or message us for any questions or problems  The patient was given ample opportunity to ask questions, and to the best of my abilities, all questions answered to satisfaction; patient demonstrated understanding of what we discussed and  agreeable to the plan.     RORY HuddlestonP

## 2023-02-15 LAB
KAPPA LC FREE SER-MCNC: 2.46 MG/DL (ref 0.33–1.94)
KAPPA LC FREE/LAMBDA FREE SER: 1.49 {RATIO} (ref 0.26–1.65)
LAMBDA LC FREE SERPL-MCNC: 1.65 MG/DL (ref 0.57–2.63)

## 2023-02-20 ENCOUNTER — TELEPHONE (OUTPATIENT)
Dept: INTERNAL MEDICINE | Facility: CLINIC | Age: 58
End: 2023-02-20
Payer: MEDICARE

## 2023-02-27 DIAGNOSIS — C90.00 MULTIPLE MYELOMA, REMISSION STATUS UNSPECIFIED: Primary | ICD-10-CM

## 2023-03-01 ENCOUNTER — INFUSION (OUTPATIENT)
Dept: INFUSION THERAPY | Facility: HOSPITAL | Age: 58
End: 2023-03-01
Attending: INTERNAL MEDICINE
Payer: MEDICARE

## 2023-03-01 ENCOUNTER — LAB VISIT (OUTPATIENT)
Dept: LAB | Facility: HOSPITAL | Age: 58
End: 2023-03-01
Attending: NURSE PRACTITIONER
Payer: MEDICARE

## 2023-03-01 VITALS
OXYGEN SATURATION: 96 % | HEART RATE: 94 BPM | TEMPERATURE: 98 F | DIASTOLIC BLOOD PRESSURE: 76 MMHG | SYSTOLIC BLOOD PRESSURE: 120 MMHG | RESPIRATION RATE: 16 BRPM

## 2023-03-01 DIAGNOSIS — E85.9 AMYLOIDOSIS, UNSPECIFIED TYPE: Primary | ICD-10-CM

## 2023-03-01 DIAGNOSIS — C90.00 MULTIPLE MYELOMA, REMISSION STATUS UNSPECIFIED: ICD-10-CM

## 2023-03-01 LAB
BASOPHILS # BLD AUTO: 0.02 X10(3)/MCL (ref 0–0.2)
BASOPHILS NFR BLD AUTO: 0.4 %
EOSINOPHIL # BLD AUTO: 0.2 X10(3)/MCL (ref 0–0.9)
EOSINOPHIL NFR BLD AUTO: 4.1 %
ERYTHROCYTE [DISTWIDTH] IN BLOOD BY AUTOMATED COUNT: 15.3 % (ref 11.5–17)
HCT VFR BLD AUTO: 32.6 % (ref 37–47)
HGB BLD-MCNC: 10.1 G/DL (ref 12–16)
IMM GRANULOCYTES # BLD AUTO: 0.04 X10(3)/MCL (ref 0–0.04)
IMM GRANULOCYTES NFR BLD AUTO: 0.8 %
LYMPHOCYTES # BLD AUTO: 1.15 X10(3)/MCL (ref 0.6–4.6)
LYMPHOCYTES NFR BLD AUTO: 23.5 %
MCH RBC QN AUTO: 28.7 PG
MCHC RBC AUTO-ENTMCNC: 31 G/DL (ref 33–36)
MCV RBC AUTO: 92.6 FL (ref 80–94)
MONOCYTES # BLD AUTO: 0.65 X10(3)/MCL (ref 0.1–1.3)
MONOCYTES NFR BLD AUTO: 13.3 %
NEUTROPHILS # BLD AUTO: 2.83 X10(3)/MCL (ref 2.1–9.2)
NEUTROPHILS NFR BLD AUTO: 57.9 %
PLATELET # BLD AUTO: 251 X10(3)/MCL (ref 130–400)
PMV BLD AUTO: 9.3 FL (ref 7.4–10.4)
RBC # BLD AUTO: 3.52 X10(6)/MCL (ref 4.2–5.4)
WBC # SPEC AUTO: 4.9 X10(3)/MCL (ref 4.5–11.5)

## 2023-03-01 PROCEDURE — 63600175 PHARM REV CODE 636 W HCPCS: Mod: JZ,TB | Performed by: NURSE PRACTITIONER

## 2023-03-01 PROCEDURE — 85025 COMPLETE CBC W/AUTO DIFF WBC: CPT

## 2023-03-01 PROCEDURE — 25000003 PHARM REV CODE 250: Performed by: NURSE PRACTITIONER

## 2023-03-01 PROCEDURE — 36415 COLL VENOUS BLD VENIPUNCTURE: CPT

## 2023-03-01 PROCEDURE — 96409 CHEMO IV PUSH SNGL DRUG: CPT

## 2023-03-01 RX ORDER — ACETAMINOPHEN 325 MG/1
650 TABLET ORAL
Status: COMPLETED | OUTPATIENT
Start: 2023-03-01 | End: 2023-03-01

## 2023-03-01 RX ORDER — DIPHENHYDRAMINE HYDROCHLORIDE 50 MG/ML
50 INJECTION INTRAMUSCULAR; INTRAVENOUS ONCE AS NEEDED
Status: DISCONTINUED | OUTPATIENT
Start: 2023-03-01 | End: 2023-03-01 | Stop reason: HOSPADM

## 2023-03-01 RX ORDER — SODIUM CHLORIDE 0.9 % (FLUSH) 0.9 %
10 SYRINGE (ML) INJECTION
Status: DISCONTINUED | OUTPATIENT
Start: 2023-03-01 | End: 2023-03-01 | Stop reason: HOSPADM

## 2023-03-01 RX ORDER — DIPHENHYDRAMINE HCL 25 MG
25 CAPSULE ORAL
Status: COMPLETED | OUTPATIENT
Start: 2023-03-01 | End: 2023-03-01

## 2023-03-01 RX ORDER — HEPARIN 100 UNIT/ML
500 SYRINGE INTRAVENOUS
Status: DISCONTINUED | OUTPATIENT
Start: 2023-03-01 | End: 2023-03-01 | Stop reason: HOSPADM

## 2023-03-01 RX ORDER — EPINEPHRINE 0.3 MG/.3ML
0.3 INJECTION SUBCUTANEOUS ONCE AS NEEDED
Status: DISCONTINUED | OUTPATIENT
Start: 2023-03-01 | End: 2023-03-01 | Stop reason: HOSPADM

## 2023-03-01 RX ADMIN — DARATUMUMAB AND HYALURONIDASE-FIHJ (HUMAN RECOMBINANT) 1800 MG: 1800; 30000 INJECTION SUBCUTANEOUS at 02:03

## 2023-03-01 RX ADMIN — DIPHENHYDRAMINE HYDROCHLORIDE 25 MG: 25 CAPSULE ORAL at 02:03

## 2023-03-01 RX ADMIN — ACETAMINOPHEN 325MG 650 MG: 325 TABLET ORAL at 02:03

## 2023-03-13 RX ORDER — DIPHENHYDRAMINE HCL 25 MG
25 CAPSULE ORAL
Status: CANCELLED | OUTPATIENT
Start: 2023-03-29

## 2023-03-13 RX ORDER — HEPARIN 100 UNIT/ML
500 SYRINGE INTRAVENOUS
Status: CANCELLED | OUTPATIENT
Start: 2023-03-15

## 2023-03-13 RX ORDER — SODIUM CHLORIDE 0.9 % (FLUSH) 0.9 %
10 SYRINGE (ML) INJECTION
Status: CANCELLED | OUTPATIENT
Start: 2023-03-29

## 2023-03-13 RX ORDER — HEPARIN 100 UNIT/ML
500 SYRINGE INTRAVENOUS
Status: CANCELLED | OUTPATIENT
Start: 2023-03-29

## 2023-03-13 RX ORDER — EPINEPHRINE 0.3 MG/.3ML
0.3 INJECTION SUBCUTANEOUS ONCE AS NEEDED
Status: CANCELLED | OUTPATIENT
Start: 2023-03-29

## 2023-03-13 RX ORDER — DIPHENHYDRAMINE HCL 25 MG
25 CAPSULE ORAL
Status: CANCELLED | OUTPATIENT
Start: 2023-03-15

## 2023-03-13 RX ORDER — SODIUM CHLORIDE 0.9 % (FLUSH) 0.9 %
10 SYRINGE (ML) INJECTION
Status: CANCELLED | OUTPATIENT
Start: 2023-03-15

## 2023-03-13 RX ORDER — DIPHENHYDRAMINE HYDROCHLORIDE 50 MG/ML
50 INJECTION INTRAMUSCULAR; INTRAVENOUS ONCE AS NEEDED
Status: CANCELLED | OUTPATIENT
Start: 2023-03-29

## 2023-03-13 RX ORDER — ACETAMINOPHEN 325 MG/1
650 TABLET ORAL
Status: CANCELLED | OUTPATIENT
Start: 2023-03-29

## 2023-03-13 RX ORDER — EPINEPHRINE 0.3 MG/.3ML
0.3 INJECTION SUBCUTANEOUS ONCE AS NEEDED
Status: CANCELLED | OUTPATIENT
Start: 2023-03-15

## 2023-03-13 RX ORDER — DIPHENHYDRAMINE HYDROCHLORIDE 50 MG/ML
50 INJECTION INTRAMUSCULAR; INTRAVENOUS ONCE AS NEEDED
Status: CANCELLED | OUTPATIENT
Start: 2023-03-15

## 2023-03-13 RX ORDER — ACETAMINOPHEN 325 MG/1
650 TABLET ORAL
Status: CANCELLED | OUTPATIENT
Start: 2023-03-15

## 2023-03-14 DIAGNOSIS — C90.00 MULTIPLE MYELOMA, REMISSION STATUS UNSPECIFIED: Primary | ICD-10-CM

## 2023-03-15 ENCOUNTER — LAB VISIT (OUTPATIENT)
Dept: LAB | Facility: HOSPITAL | Age: 58
End: 2023-03-15
Attending: INTERNAL MEDICINE
Payer: MEDICARE

## 2023-03-15 ENCOUNTER — INFUSION (OUTPATIENT)
Dept: INFUSION THERAPY | Facility: HOSPITAL | Age: 58
End: 2023-03-15
Attending: INTERNAL MEDICINE
Payer: MEDICARE

## 2023-03-15 VITALS
HEART RATE: 87 BPM | SYSTOLIC BLOOD PRESSURE: 161 MMHG | DIASTOLIC BLOOD PRESSURE: 80 MMHG | OXYGEN SATURATION: 94 % | TEMPERATURE: 99 F

## 2023-03-15 DIAGNOSIS — C90.00 MULTIPLE MYELOMA, REMISSION STATUS UNSPECIFIED: ICD-10-CM

## 2023-03-15 DIAGNOSIS — C90.00 MULTIPLE MYELOMA NOT HAVING ACHIEVED REMISSION: ICD-10-CM

## 2023-03-15 DIAGNOSIS — E85.9 AMYLOIDOSIS, UNSPECIFIED TYPE: ICD-10-CM

## 2023-03-15 DIAGNOSIS — E85.9 AMYLOIDOSIS, UNSPECIFIED TYPE: Primary | ICD-10-CM

## 2023-03-15 LAB
ALBUMIN SERPL-MCNC: 3.1 G/DL (ref 3.5–5)
ALBUMIN/GLOB SERPL: 1.1 RATIO (ref 1.1–2)
ALP SERPL-CCNC: 71 UNIT/L (ref 40–150)
ALT SERPL-CCNC: 14 UNIT/L (ref 0–55)
AST SERPL-CCNC: 14 UNIT/L (ref 5–34)
BASOPHILS # BLD AUTO: 0.01 X10(3)/MCL (ref 0–0.2)
BASOPHILS NFR BLD AUTO: 0.2 %
BILIRUBIN DIRECT+TOT PNL SERPL-MCNC: 0.3 MG/DL
BUN SERPL-MCNC: 25.2 MG/DL (ref 9.8–20.1)
CALCIUM SERPL-MCNC: 9.3 MG/DL (ref 8.4–10.2)
CHLORIDE SERPL-SCNC: 106 MMOL/L (ref 98–107)
CO2 SERPL-SCNC: 30 MMOL/L (ref 22–29)
CREAT SERPL-MCNC: 2.18 MG/DL (ref 0.55–1.02)
EOSINOPHIL # BLD AUTO: 0.08 X10(3)/MCL (ref 0–0.9)
EOSINOPHIL NFR BLD AUTO: 1.7 %
ERYTHROCYTE [DISTWIDTH] IN BLOOD BY AUTOMATED COUNT: 15.6 % (ref 11.5–17)
GFR SERPLBLD CREATININE-BSD FMLA CKD-EPI: 26 MLS/MIN/1.73/M2
GLOBULIN SER-MCNC: 2.7 GM/DL (ref 2.4–3.5)
GLUCOSE SERPL-MCNC: 89 MG/DL (ref 74–100)
HCT VFR BLD AUTO: 31.2 % (ref 37–47)
HGB BLD-MCNC: 9.5 G/DL (ref 12–16)
IMM GRANULOCYTES # BLD AUTO: 0.04 X10(3)/MCL (ref 0–0.04)
IMM GRANULOCYTES NFR BLD AUTO: 0.8 %
LYMPHOCYTES # BLD AUTO: 1.06 X10(3)/MCL (ref 0.6–4.6)
LYMPHOCYTES NFR BLD AUTO: 21.9 %
MCH RBC QN AUTO: 29.3 PG
MCHC RBC AUTO-ENTMCNC: 30.4 G/DL (ref 33–36)
MCV RBC AUTO: 96.3 FL (ref 80–94)
MONOCYTES # BLD AUTO: 1.11 X10(3)/MCL (ref 0.1–1.3)
MONOCYTES NFR BLD AUTO: 22.9 %
NEUTROPHILS # BLD AUTO: 2.54 X10(3)/MCL (ref 2.1–9.2)
NEUTROPHILS NFR BLD AUTO: 52.5 %
PLATELET # BLD AUTO: 223 X10(3)/MCL (ref 130–400)
PMV BLD AUTO: 8.5 FL (ref 7.4–10.4)
POTASSIUM SERPL-SCNC: 2.9 MMOL/L (ref 3.5–5.1)
PROT SERPL-MCNC: 5.8 GM/DL (ref 6.4–8.3)
RBC # BLD AUTO: 3.24 X10(6)/MCL (ref 4.2–5.4)
SODIUM SERPL-SCNC: 147 MMOL/L (ref 136–145)
WBC # SPEC AUTO: 4.8 X10(3)/MCL (ref 4.5–11.5)

## 2023-03-15 PROCEDURE — 36415 COLL VENOUS BLD VENIPUNCTURE: CPT

## 2023-03-15 PROCEDURE — 63600175 PHARM REV CODE 636 W HCPCS: Mod: JZ,TB | Performed by: INTERNAL MEDICINE

## 2023-03-15 PROCEDURE — 96401 CHEMO ANTI-NEOPL SQ/IM: CPT

## 2023-03-15 PROCEDURE — 85025 COMPLETE CBC W/AUTO DIFF WBC: CPT

## 2023-03-15 PROCEDURE — 25000003 PHARM REV CODE 250: Performed by: INTERNAL MEDICINE

## 2023-03-15 PROCEDURE — 80053 COMPREHEN METABOLIC PANEL: CPT

## 2023-03-15 RX ORDER — DIPHENHYDRAMINE HCL 25 MG
25 CAPSULE ORAL
Status: COMPLETED | OUTPATIENT
Start: 2023-03-15 | End: 2023-03-15

## 2023-03-15 RX ORDER — EPINEPHRINE 0.3 MG/.3ML
0.3 INJECTION SUBCUTANEOUS ONCE AS NEEDED
Status: DISCONTINUED | OUTPATIENT
Start: 2023-03-15 | End: 2023-03-15 | Stop reason: HOSPADM

## 2023-03-15 RX ORDER — DIPHENHYDRAMINE HYDROCHLORIDE 50 MG/ML
50 INJECTION INTRAMUSCULAR; INTRAVENOUS ONCE AS NEEDED
Status: DISCONTINUED | OUTPATIENT
Start: 2023-03-15 | End: 2023-03-15 | Stop reason: HOSPADM

## 2023-03-15 RX ORDER — ACETAMINOPHEN 325 MG/1
650 TABLET ORAL
Status: COMPLETED | OUTPATIENT
Start: 2023-03-15 | End: 2023-03-15

## 2023-03-15 RX ORDER — ONDANSETRON HYDROCHLORIDE 8 MG/1
8 TABLET, FILM COATED ORAL EVERY 8 HOURS PRN
Qty: 90 TABLET | Refills: 0 | Status: SHIPPED | OUTPATIENT
Start: 2023-03-15 | End: 2023-04-18 | Stop reason: SDUPTHER

## 2023-03-15 RX ORDER — SODIUM CHLORIDE 0.9 % (FLUSH) 0.9 %
10 SYRINGE (ML) INJECTION
Status: DISCONTINUED | OUTPATIENT
Start: 2023-03-15 | End: 2023-03-15 | Stop reason: HOSPADM

## 2023-03-15 RX ORDER — HEPARIN 100 UNIT/ML
500 SYRINGE INTRAVENOUS
Status: DISCONTINUED | OUTPATIENT
Start: 2023-03-15 | End: 2023-03-15 | Stop reason: HOSPADM

## 2023-03-15 RX ADMIN — DIPHENHYDRAMINE HYDROCHLORIDE 25 MG: 25 CAPSULE ORAL at 03:03

## 2023-03-15 RX ADMIN — ACETAMINOPHEN 325MG 650 MG: 325 TABLET ORAL at 03:03

## 2023-03-15 RX ADMIN — DARATUMUMAB AND HYALURONIDASE-FIHJ (HUMAN RECOMBINANT) 1800 MG: 1800; 30000 INJECTION SUBCUTANEOUS at 03:03

## 2023-03-15 NOTE — NURSING
Able to verify with Ninfa in pharmacy that last TD note with ABDULKADIR Moreau NP states darzalex q4wks but NP called to add 2 more q2wk doses on 3/1/23; darzalex subq to be given 3/15/23

## 2023-03-15 NOTE — PLAN OF CARE
Pt received c6 d1 darzalex ; verified with Ronda in pharmacy that we are still on a q2wk schedule per ABDULKADIR Moreau NP instructions from x2 more doses, verified with pt.  Tolerated well.

## 2023-03-21 DIAGNOSIS — C90.00 MULTIPLE MYELOMA, REMISSION STATUS UNSPECIFIED: Primary | ICD-10-CM

## 2023-03-21 DIAGNOSIS — C50.819 OVERLAPPING MALIGNANT NEOPLASM OF FEMALE BREAST, UNSPECIFIED ESTROGEN RECEPTOR STATUS, UNSPECIFIED LATERALITY: ICD-10-CM

## 2023-03-22 ENCOUNTER — LAB VISIT (OUTPATIENT)
Dept: LAB | Facility: HOSPITAL | Age: 58
End: 2023-03-22
Attending: INTERNAL MEDICINE
Payer: COMMERCIAL

## 2023-03-22 DIAGNOSIS — C90.00 MULTIPLE MYELOMA, REMISSION STATUS UNSPECIFIED: Primary | ICD-10-CM

## 2023-03-22 DIAGNOSIS — C90.00 MULTIPLE MYELOMA NOT HAVING ACHIEVED REMISSION: ICD-10-CM

## 2023-03-22 PROCEDURE — 86335 IMMUNFIX E-PHORSIS/URINE/CSF: CPT | Mod: 90

## 2023-03-29 ENCOUNTER — LAB VISIT (OUTPATIENT)
Dept: LAB | Facility: HOSPITAL | Age: 58
End: 2023-03-29
Attending: INTERNAL MEDICINE
Payer: MEDICARE

## 2023-03-29 ENCOUNTER — OFFICE VISIT (OUTPATIENT)
Dept: HEMATOLOGY/ONCOLOGY | Facility: CLINIC | Age: 58
End: 2023-03-29
Payer: MEDICARE

## 2023-03-29 ENCOUNTER — INFUSION (OUTPATIENT)
Dept: INFUSION THERAPY | Facility: HOSPITAL | Age: 58
End: 2023-03-29
Attending: INTERNAL MEDICINE
Payer: MEDICARE

## 2023-03-29 VITALS
WEIGHT: 176.38 LBS | DIASTOLIC BLOOD PRESSURE: 82 MMHG | BODY MASS INDEX: 30.11 KG/M2 | HEIGHT: 64 IN | TEMPERATURE: 98 F | RESPIRATION RATE: 18 BRPM | HEART RATE: 83 BPM | OXYGEN SATURATION: 98 % | SYSTOLIC BLOOD PRESSURE: 132 MMHG

## 2023-03-29 DIAGNOSIS — C50.819 OVERLAPPING MALIGNANT NEOPLASM OF FEMALE BREAST, UNSPECIFIED ESTROGEN RECEPTOR STATUS, UNSPECIFIED LATERALITY: ICD-10-CM

## 2023-03-29 DIAGNOSIS — Z79.811 AROMATASE INHIBITOR USE: ICD-10-CM

## 2023-03-29 DIAGNOSIS — E85.89 OTHER AMYLOIDOSIS: ICD-10-CM

## 2023-03-29 DIAGNOSIS — C50.812 MALIGNANT NEOPLASM OF OVERLAPPING SITES OF LEFT BREAST IN FEMALE, ESTROGEN RECEPTOR POSITIVE: ICD-10-CM

## 2023-03-29 DIAGNOSIS — Z17.0 MALIGNANT NEOPLASM OF OVERLAPPING SITES OF LEFT BREAST IN FEMALE, ESTROGEN RECEPTOR POSITIVE: ICD-10-CM

## 2023-03-29 DIAGNOSIS — C90.01 MULTIPLE MYELOMA IN REMISSION: Primary | ICD-10-CM

## 2023-03-29 DIAGNOSIS — D63.8 ANEMIA OF CHRONIC DISEASE: ICD-10-CM

## 2023-03-29 DIAGNOSIS — C90.00 MULTIPLE MYELOMA, REMISSION STATUS UNSPECIFIED: ICD-10-CM

## 2023-03-29 DIAGNOSIS — E85.9 AMYLOIDOSIS, UNSPECIFIED TYPE: Primary | ICD-10-CM

## 2023-03-29 LAB
ALBUMIN 24H UR ELPH-MRATE: 25.2 MG/24 H
ALBUMIN SERPL-MCNC: 3.4 G/DL (ref 3.5–5)
ALBUMIN/GLOB 24H UR ELPH: 0.27 {RATIO}
ALBUMIN/GLOB SERPL: 1.3 RATIO (ref 1.1–2)
ALP SERPL-CCNC: 78 UNIT/L (ref 40–150)
ALPHA1 GLOB 24H UR ELPH-MRATE: 12 MG/24 H
ALPHA2 GLOB 24H UR ELPH-MRATE: 38.4 MG/24 H
ALT SERPL-CCNC: 13 UNIT/L (ref 0–55)
AST SERPL-CCNC: 18 UNIT/L (ref 5–34)
B-GLOBULIN 24H UR ELPH-MRATE: 30 MG/24 H
B2 MICROGLOB SERPL-MCNC: 3.99 MG/L (ref 0.97–2.64)
BASOPHILS # BLD AUTO: 0 X10(3)/MCL (ref 0–0.2)
BASOPHILS NFR BLD AUTO: 0 %
BILIRUBIN DIRECT+TOT PNL SERPL-MCNC: 0.2 MG/DL
BUN SERPL-MCNC: 34.5 MG/DL (ref 9.8–20.1)
CALCIUM SERPL-MCNC: 10.2 MG/DL (ref 8.4–10.2)
CHLORIDE SERPL-SCNC: 104 MMOL/L (ref 98–107)
CO2 SERPL-SCNC: 27 MMOL/L (ref 22–29)
COLLECT DURATION TIME UR: 24 H
CREAT SERPL-MCNC: 2.43 MG/DL (ref 0.55–1.02)
EOSINOPHIL # BLD AUTO: 0.04 X10(3)/MCL (ref 0–0.9)
EOSINOPHIL NFR BLD AUTO: 1 %
ERYTHROCYTE [DISTWIDTH] IN BLOOD BY AUTOMATED COUNT: 15.3 % (ref 11.5–17)
GAMMA GLOB 24H UR ELPH-MRATE: 14.4 MG/24 H
GFR SERPLBLD CREATININE-BSD FMLA CKD-EPI: 22 MLS/MIN/1.73/M2
GLOBULIN SER-MCNC: 2.6 GM/DL (ref 2.4–3.5)
GLUCOSE SERPL-MCNC: 79 MG/DL (ref 74–100)
HCT VFR BLD AUTO: 31.6 % (ref 37–47)
HGB BLD-MCNC: 9.5 G/DL (ref 12–16)
IGA SERPL-MCNC: 42 MG/DL (ref 65–421)
IGG SERPL-MCNC: 518 MG/DL (ref 522–1631)
IGM SERPL-MCNC: 5 MG/DL (ref 33–293)
IMM GRANULOCYTES # BLD AUTO: 0.02 X10(3)/MCL (ref 0–0.04)
IMM GRANULOCYTES NFR BLD AUTO: 0.5 %
INTERPRETATION 24H UR IFE-IMP: NORMAL
LYMPHOCYTES # BLD AUTO: 0.8 X10(3)/MCL (ref 0.6–4.6)
LYMPHOCYTES NFR BLD AUTO: 19.9 %
M IMMUNOFIXATION FLAG, 24 HR, U: NEGATIVE
MCH RBC QN AUTO: 29.7 PG (ref 27–31)
MCHC RBC AUTO-ENTMCNC: 30.1 G/DL (ref 33–36)
MCV RBC AUTO: 98.8 FL (ref 80–94)
MONOCYTES # BLD AUTO: 0.81 X10(3)/MCL (ref 0.1–1.3)
MONOCYTES NFR BLD AUTO: 20.1 %
NEUTROPHILS # BLD AUTO: 2.36 X10(3)/MCL (ref 2.1–9.2)
NEUTROPHILS NFR BLD AUTO: 58.5 %
PLATELET # BLD AUTO: 239 X10(3)/MCL (ref 130–400)
PMV BLD AUTO: 9.8 FL (ref 7.4–10.4)
POTASSIUM SERPL-SCNC: 3.7 MMOL/L (ref 3.5–5.1)
PROT 24H UR-MRATE: 120 MG/24 H
PROT PATTERN 24H UR ELPH-IMP: NORMAL
PROT SERPL-MCNC: 6 GM/DL (ref 6.4–8.3)
RBC # BLD AUTO: 3.2 X10(6)/MCL (ref 4.2–5.4)
SODIUM SERPL-SCNC: 144 MMOL/L (ref 136–145)
SPECIMEN VOL ?TM UR: 800 ML
WBC # SPEC AUTO: 4 X10(3)/MCL (ref 4.5–11.5)

## 2023-03-29 PROCEDURE — 82784 ASSAY IGA/IGD/IGG/IGM EACH: CPT

## 2023-03-29 PROCEDURE — 99215 PR OFFICE/OUTPT VISIT, EST, LEVL V, 40-54 MIN: ICD-10-PCS | Mod: S$PBB,,, | Performed by: INTERNAL MEDICINE

## 2023-03-29 PROCEDURE — 84165 PROTEIN E-PHORESIS SERUM: CPT

## 2023-03-29 PROCEDURE — 96401 CHEMO ANTI-NEOPL SQ/IM: CPT

## 2023-03-29 PROCEDURE — 99999 PR PBB SHADOW E&M-EST. PATIENT-LVL V: ICD-10-PCS | Mod: PBBFAC,,, | Performed by: INTERNAL MEDICINE

## 2023-03-29 PROCEDURE — 85025 COMPLETE CBC W/AUTO DIFF WBC: CPT

## 2023-03-29 PROCEDURE — 36415 COLL VENOUS BLD VENIPUNCTURE: CPT

## 2023-03-29 PROCEDURE — 99999 PR PBB SHADOW E&M-EST. PATIENT-LVL V: CPT | Mod: PBBFAC,,, | Performed by: INTERNAL MEDICINE

## 2023-03-29 PROCEDURE — 25000003 PHARM REV CODE 250: Performed by: INTERNAL MEDICINE

## 2023-03-29 PROCEDURE — 86334 IMMUNOFIX E-PHORESIS SERUM: CPT

## 2023-03-29 PROCEDURE — 63600175 PHARM REV CODE 636 W HCPCS: Mod: JZ,TB | Performed by: INTERNAL MEDICINE

## 2023-03-29 PROCEDURE — 99215 OFFICE O/P EST HI 40 MIN: CPT | Mod: PBBFAC | Performed by: INTERNAL MEDICINE

## 2023-03-29 PROCEDURE — 86146 BETA-2 GLYCOPROTEIN ANTIBODY: CPT

## 2023-03-29 PROCEDURE — 99215 OFFICE O/P EST HI 40 MIN: CPT | Mod: S$PBB,,, | Performed by: INTERNAL MEDICINE

## 2023-03-29 PROCEDURE — 80053 COMPREHEN METABOLIC PANEL: CPT

## 2023-03-29 PROCEDURE — 83521 IG LIGHT CHAINS FREE EACH: CPT | Mod: 90

## 2023-03-29 RX ORDER — SODIUM CHLORIDE 0.9 % (FLUSH) 0.9 %
10 SYRINGE (ML) INJECTION
Status: DISCONTINUED | OUTPATIENT
Start: 2023-03-29 | End: 2023-03-29 | Stop reason: HOSPADM

## 2023-03-29 RX ORDER — EPINEPHRINE 0.3 MG/.3ML
0.3 INJECTION SUBCUTANEOUS ONCE AS NEEDED
Status: DISCONTINUED | OUTPATIENT
Start: 2023-03-29 | End: 2023-03-29 | Stop reason: HOSPADM

## 2023-03-29 RX ORDER — ACETAMINOPHEN 325 MG/1
650 TABLET ORAL
Status: COMPLETED | OUTPATIENT
Start: 2023-03-29 | End: 2023-03-29

## 2023-03-29 RX ORDER — HEPARIN 100 UNIT/ML
500 SYRINGE INTRAVENOUS
Status: DISCONTINUED | OUTPATIENT
Start: 2023-03-29 | End: 2023-03-29 | Stop reason: HOSPADM

## 2023-03-29 RX ORDER — DIPHENHYDRAMINE HYDROCHLORIDE 50 MG/ML
50 INJECTION INTRAMUSCULAR; INTRAVENOUS ONCE AS NEEDED
Status: DISCONTINUED | OUTPATIENT
Start: 2023-03-29 | End: 2023-03-29 | Stop reason: HOSPADM

## 2023-03-29 RX ORDER — DIPHENHYDRAMINE HCL 25 MG
25 CAPSULE ORAL
Status: COMPLETED | OUTPATIENT
Start: 2023-03-29 | End: 2023-03-29

## 2023-03-29 RX ADMIN — DARATUMUMAB AND HYALURONIDASE-FIHJ (HUMAN RECOMBINANT) 1800 MG: 1800; 30000 INJECTION SUBCUTANEOUS at 04:03

## 2023-03-29 RX ADMIN — ACETAMINOPHEN 650 MG: 325 TABLET, FILM COATED ORAL at 03:03

## 2023-03-29 RX ADMIN — DIPHENHYDRAMINE HYDROCHLORIDE 25 MG: 25 CAPSULE ORAL at 03:03

## 2023-03-29 NOTE — PROGRESS NOTES
HEMATOLOGY/ONCOLOGY OFFICE CLINIC VISIT    Visit Information:    NO SHOW/RESCHEDULE  08/21/2018--> Rescheduled     11/05/2019--> No Show/Reschedule  06/24/2020--> No Show/Reschedule  09/24/2020--> No Show/Reschedule  03/15/2021--> No Show/Reschedule  03/23/2021--> No Show/Reschedule  04/13/2021--> No Show  05/27/2021--> No Show  08/19/2021--> No Show  08/26/2021--> No Show/Rescheduled  04/26/2022--> No Show  10/03/2022--> Rescheduled  10/11/2022--> Rescheduled  03/22/2023--> No Show/Rescheduled    Referring Physician: DR. Cardona  PCP: Dr. Chacon  GI: Dr. Johnny Bhardwaj  Rheumatologist: Dr. Luis Rea  Immunologist: Dr. Daniel Nava  ENT: Dr. Brice Williamson  [facility] Pain management: Dr.Chai MD Rivera Transplant: Dr. Adrian Naylor [facility] [place] Med Onc: Dr. Zulema Rivera Breast Surg Onc: Dr.DeSnyder [facility] [place]: Dr. 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   8) Stage IA grade 3, ER+, HER2 BERNA +,  IDC/DCIS of the left breast --- 2/7/18 at [facility]   --s/p lumpectomy with SLNB 4/12, Grade 2 IDC 4mm with second focus of microinvasive carcinoma 0.4mm. 2 SLN negative for malignancy    9) Progressive swelling of R lower extremity--- US NIVA done 2/19/18 negative for evidence of DVT  10). Paroxysmal Afib (2/18/18) diagnosed at [facility]; ECHO noted EF 58% - on Eliquis  11) Mild CKD with GFR 55 - managed per [facility] nephrology  12) Treatment induced neutropenia     Present  treatment:  -Maintenance Revlimid 5mg PO QOD, started 02/16/17-- was held for a few months while undergoing treatment for her breast cancer 02/18-05/18; Restarted 6/15/18   Dexamethasone 20 mg po weekly added early July 2017--> reduced to 12 mg PO weekly October 4, 2017---> increased to 16mg PO weekly 2/15/18---restarted 6/15/18 --> 20 mg weekly 7//8/2022  Darzalex 7/8/2022-present  Femara 2.5 mg PO daily   Eliquis 2.5mg PO BID      Treatment/Oncology history:  1) CyBorD x5 cycles 09/28/15-12/24/15  2) Autologous stem cell transplant 02/08/16, done at [facility]  3) Exploratory laparotomy with subtotal colectomy and end ileostomy done August 2, 2016--pathology specimen showed advanced colonic amyloidosis extensive involving the muscular wall submucosa and mucosa.  Appendix also involvement by amyloidosis with fibrous obliteration of the distal lumen.  4) Maintenance therapy with Revlimid 5mg-started 06/01/16--Discontinued shortly after 2/2 cytopenias  5) Left breast lumpectomy with SLNB at [facility] 4/12/18  6) Left partial breast RT x10 fractions  5/21/18-6/4/1          Imaging:  NIVA 7/29/2021: Negative for deep venous thrombosis in the left lower extremity.   MMG 3/21/2022: BI-RADS Category 2: Benign Finding(s)  MRI CTL spine 9/14/2022: No acute myelomatous findings. Severe spinal stenosis process detected within the upper cervical spine as well as the entirety of the lumbar spine segments similar to the prior imaging assessment.    CT C 1/24/2023: Right greater than left lower lobe opacification consistent with infectious process.  Lucent lesions throughout the osseous structures similar to 2015.    Pathology:    CLINICAL HISTORY:       Patient: Ninfa Candelario is a 57 y.o. female.  Ms. Cabrera Joseph was admitted on 08/16/15 for ileus versus partial SBO. CT A/P done 08/17/15 showed sigmoid colitis and a zone of transition at the junction of the descending and sigmoid colon which could indicate some degree of  obstruction and an obstructing mass cannot be excluded. Numerous skeletal lytic lesions noted. CT chest done 08/19/15 showed Multiple skeletal lytic lesions involving the thoracic spine, left rib sternum consistent with either metastases or multiple myeloma. There is no evidence of pulmonary or mediastinal mass. Dr. Farr was consulted for possible MM/anemia.     Nuclear Bone scan done 8/20/15 showed mild to moderate increased activity in left rib and right second rib which correlates with fractures seen on CT.  Skeletal survey done 8/20/15showed lytic lesions in the calvarium and probably a lytic lesion in the left scapula. Lytic areas in the spine and pelvis seen on recent CT are not well defined on skeletal survey. Work up labs done 08/20/15: Negative for sickle cell and Thalessemia (reported history of thalessemia). Iron 54, Transferrin 118.0, Iron sat 34.6%, Folate 26.5, Vit B12 1,779  Peripheral smear resulted slightly macrocytic normochromic anemia without signifcant anisocytosis may reflect early B12/folate deficiency or marrow dysfunction, among others. No immature myeloid cells or blasts. Platlets adequate. Beta 2 mircoglobulin = 3.2.  SPEP/NADIA: M-spike in the beta region. The monoclonal protein peak accounts for 1.13 g/dL. Hypogammaglobulinemia. NADIA pattern shows the presence of a free lambda light chain monoclonal protein with additional faint band in IgA.  All immunoglobulin levels decreased. IgA=26, IgG=307, IgM<5.  Kappa Qnt 0.16, Lambda Qnt 4600.00, Ratio <0.01.  24hr Urine for Bence Mendez: Kappa 2.75, Lambda 1250.00, Ratio <0.01. NADIA: Urine is POSITIVE for monoclonal Free Lambda Light chains     Patient then [facility] where she underwent a bone marrow biopsy on 09/18/15. BMBx showed 80% plasma cells, 13p deletion and normal karyotype. She underwent fat pad biopsy which came back positive for Congo red consistent with amyloidosis. Cardiac workup revealed no amyloid deposition  within the heart.  PET/CT and skeletal survey done September 18, 2015 showed multiple lytic osseous lesions  The patient was started on Velcade while at [facility] with the plan to transition to autologous stem cell transplantation. They asked if we could give her could continue treatment here.   She completed CyborD x5 cycles on 12/24/15     Patient admitted 01/06/16 for colitis and C. Diff. Pt treated with Flagyl. Discharged home 01/08/16     Repeat BMBx done at [facility] 01/2016 did not show any morphologic or immnophenotypic evidence of plasama cell neoplasm. Patient underwent Autologous stem cell transplant with Busulfan and melphalan on 02/08/16 at [facility]. The only complication was recurrent C.diff infection for which she completed 10 days of Fidaxomycin.     Follow-up bone marrow biopsy done at [facility] done 5/16/16 showed cellular 30-40% bone marrow with trilineage hematopoiesis. No morphologic or immunophenotypic support for plasma cell neoplasm. Started maintenance Revlimid 5mg. Unable to continue therapy due to cytopenias.     On 08/02/16 patient underwent  Exploratory laparotomy with subtotal colectomy and end ileostomy for what was thought to be toxic megacolon. Pathology showed extensive advanced colonic amyloidosis involving the muscular wall, submucosa and mucosa: With the appendix also involved with amyloidosis.  Positive lambda light chains were noted.     Follow-up at [facility] 8/31/16, Per Dr. Adrian Naylor, 6 months post autologous transplant. She has engrafted. Based on the latest restaging she is near complete remission with possible IgA lambda on serum immunofixation. Patient was instructed to discontinue prophylactic antibiotics. She received her 1st series of immunizations while at [facility]. Patient had a bilateral venous ultrasound to rule out DVT, negative B/L. In regards to amyloidosis the patient feels that her tongue is smaller in size and her BNP has come down some.  Patient had a follow-up  appointment at [facility] November 30, 2016.  Dr. Parnell documented the patient's free lambda light chain remains at a lower level.  Therefore in light of her recent surgery they will continue with observation only.  The plan to see the patient back in 2-3 months with repeat restaging labs.  They recommended regular CBC checks to monitor anemia.  Patient returned to [facility] in December 2016 to meet with dermatology for numerous papillary lesions on eyelids, chest and posterior neck consistent with amyloid deposits. Recommendations were for watchful waiting.  Patient is less than 1 year out from bone marrow transplant and further improvement can be expected.  She will see the patient back in 1 year to discuss possible surgical resection of the plaques especially around the eyelid region.  Rogaine recommended for persistent hair loss. Retin-A recommended for acne vulgaris.  Patient returned to [facility] on 2/8/2017 for neurosurgery consult for chronic low back pain and difficulty walking.  Imaging showed extensive DJD with discovertebral bulges and thickening of the spinal laminar tissues creating spinal stenosis throughout, but greatest at L2/L3.  Neurosurgery felt that surgery risks outweighed the benefits.  Patient was prescribed pain medicine and encouraged to participate in physical therapy.  Patient also met with Dr. Parnell on 02/08/17, who noted an elevation in free light chains (kappa 52.60/lambda 27.77/ratio 1.89).  Recommendation is to resume maintenance therapy with Revlimid at a low dose of 5 mg every other day.  Patient went back to [facility] first week of April 2017.  I do not have these notes.  Reportedly she was told to continue on every other day Revlimid at 5 mg.  She reportedly had repeat myeloma labs done as well.  Again I do not have these results.  Patient went back to [facility] and saw Dr. Parnell June 29, 2017.  Myeloma labs were done with serum protein electrophoresis showing  irregularity in the fast gamma region.  IgG of 1291.  Free kappa light chains of 84.6 with lambda light chains of 66.9.  Beta-2 microglobulin of 4.5  CT scan of lumbar spine showed multiple lytic lesions once again in the lumbar spine and bony pelvis.  Ultrasound of the left leg showed no evidence of DVT.  It was recommended based on the slight increase in her And lambda light chains to start weekly dexamethasone 20 mg p.o. weekly.  Follow-up visit and labs at [facility] on September 29, 2017 with Dr. Parnell.  Repeat beta-2 microglobulin came back at 2.4.  Serum IgG of 761, IgA 117 and IgM of 29.  Serum free kappa light chains of 24.9 and lambda of 23.5.  Counts were stable with hemoglobin of 11.1, WBC 4.9 and platelets of 210,000.  Recommendations were for continued Revlimid every other day with weekly dexamethasone along with aspirin for DVT prophylaxis.  Bilateral diagnostic MMG 12/19/17  noted 1.6cm coarse heterogeneous calcifications in posterior region of L breast at 3 o'clock position. Patient was scheduled for FNA which confirmed ID grade 3, single focus measuring 1.7cm with 2nd focus microinvasion DCIS grade 2 measuring 0.3cm. Left axillary LN biopsy showed no evidence of metastatic carcinoma. Complete staging: Stage IA, grade 3 ER+, Her 2 Niki + IDC/DCIS of left breast. Ki-67 <17%.  Repeat myeloma labs showed rise in free lambda light chains noted at 68.69, kappa light chains at 27.22,  beta 2 microglobulin came back at 2.9. Serum protein electrophoresis showed no definitive evidence of an M protein peak. The pattern is consistent with an acute inflammatory reaction. Recommendations were made to maintain Revlimid at current dose of 5mg every other day to be taken continuously increase weekly dexamethasone to 16 mg.   Patient seen at [facility] with tentative plan for L breast lumpectomy on 3/13/18. 2/16/18- Prior to surgery she was seen by genetic counselor who ran genetic testing per patient reques, all  of which were negative. She was then seen by cardiology at [facility] 2/16/18 for further evaluation of persistent bilateral lower extremity swelling-ECHO noted EF of 58%; diagnosed with paroxysmal atrial fibrillation and instructed to begin daily ASA. During preoperative asssessment per Rad/Onc 3/12/18, corresponding chest CT noted new bilateral pulmonary nodules concerning for metastatic disease- surgery was subsequently postponed pending pulmonary evaluation. Seen by Pulmonology on 3/21/18, PFTs within normal limits and cleared patient for surgery with recommended close CT follow up of nodules in 3 months. 3/21/18 seen by nephrology for management of mild CKD (GFR 55)-cleared for surgery with recommended follow up in 3 months. Left breast lumpectomy and SLNB performed per Dr. Washburn on 4/12/18- plan for follow up in clinic on 4/24/18  for postoperative assessment. Follow up with Dr. Poole (Med/Onc) on 4/25/18. Follow up with Dr. Parnell 4/26/18.   Patient seen at [facility] s/p Left breast lumpectomy with SLNB on 4/12/18. Following surgery she completed Left partial breast radiation x 10 fractions. She was then started on endocrine therapy with Femara after been deemedan inappropriate candidate for systemic chemotherapy/Herceptin.      She was seen by neurosugery at [facility] on 4/26/18 following repeat MRI of cervical thoracic lumbar spine which noted focal enhancement of T2 hyperintensity at the C2 level which may be due to degenerative changes. Signal abnormality within  the T12 and L1 may be secondary to myeloma. Plan to continue with observation for now with F/U scheduled in October 2018.      She was seen by Dr. Parnell at [facility] for management of her MM on 4/26/18. Patient was recommended to restart Revlimid 5mg PO every other day with notes that these recommendations would be communicated to collaborating Oncologist. Patient was also recommended to start on Zometa for her bone disease.      Seen by   Parmjit at [facility] for management of her MM on 6/28/18. Repeat light chains noted lambda 33.36 (previously 80.80), K/L ratio 0.91 (previously 0.49). Due to slight improvement in light chains, plan to continue on lenalidomide maintenance. Recommendations to continue anticoagulation with Eliquis. BLE venous doppler negative for DVT.   Seen by pulmonolgy on 6/28/18- repeat CT chest done 6/28/18 noted resolution of previous pulmonary nodules. Thought to be infectious in nature. Recommendations for discharge from pulmonary clinic.  Should MRI of her cervical thoracic lumbar spine on 2/12/2019 that demonstrated cervical spondylosis with canal stenosis most notable at C1 and 2. Cord signal abnormality at C1 and 2 with enhancement is stable. Lumbar spondylosis with central canal stenosis at multiple levels. No imaging features of bony metastatic disease.     For amyloidosis (Light chain type).    Patient presented with macroglossia and now with improvement of the swelling of her tongue. Her cardiac parameters are also better.   8/10/2020 proBNP  250   2/17/2020                616  8/9/2019                  190        Chief Complaint: no complaints today        Interval History:  Patient presents today for TD and labs for daratumumab. She was due on 2/6/23 but she rescheduled due to illness.  She is currently on Femara for breast cancer and Rev/Dex/Darzalex  for MM  s/p transplant in 2016. At her [facility] visit on 10/12/22, Ninlaro was discontinued due to it causing severe diarrhea and leukopenia. Diarrhea resolved after stopping Ninlaro (she admits to stopping prior to visit @ [facility]). In the interim, she was hospitalized for pneumonia. She did not go to [facility] in 12/2022 as planned. No fever, chills or sweats.  No chest pain or shortness of breath.  She uses a walker for mobility.  She continues to do her mammograms and other imaging studies at [facility].          ROS: All 14 points ROS taken and as per Interval  "History  Review of Systems   Constitutional:  Positive for malaise/fatigue. Negative for chills, fever and weight loss.   HENT:  Negative for congestion and nosebleeds.    Eyes:  Negative for blurred vision, double vision and photophobia.   Respiratory:  Negative for cough, hemoptysis and shortness of breath.    Cardiovascular:  Negative for chest pain, palpitations, leg swelling and PND.   Gastrointestinal:  Positive for abdominal pain and diarrhea. Negative for blood in stool, constipation, melena, nausea and vomiting.   Genitourinary:  Negative for dysuria, frequency, hematuria and urgency.   Musculoskeletal:  Positive for back pain. Negative for falls and myalgias.   Skin:  Negative for itching and rash.   Neurological:  Positive for weakness. Negative for tremors, focal weakness, seizures and headaches.   Endo/Heme/Allergies:  Negative for environmental allergies. Does not bruise/bleed easily.   Psychiatric/Behavioral:  Negative for depression and suicidal ideas. The patient is not nervous/anxious.        Histories:  PMH/PSH/FH/SOCIAL/ALLERGIES AND MEDS REVIEWED AND UPDATED AS APPROPRIATE       Vitals:    03/29/23 1453   BP: 132/82   BP Location: Right arm   Patient Position: Sitting   BP Method: Medium (Automatic)   Pulse: 83   Resp: 18   Temp: 97.8 °F (36.6 °C)   TempSrc: Oral   SpO2: 98%   Weight: 80 kg (176 lb 6.4 oz)   Height: 5' 3.5" (1.613 m)            Physical Exam  Vitals and nursing note reviewed.   Constitutional:       General: She is not in acute distress.     Appearance: Normal appearance. She is well-developed. She is ill-appearing.      Comments: Uses walker for mobility   HENT:      Head: Normocephalic and atraumatic.      Mouth/Throat:      Mouth: Mucous membranes are moist.   Eyes:      General: No scleral icterus.     Extraocular Movements: Extraocular movements intact.      Conjunctiva/sclera: Conjunctivae normal.      Pupils: Pupils are equal, round, and reactive to light.   Neck:      " Vascular: No JVD.   Cardiovascular:      Rate and Rhythm: Normal rate and regular rhythm.      Heart sounds: No murmur heard.  Pulmonary:      Effort: Pulmonary effort is normal.      Breath sounds: Normal breath sounds. No wheezing or rhonchi.   Abdominal:      General: Bowel sounds are normal. There is no distension.      Palpations: Abdomen is soft. There is no mass.      Tenderness: There is no abdominal tenderness.   Musculoskeletal:         General: No swelling or deformity.      Cervical back: Neck supple.   Lymphadenopathy:      Head:      Right side of head: No submandibular adenopathy.      Left side of head: No submandibular adenopathy.      Cervical: No cervical adenopathy.      Upper Body:      Right upper body: No supraclavicular or axillary adenopathy.      Left upper body: No supraclavicular or axillary adenopathy.      Lower Body: No right inguinal adenopathy. No left inguinal adenopathy.   Skin:     General: Skin is warm.      Coloration: Skin is not jaundiced.      Findings: No lesion or rash.      Nails: There is no clubbing.   Neurological:      General: No focal deficit present.      Mental Status: She is alert and oriented to person, place, and time.      Cranial Nerves: Cranial nerves 2-12 are intact.      Motor: Weakness present.      Gait: Gait abnormal.   Psychiatric:         Attention and Perception: Attention normal.         Behavior: Behavior is cooperative.         Cognition and Memory: Cognition normal.         Judgment: Judgment normal.   Answers submitted by the patient for this visit:  Review of Systems Questionnaire (Submitted on 3/22/2023)  appetite change : No  unexpected weight change: No  mouth sores: No  visual disturbance: No  adenopathy: No  ECOG SCORE    2 - Capable of all selfcare but unable to carry out any work activities, active > 50% of hours         Laboratory:  CBC with Differential:  Lab Results   Component Value Date    WBC 4.0 (L) 03/29/2023    RBC 3.20 (L)  03/29/2023    HGB 9.5 (L) 03/29/2023    HCT 31.6 (L) 03/29/2023    MCV 98.8 (H) 03/29/2023    MCH 29.7 03/29/2023    MCHC 30.1 (L) 03/29/2023    RDW 15.3 03/29/2023     03/29/2023    MPV 9.8 03/29/2023        CMP:  Sodium Level   Date Value Ref Range Status   03/15/2023 147 (H) 136 - 145 mmol/L Final     Potassium Level   Date Value Ref Range Status   03/15/2023 2.9 (L) 3.5 - 5.1 mmol/L Final     Carbon Dioxide   Date Value Ref Range Status   03/15/2023 30 (H) 22 - 29 mmol/L Final     Blood Urea Nitrogen   Date Value Ref Range Status   03/15/2023 25.2 (H) 9.8 - 20.1 mg/dL Final     Creatinine   Date Value Ref Range Status   03/15/2023 2.18 (H) 0.55 - 1.02 mg/dL Final     Calcium Level Total   Date Value Ref Range Status   03/15/2023 9.3 8.4 - 10.2 mg/dL Final     Albumin Level   Date Value Ref Range Status   03/15/2023 3.1 (L) 3.5 - 5.0 g/dL Final     Bilirubin Total   Date Value Ref Range Status   03/15/2023 0.3 <=1.5 mg/dL Final     Alkaline Phosphatase   Date Value Ref Range Status   03/15/2023 71 40 - 150 unit/L Final     Aspartate Aminotransferase   Date Value Ref Range Status   03/15/2023 14 5 - 34 unit/L Final     Alanine Aminotransferase   Date Value Ref Range Status   03/15/2023 14 0 - 55 unit/L Final     Estimated GFR-Non    Date Value Ref Range Status   04/20/2022 25               Assessment:       No diagnosis found.        1) Multiple Myeloma, IgA Lambda, FISH with del 13p, normal karyotype, ISS stg II Dx 2015; S/p Autologous stem cell transplant 02/08/16-remission-->slight rise in free light chains 02/08/17  2) Amyloidosis, Lambda light chain type, organ involvement with macroglossia and colon involvement. 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 Sx risks do not outweigh benefits. Pain meds given and encouraged Physical Therpay  8) Amyloid plaques, evaluated by Derm at [facility], recommend watchful waiting. Patient to follow up 12/2017  9) Stage IA grade 3, ER+, HER2 BERNA +,  IDC/DCIS of the left breast --- 2/7/18 at [facility]; s/p lumpectomy with SLNB 4/12, Grade 2 IDC measuring 4mm with second focus of microinvasive carcinoma measuring  0.4mm .IG DCIS, 0.3mm to posterior margin; 2 SLN negative for malignancy    10) Progressive swelling of R lower extremity--- US NIVA done 2/19/18 negative for evidence of DVT  11). Paroxysmal Afib (2/18/18) diagnosed at [facility]; ECHO noted EF 58%  12. Mild CKD with GFR 55 - managed per [facility] nephrology  13). Pulmonary nodules noted on pre-operative chest CT scan (3/12/18) noted new bilateral pulmonary nodules are nonspecific -- seen by Pulmonology at [facility] (3/21/18) thought to be inflammatory/infectious in nature, repeat Chest CT 6/28/18 noted resolution of nodules  14). Treatment induced neutropenia        Plan:       Labs pending. She is taking potassium. Will wait for K level from today  Daratumumab weekly x 8 weeks then q2 weeks x 4 doses (8 weeks) then q4 weeks.     Ok for daratumumab q4 weeks dosing today.   Zometa is on hold for now as she is undergoing dental work at [facility].   Cont low dose Revlimid 5 mg q other day  Dexamethasone 20 mg weekly   Cont potassium  Pain medications are refilled by her pain management doctor at [facility]  Cont Eliquis  RTC in 4 weeks for TD with labs, same day infusion.  Mammograms and imaging studies done at [facility]  Keep appointment at [facility]    Encourage to call or message us for any questions or problems  The patient was given ample opportunity to ask questions, and to the best of my abilities, all questions answered to satisfaction; patient demonstrated understanding of what we discussed and agreeable to the plan.     Natalie Meyers,  MD  Hematology/Oncology

## 2023-03-30 ENCOUNTER — TELEPHONE (OUTPATIENT)
Dept: HEMATOLOGY/ONCOLOGY | Facility: CLINIC | Age: 58
End: 2023-03-30
Payer: MEDICARE

## 2023-03-30 LAB
ALBUMIN % SPEP (OHS): 51.71
ALBUMIN SERPL-MCNC: 3.2 G/DL (ref 3.5–5)
ALBUMIN/GLOB SERPL: 1.1 RATIO (ref 1.1–2)
ALPHA 1 GLOB (OHS): 0.23 GM/DL
ALPHA 1 GLOB% (OHS): 3.78
ALPHA 2 GLOB % (OHS): 18.97
ALPHA 2 GLOB (OHS): 1.16 GM/DL
BETA GLOB (OHS): 1.02 GM/DL
BETA GLOB% (OHS): 16.77
GAMMA GLOBULIN % (OHS): 8.77
GAMMA GLOBULIN (OHS): 0.54 GM/DL
GLOBULIN SER-MCNC: 2.9 GM/DL (ref 2.4–3.5)
M SPIKE % (OHS): 1.08
M SPIKE (OHS): 0.07 GM/DL
PATH REV: NORMAL
PROT SERPL-MCNC: 6.1 GM/DL (ref 6.4–8.3)

## 2023-03-31 LAB
KAPPA LC FREE SER-MCNC: 2.18 MG/DL (ref 0.33–1.94)
KAPPA LC FREE/LAMBDA FREE SER: 1.77 {RATIO} (ref 0.26–1.65)
LAMBDA LC FREE SERPL-MCNC: 1.23 MG/DL (ref 0.57–2.63)

## 2023-04-18 DIAGNOSIS — C90.00 MULTIPLE MYELOMA NOT HAVING ACHIEVED REMISSION: ICD-10-CM

## 2023-04-18 RX ORDER — ONDANSETRON HYDROCHLORIDE 8 MG/1
8 TABLET, FILM COATED ORAL EVERY 8 HOURS PRN
Qty: 90 TABLET | Refills: 0 | Status: SHIPPED | OUTPATIENT
Start: 2023-04-18 | End: 2023-05-18 | Stop reason: SDUPTHER

## 2023-05-01 PROBLEM — J18.9 PNEUMONIA OF BOTH LUNGS DUE TO INFECTIOUS ORGANISM: Status: RESOLVED | Noted: 2023-01-24 | Resolved: 2023-05-01

## 2023-05-03 ENCOUNTER — TELEPHONE (OUTPATIENT)
Dept: HEMATOLOGY/ONCOLOGY | Facility: CLINIC | Age: 58
End: 2023-05-03
Payer: MEDICARE

## 2023-05-09 ENCOUNTER — TELEPHONE (OUTPATIENT)
Dept: HEMATOLOGY/ONCOLOGY | Facility: CLINIC | Age: 58
End: 2023-05-09
Payer: MEDICARE

## 2023-05-15 DIAGNOSIS — C90.00 MULTIPLE MYELOMA, REMISSION STATUS UNSPECIFIED: ICD-10-CM

## 2023-05-15 DIAGNOSIS — C90.01 MULTIPLE MYELOMA IN REMISSION: ICD-10-CM

## 2023-05-15 DIAGNOSIS — C50.819 OVERLAPPING MALIGNANT NEOPLASM OF FEMALE BREAST, UNSPECIFIED ESTROGEN RECEPTOR STATUS, UNSPECIFIED LATERALITY: ICD-10-CM

## 2023-05-15 RX ORDER — LENALIDOMIDE 5 MG/1
CAPSULE ORAL
Qty: 30 EACH | Refills: 6 | Status: SHIPPED | OUTPATIENT
Start: 2023-05-15 | End: 2023-05-26 | Stop reason: SDUPTHER

## 2023-05-18 ENCOUNTER — TELEPHONE (OUTPATIENT)
Dept: HEMATOLOGY/ONCOLOGY | Facility: CLINIC | Age: 58
End: 2023-05-18

## 2023-05-18 ENCOUNTER — HOSPITAL ENCOUNTER (OUTPATIENT)
Dept: CARDIOLOGY | Facility: HOSPITAL | Age: 58
Discharge: HOME | End: 2023-05-18
Attending: INTERNAL MEDICINE
Payer: COMMERCIAL

## 2023-05-18 ENCOUNTER — OFFICE VISIT (OUTPATIENT)
Dept: HEMATOLOGY/ONCOLOGY | Facility: CLINIC | Age: 58
End: 2023-05-18
Attending: INTERNAL MEDICINE
Payer: MEDICARE

## 2023-05-18 ENCOUNTER — INFUSION (OUTPATIENT)
Dept: INFUSION THERAPY | Facility: HOSPITAL | Age: 58
End: 2023-05-18
Attending: INTERNAL MEDICINE
Payer: MEDICARE

## 2023-05-18 VITALS
WEIGHT: 185 LBS | BODY MASS INDEX: 32.78 KG/M2 | HEIGHT: 63 IN | DIASTOLIC BLOOD PRESSURE: 84 MMHG | TEMPERATURE: 98 F | OXYGEN SATURATION: 96 % | HEART RATE: 82 BPM | SYSTOLIC BLOOD PRESSURE: 164 MMHG

## 2023-05-18 DIAGNOSIS — D63.8 ANEMIA OF CHRONIC DISEASE: ICD-10-CM

## 2023-05-18 DIAGNOSIS — L03.116 CELLULITIS OF LEFT LEG: ICD-10-CM

## 2023-05-18 DIAGNOSIS — M79.605 LEFT LEG PAIN: ICD-10-CM

## 2023-05-18 DIAGNOSIS — M79.89 LEFT LEG SWELLING: ICD-10-CM

## 2023-05-18 DIAGNOSIS — C90.01 MULTIPLE MYELOMA IN REMISSION: Primary | ICD-10-CM

## 2023-05-18 DIAGNOSIS — E85.9 AMYLOIDOSIS, UNSPECIFIED TYPE: Primary | ICD-10-CM

## 2023-05-18 DIAGNOSIS — C90.00 MULTIPLE MYELOMA NOT HAVING ACHIEVED REMISSION: ICD-10-CM

## 2023-05-18 PROCEDURE — 99999 PR PBB SHADOW E&M-EST. PATIENT-LVL V: ICD-10-PCS | Mod: PBBFAC,,, | Performed by: INTERNAL MEDICINE

## 2023-05-18 PROCEDURE — 84156 ASSAY OF PROTEIN URINE: CPT

## 2023-05-18 PROCEDURE — 99215 PR OFFICE/OUTPT VISIT, EST, LEVL V, 40-54 MIN: ICD-10-PCS | Mod: S$PBB,,, | Performed by: INTERNAL MEDICINE

## 2023-05-18 PROCEDURE — 99215 OFFICE O/P EST HI 40 MIN: CPT | Mod: PBBFAC,25 | Performed by: INTERNAL MEDICINE

## 2023-05-18 PROCEDURE — 99999 PR PBB SHADOW E&M-EST. PATIENT-LVL V: CPT | Mod: PBBFAC,,, | Performed by: INTERNAL MEDICINE

## 2023-05-18 PROCEDURE — 84166 PROTEIN E-PHORESIS/URINE/CSF: CPT | Performed by: INTERNAL MEDICINE

## 2023-05-18 PROCEDURE — 63600175 PHARM REV CODE 636 W HCPCS: Mod: JZ,TB | Performed by: INTERNAL MEDICINE

## 2023-05-18 PROCEDURE — 25000003 PHARM REV CODE 250: Performed by: INTERNAL MEDICINE

## 2023-05-18 PROCEDURE — 93971 EXTREMITY STUDY: CPT | Mod: LT

## 2023-05-18 PROCEDURE — 96401 CHEMO ANTI-NEOPL SQ/IM: CPT

## 2023-05-18 PROCEDURE — 99215 OFFICE O/P EST HI 40 MIN: CPT | Mod: S$PBB,,, | Performed by: INTERNAL MEDICINE

## 2023-05-18 RX ORDER — ACETAMINOPHEN 325 MG/1
650 TABLET ORAL
Status: CANCELLED | OUTPATIENT
Start: 2023-05-18

## 2023-05-18 RX ORDER — CEFTRIAXONE 1 G/1
1 INJECTION, POWDER, FOR SOLUTION INTRAMUSCULAR; INTRAVENOUS DAILY
Qty: 7 G | Refills: 0 | Status: SHIPPED | OUTPATIENT
Start: 2023-05-18 | End: 2023-05-25

## 2023-05-18 RX ORDER — DIPHENHYDRAMINE HYDROCHLORIDE 50 MG/ML
50 INJECTION INTRAMUSCULAR; INTRAVENOUS ONCE AS NEEDED
Status: DISCONTINUED | OUTPATIENT
Start: 2023-05-18 | End: 2023-05-18 | Stop reason: HOSPADM

## 2023-05-18 RX ORDER — ONDANSETRON HYDROCHLORIDE 8 MG/1
8 TABLET, FILM COATED ORAL EVERY 8 HOURS PRN
Qty: 90 TABLET | Refills: 0 | Status: SHIPPED | OUTPATIENT
Start: 2023-05-18 | End: 2023-06-19 | Stop reason: SDUPTHER

## 2023-05-18 RX ORDER — EPINEPHRINE 0.3 MG/.3ML
0.3 INJECTION SUBCUTANEOUS ONCE AS NEEDED
Status: DISCONTINUED | OUTPATIENT
Start: 2023-05-18 | End: 2023-05-18 | Stop reason: HOSPADM

## 2023-05-18 RX ORDER — DIPHENHYDRAMINE HCL 25 MG
25 CAPSULE ORAL
Status: COMPLETED | OUTPATIENT
Start: 2023-05-18 | End: 2023-05-18

## 2023-05-18 RX ORDER — DIPHENHYDRAMINE HYDROCHLORIDE 50 MG/ML
50 INJECTION INTRAMUSCULAR; INTRAVENOUS ONCE AS NEEDED
Status: CANCELLED | OUTPATIENT
Start: 2023-05-18

## 2023-05-18 RX ORDER — EPINEPHRINE 0.3 MG/.3ML
0.3 INJECTION SUBCUTANEOUS ONCE AS NEEDED
Status: CANCELLED | OUTPATIENT
Start: 2023-05-18

## 2023-05-18 RX ORDER — HEPARIN 100 UNIT/ML
500 SYRINGE INTRAVENOUS
Status: DISCONTINUED | OUTPATIENT
Start: 2023-05-18 | End: 2023-05-18 | Stop reason: HOSPADM

## 2023-05-18 RX ORDER — HEPARIN 100 UNIT/ML
500 SYRINGE INTRAVENOUS
Status: CANCELLED | OUTPATIENT
Start: 2023-05-18

## 2023-05-18 RX ORDER — ACETAMINOPHEN 325 MG/1
650 TABLET ORAL
Status: COMPLETED | OUTPATIENT
Start: 2023-05-18 | End: 2023-05-18

## 2023-05-18 RX ORDER — SODIUM CHLORIDE 0.9 % (FLUSH) 0.9 %
10 SYRINGE (ML) INJECTION
Status: CANCELLED | OUTPATIENT
Start: 2023-05-18

## 2023-05-18 RX ORDER — DIPHENHYDRAMINE HCL 25 MG
25 CAPSULE ORAL
Status: CANCELLED | OUTPATIENT
Start: 2023-05-18

## 2023-05-18 RX ORDER — SODIUM CHLORIDE 0.9 % (FLUSH) 0.9 %
10 SYRINGE (ML) INJECTION
Status: DISCONTINUED | OUTPATIENT
Start: 2023-05-18 | End: 2023-05-18 | Stop reason: HOSPADM

## 2023-05-18 RX ADMIN — DIPHENHYDRAMINE HYDROCHLORIDE 25 MG: 25 CAPSULE ORAL at 02:05

## 2023-05-18 RX ADMIN — ACETAMINOPHEN 650 MG: 325 TABLET, FILM COATED ORAL at 02:05

## 2023-05-18 RX ADMIN — DARATUMUMAB AND HYALURONIDASE-FIHJ (HUMAN RECOMBINANT) 1800 MG: 1800; 30000 INJECTION SUBCUTANEOUS at 02:05

## 2023-05-18 NOTE — PLAN OF CARE
Patient received C7, tolerated well. No reaction noted. Instructed patient to call facility in two days if she doesn't received call with next appointment. Voiced understanding.

## 2023-05-18 NOTE — PROGRESS NOTES
HEMATOLOGY/ONCOLOGY OFFICE CLINIC VISIT    Visit Information:    NO SHOW/RESCHEDULE  08/21/2018--> Rescheduled     11/05/2019--> No Show/Reschedule  06/24/2020--> No Show/Reschedule  09/24/2020--> No Show/Reschedule  03/15/2021--> No Show/Reschedule  03/23/2021--> No Show/Reschedule  04/13/2021--> No Show  05/27/2021--> No Show  08/19/2021--> No Show  08/26/2021--> No Show/Rescheduled  04/26/2022--> No Show  10/03/2022--> Rescheduled  10/11/2022--> Rescheduled  03/22/2023--> No Show/Rescheduled  4/26/2023->Rescheduled  05/09/2023->Rescheduled    Referring Physician: DR. Cardona  PCP: Dr. Chacon  GI:   Rheumatologist: Dr. Luis Rea  Immunologist: Dr. Daniel Nava  ENT: Dr. Brice Williamson  [facility] Pain management: Dr.Chai MD Rivera Transplant: Dr. Adrian Naylor [facility] Med Onc: Dr. Zulema Rivera Breast Surg Onc: Dr.DeSnyder [facility]: Dr. 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   8) Stage IA grade 3, ER+, HER2 BERNA +,  IDC/DCIS of the left breast --- 2/7/18 at [facility]   --s/p lumpectomy with SLNB 4/12, Grade 2 IDC 4mm with second focus of microinvasive carcinoma 0.4mm. 2 SLN negative for malignancy    9) Progressive swelling of R lower extremity--- US NIVA done 2/19/18 negative for evidence of DVT  10). Paroxysmal Afib (2/18/18) diagnosed at [facility]; ECHO noted EF 58% - on Eliquis  11) Mild CKD with GFR 55 - managed per [facility] nephrology  12) Treatment induced  neutropenia     Present treatment:  -Maintenance Revlimid 5mg PO QOD, started 02/16/17-- was held for a few months while undergoing treatment for her breast cancer 02/18-05/18; Restarted 6/15/18   Dexamethasone 20 mg po weekly added early July 2017--> reduced to 12 mg PO weekly October 4, 2017---> increased to 16mg PO weekly 2/15/18---restarted 6/15/18 --> 20 mg weekly 7//8/2022  Darzalex 7/8/2022-present  Femara 2.5 mg PO daily   Eliquis 2.5mg PO BID      Treatment/Oncology history:  1) CyBorD x5 cycles 09/28/15-12/24/15  2) Autologous stem cell transplant 02/08/16, done at [facility]  3) Exploratory laparotomy with subtotal colectomy and end ileostomy done August 2, 2016--pathology specimen showed advanced colonic amyloidosis extensive involving the muscular wall submucosa and mucosa.  Appendix also involvement by amyloidosis with fibrous obliteration of the distal lumen.  4) Maintenance therapy with Revlimid 5mg-started 06/01/16--Discontinued shortly after 2/2 cytopenias  5) Left breast lumpectomy with SLNB at [facility] 4/12/18  6) Left partial breast RT x10 fractions  5/21/18-6/4/1      Imaging:  NIVA 7/29/2021: Negative for deep venous thrombosis in the left lower extremity.   MMG 3/21/2022: BI-RADS Category 2: Benign Finding(s)  MRI CTL spine 9/14/2022: No acute myelomatous findings. Severe spinal stenosis process detected within the upper cervical spine as well as the entirety of the lumbar spine segments similar to the prior imaging assessment.    CT C 1/24/2023: Right greater than left lower lobe opacification consistent with infectious process.  Lucent lesions throughout the osseous structures similar to 2015.    Pathology:    CLINICAL HISTORY:       Patient: Ninfa Candelario is a 57 y.o. female.  Ms. Cabrera Joseph was admitted on 08/16/15 for ileus versus partial SBO. CT A/P done 08/17/15 showed sigmoid colitis and a zone of transition at the junction of the descending and sigmoid colon which could indicate  some degree of obstruction and an obstructing mass cannot be excluded. Numerous skeletal lytic lesions noted. CT chest done 08/19/15 showed Multiple skeletal lytic lesions involving the thoracic spine, left rib sternum consistent with either metastases or multiple myeloma. There is no evidence of pulmonary or mediastinal mass. Dr. Farr was consulted for possible MM/anemia.     Nuclear Bone scan done 8/20/15 showed mild to moderate increased activity in left rib and right second rib which correlates with fractures seen on CT.  Skeletal survey done 8/20/15showed lytic lesions in the calvarium and probably a lytic lesion in the left scapula. Lytic areas in the spine and pelvis seen on recent CT are not well defined on skeletal survey. Work up labs done 08/20/15: Negative for sickle cell and Thalessemia (reported history of thalessemia). Iron 54, Transferrin 118.0, Iron sat 34.6%, Folate 26.5, Vit B12 1,779  Peripheral smear resulted slightly macrocytic normochromic anemia without signifcant anisocytosis may reflect early B12/folate deficiency or marrow dysfunction, among others. No immature myeloid cells or blasts. Platlets adequate. Beta 2 mircoglobulin = 3.2.  SPEP/NADIA: M-spike in the beta region. The monoclonal protein peak accounts for 1.13 g/dL. Hypogammaglobulinemia. NADIA pattern shows the presence of a free lambda light chain monoclonal protein with additional faint band in IgA.  All immunoglobulin levels decreased. IgA=26, IgG=307, IgM<5.  Kappa Qnt 0.16, Lambda Qnt 4600.00, Ratio <0.01.  24hr Urine for Bence Mendez: Kappa 2.75, Lambda 1250.00, Ratio <0.01. NADIA: Urine is POSITIVE for monoclonal Free Lambda Light chains     Patient then [facility] where she underwent a bone marrow biopsy on 09/18/15. BMBx showed 80% plasma cells, 13p deletion and normal karyotype. She underwent fat pad biopsy which came back positive for Congo red consistent with amyloidosis. Cardiac workup revealed no amyloid  deposition within the heart.  PET/CT and skeletal survey done September 18, 2015 showed multiple lytic osseous lesions  The patient was started on Velcade while at [facility] with the plan to transition to autologous stem cell transplantation. They asked if we could give her could continue treatment here.   She completed CyborD x5 cycles on 12/24/15     Patient admitted 01/06/16 for colitis and C. Diff. Pt treated with Flagyl. Discharged home 01/08/16     Repeat BMBx done at [facility] 01/2016 did not show any morphologic or immnophenotypic evidence of plasama cell neoplasm. Patient underwent Autologous stem cell transplant with Busulfan and melphalan on 02/08/16 at [facility]. The only complication was recurrent C.diff infection for which she completed 10 days of Fidaxomycin.     Follow-up bone marrow biopsy done at [facility] done 5/16/16 showed cellular 30-40% bone marrow with trilineage hematopoiesis. No morphologic or immunophenotypic support for plasma cell neoplasm. Started maintenance Revlimid 5mg. Unable to continue therapy due to cytopenias.     On 08/02/16 patient underwent  Exploratory laparotomy with subtotal colectomy and end ileostomy for what was thought to be toxic megacolon. Pathology showed extensive advanced colonic amyloidosis involving the muscular wall, submucosa and mucosa: With the appendix also involved with amyloidosis.  Positive lambda light chains were noted.     Follow-up at [facility] 8/31/16, Per Dr. Adrian Naylor, 6 months post autologous transplant. She has engrafted. Based on the latest restaging she is near complete remission with possible IgA lambda on serum immunofixation. Patient was instructed to discontinue prophylactic antibiotics. She received her 1st series of immunizations while at [facility]. Patient had a bilateral venous ultrasound to rule out DVT, negative B/L. In regards to amyloidosis the patient feels that her tongue is smaller in size and her BNP has come down some.  Patient had a  follow-up appointment at [facility] November 30, 2016.  Dr. Parnell documented the patient's free lambda light chain remains at a lower level.  Therefore in light of her recent surgery they will continue with observation only.  The plan to see the patient back in 2-3 months with repeat restaging labs.  They recommended regular CBC checks to monitor anemia.  Patient returned to [facility] in December 2016 to meet with dermatology for numerous papillary lesions on eyelids, chest and posterior neck consistent with amyloid deposits. Recommendations were for watchful waiting.  Patient is less than 1 year out from bone marrow transplant and further improvement can be expected.  She will see the patient back in 1 year to discuss possible surgical resection of the plaques especially around the eyelid region.  Rogaine recommended for persistent hair loss. Retin-A recommended for acne vulgaris.  Patient returned to [facility] on 2/8/2017 for neurosurgery consult for chronic low back pain and difficulty walking.  Imaging showed extensive DJD with discovertebral bulges and thickening of the spinal laminar tissues creating spinal stenosis throughout, but greatest at L2/L3.  Neurosurgery felt that surgery risks outweighed the benefits.  Patient was prescribed pain medicine and encouraged to participate in physical therapy.  Patient also met with Dr. Parnell on 02/08/17, who noted an elevation in free light chains (kappa 52.60/lambda 27.77/ratio 1.89).  Recommendation is to resume maintenance therapy with Revlimid at a low dose of 5 mg every other day.  Patient went back to [facility] first week of April 2017.  I do not have these notes.  Reportedly she was told to continue on every other day Revlimid at 5 mg.  She reportedly had repeat myeloma labs done as well.  Again I do not have these results.  Patient went back to [facility] and saw Dr. Parnell June 29, 2017.  Myeloma labs were done with serum protein electrophoresis showing  irregularity in the fast gamma region.  IgG of 1291.  Free kappa light chains of 84.6 with lambda light chains of 66.9.  Beta-2 microglobulin of 4.5  CT scan of lumbar spine showed multiple lytic lesions once again in the lumbar spine and bony pelvis.  Ultrasound of the left leg showed no evidence of DVT.  It was recommended based on the slight increase in her And lambda light chains to start weekly dexamethasone 20 mg p.o. weekly.  Follow-up visit and labs at [facility] on September 29, 2017 with Dr. Parnell.  Repeat beta-2 microglobulin came back at 2.4.  Serum IgG of 761, IgA 117 and IgM of 29.  Serum free kappa light chains of 24.9 and lambda of 23.5.  Counts were stable with hemoglobin of 11.1, WBC 4.9 and platelets of 210,000.  Recommendations were for continued Revlimid every other day with weekly dexamethasone along with aspirin for DVT prophylaxis.  Bilateral diagnostic MMG 12/19/17  noted 1.6cm coarse heterogeneous calcifications in posterior region of L breast at 3 o'clock position. Patient was scheduled for FNA which confirmed ID grade 3, single focus measuring 1.7cm with 2nd focus microinvasion DCIS grade 2 measuring 0.3cm. Left axillary LN biopsy showed no evidence of metastatic carcinoma. Complete staging: Stage IA, grade 3 ER+, Her 2 Niki + IDC/DCIS of left breast. Ki-67 <17%.  Repeat myeloma labs showed rise in free lambda light chains noted at 68.69, kappa light chains at 27.22,  beta 2 microglobulin came back at 2.9. Serum protein electrophoresis showed no definitive evidence of an M protein peak. The pattern is consistent with an acute inflammatory reaction. Recommendations were made to maintain Revlimid at current dose of 5mg every other day to be taken continuously increase weekly dexamethasone to 16 mg.   Patient seen at [facility] with tentative plan for L breast lumpectomy on 3/13/18. 2/16/18- Prior to surgery she was seen by genetic counselor who ran genetic testing per patient reques, all  of which were negative. She was then seen by cardiology at [facility] 2/16/18 for further evaluation of persistent bilateral lower extremity swelling-ECHO noted EF of 58%; diagnosed with paroxysmal atrial fibrillation and instructed to begin daily ASA. During preoperative asssessment per Rad/Onc 3/12/18, corresponding chest CT noted new bilateral pulmonary nodules concerning for metastatic disease- surgery was subsequently postponed pending pulmonary evaluation. Seen by Pulmonology on 3/21/18, PFTs within normal limits and cleared patient for surgery with recommended close CT follow up of nodules in 3 months. 3/21/18 seen by nephrology for management of mild CKD (GFR 55)-cleared for surgery with recommended follow up in 3 months. Left breast lumpectomy and SLNB performed per Dr. Washburn on 4/12/18- plan for follow up in clinic on 4/24/18  for postoperative assessment. Follow up with Dr. Poole (Med/Onc) on 4/25/18. Follow up with Dr. Parnell 4/26/18.   Patient seen at [facility] s/p Left breast lumpectomy with SLNB on 4/12/18. Following surgery she completed Left partial breast radiation x 10 fractions. She was then started on endocrine therapy with Femara after been deemedan inappropriate candidate for systemic chemotherapy/Herceptin.      She was seen by neurosugery at [facility] on 4/26/18 following repeat MRI of cervical thoracic lumbar spine which noted focal enhancement of T2 hyperintensity at the C2 level which may be due to degenerative changes. Signal abnormality within  the T12 and L1 may be secondary to myeloma. Plan to continue with observation for now with F/U scheduled in October 2018.      She was seen by Dr. Parnell at [facility] for management of her MM on 4/26/18. Patient was recommended to restart Revlimid 5mg PO every other day with notes that these recommendations would be communicated to collaborating Oncologist. Patient was also recommended to start on Zometa for her bone disease.      Seen by   Parmjit at [facility] for management of her MM on 6/28/18. Repeat light chains noted lambda 33.36 (previously 80.80), K/L ratio 0.91 (previously 0.49). Due to slight improvement in light chains, plan to continue on lenalidomide maintenance. Recommendations to continue anticoagulation with Eliquis. BLE venous doppler negative for DVT.   Seen by pulmonolgy on 6/28/18- repeat CT chest done 6/28/18 noted resolution of previous pulmonary nodules. Thought to be infectious in nature. Recommendations for discharge from pulmonary clinic.  Should MRI of her cervical thoracic lumbar spine on 2/12/2019 that demonstrated cervical spondylosis with canal stenosis most notable at C1 and 2. Cord signal abnormality at C1 and 2 with enhancement is stable. Lumbar spondylosis with central canal stenosis at multiple levels. No imaging features of bony metastatic disease.     For amyloidosis (Light chain type).    Patient presented with macroglossia and now with improvement of the swelling of her tongue. Her cardiac parameters are also better.   8/10/2020 proBNP  250   2/17/2020                616  8/9/2019                  190        Chief Complaint: OTHER (PT states she coughing a lot and her LT leg is swollen.)        Interval History:  Patient presents today for TD and labs for daratumumab. She was due on 4/26/23, but she rescheduled due to illness.  She is currently on Femara for breast cancer and Rev/Dex/Darzalex  for MM  s/p transplant in 2016. At her [facility] visit on 10/12/22, Ninlaro was discontinued due to it causing severe diarrhea and leukopenia. Diarrhea resolved after stopping Ninlaro (she admits to stopping prior to visit @ [facility]).     She reports occasional n/v and diarrhea with Revlimid, but not severe, is tolerable. She c/o swelling and pain to legs. During physical exam, BLE swellling noted, L>R, left leg red with small fluid filled blisters. Otherwise, she is doing well. No fever, chills or sweats.  No chest pain or  "shortness of breath.  She uses a walker for mobility.  She continues to do her mammograms and other imaging studies at [facility]. She also follows with nephrologist @ [facility].         ROS: All 14 points ROS taken and as per Interval History  Review of Systems   Constitutional:  Positive for malaise/fatigue. Negative for chills, fever and weight loss.   HENT:  Negative for congestion and nosebleeds.    Eyes:  Negative for blurred vision, double vision and photophobia.   Respiratory:  Negative for cough, hemoptysis and shortness of breath.    Cardiovascular:  Negative for chest pain, palpitations, leg swelling and PND.   Gastrointestinal:  Positive for abdominal pain and diarrhea. Negative for blood in stool, constipation, melena, nausea and vomiting.   Genitourinary:  Negative for dysuria, frequency, hematuria and urgency.   Musculoskeletal:  Positive for back pain. Negative for falls and myalgias.   Skin:  Negative for itching and rash.   Neurological:  Positive for weakness. Negative for tremors, focal weakness, seizures and headaches.   Endo/Heme/Allergies:  Negative for environmental allergies. Does not bruise/bleed easily.   Psychiatric/Behavioral:  Negative for depression and suicidal ideas. The patient is not nervous/anxious.        Histories:  PMH/PSH/FH/SOCIAL/ALLERGIES AND MEDS REVIEWED AND UPDATED AS APPROPRIATE       Vitals:    05/18/23 1320   BP: (!) 164/84   BP Location: Left arm   Patient Position: Sitting   Pulse: 82   Temp: 97.5 °F (36.4 °C)   TempSrc: Oral   SpO2: 96%   Weight: 83.9 kg (185 lb)   Height: 5' 3.4" (1.61 m)              Physical Exam  Vitals and nursing note reviewed.   Constitutional:       General: She is not in acute distress.     Appearance: Normal appearance. She is well-developed. She is ill-appearing.      Comments: Uses walker for mobility   HENT:      Head: Normocephalic and atraumatic.      Mouth/Throat:      Mouth: Mucous membranes are moist.   Eyes:      General: No " scleral icterus.     Extraocular Movements: Extraocular movements intact.      Conjunctiva/sclera: Conjunctivae normal.      Pupils: Pupils are equal, round, and reactive to light.   Neck:      Vascular: No JVD.   Cardiovascular:      Rate and Rhythm: Normal rate and regular rhythm.      Heart sounds: No murmur heard.  Pulmonary:      Effort: Pulmonary effort is normal.      Breath sounds: Normal breath sounds. No wheezing or rhonchi.   Abdominal:      General: Bowel sounds are normal. There is no distension.      Palpations: Abdomen is soft. There is no mass.      Tenderness: There is no abdominal tenderness.   Musculoskeletal:         General: No swelling or deformity.      Cervical back: Neck supple.   Lymphadenopathy:      Head:      Right side of head: No submandibular adenopathy.      Left side of head: No submandibular adenopathy.      Cervical: No cervical adenopathy.      Upper Body:      Right upper body: No supraclavicular or axillary adenopathy.      Left upper body: No supraclavicular or axillary adenopathy.      Lower Body: No right inguinal adenopathy. No left inguinal adenopathy.   Skin:     General: Skin is warm.      Coloration: Skin is not jaundiced.      Findings: No lesion or rash.      Nails: There is no clubbing.   Neurological:      General: No focal deficit present.      Mental Status: She is alert and oriented to person, place, and time.      Cranial Nerves: Cranial nerves 2-12 are intact.      Motor: Weakness present.      Gait: Gait abnormal.   Psychiatric:         Attention and Perception: Attention normal.         Behavior: Behavior is cooperative.         Cognition and Memory: Cognition normal.         Judgment: Judgment normal.   Answers submitted by the patient for this visit:  Review of Systems Questionnaire (Submitted on 3/22/2023)  appetite change : No  unexpected weight change: No  mouth sores: No  visual disturbance: No  adenopathy: No  ECOG SCORE    2 - Capable of all selfcare  but unable to carry out any work activities, active > 50% of hours         Laboratory:  CBC with Differential:  Lab Results   Component Value Date    WBC 4.34 (L) 05/18/2023    RBC 3.04 (L) 05/18/2023    HGB 9.0 (L) 05/18/2023    HCT 29.4 (L) 05/18/2023    MCV 96.7 (H) 05/18/2023    MCH 29.6 05/18/2023    MCHC 30.6 (L) 05/18/2023    RDW 14.4 05/18/2023     05/18/2023    MPV 9.2 05/18/2023        CMP:  Sodium Level   Date Value Ref Range Status   05/18/2023 142 136 - 145 mmol/L Final     Potassium Level   Date Value Ref Range Status   05/18/2023 3.9 3.5 - 5.1 mmol/L Final     Carbon Dioxide   Date Value Ref Range Status   05/18/2023 24 22 - 29 mmol/L Final     Blood Urea Nitrogen   Date Value Ref Range Status   05/18/2023 35.2 (H) 9.8 - 20.1 mg/dL Final     Creatinine   Date Value Ref Range Status   05/18/2023 2.69 (H) 0.55 - 1.02 mg/dL Final     Calcium Level Total   Date Value Ref Range Status   05/18/2023 9.2 8.4 - 10.2 mg/dL Final     Albumin Level   Date Value Ref Range Status   05/18/2023 3.2 (L) 3.5 - 5.0 g/dL Final     Bilirubin Total   Date Value Ref Range Status   05/18/2023 0.2 <=1.5 mg/dL Final     Alkaline Phosphatase   Date Value Ref Range Status   05/18/2023 83 40 - 150 unit/L Final     Aspartate Aminotransferase   Date Value Ref Range Status   05/18/2023 23 5 - 34 unit/L Final     Alanine Aminotransferase   Date Value Ref Range Status   05/18/2023 14 0 - 55 unit/L Final     Estimated GFR-Non    Date Value Ref Range Status   04/20/2022 25           Assessment:       1) Multiple Myeloma, IgA Lambda, FISH with del 13p, normal karyotype, ISS stg II Dx 2015; S/p Autologous stem cell transplant 02/08/16-remission-->slight rise in free light chains 02/08/17  2) Amyloidosis, Lambda light chain type, organ involvement with macroglossia and colon involvement. 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 Sx risks do not outweigh benefits. Pain meds given and encouraged Physical Therpay  8) Amyloid plaques, evaluated by Derm at [facility], recommend watchful waiting. Patient to follow up 12/2017  9) Stage IA grade 3, ER+, HER2 BERNA +,  IDC/DCIS of the left breast --- 2/7/18 at [facility]; s/p lumpectomy with SLNB 4/12, Grade 2 IDC measuring 4mm with second focus of microinvasive carcinoma measuring  0.4mm .IG DCIS, 0.3mm to posterior margin; 2 SLN negative for malignancy    10) Progressive swelling of R lower extremity--- US NIVA done 2/19/18 negative for evidence of DVT  11). Paroxysmal Afib (2/18/18) diagnosed at [facility]; ECHO noted EF 58%  12. Mild CKD with GFR 55 - managed per [facility] nephrology  13). Pulmonary nodules noted on pre-operative chest CT scan (3/12/18) noted new bilateral pulmonary nodules are nonspecific -- seen by Pulmonology at [facility] (3/21/18) thought to be inflammatory/infectious in nature, repeat Chest CT 6/28/18 noted resolution of nodules  14). Treatment induced neutropenia        Plan:       Patient with stable disease.  We continue present treatment and management.    Okay for daratumumab q4 weeks dosing today  Zometa is on hold for now as she is undergoing dental work at [facility]   Continue low dose Revlimid 5 mg q other day with Dexamethasone 20 mg weekly   Continue potassium  Pain medications are refilled by her pain management doctor at [facility]  Continue Eliquis  Will add CMP to labs today to evaluate kidney function  Will order NIVA study of left leg to r/o DVT TODAY - scheduled for 3 pm, patient notified - will call if abnormal  Will order IV Rocephin 1 gram daily x 7 days @ [facility] in [place] - orders faxed - called facility, will not be able to schedule until 5/22/23 due to staffing issues, MD aware. Patient called and  instructed to go to ER if she begins to experience fever or if pain or swelling worsens.  RTC on 5/30/23 with NP after IV Rocephin to evaluate left leg  RTC in 4 weeks for TD with MM labs (no urine), same day infusion  Urine every other month - due 7/2023  Mammograms and imaging studies done at [facility]  Keep appointments at [facility]    Encourage to call or message us for any questions or problems  The patient was given ample opportunity to ask questions, and to the best of my abilities, all questions answered to satisfaction; patient demonstrated understanding of what we discussed and agreeable to the plan.     Natalie Meyers MD  Hematology/Oncology      Professional Services   I, Jagruti Booker LPN, acted solely as a scribe for and in the presence of Dr. Natalie Meyers, who performed these services.

## 2023-05-19 DIAGNOSIS — L03.116 CELLULITIS OF LEFT LOWER EXTREMITY: ICD-10-CM

## 2023-05-19 RX ORDER — HEPARIN 100 UNIT/ML
500 SYRINGE INTRAVENOUS
Status: CANCELLED | OUTPATIENT
Start: 2023-05-22

## 2023-05-19 RX ORDER — SODIUM CHLORIDE 0.9 % (FLUSH) 0.9 %
10 SYRINGE (ML) INJECTION
Status: CANCELLED | OUTPATIENT
Start: 2023-05-22

## 2023-05-22 DIAGNOSIS — C90.00 MULTIPLE MYELOMA, REMISSION STATUS UNSPECIFIED: ICD-10-CM

## 2023-05-22 RX ORDER — POTASSIUM CHLORIDE 750 MG/1
10 TABLET, EXTENDED RELEASE ORAL 2 TIMES DAILY
Qty: 180 TABLET | Refills: 3 | Status: SHIPPED | OUTPATIENT
Start: 2023-05-22 | End: 2024-02-07 | Stop reason: SDUPTHER

## 2023-05-24 ENCOUNTER — INFUSION (OUTPATIENT)
Dept: INFUSION THERAPY | Facility: HOSPITAL | Age: 58
End: 2023-05-24
Attending: INTERNAL MEDICINE
Payer: MEDICARE

## 2023-05-24 VITALS
RESPIRATION RATE: 20 BRPM | TEMPERATURE: 98 F | BODY MASS INDEX: 32.35 KG/M2 | WEIGHT: 184.94 LBS | OXYGEN SATURATION: 95 % | HEART RATE: 104 BPM | DIASTOLIC BLOOD PRESSURE: 70 MMHG | SYSTOLIC BLOOD PRESSURE: 112 MMHG

## 2023-05-24 DIAGNOSIS — L03.116 CELLULITIS OF LEFT LOWER EXTREMITY: Primary | ICD-10-CM

## 2023-05-24 LAB — MAYO GENERIC ORDERABLE RESULT: NORMAL

## 2023-05-24 PROCEDURE — 63600175 PHARM REV CODE 636 W HCPCS: Performed by: INTERNAL MEDICINE

## 2023-05-24 PROCEDURE — 25000003 PHARM REV CODE 250: Performed by: INTERNAL MEDICINE

## 2023-05-24 PROCEDURE — 96365 THER/PROPH/DIAG IV INF INIT: CPT

## 2023-05-24 PROCEDURE — A4216 STERILE WATER/SALINE, 10 ML: HCPCS | Performed by: INTERNAL MEDICINE

## 2023-05-24 RX ORDER — SODIUM CHLORIDE 0.9 % (FLUSH) 0.9 %
10 SYRINGE (ML) INJECTION
Status: DISCONTINUED | OUTPATIENT
Start: 2023-05-24 | End: 2023-06-08

## 2023-05-24 RX ORDER — SODIUM CHLORIDE 0.9 % (FLUSH) 0.9 %
10 SYRINGE (ML) INJECTION
Status: CANCELLED | OUTPATIENT
Start: 2023-05-24

## 2023-05-24 RX ORDER — HEPARIN 100 UNIT/ML
500 SYRINGE INTRAVENOUS
Status: CANCELLED | OUTPATIENT
Start: 2023-05-24

## 2023-05-24 RX ORDER — HEPARIN 100 UNIT/ML
500 SYRINGE INTRAVENOUS
Status: DISCONTINUED | OUTPATIENT
Start: 2023-05-24 | End: 2023-06-08

## 2023-05-24 RX ADMIN — CEFTRIAXONE SODIUM 1 G: 1 INJECTION, POWDER, FOR SOLUTION INTRAMUSCULAR; INTRAVENOUS at 12:05

## 2023-05-24 RX ADMIN — Medication 30 ML: at 01:05

## 2023-05-25 ENCOUNTER — INFUSION (OUTPATIENT)
Dept: INFUSION THERAPY | Facility: HOSPITAL | Age: 58
End: 2023-05-25
Attending: INTERNAL MEDICINE
Payer: MEDICARE

## 2023-05-25 VITALS
DIASTOLIC BLOOD PRESSURE: 74 MMHG | WEIGHT: 184.94 LBS | RESPIRATION RATE: 20 BRPM | OXYGEN SATURATION: 97 % | TEMPERATURE: 98 F | HEART RATE: 93 BPM | SYSTOLIC BLOOD PRESSURE: 143 MMHG | BODY MASS INDEX: 32.35 KG/M2

## 2023-05-25 DIAGNOSIS — L03.116 CELLULITIS OF LEFT LOWER EXTREMITY: Primary | ICD-10-CM

## 2023-05-25 PROCEDURE — 25000003 PHARM REV CODE 250: Performed by: INTERNAL MEDICINE

## 2023-05-25 PROCEDURE — 96365 THER/PROPH/DIAG IV INF INIT: CPT

## 2023-05-25 PROCEDURE — A4216 STERILE WATER/SALINE, 10 ML: HCPCS | Performed by: INTERNAL MEDICINE

## 2023-05-25 PROCEDURE — 63600175 PHARM REV CODE 636 W HCPCS: Performed by: INTERNAL MEDICINE

## 2023-05-25 RX ORDER — HEPARIN 100 UNIT/ML
500 SYRINGE INTRAVENOUS
Status: DISCONTINUED | OUTPATIENT
Start: 2023-05-25 | End: 2023-06-08

## 2023-05-25 RX ORDER — SODIUM CHLORIDE 0.9 % (FLUSH) 0.9 %
10 SYRINGE (ML) INJECTION
Status: DISCONTINUED | OUTPATIENT
Start: 2023-05-25 | End: 2024-02-29 | Stop reason: HOSPADM

## 2023-05-25 RX ORDER — HEPARIN 100 UNIT/ML
500 SYRINGE INTRAVENOUS
Status: CANCELLED | OUTPATIENT
Start: 2023-05-25

## 2023-05-25 RX ORDER — SODIUM CHLORIDE 0.9 % (FLUSH) 0.9 %
10 SYRINGE (ML) INJECTION
Status: CANCELLED | OUTPATIENT
Start: 2023-05-25

## 2023-05-25 RX ADMIN — CEFTRIAXONE 1 G: 1 INJECTION, SOLUTION INTRAVENOUS at 11:05

## 2023-05-25 RX ADMIN — Medication 30 ML: at 12:05

## 2023-05-25 RX ADMIN — Medication 10 ML: at 11:05

## 2023-05-26 ENCOUNTER — INFUSION (OUTPATIENT)
Dept: INFUSION THERAPY | Facility: HOSPITAL | Age: 58
End: 2023-05-26
Attending: INTERNAL MEDICINE
Payer: MEDICARE

## 2023-05-26 VITALS
WEIGHT: 184.94 LBS | OXYGEN SATURATION: 97 % | SYSTOLIC BLOOD PRESSURE: 116 MMHG | DIASTOLIC BLOOD PRESSURE: 68 MMHG | RESPIRATION RATE: 18 BRPM | HEART RATE: 99 BPM | BODY MASS INDEX: 32.35 KG/M2 | TEMPERATURE: 98 F

## 2023-05-26 DIAGNOSIS — L03.116 CELLULITIS OF LEFT LOWER EXTREMITY: Primary | ICD-10-CM

## 2023-05-26 DIAGNOSIS — C50.819 OVERLAPPING MALIGNANT NEOPLASM OF FEMALE BREAST, UNSPECIFIED ESTROGEN RECEPTOR STATUS, UNSPECIFIED LATERALITY: ICD-10-CM

## 2023-05-26 DIAGNOSIS — C90.01 MULTIPLE MYELOMA IN REMISSION: Primary | ICD-10-CM

## 2023-05-26 DIAGNOSIS — C90.00 MULTIPLE MYELOMA, REMISSION STATUS UNSPECIFIED: ICD-10-CM

## 2023-05-26 PROCEDURE — A4216 STERILE WATER/SALINE, 10 ML: HCPCS | Performed by: INTERNAL MEDICINE

## 2023-05-26 PROCEDURE — 63600175 PHARM REV CODE 636 W HCPCS: Performed by: INTERNAL MEDICINE

## 2023-05-26 PROCEDURE — 25000003 PHARM REV CODE 250: Performed by: INTERNAL MEDICINE

## 2023-05-26 PROCEDURE — 96365 THER/PROPH/DIAG IV INF INIT: CPT

## 2023-05-26 RX ORDER — SODIUM CHLORIDE 0.9 % (FLUSH) 0.9 %
10 SYRINGE (ML) INJECTION
Status: DISCONTINUED | OUTPATIENT
Start: 2023-05-26 | End: 2024-02-29 | Stop reason: HOSPADM

## 2023-05-26 RX ORDER — HEPARIN 100 UNIT/ML
500 SYRINGE INTRAVENOUS
Status: DISCONTINUED | OUTPATIENT
Start: 2023-05-26 | End: 2023-06-08

## 2023-05-26 RX ORDER — LENALIDOMIDE 5 MG/1
5 CAPSULE ORAL EVERY OTHER DAY
Qty: 14 EACH | Refills: 0 | Status: SHIPPED | OUTPATIENT
Start: 2023-05-26 | End: 2023-06-23 | Stop reason: SDUPTHER

## 2023-05-26 RX ORDER — SODIUM CHLORIDE 0.9 % (FLUSH) 0.9 %
10 SYRINGE (ML) INJECTION
Status: CANCELLED | OUTPATIENT
Start: 2023-05-26

## 2023-05-26 RX ORDER — HEPARIN 100 UNIT/ML
500 SYRINGE INTRAVENOUS
Status: CANCELLED | OUTPATIENT
Start: 2023-05-26

## 2023-05-26 RX ADMIN — Medication 20 ML: at 10:05

## 2023-05-26 RX ADMIN — Medication 10 ML: at 09:05

## 2023-05-26 RX ADMIN — CEFTRIAXONE SODIUM 1 G: 1 INJECTION, POWDER, FOR SOLUTION INTRAMUSCULAR; INTRAVENOUS at 10:05

## 2023-05-26 NOTE — PROGRESS NOTES
Patient informed that house supervisor would be giving her weekend infusions. Patient given map with directions to outpatient registration. Patient instructed to check in with , then house supervisor would meet her there, and escort her to room where she will be given her Rocephin infusion. Patient verbalizes understanding.

## 2023-05-27 ENCOUNTER — INFUSION (OUTPATIENT)
Dept: INFUSION THERAPY | Facility: HOSPITAL | Age: 58
End: 2023-05-27
Attending: INTERNAL MEDICINE
Payer: MEDICARE

## 2023-05-27 VITALS
HEART RATE: 95 BPM | RESPIRATION RATE: 16 BRPM | TEMPERATURE: 98 F | OXYGEN SATURATION: 99 % | DIASTOLIC BLOOD PRESSURE: 65 MMHG | SYSTOLIC BLOOD PRESSURE: 110 MMHG

## 2023-05-27 RX ORDER — HEPARIN 100 UNIT/ML
500 SYRINGE INTRAVENOUS
Status: CANCELLED | OUTPATIENT
Start: 2023-05-27

## 2023-05-27 RX ORDER — SODIUM CHLORIDE 0.9 % (FLUSH) 0.9 %
10 SYRINGE (ML) INJECTION
Status: CANCELLED | OUTPATIENT
Start: 2023-05-27

## 2023-05-27 NOTE — PROGRESS NOTES
Pt brought to outpt surgery room 3. Vitals obtained. TEMP 98.0 HR 95 RR 16 SPO2 99% /65. 3 attempts made for IV access. 24 gauge to the right forearm was obtained. Rocephin 1gm in 50ml D5W started at 1100. Infusion completed at 1130. IV flushed with 10ml NS. IV discontinued. Pt DC at 1136.

## 2023-05-28 ENCOUNTER — INFUSION (OUTPATIENT)
Dept: INFUSION THERAPY | Facility: HOSPITAL | Age: 58
End: 2023-05-28
Attending: INTERNAL MEDICINE
Payer: MEDICARE

## 2023-05-28 RX ORDER — SODIUM CHLORIDE 0.9 % (FLUSH) 0.9 %
10 SYRINGE (ML) INJECTION
Status: CANCELLED | OUTPATIENT
Start: 2023-05-28

## 2023-05-28 RX ORDER — HEPARIN 100 UNIT/ML
500 SYRINGE INTRAVENOUS
Status: CANCELLED | OUTPATIENT
Start: 2023-05-28

## 2023-05-28 NOTE — PROGRESS NOTES
Pt arrived and placed in Outpt surgery room 3. Vitals obtained. /76 HR 75 RR 16 SPO2 96% TEMP 98.1. Two attempts made to start IV. 24 gauge place in the left forearm. Pt tolerated well. 1 gram Rocephin in 50ML D5W was started at 1030. Infusion completed at 1100. IV discontinued. Pt DC.

## 2023-05-29 ENCOUNTER — INFUSION (OUTPATIENT)
Dept: INFUSION THERAPY | Facility: HOSPITAL | Age: 58
End: 2023-05-29
Attending: INTERNAL MEDICINE
Payer: MEDICARE

## 2023-05-29 VITALS
RESPIRATION RATE: 18 BRPM | SYSTOLIC BLOOD PRESSURE: 121 MMHG | DIASTOLIC BLOOD PRESSURE: 83 MMHG | TEMPERATURE: 98 F | OXYGEN SATURATION: 95 % | BODY MASS INDEX: 32.35 KG/M2 | WEIGHT: 184.94 LBS | HEART RATE: 95 BPM

## 2023-05-29 DIAGNOSIS — L03.116 CELLULITIS OF LEFT LOWER EXTREMITY: Primary | ICD-10-CM

## 2023-05-29 PROCEDURE — 63600175 PHARM REV CODE 636 W HCPCS: Performed by: INTERNAL MEDICINE

## 2023-05-29 PROCEDURE — 96365 THER/PROPH/DIAG IV INF INIT: CPT

## 2023-05-29 PROCEDURE — 25000003 PHARM REV CODE 250: Performed by: INTERNAL MEDICINE

## 2023-05-29 PROCEDURE — A4216 STERILE WATER/SALINE, 10 ML: HCPCS | Performed by: INTERNAL MEDICINE

## 2023-05-29 RX ORDER — HEPARIN 100 UNIT/ML
500 SYRINGE INTRAVENOUS
Status: CANCELLED | OUTPATIENT
Start: 2023-05-29

## 2023-05-29 RX ORDER — HEPARIN 100 UNIT/ML
500 SYRINGE INTRAVENOUS
Status: DISCONTINUED | OUTPATIENT
Start: 2023-05-29 | End: 2023-06-08

## 2023-05-29 RX ORDER — SODIUM CHLORIDE 0.9 % (FLUSH) 0.9 %
10 SYRINGE (ML) INJECTION
Status: CANCELLED | OUTPATIENT
Start: 2023-05-29

## 2023-05-29 RX ORDER — SODIUM CHLORIDE 0.9 % (FLUSH) 0.9 %
10 SYRINGE (ML) INJECTION
Status: DISCONTINUED | OUTPATIENT
Start: 2023-05-29 | End: 2024-02-29 | Stop reason: HOSPADM

## 2023-05-29 RX ADMIN — CEFTRIAXONE SODIUM 1 G: 1 INJECTION, POWDER, FOR SOLUTION INTRAMUSCULAR; INTRAVENOUS at 12:05

## 2023-05-29 RX ADMIN — Medication 30 ML: at 12:05

## 2023-05-29 RX ADMIN — Medication 10 ML: at 11:05

## 2023-05-30 ENCOUNTER — INFUSION (OUTPATIENT)
Dept: INFUSION THERAPY | Facility: HOSPITAL | Age: 58
End: 2023-05-30
Attending: INTERNAL MEDICINE
Payer: MEDICARE

## 2023-05-30 VITALS
SYSTOLIC BLOOD PRESSURE: 162 MMHG | WEIGHT: 184.94 LBS | BODY MASS INDEX: 32.35 KG/M2 | DIASTOLIC BLOOD PRESSURE: 73 MMHG | OXYGEN SATURATION: 95 % | RESPIRATION RATE: 18 BRPM | HEART RATE: 88 BPM | TEMPERATURE: 98 F

## 2023-05-30 DIAGNOSIS — L03.116 CELLULITIS OF LEFT LOWER EXTREMITY: Primary | ICD-10-CM

## 2023-05-30 PROCEDURE — 96365 THER/PROPH/DIAG IV INF INIT: CPT

## 2023-05-30 PROCEDURE — A4216 STERILE WATER/SALINE, 10 ML: HCPCS | Performed by: INTERNAL MEDICINE

## 2023-05-30 PROCEDURE — 63600175 PHARM REV CODE 636 W HCPCS: Performed by: INTERNAL MEDICINE

## 2023-05-30 PROCEDURE — 25000003 PHARM REV CODE 250: Performed by: INTERNAL MEDICINE

## 2023-05-30 RX ORDER — HEPARIN 100 UNIT/ML
500 SYRINGE INTRAVENOUS
Status: DISCONTINUED | OUTPATIENT
Start: 2023-05-30 | End: 2023-06-08

## 2023-05-30 RX ORDER — SODIUM CHLORIDE 0.9 % (FLUSH) 0.9 %
10 SYRINGE (ML) INJECTION
Status: CANCELLED | OUTPATIENT
Start: 2023-05-30

## 2023-05-30 RX ORDER — HEPARIN 100 UNIT/ML
500 SYRINGE INTRAVENOUS
Status: CANCELLED | OUTPATIENT
Start: 2023-05-30

## 2023-05-30 RX ORDER — SODIUM CHLORIDE 0.9 % (FLUSH) 0.9 %
10 SYRINGE (ML) INJECTION
Status: DISCONTINUED | OUTPATIENT
Start: 2023-05-30 | End: 2024-02-29 | Stop reason: HOSPADM

## 2023-05-30 RX ADMIN — Medication 30 ML: at 11:05

## 2023-05-30 RX ADMIN — CEFTRIAXONE SODIUM 1 G: 1 INJECTION, POWDER, FOR SOLUTION INTRAVENOUS at 10:05

## 2023-05-31 ENCOUNTER — OFFICE VISIT (OUTPATIENT)
Dept: HEMATOLOGY/ONCOLOGY | Facility: CLINIC | Age: 58
End: 2023-05-31
Payer: MEDICARE

## 2023-05-31 VITALS
WEIGHT: 181.69 LBS | SYSTOLIC BLOOD PRESSURE: 171 MMHG | TEMPERATURE: 98 F | BODY MASS INDEX: 32.19 KG/M2 | HEART RATE: 68 BPM | DIASTOLIC BLOOD PRESSURE: 81 MMHG | HEIGHT: 63 IN | OXYGEN SATURATION: 94 %

## 2023-05-31 DIAGNOSIS — L03.116 CELLULITIS OF LEFT LEG: Primary | ICD-10-CM

## 2023-05-31 DIAGNOSIS — L03.116 CELLULITIS OF LEFT LOWER EXTREMITY: ICD-10-CM

## 2023-05-31 PROCEDURE — 99215 OFFICE O/P EST HI 40 MIN: CPT | Mod: PBBFAC | Performed by: NURSE PRACTITIONER

## 2023-05-31 PROCEDURE — 99213 PR OFFICE/OUTPT VISIT, EST, LEVL III, 20-29 MIN: ICD-10-PCS | Mod: S$PBB,,, | Performed by: NURSE PRACTITIONER

## 2023-05-31 PROCEDURE — 99999 PR PBB SHADOW E&M-EST. PATIENT-LVL V: ICD-10-PCS | Mod: PBBFAC,,, | Performed by: NURSE PRACTITIONER

## 2023-05-31 PROCEDURE — 99999 PR PBB SHADOW E&M-EST. PATIENT-LVL V: CPT | Mod: PBBFAC,,, | Performed by: NURSE PRACTITIONER

## 2023-05-31 PROCEDURE — 99213 OFFICE O/P EST LOW 20 MIN: CPT | Mod: S$PBB,,, | Performed by: NURSE PRACTITIONER

## 2023-05-31 RX ORDER — CEFTRIAXONE 1 G/1
1 INJECTION, POWDER, FOR SOLUTION INTRAMUSCULAR; INTRAVENOUS DAILY
Qty: 7 G | Refills: 0 | Status: SHIPPED | OUTPATIENT
Start: 2023-05-31 | End: 2023-06-07

## 2023-05-31 RX ORDER — HEPARIN 100 UNIT/ML
500 SYRINGE INTRAVENOUS
Status: CANCELLED | OUTPATIENT
Start: 2023-06-01

## 2023-05-31 RX ORDER — SODIUM CHLORIDE 0.9 % (FLUSH) 0.9 %
10 SYRINGE (ML) INJECTION
Status: CANCELLED | OUTPATIENT
Start: 2023-06-01

## 2023-05-31 NOTE — PROGRESS NOTES
HEMATOLOGY/ONCOLOGY OFFICE CLINIC VISIT    Visit Information:    NO SHOW/RESCHEDULE  08/21/2018--> Rescheduled     11/05/2019--> No Show/Reschedule  06/24/2020--> No Show/Reschedule  09/24/2020--> No Show/Reschedule  03/15/2021--> No Show/Reschedule  03/23/2021--> No Show/Reschedule  04/13/2021--> No Show  05/27/2021--> No Show  08/19/2021--> No Show  08/26/2021--> No Show/Rescheduled  04/26/2022--> No Show  10/03/2022--> Rescheduled  10/11/2022--> Rescheduled  03/22/2023--> No Show/Rescheduled  4/26/2023->Rescheduled  05/09/2023->Rescheduled    Referring Physician: DR. Cardona  PCP: Dr. Chacon  GI:   Rheumatologist: Dr. Luis Rea  Immunologist: Dr. Daniel Nava  ENT: Dr. Brice Williamson  [facility] Pain management: Dr.Chai [facility] Transplant: Dr. Adrian Naylor [facility] Onc: Dr. Zulema [facility] Breast Surg Onc: Dr.DeSnyder LONG [facility]: Dr. 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   8) Stage IA grade 3, ER+, HER2 BERNA +,  IDC/DCIS of the left breast --- 2/7/18 at [facility]   --s/p lumpectomy with SLNB 4/12, Grade 2 IDC 4mm with second focus of microinvasive carcinoma 0.4mm. 2 SLN negative for malignancy    9) Progressive swelling of R lower extremity--- US NIVA done 2/19/18 negative for evidence of DVT  10). Paroxysmal Afib (2/18/18) diagnosed at [facility]; ECHO noted EF 58% - on Eliquis  11) Mild CKD with GFR 55 - managed per [facility] nephrology  12) Treatment induced  neutropenia     Present treatment:  -Maintenance Revlimid 5mg PO QOD, started 02/16/17-- was held for a few months while undergoing treatment for her breast cancer 02/18-05/18; Restarted 6/15/18   Dexamethasone 20 mg po weekly added early July 2017--> reduced to 12 mg PO weekly October 4, 2017---> increased to 16mg PO weekly 2/15/18---restarted 6/15/18 --> 20 mg weekly 7//8/2022  Darzalex 7/8/2022-present  Femara 2.5 mg PO daily   Eliquis 2.5mg PO BID      Treatment/Oncology history:  1) CyBorD x5 cycles 09/28/15-12/24/15  2) Autologous stem cell transplant 02/08/16, done at [facility]  3) Exploratory laparotomy with subtotal colectomy and end ileostomy done August 2, 2016--pathology specimen showed advanced colonic amyloidosis extensive involving the muscular wall submucosa and mucosa.  Appendix also involvement by amyloidosis with fibrous obliteration of the distal lumen.  4) Maintenance therapy with Revlimid 5mg-started 06/01/16--Discontinued shortly after 2/2 cytopenias  5) Left breast lumpectomy with SLNB at [facility] 4/12/18  6) Left partial breast RT x10 fractions  5/21/18-6/4/1      Imaging:  NIVA 7/29/2021: Negative for deep venous thrombosis in the left lower extremity.   MMG 3/21/2022: BI-RADS Category 2: Benign Finding(s)  MRI CTL spine 9/14/2022: No acute myelomatous findings. Severe spinal stenosis process detected within the upper cervical spine as well as the entirety of the lumbar spine segments similar to the prior imaging assessment.    CT C 1/24/2023: Right greater than left lower lobe opacification consistent with infectious process.  Lucent lesions throughout the osseous structures similar to 2015.    Pathology:    CLINICAL HISTORY:       Patient: Ninfa Candelario is a 57 y.o. female.  Ms. Cabrera Joseph was admitted on 08/16/15 for ileus versus partial SBO. CT A/P done 08/17/15 showed sigmoid colitis and a zone of transition at the junction of the descending and sigmoid colon which could indicate  some degree of obstruction and an obstructing mass cannot be excluded. Numerous skeletal lytic lesions noted. CT chest done 08/19/15 showed Multiple skeletal lytic lesions involving the thoracic spine, left rib sternum consistent with either metastases or multiple myeloma. There is no evidence of pulmonary or mediastinal mass. Dr. Farr was consulted for possible MM/anemia.     Nuclear Bone scan done 8/20/15 showed mild to moderate increased activity in left rib and right second rib which correlates with fractures seen on CT.  Skeletal survey done 8/20/15showed lytic lesions in the calvarium and probably a lytic lesion in the left scapula. Lytic areas in the spine and pelvis seen on recent CT are not well defined on skeletal survey. Work up labs done 08/20/15: Negative for sickle cell and Thalessemia (reported history of thalessemia). Iron 54, Transferrin 118.0, Iron sat 34.6%, Folate 26.5, Vit B12 1,779  Peripheral smear resulted slightly macrocytic normochromic anemia without signifcant anisocytosis may reflect early B12/folate deficiency or marrow dysfunction, among others. No immature myeloid cells or blasts. Platlets adequate. Beta 2 mircoglobulin = 3.2.  SPEP/NADIA: M-spike in the beta region. The monoclonal protein peak accounts for 1.13 g/dL. Hypogammaglobulinemia. NADIA pattern shows the presence of a free lambda light chain monoclonal protein with additional faint band in IgA.  All immunoglobulin levels decreased. IgA=26, IgG=307, IgM<5.  Kappa Qnt 0.16, Lambda Qnt 4600.00, Ratio <0.01.  24hr Urine for Bence Mendez: Kappa 2.75, Lambda 1250.00, Ratio <0.01. NADIA: Urine is POSITIVE for monoclonal Free Lambda Light chains     Patient then opted to go to [facility] where she underwent a bone marrow biopsy on 09/18/15. BMBx showed 80% plasma cells, 13p deletion and normal karyotype. She underwent fat pad biopsy which came back positive for Congo red consistent with amyloidosis. Cardiac workup revealed no amyloid  deposition within the heart.  PET/CT and skeletal survey done September 18, 2015 showed multiple lytic osseous lesions  The patient was started on Velcade while at [facility] with the plan to transition to autologous stem cell transplantation. They asked if we could give her could continue treatment here.   She completed CyborD x5 cycles on 12/24/15     Patient admitted 01/06/16 for colitis and C. Diff. Pt treated with Flagyl. Discharged home 01/08/16     Repeat BMBx done at [facility] 01/2016 did not show any morphologic or immnophenotypic evidence of plasama cell neoplasm. Patient underwent Autologous stem cell transplant with Busulfan and melphalan on 02/08/16 at [facility]. The only complication was recurrent C.diff infection for which she completed 10 days of Fidaxomycin.     Follow-up bone marrow biopsy done at [facility] done 5/16/16 showed cellular 30-40% bone marrow with trilineage hematopoiesis. No morphologic or immunophenotypic support for plasma cell neoplasm. Started maintenance Revlimid 5mg. Unable to continue therapy due to cytopenias.     On 08/02/16 patient underwent  Exploratory laparotomy with subtotal colectomy and end ileostomy for what was thought to be toxic megacolon. Pathology showed extensive advanced colonic amyloidosis involving the muscular wall, submucosa and mucosa: With the appendix also involved with amyloidosis.  Positive lambda light chains were noted.     Follow-up at [facility] 8/31/16, Per Dr. Adrian Naylor, 6 months post autologous transplant. She has engrafted. Based on the latest restaging she is near complete remission with possible IgA lambda on serum immunofixation. Patient was instructed to discontinue prophylactic antibiotics. She received her 1st series of immunizations while at [facility]. Patient had a bilateral venous ultrasound to rule out DVT, negative B/L. In regards to amyloidosis the patient feels that her tongue is smaller in size and her BNP has come down some.  Patient had a  follow-up appointment at [facility] November 30, 2016.  Dr. Parnell documented the patient's free lambda light chain remains at a lower level.  Therefore in light of her recent surgery they will continue with observation only.  The plan to see the patient back in 2-3 months with repeat restaging labs.  They recommended regular CBC checks to monitor anemia.  Patient returned to [facility] in December 2016 to meet with dermatology for numerous papillary lesions on eyelids, chest and posterior neck consistent with amyloid deposits. Recommendations were for watchful waiting.  Patient is less than 1 year out from bone marrow transplant and further improvement can be expected.  She will see the patient back in 1 year to discuss possible surgical resection of the plaques especially around the eyelid region.  Rogaine recommended for persistent hair loss. Retin-A recommended for acne vulgaris.  Patient returned to [facility] on 2/8/2017 for neurosurgery consult for chronic low back pain and difficulty walking.  Imaging showed extensive DJD with discovertebral bulges and thickening of the spinal laminar tissues creating spinal stenosis throughout, but greatest at L2/L3.  Neurosurgery felt that surgery risks outweighed the benefits.  Patient was prescribed pain medicine and encouraged to participate in physical therapy.  Patient also met with Dr. Parnell on 02/08/17, who noted an elevation in free light chains (kappa 52.60/lambda 27.77/ratio 1.89).  Recommendation is to resume maintenance therapy with Revlimid at a low dose of 5 mg every other day.  Patient went back to [facility] first week of April 2017.  I do not have these notes.  Reportedly she was told to continue on every other day Revlimid at 5 mg.  She reportedly had repeat myeloma labs done as well.  Again I do not have these results.  Patient went back to [facility] and saw Dr. Parnell June 29, 2017.  Myeloma labs were done with serum protein electrophoresis showing  irregularity in the fast gamma region.  IgG of 1291.  Free kappa light chains of 84.6 with lambda light chains of 66.9.  Beta-2 microglobulin of 4.5  CT scan of lumbar spine showed multiple lytic lesions once again in the lumbar spine and bony pelvis.  Ultrasound of the left leg showed no evidence of DVT.  It was recommended based on the slight increase in her And lambda light chains to start weekly dexamethasone 20 mg p.o. weekly.  Follow-up visit and labs at [facility] on September 29, 2017 with Dr. Parnell.  Repeat beta-2 microglobulin came back at 2.4.  Serum IgG of 761, IgA 117 and IgM of 29.  Serum free kappa light chains of 24.9 and lambda of 23.5.  Counts were stable with hemoglobin of 11.1, WBC 4.9 and platelets of 210,000.  Recommendations were for continued Revlimid every other day with weekly dexamethasone along with aspirin for DVT prophylaxis.  Bilateral diagnostic MMG 12/19/17  noted 1.6cm coarse heterogeneous calcifications in posterior region of L breast at 3 o'clock position. Patient was scheduled for FNA which confirmed ID grade 3, single focus measuring 1.7cm with 2nd focus microinvasion DCIS grade 2 measuring 0.3cm. Left axillary LN biopsy showed no evidence of metastatic carcinoma. Complete staging: Stage IA, grade 3 ER+, Her 2 Niki + IDC/DCIS of left breast. Ki-67 <17%.  Repeat myeloma labs showed rise in free lambda light chains noted at 68.69, kappa light chains at 27.22,  beta 2 microglobulin came back at 2.9. Serum protein electrophoresis showed no definitive evidence of an M protein peak. The pattern is consistent with an acute inflammatory reaction. Recommendations were made to maintain Revlimid at current dose of 5mg every other day to be taken continuously increase weekly dexamethasone to 16 mg.   Patient seen at [facility] with tentative plan for L breast lumpectomy on 3/13/18. 2/16/18- Prior to surgery she was seen by genetic counselor who ran genetic testing per patient reques, all  of which were negative. She was then seen by cardiology at [facility] 2/16/18 for further evaluation of persistent bilateral lower extremity swelling-ECHO noted EF of 58%; diagnosed with paroxysmal atrial fibrillation and instructed to begin daily ASA. During preoperative asssessment per Rad/Onc 3/12/18, corresponding chest CT noted new bilateral pulmonary nodules concerning for metastatic disease- surgery was subsequently postponed pending pulmonary evaluation. Seen by Pulmonology on 3/21/18, PFTs within normal limits and cleared patient for surgery with recommended close CT follow up of nodules in 3 months. 3/21/18 seen by nephrology for management of mild CKD (GFR 55)-cleared for surgery with recommended follow up in 3 months. Left breast lumpectomy and SLNB performed per Dr. Washburn on 4/12/18- plan for follow up in clinic on 4/24/18  for postoperative assessment. Follow up with Dr. Poole (Med/Onc) on 4/25/18. Follow up with Dr. Parnell 4/26/18.   Patient seen at [facility] s/p Left breast lumpectomy with SLNB on 4/12/18. Following surgery she completed Left partial breast radiation x 10 fractions. She was then started on endocrine therapy with Femara after been deemedan inappropriate candidate for systemic chemotherapy/Herceptin.      She was seen by neurosugery at [facility] on 4/26/18 following repeat MRI of cervical thoracic lumbar spine which noted focal enhancement of T2 hyperintensity at the C2 level which may be due to degenerative changes. Signal abnormality within  the T12 and L1 may be secondary to myeloma. Plan to continue with observation for now with F/U scheduled in October 2018.      She was seen by Dr. Parnell at [facility] for management of her MM on 4/26/18. Patient was recommended to restart Revlimid 5mg PO every other day with notes that these recommendations would be communicated to collaborating Oncologist. Patient was also recommended to start on Zometa for her bone disease.      Seen by   Parmjit at [facility] for management of her MM on 6/28/18. Repeat light chains noted lambda 33.36 (previously 80.80), K/L ratio 0.91 (previously 0.49). Due to slight improvement in light chains, plan to continue on lenalidomide maintenance. Recommendations to continue anticoagulation with Eliquis. BLE venous doppler negative for DVT.   Seen by pulmonolgy on 6/28/18- repeat CT chest done 6/28/18 noted resolution of previous pulmonary nodules. Thought to be infectious in nature. Recommendations for discharge from pulmonary clinic.  Should MRI of her cervical thoracic lumbar spine on 2/12/2019 that demonstrated cervical spondylosis with canal stenosis most notable at C1 and 2. Cord signal abnormality at C1 and 2 with enhancement is stable. Lumbar spondylosis with central canal stenosis at multiple levels. No imaging features of bony metastatic disease.     For amyloidosis (Light chain type).    Patient presented with macroglossia and now with improvement of the swelling of her tongue. Her cardiac parameters are also better.   8/10/2020 proBNP  250   2/17/2020                616  8/9/2019                  190        Chief Complaint: OTHER (Elevated BP.)        Interval History:  She is currently on Femara for breast cancer and Rev/Dex/Darzalex  for MM  s/p transplant in 2016. At her [facility] visit on 10/12/22, Ninlaro was discontinued due to it causing severe diarrhea and leukopenia. Diarrhea resolved after stopping Ninlaro (she admits to stopping prior to visit @ [facility]).     She reports occasional n/v and diarrhea with Revlimid, but not severe, is tolerable. She c/o swelling and pain to legs. During physical exam, BLE swellling noted, L>R, left leg red with small fluid filled blisters. Otherwise, she is doing well. No fever, chills or sweats.  No chest pain or shortness of breath.  She uses a walker for mobility.  She continues to do her mammograms and other imaging studies at [facility]. She also follows with  "nephrologist @ [facility].    5/31/23: Patient presents today for two week f/u to assess her left leg. Left leg with erythema and small fluid filled blisters. She received 7 daily doses of IV Rocephin and reports some improvement to her symptoms. Dr. Meyers examined her leg and ordered for 7 more days of antibiotics.          ROS: All 14 points ROS taken and as per Interval History  ROS      Histories:  PMH/PSH/FH/SOCIAL/ALLERGIES AND MEDS REVIEWED AND UPDATED AS APPROPRIATE       Vitals:    05/31/23 1500   BP: (!) 171/81  Comment: LT forearm-178/85   BP Location: Left arm   Patient Position: Sitting   Pulse: 68   Temp: 98.1 °F (36.7 °C)   TempSrc: Oral   SpO2: (!) 94%   Weight: 82.4 kg (181 lb 11.2 oz)   Height: 5' 3.4" (1.61 m)                Physical Exam  Vitals and nursing note reviewed.   Constitutional:       General: She is not in acute distress.     Appearance: Normal appearance. She is well-developed. She is ill-appearing.      Comments: Uses walker for mobility   HENT:      Head: Normocephalic and atraumatic.      Mouth/Throat:      Mouth: Mucous membranes are moist.   Eyes:      General: No scleral icterus.     Extraocular Movements: Extraocular movements intact.      Conjunctiva/sclera: Conjunctivae normal.      Pupils: Pupils are equal, round, and reactive to light.   Neck:      Vascular: No JVD.   Cardiovascular:      Rate and Rhythm: Normal rate and regular rhythm.      Heart sounds: No murmur heard.  Pulmonary:      Effort: Pulmonary effort is normal.      Breath sounds: Normal breath sounds. No wheezing or rhonchi.   Abdominal:      General: Bowel sounds are normal. There is no distension.      Palpations: Abdomen is soft. There is no mass.      Tenderness: There is no abdominal tenderness.   Musculoskeletal:         General: No swelling or deformity.      Cervical back: Neck supple.   Lymphadenopathy:      Head:      Right side of head: No submandibular adenopathy.      Left side of head: No " submandibular adenopathy.      Cervical: No cervical adenopathy.      Upper Body:      Right upper body: No supraclavicular or axillary adenopathy.      Left upper body: No supraclavicular or axillary adenopathy.      Lower Body: No right inguinal adenopathy. No left inguinal adenopathy.   Skin:     General: Skin is warm.      Coloration: Skin is not jaundiced.      Findings: No lesion or rash.      Nails: There is no clubbing.   Neurological:      General: No focal deficit present.      Mental Status: She is alert and oriented to person, place, and time.      Cranial Nerves: Cranial nerves 2-12 are intact.      Motor: Weakness present.      Gait: Gait abnormal.   Psychiatric:         Attention and Perception: Attention normal.         Behavior: Behavior is cooperative.         Cognition and Memory: Cognition normal.         Judgment: Judgment normal.   Answers submitted by the patient for this visit:  Review of Systems Questionnaire (Submitted on 3/22/2023)  appetite change : No  unexpected weight change: No  mouth sores: No  visual disturbance: No  adenopathy: No  ECOG SCORE             Laboratory:  CBC with Differential:  Lab Results   Component Value Date    WBC 4.34 (L) 05/18/2023    RBC 3.04 (L) 05/18/2023    HGB 9.0 (L) 05/18/2023    HCT 29.4 (L) 05/18/2023    MCV 96.7 (H) 05/18/2023    MCH 29.6 05/18/2023    MCHC 30.6 (L) 05/18/2023    RDW 14.4 05/18/2023     05/18/2023    MPV 9.2 05/18/2023        CMP:  Sodium Level   Date Value Ref Range Status   05/18/2023 142 136 - 145 mmol/L Final     Potassium Level   Date Value Ref Range Status   05/18/2023 3.9 3.5 - 5.1 mmol/L Final     Carbon Dioxide   Date Value Ref Range Status   05/18/2023 24 22 - 29 mmol/L Final     Blood Urea Nitrogen   Date Value Ref Range Status   05/18/2023 35.2 (H) 9.8 - 20.1 mg/dL Final     Creatinine   Date Value Ref Range Status   05/18/2023 2.69 (H) 0.55 - 1.02 mg/dL Final     Calcium Level Total   Date Value Ref Range Status    05/18/2023 9.2 8.4 - 10.2 mg/dL Final     Albumin Level   Date Value Ref Range Status   05/18/2023 3.2 (L) 3.5 - 5.0 g/dL Final     Bilirubin Total   Date Value Ref Range Status   05/18/2023 0.2 <=1.5 mg/dL Final     Alkaline Phosphatase   Date Value Ref Range Status   05/18/2023 83 40 - 150 unit/L Final     Aspartate Aminotransferase   Date Value Ref Range Status   05/18/2023 23 5 - 34 unit/L Final     Alanine Aminotransferase   Date Value Ref Range Status   05/18/2023 14 0 - 55 unit/L Final     Estimated GFR-Non    Date Value Ref Range Status   04/20/2022 25           Assessment:       1) Multiple Myeloma, IgA Lambda, FISH with del 13p, normal karyotype, ISS stg II Dx 2015; S/p Autologous stem cell transplant 02/08/16-remission-->slight rise in free light chains 02/08/17  2) Amyloidosis, Lambda light chain type, organ involvement with macroglossia and colon involvement. 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 Sx risks do not outweigh benefits. Pain meds given and encouraged Physical Therpay  8) Amyloid plaques, evaluated by Derm at [facility], recommend watchful waiting. Patient to follow up 12/2017  9) Stage IA grade 3, ER+, HER2 BERNA +,  IDC/DCIS of the left breast --- 2/7/18 at [facility]; s/p lumpectomy with SLNB 4/12, Grade 2 IDC measuring 4mm with second focus of microinvasive carcinoma measuring  0.4mm .IG DCIS, 0.3mm to posterior margin; 2 SLN negative for malignancy    10) Progressive swelling of R lower extremity--- US NIVA done 2/19/18 negative for evidence of DVT  11). Paroxysmal Afib (2/18/18) diagnosed at [facility]; ECHO noted EF 58%  12. Mild CKD with GFR 55 - managed per [facility] nephrology  13). Pulmonary nodules noted on pre-operative chest CT  scan (3/12/18) noted new bilateral pulmonary nodules are nonspecific -- seen by Pulmonology at [facility] (3/21/18) thought to be inflammatory/infectious in nature, repeat Chest CT 6/28/18 noted resolution of nodules  14). Treatment induced neutropenia        Plan:     Left leg improved but still with edema , erythema and blisters.   Will order IV Rocephin 1 gram daily x 7 additional days @ [facility] in [place] - orders faxed   RTC on 6/14/23 with NP after additional doses of IV Rocephin to re-evaluate left leg    Encourage to call or message us for any questions or problems  The patient was given ample opportunity to ask questions, and to the best of my abilities, all questions answered to satisfaction; patient demonstrated understanding of what we discussed and agreeable to the plan.     CARLEEN Huddleston

## 2023-06-05 ENCOUNTER — INFUSION (OUTPATIENT)
Dept: INFUSION THERAPY | Facility: HOSPITAL | Age: 58
End: 2023-06-05
Attending: INTERNAL MEDICINE
Payer: MEDICARE

## 2023-06-05 VITALS
RESPIRATION RATE: 18 BRPM | BODY MASS INDEX: 31.77 KG/M2 | WEIGHT: 181.69 LBS | TEMPERATURE: 98 F | DIASTOLIC BLOOD PRESSURE: 89 MMHG | OXYGEN SATURATION: 98 % | HEART RATE: 90 BPM | SYSTOLIC BLOOD PRESSURE: 171 MMHG

## 2023-06-05 DIAGNOSIS — L03.116 CELLULITIS OF LEFT LOWER EXTREMITY: Primary | ICD-10-CM

## 2023-06-05 PROCEDURE — A4216 STERILE WATER/SALINE, 10 ML: HCPCS | Performed by: NURSE PRACTITIONER

## 2023-06-05 PROCEDURE — 25000003 PHARM REV CODE 250: Performed by: NURSE PRACTITIONER

## 2023-06-05 PROCEDURE — 96365 THER/PROPH/DIAG IV INF INIT: CPT

## 2023-06-05 PROCEDURE — 63600175 PHARM REV CODE 636 W HCPCS: Performed by: NURSE PRACTITIONER

## 2023-06-05 RX ORDER — HEPARIN 100 UNIT/ML
500 SYRINGE INTRAVENOUS
Status: CANCELLED | OUTPATIENT
Start: 2023-06-06

## 2023-06-05 RX ORDER — HEPARIN 100 UNIT/ML
500 SYRINGE INTRAVENOUS
Status: DISCONTINUED | OUTPATIENT
Start: 2023-06-05 | End: 2023-06-08

## 2023-06-05 RX ORDER — SODIUM CHLORIDE 0.9 % (FLUSH) 0.9 %
10 SYRINGE (ML) INJECTION
Status: CANCELLED | OUTPATIENT
Start: 2023-06-06

## 2023-06-05 RX ORDER — SODIUM CHLORIDE 0.9 % (FLUSH) 0.9 %
10 SYRINGE (ML) INJECTION
Status: DISCONTINUED | OUTPATIENT
Start: 2023-06-05 | End: 2024-02-29 | Stop reason: HOSPADM

## 2023-06-05 RX ADMIN — Medication 30 ML: at 12:06

## 2023-06-05 RX ADMIN — CEFTRIAXONE SODIUM 1 G: 1 INJECTION, POWDER, FOR SOLUTION INTRAMUSCULAR; INTRAVENOUS at 12:06

## 2023-06-05 RX ADMIN — Medication 10 ML: at 11:06

## 2023-06-06 ENCOUNTER — INFUSION (OUTPATIENT)
Dept: INFUSION THERAPY | Facility: HOSPITAL | Age: 58
End: 2023-06-06
Attending: INTERNAL MEDICINE
Payer: MEDICARE

## 2023-06-06 VITALS
SYSTOLIC BLOOD PRESSURE: 137 MMHG | HEART RATE: 92 BPM | DIASTOLIC BLOOD PRESSURE: 79 MMHG | BODY MASS INDEX: 31.77 KG/M2 | TEMPERATURE: 98 F | OXYGEN SATURATION: 97 % | RESPIRATION RATE: 18 BRPM | WEIGHT: 181.69 LBS

## 2023-06-06 DIAGNOSIS — L03.116 CELLULITIS OF LEFT LOWER EXTREMITY: Primary | ICD-10-CM

## 2023-06-06 PROCEDURE — 25000003 PHARM REV CODE 250: Performed by: NURSE PRACTITIONER

## 2023-06-06 PROCEDURE — 96365 THER/PROPH/DIAG IV INF INIT: CPT

## 2023-06-06 PROCEDURE — A4216 STERILE WATER/SALINE, 10 ML: HCPCS | Performed by: NURSE PRACTITIONER

## 2023-06-06 PROCEDURE — 63600175 PHARM REV CODE 636 W HCPCS: Performed by: NURSE PRACTITIONER

## 2023-06-06 RX ORDER — HEPARIN 100 UNIT/ML
500 SYRINGE INTRAVENOUS
Status: CANCELLED | OUTPATIENT
Start: 2023-06-06

## 2023-06-06 RX ORDER — SODIUM CHLORIDE 0.9 % (FLUSH) 0.9 %
10 SYRINGE (ML) INJECTION
Status: CANCELLED | OUTPATIENT
Start: 2023-06-06

## 2023-06-06 RX ORDER — HEPARIN 100 UNIT/ML
500 SYRINGE INTRAVENOUS
Status: DISCONTINUED | OUTPATIENT
Start: 2023-06-06 | End: 2023-06-08

## 2023-06-06 RX ORDER — SODIUM CHLORIDE 0.9 % (FLUSH) 0.9 %
10 SYRINGE (ML) INJECTION
Status: DISCONTINUED | OUTPATIENT
Start: 2023-06-06 | End: 2023-06-08

## 2023-06-06 RX ADMIN — CEFTRIAXONE SODIUM 1 G: 1 INJECTION, POWDER, FOR SOLUTION INTRAMUSCULAR; INTRAVENOUS at 11:06

## 2023-06-06 RX ADMIN — Medication 10 ML: at 11:06

## 2023-06-06 RX ADMIN — Medication 30 ML: at 12:06

## 2023-06-07 ENCOUNTER — TELEPHONE (OUTPATIENT)
Dept: HEMATOLOGY/ONCOLOGY | Facility: CLINIC | Age: 58
End: 2023-06-07
Payer: MEDICARE

## 2023-06-07 NOTE — TELEPHONE ENCOUNTER
Spoke with patient's sister, Stephanie. States that patient is c/o N+V with the rocephin infusions. Goes in today @ 11:30. She was wondering if you could order IV anti-emetics with the Rocephin. Please advise.

## 2023-06-08 ENCOUNTER — INFUSION (OUTPATIENT)
Dept: INFUSION THERAPY | Facility: HOSPITAL | Age: 58
End: 2023-06-08
Attending: INTERNAL MEDICINE
Payer: MEDICARE

## 2023-06-08 VITALS
HEART RATE: 96 BPM | SYSTOLIC BLOOD PRESSURE: 120 MMHG | TEMPERATURE: 99 F | RESPIRATION RATE: 18 BRPM | BODY MASS INDEX: 32.19 KG/M2 | WEIGHT: 181.69 LBS | HEIGHT: 63 IN | DIASTOLIC BLOOD PRESSURE: 65 MMHG | OXYGEN SATURATION: 97 %

## 2023-06-08 DIAGNOSIS — L03.116 CELLULITIS OF LEFT LOWER EXTREMITY: Primary | ICD-10-CM

## 2023-06-08 PROCEDURE — A4216 STERILE WATER/SALINE, 10 ML: HCPCS | Performed by: INTERNAL MEDICINE

## 2023-06-08 PROCEDURE — 25000003 PHARM REV CODE 250: Performed by: INTERNAL MEDICINE

## 2023-06-08 PROCEDURE — 63600175 PHARM REV CODE 636 W HCPCS: Performed by: INTERNAL MEDICINE

## 2023-06-08 PROCEDURE — 96365 THER/PROPH/DIAG IV INF INIT: CPT

## 2023-06-08 PROCEDURE — 96375 TX/PRO/DX INJ NEW DRUG ADDON: CPT

## 2023-06-08 RX ORDER — HEPARIN 100 UNIT/ML
500 SYRINGE INTRAVENOUS
Status: CANCELLED | OUTPATIENT
Start: 2023-06-08

## 2023-06-08 RX ORDER — HEPARIN 100 UNIT/ML
500 SYRINGE INTRAVENOUS
Status: SHIPPED | OUTPATIENT
Start: 2023-06-08

## 2023-06-08 RX ORDER — SODIUM CHLORIDE 0.9 % (FLUSH) 0.9 %
10 SYRINGE (ML) INJECTION
Status: CANCELLED | OUTPATIENT
Start: 2023-06-08

## 2023-06-08 RX ORDER — SODIUM CHLORIDE 0.9 % (FLUSH) 0.9 %
10 SYRINGE (ML) INJECTION
Status: SHIPPED | OUTPATIENT
Start: 2023-06-08

## 2023-06-08 RX ORDER — SODIUM CHLORIDE 0.9 % (FLUSH) 0.9 %
10 SYRINGE (ML) INJECTION
Status: DISCONTINUED | OUTPATIENT
Start: 2023-06-08 | End: 2024-02-29 | Stop reason: HOSPADM

## 2023-06-08 RX ORDER — HEPARIN 100 UNIT/ML
500 SYRINGE INTRAVENOUS
Status: DISCONTINUED | OUTPATIENT
Start: 2023-06-08 | End: 2023-06-08

## 2023-06-08 RX ADMIN — Medication 10 ML: at 11:06

## 2023-06-08 RX ADMIN — Medication 30 ML: at 12:06

## 2023-06-08 RX ADMIN — SODIUM CHLORIDE 16 MG: 9 INJECTION, SOLUTION INTRAVENOUS at 11:06

## 2023-06-08 RX ADMIN — CEFTRIAXONE SODIUM 1 G: 1 INJECTION, POWDER, FOR SOLUTION INTRAMUSCULAR; INTRAVENOUS at 12:06

## 2023-06-08 NOTE — TELEPHONE ENCOUNTER
Gladys -     Can you help with this one. I'm trying to see if she can get something IV for nausea with the rocephin infusions. Patient missed her infusion @ [facility] yesterday. I haven't gotten a response yet and she is due to go in again this morning. Please let me know.

## 2023-06-09 NOTE — PROGRESS NOTES
Spoke with Dr. Meyers regarding leaving patients iv in place for weekend infusions. Dr. Meyers gives okay to leave iv in.

## 2023-06-10 ENCOUNTER — INFUSION (OUTPATIENT)
Dept: INFUSION THERAPY | Facility: HOSPITAL | Age: 58
End: 2023-06-10
Attending: INTERNAL MEDICINE
Payer: MEDICARE

## 2023-06-10 PROCEDURE — 63600175 PHARM REV CODE 636 W HCPCS

## 2023-06-10 PROCEDURE — 25000003 PHARM REV CODE 250

## 2023-06-10 RX ORDER — SODIUM CHLORIDE 0.9 % (FLUSH) 0.9 %
10 SYRINGE (ML) INJECTION
Status: CANCELLED | OUTPATIENT
Start: 2023-06-10

## 2023-06-10 RX ORDER — HEPARIN 100 UNIT/ML
500 SYRINGE INTRAVENOUS
Status: CANCELLED | OUTPATIENT
Start: 2023-06-10

## 2023-06-10 NOTE — PROGRESS NOTES
Pt arrived for outpatient infusion. Placed in Outpt surgery room 3. Vital signs /76, HR 92, RR 16, SPO2 96%, Temp 98.2. 2 attempts made at IV placement. 24 gauge obtained to Right Forearm. Pt tolerated well. 16mg of Ondansetron in 50 ml NS 0.9% started at 1045 and ended at 1105. IV flushed with NS. 1 gram Rocephin in 100ml D5W started at 1107 and ended at 1137. IV discontinued. Pt DC.

## 2023-06-11 ENCOUNTER — INFUSION (OUTPATIENT)
Dept: INFUSION THERAPY | Facility: HOSPITAL | Age: 58
End: 2023-06-11
Attending: INTERNAL MEDICINE
Payer: MEDICARE

## 2023-06-11 PROCEDURE — 63600175 PHARM REV CODE 636 W HCPCS

## 2023-06-11 PROCEDURE — 25000003 PHARM REV CODE 250

## 2023-06-11 RX ORDER — SODIUM CHLORIDE 0.9 % (FLUSH) 0.9 %
10 SYRINGE (ML) INJECTION
Status: CANCELLED | OUTPATIENT
Start: 2023-06-11

## 2023-06-11 RX ORDER — HEPARIN 100 UNIT/ML
500 SYRINGE INTRAVENOUS
Status: CANCELLED | OUTPATIENT
Start: 2023-06-11

## 2023-06-11 NOTE — PROGRESS NOTES
Pt arrived at 1020. Pt placed in Outpatient surgery room 3. VS obtatined. /76, HR 92, RR 18, SPO2 97%, TEMP 98.1. 24 gauge IV started in the left forearm. 1 attempt. Pt tolerated well. Zofran 16mg in 50ml NS 0.9% started at 1040 ended at 1100. IV flushed with NS. Rocephin 1 gram in 100 ML D5W started at 1103 and ended at 1133. Pt IV discontinued. Pt DC.

## 2023-06-12 ENCOUNTER — INFUSION (OUTPATIENT)
Dept: INFUSION THERAPY | Facility: HOSPITAL | Age: 58
End: 2023-06-12
Attending: INTERNAL MEDICINE
Payer: MEDICARE

## 2023-06-12 VITALS
DIASTOLIC BLOOD PRESSURE: 76 MMHG | WEIGHT: 181.69 LBS | HEART RATE: 93 BPM | BODY MASS INDEX: 31.77 KG/M2 | RESPIRATION RATE: 18 BRPM | SYSTOLIC BLOOD PRESSURE: 143 MMHG | OXYGEN SATURATION: 97 % | TEMPERATURE: 98 F

## 2023-06-12 DIAGNOSIS — L03.116 CELLULITIS OF LEFT LOWER EXTREMITY: Primary | ICD-10-CM

## 2023-06-12 PROCEDURE — 96367 TX/PROPH/DG ADDL SEQ IV INF: CPT

## 2023-06-12 PROCEDURE — A4216 STERILE WATER/SALINE, 10 ML: HCPCS | Performed by: INTERNAL MEDICINE

## 2023-06-12 PROCEDURE — 25000003 PHARM REV CODE 250: Performed by: INTERNAL MEDICINE

## 2023-06-12 PROCEDURE — 63600175 PHARM REV CODE 636 W HCPCS: Performed by: INTERNAL MEDICINE

## 2023-06-12 PROCEDURE — 96365 THER/PROPH/DIAG IV INF INIT: CPT

## 2023-06-12 PROCEDURE — 96375 TX/PRO/DX INJ NEW DRUG ADDON: CPT

## 2023-06-12 RX ORDER — HEPARIN 100 UNIT/ML
500 SYRINGE INTRAVENOUS
Status: DISCONTINUED | OUTPATIENT
Start: 2023-06-12 | End: 2024-03-27 | Stop reason: HOSPADM

## 2023-06-12 RX ORDER — SODIUM CHLORIDE 0.9 % (FLUSH) 0.9 %
10 SYRINGE (ML) INJECTION
Status: DISCONTINUED | OUTPATIENT
Start: 2023-06-12 | End: 2024-02-29 | Stop reason: HOSPADM

## 2023-06-12 RX ORDER — SODIUM CHLORIDE 0.9 % (FLUSH) 0.9 %
10 SYRINGE (ML) INJECTION
Status: CANCELLED | OUTPATIENT
Start: 2023-06-12

## 2023-06-12 RX ORDER — HEPARIN 100 UNIT/ML
500 SYRINGE INTRAVENOUS
Status: CANCELLED | OUTPATIENT
Start: 2023-06-12

## 2023-06-12 RX ADMIN — Medication 10 ML: at 12:06

## 2023-06-12 RX ADMIN — Medication 30 ML: at 12:06

## 2023-06-12 RX ADMIN — Medication 10 ML: at 11:06

## 2023-06-12 RX ADMIN — CEFTRIAXONE SODIUM 1 G: 1 INJECTION, POWDER, FOR SOLUTION INTRAMUSCULAR; INTRAVENOUS at 12:06

## 2023-06-12 RX ADMIN — ONDANSETRON 16 MG: 2 INJECTION INTRAMUSCULAR; INTRAVENOUS at 12:06

## 2023-06-13 ENCOUNTER — INFUSION (OUTPATIENT)
Dept: INFUSION THERAPY | Facility: HOSPITAL | Age: 58
End: 2023-06-13
Attending: INTERNAL MEDICINE
Payer: MEDICARE

## 2023-06-13 VITALS
TEMPERATURE: 99 F | HEART RATE: 105 BPM | DIASTOLIC BLOOD PRESSURE: 74 MMHG | WEIGHT: 181.69 LBS | RESPIRATION RATE: 18 BRPM | OXYGEN SATURATION: 99 % | SYSTOLIC BLOOD PRESSURE: 135 MMHG | BODY MASS INDEX: 31.77 KG/M2

## 2023-06-13 DIAGNOSIS — L03.116 CELLULITIS OF LEFT LOWER EXTREMITY: Primary | ICD-10-CM

## 2023-06-13 PROCEDURE — 96365 THER/PROPH/DIAG IV INF INIT: CPT

## 2023-06-13 PROCEDURE — 25000003 PHARM REV CODE 250: Performed by: INTERNAL MEDICINE

## 2023-06-13 PROCEDURE — 63600175 PHARM REV CODE 636 W HCPCS: Performed by: INTERNAL MEDICINE

## 2023-06-13 PROCEDURE — 96367 TX/PROPH/DG ADDL SEQ IV INF: CPT

## 2023-06-13 PROCEDURE — 96375 TX/PRO/DX INJ NEW DRUG ADDON: CPT

## 2023-06-13 PROCEDURE — A4216 STERILE WATER/SALINE, 10 ML: HCPCS | Performed by: INTERNAL MEDICINE

## 2023-06-13 RX ORDER — SODIUM CHLORIDE 0.9 % (FLUSH) 0.9 %
10 SYRINGE (ML) INJECTION
OUTPATIENT
Start: 2023-06-13

## 2023-06-13 RX ORDER — HEPARIN 100 UNIT/ML
500 SYRINGE INTRAVENOUS
OUTPATIENT
Start: 2023-06-13

## 2023-06-13 RX ORDER — HEPARIN 100 UNIT/ML
500 SYRINGE INTRAVENOUS
Status: DISCONTINUED | OUTPATIENT
Start: 2023-06-13 | End: 2024-03-27 | Stop reason: HOSPADM

## 2023-06-13 RX ORDER — SODIUM CHLORIDE 0.9 % (FLUSH) 0.9 %
10 SYRINGE (ML) INJECTION
Status: DISCONTINUED | OUTPATIENT
Start: 2023-06-13 | End: 2024-02-29 | Stop reason: HOSPADM

## 2023-06-13 RX ADMIN — CEFTRIAXONE SODIUM 1 G: 1 INJECTION, POWDER, FOR SOLUTION INTRAMUSCULAR; INTRAVENOUS at 11:06

## 2023-06-13 RX ADMIN — ONDANSETRON 16 MG: 2 INJECTION INTRAMUSCULAR; INTRAVENOUS at 11:06

## 2023-06-13 RX ADMIN — Medication 10 ML: at 11:06

## 2023-06-13 RX ADMIN — Medication 30 ML: at 12:06

## 2023-06-15 ENCOUNTER — TELEPHONE (OUTPATIENT)
Dept: HEMATOLOGY/ONCOLOGY | Facility: CLINIC | Age: 58
End: 2023-06-15
Payer: MEDICARE

## 2023-06-19 DIAGNOSIS — C90.00 MULTIPLE MYELOMA NOT HAVING ACHIEVED REMISSION: ICD-10-CM

## 2023-06-19 RX ORDER — ONDANSETRON HYDROCHLORIDE 8 MG/1
8 TABLET, FILM COATED ORAL EVERY 8 HOURS PRN
Qty: 90 TABLET | Refills: 0 | Status: SHIPPED | OUTPATIENT
Start: 2023-06-19 | End: 2023-07-20 | Stop reason: SDUPTHER

## 2023-06-21 ENCOUNTER — INFUSION (OUTPATIENT)
Dept: INFUSION THERAPY | Facility: HOSPITAL | Age: 58
End: 2023-06-21
Attending: INTERNAL MEDICINE
Payer: MEDICARE

## 2023-06-21 ENCOUNTER — OFFICE VISIT (OUTPATIENT)
Dept: HEMATOLOGY/ONCOLOGY | Facility: CLINIC | Age: 58
End: 2023-06-21
Payer: MEDICARE

## 2023-06-21 VITALS
TEMPERATURE: 98 F | OXYGEN SATURATION: 97 % | HEART RATE: 83 BPM | RESPIRATION RATE: 18 BRPM | DIASTOLIC BLOOD PRESSURE: 81 MMHG | SYSTOLIC BLOOD PRESSURE: 133 MMHG

## 2023-06-21 DIAGNOSIS — C50.812 MALIGNANT NEOPLASM OF OVERLAPPING SITES OF BOTH BREASTS IN FEMALE, ESTROGEN RECEPTOR POSITIVE: ICD-10-CM

## 2023-06-21 DIAGNOSIS — C50.811 MALIGNANT NEOPLASM OF OVERLAPPING SITES OF BOTH BREASTS IN FEMALE, ESTROGEN RECEPTOR POSITIVE: ICD-10-CM

## 2023-06-21 DIAGNOSIS — D63.8 ANEMIA OF CHRONIC DISEASE: ICD-10-CM

## 2023-06-21 DIAGNOSIS — K21.9 GASTROESOPHAGEAL REFLUX DISEASE, UNSPECIFIED WHETHER ESOPHAGITIS PRESENT: ICD-10-CM

## 2023-06-21 DIAGNOSIS — E85.9 AMYLOIDOSIS, UNSPECIFIED TYPE: Primary | ICD-10-CM

## 2023-06-21 DIAGNOSIS — Z79.811 AROMATASE INHIBITOR USE: ICD-10-CM

## 2023-06-21 DIAGNOSIS — Z17.0 MALIGNANT NEOPLASM OF OVERLAPPING SITES OF BOTH BREASTS IN FEMALE, ESTROGEN RECEPTOR POSITIVE: ICD-10-CM

## 2023-06-21 DIAGNOSIS — C90.00 MULTIPLE MYELOMA, REMISSION STATUS UNSPECIFIED: Primary | ICD-10-CM

## 2023-06-21 DIAGNOSIS — E85.9 AMYLOIDOSIS, UNSPECIFIED TYPE: ICD-10-CM

## 2023-06-21 PROCEDURE — 99999 PR PBB SHADOW E&M-EST. PATIENT-LVL II: ICD-10-PCS | Mod: PBBFAC,,, | Performed by: INTERNAL MEDICINE

## 2023-06-21 PROCEDURE — 99999 PR PBB SHADOW E&M-EST. PATIENT-LVL II: CPT | Mod: PBBFAC,,, | Performed by: INTERNAL MEDICINE

## 2023-06-21 PROCEDURE — 63600175 PHARM REV CODE 636 W HCPCS: Mod: JZ,TB | Performed by: INTERNAL MEDICINE

## 2023-06-21 PROCEDURE — 99215 OFFICE O/P EST HI 40 MIN: CPT | Mod: S$PBB,,, | Performed by: INTERNAL MEDICINE

## 2023-06-21 PROCEDURE — 25000003 PHARM REV CODE 250: Performed by: INTERNAL MEDICINE

## 2023-06-21 PROCEDURE — 99212 OFFICE O/P EST SF 10 MIN: CPT | Mod: PBBFAC | Performed by: INTERNAL MEDICINE

## 2023-06-21 PROCEDURE — 99215 PR OFFICE/OUTPT VISIT, EST, LEVL V, 40-54 MIN: ICD-10-PCS | Mod: S$PBB,,, | Performed by: INTERNAL MEDICINE

## 2023-06-21 PROCEDURE — 96401 CHEMO ANTI-NEOPL SQ/IM: CPT

## 2023-06-21 RX ORDER — ACETAMINOPHEN 325 MG/1
650 TABLET ORAL
Status: COMPLETED | OUTPATIENT
Start: 2023-06-21 | End: 2023-06-21

## 2023-06-21 RX ORDER — DIPHENHYDRAMINE HYDROCHLORIDE 50 MG/ML
50 INJECTION INTRAMUSCULAR; INTRAVENOUS ONCE AS NEEDED
Status: CANCELLED | OUTPATIENT
Start: 2023-06-21

## 2023-06-21 RX ORDER — EPINEPHRINE 0.3 MG/.3ML
0.3 INJECTION SUBCUTANEOUS ONCE AS NEEDED
Status: CANCELLED | OUTPATIENT
Start: 2023-06-21

## 2023-06-21 RX ORDER — ACETAMINOPHEN 325 MG/1
650 TABLET ORAL
Status: CANCELLED | OUTPATIENT
Start: 2023-06-21

## 2023-06-21 RX ORDER — HEPARIN 100 UNIT/ML
500 SYRINGE INTRAVENOUS
Status: CANCELLED | OUTPATIENT
Start: 2023-06-21

## 2023-06-21 RX ORDER — DIPHENHYDRAMINE HCL 25 MG
25 CAPSULE ORAL
Status: CANCELLED | OUTPATIENT
Start: 2023-06-21

## 2023-06-21 RX ORDER — DIPHENHYDRAMINE HYDROCHLORIDE 50 MG/ML
50 INJECTION INTRAMUSCULAR; INTRAVENOUS ONCE AS NEEDED
Status: DISCONTINUED | OUTPATIENT
Start: 2023-06-21 | End: 2023-06-21 | Stop reason: HOSPADM

## 2023-06-21 RX ORDER — HEPARIN 100 UNIT/ML
500 SYRINGE INTRAVENOUS
Status: DISCONTINUED | OUTPATIENT
Start: 2023-06-21 | End: 2023-06-21 | Stop reason: HOSPADM

## 2023-06-21 RX ORDER — EPINEPHRINE 0.3 MG/.3ML
0.3 INJECTION SUBCUTANEOUS ONCE AS NEEDED
Status: DISCONTINUED | OUTPATIENT
Start: 2023-06-21 | End: 2023-06-21 | Stop reason: HOSPADM

## 2023-06-21 RX ORDER — SODIUM CHLORIDE 0.9 % (FLUSH) 0.9 %
10 SYRINGE (ML) INJECTION
Status: CANCELLED | OUTPATIENT
Start: 2023-06-21

## 2023-06-21 RX ORDER — DIPHENHYDRAMINE HCL 25 MG
25 CAPSULE ORAL
Status: COMPLETED | OUTPATIENT
Start: 2023-06-21 | End: 2023-06-21

## 2023-06-21 RX ORDER — SODIUM CHLORIDE 0.9 % (FLUSH) 0.9 %
10 SYRINGE (ML) INJECTION
Status: DISCONTINUED | OUTPATIENT
Start: 2023-06-21 | End: 2023-06-21 | Stop reason: HOSPADM

## 2023-06-21 RX ORDER — OMEPRAZOLE 20 MG/1
CAPSULE, DELAYED RELEASE ORAL
Qty: 30 CAPSULE | Refills: 11 | Status: SHIPPED | OUTPATIENT
Start: 2023-06-21

## 2023-06-21 RX ADMIN — DIPHENHYDRAMINE HYDROCHLORIDE 25 MG: 25 CAPSULE ORAL at 04:06

## 2023-06-21 RX ADMIN — ACETAMINOPHEN 650 MG: 325 TABLET ORAL at 04:06

## 2023-06-21 RX ADMIN — DARATUMUMAB AND HYALURONIDASE-FIHJ (HUMAN RECOMBINANT) 1800 MG: 1800; 30000 INJECTION SUBCUTANEOUS at 04:06

## 2023-06-21 NOTE — PROGRESS NOTES
HEMATOLOGY/ONCOLOGY OFFICE CLINIC VISIT    Visit Information:    NO SHOW/RESCHEDULE  08/21/2018--> Rescheduled     11/05/2019--> No Show/Reschedule  06/24/2020--> No Show/Reschedule  09/24/2020--> No Show/Reschedule  03/15/2021--> No Show/Reschedule  03/23/2021--> No Show/Reschedule  04/13/2021--> No Show  05/27/2021--> No Show  08/19/2021--> No Show  08/26/2021--> No Show/Rescheduled  04/26/2022--> No Show  10/03/2022--> Rescheduled  10/11/2022--> Rescheduled  03/22/2023--> No Show/Rescheduled  4/26/2023->Rescheduled  05/09/2023->Rescheduled  5/30/2023->Rescheduled  6/15/2023->Rescheduled      Referring Physician: DR. Cardona  PCP: Dr. Chacon  GI:   Rheumatologist: Dr. Luis Rea  Immunologist: Dr. Daniel Nava  ENT: Dr. Brice Williamson  [facility] Pain management: Dr.Chai [facility] Transplant: Dr. Adrian Naylor [facility] [place] [facility] Onc: Dr. Zulema [facility] Breast Surg Onc: Dr.DeSnyder LONG Miguel: Dr. 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   8) Stage IA grade 3, ER+, HER2 BERNA +,  IDC/DCIS of the left breast --- 2/7/18 at [facility]   --s/p lumpectomy with SLNB 4/12, Grade 2 IDC 4mm with second focus of microinvasive carcinoma 0.4mm. 2 SLN negative for malignancy    9) Progressive swelling of R lower extremity--- US NIVA done 2/19/18 negative for evidence of DVT  10). Paroxysmal Afib (2/18/18) diagnosed at [facility]; ECHO noted EF 58% - on Eliquis  11) Mild CKD with GFR 55 - managed  per [facility] nephrology  12) Treatment induced neutropenia     Present treatment:  -Maintenance Revlimid 5mg PO QOD, started 02/16/17-- was held for a few months while undergoing treatment for her breast cancer 02/18-05/18; Restarted 6/15/18   Dexamethasone 20 mg po weekly added early July 2017--> reduced to 12 mg PO weekly October 4, 2017---> increased to 16mg PO weekly 2/15/18---restarted 6/15/18 --> 20 mg weekly 7//8/2022  Darzalex 7/8/2022-present  Femara 2.5 mg PO daily   Eliquis 2.5mg PO BID      Treatment/Oncology history:  1) CyBorD x5 cycles 09/28/15-12/24/15  2) Autologous stem cell transplant 02/08/16, done at [facility]  3) Exploratory laparotomy with subtotal colectomy and end ileostomy done August 2, 2016--pathology specimen showed advanced colonic amyloidosis extensive involving the muscular wall submucosa and mucosa.  Appendix also involvement by amyloidosis with fibrous obliteration of the distal lumen.  4) Maintenance therapy with Revlimid 5mg-started 06/01/16--Discontinued shortly after 2/2 cytopenias  5) Left breast lumpectomy with SLNB at [facility] 4/12/18  6) Left partial breast RT x10 fractions  5/21/18-6/4/1      Imaging:  NIVA 7/29/2021: Negative for deep venous thrombosis in the left lower extremity.   MMG 3/21/2022: BI-RADS Category 2: Benign Finding(s)  MRI CTL spine 9/14/2022: No acute myelomatous findings. Severe spinal stenosis process detected within the upper cervical spine as well as the entirety of the lumbar spine segments similar to the prior imaging assessment.    CT C 1/24/2023: Right greater than left lower lobe opacification consistent with infectious process.  Lucent lesions throughout the osseous structures similar to 2015.    Pathology:    CLINICAL HISTORY:       Patient: Ninfa Candelario is a 57 y.o. female.  Ms. Cabrera Joseph was admitted on 08/16/15 for ileus versus partial SBO. CT A/P done 08/17/15 showed sigmoid colitis and a zone of transition at the junction of the  descending and sigmoid colon which could indicate some degree of obstruction and an obstructing mass cannot be excluded. Numerous skeletal lytic lesions noted. CT chest done 08/19/15 showed Multiple skeletal lytic lesions involving the thoracic spine, left rib sternum consistent with either metastases or multiple myeloma. There is no evidence of pulmonary or mediastinal mass. Dr. Farr was consulted for possible MM/anemia.     Nuclear Bone scan done 8/20/15 showed mild to moderate increased activity in left rib and right second rib which correlates with fractures seen on CT.  Skeletal survey done 8/20/15showed lytic lesions in the calvarium and probably a lytic lesion in the left scapula. Lytic areas in the spine and pelvis seen on recent CT are not well defined on skeletal survey. Work up labs done 08/20/15: Negative for sickle cell and Thalessemia (reported history of thalessemia). Iron 54, Transferrin 118.0, Iron sat 34.6%, Folate 26.5, Vit B12 1,779  Peripheral smear resulted slightly macrocytic normochromic anemia without signifcant anisocytosis may reflect early B12/folate deficiency or marrow dysfunction, among others. No immature myeloid cells or blasts. Platlets adequate. Beta 2 mircoglobulin = 3.2.  SPEP/NADIA: M-spike in the beta region. The monoclonal protein peak accounts for 1.13 g/dL. Hypogammaglobulinemia. NADIA pattern shows the presence of a free lambda light chain monoclonal protein with additional faint band in IgA.  All immunoglobulin levels decreased. IgA=26, IgG=307, IgM<5.  Kappa Qnt 0.16, Lambda Qnt 4600.00, Ratio <0.01.  24hr Urine for Bence Mendez: Kappa 2.75, Lambda 1250.00, Ratio <0.01. NADIA: Urine is POSITIVE for monoclonal Free Lambda Light chains     [facility] biopsy on 09/18/15. BMBx showed 80% plasma cells, 13p deletion and normal karyotype. She underwent fat pad biopsy which came back positive for Congo red consistent with  amyloidosis. Cardiac workup revealed no amyloid deposition within the heart.  PET/CT and skeletal survey done September 18, 2015 showed multiple lytic osseous lesions  The patient was started on Velcade while at [facility] with the plan to transition to autologous stem cell transplantation. They asked if we could give her could continue treatment here.   She completed CyborD x5 cycles on 12/24/15     Patient admitted 01/06/16 for colitis and C. Diff. Pt treated with Flagyl. Discharged home 01/08/16     Repeat BMBx done at [facility] 01/2016 did not show any morphologic or immnophenotypic evidence of plasama cell neoplasm. Patient underwent Autologous stem cell transplant with Busulfan and melphalan on 02/08/16 at [facility]. The only complication was recurrent C.diff infection for which she completed 10 days of Fidaxomycin.     Follow-up bone marrow biopsy done at [facility] done 5/16/16 showed cellular 30-40% bone marrow with trilineage hematopoiesis. No morphologic or immunophenotypic support for plasma cell neoplasm. Started maintenance Revlimid 5mg. Unable to continue therapy due to cytopenias.     On 08/02/16 patient underwent  Exploratory laparotomy with subtotal colectomy and end ileostomy for what was thought to be toxic megacolon. Pathology showed extensive advanced colonic amyloidosis involving the muscular wall, submucosa and mucosa: With the appendix also involved with amyloidosis.  Positive lambda light chains were noted.     Follow-up at [facility] 8/31/16, Per Dr. Adrian Naylor, 6 months post autologous transplant. She has engrafted. Based on the latest restaging she is near complete remission with possible IgA lambda on serum immunofixation. Patient was instructed to discontinue prophylactic antibiotics. She received her 1st series of immunizations while at [facility]. Patient had a bilateral venous ultrasound to rule out DVT, negative B/L. In regards to amyloidosis the patient feels that her tongue is smaller in  size and her BNP has come down some.  Patient had a follow-up appointment at [facility] November 30, 2016.  Dr. Parnell documented the patient's free lambda light chain remains at a lower level.  Therefore in light of her recent surgery they will continue with observation only.  The plan to see the patient back in 2-3 months with repeat restaging labs.  They recommended regular CBC checks to monitor anemia.  Patient returned to [facility] in December 2016 to meet with dermatology for numerous papillary lesions on eyelids, chest and posterior neck consistent with amyloid deposits. Recommendations were for watchful waiting.  Patient is less than 1 year out from bone marrow transplant and further improvement can be expected.  She will see the patient back in 1 year to discuss possible surgical resection of the plaques especially around the eyelid region.  Rogaine recommended for persistent hair loss. Retin-A recommended for acne vulgaris.  Patient returned to [facility] on 2/8/2017 for neurosurgery consult for chronic low back pain and difficulty walking.  Imaging showed extensive DJD with discovertebral bulges and thickening of the spinal laminar tissues creating spinal stenosis throughout, but greatest at L2/L3.  Neurosurgery felt that surgery risks outweighed the benefits.  Patient was prescribed pain medicine and encouraged to participate in physical therapy.  Patient also met with Dr. Parnell on 02/08/17, who noted an elevation in free light chains (kappa 52.60/lambda 27.77/ratio 1.89).  Recommendation is to resume maintenance therapy with Revlimid at a low dose of 5 mg every other day.  Patient went back to [facility] first week of April 2017.  I do not have these notes.  Reportedly she was told to continue on every other day Revlimid at 5 mg.  She reportedly had repeat myeloma labs done as well.  Again I do not have these results.  Patient went back to [facility] and saw Dr. Parnell June 29, 2017.  Myeloma labs  were done with serum protein electrophoresis showing irregularity in the fast gamma region.  IgG of 1291.  Free kappa light chains of 84.6 with lambda light chains of 66.9.  Beta-2 microglobulin of 4.5  CT scan of lumbar spine showed multiple lytic lesions once again in the lumbar spine and bony pelvis.  Ultrasound of the left leg showed no evidence of DVT.  It was recommended based on the slight increase in her And lambda light chains to start weekly dexamethasone 20 mg p.o. weekly.  Follow-up visit and labs at [facility] on September 29, 2017 with Dr. Parnell.  Repeat beta-2 microglobulin came back at 2.4.  Serum IgG of 761, IgA 117 and IgM of 29.  Serum free kappa light chains of 24.9 and lambda of 23.5.  Counts were stable with hemoglobin of 11.1, WBC 4.9 and platelets of 210,000.  Recommendations were for continued Revlimid every other day with weekly dexamethasone along with aspirin for DVT prophylaxis.  Bilateral diagnostic MMG 12/19/17  noted 1.6cm coarse heterogeneous calcifications in posterior region of L breast at 3 o'clock position. Patient was scheduled for FNA which confirmed ID grade 3, single focus measuring 1.7cm with 2nd focus microinvasion DCIS grade 2 measuring 0.3cm. Left axillary LN biopsy showed no evidence of metastatic carcinoma. Complete staging: Stage IA, grade 3 ER+, Her 2 Niki + IDC/DCIS of left breast. Ki-67 <17%.  Repeat myeloma labs showed rise in free lambda light chains noted at 68.69, kappa light chains at 27.22,  beta 2 microglobulin came back at 2.9. Serum protein electrophoresis showed no definitive evidence of an M protein peak. The pattern is consistent with an acute inflammatory reaction. Recommendations were made to maintain Revlimid at current dose of 5mg every other day to be taken continuously increase weekly dexamethasone to 16 mg.   Patient seen at [facility] with tentative plan for L breast lumpectomy on 3/13/18. 2/16/18- Prior to surgery she was seen by genetic  counselor who ran genetic testing per patient reques, all of which were negative. She was then seen by cardiology at [facility] 2/16/18 for further evaluation of persistent bilateral lower extremity swelling-ECHO noted EF of 58%; diagnosed with paroxysmal atrial fibrillation and instructed to begin daily ASA. During preoperative asssessment per Rad/Onc 3/12/18, corresponding chest CT noted new bilateral pulmonary nodules concerning for metastatic disease- surgery was subsequently postponed pending pulmonary evaluation. Seen by Pulmonology on 3/21/18, PFTs within normal limits and cleared patient for surgery with recommended close CT follow up of nodules in 3 months. 3/21/18 seen by nephrology for management of mild CKD (GFR 55)-cleared for surgery with recommended follow up in 3 months. Left breast lumpectomy and SLNB performed per Dr. Washburn on 4/12/18- plan for follow up in clinic on 4/24/18  for postoperative assessment. Follow up with Dr. Poole (Med/Onc) on 4/25/18. Follow up with Dr. Parnell 4/26/18.   Patient seen at [facility] s/p Left breast lumpectomy with SLNB on 4/12/18. Following surgery she completed Left partial breast radiation x 10 fractions. She was then started on endocrine therapy with Femara after been deemedan inappropriate candidate for systemic chemotherapy/Herceptin.      She was seen by neurosugery at [facility] on 4/26/18 following repeat MRI of cervical thoracic lumbar spine which noted focal enhancement of T2 hyperintensity at the C2 level which may be due to degenerative changes. Signal abnormality within  the T12 and L1 may be secondary to myeloma. Plan to continue with observation for now with F/U scheduled in October 2018.      She was seen by Dr. Parnell at [facility] for management of her MM on 4/26/18. Patient was recommended to restart Revlimid 5mg PO every other day with notes that these recommendations would be communicated to collaborating Oncologist. Patient was also recommended  to start on Zometa for her bone disease.      Seen by Dr. Parnell at [facility] for management of her MM on 6/28/18. Repeat light chains noted lambda 33.36 (previously 80.80), K/L ratio 0.91 (previously 0.49). Due to slight improvement in light chains, plan to continue on lenalidomide maintenance. Recommendations to continue anticoagulation with Eliquis. BLE venous doppler negative for DVT.   Seen by pulmonolgy on 6/28/18- repeat CT chest done 6/28/18 noted resolution of previous pulmonary nodules. Thought to be infectious in nature. Recommendations for discharge from pulmonary clinic.  Should MRI of her cervical thoracic lumbar spine on 2/12/2019 that demonstrated cervical spondylosis with canal stenosis most notable at C1 and 2. Cord signal abnormality at C1 and 2 with enhancement is stable. Lumbar spondylosis with central canal stenosis at multiple levels. No imaging features of bony metastatic disease.     For amyloidosis (Light chain type).    Patient presented with macroglossia and now with improvement of the swelling of her tongue. Her cardiac parameters are also better.   8/10/2020 proBNP  250   2/17/2020                616  8/9/2019                  190        Chief Complaint: fatigue        Interval History:  She is currently on Femara for breast cancer and Rev/Dex/Darzalex  for MM  s/p transplant in 2016. At her [facility] visit on 10/12/22, Ninlaro was discontinued due to it causing severe diarrhea and leukopenia. Diarrhea resolved after stopping Ninlaro (she admits to stopping prior to visit @ [facility]).     She reports occasional n/v and diarrhea with Revlimid, but not severe, is tolerable. She c/o swelling and pain to legs. During physical exam, BLE swellling noted, L>R, left leg red with small fluid filled blisters. Otherwise, she is doing well. No fever, chills or sweats.  No chest pain or shortness of breath.  She uses a walker for mobility.  She continues to do her mammograms and other imaging studies at  [facility]. She also follows with nephrologist @ [facility].    6/21/23: Patient presents today for follow up. She is due for Darzalex today.  Left leg with mild erythema and edema,  blisters have resolved and skin looks much better. She received 14 daily doses of IV Rocephin. She is doing well and voices no complaints today.      ROS: All 14 points ROS taken and as per Interval History  Review of Systems   Constitutional:  Positive for malaise/fatigue. Negative for chills, fever and weight loss.   HENT:  Negative for congestion and nosebleeds.    Eyes:  Negative for blurred vision, double vision and photophobia.   Respiratory:  Negative for cough, hemoptysis and shortness of breath.    Cardiovascular:  Positive for leg swelling. Negative for chest pain, palpitations and PND.   Gastrointestinal:  Negative for abdominal pain, blood in stool, constipation, diarrhea, melena, nausea and vomiting.   Genitourinary:  Negative for dysuria, frequency, hematuria and urgency.   Musculoskeletal:  Negative for back pain, falls and myalgias.   Skin:  Negative for itching and rash.   Neurological:  Positive for weakness. Negative for tremors, focal weakness, seizures and headaches.   Endo/Heme/Allergies:  Negative for environmental allergies. Does not bruise/bleed easily.   Psychiatric/Behavioral:  Negative for depression and suicidal ideas. The patient is not nervous/anxious.        Histories:  PMH/PSH/FH/SOCIAL/ALLERGIES AND MEDS REVIEWED AND UPDATED AS APPROPRIATE       There were no vitals filed for this visit.    Wt Readings from Last 6 Encounters:   06/13/23 82.4 kg (181 lb 10.5 oz)   06/12/23 82.4 kg (181 lb 10.5 oz)   06/08/23 82.4 kg (181 lb 10.5 oz)   06/06/23 82.4 kg (181 lb 10.5 oz)   06/05/23 82.4 kg (181 lb 10.5 oz)   05/31/23 82.4 kg (181 lb 11.2 oz)   Vitals reviewed and stable       Physical Exam  Vitals and nursing note reviewed.   Constitutional:       General: She is not in acute distress.     Appearance:  Normal appearance. She is well-developed. She is ill-appearing.      Comments: Uses walker for mobility   HENT:      Head: Normocephalic and atraumatic.      Mouth/Throat:      Mouth: Mucous membranes are moist.   Eyes:      General: No scleral icterus.     Extraocular Movements: Extraocular movements intact.      Conjunctiva/sclera: Conjunctivae normal.      Pupils: Pupils are equal, round, and reactive to light.   Neck:      Vascular: No JVD.   Cardiovascular:      Rate and Rhythm: Normal rate and regular rhythm.      Heart sounds: No murmur heard.  Pulmonary:      Effort: Pulmonary effort is normal.      Breath sounds: Normal breath sounds. No wheezing or rhonchi.   Abdominal:      General: Bowel sounds are normal. There is no distension.      Palpations: Abdomen is soft. There is no mass.      Tenderness: There is no abdominal tenderness.   Musculoskeletal:         General: No swelling or deformity.      Cervical back: Neck supple.   Lymphadenopathy:      Head:      Right side of head: No submandibular adenopathy.      Left side of head: No submandibular adenopathy.      Cervical: No cervical adenopathy.      Upper Body:      Right upper body: No supraclavicular or axillary adenopathy.      Left upper body: No supraclavicular or axillary adenopathy.      Lower Body: No right inguinal adenopathy. No left inguinal adenopathy.   Skin:     General: Skin is warm.      Coloration: Skin is not jaundiced.      Findings: No lesion or rash.      Nails: There is no clubbing.   Neurological:      General: No focal deficit present.      Mental Status: She is alert and oriented to person, place, and time.      Cranial Nerves: Cranial nerves 2-12 are intact.      Motor: Weakness present.      Gait: Gait abnormal.   Psychiatric:         Attention and Perception: Attention normal.         Behavior: Behavior is cooperative.         Cognition and Memory: Cognition normal.         Judgment: Judgment normal.     ECOG SCORE    2 -  Capable of all selfcare but unable to carry out any work activities, active > 50% of hours         Laboratory:  CBC with Differential:  Lab Results   Component Value Date    WBC 4.30 (L) 06/21/2023    RBC 3.48 (L) 06/21/2023    HGB 10.2 (L) 06/21/2023    HCT 33.2 (L) 06/21/2023    MCV 95.4 (H) 06/21/2023    MCH 29.3 06/21/2023    MCHC 30.7 (L) 06/21/2023    RDW 14.1 06/21/2023     06/21/2023    MPV 9.3 06/21/2023        CMP:  Sodium Level   Date Value Ref Range Status   05/18/2023 142 136 - 145 mmol/L Final     Potassium Level   Date Value Ref Range Status   05/18/2023 3.9 3.5 - 5.1 mmol/L Final     Carbon Dioxide   Date Value Ref Range Status   05/18/2023 24 22 - 29 mmol/L Final     Blood Urea Nitrogen   Date Value Ref Range Status   05/18/2023 35.2 (H) 9.8 - 20.1 mg/dL Final     Creatinine   Date Value Ref Range Status   05/18/2023 2.69 (H) 0.55 - 1.02 mg/dL Final     Calcium Level Total   Date Value Ref Range Status   05/18/2023 9.2 8.4 - 10.2 mg/dL Final     Albumin Level   Date Value Ref Range Status   05/18/2023 3.2 (L) 3.5 - 5.0 g/dL Final     Bilirubin Total   Date Value Ref Range Status   05/18/2023 0.2 <=1.5 mg/dL Final     Alkaline Phosphatase   Date Value Ref Range Status   05/18/2023 83 40 - 150 unit/L Final     Aspartate Aminotransferase   Date Value Ref Range Status   05/18/2023 23 5 - 34 unit/L Final     Alanine Aminotransferase   Date Value Ref Range Status   05/18/2023 14 0 - 55 unit/L Final     Estimated GFR-Non    Date Value Ref Range Status   04/20/2022 25           Assessment:       1. Multiple myeloma, remission status unspecified    2. Malignant neoplasm of overlapping sites of both breasts in female, estrogen receptor positive    3. Anemia of chronic disease    4. Amyloidosis, unspecified type    5. 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 Sx risks do not outweigh benefits. Pain meds given and encouraged Physical Therpay  8) Amyloid plaques, evaluated by Derm at [facility], recommend watchful waiting. Patient to follow up 12/2017  9) Stage IA grade 3, ER+, HER2 BERNA +,  IDC/DCIS of the left breast --- 2/7/18 at [facility]; s/p lumpectomy with SLNB 4/12, Grade 2 IDC measuring 4mm with second focus of microinvasive carcinoma measuring  0.4mm .IG DCIS, 0.3mm to posterior margin; 2 SLN negative for malignancy    10) Progressive swelling of R lower extremity--- US NIVA done 2/19/18 negative for evidence of DVT  11). Paroxysmal Afib (2/18/18) diagnosed at [facility]; ECHO noted EF 58%  12. Mild CKD with GFR 55 - managed per [facility] nephrology  13). Pulmonary nodules noted on pre-operative chest CT scan (3/12/18) noted new bilateral pulmonary nodules are nonspecific -- seen by Pulmonology at [facility] (3/21/18) thought to be inflammatory/infectious in nature, repeat Chest CT 6/28/18 noted resolution of nodules  14). Treatment induced neutropenia        Plan:     Left leg improved but still with very mild edema (slightly > Right leg), blisters have resolved, no open wound  Okay to proceed with Darzalex today and every 4 weeks  RTC in 4 weeks with NP for TD/MM lab and infusion all same day  Instructed to call PCP if leg symptoms occur again  Continue K+ BID    Encourage to call or message us for any questions or problems  The patient was given ample opportunity to ask questions, and to the best of my abilities, all questions  answered to satisfaction; patient demonstrated understanding of what we discussed and agreeable to the plan.     Natalie Meyers MD  Hematology/Oncology      Professional Services   I, Jagruti Booker LPN, acted solely as a scribe for and in the presence of Dr. Natalie Meyers, who performed these services.

## 2023-06-23 DIAGNOSIS — C50.819 OVERLAPPING MALIGNANT NEOPLASM OF FEMALE BREAST, UNSPECIFIED ESTROGEN RECEPTOR STATUS, UNSPECIFIED LATERALITY: ICD-10-CM

## 2023-06-23 DIAGNOSIS — C90.00 MULTIPLE MYELOMA, REMISSION STATUS UNSPECIFIED: ICD-10-CM

## 2023-06-23 DIAGNOSIS — C90.01 MULTIPLE MYELOMA IN REMISSION: ICD-10-CM

## 2023-06-23 RX ORDER — LENALIDOMIDE 5 MG/1
5 CAPSULE ORAL EVERY OTHER DAY
Qty: 14 EACH | Refills: 0 | Status: SHIPPED | OUTPATIENT
Start: 2023-06-23 | End: 2023-06-28

## 2023-06-27 DIAGNOSIS — I10 HYPERTENSION, UNSPECIFIED TYPE: Primary | ICD-10-CM

## 2023-06-27 DIAGNOSIS — E78.2 MIXED HYPERLIPIDEMIA: ICD-10-CM

## 2023-06-28 ENCOUNTER — TELEPHONE (OUTPATIENT)
Dept: ADMINISTRATIVE | Facility: HOSPITAL | Age: 58
End: 2023-06-28
Payer: MEDICARE

## 2023-06-28 DIAGNOSIS — C90.00 MULTIPLE MYELOMA, REMISSION STATUS UNSPECIFIED: ICD-10-CM

## 2023-06-28 DIAGNOSIS — C50.819 OVERLAPPING MALIGNANT NEOPLASM OF FEMALE BREAST, UNSPECIFIED ESTROGEN RECEPTOR STATUS, UNSPECIFIED LATERALITY: ICD-10-CM

## 2023-06-28 DIAGNOSIS — C90.01 MULTIPLE MYELOMA IN REMISSION: ICD-10-CM

## 2023-06-28 RX ORDER — LENALIDOMIDE 5 MG/1
5 CAPSULE ORAL EVERY OTHER DAY
Qty: 14 EACH | Refills: 0 | Status: SHIPPED | OUTPATIENT
Start: 2023-06-28 | End: 2023-07-12 | Stop reason: SDUPTHER

## 2023-06-28 RX ORDER — LENALIDOMIDE 5 MG/1
5 CAPSULE ORAL EVERY OTHER DAY
COMMUNITY
End: 2023-06-28

## 2023-06-28 NOTE — TELEPHONE ENCOUNTER
----- Message from Dana Roberts MA sent at 6/27/2023  4:36 PM CDT -----  Regarding: Dr URIEL WILKINS Thursday 7-6-23       1. Are there any outstanding Labs, imaging or referrals in the patient's chart?      Wellness Appointment    Fasting wellness labs ordered and ready to do.     Last Wellness 6-30-22           2. . Has the patient been seen in an ER, urgent care clinic, or any other health care    provider since their last visit? If yes when and where?

## 2023-07-12 DIAGNOSIS — C90.01 MULTIPLE MYELOMA IN REMISSION: ICD-10-CM

## 2023-07-12 DIAGNOSIS — C90.00 MULTIPLE MYELOMA, REMISSION STATUS UNSPECIFIED: ICD-10-CM

## 2023-07-12 DIAGNOSIS — C50.819 OVERLAPPING MALIGNANT NEOPLASM OF FEMALE BREAST, UNSPECIFIED ESTROGEN RECEPTOR STATUS, UNSPECIFIED LATERALITY: ICD-10-CM

## 2023-07-12 RX ORDER — LENALIDOMIDE 5 MG/1
5 CAPSULE ORAL EVERY OTHER DAY
Qty: 14 EACH | Refills: 0 | Status: SHIPPED | OUTPATIENT
Start: 2023-07-12 | End: 2023-08-28 | Stop reason: SDUPTHER

## 2023-07-20 DIAGNOSIS — C90.00 MULTIPLE MYELOMA NOT HAVING ACHIEVED REMISSION: ICD-10-CM

## 2023-07-20 RX ORDER — ONDANSETRON HYDROCHLORIDE 8 MG/1
8 TABLET, FILM COATED ORAL EVERY 8 HOURS PRN
Qty: 90 TABLET | Refills: 0 | Status: SHIPPED | OUTPATIENT
Start: 2023-07-20 | End: 2023-08-17 | Stop reason: SDUPTHER

## 2023-07-21 DIAGNOSIS — R09.81 SINUS CONGESTION: Primary | ICD-10-CM

## 2023-07-21 RX ORDER — LEVOCETIRIZINE DIHYDROCHLORIDE 5 MG/1
TABLET, FILM COATED ORAL
Qty: 30 TABLET | Refills: 6 | Status: SHIPPED | OUTPATIENT
Start: 2023-07-21 | End: 2023-09-26 | Stop reason: SDUPTHER

## 2023-08-03 ENCOUNTER — OFFICE VISIT (OUTPATIENT)
Dept: HEMATOLOGY/ONCOLOGY | Facility: CLINIC | Age: 58
End: 2023-08-03
Payer: MEDICARE

## 2023-08-03 ENCOUNTER — INFUSION (OUTPATIENT)
Dept: INFUSION THERAPY | Facility: HOSPITAL | Age: 58
End: 2023-08-03
Attending: INTERNAL MEDICINE
Payer: MEDICARE

## 2023-08-03 VITALS
HEIGHT: 63 IN | BODY MASS INDEX: 33.17 KG/M2 | WEIGHT: 187.19 LBS | WEIGHT: 187.19 LBS | TEMPERATURE: 98 F | HEART RATE: 97 BPM | HEIGHT: 63 IN | DIASTOLIC BLOOD PRESSURE: 79 MMHG | BODY MASS INDEX: 33.17 KG/M2 | SYSTOLIC BLOOD PRESSURE: 119 MMHG | OXYGEN SATURATION: 95 %

## 2023-08-03 DIAGNOSIS — E85.9 AMYLOIDOSIS, UNSPECIFIED TYPE: Primary | ICD-10-CM

## 2023-08-03 DIAGNOSIS — C90.00 MULTIPLE MYELOMA, REMISSION STATUS UNSPECIFIED: Primary | ICD-10-CM

## 2023-08-03 DIAGNOSIS — E85.9 AMYLOIDOSIS, UNSPECIFIED TYPE: ICD-10-CM

## 2023-08-03 PROCEDURE — 99215 OFFICE O/P EST HI 40 MIN: CPT | Mod: S$PBB,,, | Performed by: NURSE PRACTITIONER

## 2023-08-03 PROCEDURE — 99999 PR PBB SHADOW E&M-EST. PATIENT-LVL V: ICD-10-PCS | Mod: PBBFAC,,, | Performed by: NURSE PRACTITIONER

## 2023-08-03 PROCEDURE — 96401 CHEMO ANTI-NEOPL SQ/IM: CPT

## 2023-08-03 PROCEDURE — 99215 OFFICE O/P EST HI 40 MIN: CPT | Mod: PBBFAC | Performed by: NURSE PRACTITIONER

## 2023-08-03 PROCEDURE — 25000003 PHARM REV CODE 250: Performed by: NURSE PRACTITIONER

## 2023-08-03 PROCEDURE — 99215 PR OFFICE/OUTPT VISIT, EST, LEVL V, 40-54 MIN: ICD-10-PCS | Mod: S$PBB,,, | Performed by: NURSE PRACTITIONER

## 2023-08-03 PROCEDURE — 99999 PR PBB SHADOW E&M-EST. PATIENT-LVL V: CPT | Mod: PBBFAC,,, | Performed by: NURSE PRACTITIONER

## 2023-08-03 PROCEDURE — 63600175 PHARM REV CODE 636 W HCPCS: Mod: JZ,TB | Performed by: NURSE PRACTITIONER

## 2023-08-03 RX ORDER — ACETAMINOPHEN 325 MG/1
650 TABLET ORAL
Status: COMPLETED | OUTPATIENT
Start: 2023-08-03 | End: 2023-08-03

## 2023-08-03 RX ORDER — HEPARIN 100 UNIT/ML
500 SYRINGE INTRAVENOUS
Status: CANCELLED | OUTPATIENT
Start: 2023-08-03

## 2023-08-03 RX ORDER — SODIUM CHLORIDE 0.9 % (FLUSH) 0.9 %
10 SYRINGE (ML) INJECTION
Status: DISCONTINUED | OUTPATIENT
Start: 2023-08-03 | End: 2023-08-03 | Stop reason: HOSPADM

## 2023-08-03 RX ORDER — DIPHENHYDRAMINE HCL 25 MG
25 CAPSULE ORAL
Status: COMPLETED | OUTPATIENT
Start: 2023-08-03 | End: 2023-08-03

## 2023-08-03 RX ORDER — HEPARIN 100 UNIT/ML
500 SYRINGE INTRAVENOUS
Status: DISCONTINUED | OUTPATIENT
Start: 2023-08-03 | End: 2023-08-03 | Stop reason: HOSPADM

## 2023-08-03 RX ORDER — DIPHENHYDRAMINE HCL 25 MG
25 CAPSULE ORAL
Status: CANCELLED | OUTPATIENT
Start: 2023-08-03

## 2023-08-03 RX ORDER — DIPHENHYDRAMINE HYDROCHLORIDE 50 MG/ML
50 INJECTION INTRAMUSCULAR; INTRAVENOUS ONCE AS NEEDED
Status: CANCELLED | OUTPATIENT
Start: 2023-08-03

## 2023-08-03 RX ORDER — SODIUM CHLORIDE 0.9 % (FLUSH) 0.9 %
10 SYRINGE (ML) INJECTION
Status: CANCELLED | OUTPATIENT
Start: 2023-08-03

## 2023-08-03 RX ORDER — EPINEPHRINE 0.3 MG/.3ML
0.3 INJECTION SUBCUTANEOUS ONCE AS NEEDED
Status: CANCELLED | OUTPATIENT
Start: 2023-08-03

## 2023-08-03 RX ORDER — EPINEPHRINE 0.3 MG/.3ML
0.3 INJECTION SUBCUTANEOUS ONCE AS NEEDED
Status: DISCONTINUED | OUTPATIENT
Start: 2023-08-03 | End: 2023-08-03 | Stop reason: HOSPADM

## 2023-08-03 RX ORDER — DIPHENHYDRAMINE HYDROCHLORIDE 50 MG/ML
50 INJECTION INTRAMUSCULAR; INTRAVENOUS ONCE AS NEEDED
Status: DISCONTINUED | OUTPATIENT
Start: 2023-08-03 | End: 2023-08-03 | Stop reason: HOSPADM

## 2023-08-03 RX ORDER — ACETAMINOPHEN 325 MG/1
650 TABLET ORAL
Status: CANCELLED | OUTPATIENT
Start: 2023-08-03

## 2023-08-03 RX ADMIN — DARATUMUMAB AND HYALURONIDASE-FIHJ (HUMAN RECOMBINANT) 1800 MG: 1800; 30000 INJECTION SUBCUTANEOUS at 02:08

## 2023-08-03 RX ADMIN — ACETAMINOPHEN 650 MG: 325 TABLET ORAL at 02:08

## 2023-08-03 RX ADMIN — DIPHENHYDRAMINE HYDROCHLORIDE 25 MG: 25 CAPSULE ORAL at 02:08

## 2023-08-03 NOTE — PROGRESS NOTES
HEMATOLOGY/ONCOLOGY OFFICE CLINIC VISIT    Visit Information:    NO SHOW/RESCHEDULE  08/21/2018--> Rescheduled     11/05/2019--> No Show/Reschedule  06/24/2020--> No Show/Reschedule  09/24/2020--> No Show/Reschedule  03/15/2021--> No Show/Reschedule  03/23/2021--> No Show/Reschedule  04/13/2021--> No Show  05/27/2021--> No Show  08/19/2021--> No Show  08/26/2021--> No Show/Rescheduled  04/26/2022--> No Show  10/03/2022--> Rescheduled  10/11/2022--> Rescheduled  03/22/2023--> No Show/Rescheduled  4/26/2023->Rescheduled  05/09/2023->Rescheduled  5/30/2023->Rescheduled  6/15/2023->Rescheduled      Referring Physician: DR. Cardona  PCP: Dr. Chacon  GI:   Rheumatologist: Dr. Luis Rea  Immunologist: Dr. Daniel Nava  ENT: Dr. Brice Williamson  [facility] Pain management: Dr.Chai [facility] Transplant: Dr. Adrian Naylor MD [place] Med Onc: Dr. Zulema [facility] Breast Surg Onc: Dr.DeSnyder LONG Miguel: Dr. 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   8) Stage IA grade 3, ER+, HER2 BERNA +,  IDC/DCIS of the left breast --- 2/7/18 at [facility]   --s/p lumpectomy with SLNB 4/12, Grade 2 IDC 4mm with second focus of microinvasive carcinoma 0.4mm. 2 SLN negative for malignancy    9) Progressive swelling of R lower extremity--- US NIVA done 2/19/18 negative for evidence of DVT  10). Paroxysmal Afib (2/18/18) diagnosed at [facility]; ECHO noted EF 58% - on Eliquis  11) Mild CKD with GFR 55 - managed  per [facility] nephrology  12) Treatment induced neutropenia     Present treatment:  -Maintenance Revlimid 5mg PO QOD, started 02/16/17-- was held for a few months while undergoing treatment for her breast cancer 02/18-05/18; Restarted 6/15/18   Dexamethasone 20 mg po weekly added early July 2017--> reduced to 12 mg PO weekly October 4, 2017---> increased to 16mg PO weekly 2/15/18---restarted 6/15/18 --> 20 mg weekly 7//8/2022  Darzalex 7/8/2022-present  Femara 2.5 mg PO daily   Eliquis 2.5mg PO BID      Treatment/Oncology history:  1) CyBorD x5 cycles 09/28/15-12/24/15  2) Autologous stem cell transplant 02/08/16, done at [facility]  3) Exploratory laparotomy with subtotal colectomy and end ileostomy done August 2, 2016--pathology specimen showed advanced colonic amyloidosis extensive involving the muscular wall submucosa and mucosa.  Appendix also involvement by amyloidosis with fibrous obliteration of the distal lumen.  4) Maintenance therapy with Revlimid 5mg-started 06/01/16--Discontinued shortly after 2/2 cytopenias  5) Left breast lumpectomy with SLNB at [facility] 4/12/18  6) Left partial breast RT x10 fractions  5/21/18-6/4/1      Imaging:  NIVA 7/29/2021: Negative for deep venous thrombosis in the left lower extremity.   MMG 3/21/2022: BI-RADS Category 2: Benign Finding(s)  MRI CTL spine 9/14/2022: No acute myelomatous findings. Severe spinal stenosis process detected within the upper cervical spine as well as the entirety of the lumbar spine segments similar to the prior imaging assessment.    CT C 1/24/2023: Right greater than left lower lobe opacification consistent with infectious process.  Lucent lesions throughout the osseous structures similar to 2015.    Pathology:    CLINICAL HISTORY:       Patient: Ninfa Candelario is a 57 y.o. female.  Ms. Cabrera Joseph was admitted on 08/16/15 for ileus versus partial SBO. CT A/P done 08/17/15 showed sigmoid colitis and a zone of transition at the junction of the  descending and sigmoid colon which could indicate some degree of obstruction and an obstructing mass cannot be excluded. Numerous skeletal lytic lesions noted. CT chest done 08/19/15 showed Multiple skeletal lytic lesions involving the thoracic spine, left rib sternum consistent with either metastases or multiple myeloma. There is no evidence of pulmonary or mediastinal mass. Dr. Farr was consulted for possible MM/anemia.     Nuclear Bone scan done 8/20/15 showed mild to moderate increased activity in left rib and right second rib which correlates with fractures seen on CT.  Skeletal survey done 8/20/15showed lytic lesions in the calvarium and probably a lytic lesion in the left scapula. Lytic areas in the spine and pelvis seen on recent CT are not well defined on skeletal survey. Work up labs done 08/20/15: Negative for sickle cell and Thalessemia (reported history of thalessemia). Iron 54, Transferrin 118.0, Iron sat 34.6%, Folate 26.5, Vit B12 1,779  Peripheral smear resulted slightly macrocytic normochromic anemia without signifcant anisocytosis may reflect early B12/folate deficiency or marrow dysfunction, among others. No immature myeloid cells or blasts. Platlets adequate. Beta 2 mircoglobulin = 3.2.  SPEP/NADIA: M-spike in the beta region. The monoclonal protein peak accounts for 1.13 g/dL. Hypogammaglobulinemia. NADIA pattern shows the presence of a free lambda light chain monoclonal protein with additional faint band in IgA.  All immunoglobulin levels decreased. IgA=26, IgG=307, IgM<5.  Kappa Qnt 0.16, Lambda Qnt 4600.00, Ratio <0.01.  24hr Urine for Bence Mendez: Kappa 2.75, Lambda 1250.00, Ratio <0.01. NADIA: Urine is POSITIVE for monoclonal Free Lambda Light chains     [facility]18[facility]. BMBx showed 80% plasma cells, 13p deletion and normal karyotype. She underwent fat pad biopsy which came back positive for Congo red consistent with  amyloidosis. Cardiac workup revealed no amyloid deposition within the heart.  PET/CT and skeletal survey done September 18, 2015 showed multiple lytic osseous lesions  The patient was started on Velcade while at [facility] with the plan to transition to autologous stem cell transplantation. They asked if we could give her could continue treatment here.   She completed CyborD x5 cycles on 12/24/15     Patient admitted 01/06/16 for colitis and C. Diff. Pt treated with Flagyl. Discharged home 01/08/16     Repeat BMBx done at [facility] 01/2016 did not show any morphologic or immnophenotypic evidence of plasama cell neoplasm. Patient underwent Autologous stem cell transplant with Busulfan and melphalan on 02/08/16 at [facility]. The only complication was recurrent C.diff infection for which she completed 10 days of Fidaxomycin.     Follow-up bone marrow biopsy done at [facility] done 5/16/16 showed cellular 30-40% bone marrow with trilineage hematopoiesis. No morphologic or immunophenotypic support for plasma cell neoplasm. Started maintenance Revlimid 5mg. Unable to continue therapy due to cytopenias.     On 08/02/16 patient underwent  Exploratory laparotomy with subtotal colectomy and end ileostomy for what was thought to be toxic megacolon. Pathology showed extensive advanced colonic amyloidosis involving the muscular wall, submucosa and mucosa: With the appendix also involved with amyloidosis.  Positive lambda light chains were noted.     Follow-up at [facility] 8/31/16, Per Dr. Adrian Naylor, 6 months post autologous transplant. She has engrafted. Based on the latest restaging she is near complete remission with possible IgA lambda on serum immunofixation. Patient was instructed to discontinue prophylactic antibiotics. She received her 1st series of immunizations while at [facility]. Patient had a bilateral venous ultrasound to rule out DVT, negative B/L. In regards to amyloidosis the patient feels that her tongue is smaller in  size and her BNP has come down some.  Patient had a follow-up appointment at [facility] November 30, 2016.  Dr. Parnell documented the patient's free lambda light chain remains at a lower level.  Therefore in light of her recent surgery they will continue with observation only.  The plan to see the patient back in 2-3 months with repeat restaging labs.  They recommended regular CBC checks to monitor anemia.  Patient returned to [facility] in December 2016 to meet with dermatology for numerous papillary lesions on eyelids, chest and posterior neck consistent with amyloid deposits. Recommendations were for watchful waiting.  Patient is less than 1 year out from bone marrow transplant and further improvement can be expected.  She will see the patient back in 1 year to discuss possible surgical resection of the plaques especially around the eyelid region.  Rogaine recommended for persistent hair loss. Retin-A recommended for acne vulgaris.  Patient returned to [facility] on 2/8/2017 for neurosurgery consult for chronic low back pain and difficulty walking.  Imaging showed extensive DJD with discovertebral bulges and thickening of the spinal laminar tissues creating spinal stenosis throughout, but greatest at L2/L3.  Neurosurgery felt that surgery risks outweighed the benefits.  Patient was prescribed pain medicine and encouraged to participate in physical therapy.  Patient also met with Dr. Parnell on 02/08/17, who noted an elevation in free light chains (kappa 52.60/lambda 27.77/ratio 1.89).  Recommendation is to resume maintenance therapy with Revlimid at a low dose of 5 mg every other day.  Patient went back to [facility] first week of April 2017.  I do not have these notes.  Reportedly she was told to continue on every other day Revlimid at 5 mg.  She reportedly had repeat myeloma labs done as well.  Again I do not have these results.  Patient went back to [facility] and saw Dr. Parnell June 29, 2017.  Myeloma labs  were done with serum protein electrophoresis showing irregularity in the fast gamma region.  IgG of 1291.  Free kappa light chains of 84.6 with lambda light chains of 66.9.  Beta-2 microglobulin of 4.5  CT scan of lumbar spine showed multiple lytic lesions once again in the lumbar spine and bony pelvis.  Ultrasound of the left leg showed no evidence of DVT.  It was recommended based on the slight increase in her And lambda light chains to start weekly dexamethasone 20 mg p.o. weekly.  Follow-up visit and labs at [facility] on September 29, 2017 with Dr. Parnell.  Repeat beta-2 microglobulin came back at 2.4.  Serum IgG of 761, IgA 117 and IgM of 29.  Serum free kappa light chains of 24.9 and lambda of 23.5.  Counts were stable with hemoglobin of 11.1, WBC 4.9 and platelets of 210,000.  Recommendations were for continued Revlimid every other day with weekly dexamethasone along with aspirin for DVT prophylaxis.  Bilateral diagnostic MMG 12/19/17  noted 1.6cm coarse heterogeneous calcifications in posterior region of L breast at 3 o'clock position. Patient was scheduled for FNA which confirmed ID grade 3, single focus measuring 1.7cm with 2nd focus microinvasion DCIS grade 2 measuring 0.3cm. Left axillary LN biopsy showed no evidence of metastatic carcinoma. Complete staging: Stage IA, grade 3 ER+, Her 2 Niki + IDC/DCIS of left breast. Ki-67 <17%.  Repeat myeloma labs showed rise in free lambda light chains noted at 68.69, kappa light chains at 27.22,  beta 2 microglobulin came back at 2.9. Serum protein electrophoresis showed no definitive evidence of an M protein peak. The pattern is consistent with an acute inflammatory reaction. Recommendations were made to maintain Revlimid at current dose of 5mg every other day to be taken continuously increase weekly dexamethasone to 16 mg.   Patient seen at [facility] with tentative plan for L breast lumpectomy on 3/13/18. 2/16/18- Prior to surgery she was seen by genetic  counselor who ran genetic testing per patient reques, all of which were negative. She was then seen by cardiology at [facility] 2/16/18 for further evaluation of persistent bilateral lower extremity swelling-ECHO noted EF of 58%; diagnosed with paroxysmal atrial fibrillation and instructed to begin daily ASA. During preoperative asssessment per Rad/Onc 3/12/18, corresponding chest CT noted new bilateral pulmonary nodules concerning for metastatic disease- surgery was subsequently postponed pending pulmonary evaluation. Seen by Pulmonology on 3/21/18, PFTs within normal limits and cleared patient for surgery with recommended close CT follow up of nodules in 3 months. 3/21/18 seen by nephrology for management of mild CKD (GFR 55)-cleared for surgery with recommended follow up in 3 months. Left breast lumpectomy and SLNB performed per Dr. Washburn on 4/12/18- plan for follow up in clinic on 4/24/18  for postoperative assessment. Follow up with Dr. Poole (Med/Onc) on 4/25/18. Follow up with Dr. Parnell 4/26/18.   Patient seen at [facility] s/p Left breast lumpectomy with SLNB on 4/12/18. Following surgery she completed Left partial breast radiation x 10 fractions. She was then started on endocrine therapy with Femara after been deemedan inappropriate candidate for systemic chemotherapy/Herceptin.      She was seen by neurosugery at [facility] on 4/26/18 following repeat MRI of cervical thoracic lumbar spine which noted focal enhancement of T2 hyperintensity at the C2 level which may be due to degenerative changes. Signal abnormality within  the T12 and L1 may be secondary to myeloma. Plan to continue with observation for now with F/U scheduled in October 2018.      She was seen by Dr. Parnell at [facility] for management of her MM on 4/26/18. Patient was recommended to restart Revlimid 5mg PO every other day with notes that these recommendations would be communicated to collaborating Oncologist. Patient was also recommended  to start on Zometa for her bone disease.      Seen by Dr. Parnell at [facility] for management of her MM on 6/28/18. Repeat light chains noted lambda 33.36 (previously 80.80), K/L ratio 0.91 (previously 0.49). Due to slight improvement in light chains, plan to continue on lenalidomide maintenance. Recommendations to continue anticoagulation with Eliquis. BLE venous doppler negative for DVT.   Seen by pulmonolgy on 6/28/18- repeat CT chest done 6/28/18 noted resolution of previous pulmonary nodules. Thought to be infectious in nature. Recommendations for discharge from pulmonary clinic.  Should MRI of her cervical thoracic lumbar spine on 2/12/2019 that demonstrated cervical spondylosis with canal stenosis most notable at C1 and 2. Cord signal abnormality at C1 and 2 with enhancement is stable. Lumbar spondylosis with central canal stenosis at multiple levels. No imaging features of bony metastatic disease.     For amyloidosis (Light chain type).    Patient presented with macroglossia and now with improvement of the swelling of her tongue. Her cardiac parameters are also better.   8/10/2020 proBNP  250   2/17/2020                616  8/9/2019                  190        Chief Complaint: fatigue        Interval History:  She is currently on Femara for breast cancer and Rev/Dex/Darzalex  for MM  s/p transplant in 2016. At her [facility] visit on 10/12/22, Ninlaro was discontinued due to it causing severe diarrhea and leukopenia. Diarrhea resolved after stopping Ninlaro (she admits to stopping prior to visit @ [facility]).     She reports occasional n/v and diarrhea with Revlimid, but not severe, is tolerable. She c/o swelling and pain to legs. During physical exam, BLE swellling noted, L>R, left leg red with small fluid filled blisters. Otherwise, she is doing well. No fever, chills or sweats.  No chest pain or shortness of breath.  She uses a walker for mobility.  She continues to do her mammograms and other imaging studies at  "[facility]. She also follows with nephrologist @ [facility].    6/21/23: Patient presents today for follow up. She is due for Darzalex today.  Left leg with mild erythema and edema,  blisters have resolved and skin looks much better. She received 14 daily doses of IV Rocephin. She is doing well and voices no complaints today.    8/3/23: Patient presents today for f/u. Her left leg is still swollen and has some blisters but it is not red or tender any longer. In the interim she went to [facility] and everything was continued.       ROS: All 14 points ROS taken and as per Interval History  ROS      Histories:  PMH/PSH/FH/SOCIAL/ALLERGIES AND MEDS REVIEWED AND UPDATED AS APPROPRIATE       Vitals:    08/03/23 1345   BP: 119/79   BP Location: Left arm   Patient Position: Sitting   Pulse: 97   Temp: 97.9 °F (36.6 °C)   TempSrc: Oral   SpO2: 95%   Weight: 84.9 kg (187 lb 3.2 oz)   Height: 5' 3.4" (1.61 m)       Wt Readings from Last 6 Encounters:   08/03/23 84.9 kg (187 lb 2.7 oz)   08/03/23 84.9 kg (187 lb 3.2 oz)   06/13/23 82.4 kg (181 lb 10.5 oz)   06/12/23 82.4 kg (181 lb 10.5 oz)   06/08/23 82.4 kg (181 lb 10.5 oz)   06/06/23 82.4 kg (181 lb 10.5 oz)   Vitals reviewed and stable       Physical Exam  Vitals and nursing note reviewed.   Constitutional:       General: She is not in acute distress.     Appearance: Normal appearance. She is well-developed. She is ill-appearing.      Comments: Uses walker for mobility   HENT:      Head: Normocephalic and atraumatic.      Mouth/Throat:      Mouth: Mucous membranes are moist.   Eyes:      General: No scleral icterus.     Extraocular Movements: Extraocular movements intact.      Conjunctiva/sclera: Conjunctivae normal.      Pupils: Pupils are equal, round, and reactive to light.   Neck:      Vascular: No JVD.   Cardiovascular:      Rate and Rhythm: Normal rate and regular rhythm.      Heart sounds: No murmur heard.  Pulmonary:      Effort: Pulmonary effort is normal.      Breath " sounds: Normal breath sounds. No wheezing or rhonchi.   Abdominal:      General: Bowel sounds are normal. There is no distension.      Palpations: Abdomen is soft. There is no mass.      Tenderness: There is no abdominal tenderness.   Musculoskeletal:         General: No swelling or deformity.      Cervical back: Neck supple.   Lymphadenopathy:      Head:      Right side of head: No submandibular adenopathy.      Left side of head: No submandibular adenopathy.      Cervical: No cervical adenopathy.      Upper Body:      Right upper body: No supraclavicular or axillary adenopathy.      Left upper body: No supraclavicular or axillary adenopathy.      Lower Body: No right inguinal adenopathy. No left inguinal adenopathy.   Skin:     General: Skin is warm.      Coloration: Skin is not jaundiced.      Findings: No lesion or rash.      Nails: There is no clubbing.   Neurological:      General: No focal deficit present.      Mental Status: She is alert and oriented to person, place, and time.      Cranial Nerves: Cranial nerves 2-12 are intact.      Motor: Weakness present.      Gait: Gait abnormal.   Psychiatric:         Attention and Perception: Attention normal.         Behavior: Behavior is cooperative.         Cognition and Memory: Cognition normal.         Judgment: Judgment normal.       ECOG SCORE             Laboratory:  CBC with Differential:  Lab Results   Component Value Date    WBC 4.68 08/03/2023    RBC 3.53 (L) 08/03/2023    HGB 10.3 (L) 08/03/2023    HCT 33.5 (L) 08/03/2023    MCV 94.9 (H) 08/03/2023    MCH 29.2 08/03/2023    MCHC 30.7 (L) 08/03/2023    RDW 14.2 08/03/2023     08/03/2023    MPV 9.3 08/03/2023        CMP:  Sodium Level   Date Value Ref Range Status   08/03/2023 140 136 - 145 mmol/L Final     Potassium Level   Date Value Ref Range Status   08/03/2023 3.7 3.5 - 5.1 mmol/L Final     Carbon Dioxide   Date Value Ref Range Status   08/03/2023 24 22 - 29 mmol/L Final     Blood Urea Nitrogen    Date Value Ref Range Status   08/03/2023 20.9 (H) 9.8 - 20.1 mg/dL Final     Creatinine   Date Value Ref Range Status   08/03/2023 2.21 (H) 0.55 - 1.02 mg/dL Final   07/18/2023 2.48 (H) 0.51 - 0.95 mg/dL Final     Calcium Level Total   Date Value Ref Range Status   08/03/2023 9.3 8.4 - 10.2 mg/dL Final     Albumin Level   Date Value Ref Range Status   08/03/2023 3.4 (L) 3.5 - 5.0 g/dL Final   08/03/2023 3.1 (L) 3.5 - 5.0 g/dL Final     Bilirubin Total   Date Value Ref Range Status   08/03/2023 0.3 <=1.5 mg/dL Final     Alkaline Phosphatase   Date Value Ref Range Status   08/03/2023 67 40 - 150 unit/L Final     Aspartate Aminotransferase   Date Value Ref Range Status   08/03/2023 19 5 - 34 unit/L Final     Alanine Aminotransferase   Date Value Ref Range Status   08/03/2023 13 0 - 55 unit/L Final     Estimated GFR-Non    Date Value Ref Range Status   04/20/2022 25           Assessment:       1. Multiple myeloma, remission status unspecified    2. Amyloidosis, unspecified type          1) Multiple Myeloma, IgA Lambda, FISH with del 13p, normal karyotype, ISS stg II Dx 2015; S/p Autologous stem cell transplant 02/08/16-remission-->slight rise in free light chains 02/08/17  2) Amyloidosis, Lambda light chain type, organ involvement with macroglossia and colon involvement. 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 Sx risks do not outweigh benefits. Pain meds given and encouraged Physical Therpay  8) Amyloid plaques, evaluated by Derm at [facility], recommend watchful waiting. Patient to follow up 12/2017  9) Stage IA grade 3, ER+, HER2 BERNA +,  IDC/DCIS of the left breast --- 2/7/18 at [facility]; s/p lumpectomy with SLNB 4/12, Grade 2 IDC measuring 4mm with second  focus of microinvasive carcinoma measuring  0.4mm .IG DCIS, 0.3mm to posterior margin; 2 SLN negative for malignancy    10) Progressive swelling of R lower extremity--- US NIVA done 2/19/18 negative for evidence of DVT  11). Paroxysmal Afib (2/18/18) diagnosed at [facility]; ECHO noted EF 58%  12. Mild CKD with GFR 55 - managed per [facility] nephrology  13). Pulmonary nodules noted on pre-operative chest CT scan (3/12/18) noted new bilateral pulmonary nodules are nonspecific -- seen by Pulmonology at [facility] (3/21/18) thought to be inflammatory/infectious in nature, repeat Chest CT 6/28/18 noted resolution of nodules  14). Treatment induced neutropenia        Plan:     Left leg improved but still generalized swelling (legs and supraclavicular areas). Discussed holding dexamethasone as this is likely why she is retaining so much fluid. She will continue for now.   Okay to proceed with Darzalex today and every 4 weeks  RTC in 4 weeks for  TD/MM lab and infusion all same day  Continue K+ BID    Encourage to call or message us for any questions or problems  The patient was given ample opportunity to ask questions, and to the best of my abilities, all questions answered to satisfaction; patient demonstrated understanding of what we discussed and agreeable to the plan.     CARLEEN Huddleston

## 2023-08-17 DIAGNOSIS — C90.00 MULTIPLE MYELOMA NOT HAVING ACHIEVED REMISSION: ICD-10-CM

## 2023-08-17 DIAGNOSIS — C50.819 OVERLAPPING MALIGNANT NEOPLASM OF FEMALE BREAST, UNSPECIFIED ESTROGEN RECEPTOR STATUS, UNSPECIFIED LATERALITY: ICD-10-CM

## 2023-08-17 RX ORDER — ONDANSETRON HYDROCHLORIDE 8 MG/1
8 TABLET, FILM COATED ORAL EVERY 8 HOURS PRN
Qty: 90 TABLET | Refills: 2 | Status: SHIPPED | OUTPATIENT
Start: 2023-08-17 | End: 2023-11-22 | Stop reason: SDUPTHER

## 2023-08-21 ENCOUNTER — PATIENT MESSAGE (OUTPATIENT)
Dept: HEMATOLOGY/ONCOLOGY | Facility: CLINIC | Age: 58
End: 2023-08-21
Payer: MEDICARE

## 2023-08-23 NOTE — TELEPHONE ENCOUNTER
----- Message from Corrina Arteaga MD sent at 10/6/2022  8:16 AM CDT -----  Notify patient with lab results, appears consistent with labs 2 weeks ago.  Except white count is lower.  Encouraged to follow-up with primary MD/ Heme-Onc  ER precautions with any acute change in symptoms   [phone]

## 2023-08-28 DIAGNOSIS — C50.819 OVERLAPPING MALIGNANT NEOPLASM OF FEMALE BREAST, UNSPECIFIED ESTROGEN RECEPTOR STATUS, UNSPECIFIED LATERALITY: ICD-10-CM

## 2023-08-28 DIAGNOSIS — C90.00 MULTIPLE MYELOMA, REMISSION STATUS UNSPECIFIED: ICD-10-CM

## 2023-08-28 DIAGNOSIS — C90.01 MULTIPLE MYELOMA IN REMISSION: Primary | ICD-10-CM

## 2023-08-29 ENCOUNTER — TELEPHONE (OUTPATIENT)
Dept: HEMATOLOGY/ONCOLOGY | Facility: CLINIC | Age: 58
End: 2023-08-29
Payer: MEDICARE

## 2023-08-29 RX ORDER — LENALIDOMIDE 5 MG/1
5 CAPSULE ORAL EVERY OTHER DAY
Qty: 14 EACH | Refills: 0 | Status: SHIPPED | OUTPATIENT
Start: 2023-08-29 | End: 2023-10-03

## 2023-08-29 NOTE — TELEPHONE ENCOUNTER
Patient c/o intermittent diarrhea for the past 2 weeks with mild nausea. States she is now only having about 2 loose stools a day. Her main concern is the color of her stools. States the last few stools were very light tan to white. She is concerned that this may be from treatment. No other symptoms to mention. I did instruct her to take immodium prn loose stools. Please advise.

## 2023-08-29 NOTE — TELEPHONE ENCOUNTER
No intervention at this time. Her liver enzymes are normal. I don't think this is related to her treatment. Just continue to monitor for now. If she is concerned or if she has abdominal pain, vomiting and/or fever, go to the ED.

## 2023-08-30 ENCOUNTER — TELEPHONE (OUTPATIENT)
Dept: INTERNAL MEDICINE | Facility: CLINIC | Age: 58
End: 2023-08-30
Payer: MEDICARE

## 2023-09-12 DIAGNOSIS — C90.00 MULTIPLE MYELOMA, REMISSION STATUS UNSPECIFIED: Primary | ICD-10-CM

## 2023-09-12 DIAGNOSIS — C90.01 MULTIPLE MYELOMA IN REMISSION: ICD-10-CM

## 2023-09-15 ENCOUNTER — OFFICE VISIT (OUTPATIENT)
Dept: URGENT CARE | Facility: CLINIC | Age: 58
End: 2023-09-15
Payer: MEDICARE

## 2023-09-15 VITALS
SYSTOLIC BLOOD PRESSURE: 120 MMHG | WEIGHT: 185 LBS | TEMPERATURE: 99 F | OXYGEN SATURATION: 99 % | BODY MASS INDEX: 32.78 KG/M2 | HEART RATE: 80 BPM | HEIGHT: 63 IN | RESPIRATION RATE: 18 BRPM | DIASTOLIC BLOOD PRESSURE: 81 MMHG

## 2023-09-15 DIAGNOSIS — R05.9 COUGH, UNSPECIFIED TYPE: ICD-10-CM

## 2023-09-15 DIAGNOSIS — J01.90 SUBACUTE RHINOSINUSITIS: Primary | ICD-10-CM

## 2023-09-15 LAB
CTP QC/QA: YES
SARS-COV-2 RDRP RESP QL NAA+PROBE: NEGATIVE

## 2023-09-15 PROCEDURE — 99212 OFFICE O/P EST SF 10 MIN: CPT | Mod: ,,, | Performed by: FAMILY MEDICINE

## 2023-09-15 PROCEDURE — 87635 SARS-COV-2 COVID-19 AMP PRB: CPT | Mod: QW,,, | Performed by: FAMILY MEDICINE

## 2023-09-15 PROCEDURE — 87635: ICD-10-PCS | Mod: QW,,, | Performed by: FAMILY MEDICINE

## 2023-09-15 PROCEDURE — 99212 PR OFFICE/OUTPT VISIT, EST, LEVL II, 10-19 MIN: ICD-10-PCS | Mod: ,,, | Performed by: FAMILY MEDICINE

## 2023-09-15 RX ORDER — FAMOTIDINE 40 MG/1
40 TABLET, FILM COATED ORAL
COMMUNITY
End: 2024-04-01 | Stop reason: CLARIF

## 2023-09-15 NOTE — PATIENT INSTRUCTIONS
Discussed the physical finding, condition and course.  Continue Xyzal for now.  Tylenol for fever body aches and discomfort.  Considering her medication allergies appears anxious about antibiotics which can upset her stomach.  Desires to monitor the symptoms for now.  For worsening symptoms can call clinic for antibiotics.  Call or return to clinic for any questions.  Follow-up with primary MD.  COVID-19 test negative.  Patient voiced understanding agrees with the plan of care

## 2023-09-15 NOTE — PROGRESS NOTES
"Subjective:      Patient ID: Ninfa Candelario is a 57 y.o. female.    Vitals:  height is 5' 3" (1.6 m) and weight is 83.9 kg (185 lb). Her temperature is 98.6 °F (37 °C). Her blood pressure is 120/81 and her pulse is 80. Her respiration is 18 and oxygen saturation is 99%.     Chief Complaint: Cough (Cough, PND, sneezing, sore throat x unclear how long. Positive covid exposure. )    HPI:  57-year-old female present to clinic with concerns of coughing, congestion, sneezing associated with postnasal drip sore throat and feeling fatigued.  Symptoms have been on and off for 2 weeks.  Appears concerned with positive exposure to COVID-19 in the family with her niece.  No fever in the clinic.  O2 sats 99%.  Patient is vaccinated for COVID-19.  For all other medical conditions follows up with primary MD.     ROS :  Constitutional : _ No fever , Body aches, Chills  HENT_sore throat, postnasal drainage  Respiratory_no wheezing, no shortness of breath  Cardiovascular_no chest pain  Gastrointestinal_ No vomiting, No diarrhea, No abdominal pain  Musculoskeletal_no joint pain, no joint swelling  Integumentary_no skin rash     Objective:     Physical Exam  General : Alert and Oriented, No apparent distress, afebrile, sounds congested and stuffy  Neck - supple  HENT : Oropharynx and tonsils no redness or swelling.   Respiratory : Bilateral equal breath sounds, nonlabored respirations  Cardiovascular : Rate, rhythm regular, normal volume pulse, no murmur  Integumentary : Warm, Dry and no rash    Assessment:     1. Subacute rhinosinusitis    2. Cough, unspecified type      Plan:   Discussed the physical finding, condition and course.  Continue Xyzal for now.  Tylenol for fever body aches and discomfort.  Considering her medication allergies appears anxious about antibiotics which can upset her stomach.  Desires to monitor the symptoms for now.  For worsening symptoms can call clinic for antibiotics.  Call or return to clinic for any " questions.  Follow-up with primary MD.  COVID-19 test negative.  Patient voiced understanding agrees with the plan of care    Subacute rhinosinusitis    Cough, unspecified type  -     POCT COVID-19 Rapid Screening

## 2023-09-26 ENCOUNTER — TELEPHONE (OUTPATIENT)
Dept: INTERNAL MEDICINE | Facility: CLINIC | Age: 58
End: 2023-09-26
Payer: MEDICARE

## 2023-09-26 ENCOUNTER — OFFICE VISIT (OUTPATIENT)
Dept: HEMATOLOGY/ONCOLOGY | Facility: CLINIC | Age: 58
End: 2023-09-26
Payer: MEDICARE

## 2023-09-26 ENCOUNTER — LAB VISIT (OUTPATIENT)
Dept: LAB | Facility: HOSPITAL | Age: 58
End: 2023-09-26
Attending: INTERNAL MEDICINE
Payer: MEDICARE

## 2023-09-26 ENCOUNTER — INFUSION (OUTPATIENT)
Dept: INFUSION THERAPY | Facility: HOSPITAL | Age: 58
End: 2023-09-26
Attending: INTERNAL MEDICINE
Payer: MEDICARE

## 2023-09-26 VITALS
RESPIRATION RATE: 17 BRPM | DIASTOLIC BLOOD PRESSURE: 82 MMHG | TEMPERATURE: 98 F | HEIGHT: 63 IN | WEIGHT: 183.63 LBS | OXYGEN SATURATION: 95 % | HEART RATE: 102 BPM | SYSTOLIC BLOOD PRESSURE: 145 MMHG | BODY MASS INDEX: 32.54 KG/M2

## 2023-09-26 DIAGNOSIS — R09.81 SINUS CONGESTION: ICD-10-CM

## 2023-09-26 DIAGNOSIS — R79.9 ABNORMAL FINDING OF BLOOD CHEMISTRY, UNSPECIFIED: ICD-10-CM

## 2023-09-26 DIAGNOSIS — C50.819 OVERLAPPING MALIGNANT NEOPLASM OF FEMALE BREAST, UNSPECIFIED ESTROGEN RECEPTOR STATUS, UNSPECIFIED LATERALITY: ICD-10-CM

## 2023-09-26 DIAGNOSIS — C90.00 MULTIPLE MYELOMA, REMISSION STATUS UNSPECIFIED: ICD-10-CM

## 2023-09-26 DIAGNOSIS — E85.9 AMYLOIDOSIS, UNSPECIFIED TYPE: ICD-10-CM

## 2023-09-26 DIAGNOSIS — Z13.1 SCREENING FOR DIABETES MELLITUS: ICD-10-CM

## 2023-09-26 DIAGNOSIS — C90.01 MULTIPLE MYELOMA IN REMISSION: ICD-10-CM

## 2023-09-26 DIAGNOSIS — C90.00 MULTIPLE MYELOMA NOT HAVING ACHIEVED REMISSION: ICD-10-CM

## 2023-09-26 DIAGNOSIS — Z00.00 MEDICARE ANNUAL WELLNESS VISIT, SUBSEQUENT: Primary | ICD-10-CM

## 2023-09-26 DIAGNOSIS — E85.9 AMYLOIDOSIS, UNSPECIFIED TYPE: Primary | ICD-10-CM

## 2023-09-26 LAB
ALBUMIN SERPL-MCNC: 3.2 G/DL (ref 3.5–5)
ALBUMIN/GLOB SERPL: 1.1 RATIO (ref 1.1–2)
ALP SERPL-CCNC: 80 UNIT/L (ref 40–150)
ALT SERPL-CCNC: 20 UNIT/L (ref 0–55)
AST SERPL-CCNC: 23 UNIT/L (ref 5–34)
BASOPHILS # BLD AUTO: 0.01 X10(3)/MCL
BASOPHILS NFR BLD AUTO: 0.2 %
BILIRUB SERPL-MCNC: 0.4 MG/DL
BUN SERPL-MCNC: 23.2 MG/DL (ref 9.8–20.1)
CALCIUM SERPL-MCNC: 9.5 MG/DL (ref 8.4–10.2)
CHLORIDE SERPL-SCNC: 105 MMOL/L (ref 98–107)
CO2 SERPL-SCNC: 25 MMOL/L (ref 22–29)
CREAT SERPL-MCNC: 2.52 MG/DL (ref 0.55–1.02)
EOSINOPHIL # BLD AUTO: 0.3 X10(3)/MCL (ref 0–0.9)
EOSINOPHIL NFR BLD AUTO: 6.7 %
ERYTHROCYTE [DISTWIDTH] IN BLOOD BY AUTOMATED COUNT: 14.3 % (ref 11.5–17)
GFR SERPLBLD CREATININE-BSD FMLA CKD-EPI: 21 MLS/MIN/1.73/M2
GLOBULIN SER-MCNC: 2.9 GM/DL (ref 2.4–3.5)
GLUCOSE SERPL-MCNC: 98 MG/DL (ref 74–100)
HCT VFR BLD AUTO: 33.5 % (ref 37–47)
HGB BLD-MCNC: 10.3 G/DL (ref 12–16)
IGA SERPL-MCNC: 79 MG/DL (ref 65–421)
IGG SERPL-MCNC: 593 MG/DL (ref 522–1631)
IGM SERPL-MCNC: <25 MG/DL (ref 33–293)
IMM GRANULOCYTES # BLD AUTO: 0.02 X10(3)/MCL (ref 0–0.04)
IMM GRANULOCYTES NFR BLD AUTO: 0.4 %
LYMPHOCYTES # BLD AUTO: 0.77 X10(3)/MCL (ref 0.6–4.6)
LYMPHOCYTES NFR BLD AUTO: 17.3 %
MCH RBC QN AUTO: 28.4 PG (ref 27–31)
MCHC RBC AUTO-ENTMCNC: 30.7 G/DL (ref 33–36)
MCV RBC AUTO: 92.3 FL (ref 80–94)
MONOCYTES # BLD AUTO: 0.82 X10(3)/MCL (ref 0.1–1.3)
MONOCYTES NFR BLD AUTO: 18.4 %
NEUTROPHILS # BLD AUTO: 2.53 X10(3)/MCL (ref 2.1–9.2)
NEUTROPHILS NFR BLD AUTO: 57 %
PLATELET # BLD AUTO: 219 X10(3)/MCL (ref 130–400)
PMV BLD AUTO: 8.9 FL (ref 7.4–10.4)
POTASSIUM SERPL-SCNC: 3.6 MMOL/L (ref 3.5–5.1)
PROT SERPL-MCNC: 6.1 GM/DL (ref 6.4–8.3)
RBC # BLD AUTO: 3.63 X10(6)/MCL (ref 4.2–5.4)
SODIUM SERPL-SCNC: 142 MMOL/L (ref 136–145)
WBC # SPEC AUTO: 4.45 X10(3)/MCL (ref 4.5–11.5)

## 2023-09-26 PROCEDURE — 36415 COLL VENOUS BLD VENIPUNCTURE: CPT

## 2023-09-26 PROCEDURE — 99999 PR PBB SHADOW E&M-EST. PATIENT-LVL V: CPT | Mod: PBBFAC,,, | Performed by: NURSE PRACTITIONER

## 2023-09-26 PROCEDURE — 99215 PR OFFICE/OUTPT VISIT, EST, LEVL V, 40-54 MIN: ICD-10-PCS | Mod: S$PBB,,, | Performed by: NURSE PRACTITIONER

## 2023-09-26 PROCEDURE — 84165 PROTEIN E-PHORESIS SERUM: CPT

## 2023-09-26 PROCEDURE — 83521 IG LIGHT CHAINS FREE EACH: CPT

## 2023-09-26 PROCEDURE — 99215 OFFICE O/P EST HI 40 MIN: CPT | Mod: PBBFAC | Performed by: NURSE PRACTITIONER

## 2023-09-26 PROCEDURE — 86334 IMMUNOFIX E-PHORESIS SERUM: CPT

## 2023-09-26 PROCEDURE — 80053 COMPREHEN METABOLIC PANEL: CPT

## 2023-09-26 PROCEDURE — 82784 ASSAY IGA/IGD/IGG/IGM EACH: CPT

## 2023-09-26 PROCEDURE — 99999 PR PBB SHADOW E&M-EST. PATIENT-LVL V: ICD-10-PCS | Mod: PBBFAC,,, | Performed by: NURSE PRACTITIONER

## 2023-09-26 PROCEDURE — 85025 COMPLETE CBC W/AUTO DIFF WBC: CPT

## 2023-09-26 PROCEDURE — 99215 OFFICE O/P EST HI 40 MIN: CPT | Mod: S$PBB,,, | Performed by: NURSE PRACTITIONER

## 2023-09-26 RX ORDER — HEPARIN 100 UNIT/ML
500 SYRINGE INTRAVENOUS
Status: DISCONTINUED | OUTPATIENT
Start: 2023-09-26 | End: 2023-09-26 | Stop reason: HOSPADM

## 2023-09-26 RX ORDER — DIPHENHYDRAMINE HYDROCHLORIDE 50 MG/ML
50 INJECTION INTRAMUSCULAR; INTRAVENOUS ONCE AS NEEDED
Status: CANCELLED | OUTPATIENT
Start: 2023-09-26

## 2023-09-26 RX ORDER — EPINEPHRINE 0.3 MG/.3ML
0.3 INJECTION SUBCUTANEOUS ONCE AS NEEDED
Status: DISCONTINUED | OUTPATIENT
Start: 2023-09-26 | End: 2023-09-26 | Stop reason: HOSPADM

## 2023-09-26 RX ORDER — HEPARIN 100 UNIT/ML
500 SYRINGE INTRAVENOUS
Status: CANCELLED | OUTPATIENT
Start: 2023-09-26

## 2023-09-26 RX ORDER — SODIUM CHLORIDE 0.9 % (FLUSH) 0.9 %
10 SYRINGE (ML) INJECTION
Status: CANCELLED | OUTPATIENT
Start: 2023-09-26

## 2023-09-26 RX ORDER — SODIUM CHLORIDE 0.9 % (FLUSH) 0.9 %
10 SYRINGE (ML) INJECTION
Status: DISCONTINUED | OUTPATIENT
Start: 2023-09-26 | End: 2023-09-26 | Stop reason: HOSPADM

## 2023-09-26 RX ORDER — DIPHENHYDRAMINE HYDROCHLORIDE 50 MG/ML
50 INJECTION INTRAMUSCULAR; INTRAVENOUS ONCE AS NEEDED
Status: DISCONTINUED | OUTPATIENT
Start: 2023-09-26 | End: 2023-09-26 | Stop reason: HOSPADM

## 2023-09-26 RX ORDER — LEVOCETIRIZINE DIHYDROCHLORIDE 5 MG/1
5 TABLET, FILM COATED ORAL NIGHTLY
Qty: 30 TABLET | Refills: 6 | Status: SHIPPED | OUTPATIENT
Start: 2023-09-26 | End: 2023-10-03 | Stop reason: SDUPTHER

## 2023-09-26 RX ORDER — EPINEPHRINE 0.3 MG/.3ML
0.3 INJECTION SUBCUTANEOUS ONCE AS NEEDED
Status: CANCELLED | OUTPATIENT
Start: 2023-09-26

## 2023-09-26 RX ORDER — DIPHENHYDRAMINE HCL 25 MG
25 CAPSULE ORAL
Status: DISCONTINUED | OUTPATIENT
Start: 2023-09-26 | End: 2023-09-26 | Stop reason: HOSPADM

## 2023-09-26 RX ORDER — DIPHENHYDRAMINE HCL 25 MG
25 CAPSULE ORAL
Status: CANCELLED | OUTPATIENT
Start: 2023-09-26

## 2023-09-26 RX ORDER — ACETAMINOPHEN 325 MG/1
650 TABLET ORAL
Status: DISCONTINUED | OUTPATIENT
Start: 2023-09-26 | End: 2023-09-26 | Stop reason: HOSPADM

## 2023-09-26 RX ORDER — ACETAMINOPHEN 325 MG/1
650 TABLET ORAL
Status: CANCELLED | OUTPATIENT
Start: 2023-09-26

## 2023-09-26 NOTE — PROGRESS NOTES
HEMATOLOGY/ONCOLOGY OFFICE CLINIC VISIT    Visit Information:    NO SHOW/RESCHEDULE  08/21/2018--> Rescheduled     11/05/2019--> No Show/Reschedule  06/24/2020--> No Show/Reschedule  09/24/2020--> No Show/Reschedule  03/15/2021--> No Show/Reschedule  03/23/2021--> No Show/Reschedule  04/13/2021--> No Show  05/27/2021--> No Show  08/19/2021--> No Show  08/26/2021--> No Show/Rescheduled  04/26/2022--> No Show  10/03/2022--> Rescheduled  10/11/2022--> Rescheduled  03/22/2023--> No Show/Rescheduled  4/26/2023->Rescheduled  05/09/2023->Rescheduled  5/30/2023->Rescheduled  6/15/2023->Rescheduled      Referring Physician: DR. Cardona  PCP: Dr. Chacon  GI:   Rheumatologist: Dr. Luis Rea  Immunologist: Dr. Daniel Nava  ENT: Dr. Brice Williamson  [facility] Pain management: Dr.Chai [facility] Transplant: Dr. Adrian Naylor [facility] [place] [facility] Onc: Dr. Zulema [facility] Breast Surg Onc: Dr.DeSnyder LONG Miguel: Dr. 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   8) Stage IA grade 3, ER+, HER2 BERNA +,  IDC/DCIS of the left breast --- 2/7/18 at [facility]   --s/p lumpectomy with SLNB 4/12, Grade 2 IDC 4mm with second focus of microinvasive carcinoma 0.4mm. 2 SLN negative for malignancy    9) Progressive swelling of R lower extremity--- US NIVA done 2/19/18 negative for evidence of DVT  10). Paroxysmal Afib (2/18/18) diagnosed at [facility]; ECHO noted EF 58% - on Eliquis  11) Mild CKD with GFR 55 - managed  per [facility] nephrology  12) Treatment induced neutropenia     Present treatment:  -Maintenance Revlimid 5mg PO QOD, started 02/16/17-- was held for a few months while undergoing treatment for her breast cancer 02/18-05/18; Restarted 6/15/18   Dexamethasone 20 mg po weekly added early July 2017--> reduced to 12 mg PO weekly October 4, 2017---> increased to 16mg PO weekly 2/15/18---restarted 6/15/18 --> 20 mg weekly 7//8/2022  Darzalex 7/8/2022-present  Femara 2.5 mg PO daily   Eliquis 2.5mg PO BID      Treatment/Oncology history:  1) CyBorD x5 cycles 09/28/15-12/24/15  2) Autologous stem cell transplant 02/08/16, done at [facility]  3) Exploratory laparotomy with subtotal colectomy and end ileostomy done August 2, 2016--pathology specimen showed advanced colonic amyloidosis extensive involving the muscular wall submucosa and mucosa.  Appendix also involvement by amyloidosis with fibrous obliteration of the distal lumen.  4) Maintenance therapy with Revlimid 5mg-started 06/01/16--Discontinued shortly after 2/2 cytopenias  5) Left breast lumpectomy with SLNB at [facility] 4/12/18  6) Left partial breast RT x10 fractions  5/21/18-6/4/1      Imaging:  NIVA 7/29/2021: Negative for deep venous thrombosis in the left lower extremity.   MMG 3/21/2022: BI-RADS Category 2: Benign Finding(s)  MRI CTL spine 9/14/2022: No acute myelomatous findings. Severe spinal stenosis process detected within the upper cervical spine as well as the entirety of the lumbar spine segments similar to the prior imaging assessment.    CT C 1/24/2023: Right greater than left lower lobe opacification consistent with infectious process.  Lucent lesions throughout the osseous structures similar to 2015.    Pathology:    CLINICAL HISTORY:       Patient: Ninfa Candelario is a 57 y.o. female.  Ms. Cabrera Joseph was admitted on 08/16/15 for ileus versus partial SBO. CT A/P done 08/17/15 showed sigmoid colitis and a zone of transition at the junction of the  descending and sigmoid colon which could indicate some degree of obstruction and an obstructing mass cannot be excluded. Numerous skeletal lytic lesions noted. CT chest done 08/19/15 showed Multiple skeletal lytic lesions involving the thoracic spine, left rib sternum consistent with either metastases or multiple myeloma. There is no evidence of pulmonary or mediastinal mass. Dr. Farr was consulted for possible MM/anemia.     Nuclear Bone scan done 8/20/15 showed mild to moderate increased activity in left rib and right second rib which correlates with fractures seen on CT.  Skeletal survey done 8/20/15showed lytic lesions in the calvarium and probably a lytic lesion in the left scapula. Lytic areas in the spine and pelvis seen on recent CT are not well defined on skeletal survey. Work up labs done 08/20/15: Negative for sickle cell and Thalessemia (reported history of thalessemia). Iron 54, Transferrin 118.0, Iron sat 34.6%, Folate 26.5, Vit B12 1,779  Peripheral smear resulted slightly macrocytic normochromic anemia without signifcant anisocytosis may reflect early B12/folate deficiency or marrow dysfunction, among others. No immature myeloid cells or blasts. Platlets adequate. Beta 2 mircoglobulin = 3.2.  SPEP/NADIA: M-spike in the beta region. The monoclonal protein peak accounts for 1.13 g/dL. Hypogammaglobulinemia. NADIA pattern shows the presence of a free lambda light chain monoclonal protein with additional faint band in IgA.  All immunoglobulin levels decreased. IgA=26, IgG=307, IgM<5.  Kappa Qnt 0.16, Lambda Qnt 4600.00, Ratio <0.01.  24hr Urine for Bence Mendez: Kappa 2.75, Lambda 1250.00, Ratio <0.01. NADIA: Urine is POSITIVE for monoclonal Free Lambda Light chains     Patient then opted to [facility] where she underwent a bone marrow biopsy on 09/18/15. BMBx showed 80% plasma cells, 13p deletion and normal karyotype. She underwent fat pad biopsy which came back positive for Congo red consistent with  amyloidosis. Cardiac workup revealed no amyloid deposition within the heart.  PET/CT and skeletal survey done September 18, 2015 showed multiple lytic osseous lesions  The patient was started on Velcade while at [facility] with the plan to transition to autologous stem cell transplantation. They asked if we could give her could continue treatment here.   She completed CyborD x5 cycles on 12/24/15     Patient admitted 01/06/16 for colitis and C. Diff. Pt treated with Flagyl. Discharged home 01/08/16     Repeat BMBx done at [facility] 01/2016 did not show any morphologic or immnophenotypic evidence of plasama cell neoplasm. Patient underwent Autologous stem cell transplant with Busulfan and melphalan on 02/08/16 at [facility]. The only complication was recurrent C.diff infection for which she completed 10 days of Fidaxomycin.     Follow-up bone marrow biopsy done at [facility] done 5/16/16 showed cellular 30-40% bone marrow with trilineage hematopoiesis. No morphologic or immunophenotypic support for plasma cell neoplasm. Started maintenance Revlimid 5mg. Unable to continue therapy due to cytopenias.     On 08/02/16 patient underwent  Exploratory laparotomy with subtotal colectomy and end ileostomy for what was thought to be toxic megacolon. Pathology showed extensive advanced colonic amyloidosis involving the muscular wall, submucosa and mucosa: With the appendix also involved with amyloidosis.  Positive lambda light chains were noted.     Follow-up at [facility] 8/31/16, Per Dr. Adrian Naylor, 6 months post autologous transplant. She has engrafted. Based on the latest restaging she is near complete remission with possible IgA lambda on serum immunofixation. Patient was instructed to discontinue prophylactic antibiotics. She received her 1st series of immunizations while at [facility]. Patient had a bilateral venous ultrasound to rule out DVT, negative B/L. In regards to amyloidosis the patient feels that her tongue is smaller in  size and her BNP has come down some.  Patient had a follow-up appointment at [facility] November 30, 2016.  Dr. Parnell documented the patient's free lambda light chain remains at a lower level.  Therefore in light of her recent surgery they will continue with observation only.  The plan to see the patient back in 2-3 months with repeat restaging labs.  They recommended regular CBC checks to monitor anemia.  Patient returned to [facility] in December 2016 to meet with dermatology for numerous papillary lesions on eyelids, chest and posterior neck consistent with amyloid deposits. Recommendations were for watchful waiting.  Patient is less than 1 year out from bone marrow transplant and further improvement can be expected.  She will see the patient back in 1 year to discuss possible surgical resection of the plaques especially around the eyelid region.  Rogaine recommended for persistent hair loss. Retin-A recommended for acne vulgaris.  Patient returned to [facility] on 2/8/2017 for neurosurgery consult for chronic low back pain and difficulty walking.  Imaging showed extensive DJD with discovertebral bulges and thickening of the spinal laminar tissues creating spinal stenosis throughout, but greatest at L2/L3.  Neurosurgery felt that surgery risks outweighed the benefits.  Patient was prescribed pain medicine and encouraged to participate in physical therapy.  Patient also met with Dr. Parnell on 02/08/17, who noted an elevation in free light chains (kappa 52.60/lambda 27.77/ratio 1.89).  Recommendation is to resume maintenance therapy with Revlimid at a low dose of 5 mg every other day.  Patient went back to [facility] first week of April 2017.  I do not have these notes.  Reportedly she was told to continue on every other day Revlimid at 5 mg.  She reportedly had repeat myeloma labs done as well.  Again I do not have these results.  Patient went back to [facility] and saw Dr. Parnlel June 29, 2017.  Myeloma labs  were done with serum protein electrophoresis showing irregularity in the fast gamma region.  IgG of 1291.  Free kappa light chains of 84.6 with lambda light chains of 66.9.  Beta-2 microglobulin of 4.5  CT scan of lumbar spine showed multiple lytic lesions once again in the lumbar spine and bony pelvis.  Ultrasound of the left leg showed no evidence of DVT.  It was recommended based on the slight increase in her And lambda light chains to start weekly dexamethasone 20 mg p.o. weekly.  Follow-up visit and labs at [facility] on September 29, 2017 with Dr. Parnell.  Repeat beta-2 microglobulin came back at 2.4.  Serum IgG of 761, IgA 117 and IgM of 29.  Serum free kappa light chains of 24.9 and lambda of 23.5.  Counts were stable with hemoglobin of 11.1, WBC 4.9 and platelets of 210,000.  Recommendations were for continued Revlimid every other day with weekly dexamethasone along with aspirin for DVT prophylaxis.  Bilateral diagnostic MMG 12/19/17  noted 1.6cm coarse heterogeneous calcifications in posterior region of L breast at 3 o'clock position. Patient was scheduled for FNA which confirmed ID grade 3, single focus measuring 1.7cm with 2nd focus microinvasion DCIS grade 2 measuring 0.3cm. Left axillary LN biopsy showed no evidence of metastatic carcinoma. Complete staging: Stage IA, grade 3 ER+, Her 2 Niki + IDC/DCIS of left breast. Ki-67 <17%.  Repeat myeloma labs showed rise in free lambda light chains noted at 68.69, kappa light chains at 27.22,  beta 2 microglobulin came back at 2.9. Serum protein electrophoresis showed no definitive evidence of an M protein peak. The pattern is consistent with an acute inflammatory reaction. Recommendations were made to maintain Revlimid at current dose of 5mg every other day to be taken continuously increase weekly dexamethasone to 16 mg.   Patient seen at [facility] with tentative plan for L breast lumpectomy on 3/13/18. 2/16/18- Prior to surgery she was seen by genetic  counselor who ran genetic testing per patient reques, all of which were negative. She was then seen by cardiology at [facility] 2/16/18 for further evaluation of persistent bilateral lower extremity swelling-ECHO noted EF of 58%; diagnosed with paroxysmal atrial fibrillation and instructed to begin daily ASA. During preoperative asssessment per Rad/Onc 3/12/18, corresponding chest CT noted new bilateral pulmonary nodules concerning for metastatic disease- surgery was subsequently postponed pending pulmonary evaluation. Seen by Pulmonology on 3/21/18, PFTs within normal limits and cleared patient for surgery with recommended close CT follow up of nodules in 3 months. 3/21/18 seen by nephrology for management of mild CKD (GFR 55)-cleared for surgery with recommended follow up in 3 months. Left breast lumpectomy and SLNB performed per Dr. Washburn on 4/12/18- plan for follow up in clinic on 4/24/18  for postoperative assessment. Follow up with Dr. Poole (Med/Onc) on 4/25/18. Follow up with Dr. Parnell 4/26/18.   Patient seen at [facility] s/p Left breast lumpectomy with SLNB on 4/12/18. Following surgery she completed Left partial breast radiation x 10 fractions. She was then started on endocrine therapy with Femara after been deemedan inappropriate candidate for systemic chemotherapy/Herceptin.      She was seen by neurosugery at [facility] on 4/26/18 following repeat MRI of cervical thoracic lumbar spine which noted focal enhancement of T2 hyperintensity at the C2 level which may be due to degenerative changes. Signal abnormality within  the T12 and L1 may be secondary to myeloma. Plan to continue with observation for now with F/U scheduled in October 2018.      She was seen by Dr. Parnell at [facility] for management of her MM on 4/26/18. Patient was recommended to restart Revlimid 5mg PO every other day with notes that these recommendations would be communicated to collaborating Oncologist. Patient was also recommended  to start on Zometa for her bone disease.      Seen by Dr. Parnell at [facility] for management of her MM on 6/28/18. Repeat light chains noted lambda 33.36 (previously 80.80), K/L ratio 0.91 (previously 0.49). Due to slight improvement in light chains, plan to continue on lenalidomide maintenance. Recommendations to continue anticoagulation with Eliquis. BLE venous doppler negative for DVT.   Seen by pulmonolgy on 6/28/18- repeat CT chest done 6/28/18 noted resolution of previous pulmonary nodules. Thought to be infectious in nature. Recommendations for discharge from pulmonary clinic.  Should MRI of her cervical thoracic lumbar spine on 2/12/2019 that demonstrated cervical spondylosis with canal stenosis most notable at C1 and 2. Cord signal abnormality at C1 and 2 with enhancement is stable. Lumbar spondylosis with central canal stenosis at multiple levels. No imaging features of bony metastatic disease.     For amyloidosis (Light chain type).    Patient presented with macroglossia and now with improvement of the swelling of her tongue. Her cardiac parameters are also better.   8/10/2020 proBNP  250   2/17/2020                616  8/9/2019                  190        Chief Complaint: fatigue        Interval History:  She is currently on Femara for breast cancer and Rev/Dex/Darzalex  for MM  s/p transplant in 2016. At her [facility] visit on 10/12/22, Ninlaro was discontinued due to it causing severe diarrhea and leukopenia. Diarrhea resolved after stopping Ninlaro (she admits to stopping prior to visit @ [facility]).     She reports occasional n/v and diarrhea with Revlimid, but not severe, is tolerable. She c/o swelling and pain to legs. During physical exam, BLE swellling noted, L>R, left leg red with small fluid filled blisters. Otherwise, she is doing well. No fever, chills or sweats.  No chest pain or shortness of breath.  She uses a walker for mobility.  She continues to do her mammograms and other imaging studies at  "[facility]. She also follows with nephrologist @ [facility].    6/21/23: Patient presents today for follow up. She is due for Darzalex today.  Left leg with mild erythema and edema,  blisters have resolved and skin looks much better. She received 14 daily doses of IV Rocephin. She is doing well and voices no complaints today.    8/3/23: Patient presents today for f/u. Her left leg is still swollen and has some blisters but it is not red or tender any longer. In the interim she went to [facility] and everything was continued.     9/26/23: Patient presents today for f/u. She missed her appt and treatment last month. She c/o sinus congestion today. Afebrile. She was supposed to have kidney biopsy at [facility] last week but she cancelled it bc her sister had covid.       ROS: All 14 points ROS taken and as per Interval History  ROS      Histories:  PMH/PSH/FH/SOCIAL/ALLERGIES AND MEDS REVIEWED AND UPDATED AS APPROPRIATE       Vitals:    09/26/23 1357   BP: (!) 145/82   BP Location: Right arm   Patient Position: Sitting   Pulse: 102   Resp: 17   Temp: 98 °F (36.7 °C)   TempSrc: Oral   SpO2: 95%   Weight: 83.3 kg (183 lb 9.6 oz)   Height: 5' 3" (1.6 m)         Wt Readings from Last 6 Encounters:   09/26/23 83.3 kg (183 lb 9.6 oz)   09/15/23 83.9 kg (185 lb)   08/03/23 84.9 kg (187 lb 2.7 oz)   08/03/23 84.9 kg (187 lb 3.2 oz)   06/13/23 82.4 kg (181 lb 10.5 oz)   06/12/23 82.4 kg (181 lb 10.5 oz)   Vitals reviewed and stable       Physical Exam  Vitals and nursing note reviewed.   Constitutional:       General: She is not in acute distress.     Appearance: Normal appearance. She is well-developed. She is ill-appearing.      Comments: Uses walker for mobility   HENT:      Head: Normocephalic and atraumatic.      Mouth/Throat:      Mouth: Mucous membranes are moist.   Eyes:      General: No scleral icterus.     Extraocular Movements: Extraocular movements intact.      Conjunctiva/sclera: Conjunctivae normal.      Pupils: Pupils " are equal, round, and reactive to light.   Neck:      Vascular: No JVD.   Cardiovascular:      Rate and Rhythm: Normal rate and regular rhythm.      Heart sounds: No murmur heard.  Pulmonary:      Effort: Pulmonary effort is normal.      Breath sounds: Normal breath sounds. No wheezing or rhonchi.   Abdominal:      General: Bowel sounds are normal. There is no distension.      Palpations: Abdomen is soft. There is no mass.      Tenderness: There is no abdominal tenderness.   Musculoskeletal:         General: No swelling or deformity.      Cervical back: Neck supple.   Lymphadenopathy:      Head:      Right side of head: No submandibular adenopathy.      Left side of head: No submandibular adenopathy.      Cervical: No cervical adenopathy.      Upper Body:      Right upper body: No supraclavicular or axillary adenopathy.      Left upper body: No supraclavicular or axillary adenopathy.      Lower Body: No right inguinal adenopathy. No left inguinal adenopathy.   Skin:     General: Skin is warm.      Coloration: Skin is not jaundiced.      Findings: No lesion or rash.      Nails: There is no clubbing.   Neurological:      General: No focal deficit present.      Mental Status: She is alert and oriented to person, place, and time.      Cranial Nerves: Cranial nerves 2-12 are intact.      Motor: Weakness present.      Gait: Gait abnormal.   Psychiatric:         Attention and Perception: Attention normal.         Behavior: Behavior is cooperative.         Cognition and Memory: Cognition normal.         Judgment: Judgment normal.       ECOG SCORE             Laboratory:  CBC with Differential:  Lab Results   Component Value Date    WBC 4.45 (L) 09/26/2023    RBC 3.63 (L) 09/26/2023    HGB 10.3 (L) 09/26/2023    HCT 33.5 (L) 09/26/2023    MCV 92.3 09/26/2023    MCH 28.4 09/26/2023    MCHC 30.7 (L) 09/26/2023    RDW 14.3 09/26/2023     09/26/2023    MPV 8.9 09/26/2023        CMP:  Sodium Level   Date Value Ref Range  Status   09/26/2023 142 136 - 145 mmol/L Final     Potassium Level   Date Value Ref Range Status   09/26/2023 3.6 3.5 - 5.1 mmol/L Final     Carbon Dioxide   Date Value Ref Range Status   09/26/2023 25 22 - 29 mmol/L Final     Blood Urea Nitrogen   Date Value Ref Range Status   09/26/2023 23.2 (H) 9.8 - 20.1 mg/dL Final     Creatinine   Date Value Ref Range Status   09/26/2023 2.52 (H) 0.55 - 1.02 mg/dL Final   07/18/2023 2.48 (H) 0.51 - 0.95 mg/dL Final     Calcium Level Total   Date Value Ref Range Status   09/26/2023 9.5 8.4 - 10.2 mg/dL Final     Albumin Level   Date Value Ref Range Status   09/26/2023 3.2 (L) 3.5 - 5.0 g/dL Final     Bilirubin Total   Date Value Ref Range Status   09/26/2023 0.4 <=1.5 mg/dL Final     Alkaline Phosphatase   Date Value Ref Range Status   09/26/2023 80 40 - 150 unit/L Final     Aspartate Aminotransferase   Date Value Ref Range Status   09/26/2023 23 5 - 34 unit/L Final     Alanine Aminotransferase   Date Value Ref Range Status   09/26/2023 20 0 - 55 unit/L Final     Estimated GFR-Non    Date Value Ref Range Status   04/20/2022 25           Assessment:       1. Multiple myeloma not having achieved remission    2. Overlapping malignant neoplasm of female breast, unspecified estrogen receptor status, unspecified laterality    3. Sinus congestion            1) Multiple Myeloma, IgA Lambda, FISH with del 13p, normal karyotype, ISS stg II Dx 2015; S/p Autologous stem cell transplant 02/08/16-remission-->slight rise in free light chains 02/08/17  2) Amyloidosis, Lambda light chain type, organ involvement with macroglossia and colon involvement. 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 Sx risks do not outweigh benefits.  Pain meds given and encouraged Physical Therpay  8) Amyloid plaques, evaluated by Derm at [facility], recommend watchful waiting. Patient to follow up 12/2017  9) Stage IA grade 3, ER+, HER2 BERNA +,  IDC/DCIS of the left breast --- 2/7/18 at [facility]; s/p lumpectomy with SLNB 4/12, Grade 2 IDC measuring 4mm with second focus of microinvasive carcinoma measuring  0.4mm .IG DCIS, 0.3mm to posterior margin; 2 SLN negative for malignancy    10) Progressive swelling of R lower extremity--- US NIVA done 2/19/18 negative for evidence of DVT  11). Paroxysmal Afib (2/18/18) diagnosed at [facility]; ECHO noted EF 58%  12. Mild CKD with GFR 55 - managed per [facility] nephrology  13). Pulmonary nodules noted on pre-operative chest CT scan (3/12/18) noted new bilateral pulmonary nodules are nonspecific -- seen by Pulmonology at [facility] (3/21/18) thought to be inflammatory/infectious in nature, repeat Chest CT 6/28/18 noted resolution of nodules  14). Treatment induced neutropenia        Plan:     Left leg improved but still generalized swelling (legs and supraclavicular areas). Discussed holding dexamethasone as this is likely why she is retaining so much fluid. She wants to continue  for now.   Okay to proceed with Darzalex today and every 4 weeks  RTC in 4 weeks for  TD/MM lab and infusion all same day  Continue K+ BID    Encourage to call or message us for any questions or problems  The patient was given ample opportunity to ask questions, and to the best of my abilities, all questions answered to satisfaction; patient demonstrated understanding of what we discussed and agreeable to the plan.     CARLEEN Huddleston

## 2023-09-26 NOTE — NURSING
Patient unable to received injection on today d/t having to  kids [place] bus stop. Reschedule for 10 am tomorrow morning.

## 2023-09-27 ENCOUNTER — INFUSION (OUTPATIENT)
Dept: INFUSION THERAPY | Facility: HOSPITAL | Age: 58
End: 2023-09-27
Attending: INTERNAL MEDICINE
Payer: MEDICARE

## 2023-09-27 VITALS — HEART RATE: 112 BPM | DIASTOLIC BLOOD PRESSURE: 64 MMHG | SYSTOLIC BLOOD PRESSURE: 108 MMHG

## 2023-09-27 DIAGNOSIS — E85.9 AMYLOIDOSIS, UNSPECIFIED TYPE: Primary | ICD-10-CM

## 2023-09-27 LAB
ALBUMIN % SPEP (OHS): 47.07
ALBUMIN SERPL-MCNC: 2.9 G/DL (ref 3.5–5)
ALBUMIN/GLOB SERPL: 0.9 RATIO (ref 1.1–2)
ALPHA 1 GLOB (OHS): 0.32 GM/DL
ALPHA 1 GLOB% (OHS): 5.27
ALPHA 2 GLOB % (OHS): 19.62
ALPHA 2 GLOB (OHS): 1.2 GM/DL
BETA GLOB (OHS): 1.17 GM/DL
BETA GLOB% (OHS): 19.15
GAMMA GLOBULIN % (OHS): 8.9
GAMMA GLOBULIN (OHS): 0.54 GM/DL
GLOBULIN SER-MCNC: 3.2 GM/DL (ref 2.4–3.5)
KAPPA LC FREE SER-MCNC: 5.71 MG/DL (ref 0.33–1.94)
KAPPA LC FREE/LAMBDA FREE SER: 1.08 {RATIO} (ref 0.26–1.65)
LAMBDA LC FREE SERPL-MCNC: 5.31 MG/DL (ref 0.57–2.63)
M SPIKE % (OHS): ABNORMAL
M SPIKE (OHS): ABNORMAL
PATH REV: NORMAL
PROT SERPL-MCNC: 6.1 GM/DL (ref 6.4–8.3)

## 2023-09-27 PROCEDURE — 25000003 PHARM REV CODE 250: Performed by: NURSE PRACTITIONER

## 2023-09-27 PROCEDURE — 96401 CHEMO ANTI-NEOPL SQ/IM: CPT

## 2023-09-27 PROCEDURE — 63600175 PHARM REV CODE 636 W HCPCS: Mod: JZ,TB | Performed by: NURSE PRACTITIONER

## 2023-09-27 RX ORDER — SODIUM CHLORIDE 0.9 % (FLUSH) 0.9 %
10 SYRINGE (ML) INJECTION
Status: DISCONTINUED | OUTPATIENT
Start: 2023-09-27 | End: 2023-09-27 | Stop reason: HOSPADM

## 2023-09-27 RX ORDER — HEPARIN 100 UNIT/ML
500 SYRINGE INTRAVENOUS
Status: DISCONTINUED | OUTPATIENT
Start: 2023-09-27 | End: 2023-09-27 | Stop reason: HOSPADM

## 2023-09-27 RX ORDER — DIPHENHYDRAMINE HYDROCHLORIDE 50 MG/ML
50 INJECTION INTRAMUSCULAR; INTRAVENOUS ONCE AS NEEDED
Status: DISCONTINUED | OUTPATIENT
Start: 2023-09-27 | End: 2023-09-27 | Stop reason: HOSPADM

## 2023-09-27 RX ORDER — ACETAMINOPHEN 325 MG/1
650 TABLET ORAL
Status: COMPLETED | OUTPATIENT
Start: 2023-09-27 | End: 2023-09-27

## 2023-09-27 RX ORDER — EPINEPHRINE 0.3 MG/.3ML
0.3 INJECTION SUBCUTANEOUS ONCE AS NEEDED
Status: DISCONTINUED | OUTPATIENT
Start: 2023-09-27 | End: 2023-09-27 | Stop reason: HOSPADM

## 2023-09-27 RX ORDER — DIPHENHYDRAMINE HCL 25 MG
25 CAPSULE ORAL
Status: COMPLETED | OUTPATIENT
Start: 2023-09-27 | End: 2023-09-27

## 2023-09-27 RX ADMIN — DIPHENHYDRAMINE HYDROCHLORIDE 25 MG: 25 CAPSULE ORAL at 11:09

## 2023-09-27 RX ADMIN — ACETAMINOPHEN 650 MG: 325 TABLET ORAL at 11:09

## 2023-09-27 RX ADMIN — DARATUMUMAB AND HYALURONIDASE-FIHJ (HUMAN RECOMBINANT) 1800 MG: 1800; 30000 INJECTION SUBCUTANEOUS at 11:09

## 2023-10-03 ENCOUNTER — OFFICE VISIT (OUTPATIENT)
Dept: INTERNAL MEDICINE | Facility: CLINIC | Age: 58
End: 2023-10-03
Payer: MEDICARE

## 2023-10-03 VITALS
BODY MASS INDEX: 31.35 KG/M2 | DIASTOLIC BLOOD PRESSURE: 84 MMHG | TEMPERATURE: 97 F | WEIGHT: 177 LBS | SYSTOLIC BLOOD PRESSURE: 122 MMHG | RESPIRATION RATE: 18 BRPM | OXYGEN SATURATION: 96 % | HEART RATE: 125 BPM

## 2023-10-03 DIAGNOSIS — J32.9 BACTERIAL SINUSITIS: Primary | ICD-10-CM

## 2023-10-03 DIAGNOSIS — C90.01 MULTIPLE MYELOMA IN REMISSION: ICD-10-CM

## 2023-10-03 DIAGNOSIS — B96.89 BACTERIAL SINUSITIS: Primary | ICD-10-CM

## 2023-10-03 DIAGNOSIS — R05.8 RECURRENT PRODUCTIVE COUGH: ICD-10-CM

## 2023-10-03 PROCEDURE — 1160F PR REVIEW ALL MEDS BY PRESCRIBER/CLIN PHARMACIST DOCUMENTED: ICD-10-PCS | Mod: CPTII,,,

## 2023-10-03 PROCEDURE — 1160F RVW MEDS BY RX/DR IN RCRD: CPT | Mod: CPTII,,,

## 2023-10-03 PROCEDURE — 3079F PR MOST RECENT DIASTOLIC BLOOD PRESSURE 80-89 MM HG: ICD-10-PCS | Mod: CPTII,,,

## 2023-10-03 PROCEDURE — 99214 OFFICE O/P EST MOD 30 MIN: CPT | Mod: ,,,

## 2023-10-03 PROCEDURE — 1159F MED LIST DOCD IN RCRD: CPT | Mod: CPTII,,,

## 2023-10-03 PROCEDURE — 3079F DIAST BP 80-89 MM HG: CPT | Mod: CPTII,,,

## 2023-10-03 PROCEDURE — 3074F PR MOST RECENT SYSTOLIC BLOOD PRESSURE < 130 MM HG: ICD-10-PCS | Mod: CPTII,,,

## 2023-10-03 PROCEDURE — 3008F PR BODY MASS INDEX (BMI) DOCUMENTED: ICD-10-PCS | Mod: CPTII,,,

## 2023-10-03 PROCEDURE — 3008F BODY MASS INDEX DOCD: CPT | Mod: CPTII,,,

## 2023-10-03 PROCEDURE — 3074F SYST BP LT 130 MM HG: CPT | Mod: CPTII,,,

## 2023-10-03 PROCEDURE — 99214 PR OFFICE/OUTPT VISIT, EST, LEVL IV, 30-39 MIN: ICD-10-PCS | Mod: ,,,

## 2023-10-03 PROCEDURE — 1159F PR MEDICATION LIST DOCUMENTED IN MEDICAL RECORD: ICD-10-PCS | Mod: CPTII,,,

## 2023-10-03 RX ORDER — LENALIDOMIDE 5 MG/1
CAPSULE ORAL
Qty: 14 EACH | Refills: 0 | Status: SHIPPED | OUTPATIENT
Start: 2023-10-03 | End: 2023-11-01

## 2023-10-03 RX ORDER — GUAIFENESIN/DEXTROMETHORPHAN 100-10MG/5
5 SYRUP ORAL EVERY 6 HOURS PRN
Qty: 236 ML | Refills: 0 | Status: SHIPPED | OUTPATIENT
Start: 2023-10-03 | End: 2023-10-13

## 2023-10-03 RX ORDER — FLUTICASONE PROPIONATE 50 MCG
1 SPRAY, SUSPENSION (ML) NASAL 2 TIMES DAILY
Qty: 16 G | Refills: 3 | Status: SHIPPED | OUTPATIENT
Start: 2023-10-03 | End: 2023-11-02

## 2023-10-03 RX ORDER — DOXYCYCLINE 100 MG/1
100 CAPSULE ORAL EVERY 12 HOURS
Qty: 10 CAPSULE | Refills: 0 | Status: SHIPPED | OUTPATIENT
Start: 2023-10-03 | End: 2023-10-08

## 2023-10-03 RX ORDER — LEVOCETIRIZINE DIHYDROCHLORIDE 5 MG/1
5 TABLET, FILM COATED ORAL NIGHTLY
Qty: 30 TABLET | Refills: 6 | Status: SHIPPED | OUTPATIENT
Start: 2023-10-03

## 2023-10-03 NOTE — PROGRESS NOTES
Patient ID: Ninfa Candelario is a 58 y.o. female.    Chief Complaint: Annual Exam (Pt doing well, )    56-year-old female with HTN, multiple myeloma, breast cancer, amyloidosis, anemia of chronic disease, poorly controlled hypertension, hyperlipidemia, chronic back pain, atrial fibrillation and lumbar disc problems that is seen seen today for requested visit. 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  Being followed by oncology closely.  Today presents for a productive cough and sinus congestion x3 weeks.    Cough  This is a new problem. The current episode started 1 to 4 weeks ago. The problem has been unchanged. The problem occurs every few minutes. The cough is Productive of sputum. Associated symptoms include nasal congestion and a sore throat. Pertinent negatives include no chest pain, chills, ear pain, headaches or shortness of breath. She has tried rest for the symptoms. The treatment provided mild relief.   Sinusitis  This is a new problem. The current episode started 1 to 4 weeks ago. The problem is unchanged. There has been no fever. Associated symptoms include congestion, coughing, sinus pressure and a sore throat. Pertinent negatives include no chills, ear pain, headaches or shortness of breath. Past treatments include nothing. The treatment provided no relief.     MEDICAL HISTORY:    Past Medical History:   Diagnosis Date    Amyloidosis     Anemia     Anxiety and depression     Diplopia     Hyperlipidemia     Hypertension     Impaired mobility     Lytic lesion of bone on x-ray     Multiple myeloma     Neuropathy     Obesity, unspecified     Paroxysmal atrial fibrillation     Primary cancer of left female breast       Past Surgical History:   Procedure Laterality Date    BONE MARROW TRANSPLANT  02/08/2016    CARPAL TUNNEL RELEASE      COLONOSCOPY  12/11/2018    Dr. Eddie Jimenez    ENDOSCOPIC RELEASE OF TRIGGER FINGER      ESOPHAGEAL MOTILITY  STUDY  2015    Excision Lipoma left elbow      Exploration Laparotomy  2016    FLEXIBLE SIGMOIDOSCOPY      MEDIPORT INSERTION, SINGLE  03/15/2016    PH Study 24 Hour  2015      Social History     Tobacco Use    Smoking status: Never    Smokeless tobacco: Never   Substance Use Topics    Alcohol use: Not Currently    Drug use: Never          Health Maintenance Due   Topic Date Due    Hepatitis C Screening  Never done    HIV Screening  Never done    Hemoglobin A1c (Diabetic Prevention Screening)  Never done    TETANUS VACCINE  08/22/2013    COVID-19 Vaccine (6 - Pfizer series) 07/11/2022    Cervical Cancer Screening  02/18/2023          Patient Care Team:  Adry Rosario MD as PCP - General (Internal Medicine)  Adry Rosario MD Panelli, Victoria E, MD as Consulting Physician (Hematology and Oncology)  Arben Xie MD as Consulting Physician (Physical Medicine and Rehabilitation)  Brice Williamson MD as Consulting Physician (Otolaryngology)  Daniel Nava MD (Allergy and Immunology)      Review of Systems   Constitutional:  Negative for chills.   HENT:  Positive for congestion, sinus pressure and sore throat. Negative for ear pain.    Respiratory:  Positive for cough. Negative for shortness of breath.    Cardiovascular:  Negative for chest pain.   Neurological:  Negative for headaches.       Objective:   /84 (BP Location: Left arm, Patient Position: Sitting, BP Method: Small (Automatic))   Pulse (!) 125   Temp 97.2 °F (36.2 °C) (Temporal)   Resp 18   Wt 80.3 kg (177 lb)   SpO2 96%   BMI 31.35 kg/m²      Physical Exam  Constitutional:       General: She is not in acute distress.     Appearance: Normal appearance.   HENT:      Right Ear: Tympanic membrane, ear canal and external ear normal.      Left Ear: Tympanic membrane, ear canal and external ear normal.      Nose: Nose normal.      Mouth/Throat:      Mouth: Mucous membranes are moist.      Pharynx: Oropharynx is clear.   Eyes:       Extraocular Movements: Extraocular movements intact.      Conjunctiva/sclera: Conjunctivae normal.      Pupils: Pupils are equal, round, and reactive to light.   Cardiovascular:      Rate and Rhythm: Normal rate and regular rhythm.      Pulses: Normal pulses.      Heart sounds: Normal heart sounds. No murmur heard.     No gallop.   Pulmonary:      Effort: Pulmonary effort is normal.      Breath sounds: Normal breath sounds. No wheezing.   Abdominal:      General: Bowel sounds are normal. There is no distension.      Palpations: Abdomen is soft. There is no mass.      Tenderness: There is no abdominal tenderness. There is no guarding.   Musculoskeletal:         General: Normal range of motion.   Skin:     General: Skin is warm and dry.   Neurological:      Mental Status: She is alert. Mental status is at baseline.      Sensory: No sensory deficit.      Motor: No weakness.           Assessment:       ICD-10-CM ICD-9-CM   1. Bacterial sinusitis  J32.9 473.9    B96.89 041.9   2. Recurrent productive cough  R05.8 786.2        Plan:     Problem List Items Addressed This Visit          ENT    Bacterial sinusitis - Primary     -onset >2 weeks ago  -initiate daily Antihistamine  -Okay to utilize Flonase BID  -Steam inhalation  -Tylenol/ibuprofen for body aches and low-grade temps  - initiate cough syrup as indicated  -okay to utilize Vit C , Vit D, and Zinc  -safety precautions/quarantine discussed   -notify clinic for any new or worsening symptoms           Relevant Medications    doxycycline (VIBRAMYCIN) 100 MG Cap    levocetirizine (XYZAL) 5 MG tablet    fluticasone propionate (FLONASE) 50 mcg/actuation nasal spray       Pulmonary    Recurrent productive cough     -initiate daily Antihistamine  -initiate doxycycline   -order chest x-ray  - initiate cough syrup as indicated  -safety precautions/quarantine discussed   -notify clinic for any new or worsening symptoms         Relevant Medications    doxycycline (VIBRAMYCIN)  100 MG Cap    dextromethorphan-guaiFENesin  mg/5 ml (ROBITUSSIN-DM)  mg/5 mL liquid    Other Relevant Orders    X-Ray Chest PA And Lateral          Follow up in about 4 weeks (around 10/31/2023) for wellness reschedule.   -plan specifics discussed above    Orders Placed This Encounter    X-Ray Chest PA And Lateral    doxycycline (VIBRAMYCIN) 100 MG Cap    levocetirizine (XYZAL) 5 MG tablet    fluticasone propionate (FLONASE) 50 mcg/actuation nasal spray    dextromethorphan-guaiFENesin  mg/5 ml (ROBITUSSIN-DM)  mg/5 mL liquid        Medication List with Changes/Refills   New Medications    DEXTROMETHORPHAN-GUAIFENESIN  MG/5 ML (ROBITUSSIN-DM)  MG/5 ML LIQUID    Take 5 mLs by mouth every 6 (six) hours as needed (coup).    DOXYCYCLINE (VIBRAMYCIN) 100 MG CAP    Take 1 capsule (100 mg total) by mouth every 12 (twelve) hours. for 5 days   Current Medications    APIXABAN (ELIQUIS) 2.5 MG TAB    Take 1 tablet (2.5 mg total) by mouth 2 (two) times daily.    CARVEDILOL (COREG) 3.125 MG TABLET    Take 1 tablet (3.125 mg total) by mouth 2 (two) times daily with meals.    COLESTIPOL (COLESTID) 1 GRAM TAB    TAKE 1 TABLET TWICE A DAY    DULOXETINE (CYMBALTA) 60 MG CAPSULE    Take 60 mg by mouth.    FAMOTIDINE (PEPCID) 40 MG TABLET    Take 40 mg by mouth.    HYDROCHLOROTHIAZIDE (HYDRODIURIL) 12.5 MG TAB    TAKE 1 TABLET(12.5 MG) BY MOUTH DAILY    LETROZOLE (FEMARA) 2.5 MG TAB    Take 1 tablet by mouth once daily.    METHADONE (DOLOPHINE) 10 MG TABLET    Take 10 mg by mouth every 8 (eight) hours while awake.    METHADONE (DOLOPHINE) 10 MG TABLET    Take 10 mg by mouth every 8 (eight) hours.    METHYLPREDNISOLONE (MEDROL DOSEPACK) 4 MG TABLET    use as directed    MULTIVITAMIN (THERAGRAN) PER TABLET    Take 1 tablet by mouth once daily.    OFLOXACIN (OCUFLOX) 0.3 % OPHTHALMIC SOLUTION    Place 1 drop into both eyes 4 (four) times daily.    OMEPRAZOLE (PRILOSEC) 20 MG CAPSULE    TAKE 1 CAPSULE(20  MG) BY MOUTH EVERY DAY    ONDANSETRON (ZOFRAN) 8 MG TABLET    Take 1 tablet (8 mg total) by mouth every 8 (eight) hours as needed for Nausea.    OXYCODONE (ROXICODONE) 10 MG TAB IMMEDIATE RELEASE TABLET    Take 1 tablet (10 mg total) by mouth every 12 (twelve) hours as needed.    POTASSIUM CHLORIDE (KLOR-CON) 10 MEQ TBSR    TAKE 1 TABLET BY MOUTH TWICE DAILY    POTASSIUM CHLORIDE (KLOR-CON) 10 MEQ TBSR    Take 1 tablet (10 mEq total) by mouth 2 (two) times daily.    REVLIMID 5 MG CAP    TAKE 1 CAPSULE BY MOUTH EVERY OTHER DAY.    ROSUVASTATIN (CRESTOR) 10 MG TABLET    Take 10 mg by mouth Daily.    TRETINOIN (RETIN-A) 0.1 % CREAM    Apply 1 application topically every evening.   Changed and/or Refilled Medications    Modified Medication Previous Medication    DEXAMETHASONE 20 MG TAB dexAMETHasone 20 mg Tab       Take 20 mg by mouth once a week.    Take 20 mg by mouth once a week.    FLUTICASONE PROPIONATE (FLONASE) 50 MCG/ACTUATION NASAL SPRAY fluticasone propionate (FLONASE) 50 mcg/actuation nasal spray       1 spray (50 mcg total) by Each Nostril route 2 (two) times a day.    SHAKE LIQUID AND USE 1 SPRAY IN EACH NOSTRIL TWICE DAILY AS NEEDED FOR ALLERGY SYMPTOMS    LEVOCETIRIZINE (XYZAL) 5 MG TABLET levocetirizine (XYZAL) 5 MG tablet       Take 1 tablet (5 mg total) by mouth every evening.    Take 1 tablet (5 mg total) by mouth every evening.

## 2023-10-03 NOTE — PROGRESS NOTES
Patient Care Team:  Adry Rosario MD as PCP - General (Internal Medicine)  Adry Rosario MD Panelli, Victoria E, MD as Consulting Physician (Hematology and Oncology)  Arben Xie MD as Consulting Physician (Physical Medicine and Rehabilitation)  Brice Williamson MD as Consulting Physician (Otolaryngology)  Daniel Nava MD (Allergy and Immunology)      Patient ID: Ninfa Candelario is a 58 y.o. female.    Chief Complaint: No chief complaint on file.      HPI:     56-year-old female  with HTN, multiple myeloma, breast cancer, amyloidosis, anemia of chronic disease, hypertension, hyperlipidemia,  atrial fibrillation, chronic back pain, and lumbar disc problems. 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  Being followed by oncology closely        MEDICAL HISTORY:    Past Medical History:   Diagnosis Date    Amyloidosis     Anemia     Anxiety and depression     Diplopia     Hyperlipidemia     Hypertension     Impaired mobility     Lytic lesion of bone on x-ray     Multiple myeloma     Neuropathy     Obesity, unspecified     Paroxysmal atrial fibrillation     Primary cancer of left female breast       Past Surgical History:   Procedure Laterality Date    BONE MARROW TRANSPLANT  02/08/2016    CARPAL TUNNEL RELEASE      COLONOSCOPY  12/11/2018    Dr. Eddie Jimenez    ENDOSCOPIC RELEASE OF TRIGGER FINGER      ESOPHAGEAL MOTILITY STUDY  2015    Excision Lipoma left elbow      Exploration Laparotomy  2016    FLEXIBLE SIGMOIDOSCOPY      MEDIPORT INSERTION, SINGLE  03/15/2016    PH Study 24 Hour  2015      Social History     Tobacco Use    Smoking status: Never    Smokeless tobacco: Never   Substance Use Topics    Alcohol use: Not Currently    Drug use: Never          Health Maintenance Due   Topic Date Due    Hepatitis C Screening  Never done    HIV Screening  Never done    Hemoglobin A1c (Diabetic Prevention Screening)  Never done     TETANUS VACCINE  08/22/2013    COVID-19 Vaccine (6 - Pfizer series) 07/11/2022    Cervical Cancer Screening  02/18/2023        SUBJECTIVE:     Review of Systems      Patient Reported Health Risk Assessment       OBJECTIVE:     There were no vitals taken for this visit.     Physical Exam             No data to display                  9/27/2023    10:00 AM 9/26/2023     2:00 PM 8/3/2023     2:00 PM 8/3/2023     1:30 PM 6/21/2023     4:00 PM 5/31/2023     2:30 PM 5/18/2023     1:00 PM   Fall Risk Assessment - Outpatient   Mobility Status Ambulatory Ambulatory Ambulatory Ambulatory Ambulatory w/ assistance Ambulatory Ambulatory w/ assistance   Number of falls 0 0 0 0 0 0 0   Identified as fall risk False False False False True False True                        ASSEMENT/ PLAN:     No diagnosis found.      Plan:     Problem List Items Addressed This Visit    None      Advance Care Planning   I attest to discussing Advance Care Planning with patient and/or family member.  Education was provided including the importance of the Health Care Power of , Advance Directives, and/or [place] documentation.  The patient expressed understanding to the importance of this information and discussion.  Length of ACP conversation in minutes:       Opioid Screening: Patient medication list reviewed, patient {IS / IS NOT:41723} taking prescription opioids. Patient {IS / IS NOT:99714} using additional opioids than prescribed. Patient {IS / IS NOT:60730} at low risk of substance abuse based on this opioid use history.         No follow-ups on file.   -plan specifics discussed above          Medication List with Changes/Refills   Current Medications    APIXABAN (ELIQUIS) 2.5 MG TAB    Take 1 tablet (2.5 mg total) by mouth 2 (two) times daily.    CARVEDILOL (COREG) 3.125 MG TABLET    Take 1 tablet (3.125 mg total) by mouth 2 (two) times daily with meals.    COLESTIPOL (COLESTID) 1 GRAM TAB    TAKE 1 TABLET TWICE A DAY    DEXAMETHASONE  20 MG TAB    Take 20 mg by mouth once a week.    DULOXETINE (CYMBALTA) 60 MG CAPSULE    Take 60 mg by mouth.    FAMOTIDINE (PEPCID) 40 MG TABLET    Take 40 mg by mouth.    FLUTICASONE PROPIONATE (FLONASE) 50 MCG/ACTUATION NASAL SPRAY    SHAKE LIQUID AND USE 1 SPRAY IN EACH NOSTRIL TWICE DAILY AS NEEDED FOR ALLERGY SYMPTOMS    HYDROCHLOROTHIAZIDE (HYDRODIURIL) 12.5 MG TAB    TAKE 1 TABLET(12.5 MG) BY MOUTH DAILY    LETROZOLE (FEMARA) 2.5 MG TAB    Take 1 tablet by mouth once daily.    LEVOCETIRIZINE (XYZAL) 5 MG TABLET    Take 1 tablet (5 mg total) by mouth every evening.    METHADONE (DOLOPHINE) 10 MG TABLET    Take 10 mg by mouth every 8 (eight) hours while awake.    METHADONE (DOLOPHINE) 10 MG TABLET    Take 10 mg by mouth every 8 (eight) hours.    METHYLPREDNISOLONE (MEDROL DOSEPACK) 4 MG TABLET    use as directed    MULTIVITAMIN (THERAGRAN) PER TABLET    Take 1 tablet by mouth once daily.    OFLOXACIN (OCUFLOX) 0.3 % OPHTHALMIC SOLUTION    Place 1 drop into both eyes 4 (four) times daily.    OMEPRAZOLE (PRILOSEC) 20 MG CAPSULE    TAKE 1 CAPSULE(20 MG) BY MOUTH EVERY DAY    ONDANSETRON (ZOFRAN) 8 MG TABLET    Take 1 tablet (8 mg total) by mouth every 8 (eight) hours as needed for Nausea.    OXYCODONE (ROXICODONE) 10 MG TAB IMMEDIATE RELEASE TABLET    Take 1 tablet (10 mg total) by mouth every 12 (twelve) hours as needed.    POTASSIUM CHLORIDE (KLOR-CON) 10 MEQ TBSR    TAKE 1 TABLET BY MOUTH TWICE DAILY    POTASSIUM CHLORIDE (KLOR-CON) 10 MEQ TBSR    Take 1 tablet (10 mEq total) by mouth 2 (two) times daily.    REVLIMID 5 MG CAP    Take 5 mg by mouth every other day.    ROSUVASTATIN (CRESTOR) 10 MG TABLET    Take 10 mg by mouth Daily.    TRETINOIN (RETIN-A) 0.1 % CREAM    Apply 1 application topically every evening.

## 2023-10-04 DIAGNOSIS — C50.819 OVERLAPPING MALIGNANT NEOPLASM OF FEMALE BREAST, UNSPECIFIED ESTROGEN RECEPTOR STATUS, UNSPECIFIED LATERALITY: ICD-10-CM

## 2023-10-04 DIAGNOSIS — C90.00 MULTIPLE MYELOMA NOT HAVING ACHIEVED REMISSION: ICD-10-CM

## 2023-10-05 PROBLEM — R05.8 RECURRENT PRODUCTIVE COUGH: Status: ACTIVE | Noted: 2023-10-05

## 2023-10-05 PROBLEM — B96.89 BACTERIAL SINUSITIS: Status: ACTIVE | Noted: 2023-10-05

## 2023-10-05 PROBLEM — J32.9 BACTERIAL SINUSITIS: Status: ACTIVE | Noted: 2023-10-05

## 2023-10-05 NOTE — ASSESSMENT & PLAN NOTE
-initiate daily Antihistamine  -initiate doxycycline   -order chest x-ray  - initiate cough syrup as indicated  -safety precautions/quarantine discussed   -notify clinic for any new or worsening symptoms

## 2023-10-05 NOTE — ASSESSMENT & PLAN NOTE
-onset >2 weeks ago  -initiate daily Antihistamine  -Okay to utilize Flonase BID  -Steam inhalation  -Tylenol/ibuprofen for body aches and low-grade temps  - initiate cough syrup as indicated  -okay to utilize Vit C , Vit D, and Zinc  -safety precautions/quarantine discussed   -notify clinic for any new or worsening symptoms

## 2023-10-25 ENCOUNTER — TELEPHONE (OUTPATIENT)
Dept: INTERNAL MEDICINE | Facility: CLINIC | Age: 58
End: 2023-10-25
Payer: MEDICARE

## 2023-10-25 DIAGNOSIS — I48.0 PAROXYSMAL ATRIAL FIBRILLATION: ICD-10-CM

## 2023-10-31 DIAGNOSIS — C90.01 MULTIPLE MYELOMA IN REMISSION: ICD-10-CM

## 2023-11-01 ENCOUNTER — LAB VISIT (OUTPATIENT)
Dept: LAB | Facility: HOSPITAL | Age: 58
End: 2023-11-01
Attending: INTERNAL MEDICINE
Payer: MEDICARE

## 2023-11-01 ENCOUNTER — INFUSION (OUTPATIENT)
Dept: INFUSION THERAPY | Facility: HOSPITAL | Age: 58
End: 2023-11-01
Attending: INTERNAL MEDICINE
Payer: MEDICARE

## 2023-11-01 ENCOUNTER — TELEPHONE (OUTPATIENT)
Dept: INTERNAL MEDICINE | Facility: CLINIC | Age: 58
End: 2023-11-01

## 2023-11-01 VITALS
HEART RATE: 92 BPM | WEIGHT: 179.31 LBS | SYSTOLIC BLOOD PRESSURE: 132 MMHG | RESPIRATION RATE: 16 BRPM | OXYGEN SATURATION: 98 % | DIASTOLIC BLOOD PRESSURE: 81 MMHG | BODY MASS INDEX: 31.76 KG/M2 | TEMPERATURE: 98 F

## 2023-11-01 DIAGNOSIS — C90.01 MULTIPLE MYELOMA IN REMISSION: ICD-10-CM

## 2023-11-01 DIAGNOSIS — C50.819 OVERLAPPING MALIGNANT NEOPLASM OF FEMALE BREAST, UNSPECIFIED ESTROGEN RECEPTOR STATUS, UNSPECIFIED LATERALITY: ICD-10-CM

## 2023-11-01 DIAGNOSIS — C90.00 MULTIPLE MYELOMA NOT HAVING ACHIEVED REMISSION: ICD-10-CM

## 2023-11-01 DIAGNOSIS — E85.9 AMYLOIDOSIS, UNSPECIFIED TYPE: Primary | ICD-10-CM

## 2023-11-01 LAB
ALBUMIN SERPL-MCNC: 3.2 G/DL (ref 3.4–4.8)
ALBUMIN/GLOB SERPL: 1.2 RATIO (ref 1.1–2)
ALP SERPL-CCNC: 71 UNIT/L (ref 40–150)
ALT SERPL-CCNC: 21 UNIT/L (ref 0–55)
AST SERPL-CCNC: 19 UNIT/L (ref 5–34)
BASOPHILS # BLD AUTO: 0.01 X10(3)/MCL
BASOPHILS NFR BLD AUTO: 0.2 %
BILIRUB SERPL-MCNC: 0.4 MG/DL
BUN SERPL-MCNC: 26.3 MG/DL (ref 9.8–20.1)
CALCIUM SERPL-MCNC: 9.3 MG/DL (ref 8.4–10.2)
CHLORIDE SERPL-SCNC: 100 MMOL/L (ref 98–107)
CO2 SERPL-SCNC: 32 MMOL/L (ref 23–31)
CREAT SERPL-MCNC: 2.62 MG/DL (ref 0.55–1.02)
EOSINOPHIL # BLD AUTO: 0.26 X10(3)/MCL (ref 0–0.9)
EOSINOPHIL NFR BLD AUTO: 6.3 %
ERYTHROCYTE [DISTWIDTH] IN BLOOD BY AUTOMATED COUNT: 14 % (ref 11.5–17)
GFR SERPLBLD CREATININE-BSD FMLA CKD-EPI: 20 MLS/MIN/1.73/M2
GLOBULIN SER-MCNC: 2.7 GM/DL (ref 2.4–3.5)
GLUCOSE SERPL-MCNC: 80 MG/DL (ref 82–115)
HCT VFR BLD AUTO: 34.3 % (ref 37–47)
HGB BLD-MCNC: 10.3 G/DL (ref 12–16)
IGA SERPL-MCNC: 72 MG/DL (ref 69–517)
IGG SERPL-MCNC: 600 MG/DL (ref 522–1631)
IGM SERPL-MCNC: 6 MG/DL (ref 33–293)
IMM GRANULOCYTES # BLD AUTO: 0.01 X10(3)/MCL (ref 0–0.04)
IMM GRANULOCYTES NFR BLD AUTO: 0.2 %
LYMPHOCYTES # BLD AUTO: 1.14 X10(3)/MCL (ref 0.6–4.6)
LYMPHOCYTES NFR BLD AUTO: 27.8 %
MCH RBC QN AUTO: 28.1 PG (ref 27–31)
MCHC RBC AUTO-ENTMCNC: 30 G/DL (ref 33–36)
MCV RBC AUTO: 93.5 FL (ref 80–94)
MONOCYTES # BLD AUTO: 0.45 X10(3)/MCL (ref 0.1–1.3)
MONOCYTES NFR BLD AUTO: 11 %
NEUTROPHILS # BLD AUTO: 2.23 X10(3)/MCL (ref 2.1–9.2)
NEUTROPHILS NFR BLD AUTO: 54.5 %
PLATELET # BLD AUTO: 220 X10(3)/MCL (ref 130–400)
PMV BLD AUTO: 9.6 FL (ref 7.4–10.4)
POTASSIUM SERPL-SCNC: 3.3 MMOL/L (ref 3.5–5.1)
PROT SERPL-MCNC: 5.9 GM/DL (ref 5.8–7.6)
RBC # BLD AUTO: 3.67 X10(6)/MCL (ref 4.2–5.4)
SODIUM SERPL-SCNC: 141 MMOL/L (ref 136–145)
WBC # SPEC AUTO: 4.1 X10(3)/MCL (ref 4.5–11.5)

## 2023-11-01 PROCEDURE — 86334 IMMUNOFIX E-PHORESIS SERUM: CPT

## 2023-11-01 PROCEDURE — 96401 CHEMO ANTI-NEOPL SQ/IM: CPT

## 2023-11-01 PROCEDURE — 25000003 PHARM REV CODE 250: Performed by: NURSE PRACTITIONER

## 2023-11-01 PROCEDURE — 84165 PROTEIN E-PHORESIS SERUM: CPT

## 2023-11-01 PROCEDURE — 85025 COMPLETE CBC W/AUTO DIFF WBC: CPT

## 2023-11-01 PROCEDURE — 63600175 PHARM REV CODE 636 W HCPCS: Mod: JZ,JG | Performed by: NURSE PRACTITIONER

## 2023-11-01 PROCEDURE — 36415 COLL VENOUS BLD VENIPUNCTURE: CPT

## 2023-11-01 PROCEDURE — 82784 ASSAY IGA/IGD/IGG/IGM EACH: CPT

## 2023-11-01 PROCEDURE — 80053 COMPREHEN METABOLIC PANEL: CPT

## 2023-11-01 RX ORDER — HEPARIN 100 UNIT/ML
500 SYRINGE INTRAVENOUS
Status: DISCONTINUED | OUTPATIENT
Start: 2023-11-01 | End: 2023-11-01 | Stop reason: HOSPADM

## 2023-11-01 RX ORDER — EPINEPHRINE 0.3 MG/.3ML
0.3 INJECTION SUBCUTANEOUS ONCE AS NEEDED
Status: DISCONTINUED | OUTPATIENT
Start: 2023-11-01 | End: 2023-11-01 | Stop reason: HOSPADM

## 2023-11-01 RX ORDER — DIPHENHYDRAMINE HYDROCHLORIDE 50 MG/ML
50 INJECTION INTRAMUSCULAR; INTRAVENOUS ONCE AS NEEDED
Status: DISCONTINUED | OUTPATIENT
Start: 2023-11-01 | End: 2023-11-01 | Stop reason: HOSPADM

## 2023-11-01 RX ORDER — SODIUM CHLORIDE 0.9 % (FLUSH) 0.9 %
10 SYRINGE (ML) INJECTION
Status: DISCONTINUED | OUTPATIENT
Start: 2023-11-01 | End: 2023-11-01 | Stop reason: HOSPADM

## 2023-11-01 RX ORDER — LENALIDOMIDE 5 MG/1
CAPSULE ORAL
Qty: 14 EACH | Refills: 0 | Status: SHIPPED | OUTPATIENT
Start: 2023-11-01 | End: 2023-11-30 | Stop reason: SDUPTHER

## 2023-11-01 RX ORDER — DIPHENHYDRAMINE HCL 25 MG
25 CAPSULE ORAL
Status: COMPLETED | OUTPATIENT
Start: 2023-11-01 | End: 2023-11-01

## 2023-11-01 RX ORDER — ACETAMINOPHEN 325 MG/1
650 TABLET ORAL
Status: COMPLETED | OUTPATIENT
Start: 2023-11-01 | End: 2023-11-01

## 2023-11-01 RX ADMIN — DIPHENHYDRAMINE HYDROCHLORIDE 25 MG: 25 CAPSULE ORAL at 02:11

## 2023-11-01 RX ADMIN — ACETAMINOPHEN 650 MG: 325 TABLET ORAL at 02:11

## 2023-11-01 RX ADMIN — DARATUMUMAB AND HYALURONIDASE-FIHJ (HUMAN RECOMBINANT) 1800 MG: 1800; 30000 INJECTION SUBCUTANEOUS at 02:11

## 2023-11-01 NOTE — TELEPHONE ENCOUNTER
----- Message from Nitza Fajardo sent at 11/1/2023 10:19 AM CDT -----  .Type:  Needs Medical Advice    Who Called: pt sister  Symptoms (please be specific):   How long has patient had these symptoms:    Pharmacy name and phone #:    Would the patient rather a call back or a response via [facility]?   Best Call Back Number: [phone]  Additional Information: pt sister called at 10:19 said she can't walk and can't make her appt today

## 2023-11-02 LAB
ALBUMIN % SPEP (OHS): 45.28
ALBUMIN SERPL-MCNC: 2.6 G/DL (ref 3.4–4.8)
ALBUMIN/GLOB SERPL: 0.8 RATIO (ref 1.1–2)
ALPHA 1 GLOB (OHS): 0.27 GM/DL
ALPHA 1 GLOB% (OHS): 4.73
ALPHA 2 GLOB % (OHS): 19.52
ALPHA 2 GLOB (OHS): 1.13 GM/DL
BETA GLOB (OHS): 1.14 GM/DL
BETA GLOB% (OHS): 19.72
GAMMA GLOBULIN % (OHS): 10.74
GAMMA GLOBULIN (OHS): 0.62 GM/DL
GLOBULIN SER-MCNC: 3.2 GM/DL (ref 2.4–3.5)
KAPPA LC FREE SER-MCNC: 2.78 MG/DL (ref 0.33–1.94)
KAPPA LC FREE/LAMBDA FREE SER: 1.25 {RATIO} (ref 0.26–1.65)
LAMBDA LC FREE SERPL-MCNC: 2.23 MG/DL (ref 0.57–2.63)
M SPIKE % (OHS): ABNORMAL
M SPIKE (OHS): ABNORMAL
PATH REV: NORMAL
PROT SERPL-MCNC: 5.8 GM/DL (ref 5.8–7.6)

## 2023-11-16 ENCOUNTER — PATIENT MESSAGE (OUTPATIENT)
Dept: ADMINISTRATIVE | Facility: HOSPITAL | Age: 58
End: 2023-11-16
Payer: MEDICARE

## 2023-11-20 ENCOUNTER — DOCUMENTATION ONLY (OUTPATIENT)
Dept: ADMINISTRATIVE | Facility: HOSPITAL | Age: 58
End: 2023-11-20
Payer: MEDICARE

## 2023-11-22 DIAGNOSIS — C90.00 MULTIPLE MYELOMA NOT HAVING ACHIEVED REMISSION: ICD-10-CM

## 2023-11-24 DIAGNOSIS — C90.00 MULTIPLE MYELOMA NOT HAVING ACHIEVED REMISSION: ICD-10-CM

## 2023-11-24 DIAGNOSIS — C50.819 OVERLAPPING MALIGNANT NEOPLASM OF FEMALE BREAST, UNSPECIFIED ESTROGEN RECEPTOR STATUS, UNSPECIFIED LATERALITY: ICD-10-CM

## 2023-11-24 RX ORDER — DEXAMETHASONE 20 MG/1
TABLET ORAL
Qty: 12 TABLET | Refills: 0 | Status: SHIPPED | OUTPATIENT
Start: 2023-11-24 | End: 2024-02-26

## 2023-11-27 RX ORDER — ONDANSETRON HYDROCHLORIDE 8 MG/1
8 TABLET, FILM COATED ORAL EVERY 8 HOURS PRN
Qty: 90 TABLET | Refills: 2 | Status: SHIPPED | OUTPATIENT
Start: 2023-11-27 | End: 2024-03-14 | Stop reason: SDUPTHER

## 2023-11-28 DIAGNOSIS — C90.00 MULTIPLE MYELOMA NOT HAVING ACHIEVED REMISSION: Primary | ICD-10-CM

## 2023-11-30 DIAGNOSIS — C90.01 MULTIPLE MYELOMA IN REMISSION: ICD-10-CM

## 2023-11-30 RX ORDER — LENALIDOMIDE 5 MG/1
CAPSULE ORAL
Qty: 14 EACH | Refills: 0 | Status: SHIPPED | OUTPATIENT
Start: 2023-11-30 | End: 2024-01-05

## 2023-12-08 ENCOUNTER — PATIENT MESSAGE (OUTPATIENT)
Dept: INTERNAL MEDICINE | Facility: CLINIC | Age: 58
End: 2023-12-08
Payer: MEDICARE

## 2023-12-14 ENCOUNTER — LAB VISIT (OUTPATIENT)
Dept: LAB | Facility: HOSPITAL | Age: 58
End: 2023-12-14
Attending: INTERNAL MEDICINE
Payer: MEDICARE

## 2023-12-14 ENCOUNTER — OFFICE VISIT (OUTPATIENT)
Dept: HEMATOLOGY/ONCOLOGY | Facility: CLINIC | Age: 58
End: 2023-12-14
Payer: MEDICARE

## 2023-12-14 ENCOUNTER — INFUSION (OUTPATIENT)
Dept: INFUSION THERAPY | Facility: HOSPITAL | Age: 58
End: 2023-12-14
Attending: INTERNAL MEDICINE
Payer: COMMERCIAL

## 2023-12-14 VITALS
SYSTOLIC BLOOD PRESSURE: 131 MMHG | RESPIRATION RATE: 19 BRPM | BODY MASS INDEX: 32.16 KG/M2 | DIASTOLIC BLOOD PRESSURE: 77 MMHG | HEART RATE: 74 BPM | TEMPERATURE: 98 F | HEIGHT: 63 IN | OXYGEN SATURATION: 97 % | WEIGHT: 181.5 LBS

## 2023-12-14 DIAGNOSIS — C50.819 OVERLAPPING MALIGNANT NEOPLASM OF FEMALE BREAST, UNSPECIFIED ESTROGEN RECEPTOR STATUS, UNSPECIFIED LATERALITY: ICD-10-CM

## 2023-12-14 DIAGNOSIS — Z17.0 MALIGNANT NEOPLASM OF OVERLAPPING SITES OF BOTH BREASTS IN FEMALE, ESTROGEN RECEPTOR POSITIVE: ICD-10-CM

## 2023-12-14 DIAGNOSIS — C90.00 MULTIPLE MYELOMA NOT HAVING ACHIEVED REMISSION: ICD-10-CM

## 2023-12-14 DIAGNOSIS — C90.00 MULTIPLE MYELOMA NOT HAVING ACHIEVED REMISSION: Primary | ICD-10-CM

## 2023-12-14 DIAGNOSIS — C50.812 MALIGNANT NEOPLASM OF OVERLAPPING SITES OF BOTH BREASTS IN FEMALE, ESTROGEN RECEPTOR POSITIVE: ICD-10-CM

## 2023-12-14 DIAGNOSIS — C50.811 MALIGNANT NEOPLASM OF OVERLAPPING SITES OF BOTH BREASTS IN FEMALE, ESTROGEN RECEPTOR POSITIVE: ICD-10-CM

## 2023-12-14 DIAGNOSIS — E85.9 AMYLOIDOSIS, UNSPECIFIED TYPE: Primary | ICD-10-CM

## 2023-12-14 DIAGNOSIS — Z79.811 AROMATASE INHIBITOR USE: ICD-10-CM

## 2023-12-14 LAB
BASOPHILS # BLD AUTO: 0.01 X10(3)/MCL
BASOPHILS NFR BLD AUTO: 0.3 %
EOSINOPHIL # BLD AUTO: 0 X10(3)/MCL (ref 0–0.9)
EOSINOPHIL NFR BLD AUTO: 0 %
ERYTHROCYTE [DISTWIDTH] IN BLOOD BY AUTOMATED COUNT: 15.1 % (ref 11.5–17)
HCT VFR BLD AUTO: 26.9 % (ref 37–47)
HGB BLD-MCNC: 8.3 G/DL (ref 12–16)
IMM GRANULOCYTES # BLD AUTO: 0.03 X10(3)/MCL (ref 0–0.04)
IMM GRANULOCYTES NFR BLD AUTO: 0.8 %
LYMPHOCYTES # BLD AUTO: 0.44 X10(3)/MCL (ref 0.6–4.6)
LYMPHOCYTES NFR BLD AUTO: 11.2 %
MCH RBC QN AUTO: 28.4 PG (ref 27–31)
MCHC RBC AUTO-ENTMCNC: 30.9 G/DL (ref 33–36)
MCV RBC AUTO: 92.1 FL (ref 80–94)
MONOCYTES # BLD AUTO: 0.7 X10(3)/MCL (ref 0.1–1.3)
MONOCYTES NFR BLD AUTO: 17.8 %
NEUTROPHILS # BLD AUTO: 2.75 X10(3)/MCL (ref 2.1–9.2)
NEUTROPHILS NFR BLD AUTO: 69.9 %
PLATELET # BLD AUTO: 284 X10(3)/MCL (ref 130–400)
PMV BLD AUTO: 9 FL (ref 7.4–10.4)
RBC # BLD AUTO: 2.92 X10(6)/MCL (ref 4.2–5.4)
WBC # SPEC AUTO: 3.93 X10(3)/MCL (ref 4.5–11.5)

## 2023-12-14 PROCEDURE — 36415 COLL VENOUS BLD VENIPUNCTURE: CPT

## 2023-12-14 PROCEDURE — 96401 CHEMO ANTI-NEOPL SQ/IM: CPT

## 2023-12-14 PROCEDURE — 99215 PR OFFICE/OUTPT VISIT, EST, LEVL V, 40-54 MIN: ICD-10-PCS | Mod: S$PBB,,, | Performed by: INTERNAL MEDICINE

## 2023-12-14 PROCEDURE — 25000003 PHARM REV CODE 250: Performed by: INTERNAL MEDICINE

## 2023-12-14 PROCEDURE — 99215 OFFICE O/P EST HI 40 MIN: CPT | Mod: PBBFAC | Performed by: INTERNAL MEDICINE

## 2023-12-14 PROCEDURE — 99215 OFFICE O/P EST HI 40 MIN: CPT | Mod: S$PBB,,, | Performed by: INTERNAL MEDICINE

## 2023-12-14 PROCEDURE — 85025 COMPLETE CBC W/AUTO DIFF WBC: CPT

## 2023-12-14 PROCEDURE — 99999 PR PBB SHADOW E&M-EST. PATIENT-LVL V: CPT | Mod: PBBFAC,,, | Performed by: INTERNAL MEDICINE

## 2023-12-14 PROCEDURE — 99999 PR PBB SHADOW E&M-EST. PATIENT-LVL V: ICD-10-PCS | Mod: PBBFAC,,, | Performed by: INTERNAL MEDICINE

## 2023-12-14 PROCEDURE — 63600175 PHARM REV CODE 636 W HCPCS: Mod: JZ,JG | Performed by: INTERNAL MEDICINE

## 2023-12-14 RX ORDER — SODIUM CHLORIDE 0.9 % (FLUSH) 0.9 %
10 SYRINGE (ML) INJECTION
Status: DISCONTINUED | OUTPATIENT
Start: 2023-12-14 | End: 2023-12-14 | Stop reason: HOSPADM

## 2023-12-14 RX ORDER — HEPARIN 100 UNIT/ML
500 SYRINGE INTRAVENOUS
Status: CANCELLED | OUTPATIENT
Start: 2023-12-14

## 2023-12-14 RX ORDER — ACETAMINOPHEN 325 MG/1
650 TABLET ORAL
Status: COMPLETED | OUTPATIENT
Start: 2023-12-14 | End: 2023-12-14

## 2023-12-14 RX ORDER — HEPARIN 100 UNIT/ML
500 SYRINGE INTRAVENOUS
Status: DISCONTINUED | OUTPATIENT
Start: 2023-12-14 | End: 2023-12-14 | Stop reason: HOSPADM

## 2023-12-14 RX ORDER — ACETAMINOPHEN 325 MG/1
650 TABLET ORAL
Status: CANCELLED | OUTPATIENT
Start: 2023-12-14

## 2023-12-14 RX ORDER — DIPHENHYDRAMINE HYDROCHLORIDE 50 MG/ML
50 INJECTION INTRAMUSCULAR; INTRAVENOUS ONCE AS NEEDED
Status: DISCONTINUED | OUTPATIENT
Start: 2023-12-14 | End: 2023-12-14 | Stop reason: HOSPADM

## 2023-12-14 RX ORDER — EPINEPHRINE 0.3 MG/.3ML
0.3 INJECTION SUBCUTANEOUS ONCE AS NEEDED
Status: DISCONTINUED | OUTPATIENT
Start: 2023-12-14 | End: 2023-12-14 | Stop reason: HOSPADM

## 2023-12-14 RX ORDER — SODIUM CHLORIDE 0.9 % (FLUSH) 0.9 %
10 SYRINGE (ML) INJECTION
Status: CANCELLED | OUTPATIENT
Start: 2023-12-14

## 2023-12-14 RX ORDER — DIPHENHYDRAMINE HYDROCHLORIDE 50 MG/ML
50 INJECTION INTRAMUSCULAR; INTRAVENOUS ONCE AS NEEDED
Status: CANCELLED | OUTPATIENT
Start: 2023-12-14

## 2023-12-14 RX ORDER — DIPHENHYDRAMINE HCL 25 MG
25 CAPSULE ORAL
Status: CANCELLED | OUTPATIENT
Start: 2023-12-14

## 2023-12-14 RX ORDER — DIPHENHYDRAMINE HCL 25 MG
25 CAPSULE ORAL
Status: COMPLETED | OUTPATIENT
Start: 2023-12-14 | End: 2023-12-14

## 2023-12-14 RX ORDER — EPINEPHRINE 0.3 MG/.3ML
0.3 INJECTION SUBCUTANEOUS ONCE AS NEEDED
Status: CANCELLED | OUTPATIENT
Start: 2023-12-14

## 2023-12-14 RX ADMIN — DARATUMUMAB AND HYALURONIDASE-FIHJ (HUMAN RECOMBINANT) 1800 MG: 1800; 30000 INJECTION SUBCUTANEOUS at 12:12

## 2023-12-14 RX ADMIN — DIPHENHYDRAMINE HYDROCHLORIDE 25 MG: 25 CAPSULE ORAL at 12:12

## 2023-12-14 RX ADMIN — ACETAMINOPHEN 650 MG: 325 TABLET ORAL at 12:12

## 2023-12-14 NOTE — PLAN OF CARE
Plan of care reviewed with patient; patient in agreement. C12D1 Darzalex SQ given. Appts reviewed with patient; patient uses portal.

## 2023-12-14 NOTE — PROGRESS NOTES
HEMATOLOGY/ONCOLOGY OFFICE CLINIC VISIT    Visit Information:    No show/Reschedules/Cancellations  08/21/2018--> Rescheduled              07/20/2023-->canceled visit/labs/infusion  11/05/2019--> No Show/Reschedule 08/01/2023-->canceled visit/labs/infusion  06/24/2020--> No Show/Reschedule 08/31/2023-->canceled visit/labs/infusion  09/24/2020--> No Show/Reschedule 09/21/2023-->canceled visit/labs/infusion  03/15/2021--> No Show/Reschedule 10/24/2023-->canceled visit/labs/infusion  03/23/2021--> No Show/Reschedule 10/25/2023-->canceled visit/labs/infusion  04/13/2021--> No Show   10/26/2023-->canceled visit/labs/infusion  05/27/2021--> No Show   10/31/2023-->canceled visit/labs/infusion   08/19/2021--> No Show   11/01/2023-->canceled visit/labs/infusion  08/26/2021--> No Show/Rescheduled 11/29/2023-->canceled visit/labs/infusion  04/26/2022--> No Show   12/04/2023-->canceled visit/labs/infusion  10/03/2022--> Rescheduled  12/07/2023-->canceled visit/labs/infusion  10/11/2022--> Rescheduled  03/22/2023--> No Show/Rescheduled  4/26/2023->Rescheduled  05/09/2023->Rescheduled  5/30/2023->Rescheduled  6/15/2023->Rescheduled      Referring Physician: Dr Farr  PCP: DR. Cardona  GI:   Rheumatologist: Dr. Luis Rea  Immunologist: Dr. Daniel Nava  ENT: Dr. Brice Williamson  [facility] Pain management: Dr.Chai LONG [facility] Transplant: Dr. Adrian Naylor [facility] Med Onc: Dr. Zulema [facility] Breast Surg Onc:    [facility]: Dr. 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   8) Stage IA grade 3, ER+, HER2 BERNA +,  IDC/DCIS of the left breast --- 2/7/18 at [facility]   --s/p lumpectomy with SLNB 4/12, Grade 2 IDC 4mm with second focus of microinvasive carcinoma 0.4mm. 2 SLN negative for malignancy    9) Progressive swelling of R lower extremity--- US NIVA done 2/19/18 negative for evidence of DVT  10). Paroxysmal Afib (2/18/18) diagnosed at [facility]; ECHO noted EF 58% - on Eliquis  11) Mild CKD with GFR 55 - managed per [facility] nephrology  12) Treatment induced neutropenia     Present treatment:  -Maintenance Revlimid 5mg PO QOD, started 02/16/17-- was held for a few months while undergoing treatment for her breast cancer 02/18-05/18; Restarted 6/15/18   Dexamethasone 20 mg po weekly added early July 2017--> reduced to 12 mg PO weekly October 4, 2017---> increased to 16mg PO weekly 2/15/18---restarted 6/15/18 --> 20 mg weekly 7//8/2022  Darzalex 7/8/2022-present  Femara 2.5 mg PO daily   Eliquis 2.5mg PO BID      Treatment/Oncology history:  1) CyBorD x5 cycles 09/28/15-12/24/15  2) Autologous stem cell transplant 02/08/16, done at [facility]  3) Exploratory laparotomy with subtotal colectomy and end ileostomy done August 2, 2016--pathology specimen showed advanced colonic amyloidosis extensive involving the muscular wall submucosa and mucosa.  Appendix also involvement by amyloidosis with fibrous obliteration of the distal lumen.  4) Maintenance therapy with Revlimid 5mg-started 06/01/16--Discontinued shortly after 2/2 cytopenias  5) Left breast lumpectomy with SLNB at [facility] 4/12/18  6) Left partial breast RT x10 fractions  5/21/18-6/4/1      Imaging:  NIVA 7/29/2021: Negative for deep venous thrombosis in the left lower extremity.   MMG 3/21/2022: BI-RADS Category 2: Benign Finding(s)  MRI CTL spine 9/14/2022: No acute myelomatous findings. Severe spinal stenosis process detected within the  upper cervical spine as well as the entirety of the lumbar spine segments similar to the prior imaging assessment.    CT C 1/24/2023: Right greater than left lower lobe opacification consistent with infectious process.  Lucent lesions throughout the osseous structures similar to 2015.    Pathology:    CLINICAL HISTORY:       Patient: Ninfa Candelario is a 58 y.o. female.  Ms. Cabrera Joseph was admitted on 08/16/15 for ileus versus partial SBO. CT A/P done 08/17/15 showed sigmoid colitis and a zone of transition at the junction of the descending and sigmoid colon which could indicate some degree of obstruction and an obstructing mass cannot be excluded. Numerous skeletal lytic lesions noted. CT chest done 08/19/15 showed Multiple skeletal lytic lesions involving the thoracic spine, left rib sternum consistent with either metastases or multiple myeloma. There is no evidence of pulmonary or mediastinal mass. Dr. Farr was consulted for possible MM/anemia.     Nuclear Bone scan done 8/20/15 showed mild to moderate increased activity in left rib and right second rib which correlates with fractures seen on CT.  Skeletal survey done 8/20/15showed lytic lesions in the calvarium and probably a lytic lesion in the left scapula. Lytic areas in the spine and pelvis seen on recent CT are not well defined on skeletal survey. Work up labs done 08/20/15: Negative for sickle cell and Thalessemia (reported history of thalessemia). Iron 54, Transferrin 118.0, Iron sat 34.6%, Folate 26.5, Vit B12 1,779  Peripheral smear resulted slightly macrocytic normochromic anemia without signifcant anisocytosis may reflect early B12/folate deficiency or marrow dysfunction, among others. No immature myeloid cells or blasts. Platlets adequate. Beta 2 mircoglobulin = 3.2.  SPEP/NADIA: M-spike in the beta region. The monoclonal protein peak accounts for 1.13 g/dL. Hypogammaglobulinemia. NADIA pattern shows the presence of a free lambda light chain  monoclonal protein with additional faint band in IgA.  All immunoglobulin levels decreased. IgA=26, IgG=307, IgM<5.  Kappa Qnt 0.16, Lambda Qnt 4600.00, Ratio <0.01.  24hr Urine for Bence Mendez: Kappa 2.75, Lambda 1250.00, Ratio <0.01. NADIA: Urine is POSITIVE for monoclonal Free Lambda Light chains     Patient then opted to go to [facility] where she underwent a bone marrow biopsy on 09/18/15. BMBx showed 80% plasma cells, 13p deletion and normal karyotype. She underwent fat pad biopsy which came back positive for Congo red consistent with amyloidosis. Cardiac workup revealed no amyloid deposition within the heart.  PET/CT and skeletal survey done September 18, 2015 showed multiple lytic osseous lesions  The patient was started on Velcade while at [facility] with the plan to transition to autologous stem cell transplantation. They asked if we could give her could continue treatment here.   She completed CyborD x5 cycles on 12/24/15     Patient admitted 01/06/16 for colitis and C. Diff. Pt treated with Flagyl. Discharged home 01/08/16     Repeat BMBx done at [facility] 01/2016 did not show any morphologic or immnophenotypic evidence of plasama cell neoplasm. Patient underwent Autologous stem cell transplant with Busulfan and melphalan on 02/08/16 at [facility]. The only complication was recurrent C.diff infection for which she completed 10 days of Fidaxomycin.     Follow-up bone marrow biopsy done at [facility] done 5/16/16 showed cellular 30-40% bone marrow with trilineage hematopoiesis. No morphologic or immunophenotypic support for plasma cell neoplasm. Started maintenance Revlimid 5mg. Unable to continue therapy due to cytopenias.     On 08/02/16 patient underwent  Exploratory laparotomy with subtotal colectomy and end ileostomy for what was thought to be toxic megacolon. Pathology showed extensive advanced colonic amyloidosis involving the muscular wall, submucosa and mucosa: With the appendix also involved with amyloidosis.  Positive  lambda light chains were noted.     Follow-up at [facility] 8/31/16, Per Dr. Adrian Naylor, 6 months post autologous transplant. She has engrafted. Based on the latest restaging she is near complete remission with possible IgA lambda on serum immunofixation. Patient was instructed to discontinue prophylactic antibiotics. She received her 1st series of immunizations while at [facility]. Patient had a bilateral venous ultrasound to rule out DVT, negative B/L. In regards to amyloidosis the patient feels that her tongue is smaller in size and her BNP has come down some.  Patient had a follow-up appointment at [facility] November 30, 2016.  Dr. Parnell documented the patient's free lambda light chain remains at a lower level.  Therefore in light of her recent surgery they will continue with observation only.  The plan to see the patient back in 2-3 months with repeat restaging labs.  They recommended regular CBC checks to monitor anemia.  Patient returned to [facility] in December 2016 to meet with dermatology for numerous papillary lesions on eyelids, chest and posterior neck consistent with amyloid deposits. Recommendations were for watchful waiting.  Patient is less than 1 year out from bone marrow transplant and further improvement can be expected.  She will see the patient back in 1 year to discuss possible surgical resection of the plaques especially around the eyelid region.  Rogaine recommended for persistent hair loss. Retin-A recommended for acne vulgaris.  Patient returned to [facility] on 2/8/2017 for neurosurgery consult for chronic low back pain and difficulty walking.  Imaging showed extensive DJD with discovertebral bulges and thickening of the spinal laminar tissues creating spinal stenosis throughout, but greatest at L2/L3.  Neurosurgery felt that surgery risks outweighed the benefits.  Patient was prescribed pain medicine and encouraged to participate in physical therapy.  Patient also met  with Dr. Parnell on 02/08/17, who noted an elevation in free light chains (kappa 52.60/lambda 27.77/ratio 1.89).  Recommendation is to resume maintenance therapy with Revlimid at a low dose of 5 mg every other day.  Patient went back to [facility] first week of April 2017.  I do not have these notes.  Reportedly she was told to continue on every other day Revlimid at 5 mg.  She reportedly had repeat myeloma labs done as well.  Again I do not have these results.  Patient went back to [facility] and saw Dr. Parnell June 29, 2017.  Myeloma labs were done with serum protein electrophoresis showing irregularity in the fast gamma region.  IgG of 1291.  Free kappa light chains of 84.6 with lambda light chains of 66.9.  Beta-2 microglobulin of 4.5  CT scan of lumbar spine showed multiple lytic lesions once again in the lumbar spine and bony pelvis.  Ultrasound of the left leg showed no evidence of DVT.  It was recommended based on the slight increase in her And lambda light chains to start weekly dexamethasone 20 mg p.o. weekly.  Follow-up visit and labs at [facility] on September 29, 2017 with Dr. Parnell.  Repeat beta-2 microglobulin came back at 2.4.  Serum IgG of 761, IgA 117 and IgM of 29.  Serum free kappa light chains of 24.9 and lambda of 23.5.  Counts were stable with hemoglobin of 11.1, WBC 4.9 and platelets of 210,000.  Recommendations were for continued Revlimid every other day with weekly dexamethasone along with aspirin for DVT prophylaxis.  Bilateral diagnostic MMG 12/19/17  noted 1.6cm coarse heterogeneous calcifications in posterior region of L breast at 3 o'clock position. Patient was scheduled for FNA which confirmed ID grade 3, single focus measuring 1.7cm with 2nd focus microinvasion DCIS grade 2 measuring 0.3cm. Left axillary LN biopsy showed no evidence of metastatic carcinoma. Complete staging: Stage IA, grade 3 ER+, Her 2 Niki + IDC/DCIS of left breast. Ki-67 <17%.  Repeat myeloma labs showed rise in  free lambda light chains noted at 68.69, kappa light chains at 27.22,  beta 2 microglobulin came back at 2.9. Serum protein electrophoresis showed no definitive evidence of an M protein peak. The pattern is consistent with an acute inflammatory reaction. Recommendations were made to maintain Revlimid at current dose of 5mg every other day to be taken continuously increase weekly dexamethasone to 16 mg.   Patient seen at [facility] with tentative plan for L breast lumpectomy on 3/13/18. 2/16/18- Prior to surgery she was seen by genetic counselor who ran genetic testing per patient reques, all of which were negative. She was then seen by cardiology at [facility] 2/16/18 for further evaluation of persistent bilateral lower extremity swelling-ECHO noted EF of 58%; diagnosed with paroxysmal atrial fibrillation and instructed to begin daily ASA. During preoperative asssessment per Rad/Onc 3/12/18, corresponding chest CT noted new bilateral pulmonary nodules concerning for metastatic disease- surgery was subsequently postponed pending pulmonary evaluation. Seen by Pulmonology on 3/21/18, PFTs within normal limits and cleared patient for surgery with recommended close CT follow up of nodules in 3 months. 3/21/18 seen by nephrology for management of mild CKD (GFR 55)-cleared for surgery with recommended follow up in 3 months. Left breast lumpectomy and SLNB performed per Dr. Washburn on 4/12/18- plan for follow up in clinic on 4/24/18  for postoperative assessment. Follow up with Dr. Poole (Med/Onc) on 4/25/18. Follow up with Dr. Parnell 4/26/18.   Patient seen at [facility] s/p Left breast lumpectomy with SLNB on 4/12/18. Following surgery she completed Left partial breast radiation x 10 fractions. She was then started on endocrine therapy with Femara after been deemedan inappropriate candidate for systemic chemotherapy/Herceptin.      She was seen by neurosugery at [facility] on 4/26/18 following repeat MRI of cervical  thoracic lumbar spine which noted focal enhancement of T2 hyperintensity at the C2 level which may be due to degenerative changes. Signal abnormality within  the T12 and L1 may be secondary to myeloma. Plan to continue with observation for now with F/U scheduled in October 2018.      She was seen by Dr. Parnell at [facility] for management of her MM on 4/26/18. Patient was recommended to restart Revlimid 5mg PO every other day with notes that these recommendations would be communicated to collaborating Oncologist. Patient was also recommended to start on Zometa for her bone disease.      Se[facility] by [facility]arnell at [facility] for management of her MM on 6/28/18. Repeat light chains noted lambda 33.36 (previously 80.80), K/L ratio 0.91 (previously 0.49). Due to slight improvement in light chains, plan to continue on lenalidomide maintenance. Recommendations to continue anticoagulation with Eliquis. BLE venous doppler negative for DVT.   Seen by pulmonolgy on 6/28/18- repeat CT chest done 6/28/18 noted resolution of previous pulmonary nodules. Thought to be infectious in nature. Recommendations for discharge from pulmonary clinic.  Should MRI of her cervical thoracic lumbar spine on 2/12/2019 that demonstrated cervical spondylosis with canal stenosis most notable at C1 and 2. Cord signal abnormality at C1 and 2 with enhancement is stable. Lumbar spondylosis with central canal stenosis at multiple levels. No imaging features of bony metastatic disease.     For amyloidosis (Light chain type).    Patient presented with macroglossia and now with improvement of the swelling of her tongue. Her cardiac parameters are also better.   8/10/2020 proBNP  250   2/17/2020                616  8/9/2019                  190  11/14/2023  820        Chief Complaint:Follow-up (No concerns today.)      Interval History:  She is currently on Femara for breast cancer and Rev/Dex/Darzalex  for MM  s/p transplant in 2016. At her [facility] visit on  10/12/22, Ninlaro was discontinued due to it causing severe diarrhea and leukopenia. Diarrhea resolved after stopping Ninlaro (she admits to stopping prior to visit @ [facility]).     She reports occasional n/v and diarrhea with Revlimid, but not severe, is tolerable. She c/o swelling and pain to legs. During physical exam, BLE swellling noted, L>R, left leg red with small fluid filled blisters. Otherwise, she is doing well. No fever, chills or sweats.  No chest pain or shortness of breath.  She uses a walker for mobility.  She continues to do her mammograms and other imaging studies at [facility]. She also follows with nephrologist @ [facility].    6/21/23: Patient presents today for follow up. She is due for Darzalex today.  Left leg with mild erythema and edema,  blisters have resolved and skin looks much better. She received 14 daily doses of IV Rocephin. She is doing well and voices no complaints today.    8/3/23: Patient presents today for f/u. Her left leg is still swollen and has some blisters but it is not red or tender any longer. In the interim she went to [facility] and everything was continued.     9/26/23: Patient presents today for f/u. She missed her appt and treatment last month. She c/o sinus congestion today. Afebrile. She was supposed to have kidney biopsy at [facility] last week but she cancelled it bc her sister had covid.       12/14/23: Patient here today for follow up, due for Darzalex today.  She has upcoming appointment @ [facility] next month, scans will also be done at that time. Overall, she is doing fair.     11/14/2023 proBNP 820. She will have a PET/CT next month for further eval. Otherwise the recommendations are to continue same regimen.     ROS: All 14 points ROS taken and as per Interval History  Review of Systems   Constitutional:  Positive for malaise/fatigue. Negative for chills, fever and weight loss.   HENT:  Negative for congestion and nosebleeds.    Eyes:  Negative for blurred vision,  "double vision and photophobia.   Respiratory:  Negative for cough, hemoptysis and shortness of breath.    Cardiovascular:  Negative for chest pain, palpitations, leg swelling and PND.   Gastrointestinal:  Negative for abdominal pain, blood in stool, constipation, diarrhea, melena, nausea and vomiting.   Genitourinary:  Negative for dysuria, frequency, hematuria and urgency.   Musculoskeletal:  Negative for back pain, falls and myalgias.   Skin:  Negative for itching and rash.   Neurological:  Positive for weakness. Negative for tremors, focal weakness, seizures and headaches.   Endo/Heme/Allergies:  Negative for environmental allergies. Does not bruise/bleed easily.   Psychiatric/Behavioral:  Negative for depression and suicidal ideas. The patient is not nervous/anxious.          Histories:  PMH/PSH/FH/SOCIAL/ALLERGIES AND MEDS REVIEWED AND UPDATED AS APPROPRIATE       Vitals:    12/14/23 1137   BP: 131/77   BP Location: Right arm   Patient Position: Sitting   Pulse: 74   Resp: 19   Temp: 97.9 °F (36.6 °C)   TempSrc: Oral   SpO2: 97%   Weight: 82.3 kg (181 lb 8 oz)   Height: 5' 3" (1.6 m)           Wt Readings from Last 6 Encounters:   12/14/23 82.3 kg (181 lb 8 oz)   11/01/23 81.3 kg (179 lb 4.8 oz)   10/03/23 80.3 kg (177 lb)   09/26/23 83.3 kg (183 lb 9.6 oz)   09/15/23 83.9 kg (185 lb)   08/03/23 84.9 kg (187 lb 2.7 oz)   Vitals reviewed and stable       Physical Exam  Vitals and nursing note reviewed.   Constitutional:       General: She is not in acute distress.     Appearance: Normal appearance. She is well-developed. She is ill-appearing.      Comments: Uses walker for mobility   HENT:      Head: Normocephalic and atraumatic.      Mouth/Throat:      Mouth: Mucous membranes are moist.   Eyes:      General: No scleral icterus.     Extraocular Movements: Extraocular movements intact.      Conjunctiva/sclera: Conjunctivae normal.      Pupils: Pupils are equal, round, and reactive to light.   Neck:      Vascular: " No JVD.   Cardiovascular:      Rate and Rhythm: Normal rate and regular rhythm.      Heart sounds: No murmur heard.  Pulmonary:      Effort: Pulmonary effort is normal.      Breath sounds: Normal breath sounds. No wheezing or rhonchi.   Abdominal:      General: Bowel sounds are normal. There is no distension.      Palpations: Abdomen is soft. There is no mass.      Tenderness: There is no abdominal tenderness.   Musculoskeletal:         General: No swelling or deformity.      Cervical back: Neck supple.   Lymphadenopathy:      Head:      Right side of head: No submandibular adenopathy.      Left side of head: No submandibular adenopathy.      Cervical: No cervical adenopathy.      Upper Body:      Right upper body: No supraclavicular or axillary adenopathy.      Left upper body: No supraclavicular or axillary adenopathy.      Lower Body: No right inguinal adenopathy. No left inguinal adenopathy.   Skin:     General: Skin is warm.      Coloration: Skin is not jaundiced.      Findings: No lesion or rash.      Nails: There is no clubbing.   Neurological:      Mental Status: She is alert and oriented to person, place, and time.      Cranial Nerves: Cranial nerves 2-12 are intact.      Motor: Weakness present.      Gait: Gait abnormal.   Psychiatric:         Attention and Perception: Attention normal.         Behavior: Behavior is cooperative.         Cognition and Memory: Cognition normal.         Judgment: Judgment normal.       ECOG SCORE             Laboratory:  CBC with Differential:  Lab Results   Component Value Date    WBC 3.93 (L) 12/14/2023    RBC 2.92 (L) 12/14/2023    HGB 8.3 (L) 12/14/2023    HCT 26.9 (L) 12/14/2023    MCV 92.1 12/14/2023    MCH 28.4 12/14/2023    MCHC 30.9 (L) 12/14/2023    RDW 15.1 12/14/2023     12/14/2023    MPV 9.0 12/14/2023        CMP:  Sodium Level   Date Value Ref Range Status   11/01/2023 141 136 - 145 mmol/L Final     Potassium Level   Date Value Ref Range Status    11/01/2023 3.3 (L) 3.5 - 5.1 mmol/L Final     Carbon Dioxide   Date Value Ref Range Status   11/01/2023 32 (H) 23 - 31 mmol/L Final     Blood Urea Nitrogen   Date Value Ref Range Status   11/01/2023 26.3 (H) 9.8 - 20.1 mg/dL Final     Creatinine   Date Value Ref Range Status   11/01/2023 2.62 (H) 0.55 - 1.02 mg/dL Final   07/18/2023 2.48 (H) 0.51 - 0.95 mg/dL Final     Calcium Level Total   Date Value Ref Range Status   11/01/2023 9.3 8.4 - 10.2 mg/dL Final     Albumin Level   Date Value Ref Range Status   11/01/2023 3.2 (L) 3.4 - 4.8 g/dL Final   11/01/2023 2.6 (L) 3.4 - 4.8 g/dL Final     Bilirubin Total   Date Value Ref Range Status   11/01/2023 0.4 <=1.5 mg/dL Final     Alkaline Phosphatase   Date Value Ref Range Status   11/01/2023 71 40 - 150 unit/L Final     Aspartate Aminotransferase   Date Value Ref Range Status   11/01/2023 19 5 - 34 unit/L Final     Alanine Aminotransferase   Date Value Ref Range Status   11/01/2023 21 0 - 55 unit/L Final     Estimated GFR-Non    Date Value Ref Range Status   04/20/2022 25           Assessment:       1. Multiple myeloma not having achieved remission    2. Overlapping malignant neoplasm of female breast, unspecified estrogen receptor status, unspecified laterality    3. Malignant neoplasm of overlapping sites of both breasts in female, estrogen receptor positive    4. Aromatase inhibitor use      1) Multiple Myeloma, IgA Lambda, FISH with del 13p, normal karyotype, ISS stg II Dx 2015; S/p Autologous stem cell transplant 02/08/16-remission-->slight rise in free light chains 02/08/17  2) Amyloidosis, Lambda light chain type, organ involvement with macroglossia and colon involvement. 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 Sx risks do not outweigh benefits. Pain meds given and encouraged Physical Therpay  8) Amyloid plaques, evaluated by Derm at [facility], recommend watchful waiting. Patient to follow up 12/2017  9) Stage IA grade 3, ER+, HER2 BERNA +,  IDC/DCIS of the left breast --- 2/7/18 at [facility]; s/p lumpectomy with SLNB 4/12, Grade 2 IDC measuring 4mm with second focus of microinvasive carcinoma measuring  0.4mm .IG DCIS, 0.3mm to posterior margin; 2 SLN negative for malignancy    10) Progressive swelling of R lower extremity--- US NIVA done 2/19/18 negative for evidence of DVT  11). Paroxysmal Afib (2/18/18) diagnosed at [facility]; ECHO noted EF 58%  12. Mild CKD with GFR 55 - managed per [facility] nephrology  13). Pulmonary nodules noted on pre-operative chest CT scan (3/12/18) noted new bilateral pulmonary nodules are nonspecific -- seen by Pulmonology at [facility] (3/21/18) thought to be inflammatory/infectious in nature, repeat Chest CT 6/28/18 noted resolution of nodules  14). Treatment induced neutropenia  15) NON COMPLIANT patient-- please see above the no show, reschedule and cancelled visits/labs and infusion.         Plan:     Patient was recently seen at [facility]. Kidney disease due to HTN and NOT MM. Dr Parnell rec to continue present regimen.   Discussed with her again importance of compliance. She verbalized understanding.     Okay to proceed with Darzalex today and every 4 weeks  RTC in 4 weeks for TD/MM lab and infusion all same day - with MD ONLY, patient prefers PM appts  Continue K+ BID  Continue Revlimid 5 mg QOD  Keep upcoming appointments at [facility] next month, imaging will also be done    Encourage to call or message us for any or problems  The patient was given ample opportunity to ask questions, and to the best of my abilities, all questions answered to satisfaction; patient demonstrated understanding of what we discussed and agreeable to the plan.     Natalie Meyers,  MD  Hematology/Oncology      Professional Services   I, Jagruti Booker LPN, acted solely as a scribe for and in the presence of Dr. Natalie Meyers, who performed these services.

## 2023-12-27 ENCOUNTER — PATIENT MESSAGE (OUTPATIENT)
Dept: INTERNAL MEDICINE | Facility: CLINIC | Age: 58
End: 2023-12-27
Payer: MEDICARE

## 2024-01-04 DIAGNOSIS — C90.01 MULTIPLE MYELOMA IN REMISSION: ICD-10-CM

## 2024-01-05 RX ORDER — LENALIDOMIDE 5 MG/1
CAPSULE ORAL
Qty: 14 EACH | Refills: 0 | Status: SHIPPED | OUTPATIENT
Start: 2024-01-05 | End: 2024-02-02

## 2024-01-08 ENCOUNTER — TELEPHONE (OUTPATIENT)
Dept: INTERNAL MEDICINE | Facility: CLINIC | Age: 59
End: 2024-01-08

## 2024-01-08 NOTE — TELEPHONE ENCOUNTER
----- Message from Dana Roberts MA sent at 1/8/2024  8:41 AM CST -----    ----- Message -----  From: Jarad Dueñas  Sent: 1/8/2024   8:39 AM CST  To: [facility]lene TURCIOS Staff    .Type:  Needs Medical Advice    Who Called: Avni Brown sister    Symptoms (please be specific):    How long has patient had these symptoms:    Pharmacy name and phone #:    Would the patient rather a call back or a response via [facility]?   Best Call Back Number: [phone]  Additional Information: Patient was schedule to see the doctor this morning, she cancelled due to weather, did not want to see a NP please call her back to see if she can be seen soon by the doctor.   Dr. Rosario has nothing soon.

## 2024-01-24 DIAGNOSIS — Z13.89 SCREENING FOR CARDIOVASCULAR, RESPIRATORY, AND GENITOURINARY DISEASES: ICD-10-CM

## 2024-01-24 DIAGNOSIS — Z13.228 SCREENING FOR ENDOCRINE, NUTRITIONAL, METABOLIC AND IMMUNITY DISORDER: ICD-10-CM

## 2024-01-24 DIAGNOSIS — Z13.29 SCREENING FOR ENDOCRINE, NUTRITIONAL, METABOLIC AND IMMUNITY DISORDER: ICD-10-CM

## 2024-01-24 DIAGNOSIS — E78.2 MIXED HYPERLIPIDEMIA: Primary | ICD-10-CM

## 2024-01-24 DIAGNOSIS — Z13.21 SCREENING FOR ENDOCRINE, NUTRITIONAL, METABOLIC AND IMMUNITY DISORDER: ICD-10-CM

## 2024-01-24 DIAGNOSIS — Z13.6 SCREENING FOR CARDIOVASCULAR, RESPIRATORY, AND GENITOURINARY DISEASES: ICD-10-CM

## 2024-01-24 DIAGNOSIS — Z13.83 SCREENING FOR CARDIOVASCULAR, RESPIRATORY, AND GENITOURINARY DISEASES: ICD-10-CM

## 2024-01-24 DIAGNOSIS — Z13.1 SCREENING FOR DIABETES MELLITUS: ICD-10-CM

## 2024-01-24 DIAGNOSIS — Z13.0 SCREENING FOR ENDOCRINE, NUTRITIONAL, METABOLIC AND IMMUNITY DISORDER: ICD-10-CM

## 2024-01-24 DIAGNOSIS — I10 HYPERTENSION, UNSPECIFIED TYPE: ICD-10-CM

## 2024-01-31 ENCOUNTER — OFFICE VISIT (OUTPATIENT)
Dept: INTERNAL MEDICINE | Facility: CLINIC | Age: 59
End: 2024-01-31
Payer: MEDICARE

## 2024-01-31 DIAGNOSIS — M79.662 PAIN AND SWELLING OF LEFT LOWER LEG: Primary | ICD-10-CM

## 2024-01-31 DIAGNOSIS — M79.89 PAIN AND SWELLING OF LEFT LOWER LEG: Primary | ICD-10-CM

## 2024-01-31 DIAGNOSIS — L03.116 CELLULITIS OF LEFT LOWER EXTREMITY: ICD-10-CM

## 2024-01-31 LAB
APPEARANCE UR: CLEAR
BACTERIA #/AREA URNS AUTO: ABNORMAL /HPF
BILIRUB UR QL STRIP.AUTO: NEGATIVE
COLOR UR AUTO: ABNORMAL
GLUCOSE UR QL STRIP.AUTO: NORMAL
KETONES UR QL STRIP.AUTO: NEGATIVE
LEUKOCYTE ESTERASE UR QL STRIP.AUTO: 250
MUCOUS THREADS URNS QL MICRO: ABNORMAL /LPF
NITRITE UR QL STRIP.AUTO: NEGATIVE
PH UR STRIP.AUTO: 5.5 [PH]
PROT UR QL STRIP.AUTO: ABNORMAL
RBC #/AREA URNS AUTO: ABNORMAL /HPF
RBC UR QL AUTO: ABNORMAL
SP GR UR STRIP.AUTO: 1.02 (ref 1–1.03)
SQUAMOUS #/AREA URNS LPF: ABNORMAL /HPF
UROBILINOGEN UR STRIP-ACNC: NORMAL
WBC #/AREA URNS AUTO: ABNORMAL /HPF

## 2024-01-31 PROCEDURE — 1159F MED LIST DOCD IN RCRD: CPT | Mod: CPTII,,,

## 2024-01-31 PROCEDURE — 87086 URINE CULTURE/COLONY COUNT: CPT

## 2024-01-31 PROCEDURE — 1160F RVW MEDS BY RX/DR IN RCRD: CPT | Mod: CPTII,,,

## 2024-01-31 PROCEDURE — 81001 URINALYSIS AUTO W/SCOPE: CPT

## 2024-01-31 PROCEDURE — 99214 OFFICE O/P EST MOD 30 MIN: CPT | Mod: ,,,

## 2024-01-31 RX ORDER — CLINDAMYCIN HYDROCHLORIDE 300 MG/1
300 CAPSULE ORAL EVERY 6 HOURS
Qty: 28 CAPSULE | Refills: 0 | Status: SHIPPED | OUTPATIENT
Start: 2024-01-31 | End: 2024-02-07

## 2024-01-31 NOTE — PROGRESS NOTES
Patient ID: Ninfa Candelario is a 58 y.o. female.    Chief Complaint: No chief complaint on file.    56-year-old female with HTN, multiple myeloma, breast cancer, amyloidosis, anemia of chronic disease, poorly controlled hypertension, hyperlipidemia, chronic back pain, atrial fibrillation and lumbar disc problems that is seen seen today for requested visit. 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  Being followed by oncology closely.    Today presents for complaints of left lower extremity edema, erythema, and discomfort.  Onset of symptoms within the last 2-3 weeks.  Reports gradually worsening.  Denies shortness a breath, palpitations, chest pain, or orthopnea.  Reports compliance currently on Eliquis 2.5 mg.  Also with complaints of increased your knee frequency and urgency.  Denies dysuria, hematuria, flank pain, or fever.  Not currently treating with any OTC or prescription medication.  Otherwise generally stable.        MCW:  06/30/2022  due         MEDICAL HISTORY:    Past Medical History:   Diagnosis Date    Amyloidosis     Anemia     Anxiety and depression     Diplopia     Hyperlipidemia     Hypertension     Impaired mobility     Lytic lesion of bone on x-ray     Multiple myeloma     Neuropathy     Obesity, unspecified     Paroxysmal atrial fibrillation     Primary cancer of left female breast       Past Surgical History:   Procedure Laterality Date    BONE MARROW TRANSPLANT  02/08/2016    CARPAL TUNNEL RELEASE      COLONOSCOPY  12/11/2018    Dr. Eddie Jimenez    ENDOSCOPIC RELEASE OF TRIGGER FINGER      ESOPHAGEAL MOTILITY STUDY  2015    Excision Lipoma left elbow      Exploration Laparotomy  2016    FLEXIBLE SIGMOIDOSCOPY      MEDIPORT INSERTION, SINGLE  03/15/2016    PH Study 24 Hour  2015      Social History     Tobacco Use    Smoking status: Never    Smokeless tobacco: Never   Substance Use Topics    Alcohol use: Not Currently    Drug use:  Never          Health Maintenance Due   Topic Date Due    Hepatitis C Screening  Never done    HIV Screening  Never done    Hemoglobin A1c (Diabetic Prevention Screening)  Never done    TETANUS VACCINE  08/22/2013    RSV Vaccine (Age 60+ and Pregnant patients) (1 - 1-dose 60+ series) Never done    Mammogram  04/11/2024          Patient Care Team:  Adry Rosario MD as PCP - General (Internal Medicine)  Adry Rosario MD Panelli, Victoria E, MD as Consulting Physician (Hematology and Oncology)  Arben Xie MD as Consulting Physician (Physical Medicine and Rehabilitation)  Brice Williamson MD as Consulting Physician (Otolaryngology)  Daniel Nava MD (Allergy and Immunology)      Review of Systems   Constitutional:  Negative for fatigue and fever.   HENT:  Negative for congestion, rhinorrhea, sore throat and trouble swallowing.    Eyes:  Negative for redness and visual disturbance.   Respiratory:  Negative for cough, chest tightness and shortness of breath.    Cardiovascular:  Positive for leg swelling. Negative for chest pain and palpitations.   Gastrointestinal:  Negative for abdominal pain, constipation, diarrhea, nausea and vomiting.   Genitourinary:  Positive for frequency. Negative for dysuria, flank pain and urgency.   Musculoskeletal:  Negative for arthralgias, gait problem and myalgias.   Skin:  Positive for rash. Negative for wound.   Neurological:  Negative for facial asymmetry, speech difficulty, weakness and headaches.   All other systems reviewed and are negative.      Objective:   There were no vitals taken for this visit.     Physical Exam  Constitutional:       General: She is not in acute distress.     Appearance: Normal appearance.   HENT:      Right Ear: Tympanic membrane, ear canal and external ear normal.      Left Ear: Tympanic membrane, ear canal and external ear normal.      Nose: Nose normal.      Mouth/Throat:      Mouth: Mucous membranes are moist.      Pharynx:  Oropharynx is clear.   Eyes:      Extraocular Movements: Extraocular movements intact.      Conjunctiva/sclera: Conjunctivae normal.      Pupils: Pupils are equal, round, and reactive to light.   Cardiovascular:      Rate and Rhythm: Normal rate and regular rhythm.      Pulses: Normal pulses.      Heart sounds: Normal heart sounds. No murmur heard.     No gallop.   Pulmonary:      Effort: Pulmonary effort is normal.      Breath sounds: Normal breath sounds. No wheezing.   Abdominal:      General: Bowel sounds are normal. There is no distension.      Palpations: Abdomen is soft. There is no mass.      Tenderness: There is no abdominal tenderness. There is no guarding.   Musculoskeletal:         General: Normal range of motion.      Left lower leg: Edema present.   Skin:     General: Skin is warm and dry.      Findings: Erythema present.   Neurological:      Mental Status: She is alert. Mental status is at baseline.      Sensory: No sensory deficit.      Motor: No weakness.           Assessment:       ICD-10-CM ICD-9-CM   1. Pain and swelling of left lower leg  M79.662 729.5    M79.89 729.81   2. Cellulitis of left lower extremity  L03.116 682.6        Plan:     Problem List Items Addressed This Visit          ID    Cellulitis of left lower extremity     -acute  -initiate clindamycin 300 mg  -Tylenol for low-grade temps and discomfort  -notify clinic for worsening of symptoms         Relevant Medications    clindamycin (CLEOCIN) 300 MG capsule       Orthopedic    Pain and swelling of left lower leg - Primary     - suspect likely to current cellulitis infection   -order left lower extremity venous ultrasound rule out DVT   -elevate extremity  -low-sodium diet         Relevant Medications    clindamycin (CLEOCIN) 300 MG capsule    Other Relevant Orders    US Lower Extremity Veins Left (Completed)    Urinalysis, Reflex to Urine Culture (Completed)    Urine culture (Completed)          Follow up in about 6 months (around  7/31/2024) for with Dr. Rosario, Wellness with labs prior to visit.   -plan specifics discussed above    Orders Placed This Encounter    Urine culture    US Lower Extremity Veins Left    Urinalysis, Reflex to Urine Culture    clindamycin (CLEOCIN) 300 MG capsule        Medication List with Changes/Refills   New Medications    CLINDAMYCIN (CLEOCIN) 300 MG CAPSULE    Take 1 capsule (300 mg total) by mouth every 6 (six) hours. for 7 days    NITROFURANTOIN, MACROCRYSTAL-MONOHYDRATE, (MACROBID) 100 MG CAPSULE    Take 1 capsule (100 mg total) by mouth 2 (two) times daily. for 10 days   Current Medications    APIXABAN (ELIQUIS) 2.5 MG TAB    Take 1 tablet (2.5 mg total) by mouth 2 (two) times daily.    CARVEDILOL (COREG) 3.125 MG TABLET    Take 1 tablet (3.125 mg total) by mouth 2 (two) times daily with meals.    COLESTIPOL (COLESTID) 1 GRAM TAB    TAKE 1 TABLET TWICE A DAY    DEXAMETHASONE (HEMADY) 20 MG TAB    TAKE 1 TABLET BY MOUTH BY MOUTH ONCE A WEEK    DULOXETINE (CYMBALTA) 60 MG CAPSULE    Take 60 mg by mouth.    FAMOTIDINE (PEPCID) 40 MG TABLET    Take 40 mg by mouth.    HYDROCHLOROTHIAZIDE (HYDRODIURIL) 12.5 MG TAB    TAKE 1 TABLET(12.5 MG) BY MOUTH DAILY    LETROZOLE (FEMARA) 2.5 MG TAB    Take 1 tablet by mouth once daily.    LEVOCETIRIZINE (XYZAL) 5 MG TABLET    Take 1 tablet (5 mg total) by mouth every evening.    METHADONE (DOLOPHINE) 10 MG TABLET    Take 10 mg by mouth every 8 (eight) hours while awake.    METHADONE (DOLOPHINE) 10 MG TABLET    Take 10 mg by mouth every 8 (eight) hours.    METHYLPREDNISOLONE (MEDROL DOSEPACK) 4 MG TABLET    use as directed    MULTIVITAMIN (THERAGRAN) PER TABLET    Take 1 tablet by mouth once daily.    OFLOXACIN (OCUFLOX) 0.3 % OPHTHALMIC SOLUTION    Place 1 drop into both eyes 4 (four) times daily.    OMEPRAZOLE (PRILOSEC) 20 MG CAPSULE    TAKE 1 CAPSULE(20 MG) BY MOUTH EVERY DAY    ONDANSETRON (ZOFRAN) 8 MG TABLET    Take 1 tablet (8 mg total) by mouth every 8 (eight)  hours as needed for Nausea.    OXYCODONE (ROXICODONE) 10 MG TAB IMMEDIATE RELEASE TABLET    Take 1 tablet (10 mg total) by mouth every 12 (twelve) hours as needed.    POTASSIUM CHLORIDE (KLOR-CON) 10 MEQ TBSR    TAKE 1 TABLET BY MOUTH TWICE DAILY    POTASSIUM CHLORIDE (KLOR-CON) 10 MEQ TBSR    Take 1 tablet (10 mEq total) by mouth 2 (two) times daily.    ROSUVASTATIN (CRESTOR) 10 MG TABLET    Take 10 mg by mouth Daily.    TRETINOIN (RETIN-A) 0.1 % CREAM    Apply 1 application topically every evening.   Changed and/or Refilled Medications    Modified Medication Previous Medication    LENALIDOMIDE (REVLIMID) 5 MG CAP lenalidomide (REVLIMID) 5 mg Cap       TAKE 1 CAPSULE BY MOUTH EVERY OTHER DAY..    TAKE 1 CAPSULE BY MOUTH EVERY OTHER DAY..

## 2024-02-01 ENCOUNTER — TELEPHONE (OUTPATIENT)
Dept: INTERNAL MEDICINE | Facility: CLINIC | Age: 59
End: 2024-02-01
Payer: MEDICARE

## 2024-02-01 ENCOUNTER — PATIENT MESSAGE (OUTPATIENT)
Dept: INTERNAL MEDICINE | Facility: CLINIC | Age: 59
End: 2024-02-01
Payer: MEDICARE

## 2024-02-01 DIAGNOSIS — N30.00 ACUTE CYSTITIS WITHOUT HEMATURIA: Primary | ICD-10-CM

## 2024-02-01 DIAGNOSIS — C90.01 MULTIPLE MYELOMA IN REMISSION: ICD-10-CM

## 2024-02-01 RX ORDER — NITROFURANTOIN 25; 75 MG/1; MG/1
100 CAPSULE ORAL 2 TIMES DAILY
Qty: 20 CAPSULE | Refills: 0 | Status: SHIPPED | OUTPATIENT
Start: 2024-02-01 | End: 2024-02-05

## 2024-02-01 NOTE — TELEPHONE ENCOUNTER
----- Message from AVA Eddy sent at 2/1/2024  7:05 AM CST -----  Most recent urinalysis positive for UTI.  Prescription for Macrobid sent to patient's pharmacy.  Encouraged to increase fluid hydration.  Okay to utilize OTC azo tablets.

## 2024-02-01 NOTE — PROGRESS NOTES
Most recent urinalysis positive for UTI.  Prescription for Macrobid sent to patient's pharmacy.  Encouraged to increase fluid hydration.  Okay to utilize OTC azo tablets.

## 2024-02-01 NOTE — TELEPHONE ENCOUNTER
ICD-10-CM ICD-9-CM   1. Acute cystitis without hematuria  N30.00 595.0     Medications Ordered This Encounter   Medications    nitrofurantoin, macrocrystal-monohydrate, (MACROBID) 100 MG capsule     Sig: Take 1 capsule (100 mg total) by mouth 2 (two) times daily. for 10 days     Dispense:  20 capsule     Refill:  0

## 2024-02-01 NOTE — TELEPHONE ENCOUNTER
----- Message from Pratima John sent at 2/1/2024  8:31 AM CST -----  Regarding: return call  Type:  Patient Returning Call    Who Called:pt  Who Left Message for Patient:albertina  Does the patient know what this is regarding?:test results  Would the patient rather a call back or a response via [facility]? C/b  Best Call Back Number:[phone]  Additional Information:

## 2024-02-02 ENCOUNTER — TELEPHONE (OUTPATIENT)
Dept: INTERNAL MEDICINE | Facility: CLINIC | Age: 59
End: 2024-02-02
Payer: MEDICARE

## 2024-02-02 PROBLEM — M79.89 PAIN AND SWELLING OF LEFT LOWER LEG: Status: ACTIVE | Noted: 2024-02-02

## 2024-02-02 PROBLEM — M79.662 PAIN AND SWELLING OF LEFT LOWER LEG: Status: ACTIVE | Noted: 2024-02-02

## 2024-02-02 RX ORDER — LENALIDOMIDE 5 MG/1
CAPSULE ORAL
Qty: 14 EACH | Refills: 0 | Status: SHIPPED | OUTPATIENT
Start: 2024-02-02 | End: 2024-03-01

## 2024-02-02 NOTE — ASSESSMENT & PLAN NOTE
- suspect likely to current cellulitis infection   -order left lower extremity venous ultrasound rule out DVT   -elevate extremity  -low-sodium diet

## 2024-02-02 NOTE — ASSESSMENT & PLAN NOTE
-acute  -initiate clindamycin 300 mg  -Tylenol for low-grade temps and discomfort  -notify clinic for worsening of symptoms

## 2024-02-02 NOTE — TELEPHONE ENCOUNTER
----- Message from AVA Eddy sent at 2/2/2024 10:35 AM CST -----  Please inform patient most recent left lower extremity ultrasound negative for acute DVT.  Likely inflammation related to current cellulitis infection

## 2024-02-03 LAB — BACTERIA UR CULT: ABNORMAL

## 2024-02-05 ENCOUNTER — PATIENT MESSAGE (OUTPATIENT)
Dept: INTERNAL MEDICINE | Facility: CLINIC | Age: 59
End: 2024-02-05
Payer: MEDICARE

## 2024-02-05 ENCOUNTER — TELEPHONE (OUTPATIENT)
Dept: INTERNAL MEDICINE | Facility: CLINIC | Age: 59
End: 2024-02-05
Payer: MEDICARE

## 2024-02-05 DIAGNOSIS — N30.00 ACUTE CYSTITIS WITHOUT HEMATURIA: Primary | ICD-10-CM

## 2024-02-05 RX ORDER — CIPROFLOXACIN 500 MG/1
500 TABLET ORAL EVERY 12 HOURS
Qty: 14 TABLET | Refills: 0 | Status: SHIPPED | OUTPATIENT
Start: 2024-02-05 | End: 2024-02-12

## 2024-02-05 NOTE — TELEPHONE ENCOUNTER
Spoke with patient and she was given results and recommendations. Stop Macrobid and Start Cipro. Increase fluid intake and take OTC probiotics. Verbalized understanding.

## 2024-02-05 NOTE — TELEPHONE ENCOUNTER
Final urine cultures reviewed.  Intermittent resistance to Macrobid.  Discontinue.  Initiate on Cipro based on culture susceptibilities.      ICD-10-CM ICD-9-CM   1. Acute cystitis without hematuria  N30.00 595.0     Medications Ordered This Encounter   Medications    ciprofloxacin HCl (CIPRO) 500 MG tablet     Sig: Take 1 tablet (500 mg total) by mouth every 12 (twelve) hours. for 7 days     Dispense:  14 tablet     Refill:  0

## 2024-02-05 NOTE — PROGRESS NOTES
Please inform patient final urine culture returned intermittent resistance to current antibiotic.  Patient is to stop Macrobid, start ciprofloxacin twice daily instead.  (Prescription already sent to pharmacy) Encouraged increase fluid hydration as well as daily probiotic.

## 2024-02-05 NOTE — TELEPHONE ENCOUNTER
----- Message from AVA Eddy sent at 2/5/2024  7:04 AM CST -----  Please inform patient final urine culture returned intermittent resistance to current antibiotic.  Patient is to stop Macrobid, start ciprofloxacin twice daily instead.  (Prescription already sent to pharmacy) Encouraged increase fluid hydration as w  ell as daily probiotic.

## 2024-02-07 ENCOUNTER — INFUSION (OUTPATIENT)
Dept: INFUSION THERAPY | Facility: HOSPITAL | Age: 59
End: 2024-02-07
Attending: INTERNAL MEDICINE
Payer: MEDICARE

## 2024-02-07 ENCOUNTER — OFFICE VISIT (OUTPATIENT)
Dept: HEMATOLOGY/ONCOLOGY | Facility: CLINIC | Age: 59
End: 2024-02-07
Payer: MEDICARE

## 2024-02-07 VITALS
SYSTOLIC BLOOD PRESSURE: 121 MMHG | OXYGEN SATURATION: 95 % | WEIGHT: 178.38 LBS | BODY MASS INDEX: 31.61 KG/M2 | HEIGHT: 63 IN | RESPIRATION RATE: 18 BRPM | DIASTOLIC BLOOD PRESSURE: 71 MMHG | TEMPERATURE: 98 F | HEART RATE: 94 BPM

## 2024-02-07 DIAGNOSIS — C90.00 MULTIPLE MYELOMA NOT HAVING ACHIEVED REMISSION: Primary | ICD-10-CM

## 2024-02-07 DIAGNOSIS — E85.9 AMYLOIDOSIS, UNSPECIFIED TYPE: ICD-10-CM

## 2024-02-07 DIAGNOSIS — E85.9 AMYLOIDOSIS, UNSPECIFIED TYPE: Primary | ICD-10-CM

## 2024-02-07 PROCEDURE — 99215 OFFICE O/P EST HI 40 MIN: CPT | Mod: S$PBB,,, | Performed by: INTERNAL MEDICINE

## 2024-02-07 PROCEDURE — 99999 PR PBB SHADOW E&M-EST. PATIENT-LVL V: CPT | Mod: PBBFAC,,, | Performed by: INTERNAL MEDICINE

## 2024-02-07 PROCEDURE — 99215 OFFICE O/P EST HI 40 MIN: CPT | Mod: PBBFAC,25 | Performed by: INTERNAL MEDICINE

## 2024-02-07 PROCEDURE — 96401 CHEMO ANTI-NEOPL SQ/IM: CPT

## 2024-02-07 PROCEDURE — 63600175 PHARM REV CODE 636 W HCPCS: Mod: JZ,JG | Performed by: INTERNAL MEDICINE

## 2024-02-07 PROCEDURE — 25000003 PHARM REV CODE 250: Performed by: INTERNAL MEDICINE

## 2024-02-07 RX ORDER — EPINEPHRINE 0.3 MG/.3ML
0.3 INJECTION SUBCUTANEOUS ONCE AS NEEDED
Status: DISCONTINUED | OUTPATIENT
Start: 2024-02-07 | End: 2024-02-07 | Stop reason: HOSPADM

## 2024-02-07 RX ORDER — SODIUM CHLORIDE 0.9 % (FLUSH) 0.9 %
10 SYRINGE (ML) INJECTION
Status: DISCONTINUED | OUTPATIENT
Start: 2024-02-07 | End: 2024-02-07 | Stop reason: HOSPADM

## 2024-02-07 RX ORDER — DIPHENHYDRAMINE HYDROCHLORIDE 50 MG/ML
50 INJECTION INTRAMUSCULAR; INTRAVENOUS ONCE AS NEEDED
Status: CANCELLED | OUTPATIENT
Start: 2024-02-07

## 2024-02-07 RX ORDER — ACETAMINOPHEN 325 MG/1
650 TABLET ORAL
Status: CANCELLED | OUTPATIENT
Start: 2024-02-07

## 2024-02-07 RX ORDER — DIPHENHYDRAMINE HCL 25 MG
25 CAPSULE ORAL
Status: CANCELLED | OUTPATIENT
Start: 2024-02-07

## 2024-02-07 RX ORDER — HEPARIN 100 UNIT/ML
500 SYRINGE INTRAVENOUS
Status: CANCELLED | OUTPATIENT
Start: 2024-02-07

## 2024-02-07 RX ORDER — EPINEPHRINE 0.3 MG/.3ML
0.3 INJECTION SUBCUTANEOUS ONCE AS NEEDED
Status: CANCELLED | OUTPATIENT
Start: 2024-02-07

## 2024-02-07 RX ORDER — ACETAMINOPHEN 325 MG/1
650 TABLET ORAL
Status: COMPLETED | OUTPATIENT
Start: 2024-02-07 | End: 2024-02-07

## 2024-02-07 RX ORDER — DIPHENHYDRAMINE HCL 25 MG
25 CAPSULE ORAL
Status: COMPLETED | OUTPATIENT
Start: 2024-02-07 | End: 2024-02-07

## 2024-02-07 RX ORDER — HEPARIN 100 UNIT/ML
500 SYRINGE INTRAVENOUS
Status: DISCONTINUED | OUTPATIENT
Start: 2024-02-07 | End: 2024-02-07 | Stop reason: HOSPADM

## 2024-02-07 RX ORDER — DIPHENHYDRAMINE HYDROCHLORIDE 50 MG/ML
50 INJECTION INTRAMUSCULAR; INTRAVENOUS ONCE AS NEEDED
Status: DISCONTINUED | OUTPATIENT
Start: 2024-02-07 | End: 2024-02-07 | Stop reason: HOSPADM

## 2024-02-07 RX ORDER — SODIUM CHLORIDE 0.9 % (FLUSH) 0.9 %
10 SYRINGE (ML) INJECTION
Status: CANCELLED | OUTPATIENT
Start: 2024-02-07

## 2024-02-07 RX ADMIN — ACETAMINOPHEN 650 MG: 325 TABLET ORAL at 03:02

## 2024-02-07 RX ADMIN — DIPHENHYDRAMINE HYDROCHLORIDE 25 MG: 25 CAPSULE ORAL at 03:02

## 2024-02-07 RX ADMIN — DARATUMUMAB AND HYALURONIDASE-FIHJ (HUMAN RECOMBINANT) 1800 MG: 1800; 30000 INJECTION SUBCUTANEOUS at 03:02

## 2024-02-07 NOTE — PROGRESS NOTES
HEMATOLOGY/ONCOLOGY OFFICE CLINIC VISIT    Visit Information:    No show/Reschedules/Cancellations  08/21/2018--> Rescheduled              07/20/2023-->canceled visit/labs/infusion  11/05/2019--> No Show/Reschedule 08/01/2023-->canceled visit/labs/infusion  06/24/2020--> No Show/Reschedule 08/31/2023-->canceled visit/labs/infusion  09/24/2020--> No Show/Reschedule 09/21/2023-->canceled visit/labs/infusion  03/15/2021--> No Show/Reschedule 10/24/2023-->canceled visit/labs/infusion  03/23/2021--> No Show/Reschedule 10/25/2023-->canceled visit/labs/infusion  04/13/2021--> No Show   10/26/2023-->canceled visit/labs/infusion  05/27/2021--> No Show   10/31/2023-->canceled visit/labs/infusion   08/19/2021--> No Show   11/01/2023-->canceled visit/labs/infusion  08/26/2021--> No Show/Rescheduled 11/29/2023-->canceled visit/labs/infusion  04/26/2022--> No Show   12/04/2023-->canceled visit/labs/infusion  10/03/2022--> Rescheduled  12/07/2023-->canceled visit/labs/infusion  10/11/2022--> Rescheduled  01/11/2024-->canceled visit/labs/infusion  03/22/2023--> No Show/Rescheduled  4/26/2023->Rescheduled  05/09/2023->Rescheduled  5/30/2023->Rescheduled  6/15/2023->Rescheduled      Referring Physician: Dr Farr  PCP: DR. Cardona  GI:   Rheumatologist: Dr. Luis Rea  Immunologist: Dr. Daniel Nava  ENT: Dr. Brice Williamson  [facility] Pain management: Dr.Chai [facility] Transplant: Dr. Adrian Rivera Med Onc: Dr. Zulema Rivera Breast Surg Onc: Dr.DeSnyder [place]: Dr. 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   8) Stage IA grade 3, ER+, HER2 BERNA +,  IDC/DCIS of the left breast --- 2/7/18 at [facility]   --s/p lumpectomy with SLNB 4/12, Grade 2 IDC 4mm with second focus of microinvasive carcinoma 0.4mm. 2 SLN negative for malignancy    9) Progressive swelling of R lower extremity--- US NIVA done 2/19/18 negative for evidence of DVT  10). Paroxysmal Afib (2/18/18) diagnosed at [facility]; ECHO noted EF 58% - on Eliquis  11) Mild CKD with GFR 55 - managed per [facility] nephrology  12) Treatment induced neutropenia     Present treatment:  -Maintenance Revlimid 5mg PO QOD, started 02/16/17-- was held for a few months while undergoing treatment for her breast cancer 02/18-05/18; Restarted 6/15/18   Dexamethasone 20 mg po weekly added early July 2017--> reduced to 12 mg PO weekly October 4, 2017---> increased to 16mg PO weekly 2/15/18---restarted 6/15/18 --> 20 mg weekly 7//8/2022  Darzalex 7/8/2022-present  Femara 2.5 mg PO daily   Eliquis 2.5mg PO BID     Treatment/Oncology history:  1) CyBorD x5 cycles 09/28/15-12/24/15  2) Autologous stem cell transplant 02/08/16, done at [facility]  3) Exploratory laparotomy with subtotal colectomy and end ileostomy done August 2, 2016--pathology specimen showed advanced colonic amyloidosis extensive involving the muscular wall submucosa and mucosa.  Appendix also involvement by amyloidosis with fibrous obliteration of the distal lumen.  4) Maintenance therapy with Revlimid 5mg-started 06/01/16--Discontinued shortly after 2/2 cytopenias  5) Left breast lumpectomy with SLNB at [facility] 4/12/18  6) Left partial breast RT x10 fractions  5/21/18-6/4/1      Imaging:  NIVA 7/29/2021: Negative for deep venous thrombosis in the left lower extremity.   MMG 3/21/2022: BI-RADS Category 2: Benign Finding(s)  MRI CTL spine 9/14/2022: No acute myelomatous findings.  Severe spinal stenosis process detected within the upper cervical spine as well as the entirety of the lumbar spine segments similar to the prior imaging assessment.    CT C 1/24/2023: Right greater than left lower lobe opacification consistent with infectious process.  Lucent lesions throughout the osseous structures similar to 2015.    Pathology:    CLINICAL HISTORY:       Patient: Ninfa Candelario is a 58 y.o. female.  Ms. Cabrera Joseph was admitted on 08/16/15 for ileus versus partial SBO. CT A/P done 08/17/15 showed sigmoid colitis and a zone of transition at the junction of the descending and sigmoid colon which could indicate some degree of obstruction and an obstructing mass cannot be excluded. Numerous skeletal lytic lesions noted. CT chest done 08/19/15 showed Multiple skeletal lytic lesions involving the thoracic spine, left rib sternum consistent with either metastases or multiple myeloma. There is no evidence of pulmonary or mediastinal mass. Dr. Farr was consulted for possible MM/anemia.     Nuclear Bone scan done 8/20/15 showed mild to moderate increased activity in left rib and right second rib which correlates with fractures seen on CT.  Skeletal survey done 8/20/15showed lytic lesions in the calvarium and probably a lytic lesion in the left scapula. Lytic areas in the spine and pelvis seen on recent CT are not well defined on skeletal survey. Work up labs done 08/20/15: Negative for sickle cell and Thalessemia (reported history of thalessemia). Iron 54, Transferrin 118.0, Iron sat 34.6%, Folate 26.5, Vit B12 1,779  Peripheral smear resulted slightly macrocytic normochromic anemia without signifcant anisocytosis may reflect early B12/folate deficiency or marrow dysfunction, among others. No immature myeloid cells or blasts. Platlets adequate. Beta 2 mircoglobulin = 3.2.  SPEP/NADIA: M-spike in the beta region. The monoclonal protein peak accounts for 1.13 g/dL. Hypogammaglobulinemia. NADIA pattern shows  the presence of a free lambda light chain monoclonal protein with additional faint band in IgA.  All immunoglobulin levels decreased. IgA=26, IgG=307, IgM<5.  Kappa Qnt 0.16, Lambda Qnt 4600.00, Ratio <0.01.  24hr Urine for Bence Mendez: Kappa 2.75, Lambda 1250.00, Ratio <0.01. NADIA: Urine is POSITIVE for monoclonal Free Lambda Light chains     Patient then opted to go to [facility] where she underwent a bone marrow biopsy on 09/18/15. BMBx showed 80% plasma cells, 13p deletion and normal karyotype. She underwent fat pad biopsy which came back positive for Congo red consistent with amyloidosis. Cardiac workup revealed no amyloid deposition within the heart.  PET/CT and skeletal survey done September 18, 2015 showed multiple lytic osseous lesions  The patient was started on Velcade while at [facility] with the plan to transition to autologous stem cell transplantation. They asked if we could give her could continue treatment here.   She completed CyborD x5 cycles on 12/24/15     Patient admitted 01/06/16 for colitis and C. Diff. Pt treated with Flagyl. Discharged home 01/08/16     Repeat BMBx done at [facility] 01/2016 did not show any morphologic or immnophenotypic evidence of plasama cell neoplasm. Patient underwent Autologous stem cell transplant with Busulfan and melphalan on 02/08/16 at [facility]. The only complication was recurrent C.diff infection for which she completed 10 days of Fidaxomycin.     Follow-up bone marrow biopsy done at [facility] done 5/16/16 showed cellular 30-40% bone marrow with trilineage hematopoiesis. No morphologic or immunophenotypic support for plasma cell neoplasm. Started maintenance Revlimid 5mg. Unable to continue therapy due to cytopenias.     On 08/02/16 patient underwent  Exploratory laparotomy with subtotal colectomy and end ileostomy for what was thought to be toxic megacolon. Pathology showed extensive advanced colonic amyloidosis involving the muscular wall, submucosa and mucosa: With the appendix  also involved with amyloidosis.  Positive lambda light chains were noted.     Follow-up at [facility] 8/31/16, Per Dr. Adrian Naylor, 6 months post autologous transplant. She has engrafted. Based on the latest restaging she is near complete remission with possible IgA lambda on serum immunofixation. Patient was instructed to discontinue prophylactic antibiotics. She received her 1st series of immunizations while at [facility]. Patient had a bilateral venous ultrasound to rule out DVT, negative B/L. In regards to amyloidosis the patient feels that her tongue is smaller in size and her BNP has come down some.  Patient had a follow-up appointment at [facility] November 30, 2016.  Dr. Parnell documented the patient's free lambda light chain remains at a lower level.  Therefore in light of her recent surgery they will continue with observation only.  The plan to see the patient back in 2-3 months with repeat restaging labs.  They recommended regular CBC checks to monitor anemia.  Patient returned to [facility] in December 2016 to meet with dermatology for numerous papillary lesions on eyelids, chest and posterior neck consistent with amyloid deposits. Recommendations were for watchful waiting.  Patient is less than 1 year out from bone marrow transplant and further improvement can be expected.  She will see the patient back in 1 year to discuss possible surgical resection of the plaques especially around the eyelid region.  Rogaine recommended for persistent hair loss. Retin-A recommended for acne vulgaris.  Patient returned to [facility] on 2/8/2017 for neurosurgery consult for chronic low back pain and difficulty walking.  Imaging showed extensive DJD with discovertebral bulges and thickening of the spinal laminar tissues creating spinal stenosis throughout, but greatest at L2/L3.  Neurosurgery felt that surgery risks outweighed the benefits.  Patient was prescribed pain medicine and encouraged to  participate in physical therapy.  Patient also met with Dr. Parnell on 02/08/17, who noted an elevation in free light chains (kappa 52.60/lambda 27.77/ratio 1.89).  Recommendation is to resume maintenance therapy with Revlimid at a low dose of 5 mg every other day.  Patient went back to [facility] first week of April 2017.  I do not have these notes.  Reportedly she was told to continue on every other day Revlimid at 5 mg.  She reportedly had repeat myeloma labs done as well.  Again I do not have these results.  Patient went back to [facility] and saw Dr. Parnell June 29, 2017.  Myeloma labs were done with serum protein electrophoresis showing irregularity in the fast gamma region.  IgG of 1291.  Free kappa light chains of 84.6 with lambda light chains of 66.9.  Beta-2 microglobulin of 4.5  CT scan of lumbar spine showed multiple lytic lesions once again in the lumbar spine and bony pelvis.  Ultrasound of the left leg showed no evidence of DVT.  It was recommended based on the slight increase in her And lambda light chains to start weekly dexamethasone 20 mg p.o. weekly.  Follow-up visit and labs at [facility] on September 29, 2017 with Dr. Parnell.  Repeat beta-2 microglobulin came back at 2.4.  Serum IgG of 761, IgA 117 and IgM of 29.  Serum free kappa light chains of 24.9 and lambda of 23.5.  Counts were stable with hemoglobin of 11.1, WBC 4.9 and platelets of 210,000.  Recommendations were for continued Revlimid every other day with weekly dexamethasone along with aspirin for DVT prophylaxis.  Bilateral diagnostic MMG 12/19/17  noted 1.6cm coarse heterogeneous calcifications in posterior region of L breast at 3 o'clock position. Patient was scheduled for FNA which confirmed ID grade 3, single focus measuring 1.7cm with 2nd focus microinvasion DCIS grade 2 measuring 0.3cm. Left axillary LN biopsy showed no evidence of metastatic carcinoma. Complete staging: Stage IA, grade 3 ER+, Her 2 Niki + IDC/DCIS of left breast.  Ki-67 <17%.  Repeat myeloma labs showed rise in free lambda light chains noted at 68.69, kappa light chains at 27.22,  beta 2 microglobulin came back at 2.9. Serum protein electrophoresis showed no definitive evidence of an M protein peak. The pattern is consistent with an acute inflammatory reaction. Recommendations were made to maintain Revlimid at current dose of 5mg every other day to be taken continuously increase weekly dexamethasone to 16 mg.   Patient seen at [facility] with tentative plan for L breast lumpectomy on 3/13/18. 2/16/18- Prior to surgery she was seen by genetic counselor who ran genetic testing per patient reques, all of which were negative. She was then seen by cardiology at [facility] 2/16/18 for further evaluation of persistent bilateral lower extremity swelling-ECHO noted EF of 58%; diagnosed with paroxysmal atrial fibrillation and instructed to begin daily ASA. During preoperative asssessment per Rad/Onc 3/12/18, corresponding chest CT noted new bilateral pulmonary nodules concerning for metastatic disease- surgery was subsequently postponed pending pulmonary evaluation. Seen by Pulmonology on 3/21/18, PFTs within normal limits and cleared patient for surgery with recommended close CT follow up of nodules in 3 months. 3/21/18 seen by nephrology for management of mild CKD (GFR 55)-cleared for surgery with recommended follow up in 3 months. Left breast lumpectomy and SLNB performed per Dr. Washburn on 4/12/18- plan for follow up in clinic on 4/24/18  for postoperative assessment. Follow up with Dr. Poole (Med/Onc) on 4/25/18. Follow up with Dr. Parnell 4/26/18.   Patient seen at [facility] s/p Left breast lumpectomy with SLNB on 4/12/18. Following surgery she completed Left partial breast radiation x 10 fractions. She was then started on endocrine therapy with Femara after been deemedan inappropriate candidate for systemic chemotherapy/Herceptin.      She was seen by neurosugery at [facility] on  4/26/18 following repeat MRI of cervical thoracic lumbar spine which noted focal enhancement of T2 hyperintensity at the C2 level which may be due to degenerative changes. Signal abnormality within  the T12 and L1 may be secondary to myeloma. Plan to continue with observation for now with F/U scheduled in October 2018.      She was seen by Dr. Parnell at [facility] for management of her MM on 4/26/18. Patient was recommended to restart Revlimid 5mg PO every other day with notes that these recommendations would be communicated to collaborating Oncologist. Patient was also recommended to start on Zometa for her bone disease.      Seen by Dr. Parnell at [facility] for management of her MM on 6/28/18. Repeat light chains noted lambda 33.36 (previously 80.80), K/L ratio 0.91 (previously 0.49). Due to slight improvement in light chains, plan to continue on lenalidomide maintenance. Recommendations to continue anticoagulation with Eliquis. BLE venous doppler negative for DVT.   Seen by pulmonolgy on 6/28/18- repeat CT chest done 6/28/18 noted resolution of previous pulmonary nodules. Thought to be infectious in nature. Recommendations for discharge from pulmonary clinic.  Should MRI of her cervical thoracic lumbar spine on 2/12/2019 that demonstrated cervical spondylosis with canal stenosis most notable at C1 and 2. Cord signal abnormality at C1 and 2 with enhancement is stable. Lumbar spondylosis with central canal stenosis at multiple levels. No imaging features of bony metastatic disease.     For amyloidosis (Light chain type).    Patient presented with macroglossia and now with improvement of the swelling of her tongue. Her cardiac parameters are also better.   8/10/2020 proBNP  250   2/17/2020                616  8/9/2019                  190  11/14/2023  820        Chief Complaint:4 Week Follow Up      Interval History:  She is currently on Femara for breast cancer and Rev/Dex/Darzalex  for MM  s/p transplant in 2016.  At her [facility] visit on 10/12/22, Ninlaro was discontinued due to it causing severe diarrhea and leukopenia. Diarrhea resolved after stopping Ninlaro (she admits to stopping prior to visit @ [facility]).     She reports occasional n/v and diarrhea with Revlimid, but not severe, is tolerable. She c/o swelling and pain to legs. During physical exam, BLE swellling noted, L>R, left leg red with small fluid filled blisters. Otherwise, she is doing well. No fever, chills or sweats.  No chest pain or shortness of breath.  She uses a walker for mobility.  She continues to do her mammograms and other imaging studies at [facility]. She also follows with nephrologist @ [facility].    6/21/23: Patient presents today for follow up. She is due for Darzalex today.  Left leg with mild erythema and edema,  blisters have resolved and skin looks much better. She received 14 daily doses of IV Rocephin. She is doing well and voices no complaints today.    8/3/23: Patient presents today for f/u. Her left leg is still swollen and has some blisters but it is not red or tender any longer. In the interim she went to [facility] and everything was continued.     9/26/23: Patient presents today for f/u. She missed her appt and treatment last month. She c/o sinus congestion today. Afebrile. She was supposed to have kidney biopsy at [facility] last week but she cancelled it bc her sister had covid.       12/14/23: Patient here today for follow up, due for Darzalex today.  She has upcoming appointment @ [facility] next month, scans will also be done at that time. Overall, she is doing fair.     11/14/2023 proBNP 820. She will have a PET/CT next month for further eval. Otherwise the recommendations are to continue same regimen.     2/7/2024: Patient here today for follow up and continuation of Darzalex, due today. She reports she rescheduled last month's appointment due to having a UTI and frequent urination, was treated with antibiotics. She is currently being  "treated for cellulitis to LLE with Cipro. She continues to follow up at [facility], next visit in 4/2024, scans and mammogram will be done as well. She is scheduled to see cardio @ [facility] on 3/7/24. She complains of fatigue today. Overall, she is doing fair. She is wanting to be able to walk without her rollator walker and use a cane.         ROS: All 14 points ROS taken and as per Interval History  Review of Systems   Constitutional:  Positive for malaise/fatigue. Negative for chills, fever and weight loss.   HENT:  Negative for congestion and nosebleeds.    Eyes:  Negative for blurred vision, double vision and photophobia.   Respiratory:  Negative for cough, hemoptysis and shortness of breath.    Cardiovascular:  Negative for chest pain, palpitations, leg swelling and PND.   Gastrointestinal:  Negative for abdominal pain, blood in stool, constipation, diarrhea, melena, nausea and vomiting.   Genitourinary:  Negative for dysuria, frequency, hematuria and urgency.   Musculoskeletal:  Negative for back pain, falls and myalgias.   Skin:  Negative for itching and rash.   Neurological:  Positive for weakness. Negative for tremors, focal weakness, seizures and headaches.   Endo/Heme/Allergies:  Negative for environmental allergies. Does not bruise/bleed easily.   Psychiatric/Behavioral:  Negative for depression and suicidal ideas. The patient is not nervous/anxious.          Histories:  PMH/PSH/FH/SOCIAL/ALLERGIES AND MEDS REVIEWED AND UPDATED AS APPROPRIATE       Vitals:    02/07/24 1512   BP: 121/71   BP Location: Left arm   Patient Position: Sitting   Pulse: 94   Resp: 18   Temp: 98.1 °F (36.7 °C)   TempSrc: Oral   SpO2: 95%   Weight: 80.9 kg (178 lb 6.4 oz)   Height: 5' 3" (1.6 m)             Wt Readings from Last 6 Encounters:   02/07/24 80.9 kg (178 lb 6.4 oz)   12/14/23 82.3 kg (181 lb 8 oz)   11/01/23 81.3 kg (179 lb 4.8 oz)   10/03/23 80.3 kg (177 lb)   09/26/23 83.3 kg (183 lb 9.6 oz)   09/15/23 83.9 kg (185 lb) "   Vitals reviewed and stable       Physical Exam  Vitals and nursing note reviewed.   Constitutional:       General: She is not in acute distress.     Appearance: Normal appearance. She is well-developed. She is ill-appearing.      Comments: Uses walker for mobility   HENT:      Head: Normocephalic and atraumatic.      Mouth/Throat:      Mouth: Mucous membranes are moist.   Eyes:      General: No scleral icterus.     Extraocular Movements: Extraocular movements intact.      Conjunctiva/sclera: Conjunctivae normal.      Pupils: Pupils are equal, round, and reactive to light.   Neck:      Vascular: No JVD.   Cardiovascular:      Rate and Rhythm: Normal rate and regular rhythm.      Heart sounds: No murmur heard.  Pulmonary:      Effort: Pulmonary effort is normal.      Breath sounds: Normal breath sounds. No wheezing or rhonchi.   Abdominal:      General: Bowel sounds are normal. There is no distension.      Palpations: Abdomen is soft. There is no mass.      Tenderness: There is no abdominal tenderness.   Musculoskeletal:         General: No swelling or deformity.      Cervical back: Neck supple.   Lymphadenopathy:      Head:      Right side of head: No submandibular adenopathy.      Left side of head: No submandibular adenopathy.      Cervical: No cervical adenopathy.      Upper Body:      Right upper body: No supraclavicular or axillary adenopathy.      Left upper body: No supraclavicular or axillary adenopathy.      Lower Body: No right inguinal adenopathy. No left inguinal adenopathy.   Skin:     General: Skin is warm.      Coloration: Skin is not jaundiced.      Findings: No lesion or rash.      Nails: There is no clubbing.   Neurological:      Mental Status: She is alert and oriented to person, place, and time.      Cranial Nerves: Cranial nerves 2-12 are intact.      Motor: Weakness present.      Gait: Gait abnormal.   Psychiatric:         Attention and Perception: Attention normal.         Behavior: Behavior  is cooperative.         Cognition and Memory: Cognition normal.         Judgment: Judgment normal.       ECOG SCORE    3 - Capable of only limited selfcare, confined to bed or chair more than 50% of waking hours         Laboratory:  CBC with Differential:  Lab Results   Component Value Date    WBC 5.09 02/07/2024    RBC 3.70 (L) 02/07/2024    HGB 10.4 (L) 02/07/2024    HCT 33.9 (L) 02/07/2024    MCV 91.6 02/07/2024    MCH 28.1 02/07/2024    MCHC 30.7 (L) 02/07/2024    RDW 15.1 02/07/2024     02/07/2024    MPV 9.1 02/07/2024        CMP:  Sodium Level   Date Value Ref Range Status   02/07/2024 142 136 - 145 mmol/L Final     Potassium Level   Date Value Ref Range Status   02/07/2024 3.4 (L) 3.5 - 5.1 mmol/L Final     Carbon Dioxide   Date Value Ref Range Status   02/07/2024 27 23 - 31 mmol/L Final     Blood Urea Nitrogen   Date Value Ref Range Status   02/07/2024 27.0 (H) 9.8 - 20.1 mg/dL Final     Creatinine   Date Value Ref Range Status   02/07/2024 2.58 (H) 0.55 - 1.02 mg/dL Final   07/18/2023 2.48 (H) 0.51 - 0.95 mg/dL Final     Calcium Level Total   Date Value Ref Range Status   02/07/2024 9.0 8.4 - 10.2 mg/dL Final     Albumin Level   Date Value Ref Range Status   02/07/2024 3.1 (L) 3.4 - 4.8 g/dL Final     Bilirubin Total   Date Value Ref Range Status   02/07/2024 0.2 <=1.5 mg/dL Final     Alkaline Phosphatase   Date Value Ref Range Status   02/07/2024 85 40 - 150 unit/L Final     Aspartate Aminotransferase   Date Value Ref Range Status   02/07/2024 19 5 - 34 unit/L Final     Alanine Aminotransferase   Date Value Ref Range Status   02/07/2024 17 0 - 55 unit/L Final     Estimated GFR-Non    Date Value Ref Range Status   04/20/2022 25           Assessment:       1) Multiple Myeloma, IgA Lambda, FISH with del 13p, normal karyotype, ISS stg II Dx 2015; S/p Autologous stem cell transplant 02/08/16-remission-->slight rise in free light chains 02/08/17  2) Amyloidosis, Lambda light chain type,  organ involvement with macroglossia and colon involvement. 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 Sx risks do not outweigh benefits. Pain meds given and encouraged Physical Therpay  8) Amyloid plaques, evaluated by Derm at [facility], recommend watchful waiting. Patient to follow up 12/2017  9) Stage IA grade 3, ER+, HER2 BERNA +,  IDC/DCIS of the left breast --- 2/7/18 at [facility]; s/p lumpectomy with SLNB 4/12, Grade 2 IDC measuring 4mm with second focus of microinvasive carcinoma measuring  0.4mm .IG DCIS, 0.3mm to posterior margin; 2 SLN negative for malignancy    10) Progressive swelling of R lower extremity--- US NIVA done 2/19/18 negative for evidence of DVT  11). Paroxysmal Afib (2/18/18) diagnosed at [facility]; ECHO noted EF 58%  12. Mild CKD with GFR 55 - managed per [facility] nephrology  13). Pulmonary nodules noted on pre-operative chest CT scan (3/12/18) noted new bilateral pulmonary nodules are nonspecific -- seen by Pulmonology at [facility] (3/21/18) thought to be inflammatory/infectious in nature, repeat Chest CT 6/28/18 noted resolution of nodules  14). Treatment induced neutropenia  15) NON COMPLIANT patient-- please see above the no show, reschedule and cancelled visits/labs and infusion.         Plan:     Patient was recently seen at [facility]. Kidney disease due to HTN and NOT MM. Dr Parnell rec to continue present regimen.   Discussed with her again importance of compliance. She verbalized understanding.     Okay to proceed with Darzalex today and every 4 weeks  RTC in 4 weeks for TD/MM labs/ infusion all same day - with MD ONLY, patient prefers PM appts on Wednesdays  Continue K+ BID  Continue Revlimid 5 mg QOD  Continue Eliquis 2.5 mg BID  Continue Cipro as prescribed  for LLE cellulitis  Keep upcoming appointments at [facility] 4/2024, imaging will also be done  Keep cardiology follow up @ [facility] on 3/7/24  Offered referral for PT, she will let me know when she is ready    Encourage to call or message us for any or problems  The patient was given ample opportunity to ask questions, and to the best of my abilities, all questions answered to satisfaction; patient demonstrated understanding of what we discussed and agreeable to the plan.     Natalie Meyers MD  Hematology/Oncology      Professional Services   I, Jagruti Booker LPN, acted solely as a scribe for and in the presence of Dr. Natalie Meyers, who performed these services.

## 2024-02-23 DIAGNOSIS — C90.00 MULTIPLE MYELOMA NOT HAVING ACHIEVED REMISSION: ICD-10-CM

## 2024-02-23 DIAGNOSIS — C50.819 OVERLAPPING MALIGNANT NEOPLASM OF FEMALE BREAST, UNSPECIFIED ESTROGEN RECEPTOR STATUS, UNSPECIFIED LATERALITY: ICD-10-CM

## 2024-02-25 ENCOUNTER — HOSPITAL ENCOUNTER (INPATIENT)
Facility: HOSPITAL | Age: 59
LOS: 4 days | Discharge: HOME | DRG: 193 | End: 2024-02-29
Attending: EMERGENCY MEDICINE | Admitting: INTERNAL MEDICINE
Payer: MEDICARE

## 2024-02-25 DIAGNOSIS — J18.9 COMMUNITY ACQUIRED PNEUMONIA OF RIGHT LOWER LOBE OF LUNG: ICD-10-CM

## 2024-02-25 DIAGNOSIS — M79.89 LEFT LEG SWELLING: ICD-10-CM

## 2024-02-25 DIAGNOSIS — A41.9 ACUTE ONSET SEPSIS: Primary | ICD-10-CM

## 2024-02-25 DIAGNOSIS — L03.116 CELLULITIS OF LEFT LOWER LEG: ICD-10-CM

## 2024-02-25 DIAGNOSIS — D70.9 NEUTROPENIA, UNSPECIFIED TYPE: ICD-10-CM

## 2024-02-25 DIAGNOSIS — R41.82 ALTERED MENTAL STATUS: ICD-10-CM

## 2024-02-25 LAB
ABS NEUT (OLG): 1.16 X10(3)/MCL (ref 2.1–9.2)
ALBUMIN SERPL-MCNC: 2.6 G/DL (ref 3.4–4.8)
ALBUMIN/GLOB SERPL: 0.8 RATIO (ref 1.1–2)
ALP SERPL-CCNC: 55 UNIT/L (ref 40–150)
ALT SERPL-CCNC: 21 UNIT/L (ref 0–55)
APPEARANCE UR: ABNORMAL
AST SERPL-CCNC: 32 UNIT/L (ref 5–34)
BACTERIA #/AREA URNS AUTO: ABNORMAL /HPF
BILIRUB SERPL-MCNC: 0.7 MG/DL
BILIRUB UR QL STRIP.AUTO: NEGATIVE
BUN SERPL-MCNC: 23.9 MG/DL (ref 9.8–20.1)
CALCIUM SERPL-MCNC: 8.6 MG/DL (ref 8.4–10.2)
CHLORIDE SERPL-SCNC: 106 MMOL/L (ref 98–107)
CO2 SERPL-SCNC: 22 MMOL/L (ref 23–31)
COLOR UR AUTO: YELLOW
CREAT SERPL-MCNC: 2.16 MG/DL (ref 0.55–1.02)
ERYTHROCYTE [DISTWIDTH] IN BLOOD BY AUTOMATED COUNT: 16.4 % (ref 11.5–17)
GFR SERPLBLD CREATININE-BSD FMLA CKD-EPI: 26 MLS/MIN/1.73/M2
GLOBULIN SER-MCNC: 3.3 GM/DL (ref 2.4–3.5)
GLUCOSE SERPL-MCNC: 94 MG/DL (ref 82–115)
GLUCOSE UR QL STRIP.AUTO: NORMAL
HCT VFR BLD AUTO: 32.3 % (ref 37–47)
HGB BLD-MCNC: 9.7 G/DL (ref 12–16)
INSTRUMENT WBC (OLG): 1.47 X10(3)/MCL
KETONES UR QL STRIP.AUTO: ABNORMAL
LACTATE SERPL-SCNC: 2 MMOL/L (ref 0.5–2.2)
LEUKOCYTE ESTERASE UR QL STRIP.AUTO: NEGATIVE
LYMPHOCYTES NFR BLD MANUAL: 0.12 X10(3)/MCL
LYMPHOCYTES NFR BLD MANUAL: 8 %
MAGNESIUM SERPL-MCNC: 2 MG/DL (ref 1.6–2.6)
MCH RBC QN AUTO: 28.1 PG (ref 27–31)
MCHC RBC AUTO-ENTMCNC: 30 G/DL (ref 33–36)
MCV RBC AUTO: 93.6 FL (ref 80–94)
METAMYELOCYTES NFR BLD MANUAL: 1 %
MONOCYTES NFR BLD MANUAL: 0.16 X10(3)/MCL (ref 0.1–1.3)
MONOCYTES NFR BLD MANUAL: 11 %
MUCOUS THREADS URNS QL MICRO: ABNORMAL /LPF
MYELOCYTES NFR BLD MANUAL: 1 %
NEUTROPHILS NFR BLD MANUAL: 79 %
NITRITE UR QL STRIP.AUTO: NEGATIVE
NRBC BLD AUTO-RTO: 0 %
OVALOCYTES (OLG): ABNORMAL
PH UR STRIP.AUTO: 6 [PH]
PLATELET # BLD AUTO: 170 X10(3)/MCL (ref 130–400)
PLATELET # BLD EST: NORMAL 10*3/UL
PMV BLD AUTO: 10.7 FL (ref 7.4–10.4)
POIKILOCYTOSIS BLD QL SMEAR: ABNORMAL
POTASSIUM SERPL-SCNC: 3.7 MMOL/L (ref 3.5–5.1)
PROT SERPL-MCNC: 5.9 GM/DL (ref 5.8–7.6)
PROT UR QL STRIP.AUTO: ABNORMAL
RBC # BLD AUTO: 3.45 X10(6)/MCL (ref 4.2–5.4)
RBC #/AREA URNS AUTO: ABNORMAL /HPF
RBC MORPH BLD: ABNORMAL
RBC UR QL AUTO: ABNORMAL
SODIUM SERPL-SCNC: 144 MMOL/L (ref 136–145)
SP GR UR STRIP.AUTO: 1.02 (ref 1–1.03)
SQUAMOUS #/AREA URNS LPF: ABNORMAL /HPF
TEAR DROP CELL (OLG): ABNORMAL
TROPONIN I SERPL-MCNC: 0.03 NG/ML (ref 0–0.04)
UROBILINOGEN UR STRIP-ACNC: NORMAL
WBC # SPEC AUTO: 1.47 X10(3)/MCL (ref 4.5–11.5)
WBC #/AREA URNS AUTO: ABNORMAL /HPF

## 2024-02-25 PROCEDURE — 85027 COMPLETE CBC AUTOMATED: CPT | Performed by: EMERGENCY MEDICINE

## 2024-02-25 PROCEDURE — 11000001 HC ACUTE MED/SURG PRIVATE ROOM

## 2024-02-25 PROCEDURE — 96368 THER/DIAG CONCURRENT INF: CPT

## 2024-02-25 PROCEDURE — 25000003 PHARM REV CODE 250: Performed by: EMERGENCY MEDICINE

## 2024-02-25 PROCEDURE — 96366 THER/PROPH/DIAG IV INF ADDON: CPT

## 2024-02-25 PROCEDURE — 83735 ASSAY OF MAGNESIUM: CPT | Performed by: EMERGENCY MEDICINE

## 2024-02-25 PROCEDURE — 81001 URINALYSIS AUTO W/SCOPE: CPT | Performed by: EMERGENCY MEDICINE

## 2024-02-25 PROCEDURE — 83605 ASSAY OF LACTIC ACID: CPT | Performed by: EMERGENCY MEDICINE

## 2024-02-25 PROCEDURE — 99285 EMERGENCY DEPT VISIT HI MDM: CPT | Mod: 25

## 2024-02-25 PROCEDURE — 96365 THER/PROPH/DIAG IV INF INIT: CPT

## 2024-02-25 PROCEDURE — 96375 TX/PRO/DX INJ NEW DRUG ADDON: CPT

## 2024-02-25 PROCEDURE — 84484 ASSAY OF TROPONIN QUANT: CPT | Performed by: EMERGENCY MEDICINE

## 2024-02-25 PROCEDURE — 93010 ELECTROCARDIOGRAM REPORT: CPT | Mod: ,,, | Performed by: STUDENT IN AN ORGANIZED HEALTH CARE EDUCATION/TRAINING PROGRAM

## 2024-02-25 PROCEDURE — 80053 COMPREHEN METABOLIC PANEL: CPT | Performed by: EMERGENCY MEDICINE

## 2024-02-25 PROCEDURE — 87040 BLOOD CULTURE FOR BACTERIA: CPT | Performed by: EMERGENCY MEDICINE

## 2024-02-25 PROCEDURE — 93005 ELECTROCARDIOGRAM TRACING: CPT

## 2024-02-25 PROCEDURE — 63600175 PHARM REV CODE 636 W HCPCS: Performed by: EMERGENCY MEDICINE

## 2024-02-25 RX ORDER — ONDANSETRON HYDROCHLORIDE 2 MG/ML
4 INJECTION, SOLUTION INTRAVENOUS EVERY 8 HOURS PRN
Status: DISCONTINUED | OUTPATIENT
Start: 2024-02-25 | End: 2024-02-26

## 2024-02-25 RX ORDER — ACETAMINOPHEN 500 MG
1000 TABLET ORAL
Status: DISCONTINUED | OUTPATIENT
Start: 2024-02-25 | End: 2024-02-25

## 2024-02-25 RX ORDER — ACETAMINOPHEN 650 MG/1
650 SUPPOSITORY RECTAL EVERY 6 HOURS PRN
Status: DISCONTINUED | OUTPATIENT
Start: 2024-02-25 | End: 2024-02-29 | Stop reason: HOSPADM

## 2024-02-25 RX ORDER — ENOXAPARIN SODIUM 100 MG/ML
30 INJECTION SUBCUTANEOUS EVERY 24 HOURS
Status: DISCONTINUED | OUTPATIENT
Start: 2024-02-25 | End: 2024-02-26

## 2024-02-25 RX ORDER — FAMOTIDINE 20 MG/1
20 TABLET, FILM COATED ORAL DAILY
Status: DISCONTINUED | OUTPATIENT
Start: 2024-02-26 | End: 2024-02-26

## 2024-02-25 RX ORDER — HALOPERIDOL 5 MG/ML
2 INJECTION INTRAMUSCULAR
Status: COMPLETED | OUTPATIENT
Start: 2024-02-25 | End: 2024-02-25

## 2024-02-25 RX ORDER — SODIUM CHLORIDE 0.9 % (FLUSH) 0.9 %
10 SYRINGE (ML) INJECTION
Status: DISCONTINUED | OUTPATIENT
Start: 2024-02-25 | End: 2024-02-29 | Stop reason: HOSPADM

## 2024-02-25 RX ORDER — MUPIROCIN 20 MG/G
OINTMENT TOPICAL 2 TIMES DAILY
Status: DISCONTINUED | OUTPATIENT
Start: 2024-02-26 | End: 2024-02-29 | Stop reason: HOSPADM

## 2024-02-25 RX ORDER — ACETAMINOPHEN 650 MG/1
650 SUPPOSITORY RECTAL
Status: COMPLETED | OUTPATIENT
Start: 2024-02-25 | End: 2024-02-25

## 2024-02-25 RX ORDER — TALC
6 POWDER (GRAM) TOPICAL NIGHTLY PRN
Status: DISCONTINUED | OUTPATIENT
Start: 2024-02-25 | End: 2024-02-29 | Stop reason: HOSPADM

## 2024-02-25 RX ORDER — ACETAMINOPHEN 650 MG/1
650 SUPPOSITORY RECTAL EVERY 8 HOURS PRN
Status: DISCONTINUED | OUTPATIENT
Start: 2024-02-25 | End: 2024-02-29 | Stop reason: HOSPADM

## 2024-02-25 RX ORDER — SODIUM CHLORIDE, SODIUM LACTATE, POTASSIUM CHLORIDE, CALCIUM CHLORIDE 600; 310; 30; 20 MG/100ML; MG/100ML; MG/100ML; MG/100ML
INJECTION, SOLUTION INTRAVENOUS CONTINUOUS
Status: DISCONTINUED | OUTPATIENT
Start: 2024-02-25 | End: 2024-02-29 | Stop reason: HOSPADM

## 2024-02-25 RX ADMIN — VANCOMYCIN HYDROCHLORIDE 1750 MG: 500 INJECTION, POWDER, LYOPHILIZED, FOR SOLUTION INTRAVENOUS at 04:02

## 2024-02-25 RX ADMIN — ACETAMINOPHEN 650 MG: 650 SUPPOSITORY RECTAL at 05:02

## 2024-02-25 RX ADMIN — HALOPERIDOL LACTATE 2 MG: 5 INJECTION, SOLUTION INTRAMUSCULAR at 08:02

## 2024-02-25 RX ADMIN — SODIUM CHLORIDE, POTASSIUM CHLORIDE, SODIUM LACTATE AND CALCIUM CHLORIDE 1710 ML: 600; 310; 30; 20 INJECTION, SOLUTION INTRAVENOUS at 04:02

## 2024-02-25 RX ADMIN — CEFEPIME 2 G: 2 INJECTION, POWDER, FOR SOLUTION INTRAVENOUS at 04:02

## 2024-02-25 NOTE — ED PROVIDER NOTES
Encounter Date: 2/25/2024    SCRIBE #1 NOTE: I, Parminder Sommer, am scribing for, and in the presence of,  Meri Clifford DO. I have scribed the entire note.     History     Chief Complaint   Patient presents with    Altered Mental Status     AMS. Dx with UTI last week, took full course of Abx. Afterwards began having fluid build up in bilat legs. GCS 10 on arrival to ED. Febrile. Hx mult myeloma, breast cancer, and leukemia. Unknown if getting treatment      58 year old female with a hx of multiple myeloma, HTN, and HLD presents to the ED via EMS for altered mental status. Pt was diagnosed with a UTI last week and the family reports that the pt finished her course of antibiotics. Pt has reportedly become more altered yesterday morning per the family. Family is concerned due to edema to the left LE. Family also reports the pt had an axillary temperature of 102. Pt's CBG was 89 en route to the ED. Pt's ROS is unobtainable due to altered mental status.     The history is provided by the EMS personnel. The history is limited by the condition of the patient. No  was used.     Review of patient's allergies indicates:   Allergen Reactions    Baclofen Shortness Of Breath     Difficulty breathing      Penicillins Hives, Rash and Swelling    Shellfish containing products Hives, Rash, Swelling and Other (See Comments)     shell  She can shrimp      Clarithromycin Other (See Comments)    Iodinated contrast media     Nsaids (non-steroidal anti-inflammatory drug)     Other omega-3s     Penicillin      Other reaction(s): Unknown  Other reaction(s): Unknown  Other reaction(s): Unknown    Ace inhibitors Other (See Comments)     cough  Other reaction(s): Cough    Azithromycin Nausea Only     Upset stomach    Meloxicam Tinitus     Other reaction(s): Unknown  Other reaction(s): Unknown    Prochlorperazine Anxiety     Restlessness/anxious    Tramadol Other (See Comments)     Increased Heart Rate    Other  reaction(s): Unknown  Other reaction(s): Unknown     Past Medical History:   Diagnosis Date    Amyloidosis     Anemia     Anxiety and depression     Diplopia     Hyperlipidemia     Hypertension     Impaired mobility     Lytic lesion of bone on x-ray     Multiple myeloma     Neuropathy     Obesity, unspecified     Paroxysmal atrial fibrillation     Primary cancer of left female breast      Past Surgical History:   Procedure Laterality Date    BONE MARROW TRANSPLANT  02/08/2016    CARPAL TUNNEL RELEASE      COLONOSCOPY  12/11/2018    Dr. Eddie Jimenez    ENDOSCOPIC RELEASE OF TRIGGER FINGER      ESOPHAGEAL MOTILITY STUDY  2015    Excision Lipoma left elbow      Exploration Laparotomy  2016    FLEXIBLE SIGMOIDOSCOPY      MEDIPORT INSERTION, SINGLE  03/15/2016    PH Study 24 Hour  2015     Family History   Problem Relation Age of Onset    Hypertension Mother     Cancer Father     Hypertension Sister     Hypertension Brother      Social History     Tobacco Use    Smoking status: Never    Smokeless tobacco: Never   Substance Use Topics    Alcohol use: Not Currently    Drug use: Never     Review of Systems   Unable to perform ROS: Mental status change       Physical Exam     Initial Vitals [02/25/24 1625]   BP Pulse Resp Temp SpO2   (!) 153/86 109 (!) 31 99.2 °F (37.3 °C) 97 %      MAP       --         Physical Exam    Nursing note and vitals reviewed.  Constitutional: She appears well-developed and well-nourished. She is not diaphoretic. She does not appear ill.   HENT:   Head: Normocephalic and atraumatic.   Right Ear: External ear normal.   Left Ear: External ear normal.   Mouth/Throat: Oropharynx is clear and moist.   Eyes: Conjunctivae are normal.   Neck: Neck supple. No tracheal deviation present.   Cardiovascular:  Normal rate, regular rhythm and normal heart sounds.           No murmur heard.  Pulses:       Dorsalis pedis pulses are 2+ on the right side and 2+ on the left side.    Pulmonary/Chest: Breath sounds normal. No respiratory distress. She has no wheezes. She has no rhonchi. She has no rales.   Abdominal: Abdomen is soft. She exhibits no distension. There is no abdominal tenderness.   No right CVA tenderness.  No left CVA tenderness.   Musculoskeletal:      Cervical back: Neck supple.      Comments: Swelling of left lower leg with mild warmth and erythema      Neurological: She is alert. She displays no seizure activity. GCS score is 15. GCS eye subscore is 4. GCS verbal subscore is 4. GCS motor subscore is 6.   Skin: Skin is warm and dry. Capillary refill takes less than 2 seconds. No rash noted. No pallor.       ED Course   Critical Care    Date/Time: 2/25/2024 4:32 PM    Performed by: Meri Clifford MD  Authorized by: Meri Clifford MD  Direct patient critical care time: 20 minutes  Additional history critical care time: 5 minutes  Ordering / reviewing critical care time: 5 minutes  Documentation critical care time: 5 minutes  Consulting other physicians critical care time: 5 minutes  Total critical care time (exclusive of procedural time) : 40 minutes  Critical care time was exclusive of separately billable procedures and treating other patients and teaching time.  Critical care was necessary to treat or prevent imminent or life-threatening deterioration of the following conditions: cardiac failure, dehydration, metabolic crisis, sepsis, circulatory failure, renal failure, CNS failure or compromise and respiratory failure.  Critical care was time spent personally by me on the following activities: development of treatment plan with patient or surrogate, blood draw for specimens, discussions with primary provider, interpretation of cardiac output measurements, evaluation of patient's response to treatment, examination of patient, obtaining history from patient or surrogate, ordering and review of laboratory studies, ordering and review of radiographic studies, pulse oximetry,  re-evaluation of patient's condition, review of old charts and ordering and performing treatments and interventions.      Labs Reviewed   COMPREHENSIVE METABOLIC PANEL - Abnormal; Notable for the following components:       Result Value    Carbon Dioxide 22 (*)     Blood Urea Nitrogen 23.9 (*)     Creatinine 2.16 (*)     Albumin Level 2.6 (*)     Albumin/Globulin Ratio 0.8 (*)     All other components within normal limits   URINALYSIS, REFLEX TO URINE CULTURE - Abnormal; Notable for the following components:    Appearance, UA Turbid (*)     Protein, UA 2+ (*)     Ketones, UA 1+ (*)     Blood, UA 3+ (*)     Mucous, UA Trace (*)     RBC, UA 6-10 (*)     All other components within normal limits   CBC WITH DIFFERENTIAL - Abnormal; Notable for the following components:    WBC 1.47 (*)     RBC 3.45 (*)     Hgb 9.7 (*)     Hct 32.3 (*)     MCHC 30.0 (*)     MPV 10.7 (*)     All other components within normal limits   MANUAL DIFFERENTIAL - Abnormal; Notable for the following components:    Metamyelocytes % 1 (*)     Myelocytes % 1 (*)     Neutrophils Abs 1.1613 (*)     Lymphs Abs 0.1176 (*)     RBC Morph Abnormal (*)     Poikilocytosis 1+ (*)     Ovalocytes 1+ (*)     Tear Drops 1+ (*)     All other components within normal limits   LACTIC ACID, PLASMA - Normal   MAGNESIUM - Normal   TROPONIN I - Normal   BLOOD CULTURE OLG   BLOOD CULTURE OLG   CBC W/ AUTO DIFFERENTIAL    Narrative:     The following orders were created for panel order CBC auto differential.  Procedure                               Abnormality         Status                     ---------                               -----------         ------                     CBC with Differential[[id number]]       Abnormal            Final result               Manual Differential[[id number]]         Abnormal            Final result                 Please view results for these tests on the individual orders.   VANCOMYCIN, RANDOM     EKG Readings: (Independently  Interpreted)   Initial Reading: No STEMI. Rhythm: Sinus Tachycardia. Heart Rate: 105. Ectopy: No Ectopy. ST Segments: Non-Specific ST Segment Depression.   Performed at 1747     ECG Results              EKG 12-lead (Final result)        Collection Time Result Time QRS Duration OHS QTC Calculation    02/25/24 17:47:57 02/26/24 10:01:09 72 438                     Final result by Interface, [facility] (02/26/24 10:01:16)                   Narrative:    Test Reason : R41.82,    Vent. Rate : 105 BPM     Atrial Rate : 105 BPM     P-R Int : 162 ms          QRS Dur : 072 ms      QT Int : 332 ms       P-R-T Axes : 038 030 -17 degrees     QTc Int : 438 ms    Sinus tachycardia  Nonspecific ST and T wave abnormality  Abnormal ECG  Confirmed by Elvis Sheikh MD ([id number]) on 2/26/2024 10:01:06 AM    Referred By:             Confirmed By:Elvis Sheikh MD                                  Imaging Results              CT Head Without Contrast (Final result)  Result time 02/26/24 06:21:58      Final result by Glen Galindo MD (02/26/24 06:21:58)                   Impression:      No acute intracranial abnormality identified.  Findings of sinusitis.  Findings of chronic microvascular ischemic disease.      Electronically signed by: Glen Galindo  Date:    02/26/2024  Time:    06:21               Narrative:    EXAMINATION:  CT HEAD WITHOUT CONTRAST    CLINICAL HISTORY:  Mental status change, unknown cause;    TECHNIQUE:  Low dose axial images were obtained through the head.  Coronal and sagittal reformations were also performed. Contrast was not administered.    Automatic exposure control was utilized to reduce the patient's radiation dose.    DLP= 1006    COMPARISON:  10/06/2021    FINDINGS:  No acute intracranial hemorrhage, edema or mass. No acute parenchymal abnormality.    Mild cerebral atrophy with concordant ventricular enlargement.    There is normal gray white differentiation.    The osseous structures are  normal.    The mastoid air cells are clear.    Debris within the bilateral auditory canals.    The globes and orbital contents are normal bilaterally.    Mucosal thickening of the bilateral maxillary sinuses with air-fluid levels noted throughout the sinuses.                        Preliminary result by Hayden Bernstein MD (02/25/24 20:59:44)                   Impression:    1. No acute intracranial process identified. Details and other findings as noted above.               Narrative:    START OF REPORT:  Technique: CT of the head was performed without intravenous contrast with axial as well as coronal and sagittal images.    Comparison: None.    Dosage Information: Automated exposure control was utilized.    Clinical history: Altered Mental Status (AMS. Dx with UTI last week, took full course of Abx. Afterwards began having fluid build up in bilat legs. GCS 10 on arrival to ED. Febrile. Hx mult myeloma, breast cancer, and leukemia. Unknown if getting treatment ).    Findings:  Hemorrhage: No acute intracranial hemorrhage is seen.  CSF spaces: The ventricles, sulci and basal cisterns all appear moderately prominent consistent with global cerebral atrophy.  Brain parenchyma: There is preservation of the grey white junction throughout. No acute infarct is identified.  Cerebellum: Unremarkable.  Vascular: Unremarkable venous sinuses.  Sella and skull base: The sella appears to be within normal limits for age.  Cerebellopontine angles: Within normal limits.  Herniation: None.  Intracranial calcifications: Incidental note is made of bilateral choroid plexus calcification. Incidental note is made of some pineal region calcification.  Calvarium: No acute linear or depressed skull fracture is seen.    Maxillofacial Structures: Maxillofacial findings discussed separately in the maxillofacial CT report.  Paranasal sinuses: There is some mucoperiosteal thickening in the bilateral maxillary sinuses and bilateral ethmoid air  cells and bilateral frontal sinuses and bilateral sphenoid sinuses. This is consistent with chronic sinusitis.  Orbits: The orbits appear unremarkable.  Zygomatic arches: The zygomatic arches are intact and unremarkable.  Temporal bones and mastoids: The temporal bones and mastoids appear unremarkable.  TMJ: The mandibular condyles appear normally placed with respect to the mandibular fossa.  Nasal Bones: No displaced nasal bone fracture is seen.                                         X-Ray Chest AP Portable (Final result)  Result time 02/26/24 06:44:25      Final result by Glen Galindo MD (02/26/24 06:44:25)                   Impression:      As above.      Electronically signed by: Glen Galindo  Date:    02/26/2024  Time:    06:44               Narrative:    EXAMINATION:  XR CHEST AP PORTABLE    CLINICAL HISTORY:  Sepsis;    TECHNIQUE:  Single view of the chest    COMPARISON:  01/23/2023    FINDINGS:  The cardiomediastinal silhouette is unchanged.  Trace subsegmental atelectatic change at the right base.  Recommend continued follow-up.                                    X-Rays:   Independently Interpreted Readings:   Chest X-Ray: There is an infiltrate in the RLL.     Medications   vancomycin - pharmacy to dose (has no administration in time range)   sodium chloride 0.9% flush 10 mL (has no administration in time range)   melatonin tablet 6 mg (has no administration in time range)   enoxaparin injection 30 mg (30 mg Subcutaneous Given 2/26/24 0029)   acetaminophen suppository 650 mg (has no administration in time range)   lactated ringers infusion ( Intravenous New Bag 2/26/24 0029)   famotidine tablet 20 mg (20 mg Oral Given 2/26/24 0851)   ondansetron injection 4 mg (has no administration in time range)   acetaminophen suppository 650 mg (has no administration in time range)   mupirocin 2 % ointment ( Nasal Not Given 2/26/24 0900)   ceFEPIme (MAXIPIME) 2 g in dextrose 5 % in water (D5W) 100 mL IVPB  (MB+) (0 g Intravenous Stopped 2/26/24 0615)   haloperidol lactate injection 2 mg (2 mg Intravenous Given 2/26/24 0208)   LORazepam tablet 1 mg (1 mg Oral Given 2/26/24 1039)   lactated ringers bolus 1,710 mL (0 mLs Intravenous Stopped 2/25/24 1958)   vancomycin 1.75 g in 5 % dextrose 500 mL IVPB (0 mg Intravenous Stopped 2/25/24 1958)   ceFEPIme (MAXIPIME) 2 g in dextrose 5 % in water (D5W) 100 mL IVPB (MB+) (0 g Intravenous Stopped 2/25/24 1715)   acetaminophen suppository 650 mg (650 mg Rectal Given 2/25/24 1715)   haloperidol lactate injection 2 mg (2 mg Intravenous Given 2/25/24 2003)     Medical Decision Making  57 yo female with altered mental status and weakness per family. Febrile on arrival    DDX includes but not limited to sepsis, UTI, pneumonia, renal failure, brain metastasis, brain hemorrhage, electrolyte abnormality, bacteremia      ED management  Given 30 mL/kg IV fluid bolus ideal body weight 2/2 BMI 30   Given Tylenol for fever   Blood cultures obtained and given vancomycin and cefepime  Lab work notable for neutropenia  CT head was unremarkable   Chest x-ray concerning for a developing right lower lobe infiltrate   Patient was given Haldol IV for agitation and improved   Left leg with possible early cellulitis, ultrasound was obtained and negative for DVT  She was admitted to her primary care physician    Problems Addressed:  Acute onset sepsis: acute illness or injury that poses a threat to life or bodily functions  Altered mental status: acute illness or injury that poses a threat to life or bodily functions  Cellulitis of left lower leg: acute illness or injury that poses a threat to life or bodily functions  Community acquired pneumonia of right lower lobe of lung: acute illness or injury that poses a threat to life or bodily functions  Left leg swelling: acute illness or injury that poses a threat to life or bodily functions    Amount and/or Complexity of Data Reviewed  Independent  Historian:      Details: Sister (who lives with patient) says she has been altered since yesterday morning   External Data Reviewed: notes.     Details: Reviewed last oncology visit on 2/27-   2/7/2024: Patient here today for follow up and continuation of Darzalex, due today. She reports she rescheduled last month's appointment due to having a UTI and frequent urination, was treated with antibiotics. She is currently being treated for cellulitis to LLE with Cipro. She continues to follow up at [facility], next visit in 4/2024, scans and mammogram will be done as well. She is scheduled to see cardio @ [facility] on 3/7/24. She complains of fatigue today. Overall, she is doing fair. She is wanting to be able to walk without her rollator walker and use a cane  Labs: ordered. Decision-making details documented in ED Course.  Radiology: ordered and independent interpretation performed. Decision-making details documented in ED Course.  ECG/medicine tests: ordered and independent interpretation performed. Decision-making details documented in ED Course.    Risk  OTC drugs.  Prescription drug management.  Decision regarding hospitalization.                                      Clinical Impression:  Final diagnoses:  [R41.82] Altered mental status  [M79.89] Left leg swelling  [A41.9] Acute onset sepsis (Primary)  [J18.9] Community acquired pneumonia of right lower lobe of lung  [L03.116] Cellulitis of left lower leg          ED Disposition Condition    Admit Stable                Meri Clifford MD  02/26/24 [id number]       Meri Clifford MD  03/18/24 0123

## 2024-02-25 NOTE — PROGRESS NOTES
"Pharmacokinetic Initial Assessment: IV Vancomycin    Assessment/Plan:    Initiate intravenous vancomycin with loading dose of 1750 mg once with subsequent doses when random concentrations are less than 20 mcg/mL  Desired empiric serum trough concentration is 15 to 20 mcg/mL  Draw vancomycin random level on 02/26 at 1200.  Pharmacy will continue to follow and monitor vancomycin.      Please contact pharmacy at extension [phone] with any questions regarding this assessment.     Thank you for the consult,   Rafita Hutchison       Patient brief summary:  Ninfa Candelario is a 58 y.o. female initiated on antimicrobial therapy with IV Vancomycin for treatment of suspected sepsis    Drug Allergies:   Review of patient's allergies indicates:   Allergen Reactions    Baclofen Shortness Of Breath     Difficulty breathing      Penicillins Hives, Rash and Swelling    Shellfish containing products Hives, Rash, Swelling and Other (See Comments)     shell  She can shrimp      Clarithromycin Other (See Comments)    Iodinated contrast media     Nsaids (non-steroidal anti-inflammatory drug)     Other omega-3s     Penicillin      Other reaction(s): Unknown  Other reaction(s): Unknown  Other reaction(s): Unknown    Ace inhibitors Other (See Comments)     cough  Other reaction(s): Cough    Azithromycin Nausea Only     Upset stomach    Meloxicam Tinitus     Other reaction(s): Unknown  Other reaction(s): Unknown    Prochlorperazine Anxiety     Restlessness/anxious    Tramadol Other (See Comments)     Increased Heart Rate    Other reaction(s): Unknown  Other reaction(s): Unknown       Actual Body Weight:   81.6 kg    Renal Function:   CrCl cannot be calculated (Patient's most recent lab result is older than the maximum 7 days allowed.).,     Dialysis Method (if applicable):  N/A    CBC (last 72 hours):  No results for input(s): "WHITE BLOOD CELL COUNT", "HEMOGLOBIN", "HEMATOCRIT", "PLATELETS", "GRAN%", "LYMPH%", "MONO%", "EOSINOPHIL%", "BASOPHIL%", " ""DIFFERENTIAL METHOD" in the last 72 hours.    Metabolic Panel (last 72 hours):  No results for input(s): "SODIUM", "POTASSIUM", "CHLORIDE", "CO2", "GLUCOSE", "BUN BLD", "CREATININE", "ALBUMIN", "BILIRUBIN TOTAL", "ALK PHOS", "AST", "ALT", "MAGNESIUM", "PHOSPHORUS" in the last 72 hours.    Drug levels (last 3 results):  No results for input(s): "VANCOMYCINRA", "VANCORANDOM", "VANCOMYCINPE", "VANCOPEAK", "VANCOMYCINTR", "VANCOTROUGH" in the last 72 hours.    Microbiologic Results:  Microbiology Results (last 7 days)       Procedure Component Value Units Date/Time    Blood culture x two cultures. Draw prior to antibiotics. [[id number]]     Order Status: Sent Specimen: Blood     Blood culture x two cultures. Draw prior to antibiotics. [[id number]]     Order Status: Sent Specimen: Blood             "

## 2024-02-26 PROBLEM — A41.9 ACUTE ONSET SEPSIS: Status: ACTIVE | Noted: 2024-02-26

## 2024-02-26 LAB
OHS QRS DURATION: 72 MS
OHS QTC CALCULATION: 438 MS
POCT GLUCOSE: 105 MG/DL (ref 70–110)
POCT GLUCOSE: 109 MG/DL (ref 70–110)
VANCOMYCIN SERPL-MCNC: 11.5 UG/ML (ref 15–20)

## 2024-02-26 PROCEDURE — 11000001 HC ACUTE MED/SURG PRIVATE ROOM

## 2024-02-26 PROCEDURE — 80202 ASSAY OF VANCOMYCIN: CPT | Performed by: EMERGENCY MEDICINE

## 2024-02-26 PROCEDURE — 82962 GLUCOSE BLOOD TEST: CPT

## 2024-02-26 PROCEDURE — 63600175 PHARM REV CODE 636 W HCPCS: Performed by: INTERNAL MEDICINE

## 2024-02-26 PROCEDURE — 99223 1ST HOSP IP/OBS HIGH 75: CPT | Mod: ,,, | Performed by: INTERNAL MEDICINE

## 2024-02-26 PROCEDURE — 63600175 PHARM REV CODE 636 W HCPCS: Performed by: EMERGENCY MEDICINE

## 2024-02-26 PROCEDURE — 25000003 PHARM REV CODE 250: Performed by: EMERGENCY MEDICINE

## 2024-02-26 PROCEDURE — 25000003 PHARM REV CODE 250: Performed by: INTERNAL MEDICINE

## 2024-02-26 PROCEDURE — 21400001 HC TELEMETRY ROOM

## 2024-02-26 RX ORDER — LETROZOLE 2.5 MG/1
2.5 TABLET, FILM COATED ORAL DAILY
Status: DISCONTINUED | OUTPATIENT
Start: 2024-02-26 | End: 2024-02-29 | Stop reason: HOSPADM

## 2024-02-26 RX ORDER — LORAZEPAM 1 MG/1
1 TABLET ORAL EVERY 6 HOURS PRN
Status: DISCONTINUED | OUTPATIENT
Start: 2024-02-26 | End: 2024-02-29 | Stop reason: HOSPADM

## 2024-02-26 RX ORDER — DEXAMETHASONE 20 MG/1
1 TABLET ORAL WEEKLY
Qty: 12 TABLET | Refills: 0 | Status: SHIPPED | OUTPATIENT
Start: 2024-02-26 | End: 2024-06-19

## 2024-02-26 RX ORDER — ONDANSETRON 4 MG/1
8 TABLET, ORALLY DISINTEGRATING ORAL EVERY 8 HOURS PRN
Status: DISCONTINUED | OUTPATIENT
Start: 2024-02-26 | End: 2024-02-29 | Stop reason: HOSPADM

## 2024-02-26 RX ORDER — VANCOMYCIN HCL IN 5 % DEXTROSE 1G/250ML
1000 PLASTIC BAG, INJECTION (ML) INTRAVENOUS ONCE
Status: COMPLETED | OUTPATIENT
Start: 2024-02-26 | End: 2024-02-26

## 2024-02-26 RX ORDER — DULOXETIN HYDROCHLORIDE 30 MG/1
60 CAPSULE, DELAYED RELEASE ORAL DAILY
Status: DISCONTINUED | OUTPATIENT
Start: 2024-02-26 | End: 2024-02-29 | Stop reason: HOSPADM

## 2024-02-26 RX ORDER — FAMOTIDINE 20 MG/1
40 TABLET, FILM COATED ORAL DAILY
Status: DISCONTINUED | OUTPATIENT
Start: 2024-02-26 | End: 2024-02-29 | Stop reason: HOSPADM

## 2024-02-26 RX ORDER — DEXAMETHASONE 4 MG/1
20 TABLET ORAL WEEKLY
Status: DISCONTINUED | OUTPATIENT
Start: 2024-02-26 | End: 2024-02-29 | Stop reason: HOSPADM

## 2024-02-26 RX ORDER — CARVEDILOL 3.12 MG/1
3.12 TABLET ORAL 2 TIMES DAILY WITH MEALS
Status: DISCONTINUED | OUTPATIENT
Start: 2024-02-26 | End: 2024-02-29 | Stop reason: HOSPADM

## 2024-02-26 RX ORDER — LORAZEPAM 2 MG/ML
1 INJECTION INTRAMUSCULAR EVERY 4 HOURS PRN
Status: DISCONTINUED | OUTPATIENT
Start: 2024-02-26 | End: 2024-02-26

## 2024-02-26 RX ORDER — HALOPERIDOL 5 MG/ML
2 INJECTION INTRAMUSCULAR EVERY 4 HOURS PRN
Status: DISCONTINUED | OUTPATIENT
Start: 2024-02-26 | End: 2024-02-29 | Stop reason: HOSPADM

## 2024-02-26 RX ADMIN — LETROZOLE 2.5 MG: 2.5 TABLET ORAL at 03:02

## 2024-02-26 RX ADMIN — HALOPERIDOL LACTATE 2 MG: 5 INJECTION, SOLUTION INTRAMUSCULAR at 02:02

## 2024-02-26 RX ADMIN — MUPIROCIN: 20 OINTMENT TOPICAL at 08:02

## 2024-02-26 RX ADMIN — CEFEPIME 2 G: 2 INJECTION, POWDER, FOR SOLUTION INTRAVENOUS at 04:02

## 2024-02-26 RX ADMIN — LORAZEPAM 1 MG: 1 TABLET ORAL at 10:02

## 2024-02-26 RX ADMIN — APIXABAN 2.5 MG: 2.5 TABLET, FILM COATED ORAL at 08:02

## 2024-02-26 RX ADMIN — VANCOMYCIN HYDROCHLORIDE 1000 MG: 1 INJECTION, POWDER, LYOPHILIZED, FOR SOLUTION INTRAVENOUS at 02:02

## 2024-02-26 RX ADMIN — DULOXETINE HYDROCHLORIDE 60 MG: 30 CAPSULE, DELAYED RELEASE ORAL at 02:02

## 2024-02-26 RX ADMIN — CEFEPIME 2 G: 2 INJECTION, POWDER, FOR SOLUTION INTRAVENOUS at 05:02

## 2024-02-26 RX ADMIN — ONDANSETRON 8 MG: 4 TABLET, ORALLY DISINTEGRATING ORAL at 03:02

## 2024-02-26 RX ADMIN — CARVEDILOL 3.12 MG: 3.12 TABLET, FILM COATED ORAL at 04:02

## 2024-02-26 RX ADMIN — FAMOTIDINE 20 MG: 20 TABLET ORAL at 08:02

## 2024-02-26 RX ADMIN — SODIUM CHLORIDE, POTASSIUM CHLORIDE, SODIUM LACTATE AND CALCIUM CHLORIDE: 600; 310; 30; 20 INJECTION, SOLUTION INTRAVENOUS at 12:02

## 2024-02-26 RX ADMIN — ENOXAPARIN SODIUM 30 MG: 30 INJECTION SUBCUTANEOUS at 12:02

## 2024-02-26 RX ADMIN — DEXAMETHASONE 20 MG: 4 TABLET ORAL at 02:02

## 2024-02-26 NOTE — CONSULTS
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   8) Stage IA grade 3, ER+, HER2 BERNA +,  IDC/DCIS of the left breast --- 2/7/18 at [facility]  --s/p lumpectomy with SLNB 4/12, Grade 2 IDC 4mm with second focus of microinvasive carcinoma 0.4mm. 2 SLN negative for malignancy    9) Progressive swelling of R lower extremity--- US NIVA done 2/19/18 negative for evidence of DVT  10). Paroxysmal Afib (2/18/18) diagnosed at [facility]; ECHO noted EF 58% - on Eliquis  11) Mild CKD with GFR 55 - managed per [facility] nephrology  12) Treatment induced neutropenia     Present treatment:  -Maintenance Revlimid 5mg PO QOD, started 02/16/17-- was held for a few months while undergoing treatment for her breast cancer 02/18-05/18; Restarted 6/15/18   Dexamethasone 20 mg po weekly added early July 2017--> reduced to 12 mg PO weekly October 4, 2017---> increased to 16mg PO weekly 2/15/18---restarted 6/15/18 --> 20 mg weekly 7//8/2022  Darzalex 7/8/2022-present  Femara 2.5 mg PO daily   Eliquis 2.5mg PO BID      Treatment/Oncology history:  1) CyBorD x5 cycles 09/28/15-12/24/15  2) Autologous stem cell transplant 02/08/16, done at [facility]  3) Exploratory laparotomy with subtotal colectomy and end ileostomy done August 2, 2016--pathology specimen showed advanced  colonic amyloidosis extensive involving the muscular wall submucosa and mucosa.  Appendix also involvement by amyloidosis with fibrous obliteration of the distal lumen.  4) Maintenance therapy with Revlimid 5mg-started 06/01/16--Discontinued shortly after 2/2 cytopenias  5) Left breast lumpectomy with SLNB at [facility] 4/12/18  6) Left partial breast RT x10 fractions  5/21/18-6/4/1          History of Present Illness:  Patient is a 58 y.o. female with above medical history that presents to the emergency department with altered mental status and fever.      Of note medical history taken by nurse and electronic medical record as patient was lethargic.  She opens her eyes upon calling her name and answer yes or no questions at times.  Patient received Ativan prior to my arrival to the emergency department for anxiety.    Patient was diagnosed with UTI last week for which she completed a course of antibiotics.  As per sister patient's spike temperature of 102°.  CT scan of the head with no acute intracranial findings.  CXR Trace subsegmental atelectatic change at the right base.  Patient was started on cefepime and vancomycin.      Continuous Infusions:   lactated ringers 100 mL/hr at 02/26/24 0029     Scheduled Meds:   apixaban  2.5 mg Oral BID    carvediloL  3.125 mg Oral BID WM    ceFEPime IV (PEDS and ADULTS)  2 g Intravenous Q12H    dexAMETHasone  20 mg Oral Weekly    DULoxetine  60 mg Oral Daily    famotidine  40 mg Oral Daily    letrozole  2.5 mg Oral Daily    mupirocin   Nasal BID    sodium chloride 0.9% 250 mL flush bag   Intravenous 1 time in Clinic/HOD    sodium chloride 0.9% 250 mL flush bag   Intravenous 1 time in Clinic/HOD    sodium chloride 0.9% 250 mL flush bag   Intravenous 1 time in Clinic/HOD    sodium chloride 0.9% 250 mL flush bag   Intravenous 1 time in Clinic/HOD    sodium chloride 0.9% 250 mL flush bag   Intravenous 1 time in Clinic/HOD    sodium chloride 0.9% 250 mL flush bag   Intravenous 1 time  in Clinic/HOD    sodium chloride 0.9% 250 mL flush bag   Intravenous 1 time in Clinic/HOD    sodium chloride 0.9% 250 mL flush bag   Intravenous 1 time in Clinic/HOD     PRN Meds:acetaminophen, acetaminophen, haloperidol lactate, heparin, porcine (PF), heparin, porcine (PF), LORazepam, melatonin, ondansetron, sodium chloride 0.9%, sodium chloride 0.9%, sodium chloride 0.9%, sodium chloride 0.9%, sodium chloride 0.9%, sodium chloride 0.9%, sodium chloride 0.9%, sodium chloride 0.9%, sodium chloride 0.9%, vancomycin - pharmacy to dose    Past Medical History:   Diagnosis Date    Amyloidosis     Anemia     Anxiety and depression     Diplopia     Hyperlipidemia     Hypertension     Impaired mobility     Lytic lesion of bone on x-ray     Multiple myeloma     Neuropathy     Obesity, unspecified     Paroxysmal atrial fibrillation     Primary cancer of left female breast      Past Surgical History:   Procedure Laterality Date    BONE MARROW TRANSPLANT  02/08/2016    CARPAL TUNNEL RELEASE      COLONOSCOPY  12/11/2018    Dr. Eddie Jimenez    ENDOSCOPIC RELEASE OF TRIGGER FINGER      ESOPHAGEAL MOTILITY STUDY  2015    Excision Lipoma left elbow      Exploration Laparotomy  2016    FLEXIBLE SIGMOIDOSCOPY      MEDIPORT INSERTION, SINGLE  03/15/2016    PH Study 24 Hour  2015     Family History   Problem Relation Age of Onset    Hypertension Mother     Cancer Father     Hypertension Sister     Hypertension Brother      Social History     Tobacco Use    Smoking status: Never    Smokeless tobacco: Never   Substance Use Topics    Alcohol use: Not Currently    Drug use: Never       Review of patient's allergies indicates:   Allergen Reactions    Baclofen Shortness Of Breath     Difficulty breathing      Penicillins Hives, Rash and Swelling    Shellfish containing products Hives, Rash, Swelling and Other (See Comments)     shell  She can shrimp      Clarithromycin Other (See Comments)    Iodinated contrast media     Nsaids  (non-steroidal anti-inflammatory drug)     Other omega-3s     Penicillin      Other reaction(s): Unknown  Other reaction(s): Unknown  Other reaction(s): Unknown    Ace inhibitors Other (See Comments)     cough  Other reaction(s): Cough    Azithromycin Nausea Only     Upset stomach    Meloxicam Tinitus     Other reaction(s): Unknown  Other reaction(s): Unknown    Prochlorperazine Anxiety     Restlessness/anxious    Tramadol Other (See Comments)     Increased Heart Rate    Other reaction(s): Unknown  Other reaction(s): Unknown      Current Facility-Administered Medications on File Prior to Encounter   Medication Dose Route Frequency Provider Last Rate Last Admin    heparin, porcine (PF) 100 unit/mL injection flush 500 Units  500 Units Intravenous PRN Natalie Meyers MD        heparin, porcine (PF) 100 unit/mL injection flush 500 Units  500 Units Intravenous PRN Natalie Meyers MD        heparin, porcine (PF) 100 unit/mL injection flush 500 Units  500 Units Intravenous PRN Natalie Meyers MD        ondansetron (ZOFRAN) 16 mg in sodium chloride 0.9% 50 mL IVPB  16 mg Intravenous 1 time in Clinic/[facility] Natalie Meyers MD        sodium chloride 0.9% 250 mL flush bag   Intravenous 1 time in [facility] Natalie Meyers MD        sodium chloride 0.9% 250 mL flush bag   Intravenous 1 time in [facility] Natalie Meyers MD        sodium chloride 0.9% 250 mL flush bag   Intravenous 1 time in [facility] Natalie Meyers MD        sodium chloride 0.9% 250 mL flush bag   Intravenous 1 time in [facility] Natalie Meyers MD        sodium chloride 0.9% 250 mL flush bag   Intravenous 1 time in [facility] Ashly Moreau FNP        sodium chloride 0.9% 250 mL flush bag   Intravenous 1 time in [facility] Natalie Meyers MD        sodium chloride 0.9% 250 mL flush bag   Intravenous 1 time in [facility] Natalie Meyers MD        sodium chloride 0.9% 250 mL flush bag   Intravenous 1 time in  Clinic/HOD Natalie Meyers MD        sodium chloride 0.9% 250 mL flush bag   Intravenous 1 time in Clinic/HOD Natalie Meyers MD        sodium chloride 0.9% flush 10 mL  10 mL Intravenous PRN Natalie Meyers MD   10 mL at 05/26/23 0955    sodium chloride 0.9% flush 10 mL  10 mL Intravenous PRN Natalie Meyers MD   20 mL at 05/26/23 1031    sodium chloride 0.9% flush 10 mL  10 mL Intravenous PRN Natalie Meyers MD   30 mL at 05/29/23 1238    sodium chloride 0.9% flush 10 mL  10 mL Intravenous PRN Natalie Meyers MD   30 mL at 05/30/23 1115    sodium chloride 0.9% flush 10 mL  10 mL Intravenous PRN Ashly Moreau FNZHANNA   30 mL at 06/05/23 1235    sodium chloride 0.9% flush 10 mL  10 mL Intravenous PRN Natalie Meyers MD   30 mL at 06/08/23 1240    sodium chloride 0.9% flush 10 mL  10 mL Intravenous PRN Natalie Meyers MD        sodium chloride 0.9% flush 10 mL  10 mL Intravenous PRN Natalie Meyers MD   30 mL at 06/12/23 1250    sodium chloride 0.9% flush 10 mL  10 mL Intravenous PRN Natalie Meyers MD   30 mL at 06/13/23 1230     Current Outpatient Medications on File Prior to Encounter   Medication Sig Dispense Refill    apixaban (ELIQUIS) 2.5 mg Tab Take 1 tablet (2.5 mg total) by mouth 2 (two) times daily. 60 tablet 3    carvediloL (COREG) 3.125 MG tablet Take 1 tablet (3.125 mg total) by mouth 2 (two) times daily with meals. 180 tablet 2    colestipoL (COLESTID) 1 gram Tab TAKE 1 TABLET TWICE A DAY 60 tablet 0    DULoxetine (CYMBALTA) 60 MG capsule Take 60 mg by mouth.      famotidine (PEPCID) 40 MG tablet Take 40 mg by mouth.      HEMADY 20 mg Tab TAKE 1 TABLET BY MOUTH EVERY WEEK 12 tablet 0    hydroCHLOROthiazide (HYDRODIURIL) 12.5 MG Tab TAKE 1 TABLET(12.5 MG) BY MOUTH DAILY      lenalidomide (REVLIMID) 5 mg Cap TAKE 1 CAPSULE BY MOUTH EVERY OTHER DAY.. 14 each 0    letrozole (FEMARA) 2.5 mg Tab Take 1 tablet by mouth once daily.      levocetirizine (XYZAL) 5  MG tablet Take 1 tablet (5 mg total) by mouth every evening. 30 tablet 6    methadone (DOLOPHINE) 10 MG tablet Take 10 mg by mouth every 8 (eight) hours while awake.      methylPREDNISolone (MEDROL DOSEPACK) 4 mg tablet use as directed 21 each 0    multivitamin (THERAGRAN) per tablet Take 1 tablet by mouth once daily.      ofloxacin (OCUFLOX) 0.3 % ophthalmic solution Place 1 drop into both eyes 4 (four) times daily. 10 mL 0    omeprazole (PRILOSEC) 20 MG capsule TAKE 1 CAPSULE(20 MG) BY MOUTH EVERY DAY 30 capsule 11    ondansetron (ZOFRAN) 8 MG tablet Take 1 tablet (8 mg total) by mouth every 8 (eight) hours as needed for Nausea. 90 tablet 2    oxyCODONE (ROXICODONE) 10 mg Tab immediate release tablet Take 1 tablet (10 mg total) by mouth every 12 (twelve) hours as needed. 10 tablet 0    potassium chloride (KLOR-CON) 10 MEQ TbSR TAKE 1 TABLET BY MOUTH TWICE DAILY 180 tablet 3    rosuvastatin (CRESTOR) 10 MG tablet Take 10 mg by mouth Daily.      tretinoin (RETIN-A) 0.1 % cream Apply 1 application topically every evening.      [DISCONTINUED] dexAMETHasone (HEMADY) 20 mg Tab TAKE 1 TABLET BY MOUTH BY MOUTH ONCE A WEEK 12 tablet 0       Review of Systems   Constitutional:  Positive for activity change, fatigue and fever.   Respiratory:  Positive for cough.    Cardiovascular:  Positive for chest pain.         OBJECTIVE:     Vital Signs (Most Recent)  Temp: 98 °F (36.7 °C) (02/26/24 0949)  Pulse: 79 (02/26/24 1603)  Resp: 15 (02/26/24 1553)  BP: (!) 159/83 (02/26/24 1603)  SpO2: 100 % (02/26/24 1553)    Pain Assessment: No pain reported at this time    Vital Signs Range (Last 24H):  Temp:  [98 °F (36.7 °C)-99.9 °F (37.7 °C)]   Pulse:  []   Resp:  [12-31]   BP: (101-159)/()   SpO2:  [95 %-100 %]     Physical Exam:  Physical Exam  Vitals and nursing note reviewed.   Constitutional:       General: She is not in acute distress.     Appearance: She is ill-appearing.   HENT:      Head: Normocephalic and atraumatic.  "     Mouth/Throat:      Mouth: Mucous membranes are moist.   Eyes:      General: No scleral icterus.     Extraocular Movements: Extraocular movements intact.      Pupils: Pupils are equal, round, and reactive to light.   Neck:      Vascular: No JVD.   Cardiovascular:      Rate and Rhythm: Normal rate and regular rhythm.      Heart sounds: No murmur heard.  Pulmonary:      Breath sounds: Decreased breath sounds present.      Comments: Poor effort  Abdominal:      General: There is no distension.      Palpations: Abdomen is soft. There is no mass.      Tenderness: There is no abdominal tenderness.   Musculoskeletal:         General: No swelling or deformity.      Cervical back: Neck supple.      Right lower leg: Swelling present.      Left lower leg: Swelling present.   Skin:     General: Skin is warm.      Coloration: Skin is not jaundiced.   Neurological:      General: No focal deficit present.      Mental Status: She is oriented to person, place, and time. She is lethargic.      Cranial Nerves: Cranial nerves 2-12 are intact.         Laboratory:  CBC with Differential:  Recent Labs   Lab 02/25/24  1708   WBC 1.47  1.47*   RBC 3.45*   HCT 32.3*   HGB 9.7*   MCV 93.6   MCH 28.1   RDW 16.4      MPV 10.7*     CMP:  Recent Labs   Lab 02/25/24  1708   CALCIUM 8.6   ALBUMIN 2.6*      K 3.7   CO2 22*   BUN 23.9*   CREATININE 2.16*   ALKPHOS 55   ALT 21   AST 32   BILITOT 0.7     BMP:   Recent Labs   Lab 02/25/24  1708   CALCIUM 8.6      K 3.7   CO2 22*   BUN 23.9*   CREATININE 2.16*     LFTs:   Recent Labs   Lab 02/25/24  1708   ALT 21   AST 32   ALKPHOS 55   BILITOT 0.7   ALBUMIN 2.6*     Haptoglobin: No results for input(s): "HAPTOGLOBIN" in the last 24 hours.  Tumor Markers: No results for input(s): "PSA", "CEA", "", "AFPTM", "MI0152", "" in the last 24 hours.    Invalid input(s): "ALGTM"  Immunology: No results for input(s): "SPEP", "NADIA", "DENISE", "FREELAMBDALI" in the last 24 " "hours.  Coagulation: No results for input(s): "PT", "INR", "APTT" in the last 24 hours.  Specimen (24h ago, onward)      None          Microbiology Results (last 7 days)       Procedure Component Value Units Date/Time    Blood culture x two cultures. Draw prior to antibiotics. [[id number]] Collected: 02/25/24 1708    Order Status: Resulted Specimen: Blood Updated: 02/25/24 1944    Blood culture x two cultures. Draw prior to antibiotics. [[id number]] Collected: 02/25/24 1708    Order Status: Resulted Specimen: Blood Updated: 02/25/24 1944            Diagnostic Results:  Imaging Results              CT Head Without Contrast (Final result)  Result time 02/26/24 06:21:58      Final result by Glen Galindo MD (02/26/24 06:21:58)                   Impression:      No acute intracranial abnormality identified.  Findings of sinusitis.  Findings of chronic microvascular ischemic disease.      Electronically signed by: Glen Galindo  Date:    02/26/2024  Time:    06:21               Narrative:    EXAMINATION:  CT HEAD WITHOUT CONTRAST    CLINICAL HISTORY:  Mental status change, unknown cause;    TECHNIQUE:  Low dose axial images were obtained through the head.  Coronal and sagittal reformations were also performed. Contrast was not administered.    Automatic exposure control was utilized to reduce the patient's radiation dose.    DLP= 1006    COMPARISON:  10/06/2021    FINDINGS:  No acute intracranial hemorrhage, edema or mass. No acute parenchymal abnormality.    Mild cerebral atrophy with concordant ventricular enlargement.    There is normal gray white differentiation.    The osseous structures are normal.    The mastoid air cells are clear.    Debris within the bilateral auditory canals.    The globes and orbital contents are normal bilaterally.    Mucosal thickening of the bilateral maxillary sinuses with air-fluid levels noted throughout the sinuses.                                       X-Ray Chest AP Portable " (Final result)  Result time 02/26/24 06:44:25      Final result by Glen Galindo MD (02/26/24 06:44:25)                   Impression:      As above.      Electronically signed by: Glen Galindo  Date:    02/26/2024  Time:    06:44               Narrative:    EXAMINATION:  XR CHEST AP PORTABLE    CLINICAL HISTORY:  Sepsis;    TECHNIQUE:  Single view of the chest    COMPARISON:  01/23/2023    FINDINGS:  The cardiomediastinal silhouette is unchanged.  Trace subsegmental atelectatic change at the right base.  Recommend continued follow-up.                                          ASSESSMENT/PLAN:     Patient Active Problem List   Diagnosis    Amyloidosis    Anemia of chronic disease    Chronic low back pain    Chronic kidney disease    Gastroesophageal reflux disease    History of colostomy    Hypertension    Long term current use of opiate analgesic    Mixed anxiety depressive disorder    Mixed hyperlipidemia    Multiple nodules of lung    Overlapping malignant neoplasm of female breast    Paroxysmal atrial fibrillation    Multiple myeloma    Osteopenia    Aromatase inhibitor use    Sinus congestion    Acute bronchitis    Cellulitis of left lower extremity    Bacterial sinusitis    Recurrent productive cough    Pain and swelling of left lower leg    Acute onset sepsis     1.Encephalopathy, possibly metabolic  -  --CT brain neg  2. History of Multiple Myeloma, IgA Lambda,   --FISH with del 13p, normal karyotype, ISS stg II Dx 2015;   --S/p Autologous stem cell transplant 02/08/16-remission-->slight rise in free light chains 02/08/17  --followed by [facility]   3. Amyloidosis, Lambda light chain type,   --organ involvement with macroglossia and colon involvement.   --[place] red fat pad + Dx 2/2015  --status post ex lap for toxic megacolon with subtotal colectomy and end ileostomy 08/02/16  4.  Stage IA grade 3, IDC/DCIS of the left breast CA- 2/7/18 at [facility];   --ER+, HER2 BERNA +,    --s/p lumpectomy with SLNB 4/12,   --Grade  2 IDC measuring 4mm with second focus of microinvasive carcinoma measuring  0.4mm .IG DCIS, 0.3mm to posterior margin;   --2 SLN negative for malignancy   --On Femara  5. Neutropenic fever    6. Hypertension   7.  History of Paroxysmal atrial fibrillation  -currently rate and rhythm controlled    8. Acute on  CKD stage IIIA  -monitor renal indices closely   -avoid nephrotoxic medications     Plan  -Agree with broad-spectrum antibiotic coverage with vancomycin and cefepime for now   -Monitor counts and kidney function closely  -Neutropenic precautions  -questionable developing pneumonia in the right lower lobe versus atelectasis  -Blood culture done and pending    Thank you for the consult  I will be in [place] tomorrow but will be back Wednesday  I will be avaiable for any questions.        Natalie Meyers MD  Hematology/Oncology  [facility]

## 2024-02-26 NOTE — H&P
[facility] Hospital Medicine History & Physical Examination       Patient Name: Ninfa Candelario  MRN: [id number]  Patient Class: IP- Inpatient   Admission Date: 2/25/2024   Admitting Physician: AG Service   Length of Stay: 1  Attending Physician: Adry Rosario MD  Primary Care Provider: [facility]  Face-to-Face encounter date: 02/26/2024  Code Status:Full    Chief Complaint: Altered Mental Status (AMS. Dx with UTI last week, took full course of Abx. Afterwards began having fluid build up in bilat legs. GCS 10 on arrival to ED. Febrile. Hx mult myeloma, breast cancer, and leukemia. Unknown if getting treatment )        Patient information was obtained from patient, patient's family, past medical records and ER records.  ED records were reviewed in detail and documented below    HISTORY OF PRESENT ILLNESS:   Ninfa Candelario is a 58 y.o. female who  has a past medical history of Amyloidosis, Anemia, Anxiety and depression, Diplopia, Hyperlipidemia, Hypertension, Impaired mobility, Lytic lesion of bone on x-ray, Multiple myeloma, Neuropathy, Obesity, unspecified, Paroxysmal atrial fibrillation, and Primary cancer of left female breast.. The patient presented to [facility] on 2/25/2024 with a primary complaint of altered mental status     Ninfa is a 58-year-old female with history of multiple myeloma, hypertension, HLD that presented to the ED via EMS with altered mental status.  Apparently was diagnosed with UTI last week and had completed the course of her antibiotics.  She continued to get more altered however over the last 24 hours with some left lower extremity edema.  He also spiking a temperature of 102° F per family.  Workup in the ED included a CT head with no acute intracranial findings.  Chest x-ray concerning for developing right lower lobe infiltrate.    Blood cultures were drawn in the ED were noted to be essentially negative thus far.  She has been initiated on  broad-spectrum antibiotic coverage with vancomycin and cefepime.    At the time of my visit, patient is still boarding in the ED, appears in mild distress.      PAST MEDICAL HISTORY:     Past Medical History:   Diagnosis Date    Amyloidosis     Anemia     Anxiety and depression     Diplopia     Hyperlipidemia     Hypertension     Impaired mobility     Lytic lesion of bone on x-ray     Multiple myeloma     Neuropathy     Obesity, unspecified     Paroxysmal atrial fibrillation     Primary cancer of left female breast        PAST SURGICAL HISTORY:     Past Surgical History:   Procedure Laterality Date    BONE MARROW TRANSPLANT  02/08/2016    CARPAL TUNNEL RELEASE      COLONOSCOPY  12/11/2018    Dr. Eddie Jimenez    ENDOSCOPIC RELEASE OF TRIGGER FINGER      ESOPHAGEAL MOTILITY STUDY  2015    Excision Lipoma left elbow      Exploration Laparotomy  2016    FLEXIBLE SIGMOIDOSCOPY      MEDIPORT INSERTION, SINGLE  03/15/2016    PH Study 24 Hour  2015       ALLERGIES:   Baclofen, Penicillins, Shellfish containing products, Clarithromycin, Iodinated contrast media, Nsaids (non-steroidal anti-inflammatory drug), Other omega-3s, Penicillin, Ace inhibitors, Azithromycin, Meloxicam, Prochlorperazine, and Tramadol    FAMILY HISTORY:   Reviewed and negative    SOCIAL HISTORY:     Social History     Tobacco Use    Smoking status: Never    Smokeless tobacco: Never   Substance Use Topics    Alcohol use: Not Currently        HOME MEDICATIONS:     Prior to Admission medications    Medication Sig Start Date End Date Taking? Authorizing Provider   apixaban (ELIQUIS) 2.5 mg Tab Take 1 tablet (2.5 mg total) by mouth 2 (two) times daily. 10/25/23   Natalie Meyers MD   carvediloL (COREG) 3.125 MG tablet Take 1 tablet (3.125 mg total) by mouth 2 (two) times daily with meals. 6/30/22 9/15/23  Connor Ku FNP   colestipoL (COLESTID) 1 gram Tab TAKE 1 TABLET TWICE A DAY 12/28/22   Ashly Moreau FNP   DULoxetine (CYMBALTA) 60 MG  capsule Take 60 mg by mouth. 3/29/22   Provider, Historical   famotidine (PEPCID) 40 MG tablet Take 40 mg by mouth.    Provider, Historical   HEMADY 20 mg Tab TAKE 1 TABLET BY MOUTH EVERY WEEK 2/26/24   Ashly Moreau FNP   hydroCHLOROthiazide (HYDRODIURIL) 12.5 MG Tab TAKE 1 TABLET(12.5 MG) BY MOUTH DAILY 9/28/21   Provider, Historical   lenalidomide (REVLIMID) 5 mg Cap TAKE 1 CAPSULE BY MOUTH EVERY OTHER DAY.. 2/2/24   Ashly Moreau FNP   letrozole (FEMARA) 2.5 mg Tab Take 1 tablet by mouth once daily. 8/23/21   Provider, Historical   levocetirizine (XYZAL) 5 MG tablet Take 1 tablet (5 mg total) by mouth every evening. 10/3/23   Connor Ku FNP   methadone (DOLOPHINE) 10 MG tablet Take 10 mg by mouth every 8 (eight) hours while awake. 10/4/21   Provider, Historical   methylPREDNISolone (MEDROL DOSEPACK) 4 mg tablet use as directed 1/19/23   Connor Ku FNP   multivitamin (THERAGRAN) per tablet Take 1 tablet by mouth once daily.    Provider, Historical   ofloxacin (OCUFLOX) 0.3 % ophthalmic solution Place 1 drop into both eyes 4 (four) times daily. 1/27/23   Adry Rosario MD   omeprazole (PRILOSEC) 20 MG capsule TAKE 1 CAPSULE(20 MG) BY MOUTH EVERY DAY 6/21/23   Connor Ku FNP   ondansetron (ZOFRAN) 8 MG tablet Take 1 tablet (8 mg total) by mouth every 8 (eight) hours as needed for Nausea. 11/27/23   Ashly Moreau FNP   oxyCODONE (ROXICODONE) 10 mg Tab immediate release tablet Take 1 tablet (10 mg total) by mouth every 12 (twelve) hours as needed. 1/28/23   Sher Manrique MD   potassium chloride (KLOR-CON) 10 MEQ TbSR TAKE 1 TABLET BY MOUTH TWICE DAILY 5/22/23   Natalie Meyers MD   rosuvastatin (CRESTOR) 10 MG tablet Take 10 mg by mouth Daily. 8/25/21   Provider, Historical   tretinoin (RETIN-A) 0.1 % cream Apply 1 application topically every evening. 1/4/22   Provider, Historical   dexAMETHasone (HEMADY) 20 mg Tab TAKE 1 TABLET BY MOUTH BY MOUTH ONCE A WEEK 11/24/23 2/26/24   Natalie Meyers MD       REVIEW OF SYSTEMS:   Unable to do a complete review of systems because of patient's altered mentation, she does complain of shortness a breath however     PHYSICAL EXAM:     VITAL SIGNS: 24 HRS MIN & MAX LAST   Temp  Min: 98 °F (36.7 °C)  Max: 99.9 °F (37.7 °C) 98 °F (36.7 °C)   BP  Min: 101/58  Max: 159/86 117/72   Pulse  Min: 78  Max: 110  81   Resp  Min: 12  Max: 31 12   SpO2  Min: 95 %  Max: 100 % 100 %     General appearance:  Alert, appears in moderate distress   HENT: Atraumatic head. Moist mucous membranes of oral cavity.  Eyes: Normal extraocular movements.   Neck: Supple.   Lungs:  Extensive rhonchus sounds on the right lower lobe, otherwise clear to auscultation    Heart:  Tachycardia, left lower leg edema   Abdomen: Soft, non-distended, non-tender. No rebound tenderness/guarding. Bowel sounds are normal.   Extremities:  Swelling of the left lower leg with warmth and edema   Skin: No Rash.   Neuro:  Altered mentation, in and out, able to hold conversation however short of breath , otherwise nonfocal    LABS AND IMAGING:     Recent Labs   Lab 02/25/24 1708   WBC 1.47  1.47*   RBC 3.45*   HGB 9.7*   HCT 32.3*   MCV 93.6   MCH 28.1   MCHC 30.0*   RDW 16.4      MPV 10.7*       Recent Labs   Lab 02/25/24 1708      K 3.7   CO2 22*   BUN 23.9*   CREATININE 2.16*   CALCIUM 8.6   MG 2.00   ALBUMIN 2.6*   ALKPHOS 55   ALT 21   AST 32   BILITOT 0.7       Microbiology Results (last 7 days)       Procedure Component Value Units Date/Time    Blood culture x two cultures. Draw prior to antibiotics. [[id number]] Collected: 02/25/24 1708    Order Status: Resulted Specimen: Blood Updated: 02/25/24 1944    Blood culture x two cultures. Draw prior to antibiotics. [[id number]] Collected: 02/25/24 1708    Order Status: Resulted Specimen: Blood Updated: 02/25/24 1944             X-Ray Chest AP Portable  Narrative: EXAMINATION:  XR CHEST AP PORTABLE    CLINICAL  HISTORY:  Sepsis;    TECHNIQUE:  Single view of the chest    COMPARISON:  01/23/2023    FINDINGS:  The cardiomediastinal silhouette is unchanged.  Trace subsegmental atelectatic change at the right base.  Recommend continued follow-up.  Impression: As above.    Electronically signed by: Glen Galindo  Date:    02/26/2024  Time:    06:44  CT Head Without Contrast  Narrative: EXAMINATION:  CT HEAD WITHOUT CONTRAST    CLINICAL HISTORY:  Mental status change, unknown cause;    TECHNIQUE:  Low dose axial images were obtained through the head.  Coronal and sagittal reformations were also performed. Contrast was not administered.    Automatic exposure control was utilized to reduce the patient's radiation dose.    DLP= 1006    COMPARISON:  10/06/2021    FINDINGS:  No acute intracranial hemorrhage, edema or mass. No acute parenchymal abnormality.    Mild cerebral atrophy with concordant ventricular enlargement.    There is normal gray white differentiation.    The osseous structures are normal.    The mastoid air cells are clear.    Debris within the bilateral auditory canals.    The globes and orbital contents are normal bilaterally.    Mucosal thickening of the bilateral maxillary sinuses with air-fluid levels noted throughout the sinuses.  Impression: No acute intracranial abnormality identified.  Findings of sinusitis.  Findings of chronic microvascular ischemic disease.    Electronically signed by: Glen Galindo  Date:    02/26/2024  Time:    06:21      ASSESSMENT & PLAN:     Encephalopathy, possibly metabolic   -blood cultures currently remaining negative   -continue with broad-spectrum antibiotic coverage with vancomycin and cefepime for now   -deescalate as appropriate   -questionable developing pneumonia in the right lower lobe versus atelectasis    2. History of multiple myeloma , not achieve remission  -followed by [facility] and locally by Dr. Meyers who is consulted   -would appreciate their assistance     3.  Hypertension   -home medications have been reviewed and will be resumed as appropriate     4.  History of atrial fibrillation  -currently rate and rhythm controlled     5. Overlapping malignant neoplasm breast, ER positive    6. Amyloidosis-with tongue and: Involvement  -status post ex lap for toxic megacolon    7. Acute on  CKD stage IIIA  -monitor renal indices closely   -avoid nephrotoxic medications       VTE Prophylaxis:  Lovenox     Patient condition: Guarded  __________________________________________________________________________  INPATIENT LIST OF MEDICATIONS     Scheduled Meds:   ceFEPime IV (PEDS and ADULTS)  2 g Intravenous Q12H    enoxparin  30 mg Subcutaneous Daily    famotidine  20 mg Oral Daily    mupirocin   Nasal BID    sodium chloride 0.9% 250 mL flush bag   Intravenous 1 time in Clinic/HOD    sodium chloride 0.9% 250 mL flush bag   Intravenous 1 time in Clinic/HOD    sodium chloride 0.9% 250 mL flush bag   Intravenous 1 time in Clinic/HOD    sodium chloride 0.9% 250 mL flush bag   Intravenous 1 time in Clinic/HOD    sodium chloride 0.9% 250 mL flush bag   Intravenous 1 time in Clinic/HOD    sodium chloride 0.9% 250 mL flush bag   Intravenous 1 time in Clinic/HOD    sodium chloride 0.9% 250 mL flush bag   Intravenous 1 time in Clinic/HOD    sodium chloride 0.9% 250 mL flush bag   Intravenous 1 time in Clinic/HOD     Continuous Infusions:   lactated ringers 100 mL/hr at 02/26/24 0029     PRN Meds:.acetaminophen, acetaminophen, haloperidol lactate, heparin, porcine (PF), heparin, porcine (PF), LORazepam, melatonin, ondansetron, sodium chloride 0.9%, sodium chloride 0.9%, sodium chloride 0.9%, sodium chloride 0.9%, sodium chloride 0.9%, sodium chloride 0.9%, sodium chloride 0.9%, sodium chloride 0.9%, sodium chloride 0.9%, vancomycin - pharmacy to dose    If patient was admitted under observational status it is with my approval/permission.        All diagnosis and differential diagnosis have been  reviewed; assessment and plan has been documented; I have personally reviewed the labs and test results that are presently available; I have reviewed the patients medication list; I have reviewed the consulting providers response and recommendations. I have reviewed or attempted to review medical records based upon their availability.    All of the patient and family questions have been addressed and answered. Patient's is agreeable to the above stated plan. I will continue to monitor closely and make adjustments to medical management as needed.    If patient was admitted under observational status it is with my approval/permission.      Adry Rosario MD   02/26/2024

## 2024-02-26 NOTE — PROGRESS NOTES
Pharmacist Renal Dose Adjustment Note    Ninfa Candelario is a 58 y.o. female being treated with the medication cefepime    Patient Data:    Vital Signs (Most Recent):  Temp: 99.9 °F (37.7 °C) (02/25/24 1847)  Pulse: 108 (02/25/24 2002)  Resp: (!) 22 (02/25/24 2002)  BP: (!) 144/122 (02/25/24 2002)  SpO2: 97 % (02/25/24 2002) Vital Signs (72h Range):  Temp:  [99.2 °F (37.3 °C)-99.9 °F (37.7 °C)]   Pulse:  [103-110]   Resp:  [20-31]   BP: (141-159)/()   SpO2:  [97 %-100 %]      Recent Labs   Lab 02/25/24  1708   CREATININE 2.16*     Serum creatinine: 2.16 mg/dL (H) 02/25/24 1708  Estimated creatinine clearance: 29.2 mL/min (A)    Medication:cefepime dose: 2gm frequency q8hrs will be changed to medication:cefepime dose:2gm frequency:q12hrs    Pharmacist's Name: Cornel Thompson  Pharmacist's Extension: [phone]

## 2024-02-26 NOTE — ED NOTES
S/w Dr. Hercules on call for Dr. Anguiano at this time, notified pf patient agitation, trying to get out of bed, begging her sister to take her to [facility] in [place] right now, pt tearful and anxious, pulling on lines. Several unsuccessful attempts to redirect failed. Rec'd verbal orders for haldol and ativan PRN agitation from Dr. Hercules.

## 2024-02-26 NOTE — PROGRESS NOTES
Pharmacokinetic Assessment Follow Up: IV Vancomycin    Vancomycin serum concentration assessment(s):    The random level was drawn correctly and can be used to guide therapy at this time. The measurement is below the desired definitive target range of 15 to 20 mcg/mL.    Vancomycin Regimen Plan:  Continue to pulse dose due to poor renal function  Give 1000 mg IV vancomycin x 1 dose  Check random level on 02/27 @1200      Drug levels (last 3 results):  Recent Labs   Lab Result Units 02/26/24  1219   Vanc Lvl Random ug/ml 11.5*       Vancomycin Administrations:  vancomycin given in the last 96 hours                     vancomycin 1.75 g in 5 % dextrose 500 mL IVPB (mg) 1,750 mg New Bag 02/25/24 7615                    Pharmacy will continue to follow and monitor vancomycin.    Please contact pharmacy at extension [phone] for questions regarding this assessment.    Thank you for the consult,   Jak Escamilla       Patient brief summary:  Ninfa Candelario is a 58 y.o. female initiated on antimicrobial therapy with IV Vancomycin for treatment of sepsis      Drug Allergies:   Review of patient's allergies indicates:   Allergen Reactions    Baclofen Shortness Of Breath     Difficulty breathing      Penicillins Hives, Rash and Swelling    Shellfish containing products Hives, Rash, Swelling and Other (See Comments)     shell  She can shrimp      Clarithromycin Other (See Comments)    Iodinated contrast media     Nsaids (non-steroidal anti-inflammatory drug)     Other omega-3s     Penicillin      Other reaction(s): Unknown  Other reaction(s): Unknown  Other reaction(s): Unknown    Ace inhibitors Other (See Comments)     cough  Other reaction(s): Cough    Azithromycin Nausea Only     Upset stomach    Meloxicam Tinitus     Other reaction(s): Unknown  Other reaction(s): Unknown    Prochlorperazine Anxiety     Restlessness/anxious    Tramadol Other (See Comments)     Increased Heart Rate    Other reaction(s): Unknown  Other  reaction(s): Unknown       Actual Body Weight:  Wt Readings from Last 1 Encounters:   02/25/24 81.6 kg (180 lb)       Renal Function:   Estimated Creatinine Clearance: 29.2 mL/min (A) (based on SCr of 2.16 mg/dL (H)).,     Dialysis Method (if applicable):  N/A    CBC (last 72 hours):  Recent Labs   Lab Result Units 02/25/24 1708   WBC x10(3)/mcL 1.47  1.47*   Hgb g/dL 9.7*   Hct % 32.3*   Platelet x10(3)/mcL 170   Monocytes % % 11       Metabolic Panel (last 72 hours):  Recent Labs   Lab Result Units 02/25/24 1708 02/25/24 2011   Sodium Level mmol/L 144  --    Potassium Level mmol/L 3.7  --    Chloride mmol/L 106  --    Carbon Dioxide mmol/L 22*  --    Glucose Level mg/dL 94  --    Glucose, UA   --  Normal   Blood Urea Nitrogen mg/dL 23.9*  --    Creatinine mg/dL 2.16*  --    Albumin Level g/dL 2.6*  --    Bilirubin Total mg/dL 0.7  --    Alkaline Phosphatase unit/L 55  --    Aspartate Aminotransferase unit/L 32  --    Alanine Aminotransferase unit/L 21  --    Magnesium Level mg/dL 2.00  --        Microbiologic Results:  Microbiology Results (last 7 days)       Procedure Component Value Units Date/Time    Blood culture x two cultures. Draw prior to antibiotics. [[id number]] Collected: 02/25/24 1708    Order Status: Resulted Specimen: Blood Updated: 02/25/24 1944    Blood culture x two cultures. Draw prior to antibiotics. [[id number]] Collected: 02/25/24 1708    Order Status: Resulted Specimen: Blood Updated: 02/25/24 1944

## 2024-02-27 LAB
MRSA PCR SCRN (OHS): NOT DETECTED
VANCOMYCIN SERPL-MCNC: 17.6 UG/ML (ref 15–20)

## 2024-02-27 PROCEDURE — 80202 ASSAY OF VANCOMYCIN: CPT | Performed by: INTERNAL MEDICINE

## 2024-02-27 PROCEDURE — 25000003 PHARM REV CODE 250: Performed by: EMERGENCY MEDICINE

## 2024-02-27 PROCEDURE — 25000003 PHARM REV CODE 250: Performed by: INTERNAL MEDICINE

## 2024-02-27 PROCEDURE — 21400001 HC TELEMETRY ROOM

## 2024-02-27 PROCEDURE — 99233 SBSQ HOSP IP/OBS HIGH 50: CPT | Mod: ,,, | Performed by: INTERNAL MEDICINE

## 2024-02-27 PROCEDURE — 63600175 PHARM REV CODE 636 W HCPCS: Performed by: INTERNAL MEDICINE

## 2024-02-27 PROCEDURE — 63600175 PHARM REV CODE 636 W HCPCS: Performed by: EMERGENCY MEDICINE

## 2024-02-27 PROCEDURE — 87641 MR-STAPH DNA AMP PROBE: CPT | Performed by: INTERNAL MEDICINE

## 2024-02-27 RX ORDER — HYDRALAZINE HYDROCHLORIDE 20 MG/ML
10 INJECTION INTRAMUSCULAR; INTRAVENOUS EVERY 4 HOURS PRN
Status: DISCONTINUED | OUTPATIENT
Start: 2024-02-27 | End: 2024-02-27

## 2024-02-27 RX ORDER — TRIAMTERENE/HYDROCHLOROTHIAZID 37.5-25 MG
1 TABLET ORAL DAILY
Status: DISCONTINUED | OUTPATIENT
Start: 2024-02-27 | End: 2024-02-29 | Stop reason: HOSPADM

## 2024-02-27 RX ADMIN — TRIAMTERENE AND HYDROCHLOROTHIAZIDE 1 TABLET: 37.5; 25 TABLET ORAL at 02:02

## 2024-02-27 RX ADMIN — DULOXETINE HYDROCHLORIDE 60 MG: 30 CAPSULE, DELAYED RELEASE ORAL at 09:02

## 2024-02-27 RX ADMIN — APIXABAN 2.5 MG: 2.5 TABLET, FILM COATED ORAL at 08:02

## 2024-02-27 RX ADMIN — APIXABAN 2.5 MG: 2.5 TABLET, FILM COATED ORAL at 09:02

## 2024-02-27 RX ADMIN — LORAZEPAM 1 MG: 1 TABLET ORAL at 12:02

## 2024-02-27 RX ADMIN — HYDRALAZINE HYDROCHLORIDE 10 MG: 20 INJECTION INTRAMUSCULAR; INTRAVENOUS at 12:02

## 2024-02-27 RX ADMIN — MUPIROCIN: 20 OINTMENT TOPICAL at 09:02

## 2024-02-27 RX ADMIN — MUPIROCIN: 20 OINTMENT TOPICAL at 08:02

## 2024-02-27 RX ADMIN — CEFEPIME 2 G: 2 INJECTION, POWDER, FOR SOLUTION INTRAVENOUS at 05:02

## 2024-02-27 RX ADMIN — FAMOTIDINE 40 MG: 20 TABLET ORAL at 09:02

## 2024-02-27 RX ADMIN — CEFEPIME 2 G: 2 INJECTION, POWDER, FOR SOLUTION INTRAVENOUS at 04:02

## 2024-02-27 RX ADMIN — LETROZOLE 2.5 MG: 2.5 TABLET ORAL at 09:02

## 2024-02-27 NOTE — PROGRESS NOTES
[facility] Hospital Medicine Progress Note        Chief Complaint: Inpatient Follow-up for metabolic encephalopathy/developing pneumonia    HPI:   Ninfa is a 58-year-old female with history of multiple myeloma, hypertension, HLD that presented to the ED via EMS with altered mental status.  Apparently was diagnosed with UTI last week and had completed the course of her antibiotics.  She continued to get more altered however over the last 24 hours with some left lower extremity edema.  He also spiking a temperature of 102° F per family.  Workup in the ED included a CT head with no acute intracranial findings.  Chest x-ray concerning for developing right lower lobe infiltrate.    Blood cultures were drawn in the ED were noted to be essentially negative thus far.  She has been initiated on broad-spectrum antibiotic coverage with vancomycin and cefepime.    MRSA PCR returned negative and vancomycin was then discontinued.    Interval Hx:   The patient was seen and examined.  Starting to look much better than when in the ED yesterday.  Blood pressure is elevated.  Patient is allergic to enalapril/ACE inhibitors for chronic cough.    She was given hydralazine p.r.n. and had a bout severe anxiety/agitation.    Blood pressure is remaining elevated.  Her triamterene hydrochlorothiazide will be resumed.    Cefepime will be continued for now, vancomycin will be discontinued cysts MRSA PCR returned negative     Case was discussed with patient's nurse and  on the floor.    Objective/physical exam:  General:  Appears tired, mild distress    Chest:  Decreased in right lower lobe    Heart: RRR, +S1, S2, no appreciable murmur  Abdomen: Soft, nontender, BS +  MSK: Warm, no lower extremity edema, no clubbing or cyanosis  Neurologic: Alert and oriented x4, nonfocal, generalized weakness     VITAL SIGNS: 24 HRS MIN & MAX LAST   Temp  Min: 97.6 °F (36.4 °C)  Max: 98.3 °F (36.8 °C) 98.1 °F (36.7  °C)   BP  Min: 127/77  Max: 174/79 (!) 164/91   Pulse  Min: 51  Max: 86  70   Resp  Min: 10  Max: 20 20   SpO2  Min: 95 %  Max: 100 % 95 %     I have reviewed the following labs:  Recent Labs   Lab 02/25/24  1708   WBC 1.47  1.47*   RBC 3.45*   HGB 9.7*   HCT 32.3*   MCV 93.6   MCH 28.1   MCHC 30.0*   RDW 16.4      MPV 10.7*     Recent Labs   Lab 02/25/24  1708      K 3.7   CO2 22*   BUN 23.9*   CREATININE 2.16*   CALCIUM 8.6   MG 2.00   ALBUMIN 2.6*   ALKPHOS 55   ALT 21   AST 32   BILITOT 0.7     Microbiology Results (last 7 days)       Procedure Component Value Units Date/Time    Blood culture x two cultures. Draw prior to antibiotics. [[id number]]  (Normal) Collected: 02/25/24 1708    Order Status: Completed Specimen: Blood Updated: 02/26/24 2001     CULTURE, BLOOD ([facility]) No Growth At 24 Hours    Blood culture x two cultures. Draw prior to antibiotics. [[id number]]  (Normal) Collected: 02/25/24 1708    Order Status: Completed Specimen: Blood Updated: 02/26/24 2001     CULTURE, BLOOD ([facility]) No Growth At 24 Hours             See below for Radiology    Scheduled Med:   apixaban  2.5 mg Oral BID    carvediloL  3.125 mg Oral BID WM    ceFEPime IV (PEDS and ADULTS)  2 g Intravenous Q12H    dexAMETHasone  20 mg Oral Weekly    DULoxetine  60 mg Oral Daily    famotidine  40 mg Oral Daily    letrozole  2.5 mg Oral Daily    mupirocin   Nasal BID    triamterene-hydrochlorothiazide 37.5-25 mg  1 tablet Oral Daily      Continuous Infusions:   lactated ringers 50 mL/hr at 02/27/24 1251      PRN Meds:  acetaminophen, acetaminophen, haloperidol lactate, LORazepam, melatonin, ondansetron, sodium chloride 0.9%     Assessment/Plan:  Encephalopathy, possibly metabolic   -blood cultures currently remaining negative   -continue with cefepime, vancomycin will be discontinued since MRSA PCR was negative   -questionable developing pneumonia in the right lower lobe versus atelectasis     2. History of multiple myeloma , not  achieve remission  -followed by [facility] and locally by Dr. Meyers who is consulted   -would appreciate their assistance   -patient also followed at [facility]  -neutropenic precautions     3. Hypertension   -home medications have been reviewed and will be resumed as appropriate   -blood pressure remaining on the higher side of normal   -decrease LR to 50 cc an hour   -add triamterene hydrochlorothiazide to her current medication regimen.    -unable to tolerate hydralazine because severe anxiety     4.  History of atrial fibrillation  -currently rate and rhythm controlled      5. Overlapping malignant neoplasm breast, ER positive     6. Amyloidosis-with tongue and: Involvement  -status post ex lap for toxic megacolon     7. Acute on  CKD stage IIIA  -monitor renal indices closely   -avoid nephrotoxic medications     VTE prophylaxis:     Patient condition:  Fair   Anticipated discharge and Disposition:   Home with HH when stable      All diagnosis and differential diagnosis have been reviewed; assessment and plan has been documented; I have personally reviewed the labs and test results that are presently available; I have reviewed the patients medication list; I have reviewed the consulting providers response and recommendations. I have reviewed or attempted to review medical records based upon their availability    All of the patient's questions have been  addressed and answered. Patient's is agreeable to the above stated plan. I will continue to monitor closely and make adjustments to medical management as needed.  _____________________________________________________________________    Nutrition Status:    Radiology:  I have personally reviewed the following imaging and agree with the radiologist.     CV Ultrasound doppler venous DVT leg left    The left superficial femoral middle vein is normal.    There was no evidence of deep or superficial vein thrombosis in the left   lower extremity.       Adry Acosta  MD [facility]  Department of Hospital Medicine   [facility]   02/27/2024

## 2024-02-27 NOTE — PLAN OF CARE
Assessment completed with patient's sister. Patient lives in a single story home with sister. Reports patient was ambulating with rollator prior to admission. DME in home: Shower chair, rollator, shower chair. Denies any current services at home. PCP: Dr. Rosario, [facility]: [facility].        02/27/24 1422   Discharge Assessment   Assessment Type Discharge Planning Assessment   Confirmed/corrected address, phone number and insurance Yes   Confirmed Demographics Correct on Facesheet   Source of Information family   Reason For Admission AMS. Dx with UTI last week, took full course of Abx. Afterwards began having fluid build up in bilat legs. GCS 10 on arrival to ED. Febrile. Hx mult myeloma, breast cancer, and leukemia. Unknown if getting treatment   People in Home sibling(s)   Do you expect to return to your current living situation? Yes   Do you have help at home or someone to help you manage your care at home? Yes   Home Accessibility wheelchair accessible   Home Layout Able to live on 1st floor   Equipment Currently Used at Home rollator;wheelchair;shower chair   Readmission within 30 days? No   Patient currently being followed by outpatient case management? No   Do you currently have service(s) that help you manage your care at home? No   Do you take prescription medications? Yes   Do you have prescription coverage? Yes   Coverage BCBS/Medicare   Do you have any problems affording any of your prescribed medications? No   Is the patient taking medications as prescribed? yes   How do you get to doctors appointments? family or friend will provide;car, drives self   Are you on dialysis? No   Do you take coumadin? No   Discharge Plan A Home Health   Discharge Plan B Home with family   DME Needed Upon Discharge  other (see comments)  (TBD)   Discharge Plan discussed with: Sibling   Transition of Care Barriers None

## 2024-02-27 NOTE — PLAN OF CARE
Problem: Infection  Goal: Absence of Infection Signs and Symptoms  Outcome: Ongoing, Progressing     Problem: Adult Inpatient Plan of Care  Goal: Plan of Care Review  Outcome: Ongoing, Progressing  Goal: Patient-Specific Goal (Individualized)  Outcome: Ongoing, Progressing  Goal: Absence of Hospital-Acquired Illness or Injury  Outcome: Ongoing, Progressing  Goal: Optimal Comfort and Wellbeing  Outcome: Ongoing, Progressing  Goal: Readiness for Transition of Care  Outcome: Ongoing, Progressing     Problem: Adjustment to Illness (Sepsis/Septic Shock)  Goal: Optimal Coping  Outcome: Ongoing, Progressing     Problem: Bleeding (Sepsis/Septic Shock)  Goal: Absence of Bleeding  Outcome: Ongoing, Progressing     Problem: Glycemic Control Impaired (Sepsis/Septic Shock)  Goal: Blood Glucose Level Within Desired Range  Outcome: Ongoing, Progressing     Problem: Infection Progression (Sepsis/Septic Shock)  Goal: Absence of Infection Signs and Symptoms  Outcome: Ongoing, Progressing     Problem: Nutrition Impaired (Sepsis/Septic Shock)  Goal: Optimal Nutrition Intake  Outcome: Ongoing, Progressing     Problem: Fall Injury Risk  Goal: Absence of Fall and Fall-Related Injury  Outcome: Ongoing, Progressing     Problem: Skin Injury Risk Increased  Goal: Skin Health and Integrity  Outcome: Ongoing, Progressing

## 2024-02-27 NOTE — NURSING
Nurses Note -- 4 Eyes      2/27/2024   1:15 AM      Skin assessed during: Admit      [x] No Altered Skin Integrity Present    [x]Prevention Measures Documented      [] Yes- Altered Skin Integrity Present or Discovered   [] LDA Added if Not in Epic (Describe Wound)   [] New Altered Skin Integrity was Present on Admit and Documented in LDA   [] Wound Image Taken    Wound Care Consulted? No    Attending Nurse:  Yessi Reeves RN/Staff Member:  Deuce

## 2024-02-28 LAB
ABS NEUT (OLG): 1.82 X10(3)/MCL (ref 2.1–9.2)
ANION GAP SERPL CALC-SCNC: 14 MEQ/L
ANISOCYTOSIS BLD QL SMEAR: ABNORMAL
BUN SERPL-MCNC: 41.8 MG/DL (ref 9.8–20.1)
CALCIUM SERPL-MCNC: 8.7 MG/DL (ref 8.4–10.2)
CHLORIDE SERPL-SCNC: 108 MMOL/L (ref 98–107)
CO2 SERPL-SCNC: 20 MMOL/L (ref 23–31)
CREAT SERPL-MCNC: 2.13 MG/DL (ref 0.55–1.02)
CREAT/UREA NIT SERPL: 20
ERYTHROCYTE [DISTWIDTH] IN BLOOD BY AUTOMATED COUNT: 16.2 % (ref 11.5–17)
GFR SERPLBLD CREATININE-BSD FMLA CKD-EPI: 26 MLS/MIN/1.73/M2
GLUCOSE SERPL-MCNC: 124 MG/DL (ref 82–115)
HCT VFR BLD AUTO: 26.6 % (ref 37–47)
HGB BLD-MCNC: 8.2 G/DL (ref 12–16)
INSTRUMENT WBC (OLG): 2.28 X10(3)/MCL
LYMPHOCYTES NFR BLD MANUAL: 0.3 X10(3)/MCL
LYMPHOCYTES NFR BLD MANUAL: 13 %
MCH RBC QN AUTO: 28.4 PG (ref 27–31)
MCHC RBC AUTO-ENTMCNC: 30.8 G/DL (ref 33–36)
MCV RBC AUTO: 92 FL (ref 80–94)
MONOCYTES NFR BLD MANUAL: 0.16 X10(3)/MCL (ref 0.1–1.3)
MONOCYTES NFR BLD MANUAL: 7 %
NEUTROPHILS NFR BLD MANUAL: 80 %
NRBC BLD AUTO-RTO: 0 %
NRBC BLD MANUAL-RTO: 1 %
OVALOCYTES (OLG): ABNORMAL
PLATELET # BLD AUTO: 249 X10(3)/MCL (ref 130–400)
PLATELET # BLD EST: ADEQUATE 10*3/UL
PMV BLD AUTO: 10.7 FL (ref 7.4–10.4)
POIKILOCYTOSIS BLD QL SMEAR: ABNORMAL
POTASSIUM SERPL-SCNC: 4.2 MMOL/L (ref 3.5–5.1)
RBC # BLD AUTO: 2.89 X10(6)/MCL (ref 4.2–5.4)
RBC MORPH BLD: ABNORMAL
SODIUM SERPL-SCNC: 142 MMOL/L (ref 136–145)
TEAR DROP CELL (OLG): ABNORMAL
WBC # SPEC AUTO: 2.28 X10(3)/MCL (ref 4.5–11.5)

## 2024-02-28 PROCEDURE — 63600175 PHARM REV CODE 636 W HCPCS: Performed by: EMERGENCY MEDICINE

## 2024-02-28 PROCEDURE — 25000003 PHARM REV CODE 250: Performed by: EMERGENCY MEDICINE

## 2024-02-28 PROCEDURE — 25000003 PHARM REV CODE 250: Performed by: INTERNAL MEDICINE

## 2024-02-28 PROCEDURE — 63600175 PHARM REV CODE 636 W HCPCS: Performed by: INTERNAL MEDICINE

## 2024-02-28 PROCEDURE — 99232 SBSQ HOSP IP/OBS MODERATE 35: CPT | Mod: ,,, | Performed by: INTERNAL MEDICINE

## 2024-02-28 PROCEDURE — 99223 1ST HOSP IP/OBS HIGH 75: CPT | Mod: ,,, | Performed by: INTERNAL MEDICINE

## 2024-02-28 PROCEDURE — 80048 BASIC METABOLIC PNL TOTAL CA: CPT | Performed by: INTERNAL MEDICINE

## 2024-02-28 PROCEDURE — 21400001 HC TELEMETRY ROOM

## 2024-02-28 PROCEDURE — 85027 COMPLETE CBC AUTOMATED: CPT | Performed by: INTERNAL MEDICINE

## 2024-02-28 RX ORDER — AMLODIPINE BESYLATE 5 MG/1
5 TABLET ORAL ONCE
Status: COMPLETED | OUTPATIENT
Start: 2024-02-28 | End: 2024-02-28

## 2024-02-28 RX ORDER — FUROSEMIDE 10 MG/ML
20 INJECTION INTRAMUSCULAR; INTRAVENOUS ONCE
Status: COMPLETED | OUTPATIENT
Start: 2024-02-28 | End: 2024-02-28

## 2024-02-28 RX ADMIN — MUPIROCIN: 20 OINTMENT TOPICAL at 09:02

## 2024-02-28 RX ADMIN — FAMOTIDINE 40 MG: 20 TABLET ORAL at 09:02

## 2024-02-28 RX ADMIN — CEFEPIME 2 G: 2 INJECTION, POWDER, FOR SOLUTION INTRAVENOUS at 04:02

## 2024-02-28 RX ADMIN — APIXABAN 2.5 MG: 2.5 TABLET, FILM COATED ORAL at 09:02

## 2024-02-28 RX ADMIN — FUROSEMIDE 20 MG: 10 INJECTION, SOLUTION INTRAMUSCULAR; INTRAVENOUS at 09:02

## 2024-02-28 RX ADMIN — LETROZOLE 2.5 MG: 2.5 TABLET ORAL at 09:02

## 2024-02-28 RX ADMIN — TRIAMTERENE AND HYDROCHLOROTHIAZIDE 1 TABLET: 37.5; 25 TABLET ORAL at 09:02

## 2024-02-28 RX ADMIN — CEFEPIME 2 G: 2 INJECTION, POWDER, FOR SOLUTION INTRAVENOUS at 05:02

## 2024-02-28 RX ADMIN — DULOXETINE HYDROCHLORIDE 60 MG: 30 CAPSULE, DELAYED RELEASE ORAL at 09:02

## 2024-02-28 RX ADMIN — AMLODIPINE BESYLATE 5 MG: 5 TABLET ORAL at 09:02

## 2024-02-28 NOTE — PLAN OF CARE
Problem: Infection  Goal: Absence of Infection Signs and Symptoms  Outcome: Met     Problem: Adult Inpatient Plan of Care  Goal: Plan of Care Review  Outcome: Met  Goal: Patient-Specific Goal (Individualized)  Outcome: Met  Goal: Absence of Hospital-Acquired Illness or Injury  Outcome: Met  Goal: Optimal Comfort and Wellbeing  Outcome: Met  Goal: Readiness for Transition of Care  Outcome: Met     Problem: Adjustment to Illness (Sepsis/Septic Shock)  Goal: Optimal Coping  Outcome: Met     Problem: Bleeding (Sepsis/Septic Shock)  Goal: Absence of Bleeding  Outcome: Met     Problem: Glycemic Control Impaired (Sepsis/Septic Shock)  Goal: Blood Glucose Level Within Desired Range  Outcome: Met     Problem: Infection Progression (Sepsis/Septic Shock)  Goal: Absence of Infection Signs and Symptoms  Outcome: Met     Problem: Nutrition Impaired (Sepsis/Septic Shock)  Goal: Optimal Nutrition Intake  Outcome: Met     Problem: Fall Injury Risk  Goal: Absence of Fall and Fall-Related Injury  Outcome: Met     Problem: Skin Injury Risk Increased  Goal: Skin Health and Integrity  Outcome: Met

## 2024-02-28 NOTE — PROGRESS NOTES
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   8) Stage IA grade 3, ER+, HER2 BERNA +,  IDC/DCIS of the left breast --- 2/7/18 at [facility]  --s/p lumpectomy with SLNB 4/12, Grade 2 IDC 4mm with second focus of microinvasive carcinoma 0.4mm. 2 SLN negative for malignancy    9) Progressive swelling of R lower extremity--- US NIVA done 2/19/18 negative for evidence of DVT  10). Paroxysmal Afib (2/18/18) diagnosed at [facility]; ECHO noted EF 58% - on Eliquis  11) Mild CKD with GFR 55 - managed per [facility] nephrology  12) Treatment induced neutropenia     Present treatment:  -Maintenance Revlimid 5mg PO QOD, started 02/16/17-- was held for a few months while undergoing treatment for her breast cancer 02/18-05/18; Restarted 6/15/18   Dexamethasone 20 mg po weekly added early July 2017--> reduced to 12 mg PO weekly October 4, 2017---> increased to 16mg PO weekly 2/15/18---restarted 6/15/18 --> 20 mg weekly 7//8/2022  Darzalex 7/8/2022-present  Femara 2.5 mg PO daily   Eliquis 2.5mg PO BID      Treatment/Oncology history:  1) CyBorD x5 cycles 09/28/15-12/24/15  2) Autologous stem cell transplant 02/08/16, done at [facility]  3) Exploratory laparotomy with subtotal colectomy and end ileostomy done August 2, 2016--pathology specimen showed advanced  colonic amyloidosis extensive involving the muscular wall submucosa and mucosa.  Appendix also involvement by amyloidosis with fibrous obliteration of the distal lumen.  4) Maintenance therapy with Revlimid 5mg-started 06/01/16--Discontinued shortly after 2/2 cytopenias  5) Left breast lumpectomy with SLNB at [facility] 4/12/18  6) Left partial breast RT x10 fractions  5/21/18-6/4/1          History of Present Illness:  Patient is a 58 y.o. female with above medical history that presents to the emergency department with altered mental status and fever.      Of note medical history taken by nurse and electronic medical record as patient was lethargic.  She opens her eyes upon calling her name and answer yes or no questions at times.  Patient received Ativan prior to my arrival to the emergency department for anxiety.    Patient was diagnosed with UTI last week for which she completed a course of antibiotics.  As per sister patient's spike temperature of 102°.  CT scan of the head with no acute intracranial findings.  CXR Trace subsegmental atelectatic change at the right base.  Patient was started on cefepime and vancomycin.    2/28/2024:  Patient is seen today in rounds.  Sister at bedside.  She looks much better and feels better today.  She is awake alert oriented x3.  No fever.  No CBC this morning.    Continuous Infusions:   lactated ringers 50 mL/hr at 02/27/24 1251     Scheduled Meds:   apixaban  2.5 mg Oral BID    carvediloL  3.125 mg Oral BID WM    ceFEPime IV (PEDS and ADULTS)  2 g Intravenous Q12H    dexAMETHasone  20 mg Oral Weekly    DULoxetine  60 mg Oral Daily    famotidine  40 mg Oral Daily    furosemide (LASIX) injection  20 mg Intravenous Once    letrozole  2.5 mg Oral Daily    mupirocin   Nasal BID    triamterene-hydrochlorothiazide 37.5-25 mg  1 tablet Oral Daily     PRN Meds:acetaminophen, acetaminophen, haloperidol lactate, LORazepam, melatonin, ondansetron, sodium chloride 0.9%    Past Medical  History:   Diagnosis Date    Amyloidosis     Anemia     Anxiety and depression     Diplopia     Hyperlipidemia     Hypertension     Impaired mobility     Lytic lesion of bone on x-ray     Multiple myeloma     Neuropathy     Obesity, unspecified     Paroxysmal atrial fibrillation     Primary cancer of left female breast      Past Surgical History:   Procedure Laterality Date    BONE MARROW TRANSPLANT  02/08/2016    CARPAL TUNNEL RELEASE      COLONOSCOPY  12/11/2018    Dr. Eddie Jimenez    ENDOSCOPIC RELEASE OF TRIGGER FINGER      ESOPHAGEAL MOTILITY STUDY  2015    Excision Lipoma left elbow      Exploration Laparotomy  2016    FLEXIBLE SIGMOIDOSCOPY      MEDIPORT INSERTION, SINGLE  03/15/2016    PH Study 24 Hour  2015     Family History   Problem Relation Age of Onset    Hypertension Mother     Cancer Father     Hypertension Sister     Hypertension Brother      Social History     Tobacco Use    Smoking status: Never    Smokeless tobacco: Never   Substance Use Topics    Alcohol use: Not Currently    Drug use: Never       Review of patient's allergies indicates:   Allergen Reactions    Baclofen Shortness Of Breath     Difficulty breathing      Penicillins Hives, Rash and Swelling    Shellfish containing products Hives, Rash, Swelling and Other (See Comments)     shell  She can shrimp      Clarithromycin Other (See Comments)    Iodinated contrast media     Nsaids (non-steroidal anti-inflammatory drug)     Other omega-3s     Penicillin      Other reaction(s): Unknown  Other reaction(s): Unknown  Other reaction(s): Unknown    Ace inhibitors Other (See Comments)     cough  Other reaction(s): Cough    Azithromycin Nausea Only     Upset stomach    Hydralazine analogues Anxiety    Meloxicam Tinitus     Other reaction(s): Unknown  Other reaction(s): Unknown    Prochlorperazine Anxiety     Restlessness/anxious    Tramadol Other (See Comments)     Increased Heart Rate    Other reaction(s): Unknown  Other reaction(s): Unknown       Current Facility-Administered Medications on File Prior to Encounter   Medication Dose Route Frequency Provider Last Rate Last Admin    heparin, porcine (PF) 100 unit/mL injection flush 500 Units  500 Units Intravenous PRN Natalie Meyers MD        ondansetron (ZOFRAN) 16 mg in sodium chloride 0.9% 50 mL IVPB  16 mg Intravenous 1 time in Clinic/HOD Natalie Meyers MD        sodium chloride 0.9% 250 mL flush bag   Intravenous 1 time in Clinic/HOD Natalie Meyers MD        sodium chloride 0.9% flush 10 mL  10 mL Intravenous PRN Natalie Meyers MD         Current Outpatient Medications on File Prior to Encounter   Medication Sig Dispense Refill    apixaban (ELIQUIS) 2.5 mg Tab Take 1 tablet (2.5 mg total) by mouth 2 (two) times daily. 60 tablet 3    carvediloL (COREG) 3.125 MG tablet Take 1 tablet (3.125 mg total) by mouth 2 (two) times daily with meals. 180 tablet 2    colestipoL (COLESTID) 1 gram Tab TAKE 1 TABLET TWICE A DAY 60 tablet 0    DULoxetine (CYMBALTA) 60 MG capsule Take 60 mg by mouth.      famotidine (PEPCID) 40 MG tablet Take 40 mg by mouth.      HEMADY 20 mg Tab TAKE 1 TABLET BY MOUTH EVERY WEEK 12 tablet 0    hydroCHLOROthiazide (HYDRODIURIL) 12.5 MG Tab TAKE 1 TABLET(12.5 MG) BY MOUTH DAILY      lenalidomide (REVLIMID) 5 mg Cap TAKE 1 CAPSULE BY MOUTH EVERY OTHER DAY.. 14 each 0    letrozole (FEMARA) 2.5 mg Tab Take 1 tablet by mouth once daily.      levocetirizine (XYZAL) 5 MG tablet Take 1 tablet (5 mg total) by mouth every evening. 30 tablet 6    methadone (DOLOPHINE) 10 MG tablet Take 10 mg by mouth every 8 (eight) hours while awake.      methylPREDNISolone (MEDROL DOSEPACK) 4 mg tablet use as directed 21 each 0    multivitamin (THERAGRAN) per tablet Take 1 tablet by mouth once daily.      ofloxacin (OCUFLOX) 0.3 % ophthalmic solution Place 1 drop into both eyes 4 (four) times daily. 10 mL 0    omeprazole (PRILOSEC) 20 MG capsule TAKE 1 CAPSULE(20 MG) BY MOUTH EVERY DAY 30  capsule 11    ondansetron (ZOFRAN) 8 MG tablet Take 1 tablet (8 mg total) by mouth every 8 (eight) hours as needed for Nausea. 90 tablet 2    oxyCODONE (ROXICODONE) 10 mg Tab immediate release tablet Take 1 tablet (10 mg total) by mouth every 12 (twelve) hours as needed. 10 tablet 0    potassium chloride (KLOR-CON) 10 MEQ TbSR TAKE 1 TABLET BY MOUTH TWICE DAILY 180 tablet 3    rosuvastatin (CRESTOR) 10 MG tablet Take 10 mg by mouth Daily.      tretinoin (RETIN-A) 0.1 % cream Apply 1 application topically every evening.         Review of Systems   Constitutional:  Positive for activity change, fatigue and fever. Negative for appetite change, chills and unexpected weight change.   HENT:  Negative for mouth dryness, mouth sores, nosebleeds, sore throat and trouble swallowing.    Eyes:  Negative for visual disturbance.   Respiratory:  Positive for cough. Negative for shortness of breath.    Cardiovascular:  Positive for chest pain. Negative for palpitations and leg swelling.   Gastrointestinal:  Negative for abdominal distention, abdominal pain, blood in stool, change in bowel habit, constipation, diarrhea, nausea and vomiting.   Endocrine: Negative.    Genitourinary:  Negative for dysuria, frequency, hematuria and urgency.   Musculoskeletal:  Negative for arthralgias, back pain, myalgias and neck pain.   Integumentary:  Negative for rash.   Neurological:  Negative for dizziness, tremors, syncope, speech difficulty, weakness, light-headedness, numbness, headaches and memory loss.   Hematological:  Does not bruise/bleed easily.   Psychiatric/Behavioral:  Negative for confusion and suicidal ideas.          OBJECTIVE:     Vital Signs (Most Recent)  Temp: 98.1 °F (36.7 °C) (02/28/24 0742)  Pulse: (!) 51 (02/28/24 0742)  Resp: 18 (02/28/24 0742)  BP: (!) 175/85 (02/28/24 0742)  SpO2: 98 % (02/28/24 0742)    Pain Assessment: No pain reported at this time    Vital Signs Range (Last 24H):  Temp:  [97.7 °F (36.5 °C)-98.3 °F  "(36.8 °C)]   Pulse:  [50-83]   Resp:  [18-20]   BP: (160-176)/(77-91)   SpO2:  [95 %-100 %]     Physical Exam:  Physical Exam  Vitals and nursing note reviewed.   Constitutional:       General: She is awake. She is not in acute distress.     Appearance: She is ill-appearing (chronic).   HENT:      Head: Normocephalic and atraumatic.      Mouth/Throat:      Mouth: Mucous membranes are moist.   Eyes:      General: No scleral icterus.     Extraocular Movements: Extraocular movements intact.      Pupils: Pupils are equal, round, and reactive to light.   Neck:      Vascular: No JVD.   Cardiovascular:      Rate and Rhythm: Normal rate and regular rhythm.      Heart sounds: No murmur heard.  Pulmonary:      Breath sounds: Decreased breath sounds present.      Comments: Poor effort  Abdominal:      General: There is no distension.      Palpations: Abdomen is soft. There is no mass.      Tenderness: There is no abdominal tenderness.   Musculoskeletal:         General: No swelling or deformity.      Cervical back: Neck supple.      Right lower leg: Swelling present.      Left lower leg: Swelling present.   Skin:     General: Skin is warm.      Coloration: Skin is not jaundiced.   Neurological:      General: No focal deficit present.      Mental Status: She is alert and oriented to person, place, and time.      Cranial Nerves: Cranial nerves 2-12 are intact.   Psychiatric:         Behavior: Behavior is cooperative.         Laboratory:  CBC with Differential:  No results for input(s): "WBC", "NEUTROPCT", "LYMPH", "MONOPCT", "EOSPCT", "BASOPHIL", "RBC", "HCT", "HGB", "MCV", "MCH", "RDW", "PLT", "MPV" in the last 24 hours.    Invalid input(s): "MCMC"    CMP:  Recent Labs   Lab 02/28/24  0458   CALCIUM 8.7      K 4.2   CO2 20*   BUN 41.8*   CREATININE 2.13*       BMP:   Recent Labs   Lab 02/28/24  0458   CALCIUM 8.7      K 4.2   CO2 20*   BUN 41.8*   CREATININE 2.13*       LFTs:   No results for input(s): "ALT", "AST", " ""ALKPHOS", "BILITOT", "PROT", "ALBUMIN" in the last 24 hours.    Haptoglobin: No results for input(s): "HAPTOGLOBIN" in the last 24 hours.  Tumor Markers: No results for input(s): "PSA", "CEA", "", "AFPTM", "KM3409", "" in the last 24 hours.    Invalid input(s): "ALGTM"  Immunology: No results for input(s): "SPEP", "NADIA", "DENISE", "FREELAMBDALI" in the last 24 hours.  Coagulation: No results for input(s): "PT", "INR", "APTT" in the last 24 hours.  Specimen (24h ago, onward)      None          Microbiology Results (last 7 days)       Procedure Component Value Units Date/Time    Blood culture x two cultures. Draw prior to antibiotics. [[id number]]  (Normal) Collected: 02/25/24 1708    Order Status: Completed Specimen: Blood Updated: 02/27/24 2000     CULTURE, BLOOD ([facility]) No Growth At 48 Hours    Blood culture x two cultures. Draw prior to antibiotics. [[id number]]  (Normal) Collected: 02/25/24 1708    Order Status: Completed Specimen: Blood Updated: 02/27/24 2000     CULTURE, BLOOD ([facility]) No Growth At 48 Hours            Diagnostic Results:  Imaging Results              CT Head Without Contrast (Final result)  Result time 02/26/24 06:21:58      Final result by Glen Galindo MD (02/26/24 06:21:58)                   Impression:      No acute intracranial abnormality identified.  Findings of sinusitis.  Findings of chronic microvascular ischemic disease.      Electronically signed by: Glen Galindo  Date:    02/26/2024  Time:    06:21               Narrative:    EXAMINATION:  CT HEAD WITHOUT CONTRAST    CLINICAL HISTORY:  Mental status change, unknown cause;    TECHNIQUE:  Low dose axial images were obtained through the head.  Coronal and sagittal reformations were also performed. Contrast was not administered.    Automatic exposure control was utilized to reduce the patient's radiation dose.    DLP= 1006    COMPARISON:  10/06/2021    FINDINGS:  No acute intracranial hemorrhage, edema or mass. No acute " parenchymal abnormality.    Mild cerebral atrophy with concordant ventricular enlargement.    There is normal gray white differentiation.    The osseous structures are normal.    The mastoid air cells are clear.    Debris within the bilateral auditory canals.    The globes and orbital contents are normal bilaterally.    Mucosal thickening of the bilateral maxillary sinuses with air-fluid levels noted throughout the sinuses.                                       X-Ray Chest AP Portable (Final result)  Result time 02/26/24 06:44:25      Final result by Glen Galindo MD (02/26/24 06:44:25)                   Impression:      As above.      Electronically signed by: Glen Galindo  Date:    02/26/2024  Time:    06:44               Narrative:    EXAMINATION:  XR CHEST AP PORTABLE    CLINICAL HISTORY:  Sepsis;    TECHNIQUE:  Single view of the chest    COMPARISON:  01/23/2023    FINDINGS:  The cardiomediastinal silhouette is unchanged.  Trace subsegmental atelectatic change at the right base.  Recommend continued follow-up.                                          ASSESSMENT/PLAN:     Patient Active Problem List   Diagnosis    Amyloidosis    Anemia of chronic disease    Chronic low back pain    Chronic kidney disease    Gastroesophageal reflux disease    History of colostomy    Hypertension    Long term current use of opiate analgesic    Mixed anxiety depressive disorder    Mixed hyperlipidemia    Multiple nodules of lung    Overlapping malignant neoplasm of female breast    Paroxysmal atrial fibrillation    Multiple myeloma    Osteopenia    Aromatase inhibitor use    Sinus congestion    Acute bronchitis    Cellulitis of left lower extremity    Bacterial sinusitis    Recurrent productive cough    Pain and swelling of left lower leg    Acute onset sepsis     1.Encephalopathy, possibly metabolic  -  --CT brain neg  2. History of Multiple Myeloma, IgA Lambda,   --FISH with del 13p, normal karyotype, ISS stg II Dx 2015;    --S/p Autologous stem cell transplant 02/08/16-remission-->slight rise in free light chains 02/08/17  --followed by [facility]   3. Amyloidosis, Lambda light chain type,   --organ involvement with macroglossia and colon involvement.   --[place] red fat pad + Dx 2/2015  --status post ex lap for toxic megacolon with subtotal colectomy and end ileostomy 08/02/16  4.  Stage IA grade 3, IDC/DCIS of the left breast CA- 2/7/18 at [facility];   --ER+, HER2 BERNA +,    --s/p lumpectomy with SLNB 4/12,   --Grade 2 IDC measuring 4mm with second focus of microinvasive carcinoma measuring  0.4mm .IG DCIS, 0.3mm to posterior margin;   --2 SLN negative for malignancy   --On Femara  5. Neutropenic fever  -will check counts this morning  -Blood culture neg so far   6. Hypertension   7.  History of Paroxysmal atrial fibrillation  -currently rate and rhythm controlled    8. Acute on  CKD stage IIIA  -monitor renal indices closely   -avoid nephrotoxic medications     Plan  -Cont cefepime for now   -CBC today   -Cont Neutropenic precautions for now        Natalie Meyers MD  Hematology/Oncology  [facility]

## 2024-02-28 NOTE — CONSULTS
Inpatient Nutrition Evaluation    Admit Date: 2/25/2024   Total duration of encounter: 3 days    Nutrition Recommendation/Prescription     Regular (No fresh fruits and vegetables) diet ordered  Add chocolate Boost Plus TID (provides 360 kcal and 14 g protein per container)  Encouraged adequate PO intake  Monitor appetite/PO intake, weight, and labs    Nutrition Assessment     Chart Review    Reason Seen: physician consult for poor appetite    Malnutrition Screening Tool Results   Have you recently lost weight without trying?: No  Have you been eating poorly because of a decreased appetite?: No   MST Score: 0     Diagnosis:  Encephalopathy, possibly metabolic   2. History of multiple myeloma , not achieve remission  3. Hypertension   4.  History of atrial fibrillation  5. Overlapping malignant neoplasm breast, ER positive  6. Amyloidosis-with tongue and: Involvement  7. Acute on  CKD stage IIIA    Relevant Medical History:    Amyloidosis      Anemia      Anxiety and depression      Diplopia      Hyperlipidemia      Hypertension      Impaired mobility      Lytic lesion of bone on x-ray      Multiple myeloma      Neuropathy      Obesity, unspecified      Paroxysmal atrial fibrillation      Primary cancer of left female breast        Nutrition-Related Medications:   Scheduled Meds:   apixaban  2.5 mg Oral BID    carvediloL  3.125 mg Oral BID WM    ceFEPime IV (PEDS and ADULTS)  2 g Intravenous Q12H    dexAMETHasone  20 mg Oral Weekly    DULoxetine  60 mg Oral Daily    famotidine  40 mg Oral Daily    letrozole  2.5 mg Oral Daily    mupirocin   Nasal BID    triamterene-hydrochlorothiazide 37.5-25 mg  1 tablet Oral Daily     Continuous Infusions:   lactated ringers 50 mL/hr at 02/27/24 1251     PRN Meds:.acetaminophen, acetaminophen, haloperidol lactate, LORazepam, melatonin, ondansetron, sodium chloride 0.9%      Nutrition-Related Labs:  2/28/2024: Cl 108, BUN 41.8, Crea 2.13, Gluc 124    Diet Order: Diet Adult Regular No  "Fresh Fruit/Veg  Oral Supplement Order: none  Appetite/Oral Intake: poor/25-50% of meals  Factors Affecting Nutritional Intake: decreased appetite  Food/Restorationism/Cultural Preferences: none reported  Food Allergies: shellfish       Wound(s):   none noted    Comments    2024: Pt appears well nourished per NFPE. Pt reports poor appetite/PO intake~4 days. Pt reports a poor appetite/PO intake currently. Pt agreeable to chocolate Boost Plus TID. Pt denies N/V and chewing swallowing difficulties. Pt has colostomy. Last BM documented as 2024. The sister reports wt gain. Per EMR, pt weighed 77.6 kg on 2023. Encouraged adequate PO intake. Will monitor.    Anthropometrics    Height: 5' 5" (165.1 cm) Height Method: Estimated  Last Weight: 81.6 kg (180 lb) (24 0135) Weight Method: Bed Scale  BMI (Calculated): 30  BMI Classification: overweight (BMI 25-29.9)     Ideal Body Weight (IBW), Female: 125 lb     % Ideal Body Weight, Female (lb): 144 %                    Usual Body Weight (UBW), k.6 kg  % Usual Body Weight: 105.44     Usual Weight Provided By: EMR weight history    Wt Readings from Last 5 Encounters:   24 81.6 kg (180 lb)   24 80.9 kg (178 lb 6.4 oz)   23 82.3 kg (181 lb 8 oz)   23 81.3 kg (179 lb 4.8 oz)   10/03/23 80.3 kg (177 lb)     Weight Change(s) Since Admission:  Admit Weight: 81.6 kg (180 lb) (24 1625)  2024: 81.6 kg    Patient Education    Not applicable.    Monitoring & Evaluation     Dietitian will monitor food and beverage intake, weight, electrolyte/renal panel, glucose/endocrine profile, and gastrointestinal profile.  Nutrition Risk/Follow-Up: low (follow-up in 5-7 days)  Patients assigned 'low nutrition risk' status do not qualify for a full nutritional assessment but will be monitored and re-evaluated in a 5-7 day time period. Please consult if re-evaluation needed sooner.    "

## 2024-02-28 NOTE — PROGRESS NOTES
[facility] Hospital Medicine Progress Note        Chief Complaint: Inpatient Follow-up for metabolic encephalopathy/pneumonia    HPI:   Ninfa is a 58-year-old female with history of multiple myeloma, hypertension, HLD that presented to the ED via EMS with altered mental status.  Apparently was diagnosed with UTI last week and had completed the course of her antibiotics.  She continued to get more altered however over the last 24 hours with some left lower extremity edema.  He also spiking a temperature of 102° F per family.  Workup in the ED included a CT head with no acute intracranial findings.  Chest x-ray concerning for developing right lower lobe infiltrate.    Blood cultures were drawn in the ED were noted to be essentially negative thus far.  She has been initiated on broad-spectrum antibiotic coverage with vancomycin and cefepime.    MRSA PCR returned negative and vancomycin was then discontinued.    Interval Hx:   The patient was seen and examined.  Her mother is at bedside as always.  Patient is starting to feel much better.  Cough is improved.  Cultures remaining negative.  Currently remains on IV cefepime can possibly be switched to oral antibiotics in a.m. if she continues to do well and possible discharge in the next 24-48 hours.  Therapy services will be consulted     Case was discussed with patient's nurse and  on the floor.    Objective/physical exam:  General: In no acute distress, afebrile  Chest: Clear to auscultation bilaterally  Heart: RRR, +S1, S2, no appreciable murmur  Abdomen: Soft, nontender, BS +  MSK: Warm, no lower extremity edema, no clubbing or cyanosis  Neurologic: Alert and oriented x4, generalized weakness     VITAL SIGNS: 24 HRS MIN & MAX LAST   Temp  Min: 97 °F (36.1 °C)  Max: 98.4 °F (36.9 °C) 98.4 °F (36.9 °C)   BP  Min: 135/81  Max: 176/77 (!) 164/78   Pulse  Min: 50  Max: 87  (!) 51   Resp  Min: 17  Max: 18 17   SpO2  Min: 96 %   Max: 100 % 98 %     I have reviewed the following labs:  Recent Labs   Lab 02/25/24 1708 02/28/24  0913   WBC 1.47  1.47* 2.28  2.28*   RBC 3.45* 2.89*   HGB 9.7* 8.2*   HCT 32.3* 26.6*   MCV 93.6 92.0   MCH 28.1 28.4   MCHC 30.0* 30.8*   RDW 16.4 16.2    249   MPV 10.7* 10.7*     Recent Labs   Lab 02/25/24  1708 02/28/24  0458    142   K 3.7 4.2   CO2 22* 20*   BUN 23.9* 41.8*   CREATININE 2.16* 2.13*   CALCIUM 8.6 8.7   MG 2.00  --    ALBUMIN 2.6*  --    ALKPHOS 55  --    ALT 21  --    AST 32  --    BILITOT 0.7  --      Microbiology Results (last 7 days)       Procedure Component Value Units Date/Time    Blood culture x two cultures. Draw prior to antibiotics. [[id number]]  (Normal) Collected: 02/25/24 1708    Order Status: Completed Specimen: Blood Updated: 02/27/24 2000     CULTURE, BLOOD ([facility]) No Growth At 48 Hours    Blood culture x two cultures. Draw prior to antibiotics. [[id number]]  (Normal) Collected: 02/25/24 1708    Order Status: Completed Specimen: Blood Updated: 02/27/24 2000     CULTURE, BLOOD ([facility]) No Growth At 48 Hours             See below for Radiology    Scheduled Med:   apixaban  2.5 mg Oral BID    carvediloL  3.125 mg Oral BID WM    ceFEPime IV (PEDS and ADULTS)  2 g Intravenous Q12H    dexAMETHasone  20 mg Oral Weekly    DULoxetine  60 mg Oral Daily    famotidine  40 mg Oral Daily    letrozole  2.5 mg Oral Daily    mupirocin   Nasal BID    triamterene-hydrochlorothiazide 37.5-25 mg  1 tablet Oral Daily      Continuous Infusions:   lactated ringers 50 mL/hr at 02/27/24 1251      PRN Meds:  acetaminophen, acetaminophen, haloperidol lactate, LORazepam, melatonin, ondansetron, sodium chloride 0.9%     Assessment/Plan:  Encephalopathy, possibly metabolic   -blood cultures currently remaining negative   -continue with cefepime, vancomycin will be discontinued since MRSA PCR was negative   -questionable developing pneumonia in the right lower lobe versus atelectasis     2. History of  multiple myeloma , not achieve remission  -followed by [facility] and locally by Dr. Meyers who is following alongside   -patient also followed at [facility]  -neutropenic precautions     3. Hypertension   -home medications have been reviewed and will be resumed as appropriate   -blood pressure remaining on the higher side of normal   -decrease LR to 50 cc an hour   -continue with triamterene hydrochlorothiazide to her current medication regimen.   -Lasix 20 mg IV x1 today   -unable to tolerate hydralazine because severe anxiety     4.  History of atrial fibrillation  -currently rate and rhythm controlled      5. Overlapping malignant neoplasm breast, ER positive     6. Amyloidosis-with tongue and: Involvement  -status post ex lap for toxic megacolon     7. Acute on  CKD stage IIIA  -monitor renal indices closely   -avoid nephrotoxic medications     8. Physical deconditioning   -PT/OT to eval and treat     VTE prophylaxis:  Eliquis    Patient condition:  Stable    Anticipated discharge and Disposition:   Home possibly in a.m.      All diagnosis and differential diagnosis have been reviewed; assessment and plan has been documented; I have personally reviewed the labs and test results that are presently available; I have reviewed the patients medication list; I have reviewed the consulting providers response and recommendations. I have reviewed or attempted to review medical records based upon their availability    All of the patient's questions have been  addressed and answered. Patient's is agreeable to the above stated plan. I will continue to monitor closely and make adjustments to medical management as needed.  _____________________________________________________________________    Nutrition Status:    Radiology:  I have personally reviewed the following imaging and agree with the radiologist.     CV Ultrasound doppler venous DVT leg left    The left superficial femoral middle vein is normal.    There was no evidence  of deep or superficial vein thrombosis in the left   lower extremity.       Adry Rosario MD  Department of Hospital Medicine   [facility]   02/28/2024

## 2024-02-29 VITALS
TEMPERATURE: 97 F | DIASTOLIC BLOOD PRESSURE: 83 MMHG | SYSTOLIC BLOOD PRESSURE: 174 MMHG | HEIGHT: 65 IN | WEIGHT: 180 LBS | RESPIRATION RATE: 18 BRPM | HEART RATE: 53 BPM | OXYGEN SATURATION: 98 % | BODY MASS INDEX: 29.99 KG/M2

## 2024-02-29 DIAGNOSIS — C90.01 MULTIPLE MYELOMA IN REMISSION: ICD-10-CM

## 2024-02-29 PROCEDURE — 97166 OT EVAL MOD COMPLEX 45 MIN: CPT

## 2024-02-29 PROCEDURE — 63600175 PHARM REV CODE 636 W HCPCS: Performed by: EMERGENCY MEDICINE

## 2024-02-29 PROCEDURE — 25000003 PHARM REV CODE 250: Performed by: INTERNAL MEDICINE

## 2024-02-29 PROCEDURE — 63600175 PHARM REV CODE 636 W HCPCS: Performed by: INTERNAL MEDICINE

## 2024-02-29 PROCEDURE — 97162 PT EVAL MOD COMPLEX 30 MIN: CPT

## 2024-02-29 PROCEDURE — 99239 HOSP IP/OBS DSCHRG MGMT >30: CPT | Mod: ,,, | Performed by: INTERNAL MEDICINE

## 2024-02-29 PROCEDURE — 25000003 PHARM REV CODE 250: Performed by: EMERGENCY MEDICINE

## 2024-02-29 RX ORDER — TRIAMTERENE/HYDROCHLOROTHIAZID 37.5-25 MG
1 TABLET ORAL DAILY
Qty: 30 TABLET | Refills: 11 | Status: SHIPPED | OUTPATIENT
Start: 2024-03-01 | End: 2024-04-01 | Stop reason: CLARIF

## 2024-02-29 RX ORDER — CEFDINIR 300 MG/1
300 CAPSULE ORAL 2 TIMES DAILY
Qty: 14 CAPSULE | Refills: 0 | Status: SHIPPED | OUTPATIENT
Start: 2024-02-29 | End: 2024-03-07

## 2024-02-29 RX ADMIN — DULOXETINE HYDROCHLORIDE 60 MG: 30 CAPSULE, DELAYED RELEASE ORAL at 09:02

## 2024-02-29 RX ADMIN — FAMOTIDINE 40 MG: 20 TABLET ORAL at 09:02

## 2024-02-29 RX ADMIN — CARVEDILOL 3.12 MG: 3.12 TABLET, FILM COATED ORAL at 09:02

## 2024-02-29 RX ADMIN — SODIUM CHLORIDE, POTASSIUM CHLORIDE, SODIUM LACTATE AND CALCIUM CHLORIDE: 600; 310; 30; 20 INJECTION, SOLUTION INTRAVENOUS at 12:02

## 2024-02-29 RX ADMIN — TRIAMTERENE AND HYDROCHLOROTHIAZIDE 1 TABLET: 37.5; 25 TABLET ORAL at 09:02

## 2024-02-29 RX ADMIN — CEFEPIME 2 G: 2 INJECTION, POWDER, FOR SOLUTION INTRAVENOUS at 05:02

## 2024-02-29 RX ADMIN — LETROZOLE 2.5 MG: 2.5 TABLET ORAL at 09:02

## 2024-02-29 RX ADMIN — MUPIROCIN: 20 OINTMENT TOPICAL at 09:02

## 2024-02-29 RX ADMIN — APIXABAN 2.5 MG: 2.5 TABLET, FILM COATED ORAL at 09:02

## 2024-02-29 NOTE — PLAN OF CARE
Problem: Occupational Therapy  Goal: Occupational Therapy Goal  Description: Goals to be met by 3/28/2024:    Pt will complete grooming standing at sink with LRAD with set up A.  Pt will complete UB dressing with MIN A.  Pt will complete LB dressing with MOD A using LRAD and adaptive equipment if necessary.  Pt will complete toileting with MIN A using LRAD.  Pt will complete functional mobility to/from toilet and toilet transfer with CGA using LRAD.    Outcome: Ongoing, Progressing

## 2024-02-29 NOTE — PLAN OF CARE
Problem: Physical Therapy  Goal: Physical Therapy Goal  Description: Pt will be seen for the following goals  1. Pt will be sba with supine to sit and sit to supine  2. Pt will perform sit to stand with a rw sba  3. Pt will ambulate 50ft with a rw with cga/sba  Outcome: Ongoing, Progressing

## 2024-02-29 NOTE — PT/OT/SLP EVAL
Occupational Therapy  Evaluation    Name: Ninfa Candelario  MRN: [id number]  Admitting Diagnosis: AMS with PMH of UTI, multiple myeloma, hypertension, HLD  Recent Surgery: * No surgery found *      Recommendations:     Discharge therapy intensity: Moderate Intensity Therapy   Discharge Equipment Recommendations:     Barriers to discharge:  Other (Comment) (Ongoing medical needs; severity of deficits)    Assessment:     Ninfa Candelario is a 58 y.o. female with a medical diagnosis of AMS with PMH of UTI, multiple myeloma, hypertension, HLD.  She presents with the following performance deficits affecting function: weakness, impaired cognition, impaired endurance, decreased upper extremity function, impaired self care skills, impaired functional mobility, decreased safety awareness, gait instability, impaired balance, decreased ROM, decreased coordination.     Patient with decreased endurance and tolerance for bed mobility today. Required MOD-MAX A for bed mobility. Patient would benefit from moderate intensity therapy upon dc to improve functional abilities.     Rehab Prognosis: Good; patient would benefit from acute skilled OT services to address these deficits and reach maximum level of function.       Plan:     Patient to be seen 3 x/week to address the above listed problems via self-care/home management, therapeutic activities, therapeutic exercises, wheelchair management/training  Plan of Care Expires: 03/28/24  Plan of Care Reviewed with: patient, sibling    Subjective     Chief Complaint: tiredness  Patient/Family Comments/goals: to return home safely    Occupational Profile:  Living Environment: Pt lives at home in a [facility] with 0STE with sister.   Previous level of function: Patient's sister reported she was independent prior to admission and only required assistance to manage colostomy.   Roles and Routines: none stated  Equipment Used at Home: grab bar, wheelchair, walker, rolling, shower chair  Assistance upon  Discharge: Pt will have assistance from family upon dc.     Pain/Comfort:  Pain Rating 1: 0/10    Patients cultural, spiritual, Presybeterian conflicts given the current situation: no    Objective:     OT communicated with RN prior to session.      Patient was found supine with peripheral IV, telemetry, pulse ox (continuous), tobar catheter upon OT entry to room.    General Precautions: Standard, fall  Orthopedic Precautions: N/A  Braces: N/A    Vital Signs: Respiratory Status: on room air    Bed Mobility:    Patient completed Rolling/Turning to Left with  maximal assistance  Patient completed Rolling/Turning to Right with maximal assistance  Patient completed Scooting/Bridging with moderate assistance  Patient completed Supine to Sit with moderate assistance  Patient completed Sit to Supine with maximal assistance    Functional Mobility/Transfers:  Patient completed Sit <> Stand Transfer with moderate assistance  with  rolling walker   Functional Mobility: Patient took small lateral steps toward head of bed with assistance required to manage RW.     Activities of Daily Living:  Lower Body Dressing: total assistance to don/doff socks  Toileting: total assistance tobar catheter    Functional Cognition:  Orientation: oriented to Person and Place  Patient with slow responses to yes/no questions.     Visual Perceptual Skills:  Intact    Upper Extremity Function:  Right Upper Extremity:   Decreased ROM and strength of shoulder noted    Left Upper Extremity:  Decreased ROM and strength of shoulder noted    Balance:   Required CGA to remain standing with RW    Therapeutic Positioning  Risk for acquired pressure injuries is increased due to relative decrease in mobility d/t hospitalization .    OT interventions performed during the course of today's session:   Education was provided on benefits of and recommendations for therapeutic positioning    Skin assessment: all bony prominences were assessed    Findings: no redness or  breakdown noted    OT recommendations for therapeutic positioning throughout hospitalization:   Follow [facility] Pressure Injury Prevention Protocol      Patient Education:  Patient and sister  provided with verbal education education regarding OT role/goals/POC and Discharge/DME recommendations.  Understanding was verbalized, however additional teaching warranted.     Patient left supine with  sister present    GOALS:   Multidisciplinary Problems       Occupational Therapy Goals          Problem: Occupational Therapy    Goal Priority Disciplines Outcome Interventions   Occupational Therapy Goal     OT, PT/OT Ongoing, Progressing    Description: Goals to be met by 3/28/2024:    Pt will complete grooming standing at sink with LRAD with set up A.  Pt will complete UB dressing with MIN A.  Pt will complete LB dressing with MOD A using LRAD and adaptive equipment if necessary.  Pt will complete toileting with MIN A using LRAD.  Pt will complete functional mobility to/from toilet and toilet transfer with CGA using LRAD.                         History:     Past Medical History:   Diagnosis Date    Amyloidosis     Anemia     Anxiety and depression     Diplopia     Hyperlipidemia     Hypertension     Impaired mobility     Lytic lesion of bone on x-ray     Multiple myeloma     Neuropathy     Obesity, unspecified     Paroxysmal atrial fibrillation     Primary cancer of left female breast          Past Surgical History:   Procedure Laterality Date    BONE MARROW TRANSPLANT  02/08/2016    CARPAL TUNNEL RELEASE      COLONOSCOPY  12/11/2018    Dr. Eddie Jimenez    ENDOSCOPIC RELEASE OF TRIGGER FINGER      ESOPHAGEAL MOTILITY STUDY  2015    Excision Lipoma left elbow      Exploration Laparotomy  2016    FLEXIBLE SIGMOIDOSCOPY      MEDIPORT INSERTION, SINGLE  03/15/2016    PH Study 24 Hour  2015       Time Tracking:     OT Date of Treatment:    OT Start Time: 1123  OT Stop Time: 1147  OT Total Time (min): 24 min    Billable  Minutes:Evaluation moderate complexity    2/29/2024

## 2024-02-29 NOTE — DISCHARGE SUMMARY
[facility] Medicine Discharge Summary    Admit Date: 2/25/2024  Discharge Date and Time: 2/29/202411:37 AM  Admitting Physician: AG Team  Discharging Physician: Adry Rosario MD.  Primary Care Physician: Keyanna Primary Doctor  Consults: Hematology/Oncology    Discharge Diagnoses:  pneumonia    Hospital Course:   Ninfa is a 58-year-old female with history of multiple myeloma, hypertension, HLD that presented to the ED via EMS with altered mental status.  Apparently was diagnosed with UTI last week and had completed the course of her antibiotics.  She continued to get more altered however over the last 24 hours with some left lower extremity edema.  He also spiking a temperature of 102° F per family.  Workup in the ED included a CT head with no acute intracranial findings.  Chest x-ray concerning for developing right lower lobe infiltrate.    Blood cultures were drawn in the ED were noted to be essentially negative thus far.  She has been initiated on broad-spectrum antibiotic coverage with vancomycin and cefepime.    MRSA PCR returned negative and vancomycin was then discontinued.  Cultures will monitor for another 48 hours and have remained negative.  Considering her pneumonia however she will be discharged home on cefdinir to complete another 7 days.  Overall her other medical comorbidities have remained stable.  She can be set up for outpatient physical therapy to help with her overall deconditioning.  She will keep her follow-up appointments with Hematology/Oncology locally as well as with MD Rivera and is emphasized on the importance of not missing appointments     Pt was seen and examined on the day of discharge  Vitals:  VITAL SIGNS: 24 HRS MIN & MAX LAST   Temp  Min: 91 °F (32.8 °C)  Max: 98.1 °F (36.7 °C) 98 °F (36.7 °C)   BP  Min: 156/82  Max: 180/77 (!) 166/79   Pulse  Min: 51  Max: 61  (!) 54   Resp  Min: 18  Max: 18 18   SpO2  Min: 96 %  Max: 97 % 96 %        Physical Exam:  General: In no acute distress, afebrile  Chest: Clear to auscultation bilaterally  Heart: RRR, +S1, S2, no appreciable murmur  Abdomen: Soft, nontender, BS +  MSK: Warm, no lower extremity edema, no clubbing or cyanosis  Neurologic: Alert and oriented x4, generalized weakness     Procedures Performed: No admission procedures for hospital encounter.     Significant Diagnostic Studies: See Full reports for all details    Recent Labs   Lab 02/25/24 1708 02/28/24  0913   WBC 1.47  1.47* 2.28  2.28*   RBC 3.45* 2.89*   HGB 9.7* 8.2*   HCT 32.3* 26.6*   MCV 93.6 92.0   MCH 28.1 28.4   MCHC 30.0* 30.8*   RDW 16.4 16.2    249   MPV 10.7* 10.7*       Recent Labs   Lab 02/25/24 1708 02/28/24  0458    142   K 3.7 4.2   CO2 22* 20*   BUN 23.9* 41.8*   CREATININE 2.16* 2.13*   CALCIUM 8.6 8.7   MG 2.00  --    ALBUMIN 2.6*  --    ALKPHOS 55  --    ALT 21  --    AST 32  --    BILITOT 0.7  --         Microbiology Results (last 7 days)       Procedure Component Value Units Date/Time    Blood culture x two cultures. Draw prior to antibiotics. [[id number]]  (Normal) Collected: 02/25/24 1708    Order Status: Completed Specimen: Blood Updated: 02/28/24 2001     CULTURE, BLOOD ([facility]) No Growth At 72 Hours    Blood culture x two cultures. Draw prior to antibiotics. [[id number]]  (Normal) Collected: 02/25/24 1708    Order Status: Completed Specimen: Blood Updated: 02/28/24 2001     CULTURE, BLOOD ([facility]) No Growth At 72 Hours             CV Ultrasound doppler venous DVT leg left    The left superficial femoral middle vein is normal.    There was no evidence of deep or superficial vein thrombosis in the left   lower extremity.          Medication List        START taking these medications      cefdinir 300 MG capsule  Commonly known as: OMNICEF  Take 1 capsule (300 mg total) by mouth 2 (two) times daily. for 7 days     triamterene-hydrochlorothiazide 37.5-25 mg 37.5-25 mg per tablet  Commonly known as:  MAXZIDE-25  Take 1 tablet by mouth once daily.  Start taking on: March 1, 2024            CHANGE how you take these medications      HEMADY 20 mg Tab  Generic drug: dexAMETHasone  TAKE 1 TABLET BY MOUTH EVERY WEEK  What changed: See the new instructions.            CONTINUE taking these medications      apixaban 2.5 mg Tab  Commonly known as: ELIQUIS  Take 1 tablet (2.5 mg total) by mouth 2 (two) times daily.     carvediloL 3.125 MG tablet  Commonly known as: COREG  Take 1 tablet (3.125 mg total) by mouth 2 (two) times daily with meals.     colestipoL 1 gram Tab  Commonly known as: COLESTID  TAKE 1 TABLET TWICE A DAY     DULoxetine 60 MG capsule  Commonly known as: CYMBALTA     famotidine 40 MG tablet  Commonly known as: PEPCID     lenalidomide 5 mg Cap  Commonly known as: REVLIMID  TAKE 1 CAPSULE BY MOUTH EVERY OTHER DAY..     letrozole 2.5 mg Tab  Commonly known as: FEMARA     levocetirizine 5 MG tablet  Commonly known as: XYZAL  Take 1 tablet (5 mg total) by mouth every evening.     methadone 10 MG tablet  Commonly known as: DOLOPHINE     multivitamin per tablet  Commonly known as: THERAGRAN     omeprazole 20 MG capsule  Commonly known as: PRILOSEC  TAKE 1 CAPSULE(20 MG) BY MOUTH EVERY DAY     ondansetron 8 MG tablet  Commonly known as: ZOFRAN  Take 1 tablet (8 mg total) by mouth every 8 (eight) hours as needed for Nausea.     oxyCODONE 10 mg Tab immediate release tablet  Commonly known as: ROXICODONE  Take 1 tablet (10 mg total) by mouth every 12 (twelve) hours as needed.     potassium chloride 10 MEQ Tbsr  Commonly known as: KLOR-CON  TAKE 1 TABLET BY MOUTH TWICE DAILY     rosuvastatin 10 MG tablet  Commonly known as: CRESTOR     tretinoin 0.1 % cream  Commonly known as: RETIN-A            STOP taking these medications      hydroCHLOROthiazide 12.5 MG Tab  Commonly known as: HYDRODIURIL     methylPREDNISolone 4 mg tablet  Commonly known as: MEDROL DOSEPACK     ofloxacin 0.3 % ophthalmic solution  Commonly known  as: OCUFLOX               Where to Get Your Medications        These medications were sent to [id number] - [place], [place] - [address] [facility]  [address] [place] [address] [place] [address]      Hours: 24-hours Phone: [phone]   cefdinir 300 MG capsule  triamterene-hydrochlorothiazide 37.5-25 mg 37.5-25 mg per tablet       These medications were sent to [id number] - [place] - [address] [place] [address] AT [address] [facility] [address]  [address] [place] [address] [id number]      Phone: [phone]   HEMADY 20 mg Tab          Explained in detail to the patient about the discharge plan, medications, and follow-up visits. Pt understands and agrees with the treatment plan  Discharge Disposition: home with out patient Therapies   Discharged Condition: stable  Diet- regular   Dietary Orders (From admission, onward)       Start     Ordered    02/28/24 1349  Dietary nutrition supplements Boost Plus Chocolate; All Meals  Continuous        Question Answer Comment   Select PO Supplement: Boost Plus Chocolate    Frequency: All Meals        02/28/24 1348    02/26/24 1227  Diet Adult Regular No Fresh Fruit/Veg  Diet effective now        Question:  Diet Modifier:  Answer:  No Fresh Fruit/Veg    02/26/24 1226                   Medications Per DC med rec  Activities as tolerated    For further questions contact Dr Rosario's office    Discharge time 33 minutes    For worsening symptoms, chest pain, shortness of breath, increased abdominal pain, high grade fever, stroke or stroke like symptoms, immediately go to the nearest Emergency Room or call 911 as soon as possible.      Adry Anguiano M.D on 2/29/2024. at 11:37 AM.

## 2024-02-29 NOTE — PT/OT/SLP EVAL
Physical Therapy Evaluation    Patient Name:  Ninfa Candelario   MRN:  [id number]    Recommendations:     Discharge therapy intensity:     Discharge Equipment Recommendations: none   Barriers to discharge: Impaired mobility    Assessment:     Ninfa Candelario is a 58 y.o. female admitted with a medical diagnosis of AMS.  She presents with the following impairments/functional limitations: weakness, impaired endurance, impaired self care skills, impaired functional mobility, gait instability, impaired balance, impaired cognition, decreased safety awareness, decreased coordination . Pt requires moda for most activities and her endurance and strength are low.    Rehab Prognosis: Good; patient would benefit from acute skilled PT services to address these deficits and reach maximum level of function.    Recent Surgery: * No surgery found *      Plan:     During this hospitalization, patient to be seen 5 x/week to address the identified rehab impairments via gait training, therapeutic activities, therapeutic exercises and progress toward the following goals:    Plan of Care Expires:  03/21/24    Subjective     Chief Complaint: feeling weak  Patient/Family Comments/goals:   Pain/Comfort:       Patients cultural, spiritual, [employer]ian conflicts given the current situation:      Living Environment:  Pt livews with her mom in a house with no steps  Prior to admission, patients level of function was ind to mod ind.  Equipment used at home: wheelchair, shower chair, rollator.  DME owned (not currently used):.  Upon discharge, patient will have assistance from mom.    Objective:     Communicated with nurse prior to session.  Patient found supine with tobar catheter, peripheral IV, oxygen, pulse ox (continuous), telemetry  upon PT entry to room.    General Precautions: Standard, fall  Orthopedic Precautions:N/A   Braces: N/A  Respiratory Status: Room air  Blood Pressure:       Exams:  Cognitive Exam:  Patient is oriented to  Person  Skin integrity: Visible skin intact  Pt had 3/5 BLE strength      Functional Mobility:  Bed Mobility:     Supine to Sit: moderate assistance  Sit to Supine: moderate assistance  Transfers:     Sit to Stand:  moderate assistance with rolling walker  Bed to Chair: moderate assistance with  rolling walker  using  Step Transfer  Gait: pt was only able to take 2 steps with rw cga before she needed to sit      AM-PAC 6 CLICK MOBILITY  Total Score:        Treatment & Education:      Patient provided with verbal education education regarding PT role/goals/POC, fall prevention, and safety awareness.  Understanding was verbalized, however additional teaching warranted.     Patient left supine with all lines intact and call button in reach.    GOALS:   Multidisciplinary Problems       Physical Therapy Goals          Problem: Physical Therapy    Goal Priority Disciplines Outcome Goal Variances Interventions   Physical Therapy Goal     PT, PT/OT Ongoing, Progressing     Description: Pt will be seen for the following goals  1. Pt will be sba with supine to sit and sit to supine  2. Pt will perform sit to stand with a rw sba  3. Pt will ambulate 50ft with a rw with cga/sba                       History:     Past Medical History:   Diagnosis Date    Amyloidosis     Anemia     Anxiety and depression     Diplopia     Hyperlipidemia     Hypertension     Impaired mobility     Lytic lesion of bone on x-ray     Multiple myeloma     Neuropathy     Obesity, unspecified     Paroxysmal atrial fibrillation     Primary cancer of left female breast        Past Surgical History:   Procedure Laterality Date    BONE MARROW TRANSPLANT  02/08/2016    CARPAL TUNNEL RELEASE      COLONOSCOPY  12/11/2018    Dr. Eddie Jimenez    ENDOSCOPIC RELEASE OF TRIGGER FINGER      ESOPHAGEAL MOTILITY STUDY  2015    Excision Lipoma left elbow      Exploration Laparotomy  2016    FLEXIBLE SIGMOIDOSCOPY      MEDIPORT INSERTION, SINGLE  03/15/2016    PH Study  24 Hour  2015       Time Tracking:     PT Received On:    PT Start Time: 1015     PT Stop Time: 1042  PT Total Time (min): 27 min     Billable Minutes: Evaluation 27 02/29/2024

## 2024-03-01 ENCOUNTER — PATIENT OUTREACH (OUTPATIENT)
Dept: ADMINISTRATIVE | Facility: CLINIC | Age: 59
End: 2024-03-01
Payer: MEDICARE

## 2024-03-01 ENCOUNTER — TELEPHONE (OUTPATIENT)
Dept: HEMATOLOGY/ONCOLOGY | Facility: CLINIC | Age: 59
End: 2024-03-01
Payer: MEDICARE

## 2024-03-01 LAB
BACTERIA BLD CULT: NORMAL
BACTERIA BLD CULT: NORMAL

## 2024-03-01 RX ORDER — LENALIDOMIDE 5 MG/1
CAPSULE ORAL
Qty: 14 EACH | Refills: 0 | Status: ON HOLD | OUTPATIENT
Start: 2024-03-01 | End: 2024-03-28

## 2024-03-01 NOTE — TELEPHONE ENCOUNTER
Patient's sister, Stephanie, stating that patient is very weak. She is not eating or drinking and asking if she can have an appetite stimulant. No N+V. No fever. Patient is just stating that she isn't hungry. Has a f/u appointment here next Wednesday. Please advise.

## 2024-03-01 NOTE — PROGRESS NOTES
C3 nurse attempted to contact Ninfa Andree  for a TCC post hospital discharge follow up call. No answer. Left voicemail with callback information. The patient has a scheduled HOSFU appointment Natalie Kennedy MD (Hematology-Oncology) on Wednesday Mar 6, 2024 @ 3:00 PM

## 2024-03-04 NOTE — PROGRESS NOTES
C3 nurse spoke with Ninfa Candelario  for a TCC post hospital discharge follow up call. The patient has a scheduled HOSFU appointment with Natalie Kennedy MD (Hematology-Oncology) on Wednesday Mar 6, 2024 @ 3:00 PM

## 2024-03-06 ENCOUNTER — LAB VISIT (OUTPATIENT)
Dept: LAB | Facility: HOSPITAL | Age: 59
End: 2024-03-06
Attending: INTERNAL MEDICINE
Payer: MEDICARE

## 2024-03-06 ENCOUNTER — OFFICE VISIT (OUTPATIENT)
Dept: HEMATOLOGY/ONCOLOGY | Facility: CLINIC | Age: 59
End: 2024-03-06
Payer: MEDICARE

## 2024-03-06 ENCOUNTER — INFUSION (OUTPATIENT)
Dept: INFUSION THERAPY | Facility: HOSPITAL | Age: 59
End: 2024-03-06
Attending: INTERNAL MEDICINE
Payer: MEDICARE

## 2024-03-06 VITALS
OXYGEN SATURATION: 98 % | BODY MASS INDEX: 29.99 KG/M2 | TEMPERATURE: 98 F | WEIGHT: 180 LBS | HEIGHT: 65 IN | RESPIRATION RATE: 18 BRPM | HEART RATE: 95 BPM | SYSTOLIC BLOOD PRESSURE: 112 MMHG | DIASTOLIC BLOOD PRESSURE: 78 MMHG

## 2024-03-06 DIAGNOSIS — L03.116 CELLULITIS OF LEFT LOWER EXTREMITY: ICD-10-CM

## 2024-03-06 DIAGNOSIS — C90.00 MULTIPLE MYELOMA, REMISSION STATUS UNSPECIFIED: Primary | ICD-10-CM

## 2024-03-06 DIAGNOSIS — E85.9 AMYLOIDOSIS, UNSPECIFIED TYPE: Primary | ICD-10-CM

## 2024-03-06 DIAGNOSIS — E85.9 AMYLOIDOSIS, UNSPECIFIED TYPE: ICD-10-CM

## 2024-03-06 DIAGNOSIS — C90.00 MULTIPLE MYELOMA NOT HAVING ACHIEVED REMISSION: ICD-10-CM

## 2024-03-06 LAB
ALBUMIN SERPL-MCNC: 3.2 G/DL (ref 3.4–4.8)
ALBUMIN/GLOB SERPL: 1 RATIO (ref 1.1–2)
ALP SERPL-CCNC: 84 UNIT/L (ref 40–150)
ALT SERPL-CCNC: 16 UNIT/L (ref 0–55)
AST SERPL-CCNC: 18 UNIT/L (ref 5–34)
BASOPHILS # BLD AUTO: 0.02 X10(3)/MCL
BASOPHILS NFR BLD AUTO: 0.2 %
BILIRUB SERPL-MCNC: 0.3 MG/DL
BUN SERPL-MCNC: 35.8 MG/DL (ref 9.8–20.1)
CALCIUM SERPL-MCNC: 9.3 MG/DL (ref 8.4–10.2)
CHLORIDE SERPL-SCNC: 104 MMOL/L (ref 98–107)
CO2 SERPL-SCNC: 21 MMOL/L (ref 23–31)
CREAT SERPL-MCNC: 2.2 MG/DL (ref 0.55–1.02)
EOSINOPHIL # BLD AUTO: 0.05 X10(3)/MCL (ref 0–0.9)
EOSINOPHIL NFR BLD AUTO: 0.6 %
ERYTHROCYTE [DISTWIDTH] IN BLOOD BY AUTOMATED COUNT: 15.9 % (ref 11.5–17)
GFR SERPLBLD CREATININE-BSD FMLA CKD-EPI: 25 MLS/MIN/1.73/M2
GLOBULIN SER-MCNC: 3.1 GM/DL (ref 2.4–3.5)
GLUCOSE SERPL-MCNC: 86 MG/DL (ref 82–115)
HCT VFR BLD AUTO: 34.9 % (ref 37–47)
HGB BLD-MCNC: 10.9 G/DL (ref 12–16)
IGA SERPL-MCNC: 60 MG/DL (ref 69–517)
IGG SERPL-MCNC: 555 MG/DL (ref 522–1631)
IGM SERPL-MCNC: 11 MG/DL (ref 33–293)
IMM GRANULOCYTES # BLD AUTO: 0.05 X10(3)/MCL (ref 0–0.04)
IMM GRANULOCYTES NFR BLD AUTO: 0.6 %
LYMPHOCYTES # BLD AUTO: 1.37 X10(3)/MCL (ref 0.6–4.6)
LYMPHOCYTES NFR BLD AUTO: 16.1 %
MCH RBC QN AUTO: 28.1 PG (ref 27–31)
MCHC RBC AUTO-ENTMCNC: 31.2 G/DL (ref 33–36)
MCV RBC AUTO: 89.9 FL (ref 80–94)
MONOCYTES # BLD AUTO: 0.61 X10(3)/MCL (ref 0.1–1.3)
MONOCYTES NFR BLD AUTO: 7.2 %
NEUTROPHILS # BLD AUTO: 6.39 X10(3)/MCL (ref 2.1–9.2)
NEUTROPHILS NFR BLD AUTO: 75.3 %
PLATELET # BLD AUTO: 294 X10(3)/MCL (ref 130–400)
PMV BLD AUTO: 10.1 FL (ref 7.4–10.4)
POTASSIUM SERPL-SCNC: 3.3 MMOL/L (ref 3.5–5.1)
PROT SERPL-MCNC: 6.3 GM/DL (ref 5.8–7.6)
RBC # BLD AUTO: 3.88 X10(6)/MCL (ref 4.2–5.4)
SODIUM SERPL-SCNC: 139 MMOL/L (ref 136–145)
WBC # SPEC AUTO: 8.49 X10(3)/MCL (ref 4.5–11.5)

## 2024-03-06 PROCEDURE — 83521 IG LIGHT CHAINS FREE EACH: CPT

## 2024-03-06 PROCEDURE — 84165 PROTEIN E-PHORESIS SERUM: CPT

## 2024-03-06 PROCEDURE — 82784 ASSAY IGA/IGD/IGG/IGM EACH: CPT

## 2024-03-06 PROCEDURE — 99215 OFFICE O/P EST HI 40 MIN: CPT | Mod: PBBFAC,25 | Performed by: INTERNAL MEDICINE

## 2024-03-06 PROCEDURE — 99215 OFFICE O/P EST HI 40 MIN: CPT | Mod: S$PBB,,, | Performed by: INTERNAL MEDICINE

## 2024-03-06 PROCEDURE — 99999 PR PBB SHADOW E&M-EST. PATIENT-LVL V: CPT | Mod: PBBFAC,,, | Performed by: INTERNAL MEDICINE

## 2024-03-06 PROCEDURE — 85025 COMPLETE CBC W/AUTO DIFF WBC: CPT

## 2024-03-06 PROCEDURE — 36415 COLL VENOUS BLD VENIPUNCTURE: CPT

## 2024-03-06 PROCEDURE — 63600175 PHARM REV CODE 636 W HCPCS: Mod: JZ,JG | Performed by: INTERNAL MEDICINE

## 2024-03-06 PROCEDURE — 25000003 PHARM REV CODE 250: Performed by: INTERNAL MEDICINE

## 2024-03-06 PROCEDURE — 80053 COMPREHEN METABOLIC PANEL: CPT

## 2024-03-06 PROCEDURE — 96401 CHEMO ANTI-NEOPL SQ/IM: CPT

## 2024-03-06 RX ORDER — DIPHENHYDRAMINE HCL 25 MG
25 CAPSULE ORAL
Status: CANCELLED | OUTPATIENT
Start: 2024-03-06

## 2024-03-06 RX ORDER — EPINEPHRINE 0.3 MG/.3ML
0.3 INJECTION SUBCUTANEOUS ONCE AS NEEDED
Status: CANCELLED | OUTPATIENT
Start: 2024-03-06

## 2024-03-06 RX ORDER — DIPHENHYDRAMINE HYDROCHLORIDE 50 MG/ML
50 INJECTION INTRAMUSCULAR; INTRAVENOUS ONCE AS NEEDED
Status: DISCONTINUED | OUTPATIENT
Start: 2024-03-06 | End: 2024-03-06 | Stop reason: HOSPADM

## 2024-03-06 RX ORDER — SODIUM CHLORIDE 0.9 % (FLUSH) 0.9 %
10 SYRINGE (ML) INJECTION
Status: DISCONTINUED | OUTPATIENT
Start: 2024-03-06 | End: 2024-03-06 | Stop reason: HOSPADM

## 2024-03-06 RX ORDER — DIPHENHYDRAMINE HYDROCHLORIDE 50 MG/ML
50 INJECTION INTRAMUSCULAR; INTRAVENOUS ONCE AS NEEDED
Status: CANCELLED | OUTPATIENT
Start: 2024-03-06

## 2024-03-06 RX ORDER — SODIUM CHLORIDE 0.9 % (FLUSH) 0.9 %
10 SYRINGE (ML) INJECTION
Status: CANCELLED | OUTPATIENT
Start: 2024-03-06

## 2024-03-06 RX ORDER — ACETAMINOPHEN 325 MG/1
650 TABLET ORAL
Status: CANCELLED | OUTPATIENT
Start: 2024-03-06

## 2024-03-06 RX ORDER — EPINEPHRINE 0.3 MG/.3ML
0.3 INJECTION SUBCUTANEOUS ONCE AS NEEDED
Status: DISCONTINUED | OUTPATIENT
Start: 2024-03-06 | End: 2024-03-06 | Stop reason: HOSPADM

## 2024-03-06 RX ORDER — HEPARIN 100 UNIT/ML
500 SYRINGE INTRAVENOUS
Status: DISCONTINUED | OUTPATIENT
Start: 2024-03-06 | End: 2024-03-06 | Stop reason: HOSPADM

## 2024-03-06 RX ORDER — HEPARIN 100 UNIT/ML
500 SYRINGE INTRAVENOUS
Status: CANCELLED | OUTPATIENT
Start: 2024-03-06

## 2024-03-06 RX ORDER — DIPHENHYDRAMINE HCL 25 MG
25 CAPSULE ORAL
Status: COMPLETED | OUTPATIENT
Start: 2024-03-06 | End: 2024-03-06

## 2024-03-06 RX ORDER — ACETAMINOPHEN 325 MG/1
650 TABLET ORAL
Status: COMPLETED | OUTPATIENT
Start: 2024-03-06 | End: 2024-03-06

## 2024-03-06 RX ORDER — ONDANSETRON 8 MG/1
8 TABLET, ORALLY DISINTEGRATING ORAL EVERY 6 HOURS PRN
Qty: 30 TABLET | Refills: 3 | Status: SHIPPED | OUTPATIENT
Start: 2024-03-06 | End: 2024-04-01 | Stop reason: CLARIF

## 2024-03-06 RX ADMIN — ACETAMINOPHEN 650 MG: 325 TABLET ORAL at 04:03

## 2024-03-06 RX ADMIN — DARATUMUMAB AND HYALURONIDASE-FIHJ (HUMAN RECOMBINANT) 1800 MG: 1800; 30000 INJECTION SUBCUTANEOUS at 04:03

## 2024-03-06 RX ADMIN — DIPHENHYDRAMINE HYDROCHLORIDE 25 MG: 25 CAPSULE ORAL at 04:03

## 2024-03-06 NOTE — PROGRESS NOTES
HEMATOLOGY/ONCOLOGY OFFICE CLINIC VISIT    Visit Information:    No show/Reschedules/Cancellations  08/21/2018--> Rescheduled              07/20/2023-->canceled visit/labs/infusion  11/05/2019--> No Show/Reschedule 08/01/2023-->canceled visit/labs/infusion  06/24/2020--> No Show/Reschedule 08/31/2023-->canceled visit/labs/infusion  09/24/2020--> No Show/Reschedule 09/21/2023-->canceled visit/labs/infusion  03/15/2021--> No Show/Reschedule 10/24/2023-->canceled visit/labs/infusion  03/23/2021--> No Show/Reschedule 10/25/2023-->canceled visit/labs/infusion  04/13/2021--> No Show   10/26/2023-->canceled visit/labs/infusion  05/27/2021--> No Show   10/31/2023-->canceled visit/labs/infusion   08/19/2021--> No Show   11/01/2023-->canceled visit/labs/infusion  08/26/2021--> No Show/Rescheduled 11/29/2023-->canceled visit/labs/infusion  04/26/2022--> No Show   12/04/2023-->canceled visit/labs/infusion  10/03/2022--> Rescheduled  12/07/2023-->canceled visit/labs/infusion  10/11/2022--> Rescheduled  01/11/2024-->canceled visit/labs/infusion  03/22/2023--> No Show/Rescheduled  4/26/2023->Rescheduled  05/09/2023->Rescheduled  5/30/2023->Rescheduled  6/15/2023->Rescheduled      Referring Physician: Dr Farr  PCP: DR. Cardona  GI:   Rheumatologist: Dr. Luis Rea  Immunologist: Dr. Daniel Nava  ENT: Dr. Brice Williamson  [facility] Pain management: Dr.Chai [facility] Transplant: Dr. Adrian Rivera Med Onc: Dr. Zulema Rivera Breast Surg Onc: Dr.DeSnyder LONG [place]: Dr. 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   8) Stage IA grade 3, ER+, HER2 BERNA +,  IDC/DCIS of the left breast --- 2/7/18 at [facility]   --s/p lumpectomy with SLNB 4/12, Grade 2 IDC 4mm with second focus of microinvasive carcinoma 0.4mm. 2 SLN negative for malignancy    9) Progressive swelling of R lower extremity--- US NIVA done 2/19/18 negative for evidence of DVT  10). Paroxysmal Afib (2/18/18) diagnosed at [facility]; ECHO noted EF 58% - on Eliquis  11) Mild CKD with GFR 55 - managed per [facility] nephrology  12) Treatment induced neutropenia     Present treatment:  -Maintenance Revlimid 5mg PO QOD, started 02/16/17-- was held for a few months while undergoing treatment for her breast cancer 02/18-05/18; Restarted 6/15/18   Dexamethasone 20 mg po weekly added early July 2017--> reduced to 12 mg PO weekly October 4, 2017---> increased to 16mg PO weekly 2/15/18---restarted 6/15/18 --> 20 mg weekly 7//8/2022  Darzalex 7/8/2022-present  Femara 2.5 mg PO daily   Eliquis 2.5mg PO BID     Treatment/Oncology history:  1) CyBorD x5 cycles 09/28/15-12/24/15  2) Autologous stem cell transplant 02/08/16, done at [facility]  3) Exploratory laparotomy with subtotal colectomy and end ileostomy done August 2, 2016--pathology specimen showed advanced colonic amyloidosis extensive involving the muscular wall submucosa and mucosa.  Appendix also involvement by amyloidosis with fibrous obliteration of the distal lumen.  4) Maintenance therapy with Revlimid 5mg-started 06/01/16--Discontinued shortly after 2/2 cytopenias  5) Left breast lumpectomy with SLNB at [facility] 4/12/18  6) Left partial breast RT x10 fractions  5/21/18-6/4/1      Imaging:  NIVA 7/29/2021: Negative for deep venous thrombosis in the left lower extremity.   MMG 3/21/2022: BI-RADS Category 2: Benign Finding(s)  MRI CTL spine 9/14/2022: No acute myelomatous findings.  Severe spinal stenosis process detected within the upper cervical spine as well as the entirety of the lumbar spine segments similar to the prior imaging assessment.    CT C 1/24/2023: Right greater than left lower lobe opacification consistent with infectious process.  Lucent lesions throughout the osseous structures similar to 2015.    Pathology:    CLINICAL HISTORY:       Patient: Ninfa Candelario is a 58 y.o. female.  Ms. Cabrera Joseph was admitted on 08/16/15 for ileus versus partial SBO. CT A/P done 08/17/15 showed sigmoid colitis and a zone of transition at the junction of the descending and sigmoid colon which could indicate some degree of obstruction and an obstructing mass cannot be excluded. Numerous skeletal lytic lesions noted. CT chest done 08/19/15 showed Multiple skeletal lytic lesions involving the thoracic spine, left rib sternum consistent with either metastases or multiple myeloma. There is no evidence of pulmonary or mediastinal mass. Dr. Farr was consulted for possible MM/anemia.     Nuclear Bone scan done 8/20/15 showed mild to moderate increased activity in left rib and right second rib which correlates with fractures seen on CT.  Skeletal survey done 8/20/15showed lytic lesions in the calvarium and probably a lytic lesion in the left scapula. Lytic areas in the spine and pelvis seen on recent CT are not well defined on skeletal survey. Work up labs done 08/20/15: Negative for sickle cell and Thalessemia (reported history of thalessemia). Iron 54, Transferrin 118.0, Iron sat 34.6%, Folate 26.5, Vit B12 1,779  Peripheral smear resulted slightly macrocytic normochromic anemia without signifcant anisocytosis may reflect early B12/folate deficiency or marrow dysfunction, among others. No immature myeloid cells or blasts. Platlets adequate. Beta 2 mircoglobulin = 3.2.  SPEP/NADAI: M-spike in the beta region. The monoclonal protein peak accounts for 1.13 g/dL. Hypogammaglobulinemia. NADIA pattern shows  the presence of a free lambda light chain monoclonal protein with additional faint band in IgA.  All immunoglobulin levels decreased. IgA=26, IgG=307, IgM<5.  Kappa Qnt 0.16, Lambda Qnt 4600.00, Ratio <0.01.  24hr Urine for Bence Mendez: Kappa 2.75, Lambda 1250.00, Ratio <0.01. NADIA: Urine is POSITIVE for monoclonal Free Lambda Light chains     Patient then opted to go to [facility] where she underwent a bone marrow biopsy on 09/18/15. BMBx showed 80% plasma cells, 13p deletion and normal karyotype. She underwent fat pad biopsy which came back positive for Congo red consistent with amyloidosis. Cardiac workup revealed no amyloid deposition within the heart.  PET/CT and skeletal survey done September 18, 2015 showed multiple lytic osseous lesions  The patient was started on Velcade while at [facility] with the plan to transition to autologous stem cell transplantation. They asked if we could give her could continue treatment here.   She completed CyborD x5 cycles on 12/24/15     Patient admitted 01/06/16 for colitis and C. Diff. Pt treated with Flagyl. Discharged home 01/08/16     Repeat BMBx done at [facility] 01/2016 did not show any morphologic or immnophenotypic evidence of plasama cell neoplasm. Patient underwent Autologous stem cell transplant with Busulfan and melphalan on 02/08/16 at [facility]. The only complication was recurrent C.diff infection for which she completed 10 days of Fidaxomycin.     Follow-up bone marrow biopsy done at [facility] done 5/16/16 showed cellular 30-40% bone marrow with trilineage hematopoiesis. No morphologic or immunophenotypic support for plasma cell neoplasm. Started maintenance Revlimid 5mg. Unable to continue therapy due to cytopenias.     On 08/02/16 patient underwent  Exploratory laparotomy with subtotal colectomy and end ileostomy for what was thought to be toxic megacolon. Pathology showed extensive advanced colonic amyloidosis involving the muscular wall, submucosa and mucosa: With the appendix  also involved with amyloidosis.  Positive lambda light chains were noted.     Follow-up at [facility] 8/31/16, Per Dr. Adrian Naylor, 6 months post autologous transplant. She has engrafted. Based on the latest restaging she is near complete remission with possible IgA lambda on serum immunofixation. Patient was instructed to discontinue prophylactic antibiotics. She received her 1st series of immunizations while at [facility]. Patient had a bilateral venous ultrasound to rule out DVT, negative B/L. In regards to amyloidosis the patient feels that her tongue is smaller in size and her BNP has come down some.  Patient had a follow-up appointment at [facility] November 30, 2016.  Dr. Parnell documented the patient's free lambda light chain remains at a lower level.  Therefore in light of her recent surgery they will continue with observation only.  The plan to see the patient back in 2-3 months with repeat restaging labs.  They recommended regular CBC checks to monitor anemia.  Patient returned to [facility] in December 2016 to meet with dermatology for numerous papillary lesions on eyelids, chest and posterior neck consistent with amyloid deposits. Recommendations were for watchful waiting.  Patient is less than 1 year out from bone marrow transplant and further improvement can be expected.  She will see the patient back in 1 year to discuss possible surgical resection of the plaques especially around the eyelid region.  Rogaine recommended for persistent hair loss. Retin-A recommended for acne vulgaris.  Patient returned to [facility] on 2/8/2017 for neurosurgery consult for chronic low back pain and difficulty walking.  Imaging showed extensive DJD with discovertebral bulges and thickening of the spinal laminar tissues creating spinal stenosis throughout, but greatest at L2/L3.  Neurosurgery felt that surgery risks outweighed the benefits.  Patient was prescribed pain medicine and encouraged to  participate in physical therapy.  Patient also met with Dr. Parnell on 02/08/17, who noted an elevation in free light chains (kappa 52.60/lambda 27.77/ratio 1.89).  Recommendation is to resume maintenance therapy with Revlimid at a low dose of 5 mg every other day.  Patient went back to [facility] first week of April 2017.  I do not have these notes.  Reportedly she was told to continue on every other day Revlimid at 5 mg.  She reportedly had repeat myeloma labs done as well.  Again I do not have these results.  Patient went back to [facility] and saw Dr. Parnell June 29, 2017.  Myeloma labs were done with serum protein electrophoresis showing irregularity in the fast gamma region.  IgG of 1291.  Free kappa light chains of 84.6 with lambda light chains of 66.9.  Beta-2 microglobulin of 4.5  CT scan of lumbar spine showed multiple lytic lesions once again in the lumbar spine and bony pelvis.  Ultrasound of the left leg showed no evidence of DVT.  It was recommended based on the slight increase in her And lambda light chains to start weekly dexamethasone 20 mg p.o. weekly.  Follow-up visit and labs at [facility] on September 29, 2017 with Dr. Parnell.  Repeat beta-2 microglobulin came back at 2.4.  Serum IgG of 761, IgA 117 and IgM of 29.  Serum free kappa light chains of 24.9 and lambda of 23.5.  Counts were stable with hemoglobin of 11.1, WBC 4.9 and platelets of 210,000.  Recommendations were for continued Revlimid every other day with weekly dexamethasone along with aspirin for DVT prophylaxis.  Bilateral diagnostic MMG 12/19/17  noted 1.6cm coarse heterogeneous calcifications in posterior region of L breast at 3 o'clock position. Patient was scheduled for FNA which confirmed ID grade 3, single focus measuring 1.7cm with 2nd focus microinvasion DCIS grade 2 measuring 0.3cm. Left axillary LN biopsy showed no evidence of metastatic carcinoma. Complete staging: Stage IA, grade 3 ER+, Her 2 Niki + IDC/DCIS of left breast.  Ki-67 <17%.  Repeat myeloma labs showed rise in free lambda light chains noted at 68.69, kappa light chains at 27.22,  beta 2 microglobulin came back at 2.9. Serum protein electrophoresis showed no definitive evidence of an M protein peak. The pattern is consistent with an acute inflammatory reaction. Recommendations were made to maintain Revlimid at current dose of 5mg every other day to be taken continuously increase weekly dexamethasone to 16 mg.   Patient seen at [facility] with tentative plan for L breast lumpectomy on 3/13/18. 2/16/18- Prior to surgery she was seen by genetic counselor who ran genetic testing per patient reques, all of which were negative. She was then seen by cardiology at [facility] 2/16/18 for further evaluation of persistent bilateral lower extremity swelling-ECHO noted EF of 58%; diagnosed with paroxysmal atrial fibrillation and instructed to begin daily ASA. During preoperative asssessment per Rad/Onc 3/12/18, corresponding chest CT noted new bilateral pulmonary nodules concerning for metastatic disease- surgery was subsequently postponed pending pulmonary evaluation. Seen by Pulmonology on 3/21/18, PFTs within normal limits and cleared patient for surgery with recommended close CT follow up of nodules in 3 months. 3/21/18 seen by nephrology for management of mild CKD (GFR 55)-cleared for surgery with recommended follow up in 3 months. Left breast lumpectomy and SLNB performed per Dr. Washburn on 4/12/18- plan for follow up in clinic on 4/24/18  for postoperative assessment. Follow up with Dr. Poole (Med/Onc) on 4/25/18. Follow up with Dr. Parnell 4/26/18.   Patient seen at [facility] s/p Left breast lumpectomy with SLNB on 4/12/18. Following surgery she completed Left partial breast radiation x 10 fractions. She was then started on endocrine therapy with Femara after been deemedan inappropriate candidate for systemic chemotherapy/Herceptin.      She was seen by neurosugery at [facility] on  4/26/18 following repeat MRI of cervical thoracic lumbar spine which noted focal enhancement of T2 hyperintensity at the C2 level which may be due to degenerative changes. Signal abnormality within  the T12 and L1 may be secondary to myeloma. Plan to continue with observation for now with F/U scheduled in October 2018.      She was seen by Dr. Parnell at [facility] for management of her MM on 4/26/18. Patient was recommended to restart Revlimid 5mg PO every other day with notes that these recommendations would be communicated to collaborating Oncologist. Patient was also recommended to start on Zometa for her bone disease.      Seen by Dr. Parnell at [facility] for management of her MM on 6/28/18. Repeat light chains noted lambda 33.36 (previously 80.80), K/L ratio 0.91 (previously 0.49). Due to slight improvement in light chains, plan to continue on lenalidomide maintenance. Recommendations to continue anticoagulation with Eliquis. BLE venous doppler negative for DVT.   Seen by pulmonolgy on 6/28/18- repeat CT chest done 6/28/18 noted resolution of previous pulmonary nodules. Thought to be infectious in nature. Recommendations for discharge from pulmonary clinic.  Should MRI of her cervical thoracic lumbar spine on 2/12/2019 that demonstrated cervical spondylosis with canal stenosis most notable at C1 and 2. Cord signal abnormality at C1 and 2 with enhancement is stable. Lumbar spondylosis with central canal stenosis at multiple levels. No imaging features of bony metastatic disease.     For amyloidosis (Light chain type).    Patient presented with macroglossia and now with improvement of the swelling of her tongue. Her cardiac parameters are also better.   8/10/2020 proBNP  250   2/17/2020                616  8/9/2019                  190  11/14/2023  820        Chief Complaint:4 Week Follow Up (PT states she's been weak and that she been feeling nauseated and her Rt foot is swollen again. )      Interval  History:  She is currently on Femara for breast cancer and Rev/Dex/Darzalex  for MM  s/p transplant in 2016. At her [facility] visit on 10/12/22, Ninlaro was discontinued due to it causing severe diarrhea and leukopenia. Diarrhea resolved after stopping Ninlaro (she admits to stopping prior to visit @ [facility]).       03/06/2024:  Patient is here today in hospital follow-up.  She is here with her sister.  She is doing better.  She is due for daratumumab today.  Review blood counts with her and white blood cell normalized.  Anemia stable.  She reports some nausea and vomiting secondary to the antibiotic.  She was instructed to stop as she has not neutropenic anymore and blood cultures were all negative.      ROS: All 14 points ROS taken and as per Interval History  Review of Systems   Constitutional:  Positive for malaise/fatigue. Negative for chills, fever and weight loss.   HENT:  Negative for congestion and nosebleeds.    Eyes:  Negative for blurred vision, double vision and photophobia.   Respiratory:  Negative for cough, hemoptysis and shortness of breath.    Cardiovascular:  Negative for chest pain, palpitations, leg swelling and PND.   Gastrointestinal:  Positive for diarrhea (Better), nausea and vomiting. Negative for abdominal pain, blood in stool, constipation and melena.   Genitourinary:  Negative for dysuria, frequency, hematuria and urgency.   Musculoskeletal:  Negative for back pain, falls and myalgias.   Skin:  Negative for itching and rash.   Neurological:  Positive for weakness. Negative for tremors, focal weakness, seizures and headaches.   Endo/Heme/Allergies:  Negative for environmental allergies. Does not bruise/bleed easily.   Psychiatric/Behavioral:  Negative for depression and suicidal ideas. The patient is not nervous/anxious.          Histories:  PMH/PSH/FH/SOCIAL/ALLERGIES AND MEDS REVIEWED AND UPDATED AS APPROPRIATE       Vitals:    03/06/24 1533   BP: 112/78   BP Location: Left arm   Patient  "Position: Sitting   Pulse: 95   Resp: 18   Temp: 98.1 °F (36.7 °C)   TempSrc: Oral   SpO2: 98%   Weight: 81.6 kg (180 lb)   Height: 5' 5" (1.651 m)               Wt Readings from Last 6 Encounters:   03/06/24 81.6 kg (180 lb)   02/27/24 81.6 kg (180 lb)   02/07/24 80.9 kg (178 lb 6.4 oz)   12/14/23 82.3 kg (181 lb 8 oz)   11/01/23 81.3 kg (179 lb 4.8 oz)   10/03/23 80.3 kg (177 lb)   Vitals reviewed and stable       Physical Exam  Vitals and nursing note reviewed.   Constitutional:       General: She is not in acute distress.     Appearance: Normal appearance. She is well-developed. She is ill-appearing.      Comments: Uses walker for mobility   HENT:      Head: Normocephalic and atraumatic.      Mouth/Throat:      Mouth: Mucous membranes are moist.   Eyes:      General: No scleral icterus.     Extraocular Movements: Extraocular movements intact.      Conjunctiva/sclera: Conjunctivae normal.      Pupils: Pupils are equal, round, and reactive to light.   Neck:      Vascular: No JVD.   Cardiovascular:      Rate and Rhythm: Normal rate and regular rhythm.      Heart sounds: No murmur heard.  Pulmonary:      Effort: Pulmonary effort is normal.      Breath sounds: Normal breath sounds. No wheezing or rhonchi.   Abdominal:      General: Bowel sounds are normal. There is no distension.      Palpations: Abdomen is soft. There is no mass.      Tenderness: There is no abdominal tenderness.   Musculoskeletal:         General: No swelling or deformity.      Cervical back: Neck supple.   Lymphadenopathy:      Head:      Right side of head: No submandibular adenopathy.      Left side of head: No submandibular adenopathy.      Cervical: No cervical adenopathy.      Upper Body:      Right upper body: No supraclavicular or axillary adenopathy.      Left upper body: No supraclavicular or axillary adenopathy.      Lower Body: No right inguinal adenopathy. No left inguinal adenopathy.   Skin:     General: Skin is warm.      Coloration: " Skin is not jaundiced.      Findings: No lesion or rash.      Nails: There is no clubbing.   Neurological:      Mental Status: She is alert and oriented to person, place, and time.      Cranial Nerves: Cranial nerves 2-12 are intact.      Motor: Weakness present.      Gait: Gait abnormal.   Psychiatric:         Attention and Perception: Attention normal.         Behavior: Behavior is cooperative.         Cognition and Memory: Cognition normal.         Judgment: Judgment normal.       ECOG SCORE    2 - Capable of all selfcare but unable to carry out any work activities, active > 50% of hours         Laboratory:  CBC with Differential:  Lab Results   Component Value Date    WBC 8.49 03/06/2024    RBC 3.88 (L) 03/06/2024    HGB 10.9 (L) 03/06/2024    HCT 34.9 (L) 03/06/2024    MCV 89.9 03/06/2024    MCH 28.1 03/06/2024    MCHC 31.2 (L) 03/06/2024    RDW 15.9 03/06/2024     03/06/2024    MPV 10.1 03/06/2024        CMP:  Sodium Level   Date Value Ref Range Status   03/06/2024 139 136 - 145 mmol/L Final     Potassium Level   Date Value Ref Range Status   03/06/2024 3.3 (L) 3.5 - 5.1 mmol/L Final     Carbon Dioxide   Date Value Ref Range Status   03/06/2024 21 (L) 23 - 31 mmol/L Final     Blood Urea Nitrogen   Date Value Ref Range Status   03/06/2024 35.8 (H) 9.8 - 20.1 mg/dL Final     Creatinine   Date Value Ref Range Status   03/06/2024 2.20 (H) 0.55 - 1.02 mg/dL Final   07/18/2023 2.48 (H) 0.51 - 0.95 mg/dL Final     Calcium Level Total   Date Value Ref Range Status   03/06/2024 9.3 8.4 - 10.2 mg/dL Final     Albumin Level   Date Value Ref Range Status   03/06/2024 3.2 (L) 3.4 - 4.8 g/dL Final     Bilirubin Total   Date Value Ref Range Status   03/06/2024 0.3 <=1.5 mg/dL Final     Alkaline Phosphatase   Date Value Ref Range Status   03/06/2024 84 40 - 150 unit/L Final     Aspartate Aminotransferase   Date Value Ref Range Status   03/06/2024 18 5 - 34 unit/L Final     Alanine Aminotransferase   Date Value Ref  Range Status   03/06/2024 16 0 - 55 unit/L Final     Estimated GFR-Non    Date Value Ref Range Status   04/20/2022 25           Assessment:       1) Multiple Myeloma, IgA Lambda, FISH with del 13p, normal karyotype, ISS stg II Dx 2015; S/p Autologous stem cell transplant 02/08/16-remission-->slight rise in free light chains 02/08/17  2) Amyloidosis, Lambda light chain type, organ involvement with macroglossia and colon involvement. 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 Sx risks do not outweigh benefits. Pain meds given and encouraged Physical Therpay  8) Amyloid plaques, evaluated by Derm at [facility], recommend watchful waiting. Patient to follow up 12/2017  9) Stage IA grade 3, ER+, HER2 BERNA +,  IDC/DCIS of the left breast --- 2/7/18 at [facility]; s/p lumpectomy with SLNB 4/12, Grade 2 IDC measuring 4mm with second focus of microinvasive carcinoma measuring  0.4mm .IG DCIS, 0.3mm to posterior margin; 2 SLN negative for malignancy    10) Progressive swelling of R lower extremity--- US NIVA done 2/19/18 negative for evidence of DVT  11). Paroxysmal Afib (2/18/18) diagnosed at [facility]; ECHO noted EF 58%  12. Mild CKD with GFR 55 - managed per [facility] nephrology  13). Pulmonary nodules noted on pre-operative chest CT scan (3/12/18) noted new bilateral pulmonary nodules are nonspecific -- seen by Pulmonology at [facility] (3/21/18) thought to be inflammatory/infectious in nature, repeat Chest CT 6/28/18 noted resolution of nodules  14). Treatment induced neutropenia  15) NON COMPLIANT patient-- please see above the no show, reschedule and cancelled visits/labs and infusion.         Plan:     Patient was recently seen at [facility]. Kidney disease due to HTN and NOT MM.   Parmjit rec to continue present regimen.   Discussed with her again importance of compliance. She verbalized understanding.     Okay to proceed with Darzalex today and every 4 weeks  RTC in 4 weeks for TD/MM labs/ infusion all same day - with MD ONLY, patient prefers PM appts on Wednesdays  Continue K+ BID  Continue Revlimid 5 mg QOD  Continue Eliquis 2.5 mg BID  Keep upcoming appointments at [facility] 4/2024, imaging will also be done  Keep cardiology follow up @ [facility] on 3/7/24  Offered referral for PT, she will let me know when she is ready  Discussed with the patient and her sister the importance of compliance.  They verbalized understanding.    Encourage to call or message us for any or problems  The patient was given ample opportunity to ask questions, and to the best of my abilities, all questions answered to satisfaction; patient demonstrated understanding of what we discussed and agreeable to the plan.     Natalie Meyers MD  Hematology/Oncology

## 2024-03-07 LAB
ALBUMIN % SPEP (OHS): 44.14
ALBUMIN SERPL-MCNC: 2.7 G/DL (ref 3.4–4.8)
ALBUMIN/GLOB SERPL: 0.8 RATIO (ref 1.1–2)
ALPHA 1 GLOB (OHS): 0.31 GM/DL
ALPHA 1 GLOB% (OHS): 5.03
ALPHA 2 GLOB % (OHS): 23.32
ALPHA 2 GLOB (OHS): 1.45 GM/DL
BETA GLOB (OHS): 1.11 GM/DL
BETA GLOB% (OHS): 17.87
GAMMA GLOBULIN % (OHS): 9.64
GAMMA GLOBULIN (OHS): 0.6 GM/DL
GLOBULIN SER-MCNC: 3.5 GM/DL (ref 2.4–3.5)
KAPPA LC FREE SER-MCNC: 2.5 MG/DL (ref 0.33–1.94)
KAPPA LC FREE/LAMBDA FREE SER: 1.47 {RATIO} (ref 0.26–1.65)
LAMBDA LC FREE SERPL-MCNC: 1.7 MG/DL (ref 0.57–2.63)
M SPIKE % (OHS): ABNORMAL
M SPIKE (OHS): ABNORMAL
PATH REV: NORMAL
PROT SERPL-MCNC: 6.2 GM/DL (ref 5.8–7.6)

## 2024-03-09 ENCOUNTER — PATIENT MESSAGE (OUTPATIENT)
Dept: INTERNAL MEDICINE | Facility: CLINIC | Age: 59
End: 2024-03-09
Payer: MEDICARE

## 2024-03-14 DIAGNOSIS — C90.00 MULTIPLE MYELOMA NOT HAVING ACHIEVED REMISSION: ICD-10-CM

## 2024-03-14 RX ORDER — ONDANSETRON HYDROCHLORIDE 8 MG/1
8 TABLET, FILM COATED ORAL EVERY 8 HOURS PRN
Qty: 90 TABLET | Refills: 2 | Status: SHIPPED | OUTPATIENT
Start: 2024-03-14 | End: 2024-04-12 | Stop reason: SDUPTHER

## 2024-03-25 ENCOUNTER — OFFICE VISIT (OUTPATIENT)
Dept: URGENT CARE | Facility: CLINIC | Age: 59
End: 2024-03-25
Payer: COMMERCIAL

## 2024-03-25 VITALS
HEART RATE: 111 BPM | OXYGEN SATURATION: 99 % | TEMPERATURE: 99 F | SYSTOLIC BLOOD PRESSURE: 121 MMHG | RESPIRATION RATE: 20 BRPM | WEIGHT: 180 LBS | BODY MASS INDEX: 29.99 KG/M2 | DIASTOLIC BLOOD PRESSURE: 71 MMHG | HEIGHT: 65 IN

## 2024-03-25 DIAGNOSIS — R05.8 RECURRENT PRODUCTIVE COUGH: Primary | ICD-10-CM

## 2024-03-25 PROCEDURE — 99212 OFFICE O/P EST SF 10 MIN: CPT | Mod: ,,, | Performed by: FAMILY MEDICINE

## 2024-03-25 NOTE — PATIENT INSTRUCTIONS
Considering new onset worsening symptoms and patient's personal history of sepsis early this month.    Encouraged to go to ER directly from the clinic for further evaluation monitoring.    Patient and her sister voiced understanding.  Desires to go in personal vehicle

## 2024-03-25 NOTE — PROGRESS NOTES
"Subjective:      Patient ID: Ninfa Candelario is a 58 y.o. female.    Vitals:  height is 5' 5" (1.651 m) and weight is 81.6 kg (180 lb). Her temperature is 98.6 °F (37 °C). Her blood pressure is 121/71 and her pulse is 111 (abnormal). Her respiration is 20 and oxygen saturation is 99%.     Chief Complaint: Cough (Was in hospital 2/25.  Went home. Got a little better. Cough and congestion. Started back up again about 2 days ago. No fever, just cough and shortness of breath. Chest achy from coughing. No sore throat.  Wants to see provider before testing.)    HPI:  58-year-old female known for multiple chronic medical condition present to clinic with coughing and chest congestion since 2 days.  Shortness a breath with coughing, feels tired and fatigued.   Follows up with Hematology-Oncology , Rheumatology, immunologist, breast surgeon.  Last infusion March 6th.  No concerns of positive exposure to infections.  Reviewed the vital signs O2 sats 99%, heart rate 1 1 1.  Patient is wheelchair accompanied by her sister.  States similar complaints in the past and was in the hospital on March 9th.  Reviewed the chart    ROS :  Constitutional : _ feeling weak tired, no measured fever  HEENT : _No sore throat  Neck : No pain, range of motion present  Respiratory : _ coughing, chest congestion, feeling short of breath  Cardiovascular : _No chest pain, no palpitations  Gastrointestinal : _No vomiting or diarrhea. No abdominal pain.  Vomited yesterday more like mucus  Integumentary : _No skin rash     Objective:     Physical Exam  General :  Patient is in wheelchair, appears weak and tired, soft voice  Neck - supple  HENT : Oropharynx no redness or swelling.    Respiratory : Bilateral coarse breath sounds and rhonchi  Cardiovascular :  Rapid heart rate, regular rhythm.  Normal volume pulse  Integumentary : Warm, Dry and no rash    Assessment:     1. Recurrent productive cough      Plan:   Considering new onset worsening symptoms " and patient's personal history of sepsis early this month.    Encouraged to go to ER directly from the clinic for further evaluation monitoring.    Patient and her sister voiced understanding.  Desires to go in personal vehicle    Recurrent productive cough

## 2024-03-26 ENCOUNTER — HOSPITAL ENCOUNTER (INPATIENT)
Facility: HOSPITAL | Age: 59
LOS: 2 days | Discharge: HOME | DRG: 202 | End: 2024-03-28
Attending: EMERGENCY MEDICINE | Admitting: INTERNAL MEDICINE
Payer: MEDICARE

## 2024-03-26 ENCOUNTER — TELEPHONE (OUTPATIENT)
Dept: INTERNAL MEDICINE | Facility: CLINIC | Age: 59
End: 2024-03-26

## 2024-03-26 DIAGNOSIS — E85.9 AMYLOIDOSIS, UNSPECIFIED TYPE: ICD-10-CM

## 2024-03-26 DIAGNOSIS — J20.9 ACUTE BRONCHITIS, UNSPECIFIED ORGANISM: ICD-10-CM

## 2024-03-26 DIAGNOSIS — C90.00 MULTIPLE MYELOMA NOT HAVING ACHIEVED REMISSION: Primary | ICD-10-CM

## 2024-03-26 DIAGNOSIS — R53.1 WEAKNESS: ICD-10-CM

## 2024-03-26 DIAGNOSIS — R05.8 RECURRENT PRODUCTIVE COUGH: ICD-10-CM

## 2024-03-26 DIAGNOSIS — D63.8 ANEMIA OF CHRONIC DISEASE: ICD-10-CM

## 2024-03-26 DIAGNOSIS — B96.89 BACTERIAL SINUSITIS: ICD-10-CM

## 2024-03-26 DIAGNOSIS — J32.9 BACTERIAL SINUSITIS: ICD-10-CM

## 2024-03-26 DIAGNOSIS — C50.819 OVERLAPPING MALIGNANT NEOPLASM OF FEMALE BREAST, UNSPECIFIED ESTROGEN RECEPTOR STATUS, UNSPECIFIED LATERALITY: ICD-10-CM

## 2024-03-26 DIAGNOSIS — J45.40 MODERATE PERSISTENT ASTHMATIC BRONCHITIS WITHOUT COMPLICATION: Primary | ICD-10-CM

## 2024-03-26 LAB
ABS NEUT (OLG): 5.64 X10(3)/MCL (ref 2.1–9.2)
ALBUMIN SERPL-MCNC: 2.8 G/DL (ref 3.4–4.8)
ALBUMIN/GLOB SERPL: 0.7 RATIO (ref 1.1–2)
ALLENS TEST BLOOD GAS (OHS): YES
ALP SERPL-CCNC: 85 UNIT/L (ref 40–150)
ALT SERPL-CCNC: 67 UNIT/L (ref 0–55)
ANISOCYTOSIS BLD QL SMEAR: ABNORMAL
APPEARANCE UR: CLEAR
AST SERPL-CCNC: 20 UNIT/L (ref 5–34)
BACTERIA #/AREA URNS AUTO: ABNORMAL /HPF
BASE EXCESS BLD CALC-SCNC: -0.1 MMOL/L (ref -2–2)
BILIRUB SERPL-MCNC: 0.7 MG/DL
BILIRUB UR QL STRIP.AUTO: NEGATIVE
BLOOD GAS SAMPLE TYPE (OHS): ABNORMAL
BNP BLD-MCNC: 58.7 PG/ML
BUN SERPL-MCNC: 30.3 MG/DL (ref 9.8–20.1)
CA-I BLD-SCNC: 1.06 MMOL/L (ref 1.12–1.23)
CALCIUM SERPL-MCNC: 9.4 MG/DL (ref 8.4–10.2)
CHLORIDE SERPL-SCNC: 105 MMOL/L (ref 98–107)
CO2 BLDA-SCNC: 23.8 MMOL/L
CO2 SERPL-SCNC: 18 MMOL/L (ref 23–31)
COHGB MFR BLDA: 2.3 % (ref 0.5–1.5)
COLOR UR AUTO: COLORLESS
CREAT SERPL-MCNC: 2.38 MG/DL (ref 0.55–1.02)
DRAWN BY BLOOD GAS (OHS): ABNORMAL
ERYTHROCYTE [DISTWIDTH] IN BLOOD BY AUTOMATED COUNT: 17.2 % (ref 11.5–17)
FLUAV AG UPPER RESP QL IA.RAPID: NOT DETECTED
FLUBV AG UPPER RESP QL IA.RAPID: NOT DETECTED
GFR SERPLBLD CREATININE-BSD FMLA CKD-EPI: 23 MLS/MIN/1.73/M2
GLOBULIN SER-MCNC: 4.2 GM/DL (ref 2.4–3.5)
GLUCOSE SERPL-MCNC: 106 MG/DL (ref 82–115)
GLUCOSE UR QL STRIP.AUTO: NORMAL
HCO3 BLDA-SCNC: 22.9 MMOL/L (ref 22–26)
HCT VFR BLD AUTO: 32.1 % (ref 37–47)
HGB BLD-MCNC: 9.9 G/DL (ref 12–16)
INSTRUMENT WBC (OLG): 5.88 X10(3)/MCL
KETONES UR QL STRIP.AUTO: NEGATIVE
LACTATE SERPL-SCNC: 1.9 MMOL/L (ref 0.5–2.2)
LEUKOCYTE ESTERASE UR QL STRIP.AUTO: NEGATIVE
LYMPHOCYTES NFR BLD MANUAL: 0.12 X10(3)/MCL
LYMPHOCYTES NFR BLD MANUAL: 2 %
MCH RBC QN AUTO: 28.2 PG (ref 27–31)
MCHC RBC AUTO-ENTMCNC: 30.8 G/DL (ref 33–36)
MCV RBC AUTO: 91.5 FL (ref 80–94)
METHGB MFR BLDA: 0.6 % (ref 0.4–1.5)
MONOCYTES NFR BLD MANUAL: 0.06 X10(3)/MCL (ref 0.1–1.3)
MONOCYTES NFR BLD MANUAL: 1 %
MUCOUS THREADS URNS QL MICRO: ABNORMAL /LPF
MYELOCYTES NFR BLD MANUAL: 2 %
NEUTROPHILS NFR BLD MANUAL: 96 %
NITRITE UR QL STRIP.AUTO: NEGATIVE
NRBC BLD AUTO-RTO: 0.7 %
O2 HB BLOOD GAS (OHS): 91.4 % (ref 94–97)
OHS QRS DURATION: 68 MS
OHS QTC CALCULATION: 459 MS
OVALOCYTES (OLG): ABNORMAL
OXYHGB MFR BLDA: 8.9 G/DL (ref 12–16)
PCO2 BLDA: 30 MMHG (ref 35–45)
PH BLDA: 7.49 [PH] (ref 7.35–7.45)
PH UR STRIP.AUTO: 5 [PH]
PLATELET # BLD AUTO: 303 X10(3)/MCL (ref 130–400)
PLATELET # BLD EST: NORMAL 10*3/UL
PMV BLD AUTO: 9.8 FL (ref 7.4–10.4)
PO2 BLDA: 60 MMHG (ref 80–100)
POIKILOCYTOSIS BLD QL SMEAR: ABNORMAL
POTASSIUM BLOOD GAS (OHS): 3.1 MMOL/L (ref 3.5–5)
POTASSIUM SERPL-SCNC: 3.4 MMOL/L (ref 3.5–5.1)
PROT SERPL-MCNC: 7 GM/DL (ref 5.8–7.6)
PROT UR QL STRIP.AUTO: ABNORMAL
RBC # BLD AUTO: 3.51 X10(6)/MCL (ref 4.2–5.4)
RBC #/AREA URNS AUTO: ABNORMAL /HPF
RBC MORPH BLD: ABNORMAL
RBC UR QL AUTO: ABNORMAL
RSV A 5' UTR RNA NPH QL NAA+PROBE: NOT DETECTED
SAMPLE SITE BLOOD GAS (OHS): ABNORMAL
SAO2 % BLDA: 92.7 %
SARS-COV-2 RNA RESP QL NAA+PROBE: NOT DETECTED
SODIUM BLOOD GAS (OHS): 134 MMOL/L (ref 137–145)
SODIUM SERPL-SCNC: 138 MMOL/L (ref 136–145)
SP GR UR STRIP.AUTO: 1.01 (ref 1–1.03)
SQUAMOUS #/AREA URNS LPF: ABNORMAL /HPF
TEAR DROP CELL (OLG): ABNORMAL
TROPONIN I SERPL-MCNC: 0.02 NG/ML (ref 0–0.04)
UROBILINOGEN UR STRIP-ACNC: NORMAL
WBC # SPEC AUTO: 6.01 X10(3)/MCL (ref 4.5–11.5)
WBC #/AREA URNS AUTO: ABNORMAL /HPF

## 2024-03-26 PROCEDURE — 83605 ASSAY OF LACTIC ACID: CPT | Performed by: EMERGENCY MEDICINE

## 2024-03-26 PROCEDURE — 94760 N-INVAS EAR/PLS OXIMETRY 1: CPT | Mod: XB

## 2024-03-26 PROCEDURE — 99900035 HC TECH TIME PER 15 MIN (STAT)

## 2024-03-26 PROCEDURE — 82803 BLOOD GASES ANY COMBINATION: CPT

## 2024-03-26 PROCEDURE — 93005 ELECTROCARDIOGRAM TRACING: CPT

## 2024-03-26 PROCEDURE — 0241U COVID/RSV/FLU A&B PCR: CPT | Performed by: EMERGENCY MEDICINE

## 2024-03-26 PROCEDURE — 99900031 HC PATIENT EDUCATION (STAT)

## 2024-03-26 PROCEDURE — 25000242 PHARM REV CODE 250 ALT 637 W/ HCPCS: Performed by: EMERGENCY MEDICINE

## 2024-03-26 PROCEDURE — 83880 ASSAY OF NATRIURETIC PEPTIDE: CPT | Performed by: EMERGENCY MEDICINE

## 2024-03-26 PROCEDURE — 94640 AIRWAY INHALATION TREATMENT: CPT

## 2024-03-26 PROCEDURE — 93010 ELECTROCARDIOGRAM REPORT: CPT | Mod: ,,, | Performed by: INTERNAL MEDICINE

## 2024-03-26 PROCEDURE — 63600175 PHARM REV CODE 636 W HCPCS: Performed by: EMERGENCY MEDICINE

## 2024-03-26 PROCEDURE — 85027 COMPLETE CBC AUTOMATED: CPT | Performed by: EMERGENCY MEDICINE

## 2024-03-26 PROCEDURE — 25000003 PHARM REV CODE 250: Performed by: EMERGENCY MEDICINE

## 2024-03-26 PROCEDURE — 99285 EMERGENCY DEPT VISIT HI MDM: CPT | Mod: 25

## 2024-03-26 PROCEDURE — 99223 1ST HOSP IP/OBS HIGH 75: CPT | Mod: AI,,, | Performed by: INTERNAL MEDICINE

## 2024-03-26 PROCEDURE — 87040 BLOOD CULTURE FOR BACTERIA: CPT | Performed by: EMERGENCY MEDICINE

## 2024-03-26 PROCEDURE — 96374 THER/PROPH/DIAG INJ IV PUSH: CPT

## 2024-03-26 PROCEDURE — 96361 HYDRATE IV INFUSION ADD-ON: CPT

## 2024-03-26 PROCEDURE — 11000001 HC ACUTE MED/SURG PRIVATE ROOM

## 2024-03-26 PROCEDURE — 25000003 PHARM REV CODE 250: Performed by: INTERNAL MEDICINE

## 2024-03-26 PROCEDURE — 94644 CONT INHLJ TX 1ST HOUR: CPT

## 2024-03-26 PROCEDURE — 84484 ASSAY OF TROPONIN QUANT: CPT | Performed by: EMERGENCY MEDICINE

## 2024-03-26 PROCEDURE — 80053 COMPREHEN METABOLIC PANEL: CPT | Performed by: EMERGENCY MEDICINE

## 2024-03-26 PROCEDURE — 81001 URINALYSIS AUTO W/SCOPE: CPT | Performed by: EMERGENCY MEDICINE

## 2024-03-26 PROCEDURE — 36600 WITHDRAWAL OF ARTERIAL BLOOD: CPT

## 2024-03-26 RX ORDER — OXYCODONE HYDROCHLORIDE 10 MG/1
10 TABLET ORAL EVERY 12 HOURS PRN
Status: DISCONTINUED | OUTPATIENT
Start: 2024-03-26 | End: 2024-03-28 | Stop reason: HOSPADM

## 2024-03-26 RX ORDER — LETROZOLE 2.5 MG/1
2.5 TABLET, FILM COATED ORAL DAILY
Status: DISCONTINUED | OUTPATIENT
Start: 2024-03-26 | End: 2024-03-28 | Stop reason: HOSPADM

## 2024-03-26 RX ORDER — METHYLPREDNISOLONE SOD SUCC 125 MG
125 VIAL (EA) INJECTION
Status: DISCONTINUED | OUTPATIENT
Start: 2024-03-26 | End: 2024-03-26 | Stop reason: SDUPTHER

## 2024-03-26 RX ORDER — DEXAMETHASONE 4 MG/1
20 TABLET ORAL WEEKLY
Status: DISCONTINUED | OUTPATIENT
Start: 2024-03-26 | End: 2024-03-28 | Stop reason: HOSPADM

## 2024-03-26 RX ORDER — METHYLPREDNISOLONE SOD SUCC 125 MG
125 VIAL (EA) INJECTION
Status: COMPLETED | OUTPATIENT
Start: 2024-03-26 | End: 2024-03-26

## 2024-03-26 RX ORDER — IPRATROPIUM BROMIDE 0.5 MG/2.5ML
0.5 SOLUTION RESPIRATORY (INHALATION)
Status: COMPLETED | OUTPATIENT
Start: 2024-03-26 | End: 2024-03-26

## 2024-03-26 RX ORDER — IPRATROPIUM BROMIDE AND ALBUTEROL SULFATE 2.5; .5 MG/3ML; MG/3ML
3 SOLUTION RESPIRATORY (INHALATION)
Status: COMPLETED | OUTPATIENT
Start: 2024-03-26 | End: 2024-03-26

## 2024-03-26 RX ORDER — ONDANSETRON HYDROCHLORIDE 2 MG/ML
4 INJECTION, SOLUTION INTRAVENOUS EVERY 8 HOURS PRN
Status: DISCONTINUED | OUTPATIENT
Start: 2024-03-26 | End: 2024-03-28 | Stop reason: HOSPADM

## 2024-03-26 RX ORDER — ONDANSETRON 4 MG/1
8 TABLET, ORALLY DISINTEGRATING ORAL EVERY 8 HOURS PRN
Status: DISCONTINUED | OUTPATIENT
Start: 2024-03-26 | End: 2024-03-28 | Stop reason: HOSPADM

## 2024-03-26 RX ORDER — CARVEDILOL 3.12 MG/1
3.12 TABLET ORAL 2 TIMES DAILY WITH MEALS
Status: DISCONTINUED | OUTPATIENT
Start: 2024-03-26 | End: 2024-03-28 | Stop reason: HOSPADM

## 2024-03-26 RX ORDER — METHYLPREDNISOLONE SOD SUCC 125 MG
80 VIAL (EA) INJECTION EVERY 8 HOURS
Status: DISCONTINUED | OUTPATIENT
Start: 2024-03-26 | End: 2024-03-28 | Stop reason: HOSPADM

## 2024-03-26 RX ORDER — DULOXETIN HYDROCHLORIDE 30 MG/1
60 CAPSULE, DELAYED RELEASE ORAL DAILY
Status: DISCONTINUED | OUTPATIENT
Start: 2024-03-26 | End: 2024-03-28 | Stop reason: HOSPADM

## 2024-03-26 RX ORDER — FAMOTIDINE 20 MG/1
40 TABLET, FILM COATED ORAL DAILY
Status: DISCONTINUED | OUTPATIENT
Start: 2024-03-26 | End: 2024-03-28 | Stop reason: HOSPADM

## 2024-03-26 RX ORDER — SODIUM CHLORIDE, SODIUM LACTATE, POTASSIUM CHLORIDE, CALCIUM CHLORIDE 600; 310; 30; 20 MG/100ML; MG/100ML; MG/100ML; MG/100ML
INJECTION, SOLUTION INTRAVENOUS CONTINUOUS
Status: DISCONTINUED | OUTPATIENT
Start: 2024-03-26 | End: 2024-03-28 | Stop reason: HOSPADM

## 2024-03-26 RX ORDER — LENALIDOMIDE 5 MG/1
CAPSULE ORAL DAILY
Status: DISCONTINUED | OUTPATIENT
Start: 2024-03-26 | End: 2024-03-27

## 2024-03-26 RX ORDER — ALBUTEROL SULFATE 0.83 MG/ML
10 SOLUTION RESPIRATORY (INHALATION)
Status: COMPLETED | OUTPATIENT
Start: 2024-03-26 | End: 2024-03-26

## 2024-03-26 RX ORDER — TALC
6 POWDER (GRAM) TOPICAL NIGHTLY PRN
Status: DISCONTINUED | OUTPATIENT
Start: 2024-03-26 | End: 2024-03-28 | Stop reason: HOSPADM

## 2024-03-26 RX ORDER — TRIAMTERENE/HYDROCHLOROTHIAZID 37.5-25 MG
1 TABLET ORAL DAILY
Status: DISCONTINUED | OUTPATIENT
Start: 2024-03-26 | End: 2024-03-28 | Stop reason: HOSPADM

## 2024-03-26 RX ORDER — POTASSIUM CHLORIDE 750 MG/1
10 TABLET, EXTENDED RELEASE ORAL 2 TIMES DAILY
Status: DISCONTINUED | OUTPATIENT
Start: 2024-03-26 | End: 2024-03-28 | Stop reason: HOSPADM

## 2024-03-26 RX ORDER — IPRATROPIUM BROMIDE AND ALBUTEROL SULFATE 2.5; .5 MG/3ML; MG/3ML
3 SOLUTION RESPIRATORY (INHALATION) EVERY 4 HOURS
Status: DISCONTINUED | OUTPATIENT
Start: 2024-03-26 | End: 2024-03-28 | Stop reason: HOSPADM

## 2024-03-26 RX ORDER — METHADONE HYDROCHLORIDE 10 MG/1
10 TABLET ORAL
Status: DISCONTINUED | OUTPATIENT
Start: 2024-03-26 | End: 2024-03-28 | Stop reason: HOSPADM

## 2024-03-26 RX ORDER — MONTELUKAST SODIUM 4 MG/1
1 TABLET, CHEWABLE ORAL 2 TIMES DAILY
Status: DISCONTINUED | OUTPATIENT
Start: 2024-03-26 | End: 2024-03-28 | Stop reason: HOSPADM

## 2024-03-26 RX ADMIN — IPRATROPIUM BROMIDE AND ALBUTEROL SULFATE 3 ML: 2.5; .5 SOLUTION RESPIRATORY (INHALATION) at 11:03

## 2024-03-26 RX ADMIN — DULOXETINE HYDROCHLORIDE 60 MG: 30 CAPSULE, DELAYED RELEASE ORAL at 05:03

## 2024-03-26 RX ADMIN — APIXABAN 2.5 MG: 2.5 TABLET, FILM COATED ORAL at 08:03

## 2024-03-26 RX ADMIN — TRIAMTERENE AND HYDROCHLOROTHIAZIDE 1 TABLET: 37.5; 25 TABLET ORAL at 05:03

## 2024-03-26 RX ADMIN — FAMOTIDINE 40 MG: 20 TABLET, FILM COATED ORAL at 05:03

## 2024-03-26 RX ADMIN — METHYLPREDNISOLONE SODIUM SUCCINATE 80 MG: 125 INJECTION, POWDER, FOR SOLUTION INTRAMUSCULAR; INTRAVENOUS at 11:03

## 2024-03-26 RX ADMIN — METHYLPREDNISOLONE SODIUM SUCCINATE 125 MG: 125 INJECTION, POWDER, FOR SOLUTION INTRAMUSCULAR; INTRAVENOUS at 03:03

## 2024-03-26 RX ADMIN — IPRATROPIUM BROMIDE AND ALBUTEROL SULFATE 3 ML: 2.5; .5 SOLUTION RESPIRATORY (INHALATION) at 08:03

## 2024-03-26 RX ADMIN — METHYLPREDNISOLONE SODIUM SUCCINATE 80 MG: 125 INJECTION, POWDER, FOR SOLUTION INTRAMUSCULAR; INTRAVENOUS at 08:03

## 2024-03-26 RX ADMIN — IPRATROPIUM BROMIDE AND ALBUTEROL SULFATE 3 ML: 2.5; .5 SOLUTION RESPIRATORY (INHALATION) at 04:03

## 2024-03-26 RX ADMIN — SODIUM CHLORIDE, POTASSIUM CHLORIDE, SODIUM LACTATE AND CALCIUM CHLORIDE 1000 ML: 600; 310; 30; 20 INJECTION, SOLUTION INTRAVENOUS at 03:03

## 2024-03-26 RX ADMIN — POTASSIUM CHLORIDE 10 MEQ: 750 TABLET, FILM COATED, EXTENDED RELEASE ORAL at 08:03

## 2024-03-26 RX ADMIN — IPRATROPIUM BROMIDE 0.5 MG: 0.5 SOLUTION RESPIRATORY (INHALATION) at 05:03

## 2024-03-26 RX ADMIN — SODIUM CHLORIDE, POTASSIUM CHLORIDE, SODIUM LACTATE AND CALCIUM CHLORIDE: 600; 310; 30; 20 INJECTION, SOLUTION INTRAVENOUS at 07:03

## 2024-03-26 RX ADMIN — SODIUM CHLORIDE, POTASSIUM CHLORIDE, SODIUM LACTATE AND CALCIUM CHLORIDE: 600; 310; 30; 20 INJECTION, SOLUTION INTRAVENOUS at 04:03

## 2024-03-26 RX ADMIN — COLESTIPOL HYDROCHLORIDE 1 G: 1 TABLET, FILM COATED ORAL at 08:03

## 2024-03-26 RX ADMIN — Medication 6 MG: at 08:03

## 2024-03-26 RX ADMIN — IPRATROPIUM BROMIDE AND ALBUTEROL SULFATE 3 ML: .5; 3 SOLUTION RESPIRATORY (INHALATION) at 02:03

## 2024-03-26 RX ADMIN — CARVEDILOL 3.12 MG: 3.12 TABLET, FILM COATED ORAL at 05:03

## 2024-03-26 RX ADMIN — ALBUTEROL SULFATE 10 MG: 2.5 SOLUTION RESPIRATORY (INHALATION) at 05:03

## 2024-03-26 NOTE — NURSING
Nurses Note -- 4 Eyes      3/26/2024   3:04 PM      Skin assessed during: Admit      [x] No Altered Skin Integrity Present    [x]Prevention Measures Documented      [] Yes- Altered Skin Integrity Present or Discovered   [] LDA Added if Not in Epic (Describe Wound)   [] New Altered Skin Integrity was Present on Admit and Documented in LDA   [] Wound Image Taken    Wound Care Consulted? No    Attending Nurse:  Justin FENG    Second RN/Staff Member:  Claribel Vickers LPN

## 2024-03-26 NOTE — TELEPHONE ENCOUNTER
----- Message from Jarad Dueñas sent at 3/26/2024  1:20 PM CDT -----  .Type:  Needs Medical Advice    Who Called:  Stephanie garcia's sister   Symptoms (please be specific):    How long has patient had these symptoms:    Pharmacy name and phone #:    Would the patient rather a call back or a response via [facility]?   Best Call Back Number: [phone]  Additional Information: She wanted to let the office know that her sister cannot go to apt today she is in the hospital .

## 2024-03-26 NOTE — ED PROVIDER NOTES
Encounter Date: 3/26/2024       History     Chief Complaint   Patient presents with    Shortness of Breath     Arrives [facility] 3 reports hx CA here dx w/ pneumonia 1 month ago stopped taking antibx due to them making her sick after consulting oncologist, increasing sob/cough since stopped taking antibx     Patient presents with shortness of breath was recently admitted a month ago for pneumonia and sepsis.  Was on preventative antibiotics or extended antibiotics and stopped taking a patient states that she has been having worsening shortness of breath cough wheezing. no nausea no chest pain worse when she exerts herself no leg pain or swelling still getting infusions for her myelodysplastic syndrome/amyloidosis.  Does not smoke    The history is provided by the patient.     Review of patient's allergies indicates:   Allergen Reactions    Baclofen Shortness Of Breath     Difficulty breathing      Penicillins Hives, Rash and Swelling    Shellfish containing products Hives, Rash, Swelling and Other (See Comments)     shell  She can shrimp      Clarithromycin Other (See Comments)    Iodinated contrast media     Nsaids (non-steroidal anti-inflammatory drug)     Other omega-3s     Penicillin      Other reaction(s): Unknown  Other reaction(s): Unknown  Other reaction(s): Unknown    Ace inhibitors Other (See Comments)     cough  Other reaction(s): Cough    Azithromycin Nausea Only     Upset stomach    Hydralazine analogues Anxiety    Meloxicam Tinitus     Other reaction(s): Unknown  Other reaction(s): Unknown    Prochlorperazine Anxiety     Restlessness/anxious    Tramadol Other (See Comments)     Increased Heart Rate    Other reaction(s): Unknown  Other reaction(s): Unknown     Past Medical History:   Diagnosis Date    Amyloidosis     Anemia     Anxiety and depression     Diplopia     Hyperlipidemia     Hypertension     Impaired mobility     Lytic lesion of bone on x-ray     Multiple myeloma     Neuropathy     Obesity,  unspecified     Paroxysmal atrial fibrillation     Primary cancer of left female breast      Past Surgical History:   Procedure Laterality Date    BONE MARROW TRANSPLANT  02/08/2016    CARPAL TUNNEL RELEASE      COLONOSCOPY  12/11/2018    Dr. Eddie Jimenez    ENDOSCOPIC RELEASE OF TRIGGER FINGER      ESOPHAGEAL MOTILITY STUDY  2015    Excision Lipoma left elbow      Exploration Laparotomy  2016    FLEXIBLE SIGMOIDOSCOPY      MEDIPORT INSERTION, SINGLE  03/15/2016    PH Study 24 Hour  2015     Family History   Problem Relation Age of Onset    Hypertension Mother     Cancer Father     Hypertension Sister     Hypertension Brother      Social History     Tobacco Use    Smoking status: Never    Smokeless tobacco: Never   Substance Use Topics    Alcohol use: Not Currently    Drug use: Never     Review of Systems   Constitutional:  Positive for fatigue. Negative for fever.   HENT:  Negative for sore throat.    Respiratory:  Positive for cough and wheezing. Negative for shortness of breath.    Cardiovascular:  Negative for chest pain.   Gastrointestinal:  Negative for nausea.   Genitourinary:  Negative for difficulty urinating, dyspareunia, dysuria, menstrual problem, vaginal bleeding and vaginal discharge.   Musculoskeletal:  Negative for back pain.   Skin:  Negative for rash.   Neurological:  Negative for weakness.   Hematological:  Does not bruise/bleed easily.       Physical Exam     Initial Vitals [03/26/24 0122]   BP Pulse Resp Temp SpO2   (!) 140/88 (!) 120 (!) 30 100 °F (37.8 °C) 97 %      MAP       --         Physical Exam    Constitutional: She appears well-developed and well-nourished.   Coughing unable to catch breath appears mildly uncomfortable   HENT:   Head: Normocephalic and atraumatic.   Mouth/Throat: Oropharynx is clear and moist.   Eyes: Conjunctivae are normal. Pupils are equal, round, and reactive to light.   Neck: Neck supple.   Normal range of motion.  Cardiovascular:  Regular rhythm and normal heart  sounds.           Tachycardic regular regular   Pulmonary/Chest: She is in respiratory distress. She has wheezes. She has rhonchi. She has rales.   No crackles bilateral rales and wheezes and rhonchi.  Auditory bronchospastic cough   Abdominal: Abdomen is soft. Bowel sounds are normal.   Musculoskeletal:         General: Normal range of motion.      Cervical back: Normal range of motion and neck supple.      Comments: Without unilateral edema     Neurological: She is alert and oriented to person, place, and time. GCS score is 15. GCS eye subscore is 4. GCS verbal subscore is 5. GCS motor subscore is 6.   Skin: Skin is warm and dry. Capillary refill takes less than 2 seconds.   Psychiatric: She has a normal mood and affect. Her behavior is normal. Judgment and thought content normal.         ED Course   Critical Care    Date/Time: 3/26/2024 5:36 AM    Performed by: Zander Madison III, MD  Authorized by: Zander Madison III, MD  Direct patient critical care time: 25 minutes  Documentation critical care time: 5 minutes  Consulting other physicians critical care time: 5 minutes  Total critical care time (exclusive of procedural time) : 35 minutes  Critical care was necessary to treat or prevent imminent or life-threatening deterioration of the following conditions: respiratory failure.  Critical care was time spent personally by me on the following activities: discussions with primary provider, discussions with consultants, examination of patient, re-evaluation of patient's condition, review of old charts, ordering and review of laboratory studies and ordering and performing treatments and interventions.        Labs Reviewed   COMPREHENSIVE METABOLIC PANEL - Abnormal; Notable for the following components:       Result Value    Potassium Level 3.4 (*)     Carbon Dioxide 18 (*)     Blood Urea Nitrogen 30.3 (*)     Creatinine 2.38 (*)     Albumin Level 2.8 (*)     Globulin 4.2 (*)     Albumin/Globulin Ratio 0.7 (*)      Alanine Aminotransferase 67 (*)     All other components within normal limits   CBC WITH DIFFERENTIAL - Abnormal; Notable for the following components:    RBC 3.51 (*)     Hgb 9.9 (*)     Hct 32.1 (*)     MCHC 30.8 (*)     RDW 17.2 (*)     All other components within normal limits   MANUAL DIFFERENTIAL - Abnormal; Notable for the following components:    Myelocytes % 2 (*)     Lymphs Abs 0.1176 (*)     Monocytes Abs 0.0588 (*)     RBC Morph Abnormal (*)     Poikilocytosis 1+ (*)     Anisocytosis 1+ (*)     Ovalocytes 1+ (*)     Tear Drops 1+ (*)     All other components within normal limits   BLOOD GAS - Abnormal; Notable for the following components:    pH, Blood gas 7.490 (*)     pCO2, Blood gas 30.0 (*)     pO2, Blood gas 60.0 (*)     Sodium, Blood Gas 134 (*)     Potassium, Blood Gas 3.1 (*)     Calcium Level Ionized 1.06 (*)     THb, Blood gas 8.9 (*)     O2 Hb, Blood Gas 91.4 (*)     CO Hgb 2.3 (*)     All other components within normal limits   LACTIC ACID, PLASMA - Normal   COVID/RSV/FLU A&B PCR - Normal    Narrative:     The Xpert Xpress SARS-CoV-2/FLU/RSV plus is a rapid, multiplexed real-time PCR test intended for the simultaneous qualitative detection and differentiation of SARS-CoV-2, Influenza A, Influenza B, and respiratory syncytial virus (RSV) viral RNA in either nasopharyngeal swab or nasal swab specimens.         TROPONIN I - Normal   B-TYPE NATRIURETIC PEPTIDE - Normal   BLOOD CULTURE OLG   BLOOD CULTURE OLG   CBC W/ AUTO DIFFERENTIAL    Narrative:     The following orders were created for panel order CBC auto differential.  Procedure                               Abnormality         Status                     ---------                               -----------         ------                     CBC with Differential[[id number]]       Abnormal            Final result               Manual Differential[[id number]]         Abnormal            Final result                 Please view results for  these tests on the individual orders.   URINALYSIS, REFLEX TO URINE CULTURE          Imaging Results              X-Ray Chest AP Portable (In process)                      Medications   albuterol nebulizer solution 10 mg (has no administration in time range)   ipratropium 0.02 % nebulizer solution 0.5 mg (has no administration in time range)   lactated ringers bolus 1,641 mL (1,000 mLs Intravenous New Bag 3/26/24 0306)   albuterol-ipratropium 2.5 mg-0.5 mg/3 mL nebulizer solution 3 mL (3 mLs Nebulization Given 3/26/24 0208)   methylPREDNISolone sodium succinate injection 125 mg (125 mg Intravenous Given 3/26/24 0344)     Medical Decision Making  Differential diagnosis pneumonia flu COVID RSV acute coronary syndrome CHF anemia    Patient recent admission for sepsis patient afebrile tachycardic but with a bronchospastic history CBC and lactic acid normal will hold antibiotics for septic picture.  Patient given neb treatment and IV steroids with some mild moderation of symptoms chest x-ray with no new findings patient with creatinine 2.6 flu COVID RSV negative.  Patient given hour long neb ABG consistent with COPD type picture will admit for acute bronchitis discussed case with Dr. Manrique    Amount and/or Complexity of Data Reviewed  Labs: ordered.  Radiology: ordered.    Risk  Prescription drug management.                                      Clinical Impression:  Final diagnoses:  [R53.1] Weakness  [J45.40] Moderate persistent asthmatic bronchitis without complication (Primary)          ED Disposition Condition    Admit Stable                Zander Madison III, MD  03/26/24 8675

## 2024-03-26 NOTE — PLAN OF CARE
03/26/24 1614   Discharge Assessment   Assessment Type Discharge Planning Assessment   Confirmed/corrected address, phone number and insurance Yes   Confirmed Demographics Correct on Facesheet   Source of Information patient   Communicated DIONI with patient/caregiver Date not available/Unable to determine   Reason For Admission weakness   People in Home sibling(s)   Do you expect to return to your current living situation? Yes   Do you have help at home or someone to help you manage your care at home? Yes   Who are your caregiver(s) and their phone number(s)? devyn jones, sister, [phone]   Prior to hospitilization cognitive status: Alert/Oriented   Current cognitive status: Alert/Oriented   Walking or Climbing Stairs Difficulty no   Dressing/Bathing Difficulty no   Home Layout Able to live on 1st floor   Equipment Currently Used at Home rollator   Readmission within 30 days? Yes   Patient currently being followed by outpatient case management? No   Do you currently have service(s) that help you manage your care at home? No   Do you take prescription medications? Yes   Do you have prescription coverage? Yes   Coverage m[employer] a&b, BCBS federal   Do you have any problems affording any of your prescribed medications? No   Is the patient taking medications as prescribed? yes   Who is going to help you get home at discharge? family   How do you get to doctors appointments? family or friend will provide;car, drives self   Are you on dialysis? No   Do you take coumadin? No   Discharge Plan A Home with family   Discharge Plan B Home with family   DME Needed Upon Discharge  none   Discharge Plan discussed with: Patient   Transition of Care Barriers None   Social Connections   Are you , , , , never , or living with a partner? Never marrie   OTHER   Name(s) of People in Home devyn gonzalez     Completed assessment with pt at bedside., introduced self and explained role as SW. Pt  verb understanding to all questions asked. PCP is Dr. Rosario and pharmacy is [facility]. D/c dispo is home at this time.     Park Caba LCSW

## 2024-03-26 NOTE — PROGRESS NOTES
Pharmacist Renal Dose Adjustment Note    Ninfa Candelario is a 58 y.o. female being treated with the medication famotidine    Patient Data:    Vital Signs (Most Recent):  Temp: 99.2 °F (37.3 °C) (03/26/24 1102)  Pulse: 84 (03/26/24 1338)  Resp: (!) 21 (03/26/24 1338)  BP: 117/72 (03/26/24 1338)  SpO2: 96 % (03/26/24 1338) Vital Signs (72h Range):  Temp:  [98.6 °F (37 °C)-100 °F (37.8 °C)]   Pulse:  []   Resp:  [17-30]   BP: (115-168)/(69-88)   SpO2:  [92 %-99 %]      Recent Labs   Lab 03/26/24  0232   CREATININE 2.38*     Serum creatinine: 2.38 mg/dL (H) 03/26/24 0232  Estimated creatinine clearance: 25.1 mL/min (A)    Famotidine 40 mg PO BID will be changed to famotidine 40 mg PO QD per pharmacy protocol.    Pharmacist's Name: Maryuri Panchal  Pharmacist's Extension: [phone]

## 2024-03-26 NOTE — NURSING
"Admit questions answered per pt family member at bedside while pt slept through questioning. No hearing/vision/concentration/communication deficits reported. Family member states pt typically uses walker at home, recently they have been having to use a wheelchair, though now she states the pt is unable to do anything. Family member states pt "has all her vaccines". Family member reports pt has gained weight recently. Family member states pt has had feelings of depression recently "because of her illness". No further complaints noted, pt asleep in bed.   "

## 2024-03-26 NOTE — H&P
[facility] Medicine History & Physical Examination       Patient Name: Ninfa Candelario  MRN: [id number]  Patient Class: IP- Inpatient   Admission Date: 3/26/2024   Admitting Physician: AG Service   Length of Stay: 0  Attending Physician: Adry Rosario MD  Primary Care Provider: Adry Rosario MD  Face-to-Face encounter date: 03/26/2024  Code Status:Full    Chief Complaint: Shortness of Breath (Arrives aasi unit 3 reports hx CA here dx w/ pneumonia 1 month ago stopped taking antibx due to them making her sick after consulting oncologist, increasing sob/cough since stopped taking antibx)        Patient information was obtained from patient, patient's family, past medical records and ER records.  ED records were reviewed in detail and documented below    HISTORY OF PRESENT ILLNESS:   Ninfa Candelario is a 58 y.o. female who  has a past medical history of Amyloidosis, Anemia, Anxiety and depression, Diplopia, Hyperlipidemia, Hypertension, Impaired mobility, Lytic lesion of bone on x-ray, Multiple myeloma, Neuropathy, Obesity, unspecified, Paroxysmal atrial fibrillation, and Primary cancer of left female breast.. The patient presented to [facility] on 3/26/2024 with a primary complaint of SOB    Ninfa is a 58-year-old female with history of amyloidosis and myelodysplastic syndrome that presented to the ED with complaints of shortness a breath.  She was recently discharged after being admitted for pneumonia and sepsis.    Apparently has been having worsening shortness of breath along with some cough and wheezing.  No complaints of chest pain or nausea.  No complaints of fever or chills.    Workup in the ED consistent with a CBC consistent with myelodysplastic syndrome on.  WBC count is in normal range at 6.01.  Potassium mildly low at 3.4.  Chest x-ray was essentially normal with no acute findings.    Patient admitted under observation considering her significant  medical history.  At the time of my visit in the ED she was maintaining her oxygen saturation in the upper 90s on room air.  Overall feeling much better and appeared to be clinically much better than her previous hospitalization.      PAST MEDICAL HISTORY:     Past Medical History:   Diagnosis Date    Amyloidosis     Anemia     Anxiety and depression     Diplopia     Hyperlipidemia     Hypertension     Impaired mobility     Lytic lesion of bone on x-ray     Multiple myeloma     Neuropathy     Obesity, unspecified     Paroxysmal atrial fibrillation     Primary cancer of left female breast        PAST SURGICAL HISTORY:     Past Surgical History:   Procedure Laterality Date    BONE MARROW TRANSPLANT  02/08/2016    CARPAL TUNNEL RELEASE      COLONOSCOPY  12/11/2018    Dr. Eddie Jimenez    ENDOSCOPIC RELEASE OF TRIGGER FINGER      ESOPHAGEAL MOTILITY STUDY  2015    Excision Lipoma left elbow      Exploration Laparotomy  2016    FLEXIBLE SIGMOIDOSCOPY      MEDIPORT INSERTION, SINGLE  03/15/2016    PH Study 24 Hour  2015       ALLERGIES:   Baclofen, Penicillins, Shellfish containing products, Clarithromycin, Iodinated contrast media, Nsaids (non-steroidal anti-inflammatory drug), Other omega-3s, Penicillin, Ace inhibitors, Azithromycin, Hydralazine analogues, Meloxicam, Prochlorperazine, and Tramadol    FAMILY HISTORY:   Reviewed and negative    SOCIAL HISTORY:     Social History     Tobacco Use    Smoking status: Never    Smokeless tobacco: Never   Substance Use Topics    Alcohol use: Not Currently        HOME MEDICATIONS:     Prior to Admission medications    Medication Sig Start Date End Date Taking? Authorizing Provider   apixaban (ELIQUIS) 2.5 mg Tab Take 1 tablet (2.5 mg total) by mouth 2 (two) times daily. 10/25/23  Yes Natalie Meyers MD   carvediloL (COREG) 3.125 MG tablet Take 1 tablet (3.125 mg total) by mouth 2 (two) times daily with meals. 6/30/22 3/26/24 Yes Connor Ku FNP   colestipoL (COLESTID)  1 gram Tab TAKE 1 TABLET TWICE A DAY 12/28/22  Yes Ashly Moreau FNP   DULoxetine (CYMBALTA) 60 MG capsule Take 60 mg by mouth. 3/29/22  Yes Provider, Historical   famotidine (PEPCID) 40 MG tablet Take 40 mg by mouth.   Yes Provider, Historical   HEMADY 20 mg Tab TAKE 1 TABLET BY MOUTH EVERY WEEK 2/26/24  Yes Ashly Moreau FNP   lenalidomide (REVLIMID) 5 mg Cap TAKE 1 CAPSULE BY MOUTH EVERY OTHER DAY. 3/1/24  Yes Ashly Moreau FNP   letrozole (FEMARA) 2.5 mg Tab Take 1 tablet by mouth once daily. 8/23/21  Yes Provider, Historical   levocetirizine (XYZAL) 5 MG tablet Take 1 tablet (5 mg total) by mouth every evening. 10/3/23  Yes Connor Ku FNP   methadone (DOLOPHINE) 10 MG tablet Take 10 mg by mouth every 8 (eight) hours while awake. 10/4/21  Yes Provider, Historical   omeprazole (PRILOSEC) 20 MG capsule TAKE 1 CAPSULE(20 MG) BY MOUTH EVERY DAY 6/21/23  Yes Connor Ku FNP   ondansetron (ZOFRAN-ODT) 8 MG TbDL Take 1 tablet (8 mg total) by mouth every 6 (six) hours as needed. 3/6/24 4/5/24 Yes Natalie Meyers MD   oxyCODONE (ROXICODONE) 10 mg Tab immediate release tablet Take 1 tablet (10 mg total) by mouth every 12 (twelve) hours as needed. 1/28/23  Yes Sher Manrique MD   potassium chloride (KLOR-CON) 10 MEQ TbSR TAKE 1 TABLET BY MOUTH TWICE DAILY 5/22/23  Yes Natalie Meyers MD   rosuvastatin (CRESTOR) 10 MG tablet Take 10 mg by mouth Daily. 8/25/21  Yes Provider, Historical   triamterene-hydrochlorothiazide 37.5-25 mg (MAXZIDE-25) 37.5-25 mg per tablet Take 1 tablet by mouth once daily. 3/1/24 3/1/25 Yes Adry Rosario MD   multivitamin (THERAGRAN) per tablet Take 1 tablet by mouth once daily.    Provider, Historical   ondansetron (ZOFRAN) 8 MG tablet Take 1 tablet (8 mg total) by mouth every 8 (eight) hours as needed for Nausea. 3/14/24   Natalei Meyers MD   tretinoin (RETIN-A) 0.1 % cream Apply 1 application topically every evening. 1/4/22   Provider, Historical        REVIEW OF SYSTEMS:   Except as documented, all other systems reviewed and negative     PHYSICAL EXAM:     VITAL SIGNS: 24 HRS MIN & MAX LAST   Temp  Min: 98.6 °F (37 °C)  Max: 100 °F (37.8 °C) 99.2 °F (37.3 °C)   BP  Min: 115/69  Max: 168/86 117/72   Pulse  Min: 84  Max: 135  84   Resp  Min: 17  Max: 30 (!) 21   SpO2  Min: 92 %  Max: 99 % 96 %     General appearance:   Alert, no acute distress  HENT: Atraumatic head. Moist mucous membranes of oral cavity.  Eyes: Normal extraocular movements.   Neck: Supple.   Lungs: Clear to auscultation bilaterally.  Some bilateral expiratory wheeze   Heart: Tachycardia, trace bilateral ankle edema   Abdomen: Soft, non-distended, non-tender.   Extremities: No cyanosis, clubbing, or edema.  Skin: No Rash.   Neuro: Nonfocal   Psych/mental status: Appropriate mood and affect. Responds appropriately to questions.     LABS AND IMAGING:     Recent Labs   Lab 03/26/24 0233   WBC 5.88  6.01   RBC 3.51*   HGB 9.9*   HCT 32.1*   MCV 91.5   MCH 28.2   MCHC 30.8*   RDW 17.2*      MPV 9.8       Recent Labs   Lab 03/26/24 0232 03/26/24  0350     --    K 3.4*  --    CO2 18*  --    BUN 30.3*  --    CREATININE 2.38*  --    CALCIUM 9.4  --    PH  --  7.490*   ALBUMIN 2.8*  --    ALKPHOS 85  --    ALT 67*  --    AST 20  --    BILITOT 0.7  --        Microbiology Results (last 7 days)       Procedure Component Value Units Date/Time    Blood culture x two cultures. Draw prior to antibiotics. [[id number]] Collected: 03/26/24 0322    Order Status: Resulted Specimen: Blood Updated: 03/26/24 0341    Blood culture x two cultures. Draw prior to antibiotics. [[id number]] Collected: 03/26/24 0232    Order Status: Resulted Specimen: Blood Updated: 03/26/24 0251             X-Ray Chest AP Portable  Narrative: EXAMINATION:  XR CHEST AP PORTABLE    CLINICAL HISTORY:  Sepsis;    COMPARISON:  25 February 2024    FINDINGS:  Frontal view of the chest was obtained. Heart is not enlarged.  Slightly  improved aeration of the lung bases compared to prior.  No new focal consolidation or pneumothorax.  Impression: No acute findings.  Slightly improved aeration of the lung bases.    Electronically signed by: Simon Holt  Date:    03/26/2024  Time:    06:05      ASSESSMENT & PLAN:     Acute shortness of breath  - no indication for antibiotics at this time.    -chest x-ray clear   -currently on room air and maintaining sats above 95%, will monitor for 24 hours and if continues to stay stable, possible discharge in a.m.     2. History of multiple myeloma , not achieve remission  -followed by [facility] and locally by Dr. Meyers    3. Hypertension   -home medications have been reviewed and will be resumed as appropriate      4.  History of atrial fibrillation  -currently rate and rhythm controlled      5. Overlapping malignant neoplasm breast, ER positive     6. Amyloidosis-with tongue and: Involvement  -status post ex lap for toxic megacolon     7. Acute on  CKD stage IIIA  -monitor renal indices closely   -avoid nephrotoxic medications       VTE Prophylaxis: Eliquis    Patient condition:  Fair   __________________________________________________________________________  INPATIENT LIST OF MEDICATIONS     Scheduled Meds:   albuterol-ipratropium  3 mL Nebulization Q4H    apixaban  2.5 mg Oral BID    carvediloL  3.125 mg Oral BID WM    colestipoL  1 g Oral BID    dexAMETHasone  1 tablet Oral Weekly    DULoxetine  60 mg Oral Daily    famotidine  40 mg Oral BID    lenalidomide   Oral Daily    letrozole  2.5 mg Oral Daily    methadone  10 mg Oral Q8H WA    methylPREDNISolone sodium succinate  80 mg Intravenous Q8H    potassium chloride  10 mEq Oral BID    sodium chloride 0.9% 250 mL flush bag   Intravenous 1 time in Clinic/HOD    triamterene-hydrochlorothiazide 37.5-25 mg  1 tablet Oral Daily     Continuous Infusions:   lactated ringers 125 mL/hr at 03/26/24 0750     PRN Meds:.heparin, porcine (PF), heparin, porcine (PF),  melatonin, ondansetron, ondansetron, oxyCODONE      All diagnosis and differential diagnosis have been reviewed; assessment and plan has been documented; I have personally reviewed the labs and test results that are presently available; I have reviewed the patients medication list; I have reviewed the consulting providers response and recommendations. I have reviewed or attempted to review medical records based upon their availability.    All of the patient and family questions have been addressed and answered. Patient's is agreeable to the above stated plan. I will continue to monitor closely and make adjustments to medical management as needed.    If patient was admitted under observational status it is with my approval/permission.      Adry Rosario MD   03/26/2024

## 2024-03-27 PROCEDURE — 25000242 PHARM REV CODE 250 ALT 637 W/ HCPCS: Performed by: EMERGENCY MEDICINE

## 2024-03-27 PROCEDURE — 99233 SBSQ HOSP IP/OBS HIGH 50: CPT | Mod: ,,, | Performed by: INTERNAL MEDICINE

## 2024-03-27 PROCEDURE — 63600175 PHARM REV CODE 636 W HCPCS: Performed by: EMERGENCY MEDICINE

## 2024-03-27 PROCEDURE — 99900035 HC TECH TIME PER 15 MIN (STAT)

## 2024-03-27 PROCEDURE — 25000003 PHARM REV CODE 250: Performed by: INTERNAL MEDICINE

## 2024-03-27 PROCEDURE — 94760 N-INVAS EAR/PLS OXIMETRY 1: CPT

## 2024-03-27 PROCEDURE — 99900031 HC PATIENT EDUCATION (STAT)

## 2024-03-27 PROCEDURE — 94640 AIRWAY INHALATION TREATMENT: CPT

## 2024-03-27 PROCEDURE — 21400001 HC TELEMETRY ROOM

## 2024-03-27 RX ORDER — BENZONATATE 200 MG/1
200 CAPSULE ORAL 3 TIMES DAILY PRN
Qty: 30 CAPSULE | Refills: 0 | Status: SHIPPED | OUTPATIENT
Start: 2024-03-27 | End: 2024-04-06

## 2024-03-27 RX ORDER — IPRATROPIUM BROMIDE AND ALBUTEROL SULFATE 2.5; .5 MG/3ML; MG/3ML
3 SOLUTION RESPIRATORY (INHALATION) EVERY 4 HOURS
Qty: 75 ML | Refills: 0 | Status: SHIPPED | OUTPATIENT
Start: 2024-03-27 | End: 2025-03-27

## 2024-03-27 RX ORDER — CODEINE PHOSPHATE AND GUAIFENESIN 10; 100 MG/5ML; MG/5ML
5 SOLUTION ORAL EVERY 4 HOURS PRN
Status: DISCONTINUED | OUTPATIENT
Start: 2024-03-27 | End: 2024-03-28 | Stop reason: HOSPADM

## 2024-03-27 RX ORDER — LENALIDOMIDE 5 MG/1
5 CAPSULE ORAL EVERY OTHER DAY
Status: DISCONTINUED | OUTPATIENT
Start: 2024-03-28 | End: 2024-03-28 | Stop reason: HOSPADM

## 2024-03-27 RX ORDER — BENZONATATE 100 MG/1
200 CAPSULE ORAL 3 TIMES DAILY PRN
Status: DISCONTINUED | OUTPATIENT
Start: 2024-03-27 | End: 2024-03-28 | Stop reason: HOSPADM

## 2024-03-27 RX ADMIN — BENZONATATE 200 MG: 100 CAPSULE ORAL at 08:03

## 2024-03-27 RX ADMIN — APIXABAN 2.5 MG: 2.5 TABLET, FILM COATED ORAL at 09:03

## 2024-03-27 RX ADMIN — METHADONE HYDROCHLORIDE 10 MG: 10 TABLET ORAL at 10:03

## 2024-03-27 RX ADMIN — IPRATROPIUM BROMIDE AND ALBUTEROL SULFATE 3 ML: 2.5; .5 SOLUTION RESPIRATORY (INHALATION) at 04:03

## 2024-03-27 RX ADMIN — IPRATROPIUM BROMIDE AND ALBUTEROL SULFATE 3 ML: 2.5; .5 SOLUTION RESPIRATORY (INHALATION) at 12:03

## 2024-03-27 RX ADMIN — FAMOTIDINE 40 MG: 20 TABLET, FILM COATED ORAL at 09:03

## 2024-03-27 RX ADMIN — APIXABAN 2.5 MG: 2.5 TABLET, FILM COATED ORAL at 08:03

## 2024-03-27 RX ADMIN — METHYLPREDNISOLONE SODIUM SUCCINATE 80 MG: 125 INJECTION, POWDER, FOR SOLUTION INTRAMUSCULAR; INTRAVENOUS at 11:03

## 2024-03-27 RX ADMIN — CARVEDILOL 3.12 MG: 3.12 TABLET, FILM COATED ORAL at 04:03

## 2024-03-27 RX ADMIN — IPRATROPIUM BROMIDE AND ALBUTEROL SULFATE 3 ML: 2.5; .5 SOLUTION RESPIRATORY (INHALATION) at 08:03

## 2024-03-27 RX ADMIN — COLESTIPOL HYDROCHLORIDE 1 G: 1 TABLET, FILM COATED ORAL at 09:03

## 2024-03-27 RX ADMIN — IPRATROPIUM BROMIDE AND ALBUTEROL SULFATE 3 ML: 2.5; .5 SOLUTION RESPIRATORY (INHALATION) at 11:03

## 2024-03-27 RX ADMIN — DULOXETINE HYDROCHLORIDE 60 MG: 30 CAPSULE, DELAYED RELEASE ORAL at 09:03

## 2024-03-27 RX ADMIN — POTASSIUM CHLORIDE 10 MEQ: 750 TABLET, FILM COATED, EXTENDED RELEASE ORAL at 09:03

## 2024-03-27 RX ADMIN — COLESTIPOL HYDROCHLORIDE 1 G: 1 TABLET, FILM COATED ORAL at 08:03

## 2024-03-27 RX ADMIN — POTASSIUM CHLORIDE 10 MEQ: 750 TABLET, FILM COATED, EXTENDED RELEASE ORAL at 08:03

## 2024-03-27 RX ADMIN — CARVEDILOL 3.12 MG: 3.12 TABLET, FILM COATED ORAL at 08:03

## 2024-03-27 RX ADMIN — BENZONATATE 200 MG: 100 CAPSULE ORAL at 11:03

## 2024-03-27 RX ADMIN — TRIAMTERENE AND HYDROCHLOROTHIAZIDE 1 TABLET: 37.5; 25 TABLET ORAL at 09:03

## 2024-03-27 RX ADMIN — METHYLPREDNISOLONE SODIUM SUCCINATE 80 MG: 125 INJECTION, POWDER, FOR SOLUTION INTRAMUSCULAR; INTRAVENOUS at 04:03

## 2024-03-27 RX ADMIN — METHYLPREDNISOLONE SODIUM SUCCINATE 80 MG: 125 INJECTION, POWDER, FOR SOLUTION INTRAMUSCULAR; INTRAVENOUS at 08:03

## 2024-03-27 RX ADMIN — GUAIFENESIN AND CODEINE PHOSPHATE 5 ML: 100; 10 SOLUTION ORAL at 04:03

## 2024-03-27 RX ADMIN — ONDANSETRON 4 MG: 2 INJECTION INTRAMUSCULAR; INTRAVENOUS at 11:03

## 2024-03-27 RX ADMIN — SODIUM CHLORIDE, POTASSIUM CHLORIDE, SODIUM LACTATE AND CALCIUM CHLORIDE: 600; 310; 30; 20 INJECTION, SOLUTION INTRAVENOUS at 12:03

## 2024-03-27 NOTE — PLAN OF CARE
Nebulizer order sent to [employer], spoke to Susanna who will have it delivered to patient's house at [place].

## 2024-03-27 NOTE — PROGRESS NOTES
[facility] Medicine Progress Note        Chief Complaint: Inpatient Follow-up for SOB and cough     HPI:   Ninfa is a 58-year-old female with history of amyloidosis and myelodysplastic syndrome that presented to the ED with complaints of shortness a breath.  She was recently discharged after being admitted for pneumonia and sepsis.    Apparently has been having worsening shortness of breath along with some cough and wheezing.  No complaints of chest pain or nausea.  No complaints of fever or chills.    Workup in the ED consistent with a CBC consistent with myelodysplastic syndrome on.  WBC count is in normal range at 6.01.  Potassium mildly low at 3.4.  Chest x-ray was essentially normal with no acute findings.    Patient admitted under observation considering her significant medical history.  At the time of my visit in the ED she was maintaining her oxygen saturation in the upper 90s on room air.  Overall feeling much better and appeared to be clinically much better than her previous hospitalization.    Interval Hx:   The patient was seen and examined.  Her mother was at bedside at the time.  Shortness of breaths significantly improved and patient eager to go home.  However still having significant coughing spells.  Nebulizing seems to be helping.  Discussed getting nebulizer ordered for discharge as well and will monitor for 24 more hours and if continues to stay stable, possible discharge in a.m..  No other acute needs or complaints.      Case was discussed with patient's nurse and  on the floor.    Objective/physical exam:  General: In no acute distress, afebrile  Chest:  Scattered expiratory wheeze bilaterally    Heart: RRR, +S1, S2, no appreciable murmur  Abdomen: Soft, nontender, BS +  MSK: Warm, no lower extremity edema, no clubbing or cyanosis  Neurologic: Alert and oriented x4, nonfocal     VITAL SIGNS: 24 HRS MIN & MAX LAST   Temp  Min: 97.8 °F (36.6  °C)  Max: 99.2 °F (37.3 °C) 97.8 °F (36.6 °C)   BP  Min: 115/69  Max: 158/89 135/75   Pulse  Min: 82  Max: 105  105   Resp  Min: 18  Max: 23 18   SpO2  Min: 94 %  Max: 100 % 96 %     I have reviewed the following labs:  Recent Labs   Lab 03/26/24 0233   WBC 5.88  6.01   RBC 3.51*   HGB 9.9*   HCT 32.1*   MCV 91.5   MCH 28.2   MCHC 30.8*   RDW 17.2*      MPV 9.8     Recent Labs   Lab 03/26/24  0232 03/26/24  0350     --    K 3.4*  --    CO2 18*  --    BUN 30.3*  --    CREATININE 2.38*  --    CALCIUM 9.4  --    PH  --  7.490*   ALBUMIN 2.8*  --    ALKPHOS 85  --    ALT 67*  --    AST 20  --    BILITOT 0.7  --      Microbiology Results (last 7 days)       Procedure Component Value Units Date/Time    Blood culture x two cultures. Draw prior to antibiotics. [[id number]]  (Normal) Collected: 03/26/24 0232    Order Status: Completed Specimen: Blood Updated: 03/27/24 0409     CULTURE, BLOOD ([facility]) No Growth At 24 Hours    Blood culture x two cultures. Draw prior to antibiotics. [[id number]]  (Normal) Collected: 03/26/24 0322    Order Status: Completed Specimen: Blood Updated: 03/27/24 0409     CULTURE, BLOOD ([facility]) No Growth At 24 Hours             See below for Radiology    Scheduled Med:   albuterol-ipratropium  3 mL Nebulization Q4H    apixaban  2.5 mg Oral BID    carvediloL  3.125 mg Oral BID WM    colestipoL  1 g Oral BID    dexAMETHasone  20 mg Oral Weekly    DULoxetine  60 mg Oral Daily    famotidine  40 mg Oral Daily    lenalidomide   Oral Daily    letrozole  2.5 mg Oral Daily    methadone  10 mg Oral Q8H WA    methylPREDNISolone sodium succinate  80 mg Intravenous Q8H    potassium chloride  10 mEq Oral BID    triamterene-hydrochlorothiazide 37.5-25 mg  1 tablet Oral Daily      Continuous Infusions:   lactated ringers 125 mL/hr at 03/27/24 0022      PRN Meds:  melatonin, ondansetron, ondansetron, oxyCODONE     Assessment/Plan:  Acute shortness of breath with bronchitis  - no indication for antibiotics  at this time.    -chest x-ray clear   -currently on room air and maintaining sats above 95%, will monitor for 24 more hours and if continues to stay stable, possible discharge in a.m.  -DuoNeb seem to be helping   -will arrange for nebulizer at home, would help patient did continue b.i.d.  -Tessalon Perles/Robitussin for cough     2. History of multiple myeloma , not achieve remission  -followed by [facility] and locally by Dr. Meyers     3. Hypertension   -home medications have been reviewed and will be resumed as appropriate      4.  History of atrial fibrillation  -currently rate and rhythm controlled      5. Overlapping malignant neoplasm breast, ER positive     6. Amyloidosis-with tongue and: Involvement  -status post ex lap for toxic megacolon     7. Acute on  CKD stage IIIA  -monitor renal indices closely   -avoid nephrotoxic medications     VTE prophylaxis:  Eliquis    Patient condition:  Stable  Anticipated discharge and Disposition:   Home with family, possibly in a.m.    All diagnosis and differential diagnosis have been reviewed; assessment and plan has been documented; I have personally reviewed the labs and test results that are presently available; I have reviewed the patients medication list; I have reviewed the consulting providers response and recommendations. I have reviewed or attempted to review medical records based upon their availability    All of the patient's questions have been  addressed and answered. Patient's is agreeable to the above stated plan. I will continue to monitor closely and make adjustments to medical management as needed.  _____________________________________________________________________    Nutrition Status:    Radiology:  I have personally reviewed the following imaging and agree with the radiologist.     X-Ray Chest AP Portable  Narrative: EXAMINATION:  XR CHEST AP PORTABLE    CLINICAL HISTORY:  Sepsis;    COMPARISON:  25 February 2024    FINDINGS:  Frontal view of the  chest was obtained. Heart is not enlarged.  Slightly improved aeration of the lung bases compared to prior.  No new focal consolidation or pneumothorax.  Impression: No acute findings.  Slightly improved aeration of the lung bases.    Electronically signed by: Simon Holt  Date:    03/26/2024  Time:    06:05      Adry Rosario MD  Department of Hospital Medicine   [facility]   03/27/2024

## 2024-03-28 ENCOUNTER — PATIENT MESSAGE (OUTPATIENT)
Dept: INTERNAL MEDICINE | Facility: CLINIC | Age: 59
End: 2024-03-28
Payer: MEDICARE

## 2024-03-28 ENCOUNTER — TELEPHONE (OUTPATIENT)
Dept: INTERNAL MEDICINE | Facility: CLINIC | Age: 59
End: 2024-03-28
Payer: MEDICARE

## 2024-03-28 VITALS
HEIGHT: 64 IN | WEIGHT: 168 LBS | HEART RATE: 106 BPM | OXYGEN SATURATION: 95 % | SYSTOLIC BLOOD PRESSURE: 120 MMHG | TEMPERATURE: 98 F | DIASTOLIC BLOOD PRESSURE: 74 MMHG | BODY MASS INDEX: 28.68 KG/M2 | RESPIRATION RATE: 20 BRPM

## 2024-03-28 DIAGNOSIS — C90.01 MULTIPLE MYELOMA IN REMISSION: ICD-10-CM

## 2024-03-28 LAB
ABS NEUT (OLG): 2.71 X10(3)/MCL (ref 2.1–9.2)
ALBUMIN SERPL-MCNC: 2.3 G/DL (ref 3.4–4.8)
ALBUMIN/GLOB SERPL: 0.8 RATIO (ref 1.1–2)
ALP SERPL-CCNC: 68 UNIT/L (ref 40–150)
ALT SERPL-CCNC: 35 UNIT/L (ref 0–55)
AST SERPL-CCNC: 12 UNIT/L (ref 5–34)
BILIRUB SERPL-MCNC: 0.2 MG/DL
BUN SERPL-MCNC: 38.5 MG/DL (ref 9.8–20.1)
CALCIUM SERPL-MCNC: 8.1 MG/DL (ref 8.4–10.2)
CHLORIDE SERPL-SCNC: 106 MMOL/L (ref 98–107)
CO2 SERPL-SCNC: 24 MMOL/L (ref 23–31)
CREAT SERPL-MCNC: 2.06 MG/DL (ref 0.55–1.02)
ERYTHROCYTE [DISTWIDTH] IN BLOOD BY AUTOMATED COUNT: 17.3 % (ref 11.5–17)
GFR SERPLBLD CREATININE-BSD FMLA CKD-EPI: 27 MLS/MIN/1.73/M2
GLOBULIN SER-MCNC: 2.8 GM/DL (ref 2.4–3.5)
GLUCOSE SERPL-MCNC: 120 MG/DL (ref 82–115)
HCT VFR BLD AUTO: 24.3 % (ref 37–47)
HGB BLD-MCNC: 7.5 G/DL (ref 12–16)
INSTRUMENT WBC (OLG): 3.76 X10(3)/MCL
LYMPHOCYTES NFR BLD MANUAL: 0.34 X10(3)/MCL
LYMPHOCYTES NFR BLD MANUAL: 9 %
MCH RBC QN AUTO: 28.3 PG (ref 27–31)
MCHC RBC AUTO-ENTMCNC: 30.9 G/DL (ref 33–36)
MCV RBC AUTO: 91.7 FL (ref 80–94)
METAMYELOCYTES NFR BLD MANUAL: 2 %
MONOCYTES NFR BLD MANUAL: 0.64 X10(3)/MCL (ref 0.1–1.3)
MONOCYTES NFR BLD MANUAL: 17 %
NEUTROPHILS NFR BLD MANUAL: 72 %
NRBC BLD AUTO-RTO: 0.5 %
NRBC BLD MANUAL-RTO: 2 %
PLATELET # BLD AUTO: 216 X10(3)/MCL (ref 130–400)
PLATELET # BLD EST: ADEQUATE 10*3/UL
PMV BLD AUTO: 9.7 FL (ref 7.4–10.4)
POTASSIUM SERPL-SCNC: 4.5 MMOL/L (ref 3.5–5.1)
PROT SERPL-MCNC: 5.1 GM/DL (ref 5.8–7.6)
RBC # BLD AUTO: 2.65 X10(6)/MCL (ref 4.2–5.4)
RBC MORPH BLD: ABNORMAL
SODIUM SERPL-SCNC: 138 MMOL/L (ref 136–145)
TEAR DROP CELL (OLG): ABNORMAL
WBC # SPEC AUTO: 3.76 X10(3)/MCL (ref 4.5–11.5)

## 2024-03-28 PROCEDURE — 99900035 HC TECH TIME PER 15 MIN (STAT)

## 2024-03-28 PROCEDURE — 63600175 PHARM REV CODE 636 W HCPCS: Performed by: EMERGENCY MEDICINE

## 2024-03-28 PROCEDURE — 85007 BL SMEAR W/DIFF WBC COUNT: CPT | Performed by: INTERNAL MEDICINE

## 2024-03-28 PROCEDURE — 94640 AIRWAY INHALATION TREATMENT: CPT

## 2024-03-28 PROCEDURE — 94760 N-INVAS EAR/PLS OXIMETRY 1: CPT

## 2024-03-28 PROCEDURE — 25000242 PHARM REV CODE 250 ALT 637 W/ HCPCS: Performed by: EMERGENCY MEDICINE

## 2024-03-28 PROCEDURE — 99239 HOSP IP/OBS DSCHRG MGMT >30: CPT | Mod: ,,, | Performed by: INTERNAL MEDICINE

## 2024-03-28 PROCEDURE — 25000003 PHARM REV CODE 250: Performed by: INTERNAL MEDICINE

## 2024-03-28 PROCEDURE — 80053 COMPREHEN METABOLIC PANEL: CPT | Performed by: INTERNAL MEDICINE

## 2024-03-28 PROCEDURE — 99900031 HC PATIENT EDUCATION (STAT)

## 2024-03-28 RX ORDER — LENALIDOMIDE 5 MG/1
CAPSULE ORAL
Qty: 14 EACH | Refills: 0 | Status: SHIPPED | OUTPATIENT
Start: 2024-03-28 | End: 2024-04-01

## 2024-03-28 RX ADMIN — METHYLPREDNISOLONE SODIUM SUCCINATE 80 MG: 125 INJECTION, POWDER, FOR SOLUTION INTRAMUSCULAR; INTRAVENOUS at 04:03

## 2024-03-28 RX ADMIN — POTASSIUM CHLORIDE 10 MEQ: 750 TABLET, FILM COATED, EXTENDED RELEASE ORAL at 08:03

## 2024-03-28 RX ADMIN — GUAIFENESIN AND CODEINE PHOSPHATE 5 ML: 100; 10 SOLUTION ORAL at 12:03

## 2024-03-28 RX ADMIN — METHADONE HYDROCHLORIDE 10 MG: 10 TABLET ORAL at 08:03

## 2024-03-28 RX ADMIN — BENZONATATE 200 MG: 100 CAPSULE ORAL at 08:03

## 2024-03-28 RX ADMIN — APIXABAN 2.5 MG: 2.5 TABLET, FILM COATED ORAL at 08:03

## 2024-03-28 RX ADMIN — IPRATROPIUM BROMIDE AND ALBUTEROL SULFATE 3 ML: 2.5; .5 SOLUTION RESPIRATORY (INHALATION) at 03:03

## 2024-03-28 RX ADMIN — SODIUM CHLORIDE, POTASSIUM CHLORIDE, SODIUM LACTATE AND CALCIUM CHLORIDE: 600; 310; 30; 20 INJECTION, SOLUTION INTRAVENOUS at 12:03

## 2024-03-28 RX ADMIN — TRIAMTERENE AND HYDROCHLOROTHIAZIDE 1 TABLET: 37.5; 25 TABLET ORAL at 08:03

## 2024-03-28 RX ADMIN — COLESTIPOL HYDROCHLORIDE 1 G: 1 TABLET, FILM COATED ORAL at 08:03

## 2024-03-28 RX ADMIN — IPRATROPIUM BROMIDE AND ALBUTEROL SULFATE 3 ML: 2.5; .5 SOLUTION RESPIRATORY (INHALATION) at 08:03

## 2024-03-28 RX ADMIN — CARVEDILOL 3.12 MG: 3.12 TABLET, FILM COATED ORAL at 08:03

## 2024-03-28 RX ADMIN — METHYLPREDNISOLONE SODIUM SUCCINATE 80 MG: 125 INJECTION, POWDER, FOR SOLUTION INTRAMUSCULAR; INTRAVENOUS at 12:03

## 2024-03-28 RX ADMIN — IPRATROPIUM BROMIDE AND ALBUTEROL SULFATE 3 ML: 2.5; .5 SOLUTION RESPIRATORY (INHALATION) at 12:03

## 2024-03-28 RX ADMIN — BENZONATATE 200 MG: 100 CAPSULE ORAL at 04:03

## 2024-03-28 RX ADMIN — FAMOTIDINE 40 MG: 20 TABLET, FILM COATED ORAL at 08:03

## 2024-03-28 RX ADMIN — DULOXETINE HYDROCHLORIDE 60 MG: 30 CAPSULE, DELAYED RELEASE ORAL at 08:03

## 2024-03-28 NOTE — DISCHARGE SUMMARY
[facility] Medicine Discharge Summary    Admit Date: 3/26/2024  Discharge Date and Time: 3/28/947719:19 PM  Admitting Physician:  Team  Discharging Physician: Adry Rosario MD.  Primary Care Physician: Adry Rosario MD  Consults: None    Discharge Diagnoses:  Cough and SOB     Hospital Course:   Ninfa is a 58-year-old female with history of amyloidosis and myelodysplastic syndrome that presented to the ED with complaints of shortness a breath.  She was recently discharged after being admitted for pneumonia and sepsis.    Apparently has been having worsening shortness of breath along with some cough and wheezing.  No complaints of chest pain or nausea.  No complaints of fever or chills.    Workup in the ED consistent with a CBC consistent with myelodysplastic syndrome on.  WBC count is in normal range at 6.01.  Potassium mildly low at 3.4.  Chest x-ray was essentially normal with no acute findings.    Patient admitted under observation considering her significant medical history.  At the time of my visit in the ED she was maintaining her oxygen saturation in the upper 90s on room air.  Overall feeling much better and appeared to be clinically much better than her previous hospitalization.  Did not need any antibiotics.  Will be discharging home on DuoNeb treatments as well as Tessalon Perles.  Her other medical comorbidities have remained stable.    H/H did show a drop from admission however patient did receive IV fluids and is probably dilutional as well as consistent with her diagnosis of MDS.  No indication for acute transfusion at this time, will keep a close follow-up with Hematology/Oncology    Pt was seen and examined on the day of discharge  Vitals:  VITAL SIGNS: 24 HRS MIN & MAX LAST   Temp  Min: 96.7 °F (35.9 °C)  Max: 98.4 °F (36.9 °C) 97.9 °F (36.6 °C)   BP  Min: 115/65  Max: 181/76 120/74   Pulse  Min: 71  Max: 115  106   Resp  Min: 18  Max: 20  20   SpO2  Min: 92 %  Max: 100 % 95 %       Physical Exam:  General: In no acute distress, afebrile  Chest:  Scattered expiratory wheeze bilaterally    Heart: RRR, +S1, S2, no appreciable murmur  Abdomen: Soft, nontender, BS +  MSK: Warm, no lower extremity edema, no clubbing or cyanosis  Neurologic: Alert and oriented x4, nonfocal     Procedures Performed: No admission procedures for hospital encounter.     Significant Diagnostic Studies: See Full reports for all details    Recent Labs   Lab 03/26/24 0233 03/28/24 0358   WBC 5.88  6.01 3.76  3.76*   RBC 3.51* 2.65*   HGB 9.9* 7.5*   HCT 32.1* 24.3*   MCV 91.5 91.7   MCH 28.2 28.3   MCHC 30.8* 30.9*   RDW 17.2* 17.3*    216   MPV 9.8 9.7       Recent Labs   Lab 03/26/24 0232 03/26/24 0350 03/28/24 0358     --  138   K 3.4*  --  4.5   CO2 18*  --  24   BUN 30.3*  --  38.5*   CREATININE 2.38*  --  2.06*   CALCIUM 9.4  --  8.1*   PH  --  7.490*  --    ALBUMIN 2.8*  --  2.3*   ALKPHOS 85  --  68   ALT 67*  --  35   AST 20  --  12   BILITOT 0.7  --  0.2        Microbiology Results (last 7 days)       Procedure Component Value Units Date/Time    Blood culture x two cultures. Draw prior to antibiotics. [[id number]]  (Normal) Collected: 03/26/24 0232    Order Status: Completed Specimen: Blood Updated: 03/28/24 0406     CULTURE, BLOOD ([facility]) No Growth At 48 Hours    Blood culture x two cultures. Draw prior to antibiotics. [[id number]]  (Normal) Collected: 03/26/24 0322    Order Status: Completed Specimen: Blood Updated: 03/28/24 0406     CULTURE, BLOOD ([facility]) No Growth At 48 Hours             X-Ray Chest AP Portable  Narrative: EXAMINATION:  XR CHEST AP PORTABLE    CLINICAL HISTORY:  Sepsis;    COMPARISON:  25 February 2024    FINDINGS:  Frontal view of the chest was obtained. Heart is not enlarged.  Slightly improved aeration of the lung bases compared to prior.  No new focal consolidation or pneumothorax.  Impression: No acute findings.  Slightly improved  aeration of the lung bases.    Electronically signed by: Simon Holt  Date:    03/26/2024  Time:    06:05         Medication List        START taking these medications      albuterol-ipratropium 2.5 mg-0.5 mg/3 mL nebulizer solution  Commonly known as: DUO-NEB  Take 3 mLs by nebulization every 4 (four) hours. Rescue     benzonatate 200 MG capsule  Commonly known as: TESSALON  Take 1 capsule (200 mg total) by mouth 3 (three) times daily as needed for Cough.            CONTINUE taking these medications      apixaban 2.5 mg Tab  Commonly known as: ELIQUIS  Take 1 tablet (2.5 mg total) by mouth 2 (two) times daily.     carvediloL 3.125 MG tablet  Commonly known as: COREG  Take 1 tablet (3.125 mg total) by mouth 2 (two) times daily with meals.     colestipoL 1 gram Tab  Commonly known as: COLESTID  TAKE 1 TABLET TWICE A DAY     DULoxetine 60 MG capsule  Commonly known as: CYMBALTA     famotidine 40 MG tablet  Commonly known as: PEPCID     HEMADY 20 mg Tab  Generic drug: dexAMETHasone  TAKE 1 TABLET BY MOUTH EVERY WEEK     letrozole 2.5 mg Tab  Commonly known as: FEMARA     levocetirizine 5 MG tablet  Commonly known as: XYZAL  Take 1 tablet (5 mg total) by mouth every evening.     methadone 10 MG tablet  Commonly known as: DOLOPHINE     multivitamin per tablet  Commonly known as: THERAGRAN     omeprazole 20 MG capsule  Commonly known as: PRILOSEC  TAKE 1 CAPSULE(20 MG) BY MOUTH EVERY DAY     ondansetron 8 MG tablet  Commonly known as: ZOFRAN  Take 1 tablet (8 mg total) by mouth every 8 (eight) hours as needed for Nausea.     ondansetron 8 MG Tbdl  Commonly known as: ZOFRAN-ODT  Take 1 tablet (8 mg total) by mouth every 6 (six) hours as needed.     oxyCODONE 10 mg Tab immediate release tablet  Commonly known as: ROXICODONE  Take 1 tablet (10 mg total) by mouth every 12 (twelve) hours as needed.     potassium chloride 10 MEQ Tbsr  Commonly known as: KLOR-CON  TAKE 1 TABLET BY MOUTH TWICE DAILY     rosuvastatin 10 MG  tablet  Commonly known as: CRESTOR     tretinoin 0.1 % cream  Commonly known as: RETIN-A     triamterene-hydrochlorothiazide 37.5-25 mg 37.5-25 mg per tablet  Commonly known as: MAXZIDE-25  Take 1 tablet by mouth once daily.            ASK your doctor about these medications      lenalidomide 5 mg Cap  Commonly known as: REVLIMID  TAKE 1 CAPSULE BY MOUTH EVERY OTHER DAY..  Ask about: Which instructions should I use?               Where to Get Your Medications        These medications were sent to [facility] - [place], [place] - [address]  [address], [facility] [address]      Phone: [phone]   lenalidomide 5 mg Cap       These medications were sent to [id number] [address] & [address] [address]      Hours: 24-hours Phone: [phone]   albuterol-ipratropium 2.5 mg-0.5 mg/3 mL nebulizer solution  benzonatate 200 MG capsule          Explained in detail to the patient about the discharge plan, medications, and follow-up visits. Pt understands and agrees with the treatment plan  Discharge Disposition: Home or Self Care   Discharged Condition: stable  Diet-  Low-sodium  Dietary Orders (From admission, onward)       Start     Ordered    03/26/24 0643  Diet Adult Regular  (Diet/Nutrition [facility])  Diet effective now         03/26/24 0642                   Medications Per DC med rec  Activities as tolerated    For further questions contact hospitalist office    Discharge time 33 minutes    For worsening symptoms, chest pain, shortness of breath, increased abdominal pain, high grade fever, stroke or stroke like symptoms, immediately go to the nearest Emergency Room or call 911 as soon as possible.      Adry Anguiano M.D on 3/28/2024. at 12:19 PM.

## 2024-03-28 NOTE — PROGRESS NOTES
Pt and family member received education on home meds and were told about follow-up appointments to attend. Both acknowledged understanding.

## 2024-03-28 NOTE — PLAN OF CARE
Problem: Adult Inpatient Plan of Care  Goal: Plan of Care Review  Outcome: Met  Goal: Patient-Specific Goal (Individualized)  Outcome: Met  Goal: Absence of Hospital-Acquired Illness or Injury  Outcome: Met  Goal: Optimal Comfort and Wellbeing  Outcome: Met  Goal: Readiness for Transition of Care  Outcome: Met     Problem: Skin Injury Risk Increased  Goal: Skin Health and Integrity  Outcome: Met     Problem: Adjustment to Illness (Sepsis/Septic Shock)  Goal: Optimal Coping  Outcome: Met     Problem: Bleeding (Sepsis/Septic Shock)  Goal: Absence of Bleeding  Outcome: Met     Problem: Glycemic Control Impaired (Sepsis/Septic Shock)  Goal: Blood Glucose Level Within Desired Range  Outcome: Met     Problem: Infection Progression (Sepsis/Septic Shock)  Goal: Absence of Infection Signs and Symptoms  Outcome: Met     Problem: Nutrition Impaired (Sepsis/Septic Shock)  Goal: Optimal Nutrition Intake  Outcome: Met

## 2024-03-28 NOTE — PLAN OF CARE
Notified Susanna with [facility] regarding patient discharge today. She will set up nebulizer delivery to patient's home.

## 2024-03-28 NOTE — TELEPHONE ENCOUNTER
----- Message from Stewart Lion MA sent at 3/28/2024  1:14 PM CDT -----  Regarding: Raya 4/4 @ 1:40PM  Are there any outstanding tasks in chart? No labs needed     Is there any documentation of tasks? No    Has the pt seen another physician, been to ER, [facility], or admitted to hospital since last visit?    Has the pt done blood work or imaging since last visit?     5. PLEASE HAVE PATIENT BRING MEDICATION LIST OR BOTTLES TO EVERY OFFICE VISIT

## 2024-03-31 LAB
BACTERIA BLD CULT: NORMAL
BACTERIA BLD CULT: NORMAL

## 2024-04-01 ENCOUNTER — HOSPITAL ENCOUNTER (INPATIENT)
Facility: HOSPITAL | Age: 59
LOS: 1 days | Discharge: HOME | DRG: 194 | End: 2024-04-02
Attending: STUDENT IN AN ORGANIZED HEALTH CARE EDUCATION/TRAINING PROGRAM | Admitting: INTERNAL MEDICINE
Payer: MEDICARE

## 2024-04-01 DIAGNOSIS — J18.9 PNEUMONIA OF LEFT LOWER LOBE DUE TO INFECTIOUS ORGANISM: Primary | ICD-10-CM

## 2024-04-01 DIAGNOSIS — R06.02 SOB (SHORTNESS OF BREATH): ICD-10-CM

## 2024-04-01 DIAGNOSIS — J11.1 INFLUENZA: ICD-10-CM

## 2024-04-01 DIAGNOSIS — B34.8 PARAINFLUENZA: ICD-10-CM

## 2024-04-01 PROBLEM — J14 PNEUMONIA OF LEFT LOWER LOBE DUE TO HAEMOPHILUS INFLUENZAE: Status: ACTIVE | Noted: 2024-04-01

## 2024-04-01 PROBLEM — J10.1 INFLUENZA B: Status: ACTIVE | Noted: 2024-04-01

## 2024-04-01 LAB
ABS NEUT (OLG): 6.5 X10(3)/MCL (ref 2.1–9.2)
ALBUMIN SERPL-MCNC: 2.6 G/DL (ref 3.4–4.8)
ALBUMIN/GLOB SERPL: 0.8 RATIO (ref 1.1–2)
ALP SERPL-CCNC: 165 UNIT/L (ref 40–150)
ALT SERPL-CCNC: 93 UNIT/L (ref 0–55)
ANISOCYTOSIS BLD QL SMEAR: ABNORMAL
AST SERPL-CCNC: 47 UNIT/L (ref 5–34)
B PERT.PT PRMT NPH QL NAA+NON-PROBE: NOT DETECTED
BILIRUB SERPL-MCNC: 0.8 MG/DL
BNP BLD-MCNC: 45.5 PG/ML
BUN SERPL-MCNC: 35.1 MG/DL (ref 9.8–20.1)
C PNEUM DNA NPH QL NAA+NON-PROBE: NOT DETECTED
CALCIUM SERPL-MCNC: 8.8 MG/DL (ref 8.4–10.2)
CHLORIDE SERPL-SCNC: 106 MMOL/L (ref 98–107)
CO2 SERPL-SCNC: 22 MMOL/L (ref 23–31)
CREAT SERPL-MCNC: 1.97 MG/DL (ref 0.55–1.02)
ERYTHROCYTE [DISTWIDTH] IN BLOOD BY AUTOMATED COUNT: 17.2 % (ref 11.5–17)
FLUAV AG UPPER RESP QL IA.RAPID: NOT DETECTED
FLUBV AG UPPER RESP QL IA.RAPID: DETECTED
GFR SERPLBLD CREATININE-BSD FMLA CKD-EPI: 29 MLS/MIN/1.73/M2
GLOBULIN SER-MCNC: 3.3 GM/DL (ref 2.4–3.5)
GLUCOSE SERPL-MCNC: 104 MG/DL (ref 82–115)
HADV DNA NPH QL NAA+NON-PROBE: NOT DETECTED
HCOV 229E RNA NPH QL NAA+NON-PROBE: NOT DETECTED
HCOV HKU1 RNA NPH QL NAA+NON-PROBE: NOT DETECTED
HCOV NL63 RNA NPH QL NAA+NON-PROBE: NOT DETECTED
HCOV OC43 RNA NPH QL NAA+NON-PROBE: NOT DETECTED
HCT VFR BLD AUTO: 27.8 % (ref 37–47)
HGB BLD-MCNC: 8.6 G/DL (ref 12–16)
HMPV RNA NPH QL NAA+NON-PROBE: NOT DETECTED
HPIV1 RNA NPH QL NAA+NON-PROBE: NOT DETECTED
HPIV2 RNA NPH QL NAA+NON-PROBE: NOT DETECTED
HPIV3 RNA NPH QL NAA+NON-PROBE: DETECTED
HPIV4 RNA NPH QL NAA+NON-PROBE: NOT DETECTED
INSTRUMENT WBC (OLG): 7.93 X10(3)/MCL
LACTATE SERPL-SCNC: 1.2 MMOL/L (ref 0.5–2.2)
LYMPHOCYTES NFR BLD MANUAL: 0.79 X10(3)/MCL
LYMPHOCYTES NFR BLD MANUAL: 10 %
M PNEUMO DNA NPH QL NAA+NON-PROBE: NOT DETECTED
MACROCYTES BLD QL SMEAR: ABNORMAL
MCH RBC QN AUTO: 28.2 PG (ref 27–31)
MCHC RBC AUTO-ENTMCNC: 30.9 G/DL (ref 33–36)
MCV RBC AUTO: 91.1 FL (ref 80–94)
MONOCYTES NFR BLD MANUAL: 0.63 X10(3)/MCL (ref 0.1–1.3)
MONOCYTES NFR BLD MANUAL: 8 %
MRSA PCR SCRN (OHS): NOT DETECTED
MYELOCYTES NFR BLD MANUAL: 1 %
NEUTROPHILS NFR BLD MANUAL: 82 %
NRBC BLD AUTO-RTO: 0.3 %
NRBC BLD MANUAL-RTO: 1 %
OVALOCYTES (OLG): ABNORMAL
PLATELET # BLD AUTO: 216 X10(3)/MCL (ref 130–400)
PLATELET # BLD EST: NORMAL 10*3/UL
PMV BLD AUTO: 10.2 FL (ref 7.4–10.4)
POIKILOCYTOSIS BLD QL SMEAR: ABNORMAL
POTASSIUM SERPL-SCNC: 3.7 MMOL/L (ref 3.5–5.1)
PROT SERPL-MCNC: 5.9 GM/DL (ref 5.8–7.6)
RBC # BLD AUTO: 3.05 X10(6)/MCL (ref 4.2–5.4)
RBC MORPH BLD: ABNORMAL
RSV RNA NPH QL NAA+NON-PROBE: NOT DETECTED
RV+EV RNA NPH QL NAA+NON-PROBE: NOT DETECTED
SARS-COV-2 RNA RESP QL NAA+PROBE: NOT DETECTED
SODIUM SERPL-SCNC: 141 MMOL/L (ref 136–145)
TEAR DROP CELL (OLG): ABNORMAL
TROPONIN I SERPL-MCNC: 0.03 NG/ML (ref 0–0.04)
WBC # SPEC AUTO: 7.93 X10(3)/MCL (ref 4.5–11.5)

## 2024-04-01 PROCEDURE — 0240U COVID/FLU A&B PCR: CPT | Performed by: STUDENT IN AN ORGANIZED HEALTH CARE EDUCATION/TRAINING PROGRAM

## 2024-04-01 PROCEDURE — 25000003 PHARM REV CODE 250: Performed by: STUDENT IN AN ORGANIZED HEALTH CARE EDUCATION/TRAINING PROGRAM

## 2024-04-01 PROCEDURE — G0378 HOSPITAL OBSERVATION PER HR: HCPCS

## 2024-04-01 PROCEDURE — 94760 N-INVAS EAR/PLS OXIMETRY 1: CPT

## 2024-04-01 PROCEDURE — 99900035 HC TECH TIME PER 15 MIN (STAT)

## 2024-04-01 PROCEDURE — 94640 AIRWAY INHALATION TREATMENT: CPT | Mod: XB

## 2024-04-01 PROCEDURE — 93010 ELECTROCARDIOGRAM REPORT: CPT | Mod: ,,, | Performed by: INTERNAL MEDICINE

## 2024-04-01 PROCEDURE — 25000242 PHARM REV CODE 250 ALT 637 W/ HCPCS: Performed by: STUDENT IN AN ORGANIZED HEALTH CARE EDUCATION/TRAINING PROGRAM

## 2024-04-01 PROCEDURE — 93005 ELECTROCARDIOGRAM TRACING: CPT

## 2024-04-01 PROCEDURE — 85007 BL SMEAR W/DIFF WBC COUNT: CPT | Performed by: STUDENT IN AN ORGANIZED HEALTH CARE EDUCATION/TRAINING PROGRAM

## 2024-04-01 PROCEDURE — 96367 TX/PROPH/DG ADDL SEQ IV INF: CPT

## 2024-04-01 PROCEDURE — 80053 COMPREHEN METABOLIC PANEL: CPT | Performed by: STUDENT IN AN ORGANIZED HEALTH CARE EDUCATION/TRAINING PROGRAM

## 2024-04-01 PROCEDURE — 87798 DETECT AGENT NOS DNA AMP: CPT | Performed by: STUDENT IN AN ORGANIZED HEALTH CARE EDUCATION/TRAINING PROGRAM

## 2024-04-01 PROCEDURE — 99900031 HC PATIENT EDUCATION (STAT)

## 2024-04-01 PROCEDURE — 25000242 PHARM REV CODE 250 ALT 637 W/ HCPCS: Performed by: INTERNAL MEDICINE

## 2024-04-01 PROCEDURE — 83880 ASSAY OF NATRIURETIC PEPTIDE: CPT | Performed by: STUDENT IN AN ORGANIZED HEALTH CARE EDUCATION/TRAINING PROGRAM

## 2024-04-01 PROCEDURE — 83605 ASSAY OF LACTIC ACID: CPT | Performed by: STUDENT IN AN ORGANIZED HEALTH CARE EDUCATION/TRAINING PROGRAM

## 2024-04-01 PROCEDURE — 99223 1ST HOSP IP/OBS HIGH 75: CPT | Mod: AI,,, | Performed by: INTERNAL MEDICINE

## 2024-04-01 PROCEDURE — 99285 EMERGENCY DEPT VISIT HI MDM: CPT | Mod: 25

## 2024-04-01 PROCEDURE — 94640 AIRWAY INHALATION TREATMENT: CPT

## 2024-04-01 PROCEDURE — 96375 TX/PRO/DX INJ NEW DRUG ADDON: CPT

## 2024-04-01 PROCEDURE — 25000003 PHARM REV CODE 250: Performed by: INTERNAL MEDICINE

## 2024-04-01 PROCEDURE — 96365 THER/PROPH/DIAG IV INF INIT: CPT

## 2024-04-01 PROCEDURE — 63600175 PHARM REV CODE 636 W HCPCS: Performed by: STUDENT IN AN ORGANIZED HEALTH CARE EDUCATION/TRAINING PROGRAM

## 2024-04-01 PROCEDURE — 84484 ASSAY OF TROPONIN QUANT: CPT | Performed by: STUDENT IN AN ORGANIZED HEALTH CARE EDUCATION/TRAINING PROGRAM

## 2024-04-01 PROCEDURE — 87641 MR-STAPH DNA AMP PROBE: CPT | Performed by: STUDENT IN AN ORGANIZED HEALTH CARE EDUCATION/TRAINING PROGRAM

## 2024-04-01 PROCEDURE — 87040 BLOOD CULTURE FOR BACTERIA: CPT | Performed by: STUDENT IN AN ORGANIZED HEALTH CARE EDUCATION/TRAINING PROGRAM

## 2024-04-01 PROCEDURE — 96366 THER/PROPH/DIAG IV INF ADDON: CPT

## 2024-04-01 RX ORDER — OSELTAMIVIR PHOSPHATE 75 MG/1
75 CAPSULE ORAL
Status: COMPLETED | OUTPATIENT
Start: 2024-04-01 | End: 2024-04-01

## 2024-04-01 RX ORDER — ALUMINUM HYDROXIDE, MAGNESIUM HYDROXIDE, AND SIMETHICONE 1200; 120; 1200 MG/30ML; MG/30ML; MG/30ML
30 SUSPENSION ORAL ONCE
Status: COMPLETED | OUTPATIENT
Start: 2024-04-01 | End: 2024-04-01

## 2024-04-01 RX ORDER — FAMOTIDINE 20 MG/1
40 TABLET, FILM COATED ORAL
Status: DISCONTINUED | OUTPATIENT
Start: 2024-04-01 | End: 2024-04-02 | Stop reason: HOSPADM

## 2024-04-01 RX ORDER — OXYCODONE HYDROCHLORIDE 5 MG/1
10 TABLET ORAL EVERY 12 HOURS PRN
Status: DISCONTINUED | OUTPATIENT
Start: 2024-04-01 | End: 2024-04-02 | Stop reason: HOSPADM

## 2024-04-01 RX ORDER — MONTELUKAST SODIUM 4 MG/1
1 TABLET, CHEWABLE ORAL 2 TIMES DAILY
Status: DISCONTINUED | OUTPATIENT
Start: 2024-04-01 | End: 2024-04-02 | Stop reason: HOSPADM

## 2024-04-01 RX ORDER — ONDANSETRON HYDROCHLORIDE 2 MG/ML
4 INJECTION, SOLUTION INTRAVENOUS EVERY 8 HOURS PRN
Status: DISCONTINUED | OUTPATIENT
Start: 2024-04-01 | End: 2024-04-02 | Stop reason: HOSPADM

## 2024-04-01 RX ORDER — DULOXETIN HYDROCHLORIDE 30 MG/1
60 CAPSULE, DELAYED RELEASE ORAL DAILY
Status: DISCONTINUED | OUTPATIENT
Start: 2024-04-01 | End: 2024-04-02 | Stop reason: HOSPADM

## 2024-04-01 RX ORDER — BENZONATATE 100 MG/1
200 CAPSULE ORAL 3 TIMES DAILY PRN
Status: DISCONTINUED | OUTPATIENT
Start: 2024-04-01 | End: 2024-04-02 | Stop reason: HOSPADM

## 2024-04-01 RX ORDER — IPRATROPIUM BROMIDE AND ALBUTEROL SULFATE 2.5; .5 MG/3ML; MG/3ML
3 SOLUTION RESPIRATORY (INHALATION) EVERY 4 HOURS PRN
Status: DISCONTINUED | OUTPATIENT
Start: 2024-04-01 | End: 2024-04-02 | Stop reason: HOSPADM

## 2024-04-01 RX ORDER — METHADONE HYDROCHLORIDE 10 MG/1
10 TABLET ORAL
Status: DISCONTINUED | OUTPATIENT
Start: 2024-04-01 | End: 2024-04-02 | Stop reason: HOSPADM

## 2024-04-01 RX ORDER — DEXAMETHASONE SODIUM PHOSPHATE 4 MG/ML
4 INJECTION, SOLUTION INTRA-ARTICULAR; INTRALESIONAL; INTRAMUSCULAR; INTRAVENOUS; SOFT TISSUE DAILY
Status: DISCONTINUED | OUTPATIENT
Start: 2024-04-02 | End: 2024-04-02 | Stop reason: HOSPADM

## 2024-04-01 RX ORDER — ACETAMINOPHEN 325 MG/1
650 TABLET ORAL EVERY 8 HOURS PRN
Status: DISCONTINUED | OUTPATIENT
Start: 2024-04-01 | End: 2024-04-02 | Stop reason: HOSPADM

## 2024-04-01 RX ORDER — DEXAMETHASONE SODIUM PHOSPHATE 4 MG/ML
8 INJECTION, SOLUTION INTRA-ARTICULAR; INTRALESIONAL; INTRAMUSCULAR; INTRAVENOUS; SOFT TISSUE
Status: COMPLETED | OUTPATIENT
Start: 2024-04-01 | End: 2024-04-01

## 2024-04-01 RX ORDER — TRIAMTERENE/HYDROCHLOROTHIAZID 37.5-25 MG
1 TABLET ORAL DAILY
Status: DISCONTINUED | OUTPATIENT
Start: 2024-04-01 | End: 2024-04-02 | Stop reason: HOSPADM

## 2024-04-01 RX ORDER — CARVEDILOL 3.12 MG/1
3.12 TABLET ORAL 2 TIMES DAILY WITH MEALS
Status: DISCONTINUED | OUTPATIENT
Start: 2024-04-01 | End: 2024-04-02 | Stop reason: HOSPADM

## 2024-04-01 RX ORDER — LIDOCAINE HYDROCHLORIDE 20 MG/ML
15 SOLUTION OROPHARYNGEAL ONCE
Status: COMPLETED | OUTPATIENT
Start: 2024-04-01 | End: 2024-04-01

## 2024-04-01 RX ORDER — CODEINE PHOSPHATE AND GUAIFENESIN 10; 100 MG/5ML; MG/5ML
5 SOLUTION ORAL EVERY 4 HOURS PRN
Status: DISCONTINUED | OUTPATIENT
Start: 2024-04-01 | End: 2024-04-02 | Stop reason: HOSPADM

## 2024-04-01 RX ORDER — LETROZOLE 2.5 MG/1
2.5 TABLET, FILM COATED ORAL DAILY
Status: DISCONTINUED | OUTPATIENT
Start: 2024-04-01 | End: 2024-04-02 | Stop reason: HOSPADM

## 2024-04-01 RX ORDER — ONDANSETRON 4 MG/1
8 TABLET, ORALLY DISINTEGRATING ORAL EVERY 8 HOURS PRN
Status: DISCONTINUED | OUTPATIENT
Start: 2024-04-01 | End: 2024-04-02 | Stop reason: HOSPADM

## 2024-04-01 RX ORDER — POTASSIUM CHLORIDE 750 MG/1
10 TABLET, EXTENDED RELEASE ORAL 2 TIMES DAILY
Status: DISCONTINUED | OUTPATIENT
Start: 2024-04-01 | End: 2024-04-02 | Stop reason: HOSPADM

## 2024-04-01 RX ORDER — IPRATROPIUM BROMIDE AND ALBUTEROL SULFATE 2.5; .5 MG/3ML; MG/3ML
3 SOLUTION RESPIRATORY (INHALATION)
Status: COMPLETED | OUTPATIENT
Start: 2024-04-01 | End: 2024-04-01

## 2024-04-01 RX ORDER — IPRATROPIUM BROMIDE AND ALBUTEROL SULFATE 2.5; .5 MG/3ML; MG/3ML
3 SOLUTION RESPIRATORY (INHALATION) EVERY 4 HOURS
Status: DISCONTINUED | OUTPATIENT
Start: 2024-04-01 | End: 2024-04-01

## 2024-04-01 RX ORDER — SODIUM CHLORIDE 0.9 % (FLUSH) 0.9 %
10 SYRINGE (ML) INJECTION
Status: DISCONTINUED | OUTPATIENT
Start: 2024-04-01 | End: 2024-04-02 | Stop reason: HOSPADM

## 2024-04-01 RX ORDER — TALC
6 POWDER (GRAM) TOPICAL NIGHTLY PRN
Status: DISCONTINUED | OUTPATIENT
Start: 2024-04-01 | End: 2024-04-02 | Stop reason: HOSPADM

## 2024-04-01 RX ORDER — IPRATROPIUM BROMIDE AND ALBUTEROL SULFATE 2.5; .5 MG/3ML; MG/3ML
3 SOLUTION RESPIRATORY (INHALATION) EVERY 4 HOURS
Status: DISCONTINUED | OUTPATIENT
Start: 2024-04-01 | End: 2024-04-02 | Stop reason: HOSPADM

## 2024-04-01 RX ADMIN — GUAIFENESIN AND CODEINE PHOSPHATE 5 ML: 100; 10 SOLUTION ORAL at 07:04

## 2024-04-01 RX ADMIN — COLESTIPOL HYDROCHLORIDE 1 G: 1 TABLET, FILM COATED ORAL at 11:04

## 2024-04-01 RX ADMIN — DEXAMETHASONE SODIUM PHOSPHATE 8 MG: 4 INJECTION, SOLUTION INTRA-ARTICULAR; INTRALESIONAL; INTRAMUSCULAR; INTRAVENOUS; SOFT TISSUE at 08:04

## 2024-04-01 RX ADMIN — IPRATROPIUM BROMIDE AND ALBUTEROL SULFATE 3 ML: .5; 3 SOLUTION RESPIRATORY (INHALATION) at 08:04

## 2024-04-01 RX ADMIN — VANCOMYCIN HYDROCHLORIDE 1750 MG: 500 INJECTION, POWDER, LYOPHILIZED, FOR SOLUTION INTRAVENOUS at 09:04

## 2024-04-01 RX ADMIN — METHADONE HYDROCHLORIDE 10 MG: 10 TABLET ORAL at 05:04

## 2024-04-01 RX ADMIN — OSELTAMIVIR PHOSPHATE 75 MG: 75 CAPSULE ORAL at 09:04

## 2024-04-01 RX ADMIN — CARVEDILOL 3.12 MG: 3.12 TABLET, FILM COATED ORAL at 05:04

## 2024-04-01 RX ADMIN — IPRATROPIUM BROMIDE AND ALBUTEROL SULFATE 3 ML: .5; 3 SOLUTION RESPIRATORY (INHALATION) at 03:04

## 2024-04-01 RX ADMIN — CEFEPIME 2 G: 2 INJECTION, POWDER, FOR SOLUTION INTRAVENOUS at 08:04

## 2024-04-01 RX ADMIN — DULOXETINE HYDROCHLORIDE 60 MG: 30 CAPSULE, DELAYED RELEASE ORAL at 01:04

## 2024-04-01 RX ADMIN — FAMOTIDINE 40 MG: 20 TABLET, FILM COATED ORAL at 01:04

## 2024-04-01 RX ADMIN — APIXABAN 2.5 MG: 2.5 TABLET, FILM COATED ORAL at 11:04

## 2024-04-01 RX ADMIN — ALUMINUM HYDROXIDE, MAGNESIUM HYDROXIDE, AND DIMETHICONE 30 ML: 200; 20; 200 SUSPENSION ORAL at 09:04

## 2024-04-01 RX ADMIN — LIDOCAINE HYDROCHLORIDE 15 ML: 20 SOLUTION ORAL at 09:04

## 2024-04-01 RX ADMIN — POTASSIUM CHLORIDE 10 MEQ: 750 TABLET, FILM COATED, EXTENDED RELEASE ORAL at 11:04

## 2024-04-01 RX ADMIN — LETROZOLE 2.5 MG: 2.5 TABLET ORAL at 01:04

## 2024-04-01 RX ADMIN — BENZONATATE 200 MG: 100 CAPSULE ORAL at 01:04

## 2024-04-01 NOTE — PROGRESS NOTES
Pharmacist Renal Dose Adjustment Note    Ninfa Candelario is a 58 y.o. female being treated with the medication famotidine    Patient Data:    Vital Signs (Most Recent):  Temp: 98.2 °F (36.8 °C) (04/01/24 0753)  Pulse: 84 (04/01/24 1200)  Resp: 20 (04/01/24 1200)  BP: 122/67 (04/01/24 1200)  SpO2: 97 % (04/01/24 1200) Vital Signs (72h Range):  Temp:  [98.2 °F (36.8 °C)-99.3 °F (37.4 °C)]   Pulse:  []   Resp:  [16-32]   BP: (105-156)/(62-88)   SpO2:  [94 %-98 %]      Recent Labs   Lab 03/26/24  0232 03/28/24  0358 04/01/24  0404   CREATININE 2.38* 2.06* 1.97*     Serum creatinine: 1.97 mg/dL (H) 04/01/24 0404  Estimated creatinine clearance: 30.3 mL/min (A)    Medication: famotidine dose: 40 mg frequency daily will be changed to medication: famotidine dose: 40 mg frequency: q48h    Pharmacist's Name: Caitlin Harding  Pharmacist's Extension: [phone]

## 2024-04-01 NOTE — ED PROVIDER NOTES
Encounter Date: 4/1/2024    SCRIBE #1 NOTE: I, Sherrill Anderson, am scribing for, and in the presence of,  Jose Miguel Still MD. I have scribed the following portions of the note - the EKG reading. Other sections scribed: HPI, ROS and physical.       History     Chief Complaint   Patient presents with    Cough     Pt arrives via [facility], EMS / PT report pt called for uncontrolled coughing. Pt was dx with bronchitis  on 3/ 26. Pt denies fever , denies SOB. Pt has frequent dry, non productive cough. Drainage from bilateral eyes.  Pt is very hoarse. Pt is currently receiving chemo therapy for multiple myeloma.      Patient is a 57 y/o female with a medical hx of multiple myeloma, HTN and HLD that presents to the ED for a worsening cough. Pt was recently discharged from [facility] on 3/26 after being dx with Bronchitis; pt states that she was given breathing treatments and steroids to continue at home. Since then, the pt reports a worsenign non-productive cough. Denies fever, SOB. Reports cough, leg swelling, sore throat, eye drainage.  PCP: Adry Rosario Md  Oncologist: Natalie Meyers MD    The history is provided by the patient and a relative. No  was used.     Review of patient's allergies indicates:   Allergen Reactions    Baclofen Shortness Of Breath     Difficulty breathing      Penicillins Hives, Rash and Swelling    Shellfish containing products Hives, Rash, Swelling and Other (See Comments)     shell  She can shrimp      Clarithromycin Other (See Comments)    Iodinated contrast media     Nsaids (non-steroidal anti-inflammatory drug)     Other omega-3s     Penicillin      Other reaction(s): Unknown  Other reaction(s): Unknown  Other reaction(s): Unknown    Ace inhibitors Other (See Comments)     cough  Other reaction(s): Cough    Azithromycin Nausea Only     Upset stomach    Hydralazine analogues Anxiety    Meloxicam Tinitus     Other reaction(s): Unknown  Other reaction(s): Unknown     Prochlorperazine Anxiety     Restlessness/anxious    Tramadol Other (See Comments)     Increased Heart Rate    Other reaction(s): Unknown  Other reaction(s): Unknown     Past Medical History:   Diagnosis Date    Amyloidosis     Anemia     Anxiety and depression     Diplopia     Hyperlipidemia     Hypertension     Impaired mobility     Lytic lesion of bone on x-ray     Multiple myeloma     Neuropathy     Obesity, unspecified     Paroxysmal atrial fibrillation     Primary cancer of left female breast      Past Surgical History:   Procedure Laterality Date    BONE MARROW TRANSPLANT  02/08/2016    CARPAL TUNNEL RELEASE      COLONOSCOPY  12/11/2018    Dr. Eddie Jimenez    ENDOSCOPIC RELEASE OF TRIGGER FINGER      ESOPHAGEAL MOTILITY STUDY  2015    Excision Lipoma left elbow      Exploration Laparotomy  2016    FLEXIBLE SIGMOIDOSCOPY      MEDIPORT INSERTION, SINGLE  03/15/2016    PH Study 24 Hour  2015     Family History   Problem Relation Age of Onset    Hypertension Mother     Cancer Father     Hypertension Sister     Hypertension Brother      Social History     Tobacco Use    Smoking status: Never    Smokeless tobacco: Never   Substance Use Topics    Alcohol use: Not Currently    Drug use: Never     Review of Systems   Constitutional:  Negative for fever.   HENT:  Positive for sore throat.    Eyes:  Positive for discharge. Negative for visual disturbance.   Respiratory:  Positive for cough. Negative for shortness of breath.    Cardiovascular:  Positive for leg swelling. Negative for chest pain.   Gastrointestinal:  Negative for abdominal pain.   Genitourinary:  Negative for dysuria.   Musculoskeletal:  Negative for joint swelling.   Skin:  Negative for rash.   Neurological:  Negative for weakness.   Psychiatric/Behavioral:  Negative for confusion.        Physical Exam     Initial Vitals [04/01/24 0321]   BP Pulse Resp Temp SpO2   (!) 156/88 100 20 99.3 °F (37.4 °C) 98 %      MAP       --         Physical  Exam    Nursing note and vitals reviewed.  Constitutional: She appears well-developed and well-nourished.   HENT:   Head: Normocephalic and atraumatic.   Eyes: EOM are normal. Pupils are equal, round, and reactive to light.   Neck:   Normal range of motion.  Cardiovascular:  Regular rhythm, normal heart sounds and intact distal pulses.           No murmur heard.  Tachycardic   Pulmonary/Chest: She is in respiratory distress. She has wheezes (occasional). She has no rales.   Abdominal: Abdomen is soft. She exhibits no distension. There is no abdominal tenderness. There is no rebound.   Musculoskeletal:         General: No tenderness. Normal range of motion.      Cervical back: Normal range of motion.      Right lower le+ Edema present.      Left lower le+ Edema present.     Neurological: She is alert. She has normal strength. No cranial nerve deficit. GCS score is 15. GCS eye subscore is 4. GCS verbal subscore is 5. GCS motor subscore is 6.   Skin: Skin is warm and dry. Capillary refill takes less than 2 seconds. No rash noted. No erythema.   Psychiatric: She has a normal mood and affect.         ED Course   Procedures  Labs Reviewed   COVID/FLU A&B PCR - Abnormal; Notable for the following components:       Result Value    Influenza B PCR Detected (*)     All other components within normal limits    Narrative:     The Xpert Xpress SARS-CoV-2/FLU/RSV plus is a rapid, multiplexed real-time PCR test intended for the simultaneous qualitative detection and differentiation of SARS-CoV-2, Influenza A, Influenza B, and respiratory syncytial virus (RSV) viral RNA in either nasopharyngeal swab or nasal swab specimens.         COMPREHENSIVE METABOLIC PANEL - Abnormal; Notable for the following components:    Carbon Dioxide 22 (*)     Blood Urea Nitrogen 35.1 (*)     Creatinine 1.97 (*)     Albumin Level 2.6 (*)     Albumin/Globulin Ratio 0.8 (*)     Alkaline Phosphatase 165 (*)     Alanine Aminotransferase 93 (*)      Aspartate Aminotransferase 47 (*)     All other components within normal limits   RESPIRATORY PANEL - Abnormal; Notable for the following components:    Parainfluenza Virus 3 Detected (*)     All other components within normal limits    Narrative:     The BioFire Respiratory Panel 2.1 (RP2.1) is a PCR-based multiplexed nucleic acid test intended for use with the BioFire® 2.0 for simultaneous qualitative detection and identification of multiple respiratory viral and bacterial nucleic acids in nasopharyngeal swabs (NPS) obtained from individuals suspected of respiratory tract infections.   CBC WITH DIFFERENTIAL - Abnormal; Notable for the following components:    RBC 3.05 (*)     Hgb 8.6 (*)     Hct 27.8 (*)     MCHC 30.9 (*)     RDW 17.2 (*)     All other components within normal limits   MANUAL DIFFERENTIAL - Abnormal; Notable for the following components:    Myelocytes % 1 (*)     RBC Morph Abnormal (*)     Poikilocytosis 1+ (*)     Anisocytosis 2+ (*)     Macrocytosis 1+ (*)     Ovalocytes 1+ (*)     Tear Drops 1+ (*)     All other components within normal limits   TROPONIN I - Normal   B-TYPE NATRIURETIC PEPTIDE - Normal   LACTIC ACID, PLASMA - Normal   MRSA PCR - Normal    Narrative:     The Xpert MRSA Assay utilizes automated real-time polymerase chain reaction (PCR) to detect MRSA DNA.  A positive test result does not necessarily indicate the presence of viable organism.  It is however, presumptive for the presence of MRSA.   CBC W/ AUTO DIFFERENTIAL    Narrative:     The following orders were created for panel order CBC auto differential.  Procedure                               Abnormality         Status                     ---------                               -----------         ------                     CBC with Differential[[id number]]       Abnormal            Final result               Manual Differential[[id number]]         Abnormal            Final result                 Please view results for  these tests on the individual orders.     EKG Readings: (Independently Interpreted)   Initial Reading: No STEMI. Rhythm: Sinus Tachycardia. Heart Rate: 124.   0846     ECG Results              EKG 12-lead (Final result)        Collection Time Result Time QRS Duration OHS QTC Calculation    04/01/24 08:46:18 04/02/24 17:57:13 60 436                     Final result by Interface, Lab In [facility] (04/02/24 17:57:21)                   Narrative:    Test Reason : R06.02,    Vent. Rate : 124 BPM     Atrial Rate : 124 BPM     P-R Int : 134 ms          QRS Dur : 060 ms      QT Int : 304 ms       P-R-T Axes : 058 042 -11 degrees     QTc Int : 436 ms    Sinus tachycardia  Nonspecific ST and T wave abnormality  Abnormal ECG    Confirmed by Cooper Arnold MD ([id number]) on 4/2/2024 5:57:13 PM    Referred By: ALEXEI   SELF           Confirmed By:Coopre Arnold MD                                  Imaging Results              X-Ray Chest 1 View (Final result)  Result time 04/01/24 07:24:23      Final result by Glen Galindo MD (04/01/24 07:24:23)                   Impression:      Left lower lobe opacification concerning for pneumonia.      Electronically signed by: Glen Galindo  Date:    04/01/2024  Time:    07:24               Narrative:    EXAMINATION:  XR CHEST 1 VIEW    CLINICAL HISTORY:  frequent cough;    TECHNIQUE:  Single view of the chest    COMPARISON:  03/26/2024    FINDINGS:  Left lower lobe opacification.    The cardiomediastinal silhouette is within normal limits.    No acute osseous abnormality.                                       Medications   albuterol-ipratropium 2.5 mg-0.5 mg/3 mL nebulizer solution 3 mL (3 mLs Nebulization Given 4/1/24 0818)   dexAMETHasone injection 8 mg (8 mg Intravenous Given 4/1/24 0834)   vancomycin 1.75 g in 5 % dextrose 500 mL IVPB (0 mg Intravenous Stopped 4/1/24 1207)   ceFEPIme (MAXIPIME) 2 g in dextrose 5 % in water (D5W) 100 mL IVPB (MB+) (0 g Intravenous Stopped 4/1/24 0904)    aluminum-magnesium hydroxide-simethicone 200-200-20 mg/5 mL suspension 30 mL (30 mLs Oral Given 4/1/24 0905)     And   LIDOcaine viscous HCl 2% oral solution 15 mL (15 mLs Oral Given 4/1/24 0905)   oseltamivir capsule 75 mg (75 mg Oral Given 4/1/24 0953)     Medical Decision Making  Problems Addressed:  Influenza: acute illness or injury that poses a threat to life or bodily functions  Parainfluenza: acute illness or injury that poses a threat to life or bodily functions  Pneumonia of left lower lobe due to infectious organism: acute illness or injury that poses a threat to life or bodily functions  SOB (shortness of breath): acute illness or injury that poses a threat to life or bodily functions    Amount and/or Complexity of Data Reviewed  Independent Historian: caregiver     Details: Collateral from family member at bedside.  External Data Reviewed: notes.     Details: Reviewed old records including recent discharge summary.  Labs: ordered.  Radiology: ordered and independent interpretation performed.  ECG/medicine tests: ordered and independent interpretation performed.     Details: EKG performed at 0846. Rate of 124. Sinus Tachycardia. No STEMI.     Risk  OTC drugs.  Prescription drug management.  Decision regarding hospitalization.  Diagnosis or treatment significantly limited by social determinants of health.            Scribe Attestation:   Scribe #1: I performed the above scribed service and the documentation accurately describes the services I performed. I attest to the accuracy of the note.    Attending Attestation:           Physician Attestation for Scribe:  Physician Attestation Statement for Scribe #1: I, Jose Miguel Still MD, reviewed documentation, as scribed by Sherrill Anderson in my presence, and it is both accurate and complete.             ED Course as of 04/03/24 1436   Mon Apr 01, 2024   1308 Paged Dr. Rosario [SJ]      ED Course User Index  [SJ] Sherrill Anderson               Medical Decision  "Making:   History:   I obtained history from: someone other than patient.       <> Summary of History: Collateral history from family.  Old Medical Records: I decided to obtain old medical records.  Old Records Summarized: records from clinic visits, records from another hospital and records from previous admission(s).       <> Summary of Records: Reviewed old records  Initial Assessment:   SOB  Differential Diagnosis:   Judging by the patient's chief complaint and pertinent history, the patient has the following possible differential diagnoses, including but not limited to the following.  Some of these are deemed to be lower likelihood and some more likely based on my physical exam and history combined with possible lab work and/or imaging studies.   Please see the pertinent studies, and refer to the HPI.  Some of these diagnoses will take further evaluation to fully rule out, perhaps as an outpatient and the patient was encouraged to follow up when discharged for more comprehensive evaluation.    ACS, pneumonia, COVID/Flu, congestive heart failure, asthma, COPD, pleural effusion, pulmonary edema, acute bronchitis, PE, pneumothorax, hemothorax, aortic dissection, electrolyte abnormalities, anemia, anxiety     Independently Interpreted Test(s):   I have ordered and independently interpreted X-rays - see prior notes.  I have ordered and independently interpreted EKG Reading(s) - see prior notes  Clinical Tests:   Lab Tests: Ordered and Reviewed  Radiological Study: Ordered and Reviewed  Medical Tests: Ordered and Reviewed  Sepsis Perfusion Assessment: "I attest a sepsis perfusion exam was performed within 6 hours of sepsis, severe sepsis, or septic shock presentation, following fluid resuscitation."  ED Management:  Patient is a 58-year-old female presents to emergency department for shortness of breath, persistent cough.  See HPI.  See physical exam.  Recently discharged after being diagnosed with bronchiolitis.  On " arrival patient went frequent fits of coughing.  She reports shortness of breath.  Chest x-ray obtained which showed left lower lobe pneumonia.  Swabs obtained which showed influenza/parainfluenza.  She may have a post viral pneumonia.  As a result blood cultures obtained, started on broad-spectrum antibiotics.  Shared decision making used to determine disposition.  Patient not requiring any supplemental oxygen.  Reasonable to continue outpatient workup with antibiotics.  However on reassessment patient with continued fits of coughing.  Family states would prefer to stay in hospital.  Will continue DuoNebs.  Antibiotics.  Discussed with Dr. Rosario who will admit the patient.  All results discussed with patient and family.  Answered all questions at this time.  Verbalized understanding and agreed to plan.             Clinical Impression:  Final diagnoses:  [R06.02] SOB (shortness of breath)  [J18.9] Pneumonia of left lower lobe due to infectious organism (Primary)  [J11.1] Influenza  [B34.8] Parainfluenza          ED Disposition Condition    Observation Stable                Jose Miguel Still MD  04/03/24 0149

## 2024-04-01 NOTE — H&P
[facility] Medicine History & Physical Examination       Patient Name: Ninfa Candelario  MRN: [id number]  Patient Class: OP- Observation   Admission Date: 4/1/2024   Admitting Physician: AG Service   Length of Stay: 0  Attending Physician: Adry Rosario MD  Primary Care Provider: Adry Rosario MD  Face-to-Face encounter date: 04/01/2024  Code Status:Full  Chief Complaint: Cough (Pt arrives via [facility], EMS / PT report pt called for uncontrolled coughing. Pt was dx with bronchitis  on 3/ 26. Pt denies fever , denies SOB. Pt has frequent dry, non productive cough. Drainage from bilateral eyes.  Pt is very hoarse. Pt is currently receiving chemo therapy for multiple myeloma. )        Patient information was obtained from patient, patient's family, past medical records and ER records.  ED records were reviewed in detail and documented below    HISTORY OF PRESENT ILLNESS:   Ninfa Candelario is a 58 y.o. female who  has a past medical history of Amyloidosis, Anemia, Anxiety and depression, Diplopia, Hyperlipidemia, Hypertension, Impaired mobility, Lytic lesion of bone on x-ray, Multiple myeloma, Neuropathy, Obesity, unspecified, Paroxysmal atrial fibrillation, and Primary cancer of left female breast.. The patient presented to [facility] on 4/1/2024 with a primary complaint of cough     Ninfa is a 58-year-old female with history of amyloidosis and myelodysplastic syndrome that presented to the ED with complaints of uncontrolled cough..  She was recently discharged 3 days ago after being admitted the 2nd time for bronchitis after a previous admission prior to that for pneumonia and sepsis.    Apparently has been having significant coughing spells and generally feels tired and unwell.  Workup in the ED noted to be positive for influenza B and for a left lower lobe pneumonia which is changed from her recent admission.      Workup in the ED consistent with a CBC consistent  with myelodysplastic syndrome on.      Patient admitted under observation considering her significant medical history.  At the time of my visit in the ED she was maintaining her oxygen saturation in the upper 90s on room air.    Was given a dose of vancomycin in the ED, blood cultures drawn and cefepime ordered as well.    She has also been started on Tamiflu.  DuoNebs will be ordered as well as Tessalon Perles and Tussionex for the cough.         PAST MEDICAL HISTORY:     Past Medical History:   Diagnosis Date    Amyloidosis     Anemia     Anxiety and depression     Diplopia     Hyperlipidemia     Hypertension     Impaired mobility     Lytic lesion of bone on x-ray     Multiple myeloma     Neuropathy     Obesity, unspecified     Paroxysmal atrial fibrillation     Primary cancer of left female breast        PAST SURGICAL HISTORY:     Past Surgical History:   Procedure Laterality Date    BONE MARROW TRANSPLANT  02/08/2016    CARPAL TUNNEL RELEASE      COLONOSCOPY  12/11/2018    Dr. Eddie Jimenez    ENDOSCOPIC RELEASE OF TRIGGER FINGER      ESOPHAGEAL MOTILITY STUDY  2015    Excision Lipoma left elbow      Exploration Laparotomy  2016    FLEXIBLE SIGMOIDOSCOPY      MEDIPORT INSERTION, SINGLE  03/15/2016    PH Study 24 Hour  2015       ALLERGIES:   Baclofen, Penicillins, Shellfish containing products, Clarithromycin, Iodinated contrast media, Nsaids (non-steroidal anti-inflammatory drug), Other omega-3s, Penicillin, Ace inhibitors, Azithromycin, Hydralazine analogues, Meloxicam, Prochlorperazine, and Tramadol    FAMILY HISTORY:   Reviewed and negative    SOCIAL HISTORY:     Social History     Tobacco Use    Smoking status: Never    Smokeless tobacco: Never   Substance Use Topics    Alcohol use: Not Currently        HOME MEDICATIONS:     Prior to Admission medications    Medication Sig Start Date End Date Taking? Authorizing Provider   albuterol-ipratropium (DUO-NEB) 2.5 mg-0.5 mg/3 mL nebulizer solution Take 3 mLs by  nebulization every 4 (four) hours. Rescue 3/27/24 3/27/25  Adry Rosario MD   apixaban (ELIQUIS) 2.5 mg Tab Take 1 tablet (2.5 mg total) by mouth 2 (two) times daily. 10/25/23   Natalie Meyers MD   benzonatate (TESSALON) 200 MG capsule Take 1 capsule (200 mg total) by mouth 3 (three) times daily as needed for Cough. 3/27/24 4/6/24  Adry Rosario MD   carvediloL (COREG) 3.125 MG tablet Take 1 tablet (3.125 mg total) by mouth 2 (two) times daily with meals. 6/30/22 3/26/24  Connor Ku FNP   colestipoL (COLESTID) 1 gram Tab TAKE 1 TABLET TWICE A DAY 12/28/22   Ashly Moreau FNP   DULoxetine (CYMBALTA) 60 MG capsule Take 60 mg by mouth. 3/29/22   Provider, Historical   famotidine (PEPCID) 40 MG tablet Take 40 mg by mouth.    Provider, Historical   HEMADY 20 mg Tab TAKE 1 TABLET BY MOUTH EVERY WEEK 2/26/24   Ashly Moreau FNP   lenalidomide (REVLIMID) 5 mg Cap TAKE 1 CAPSULE BY MOUTH EVERY OTHER DAY.. 3/28/24   Ashly Moreau FNP   letrozole (FEMARA) 2.5 mg Tab Take 1 tablet by mouth once daily. 8/23/21   Provider, Historical   levocetirizine (XYZAL) 5 MG tablet Take 1 tablet (5 mg total) by mouth every evening. 10/3/23   Connor Ku FNP   methadone (DOLOPHINE) 10 MG tablet Take 10 mg by mouth every 8 (eight) hours while awake. 10/4/21   Provider, Historical   multivitamin (THERAGRAN) per tablet Take 1 tablet by mouth once daily.    Provider, Historical   omeprazole (PRILOSEC) 20 MG capsule TAKE 1 CAPSULE(20 MG) BY MOUTH EVERY DAY 6/21/23   Connor Ku FNP   ondansetron (ZOFRAN) 8 MG tablet Take 1 tablet (8 mg total) by mouth every 8 (eight) hours as needed for Nausea. 3/14/24   Natalie Meyers MD   ondansetron (ZOFRAN-ODT) 8 MG TbDL Take 1 tablet (8 mg total) by mouth every 6 (six) hours as needed. 3/6/24 4/5/24  Natalie Meyers MD   oxyCODONE (ROXICODONE) 10 mg Tab immediate release tablet Take 1 tablet (10 mg total) by mouth every 12 (twelve) hours as needed.  1/28/23   Sher Manrique MD   potassium chloride (KLOR-CON) 10 MEQ TbSR TAKE 1 TABLET BY MOUTH TWICE DAILY 5/22/23   Natalie Meyers MD   rosuvastatin (CRESTOR) 10 MG tablet Take 10 mg by mouth Daily. 8/25/21   Provider, Historical   tretinoin (RETIN-A) 0.1 % cream Apply 1 application topically every evening. 1/4/22   Provider, Historical   triamterene-hydrochlorothiazide 37.5-25 mg (MAXZIDE-25) 37.5-25 mg per tablet Take 1 tablet by mouth once daily. 3/1/24 3/1/25  Adry Rosario MD       REVIEW OF SYSTEMS:   Except as documented, all other systems reviewed and negative     PHYSICAL EXAM:     VITAL SIGNS: 24 HRS MIN & MAX LAST   Temp  Min: 98.2 °F (36.8 °C)  Max: 99.3 °F (37.4 °C) 98.2 °F (36.8 °C)   BP  Min: 105/64  Max: 156/88 122/67   Pulse  Min: 80  Max: 122  84   Resp  Min: 16  Max: 32 20   SpO2  Min: 94 %  Max: 98 % 97 %     General appearance: Alert and oriented, no acute distress   HENT: Atraumatic head. Moist mucous membranes of oral cavity.  Eyes: Normal extraocular movements.   Neck: Supple.   Lungs:  Decreased in bilateral bases, left more than right    Heart:  Tachycardia  Abdomen: Soft, non-distended, non-tender. No rebound tenderness/guarding. Bowel sounds are normal.   Extremities: No cyanosis, clubbing, or edema.  Skin: No Rash.   Neuro:  Nonfocal   Psych/mental status: Appropriate mood and affect. Responds appropriately to questions.     LABS AND IMAGING:     Recent Labs   Lab 03/26/24  0233 03/28/24  0358 04/01/24  0404   WBC 5.88  6.01 3.76  3.76* 7.93  7.93   RBC 3.51* 2.65* 3.05*   HGB 9.9* 7.5* 8.6*   HCT 32.1* 24.3* 27.8*   MCV 91.5 91.7 91.1   MCH 28.2 28.3 28.2   MCHC 30.8* 30.9* 30.9*   RDW 17.2* 17.3* 17.2*    216 216   MPV 9.8 9.7 10.2       Recent Labs   Lab 03/26/24  0232 03/26/24  0350 03/28/24  0358 04/01/24  0404     --  138 141   K 3.4*  --  4.5 3.7   CO2 18*  --  24 22*   BUN 30.3*  --  38.5* 35.1*   CREATININE 2.38*  --  2.06* 1.97*   CALCIUM 9.4  --   8.1* 8.8   PH  --  7.490*  --   --    ALBUMIN 2.8*  --  2.3* 2.6*   ALKPHOS 85  --  68 165*   ALT 67*  --  35 93*   AST 20  --  12 47*   BILITOT 0.7  --  0.2 0.8       Microbiology Results (last 7 days)       Procedure Component Value Units Date/Time    Blood culture #2 **CANNOT BE ORDERED STAT** [[id number]] Collected: 04/01/24 0809    Order Status: Resulted Specimen: Blood Updated: 04/01/24 0825    Blood culture #1 **CANNOT BE ORDERED STAT** [[id number]] Collected: 04/01/24 0809    Order Status: Resulted Specimen: Blood Updated: 04/01/24 0825             X-Ray Chest 1 View  Narrative: EXAMINATION:  XR CHEST 1 VIEW    CLINICAL HISTORY:  frequent cough;    TECHNIQUE:  Single view of the chest    COMPARISON:  03/26/2024    FINDINGS:  Left lower lobe opacification.    The cardiomediastinal silhouette is within normal limits.    No acute osseous abnormality.  Impression: Left lower lobe opacification concerning for pneumonia.    Electronically signed by: Glen Galindo  Date:    04/01/2024  Time:    07:24      ASSESSMENT & PLAN:     1. Left lower lobe opacity, developing pneumonia, present on admission   -oxygen to keep sats over 90%   -DuoNebs q.4 hours p.r.n.   -cultures in progress   -received 1 dose of vancomycin in the ED, continue with cefepime 2 g q.12 hours  -monitor clinical status closely   -incentive spirometry encouraged     2. Influenza B   -initiated on Tamiflu 75 mg p.o. b.i.d., monitor respiratory status closely   -droplet precautions     3. History of multiple myeloma, not achieved remission  -followed by [facility] and locally by Dr. Meyers     4. Hypertension   -home medications have been reviewed and will be resumed as appropriate      5.  History of atrial fibrillation  -currently rate and rhythm controlled      6. Overlapping malignant neoplasm breast, ER positive     7. Amyloidosis-with tongue and: Involvement  -status post ex lap for toxic megacolon    VTE Prophylaxis:  Eliquis    Patient condition:   Stable  __________________________________________________________________________  INPATIENT LIST OF MEDICATIONS     Scheduled Meds:   ceFEPime IV (PEDS and ADULTS)  2 g Intravenous Q12H     Continuous Infusions:  PRN Meds:.acetaminophen, melatonin, ondansetron, sodium chloride 0.9%      If patient was admitted under observational status it is with my approval/permission.        All diagnosis and differential diagnosis have been reviewed; assessment and plan has been documented; I have personally reviewed the labs and test results that are presently available; I have reviewed the patients medication list; I have reviewed the consulting providers response and recommendations. I have reviewed or attempted to review medical records based upon their availability.    All of the patient and family questions have been addressed and answered. Patient's is agreeable to the above stated plan. I will continue to monitor closely and make adjustments to medical management as needed.    If patient was admitted under observational status it is with my approval/permission.      Adry Rosario MD   04/01/2024

## 2024-04-01 NOTE — Clinical Note
Diagnosis: Pneumonia of left lower lobe due to infectious organism [[id number]]   Future Attending Provider: COMPA HERBERT [[id number]]   Admit to which facility:: [facility] [[id number]]

## 2024-04-02 VITALS
BODY MASS INDEX: 28.44 KG/M2 | WEIGHT: 160.5 LBS | HEIGHT: 63 IN | OXYGEN SATURATION: 98 % | SYSTOLIC BLOOD PRESSURE: 108 MMHG | RESPIRATION RATE: 20 BRPM | HEART RATE: 88 BPM | DIASTOLIC BLOOD PRESSURE: 67 MMHG | TEMPERATURE: 98 F

## 2024-04-02 LAB
ABS NEUT (OLG): 3.85 X10(3)/MCL (ref 2.1–9.2)
ALBUMIN SERPL-MCNC: 2.2 G/DL (ref 3.4–4.8)
ALBUMIN/GLOB SERPL: 0.6 RATIO (ref 1.1–2)
ALP SERPL-CCNC: 115 UNIT/L (ref 40–150)
ALT SERPL-CCNC: 60 UNIT/L (ref 0–55)
AST SERPL-CCNC: 17 UNIT/L (ref 5–34)
BILIRUB SERPL-MCNC: 0.5 MG/DL
BUN SERPL-MCNC: 33.8 MG/DL (ref 9.8–20.1)
CALCIUM SERPL-MCNC: 9.1 MG/DL (ref 8.4–10.2)
CHLORIDE SERPL-SCNC: 108 MMOL/L (ref 98–107)
CO2 SERPL-SCNC: 22 MMOL/L (ref 23–31)
CREAT SERPL-MCNC: 1.87 MG/DL (ref 0.55–1.02)
ERYTHROCYTE [DISTWIDTH] IN BLOOD BY AUTOMATED COUNT: 17.3 % (ref 11.5–17)
GFR SERPLBLD CREATININE-BSD FMLA CKD-EPI: 30 MLS/MIN/1.73/M2
GLOBULIN SER-MCNC: 3.7 GM/DL (ref 2.4–3.5)
GLUCOSE SERPL-MCNC: 120 MG/DL (ref 82–115)
HCT VFR BLD AUTO: 24.5 % (ref 37–47)
HGB BLD-MCNC: 7.6 G/DL (ref 12–16)
INSTRUMENT WBC (OLG): 5.06 X10(3)/MCL
LYMPHOCYTES NFR BLD MANUAL: 0.4 X10(3)/MCL
LYMPHOCYTES NFR BLD MANUAL: 8 %
MCH RBC QN AUTO: 28.7 PG (ref 27–31)
MCHC RBC AUTO-ENTMCNC: 31 G/DL (ref 33–36)
MCV RBC AUTO: 92.5 FL (ref 80–94)
MONOCYTES NFR BLD MANUAL: 0.81 X10(3)/MCL (ref 0.1–1.3)
MONOCYTES NFR BLD MANUAL: 16 %
NEUTROPHILS NFR BLD MANUAL: 76 %
NRBC BLD AUTO-RTO: 0 %
NRBC BLD MANUAL-RTO: 1 %
OHS QRS DURATION: 60 MS
OHS QTC CALCULATION: 436 MS
OVALOCYTES (OLG): ABNORMAL
PLATELET # BLD AUTO: 221 X10(3)/MCL (ref 130–400)
PLATELET # BLD EST: NORMAL 10*3/UL
PMV BLD AUTO: 10.2 FL (ref 7.4–10.4)
POIKILOCYTOSIS BLD QL SMEAR: ABNORMAL
POTASSIUM SERPL-SCNC: 4.6 MMOL/L (ref 3.5–5.1)
PROT SERPL-MCNC: 5.9 GM/DL (ref 5.8–7.6)
RBC # BLD AUTO: 2.65 X10(6)/MCL (ref 4.2–5.4)
RBC MORPH BLD: ABNORMAL
SODIUM SERPL-SCNC: 140 MMOL/L (ref 136–145)
TEAR DROP CELL (OLG): ABNORMAL
WBC # SPEC AUTO: 5.06 X10(3)/MCL (ref 4.5–11.5)

## 2024-04-02 PROCEDURE — 85027 COMPLETE CBC AUTOMATED: CPT | Performed by: INTERNAL MEDICINE

## 2024-04-02 PROCEDURE — 94640 AIRWAY INHALATION TREATMENT: CPT

## 2024-04-02 PROCEDURE — 63600175 PHARM REV CODE 636 W HCPCS: Performed by: INTERNAL MEDICINE

## 2024-04-02 PROCEDURE — 80053 COMPREHEN METABOLIC PANEL: CPT | Performed by: INTERNAL MEDICINE

## 2024-04-02 PROCEDURE — 99239 HOSP IP/OBS DSCHRG MGMT >30: CPT | Mod: ,,, | Performed by: INTERNAL MEDICINE

## 2024-04-02 PROCEDURE — 25000242 PHARM REV CODE 250 ALT 637 W/ HCPCS: Performed by: INTERNAL MEDICINE

## 2024-04-02 PROCEDURE — 21400001 HC TELEMETRY ROOM

## 2024-04-02 PROCEDURE — 27000207 HC ISOLATION

## 2024-04-02 PROCEDURE — 94761 N-INVAS EAR/PLS OXIMETRY MLT: CPT

## 2024-04-02 PROCEDURE — 25000003 PHARM REV CODE 250: Performed by: INTERNAL MEDICINE

## 2024-04-02 PROCEDURE — 63600175 PHARM REV CODE 636 W HCPCS: Performed by: STUDENT IN AN ORGANIZED HEALTH CARE EDUCATION/TRAINING PROGRAM

## 2024-04-02 PROCEDURE — 25000003 PHARM REV CODE 250: Performed by: STUDENT IN AN ORGANIZED HEALTH CARE EDUCATION/TRAINING PROGRAM

## 2024-04-02 RX ORDER — OSELTAMIVIR PHOSPHATE 75 MG/1
75 CAPSULE ORAL 2 TIMES DAILY
Qty: 8 CAPSULE | Refills: 0 | Status: SHIPPED | OUTPATIENT
Start: 2024-04-02 | End: 2024-04-06

## 2024-04-02 RX ORDER — TRIAMTERENE/HYDROCHLOROTHIAZID 37.5-25 MG
1 TABLET ORAL DAILY
Qty: 30 TABLET | Refills: 11 | Status: SHIPPED | OUTPATIENT
Start: 2024-04-02 | End: 2025-04-02

## 2024-04-02 RX ORDER — DEXAMETHASONE 4 MG/1
4 TABLET ORAL EVERY 12 HOURS
Qty: 20 TABLET | Refills: 0 | Status: SHIPPED | OUTPATIENT
Start: 2024-04-02 | End: 2024-04-12

## 2024-04-02 RX ORDER — LETROZOLE 2.5 MG/1
2.5 TABLET, FILM COATED ORAL DAILY
Start: 2024-04-02 | End: 2024-04-04

## 2024-04-02 RX ORDER — CODEINE PHOSPHATE AND GUAIFENESIN 10; 100 MG/5ML; MG/5ML
5 SOLUTION ORAL EVERY 4 HOURS PRN
Qty: 100 ML | Refills: 0 | Status: SHIPPED | OUTPATIENT
Start: 2024-04-02 | End: 2024-04-12

## 2024-04-02 RX ORDER — FAMOTIDINE 40 MG/1
40 TABLET, FILM COATED ORAL 2 TIMES DAILY
Qty: 60 TABLET | Refills: 5 | Status: SHIPPED | OUTPATIENT
Start: 2024-04-02

## 2024-04-02 RX ORDER — CEFDINIR 300 MG/1
300 CAPSULE ORAL 2 TIMES DAILY
Qty: 20 CAPSULE | Refills: 0 | Status: SHIPPED | OUTPATIENT
Start: 2024-04-02 | End: 2024-04-12

## 2024-04-02 RX ADMIN — APIXABAN 2.5 MG: 2.5 TABLET, FILM COATED ORAL at 09:04

## 2024-04-02 RX ADMIN — CARVEDILOL 3.12 MG: 3.12 TABLET, FILM COATED ORAL at 09:04

## 2024-04-02 RX ADMIN — DEXAMETHASONE SODIUM PHOSPHATE 4 MG: 4 INJECTION, SOLUTION INTRA-ARTICULAR; INTRALESIONAL; INTRAMUSCULAR; INTRAVENOUS; SOFT TISSUE at 09:04

## 2024-04-02 RX ADMIN — IPRATROPIUM BROMIDE AND ALBUTEROL SULFATE 3 ML: .5; 3 SOLUTION RESPIRATORY (INHALATION) at 07:04

## 2024-04-02 RX ADMIN — CEFEPIME 2 G: 2 INJECTION, POWDER, FOR SOLUTION INTRAVENOUS at 09:04

## 2024-04-02 RX ADMIN — IPRATROPIUM BROMIDE AND ALBUTEROL SULFATE 3 ML: .5; 3 SOLUTION RESPIRATORY (INHALATION) at 03:04

## 2024-04-02 RX ADMIN — DULOXETINE HYDROCHLORIDE 60 MG: 30 CAPSULE, DELAYED RELEASE ORAL at 09:04

## 2024-04-02 RX ADMIN — METHADONE HYDROCHLORIDE 10 MG: 10 TABLET ORAL at 09:04

## 2024-04-02 RX ADMIN — IPRATROPIUM BROMIDE AND ALBUTEROL SULFATE 3 ML: .5; 3 SOLUTION RESPIRATORY (INHALATION) at 12:04

## 2024-04-02 RX ADMIN — POTASSIUM CHLORIDE 10 MEQ: 750 TABLET, FILM COATED, EXTENDED RELEASE ORAL at 09:04

## 2024-04-02 NOTE — PROGRESS NOTES
[facility] - 4th Floor Medical Telemetry  Wound Care    Patient Name:  Ninfa Candelario   MRN:  [id number]  Date: 4/2/2024  Diagnosis: Influenza B    History:     Past Medical History:   Diagnosis Date    Amyloidosis     Anemia     Anxiety and depression     Diplopia     Hyperlipidemia     Hypertension     Impaired mobility     Lytic lesion of bone on x-ray     Multiple myeloma     Neuropathy     Obesity, unspecified     Paroxysmal atrial fibrillation     Primary cancer of left female breast        Social History     Socioeconomic History    Marital status: Single   Tobacco Use    Smoking status: Never    Smokeless tobacco: Never   Substance and Sexual Activity    Alcohol use: Not Currently    Drug use: Never     Social Determinants of Health     Social Connections: Unknown (3/26/2024)    Social Connection and Isolation Panel [NHANES]     Marital Status: Never        Precautions:     Allergies as of 04/01/2024 - Reviewed 04/01/2024   Allergen Reaction Noted    Baclofen Shortness Of Breath 02/24/2016    Penicillins Hives, Rash, and Swelling 02/24/2016    Shellfish containing products Hives, Rash, Swelling, and Other (See Comments) 02/24/2016    Clarithromycin Other (See Comments) 10/03/2023    Iodinated contrast media  06/27/2023    Nsaids (non-steroidal anti-inflammatory drug)  05/11/2022    Other omega-3s  10/03/2023    Penicillin  10/29/2013    Ace inhibitors Other (See Comments) 02/24/2016    Azithromycin Nausea Only 02/24/2016    Hydralazine analogues Anxiety 02/27/2024    Meloxicam Tinitus 02/24/2016    Prochlorperazine Anxiety 02/24/2016    Tramadol Other (See Comments) 02/24/2016       WOC Assessment Details/Treatment      04/02/24 0936   WOCN Assessment   Visit Date 04/02/24   Visit Time 0936   Consult Type New   WOCN Speciality Ostomy   WOCN List colostomy   Wound surgical   Continence Type Fecal   Ostomy Type Colostomy   Procedure ostomy pouch   Intervention chart  review;assessed;applied   Teaching on-going        Colostomy RLQ   No placement date or time found.   Present Prior to Hospital Arrival?: Yes  Location: RLQ   Wound Image    Stomal Appliance 2 piece;Dry;Clean;Intact;No Leakage   Stoma Appearance rosebud appearance;pink;moist;protruding above skin level   Site Assessment Clean;Intact;Pink   Peristomal Assessment Clean;Intact;Dry   Accessories/Skin Care cleansed w/ sterile normal saline   Stoma Function flatus;stool;thin liquid   Treatment Site care     WOCN consulted for colostomy. Discussed plan of care with nurse Cardona prior to visiting the patient. Introduced self and explained reason for visit, she was agreeable to be seen. Family at bedside. New photograph taken for EMR. Educated the patient on proper cleaning techniques, she stated that her bag had just been changed. Patient verbalized her ability to ambulate and mobilize herself. Requested more supplies. Answered all questions to the satisfaction of the patient. Will follow up.   04/02/2024

## 2024-04-02 NOTE — NURSING
.Nurses Note -- 4 Eyes      4/2/2024   12:35 AM      Skin assessed during: Admit      [x] No Altered Skin Integrity Present    []Prevention Measures Documented      [] Yes- Altered Skin Integrity Present or Discovered   [] LDA Added if Not in Epic (Describe Wound)   [] New Altered Skin Integrity was Present on Admit and Documented in LDA   [] Wound Image Taken    Wound Care Consulted? No    Attending Nurse:  Salvador Reeves RN/Staff Member:   Idalia Bernal

## 2024-04-02 NOTE — DISCHARGE SUMMARY
[facility] Medicine Discharge Summary    Admit Date: 4/1/2024  Discharge Date and Time: 4/2/20243:31 PM  Admitting Physician:  Team  Discharging Physician: Adry Rosario MD.  Primary Care Physician: Adry Rosario MD  Consults: None    Discharge Diagnoses:  Bronchitis    Hospital Course:   Ninfa is a 58-year-old female with history of amyloidosis and myelodysplastic syndrome that presented to the ED with complaints of uncontrolled cough..  She was recently discharged 3 days ago after being admitted the 2nd time for bronchitis after a previous admission prior to that for pneumonia and sepsis.    Apparently has been having significant coughing spells and generally feels tired and unwell.  Workup in the ED noted to be positive for influenza B and for a left lower lobe pneumonia which is changed from her recent admission.      Workup in the ED consistent with a CBC consistent with myelodysplastic syndrome on.      Patient admitted under observation considering her significant medical history.  At the time of my visit in the ED she was maintaining her oxygen saturation in the upper 90s on room air.    Was given a dose of vancomycin in the ED, blood cultures drawn and cefepime ordered as well.    She has also been started on Tamiflu.  DuoNebs will be ordered as well as Tessalon Perles and Tussionex for the cough.    She did well overnight and is eager to go home again.  She will be discharging home on 10 more days of cefdinir for the pneumonia as well as 4 more days of Tamiflu to complete her treatment for influenza B. sending cough suppressant.  She will keep a follow-up appointment with myself in the office as well as with Hematology/Oncology.  Her other medical comorbidities have remained stable except for a drop in H&H like her previous visit however this is consistent with her history of MDS and amyloidosis and can be managed outpatient with Hematology        Pt was seen and examined on the day of discharge  Vitals:  VITAL SIGNS: 24 HRS MIN & MAX LAST   Temp  Min: 97.7 °F (36.5 °C)  Max: 98.2 °F (36.8 °C) 98.2 °F (36.8 °C)   BP  Min: 108/67  Max: 147/79 108/67   Pulse  Min: 65  Max: 94  88   Resp  Min: 13  Max: 25 20   SpO2  Min: 95 %  Max: 99 % 98 %       Physical Exam:  General: Alert and oriented, no acute distress   Cardiovascular:  S1-S2, no murmurs, rubs, gallops   Abdomen:  Soft  Respiratory:  Some expiratory wheeze, clears on taking a deep breath   Neuro: Nonfocal    Procedures Performed: No admission procedures for hospital encounter.     Significant Diagnostic Studies: See Full reports for all details    Recent Labs   Lab 03/28/24 0358 04/01/24  0404 04/02/24  0429   WBC 3.76  3.76* 7.93  7.93 5.06  5.06   RBC 2.65* 3.05* 2.65*   HGB 7.5* 8.6* 7.6*   HCT 24.3* 27.8* 24.5*   MCV 91.7 91.1 92.5   MCH 28.3 28.2 28.7   MCHC 30.9* 30.9* 31.0*   RDW 17.3* 17.2* 17.3*    216 221   MPV 9.7 10.2 10.2       Recent Labs   Lab 03/28/24 0358 04/01/24  0404 04/02/24  0429    141 140   K 4.5 3.7 4.6   CO2 24 22* 22*   BUN 38.5* 35.1* 33.8*   CREATININE 2.06* 1.97* 1.87*   CALCIUM 8.1* 8.8 9.1   ALBUMIN 2.3* 2.6* 2.2*   ALKPHOS 68 165* 115   ALT 35 93* 60*   AST 12 47* 17   BILITOT 0.2 0.8 0.5        Microbiology Results (last 7 days)       Procedure Component Value Units Date/Time    Blood culture #1 **CANNOT BE ORDERED STAT** [[id number]]  (Normal) Collected: 04/01/24 0809    Order Status: Completed Specimen: Blood Updated: 04/02/24 0900     CULTURE, BLOOD ([facility]) No Growth At 24 Hours    Blood culture #2 **CANNOT BE ORDERED STAT** [[id number]]  (Normal) Collected: 04/01/24 0809    Order Status: Completed Specimen: Blood Updated: 04/02/24 0900     CULTURE, BLOOD ([facility]) No Growth At 24 Hours             X-Ray Chest 1 View  Narrative: EXAMINATION:  XR CHEST 1 VIEW    CLINICAL HISTORY:  frequent cough;    TECHNIQUE:  Single view of the  chest    COMPARISON:  03/26/2024    FINDINGS:  Left lower lobe opacification.    The cardiomediastinal silhouette is within normal limits.    No acute osseous abnormality.  Impression: Left lower lobe opacification concerning for pneumonia.    Electronically signed by: Glen Galindo  Date:    04/01/2024  Time:    07:24         Medication List        START taking these medications      cefdinir 300 MG capsule  Commonly known as: OMNICEF  Take 1 capsule (300 mg total) by mouth 2 (two) times daily. for 10 days     guaiFENesin-codeine 100-10 mg/5 ml  mg/5 mL syrup  Commonly known as: TUSSI-ORGANIDIN NR  Take 5 mLs by mouth every 4 (four) hours as needed for Cough or Congestion.     oseltamivir 75 MG capsule  Commonly known as: TAMIFLU  Take 1 capsule (75 mg total) by mouth 2 (two) times daily. for 4 days            CHANGE how you take these medications      famotidine 40 MG tablet  Commonly known as: PEPCID  Take 1 tablet (40 mg total) by mouth 2 (two) times daily.  What changed: when to take this     * HEMADY 20 mg Tab  Generic drug: dexAMETHasone  TAKE 1 TABLET BY MOUTH EVERY WEEK  What changed: Another medication with the same name was added. Make sure you understand how and when to take each.     * dexAMETHasone 4 MG Tab  Commonly known as: DECADRON  Take 1 tablet (4 mg total) by mouth every 12 (twelve) hours. for 10 days  What changed: You were already taking a medication with the same name, and this prescription was added. Make sure you understand how and when to take each.           * This list has 2 medication(s) that are the same as other medications prescribed for you. Read the directions carefully, and ask your doctor or other care provider to review them with you.                CONTINUE taking these medications      albuterol-ipratropium 2.5 mg-0.5 mg/3 mL nebulizer solution  Commonly known as: DUO-NEB  Take 3 mLs by nebulization every 4 (four) hours. Rescue     apixaban 2.5 mg Tab  Commonly known  as: ELIQUIS  Take 1 tablet (2.5 mg total) by mouth 2 (two) times daily.     benzonatate 200 MG capsule  Commonly known as: TESSALON  Take 1 capsule (200 mg total) by mouth 3 (three) times daily as needed for Cough.     carvediloL 3.125 MG tablet  Commonly known as: COREG  Take 1 tablet (3.125 mg total) by mouth 2 (two) times daily with meals.     DULoxetine 60 MG capsule  Commonly known as: CYMBALTA     letrozole 2.5 mg Tab  Commonly known as: FEMARA  Take 1 tablet (2.5 mg total) by mouth once daily.     levocetirizine 5 MG tablet  Commonly known as: XYZAL  Take 1 tablet (5 mg total) by mouth every evening.     methadone 10 MG tablet  Commonly known as: DOLOPHINE     multivitamin per tablet  Commonly known as: THERAGRAN     NON FORMULARY MEDICATION     omeprazole 20 MG capsule  Commonly known as: PRILOSEC  TAKE 1 CAPSULE(20 MG) BY MOUTH EVERY DAY     ondansetron 8 MG tablet  Commonly known as: ZOFRAN  Take 1 tablet (8 mg total) by mouth every 8 (eight) hours as needed for Nausea.     oxyCODONE 10 mg Tab immediate release tablet  Commonly known as: ROXICODONE  Take 1 tablet (10 mg total) by mouth every 12 (twelve) hours as needed.     potassium chloride 10 MEQ Tbsr  Commonly known as: KLOR-CON  TAKE 1 TABLET BY MOUTH TWICE DAILY     rosuvastatin 10 MG tablet  Commonly known as: CRESTOR     tretinoin 0.1 % cream  Commonly known as: RETIN-A     triamterene-hydrochlorothiazide 37.5-25 mg 37.5-25 mg per tablet  Commonly known as: MAXZIDE-25  Take 1 tablet by mouth once daily.               Where to Get Your Medications        These medications were sent to [id number] - [address] AT [facility] & [address] [address]      Hours: 24-hours Phone: [phone]   cefdinir 300 MG capsule  dexAMETHasone 4 MG Tab  famotidine 40 MG tablet  guaiFENesin-codeine 100-10 mg/5 ml  mg/5 mL syrup  oseltamivir 75 MG  capsule  triamterene-hydrochlorothiazide 37.5-25 mg 37.5-25 mg per tablet       Information about where to get these medications is not yet available    Ask your nurse or doctor about these medications  letrozole 2.5 mg Tab          Explained in detail to the patient about the discharge plan, medications, and follow-up visits. Pt understands and agrees with the treatment plan  Discharge Disposition: Home-Health Care [facility]   Discharged Condition: stable  Diet- As tolerated  Dietary Orders (From admission, onward)       Start     Ordered    04/01/24 2104  Diet Adult Regular  Diet effective now         04/01/24 2103                   Medications Per DC med rec  Activities as tolerated   Follow-up Information       Adry Rosario MD Follow up in 1 week(s).    Specialty: Internal Medicine  Why: as scheduled  Contact information:  [address] [place] [address]  [phone]               Natalie Meyers MD Follow up.    Specialty: Hematology and Oncology  Contact information:  [address] [address]  [phone]                           For further questions contact Dr Presley's office    Discharge time 33 minutes    For worsening symptoms, chest pain, shortness of breath, increased abdominal pain, high grade fever, stroke or stroke like symptoms, immediately go to the nearest Emergency Room or call 911 as soon as possible.      Adry Anguiano M.D, on 4/2/2024. at 3:31 PM.

## 2024-04-02 NOTE — PLAN OF CARE
Problem: Adult Inpatient Plan of Care  Goal: Readiness for Transition of Care  Outcome: Ongoing, Progressing     Problem: Adjustment to Illness (Sepsis/Septic Shock)  Goal: Optimal Coping  Outcome: Ongoing, Progressing     Problem: Fall Injury Risk  Goal: Absence of Fall and Fall-Related Injury  Outcome: Ongoing, Progressing

## 2024-04-04 ENCOUNTER — OFFICE VISIT (OUTPATIENT)
Dept: INTERNAL MEDICINE | Facility: CLINIC | Age: 59
End: 2024-04-04
Payer: MEDICARE

## 2024-04-04 VITALS
OXYGEN SATURATION: 98 % | BODY MASS INDEX: 28.35 KG/M2 | DIASTOLIC BLOOD PRESSURE: 68 MMHG | HEART RATE: 87 BPM | WEIGHT: 160 LBS | HEIGHT: 63 IN | SYSTOLIC BLOOD PRESSURE: 128 MMHG

## 2024-04-04 DIAGNOSIS — I10 PRIMARY HYPERTENSION: ICD-10-CM

## 2024-04-04 DIAGNOSIS — D63.8 ANEMIA OF CHRONIC DISEASE: ICD-10-CM

## 2024-04-04 DIAGNOSIS — J14 PNEUMONIA OF LEFT LOWER LOBE DUE TO HAEMOPHILUS INFLUENZAE: ICD-10-CM

## 2024-04-04 DIAGNOSIS — Z93.2 ILEOSTOMY STATUS: ICD-10-CM

## 2024-04-04 DIAGNOSIS — E85.9 AMYLOIDOSIS, UNSPECIFIED TYPE: Primary | ICD-10-CM

## 2024-04-04 DIAGNOSIS — Z09 HOSPITAL DISCHARGE FOLLOW-UP: ICD-10-CM

## 2024-04-04 DIAGNOSIS — G62.0 DRUG-INDUCED POLYNEUROPATHY: ICD-10-CM

## 2024-04-04 DIAGNOSIS — N18.4 CHRONIC KIDNEY DISEASE, STAGE 4 (SEVERE): ICD-10-CM

## 2024-04-04 DIAGNOSIS — Z94.84 STEM CELLS TRANSPLANT STATUS: ICD-10-CM

## 2024-04-04 DIAGNOSIS — M06.9 RHEUMATOID ARTHRITIS, INVOLVING UNSPECIFIED SITE, UNSPECIFIED WHETHER RHEUMATOID FACTOR PRESENT: ICD-10-CM

## 2024-04-04 DIAGNOSIS — E78.2 MIXED HYPERLIPIDEMIA: ICD-10-CM

## 2024-04-04 DIAGNOSIS — I48.0 PAROXYSMAL ATRIAL FIBRILLATION: ICD-10-CM

## 2024-04-04 LAB
ABS NEUT (OLG): 4.89 X10(3)/MCL (ref 2.1–9.2)
ALBUMIN SERPL-MCNC: 2.7 G/DL (ref 3.4–4.8)
ALBUMIN/GLOB SERPL: 0.9 RATIO (ref 1.1–2)
ALP SERPL-CCNC: 113 UNIT/L (ref 40–150)
ALT SERPL-CCNC: 54 UNIT/L (ref 0–55)
ANISOCYTOSIS BLD QL SMEAR: ABNORMAL
AST SERPL-CCNC: 17 UNIT/L (ref 5–34)
BILIRUB SERPL-MCNC: 0.3 MG/DL
BUN SERPL-MCNC: 41 MG/DL (ref 9.8–20.1)
CALCIUM SERPL-MCNC: 8.6 MG/DL (ref 8.4–10.2)
CHLORIDE SERPL-SCNC: 107 MMOL/L (ref 98–107)
CO2 SERPL-SCNC: 21 MMOL/L (ref 23–31)
CREAT SERPL-MCNC: 2.05 MG/DL (ref 0.55–1.02)
ERYTHROCYTE [DISTWIDTH] IN BLOOD BY AUTOMATED COUNT: 17.4 % (ref 11.5–17)
GFR SERPLBLD CREATININE-BSD FMLA CKD-EPI: 27 MLS/MIN/1.73/M2
GLOBULIN SER-MCNC: 3 GM/DL (ref 2.4–3.5)
GLUCOSE SERPL-MCNC: 108 MG/DL (ref 82–115)
HCT VFR BLD AUTO: 25.2 % (ref 37–47)
HGB BLD-MCNC: 7.6 G/DL (ref 12–16)
INSTRUMENT WBC (OLG): 5.5 X10(3)/MCL
LYMPHOCYTES NFR BLD MANUAL: 0.17 X10(3)/MCL
LYMPHOCYTES NFR BLD MANUAL: 3 %
MCH RBC QN AUTO: 28 PG (ref 27–31)
MCHC RBC AUTO-ENTMCNC: 30.2 G/DL (ref 33–36)
MCV RBC AUTO: 93 FL (ref 80–94)
METAMYELOCYTES NFR BLD MANUAL: 4 %
MONOCYTES NFR BLD MANUAL: 0.22 X10(3)/MCL (ref 0.1–1.3)
MONOCYTES NFR BLD MANUAL: 4 %
MYELOCYTES NFR BLD MANUAL: 2 %
NEUTROPHILS NFR BLD MANUAL: 89 %
NRBC BLD AUTO-RTO: 0.4 %
PLATELET # BLD AUTO: 274 X10(3)/MCL (ref 130–400)
PLATELET # BLD EST: NORMAL 10*3/UL
PMV BLD AUTO: 10.2 FL (ref 7.4–10.4)
POIKILOCYTOSIS BLD QL SMEAR: ABNORMAL
POTASSIUM SERPL-SCNC: 4.9 MMOL/L (ref 3.5–5.1)
PROT SERPL-MCNC: 5.7 GM/DL (ref 5.8–7.6)
RBC # BLD AUTO: 2.71 X10(6)/MCL (ref 4.2–5.4)
RBC MORPH BLD: ABNORMAL
SODIUM SERPL-SCNC: 141 MMOL/L (ref 136–145)
TEAR DROP CELL (OLG): ABNORMAL
WBC # SPEC AUTO: 5.5 X10(3)/MCL (ref 4.5–11.5)

## 2024-04-04 PROCEDURE — 85007 BL SMEAR W/DIFF WBC COUNT: CPT | Performed by: INTERNAL MEDICINE

## 2024-04-04 PROCEDURE — 99496 TRANSJ CARE MGMT HIGH F2F 7D: CPT | Mod: ,,, | Performed by: INTERNAL MEDICINE

## 2024-04-04 PROCEDURE — 80053 COMPREHEN METABOLIC PANEL: CPT | Performed by: INTERNAL MEDICINE

## 2024-04-04 PROCEDURE — 36415 COLL VENOUS BLD VENIPUNCTURE: CPT | Performed by: INTERNAL MEDICINE

## 2024-04-04 RX ORDER — CHLOPHEDIANOL HCL AND PYRILAMINE MALEATE 12.5; 12.5 MG/5ML; MG/5ML
10 SOLUTION ORAL 3 TIMES DAILY PRN
Qty: 500 ML | Refills: 0 | Status: SHIPPED | OUTPATIENT
Start: 2024-04-04

## 2024-04-04 NOTE — PROGRESS NOTES
Transitional Care Note  Subjective:   Patient ID: Ninfa Candelario is a 58 y.o. female.    Chief Complaint: Follow-up (Hospital DC 4-2-24)      Date of Hospital Discharge: 4/2/24   Family and/or Caretaker present at visit?  Yes  Diagnostic tests reviewed/disposition: I have reviewed all completed as well as pending diagnostic tests at the time of discharge.  Disease/illness education: performed   Home health/community services discussion/referrals: Patient does not have home health established from hospital visit.  They do not need home health.  If needed, we will set up home health for the patient.   Establishment or re-establishment of referral orders for community resources: No other necessary community resources.   Discussion with other health care providers: No discussion with other health care providers necessary.     HPI: Ninfa is a 58-year-old female with history of amyloidosis and myelodysplastic syndrome that presented to the ED with complaints of uncontrolled cough..  She was recently discharged 3 days ago after being admitted the 2nd time for bronchitis after a previous admission prior to that for pneumonia and sepsis.    Apparently has been having significant coughing spells and generally feels tired and unwell.  Workup in the ED noted to be positive for influenza B and for a left lower lobe pneumonia which is changed from her recent admission.      Workup in the ED consistent with a CBC consistent with myelodysplastic syndrome on.      Patient admitted under observation considering her significant medical history.  At the time of my visit in the ED she was maintaining her oxygen saturation in the upper 90s on room air.    Was given a dose of vancomycin in the ED, blood cultures drawn and cefepime ordered as well.    She has also been started on Tamiflu.  DuoNebs will be ordered as well as Tessalon Perles and Tussionex for the cough.    She did well overnight and is eager to go home again.  She will  be discharging home on 10 more days of cefdinir for the pneumonia as well as 4 more days of Tamiflu to complete her treatment for influenza B. sending cough suppressant.  She will keep a follow-up appointment with myself in the office as well as with Hematology/Oncology.  Her other medical comorbidities have remained stable except for a drop in H&H like her previous visit however this is consistent with her history of MDS and amyloidosis and can be managed outpatient with Hematology   CBC and CMP were drawn in the office today for completion sake and to monitor her anemia.  If indicated, outpatient transfusion can be ordered.  She does have an upcoming appointment with Hematology on the 10th.  Offered home health however patient reluctant at this time but is willing for outpatient therapy once her acute condition is improved.          Health maintenance reviewed with the patient.  Health maintenance completed:  Health Maintenance Topics with due status: Not Due       Topic Last Completion Date    Colorectal Cancer Screening 12/11/2018    Cervical Cancer Screening 02/18/2020    Lipid Panel 07/29/2021    Pneumococcal Vaccines (Age 0-64) 01/27/2023      Health maintenance due:  Health Maintenance Due   Topic Date Due    Hepatitis C Screening  Never done    Annual UACr  Never done    HIV Screening  Never done    Hemoglobin A1c (Diabetic Prevention Screening)  Never done    TETANUS VACCINE  08/22/2013    RSV Vaccine (Age 60+ and Pregnant patients) (1 - 1-dose 60+ series) Never done    Mammogram  04/11/2024      Review of Systems  A comprehensive review of systems is obtained and is essentially negative except for that stated in the HPI   History:     Past Medical History:   Diagnosis Date    Amyloidosis     Anemia     Anxiety and depression     Diplopia     Hyperlipidemia     Hypertension     Impaired mobility     Lytic lesion of bone on x-ray     Multiple myeloma     Neuropathy     Obesity, unspecified     Paroxysmal  "atrial fibrillation     Primary cancer of left female breast       Past Surgical History:   Procedure Laterality Date    BONE MARROW TRANSPLANT  02/08/2016    CARPAL TUNNEL RELEASE      COLONOSCOPY  12/11/2018    Dr. Eddie Jimenez    ENDOSCOPIC RELEASE OF TRIGGER FINGER      ESOPHAGEAL MOTILITY STUDY  2015    Excision Lipoma left elbow      Exploration Laparotomy  2016    FLEXIBLE SIGMOIDOSCOPY      MEDIPORT INSERTION, SINGLE  03/15/2016    PH Study 24 Hour  2015     Family History   Problem Relation Age of Onset    Hypertension Mother     Cancer Father     Hypertension Sister     Hypertension Brother       Social History     Tobacco Use    Smoking status: Never    Smokeless tobacco: Never   Substance and Sexual Activity    Alcohol use: Not Currently    Drug use: Never    Sexual activity: Not on file      Smoking Cessation:  Counseling given: No     Non-smoker    Allergies:   Review of patient's allergies indicates:   Allergen Reactions    Baclofen Shortness Of Breath     Difficulty breathing      Penicillins Hives, Rash and Swelling    Shellfish containing products Hives, Rash, Swelling and Other (See Comments)     shell  She can shrimp      Clarithromycin Other (See Comments)    Iodinated contrast media      Allergic to shrimp    Nsaids (non-steroidal anti-inflammatory drug)     Other omega-3s     Penicillin      Other reaction(s): Unknown  Other reaction(s): Unknown  Other reaction(s): Unknown    Ace inhibitors Other (See Comments)     cough  Other reaction(s): Cough    Azithromycin Nausea Only     Upset stomach    Hydralazine analogues Anxiety    Meloxicam Tinitus     Other reaction(s): Unknown  Other reaction(s): Unknown    Prochlorperazine Anxiety     Restlessness/anxious    Tramadol Other (See Comments)     Increased Heart Rate    Other reaction(s): Unknown  Other reaction(s): Unknown     Objective:     Vitals:    04/04/24 1347   BP: 128/68   Pulse: 87   SpO2: 98%   Weight: 72.6 kg (160 lb)   Height: 5' 3" " (1.6 m)   PainSc: 0-No pain     Body mass index is 28.34 kg/m².   Wt Readings from Last 4 Encounters:   04/04/24 72.6 kg (160 lb)   04/01/24 72.8 kg (160 lb 7.9 oz)   03/26/24 76.2 kg (168 lb)   03/25/24 81.6 kg (180 lb)     Weight change: has been stable.    Physical Examination:   Physical Exam  Constitutional:       Appearance: Normal appearance.      Comments: Generalized weakness and deconditioning   HENT:      Head: Normocephalic and atraumatic.      Nose: Nose normal.      Mouth/Throat:      Mouth: Mucous membranes are moist.      Pharynx: Oropharynx is clear.   Eyes:      Extraocular Movements: Extraocular movements intact.      Pupils: Pupils are equal, round, and reactive to light.   Cardiovascular:      Rate and Rhythm: Normal rate and regular rhythm.      Pulses: Normal pulses.   Pulmonary:      Effort: Pulmonary effort is normal.      Breath sounds: Normal breath sounds.   Abdominal:      General: Bowel sounds are normal.      Palpations: Abdomen is soft.   Musculoskeletal:         General: Normal range of motion.      Cervical back: Normal range of motion and neck supple.   Skin:     General: Skin is warm.   Neurological:      General: No focal deficit present.      Mental Status: She is alert and oriented to person, place, and time. Mental status is at baseline.   Psychiatric:         Mood and Affect: Mood normal.         Diagnostic Tests:  Significant Imaging: I have reviewed and interpreted all pertinent imaging results/findings.  X-Ray Chest 1 View  Narrative: EXAMINATION:  XR CHEST 1 VIEW    CLINICAL HISTORY:  frequent cough;    TECHNIQUE:  Single view of the chest    COMPARISON:  03/26/2024    FINDINGS:  Left lower lobe opacification.    The cardiomediastinal silhouette is within normal limits.    No acute osseous abnormality.  Impression: Left lower lobe opacification concerning for pneumonia.    Electronically signed by: Glen Galindo  Date:    04/01/2024  Time:    07:24      Labs:   Lab  Results   Component Value Date    WBC 5.06 04/02/2024    WBC 5.06 04/02/2024    RBC 2.65 (L) 04/02/2024    HGB 7.6 (L) 04/02/2024    HCT 24.5 (L) 04/02/2024    MCV 92.5 04/02/2024    MCH 28.7 04/02/2024     04/02/2024     04/02/2024    K 4.6 04/02/2024    BUN 33.8 (H) 04/02/2024    CREATININE 1.87 (H) 04/02/2024    AST 17 04/02/2024    ALT 60 (H) 04/02/2024    BILITOT 0.5 04/02/2024        Laboratory Data:  All pertinent labs have been reviewed.  I have reviewed the following records today:     Details:   [x] Labs [] Internal  [] External    [] Micro [] Internal  [] External    [] Pathology [] Internal  [] External    [x] Imaging [] Internal  [] External    [x] Cardiology Procedures [] Internal  [] External    [x] Provider Records [] Internal  [] External    [] Other [] Internal  [] External      Medications:     Medication List with Changes/Refills   New Medications    PYRILAMINE-CHLOPHEDIANOL (NINJACOF) 12.5-12.5 MG/5 ML LIQD    Take 10 mLs by mouth 3 (three) times daily as needed.   Current Medications    ALBUTEROL-IPRATROPIUM (DUO-NEB) 2.5 MG-0.5 MG/3 ML NEBULIZER SOLUTION    Take 3 mLs by nebulization every 4 (four) hours. Rescue    APIXABAN (ELIQUIS) 2.5 MG TAB    Take 1 tablet (2.5 mg total) by mouth 2 (two) times daily.    BENZONATATE (TESSALON) 200 MG CAPSULE    Take 1 capsule (200 mg total) by mouth 3 (three) times daily as needed for Cough.    CARVEDILOL (COREG) 3.125 MG TABLET    Take 1 tablet (3.125 mg total) by mouth 2 (two) times daily with meals.    CEFDINIR (OMNICEF) 300 MG CAPSULE    Take 1 capsule (300 mg total) by mouth 2 (two) times daily. for 10 days    DEXAMETHASONE (DECADRON) 4 MG TAB    Take 1 tablet (4 mg total) by mouth every 12 (twelve) hours. for 10 days    DULOXETINE (CYMBALTA) 60 MG CAPSULE    Take 60 mg by mouth. PRN  Does not take often    FAMOTIDINE (PEPCID) 40 MG TABLET    Take 1 tablet (40 mg total) by mouth 2 (two) times daily.    GUAIFENESIN-CODEINE 100-10 MG/5 ML  (TUSSI-ORGANGREGG NR)  MG/5 ML SYRUP    Take 5 mLs by mouth every 4 (four) hours as needed for Cough or Congestion.    HEMADY 20 MG TAB    TAKE 1 TABLET BY MOUTH EVERY WEEK    LEVOCETIRIZINE (XYZAL) 5 MG TABLET    Take 1 tablet (5 mg total) by mouth every evening.    METHADONE (DOLOPHINE) 10 MG TABLET    Take 10 mg by mouth every 8 (eight) hours while awake.    MULTIVITAMIN (THERAGRAN) PER TABLET    Take 1 tablet by mouth once daily.    NON FORMULARY MEDICATION    Take 1 Dose by mouth every other day. Chemo pill (Revlimid)    OMEPRAZOLE (PRILOSEC) 20 MG CAPSULE    TAKE 1 CAPSULE(20 MG) BY MOUTH EVERY DAY    ONDANSETRON (ZOFRAN) 8 MG TABLET    Take 1 tablet (8 mg total) by mouth every 8 (eight) hours as needed for Nausea.    OSELTAMIVIR (TAMIFLU) 75 MG CAPSULE    Take 1 capsule (75 mg total) by mouth 2 (two) times daily. for 4 days    OXYCODONE (ROXICODONE) 10 MG TAB IMMEDIATE RELEASE TABLET    Take 1 tablet (10 mg total) by mouth every 12 (twelve) hours as needed.    POTASSIUM CHLORIDE (KLOR-CON) 10 MEQ TBSR    TAKE 1 TABLET BY MOUTH TWICE DAILY    ROSUVASTATIN (CRESTOR) 10 MG TABLET    Take 10 mg by mouth Daily.    TRETINOIN (RETIN-A) 0.1 % CREAM    Apply 1 application topically every evening.    TRIAMTERENE-HYDROCHLOROTHIAZIDE 37.5-25 MG (MAXZIDE-25) 37.5-25 MG PER TABLET    Take 1 tablet by mouth once daily.   Discontinued Medications    LETROZOLE (FEMARA) 2.5 MG TAB    Take 1 tablet (2.5 mg total) by mouth once daily.     Assessment:     1. Amyloidosis, unspecified type    2. Pneumonia of left lower lobe due to Haemophilus influenzae    3. Primary hypertension    4. Mixed hyperlipidemia    5. Hospital discharge follow-up    6. Paroxysmal atrial fibrillation    7. Anemia of chronic disease    8. Rheumatoid arthritis, involving unspecified site, unspecified whether rheumatoid factor present    9. Stem cells transplant status    10. Ileostomy status    11. Drug-induced polyneuropathy    12. Chronic kidney  disease, stage 4 (severe)      Plan:     Problem List Items Addressed This Visit          Neuro    Drug-induced polyneuropathy       Pulmonary    Pneumonia of left lower lobe due to Haemophilus influenzae    Current Assessment & Plan     -patient needs to complete her course of cefdinir as well as Tamiflu         Relevant Orders    CBC Auto Differential    Comprehensive Metabolic Panel       Cardiac/Vascular    Hypertension    Overview     Last Assessment & Plan:   Formatting of this note might be different from the original.  Today's blood pressure is 132/70 9 mmHg and heart rate 70.  The patient informed me that she has been taking losartan 25 mg daily prescribed by her local PCP for the past year in addition to carvedilol and hydrochlorothiazide.  Due to intermittent episodes of hypotension with systolic blood pressure as low as 80s at home, the patient stopped taking hydrochlorothiazide 4 months ago.  She continues to have hypotensive episodes despite that.  Additionally, she has been having intermittent lower extremity edema bilaterally which on occasion does not resolve with leg elevations or salt restrictions.  I advised Ms Andree to discontinue losartan, restart hydrochlorothiazide 12.5 mg daily, and continue carvedilol 25 mg twice daily.  She will monitor her her blood pressure and symptoms for the next 2 weeks.  I will follow up with the patient in 2 weeks over the phone and see if further adjustment of antihypertensives is needed.         Current Assessment & Plan     -continue home meds, not on triamterene hydrochlorothiazide anymore, continue to hold since blood pressure controlled without it         Mixed hyperlipidemia    Overview     Last Assessment & Plan:   Formatting of this note might be different from the original.  The patient was started on rosuvastatin 10 mg nightly on 3/2/2021.  Today's lipid panel demonstrated significant improvement compared to March blood work.  I recommend  continuation of rosuvastatin 10 mg nightly.  I also discussed with the patient low-cholesterol heart healthy lifestyle and the strategies.         Current Assessment & Plan     -on statin         Paroxysmal atrial fibrillation    Overview     Last Assessment & Plan:   Formatting of this note might be different from the original.  The patient denies palpitations.  She is in sinus rhythm during today's exam.  I recommend continuation of carvedilol 25 mg twice daily for rate control and Eliquis 2.5 mg twice daily for anticoagulation.         Current Assessment & Plan     -asymptomatic currently  -currently on carvedilol 3.125 and Eliquis 2.5 b.i.d., continue            Renal/    Chronic kidney disease, stage 4 (severe)       Immunology/Multi System    Amyloidosis - Primary    Current Assessment & Plan     -status post colon resection and ileostomy status         Rheumatoid arthritis, involving unspecified site, unspecified whether rheumatoid factor present    Current Assessment & Plan     -stable at this time            Oncology    Anemia of chronic disease    Overview     Last Assessment & Plan:   Formatting of this note might be different from the original.  Hemoglobin is stable.  No transfusion needed at this time.         Current Assessment & Plan     -checking CBC, may need iron infusion/outpatient transfusion depending on hemoglobin and hematocrit         Relevant Orders    CBC Auto Differential    Comprehensive Metabolic Panel    Stem cells transplant status    Current Assessment & Plan     -patient immunosuppressed            GI    Ileostomy status    Current Assessment & Plan     -does have issues with some pills not getting metoprolol completely especially potassium  -may need to we switch to liquid supplement of potassium level still low            Other    Hospital discharge follow-up    Current Assessment & Plan     -medications have been reviewed and resumed and discussed with the patient in detail          Relevant Orders    CBC Auto Differential    Comprehensive Metabolic Panel        1.  Continue current medications  2.  Side effects and expected results discussed  3.  Diet and exercise as tolerated  4.  Nutritional support  5.  Health maintenance updated accordingly  6.  Call with increased complaints or concerns  7.  Discussed plan of care in detail with patient as well as the mother.  The potassium p.o. comes out intact in the ileostomy, may switch to liquid potassium if levels are low   8. Checking CBC and CMP in office today, follow-up on results  9. Advised to complete the Tamiflu as well as the Omnicef as well as prescription being sent for cough suppressant    Follow Up:   No follow-ups on file.    I spent greater than 50 minutes today both in chart review and greater than 50% of that time in discussion with the patient regarding health maintenance, diagnoses, diagnostic tests, medications, treatments, symptom management, expected results and adverse effects. Patient verbalized understanding and all questions were answered.    This note includes dictation done on [employer] word recognition program.  There are word recognition mistakes that are occasionally missed on review.

## 2024-04-04 NOTE — ASSESSMENT & PLAN NOTE
-continue home meds, not on triamterene hydrochlorothiazide anymore, continue to hold since blood pressure controlled without it

## 2024-04-04 NOTE — ASSESSMENT & PLAN NOTE
-checking CBC, may need iron infusion/outpatient transfusion depending on hemoglobin and hematocrit

## 2024-04-04 NOTE — ASSESSMENT & PLAN NOTE
-does have issues with some pills not getting metoprolol completely especially potassium  -may need to we switch to liquid supplement of potassium level still low

## 2024-04-06 LAB
BACTERIA BLD CULT: NORMAL
BACTERIA BLD CULT: NORMAL

## 2024-04-09 ENCOUNTER — PATIENT OUTREACH (OUTPATIENT)
Dept: ADMINISTRATIVE | Facility: CLINIC | Age: 59
End: 2024-04-09
Payer: MEDICARE

## 2024-04-10 ENCOUNTER — OFFICE VISIT (OUTPATIENT)
Dept: HEMATOLOGY/ONCOLOGY | Facility: CLINIC | Age: 59
End: 2024-04-10
Payer: MEDICARE

## 2024-04-10 ENCOUNTER — LAB VISIT (OUTPATIENT)
Dept: LAB | Facility: HOSPITAL | Age: 59
End: 2024-04-10
Attending: INTERNAL MEDICINE
Payer: MEDICARE

## 2024-04-10 VITALS
TEMPERATURE: 98 F | BODY MASS INDEX: 28.35 KG/M2 | RESPIRATION RATE: 17 BRPM | HEIGHT: 63 IN | OXYGEN SATURATION: 98 % | HEART RATE: 73 BPM | SYSTOLIC BLOOD PRESSURE: 143 MMHG | WEIGHT: 160 LBS | DIASTOLIC BLOOD PRESSURE: 78 MMHG

## 2024-04-10 DIAGNOSIS — C50.819 OVERLAPPING MALIGNANT NEOPLASM OF FEMALE BREAST, UNSPECIFIED ESTROGEN RECEPTOR STATUS, UNSPECIFIED LATERALITY: ICD-10-CM

## 2024-04-10 DIAGNOSIS — C90.00 MULTIPLE MYELOMA NOT HAVING ACHIEVED REMISSION: ICD-10-CM

## 2024-04-10 DIAGNOSIS — C90.00 MULTIPLE MYELOMA NOT HAVING ACHIEVED REMISSION: Primary | ICD-10-CM

## 2024-04-10 DIAGNOSIS — Z79.811 AROMATASE INHIBITOR USE: ICD-10-CM

## 2024-04-10 DIAGNOSIS — D63.8 ANEMIA OF CHRONIC DISEASE: ICD-10-CM

## 2024-04-10 DIAGNOSIS — E85.9 AMYLOIDOSIS, UNSPECIFIED TYPE: ICD-10-CM

## 2024-04-10 LAB
ALBUMIN SERPL-MCNC: 2.9 G/DL (ref 3.4–4.8)
ALBUMIN/GLOB SERPL: 1.2 RATIO (ref 1.1–2)
ALP SERPL-CCNC: 93 UNIT/L (ref 40–150)
ALT SERPL-CCNC: 31 UNIT/L (ref 0–55)
AST SERPL-CCNC: 10 UNIT/L (ref 5–34)
BASOPHILS # BLD AUTO: 0 X10(3)/MCL
BASOPHILS NFR BLD AUTO: 0 %
BILIRUB SERPL-MCNC: 0.4 MG/DL
BUN SERPL-MCNC: 36.1 MG/DL (ref 9.8–20.1)
CALCIUM SERPL-MCNC: 8.6 MG/DL (ref 8.4–10.2)
CHLORIDE SERPL-SCNC: 111 MMOL/L (ref 98–107)
CO2 SERPL-SCNC: 23 MMOL/L (ref 23–31)
CREAT SERPL-MCNC: 1.87 MG/DL (ref 0.55–1.02)
EOSINOPHIL # BLD AUTO: 0.01 X10(3)/MCL (ref 0–0.9)
EOSINOPHIL NFR BLD AUTO: 0.1 %
ERYTHROCYTE [DISTWIDTH] IN BLOOD BY AUTOMATED COUNT: 18.4 % (ref 11.5–17)
GFR SERPLBLD CREATININE-BSD FMLA CKD-EPI: 30 MLS/MIN/1.73/M2
GLOBULIN SER-MCNC: 2.5 GM/DL (ref 2.4–3.5)
GLUCOSE SERPL-MCNC: 95 MG/DL (ref 82–115)
HCT VFR BLD AUTO: 27.7 % (ref 37–47)
HGB BLD-MCNC: 8.4 G/DL (ref 12–16)
IGA SERPL-MCNC: 63 MG/DL (ref 69–517)
IGG SERPL-MCNC: 591 MG/DL (ref 522–1631)
IGM SERPL-MCNC: 8 MG/DL (ref 33–293)
IMM GRANULOCYTES # BLD AUTO: 0.24 X10(3)/MCL (ref 0–0.04)
IMM GRANULOCYTES NFR BLD AUTO: 1.9 %
LYMPHOCYTES # BLD AUTO: 0.51 X10(3)/MCL (ref 0.6–4.6)
LYMPHOCYTES NFR BLD AUTO: 4 %
MCH RBC QN AUTO: 29.2 PG (ref 27–31)
MCHC RBC AUTO-ENTMCNC: 30.3 G/DL (ref 33–36)
MCV RBC AUTO: 96.2 FL (ref 80–94)
MONOCYTES # BLD AUTO: 1 X10(3)/MCL (ref 0.1–1.3)
MONOCYTES NFR BLD AUTO: 7.8 %
NEUTROPHILS # BLD AUTO: 11.02 X10(3)/MCL (ref 2.1–9.2)
NEUTROPHILS NFR BLD AUTO: 86.2 %
PLATELET # BLD AUTO: 258 X10(3)/MCL (ref 130–400)
PMV BLD AUTO: 9.6 FL (ref 7.4–10.4)
POTASSIUM SERPL-SCNC: 4.2 MMOL/L (ref 3.5–5.1)
PROT SERPL-MCNC: 5.4 GM/DL (ref 5.8–7.6)
RBC # BLD AUTO: 2.88 X10(6)/MCL (ref 4.2–5.4)
SODIUM SERPL-SCNC: 144 MMOL/L (ref 136–145)
WBC # SPEC AUTO: 12.78 X10(3)/MCL (ref 4.5–11.5)

## 2024-04-10 PROCEDURE — 85025 COMPLETE CBC W/AUTO DIFF WBC: CPT

## 2024-04-10 PROCEDURE — 80053 COMPREHEN METABOLIC PANEL: CPT

## 2024-04-10 PROCEDURE — 99215 OFFICE O/P EST HI 40 MIN: CPT | Mod: S$PBB,,, | Performed by: INTERNAL MEDICINE

## 2024-04-10 PROCEDURE — 99999 PR PBB SHADOW E&M-EST. PATIENT-LVL V: CPT | Mod: PBBFAC,,, | Performed by: INTERNAL MEDICINE

## 2024-04-10 PROCEDURE — 84165 PROTEIN E-PHORESIS SERUM: CPT

## 2024-04-10 PROCEDURE — 99215 OFFICE O/P EST HI 40 MIN: CPT | Mod: PBBFAC | Performed by: INTERNAL MEDICINE

## 2024-04-10 PROCEDURE — 82784 ASSAY IGA/IGD/IGG/IGM EACH: CPT

## 2024-04-10 PROCEDURE — 36415 COLL VENOUS BLD VENIPUNCTURE: CPT

## 2024-04-10 PROCEDURE — 83521 IG LIGHT CHAINS FREE EACH: CPT | Mod: 59

## 2024-04-10 NOTE — PROGRESS NOTES
HEMATOLOGY/ONCOLOGY OFFICE CLINIC VISIT    Visit Information:    No show/Reschedules/Cancellations  08/21/2018--> Rescheduled              07/20/2023-->canceled visit/labs/infusion  11/05/2019--> No Show/Reschedule 08/01/2023-->canceled visit/labs/infusion  06/24/2020--> No Show/Reschedule 08/31/2023-->canceled visit/labs/infusion  09/24/2020--> No Show/Reschedule 09/21/2023-->canceled visit/labs/infusion  03/15/2021--> No Show/Reschedule 10/24/2023-->canceled visit/labs/infusion  03/23/2021--> No Show/Reschedule 10/25/2023-->canceled visit/labs/infusion  04/13/2021--> No Show   10/26/2023-->canceled visit/labs/infusion  05/27/2021--> No Show   10/31/2023-->canceled visit/labs/infusion   08/19/2021--> No Show   11/01/2023-->canceled visit/labs/infusion  08/26/2021--> No Show/Rescheduled 11/29/2023-->canceled visit/labs/infusion  04/26/2022--> No Show   12/04/2023-->canceled visit/labs/infusion  10/03/2022--> Rescheduled  12/07/2023-->canceled visit/labs/infusion  10/11/2022--> Rescheduled  01/11/2024-->canceled visit/labs/infusion  03/22/2023--> No Show/Rescheduled  4/26/2023->Rescheduled  05/09/2023->Rescheduled  5/30/2023->Rescheduled  6/15/2023->Rescheduled      Referring Physician: Dr Farr  PCP: DR. Cardona  GI:   Rheumatologist: Dr. Luis Rea  Immunologist: Dr. Daniel Nava  ENT: Dr. Brice Williamson  [facility] Pain management: Dr.Chai [facility] Transplant: Dr. Adrian Rivera Med Onc: Dr. Zulema Rivera Breast Surg Onc: Dr.DeSnyder LONG [place]: Dr. 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   8) Stage IA grade 3, ER+, HER2 BERNA +,  IDC/DCIS of the left breast --- 2/7/18 at [facility]   --s/p lumpectomy with SLNB 4/12, Grade 2 IDC 4mm with second focus of microinvasive carcinoma 0.4mm. 2 SLN negative for malignancy    9) Progressive swelling of R lower extremity--- US NIVA done 2/19/18 negative for evidence of DVT  10). Paroxysmal Afib (2/18/18) diagnosed at [facility]; ECHO noted EF 58% - on Eliquis  11) Mild CKD with GFR 55 - managed per [facility] nephrology  12) Treatment induced neutropenia     Present treatment:  -Maintenance Revlimid 5mg PO QOD, started 02/16/17-- was held for a few months while undergoing treatment for her breast cancer 02/18-05/18; Restarted 6/15/18   Dexamethasone 20 mg po weekly added early July 2017--> reduced to 12 mg PO weekly October 4, 2017---> increased to 16mg PO weekly 2/15/18---restarted 6/15/18 --> 20 mg weekly 7//8/2022  Darzalex 7/8/2022-present  Femara 2.5 mg PO daily   Eliquis 2.5mg PO BID     Treatment/Oncology history:  1) CyBorD x5 cycles 09/28/15-12/24/15  2) Autologous stem cell transplant 02/08/16, done at [facility]  3) Exploratory laparotomy with subtotal colectomy and end ileostomy done August 2, 2016--pathology specimen showed advanced colonic amyloidosis extensive involving the muscular wall submucosa and mucosa.  Appendix also involvement by amyloidosis with fibrous obliteration of the distal lumen.  4) Maintenance therapy with Revlimid 5mg-started 06/01/16--Discontinued shortly after 2/2 cytopenias  5) Left breast lumpectomy with SLNB at [facility] 4/12/18  6) Left partial breast RT x10 fractions  5/21/18-6/4/1      Imaging:  NIVA 7/29/2021: Negative for deep venous thrombosis in the left lower extremity.   MMG 3/21/2022: BI-RADS Category 2: Benign Finding(s)  MRI CTL spine 9/14/2022: No acute myelomatous findings.  Severe spinal stenosis process detected within the upper cervical spine as well as the entirety of the lumbar spine segments similar to the prior imaging assessment.    CT C 1/24/2023: Right greater than left lower lobe opacification consistent with infectious process.  Lucent lesions throughout the osseous structures similar to 2015.    Pathology:    CLINICAL HISTORY:       Patient: Ninfa Candelario is a 58 y.o. female.  Ms. Cabrera Joseph was admitted on 08/16/15 for ileus versus partial SBO. CT A/P done 08/17/15 showed sigmoid colitis and a zone of transition at the junction of the descending and sigmoid colon which could indicate some degree of obstruction and an obstructing mass cannot be excluded. Numerous skeletal lytic lesions noted. CT chest done 08/19/15 showed Multiple skeletal lytic lesions involving the thoracic spine, left rib sternum consistent with either metastases or multiple myeloma. There is no evidence of pulmonary or mediastinal mass. Dr. Farr was consulted for possible MM/anemia.     Nuclear Bone scan done 8/20/15 showed mild to moderate increased activity in left rib and right second rib which correlates with fractures seen on CT.  Skeletal survey done 8/20/15showed lytic lesions in the calvarium and probably a lytic lesion in the left scapula. Lytic areas in the spine and pelvis seen on recent CT are not well defined on skeletal survey. Work up labs done 08/20/15: Negative for sickle cell and Thalessemia (reported history of thalessemia). Iron 54, Transferrin 118.0, Iron sat 34.6%, Folate 26.5, Vit B12 1,779  Peripheral smear resulted slightly macrocytic normochromic anemia without signifcant anisocytosis may reflect early B12/folate deficiency or marrow dysfunction, among others. No immature myeloid cells or blasts. Platlets adequate. Beta 2 mircoglobulin = 3.2.  SPEP/NADIA: M-spike in the beta region. The monoclonal protein peak accounts for 1.13 g/dL. Hypogammaglobulinemia. NADIA pattern shows  the presence of a free lambda light chain monoclonal protein with additional faint band in IgA.  All immunoglobulin levels decreased. IgA=26, IgG=307, IgM<5.  Kappa Qnt 0.16, Lambda Qnt 4600.00, Ratio <0.01.  24hr Urine for Bence Mendez: Kappa 2.75, Lambda 1250.00, Ratio <0.01. NADIA: Urine is POSITIVE for monoclonal Free Lambda Light chains     Patient then opted to go to [facility] where she underwent a bone marrow biopsy on 09/18/15. BMBx showed 80% plasma cells, 13p deletion and normal karyotype. She underwent fat pad biopsy which came back positive for Congo red consistent with amyloidosis. Cardiac workup revealed no amyloid deposition within the heart.  PET/CT and skeletal survey done September 18, 2015 showed multiple lytic osseous lesions  The patient was started on Velcade while at [facility] with the plan to transition to autologous stem cell transplantation. They asked if we could give her could continue treatment here.   She completed CyborD x5 cycles on 12/24/15     Patient admitted 01/06/16 for colitis and C. Diff. Pt treated with Flagyl. Discharged home 01/08/16     Repeat BMBx done at [facility] 01/2016 did not show any morphologic or immnophenotypic evidence of plasama cell neoplasm. Patient underwent Autologous stem cell transplant with Busulfan and melphalan on 02/08/16 at [facility]. The only complication was recurrent C.diff infection for which she completed 10 days of Fidaxomycin.     Follow-up bone marrow biopsy done at [facility] done 5/16/16 showed cellular 30-40% bone marrow with trilineage hematopoiesis. No morphologic or immunophenotypic support for plasma cell neoplasm. Started maintenance Revlimid 5mg. Unable to continue therapy due to cytopenias.     On 08/02/16 patient underwent  Exploratory laparotomy with subtotal colectomy and end ileostomy for what was thought to be toxic megacolon. Pathology showed extensive advanced colonic amyloidosis involving the muscular wall, submucosa and mucosa: With the appendix  also involved with amyloidosis.  Positive lambda light chains were noted.     Follow-up at [facility] 8/31/16, Per Dr. Adrian Naylor, 6 months post autologous transplant. She has engrafted. Based on the latest restaging she is near complete remission with possible IgA lambda on serum immunofixation. Patient was instructed to discontinue prophylactic antibiotics. She received her 1st series of immunizations while at [facility]. Patient had a bilateral venous ultrasound to rule out DVT, negative B/L. In regards to amyloidosis the patient feels that her tongue is smaller in size and her BNP has come down some.  Patient had a follow-up appointment at [facility] November 30, 2016.  Dr. Parnell documented the patient's free lambda light chain remains at a lower level.  Therefore in light of her recent surgery they will continue with observation only.  The plan to see the patient back in 2-3 months with repeat restaging labs.  They recommended regular CBC checks to monitor anemia.  Patient returned to [facility] in December 2016 to meet with dermatology for numerous papillary lesions on eyelids, chest and posterior neck consistent with amyloid deposits. Recommendations were for watchful waiting.  Patient is less than 1 year out from bone marrow transplant and further improvement can be expected.  She will see the patient back in 1 year to discuss possible surgical resection of the plaques especially around the eyelid region.  Rogaine recommended for persistent hair loss. Retin-A recommended for acne vulgaris.  Patient returned to [facility] on 2/8/2017 for neurosurgery consult for chronic low back pain and difficulty walking.  Imaging showed extensive DJD with discovertebral bulges and thickening of the spinal laminar tissues creating spinal stenosis throughout, but greatest at L2/L3.  Neurosurgery felt that surgery risks outweighed the benefits.  Patient was prescribed pain medicine and encouraged to  participate in physical therapy.  Patient also met with Dr. Parnell on 02/08/17, who noted an elevation in free light chains (kappa 52.60/lambda 27.77/ratio 1.89).  Recommendation is to resume maintenance therapy with Revlimid at a low dose of 5 mg every other day.  Patient went back to [facility] first week of April 2017.  I do not have these notes.  Reportedly she was told to continue on every other day Revlimid at 5 mg.  She reportedly had repeat myeloma labs done as well.  Again I do not have these results.  Patient went back to [facility] and saw Dr. Parnell June 29, 2017.  Myeloma labs were done with serum protein electrophoresis showing irregularity in the fast gamma region.  IgG of 1291.  Free kappa light chains of 84.6 with lambda light chains of 66.9.  Beta-2 microglobulin of 4.5  CT scan of lumbar spine showed multiple lytic lesions once again in the lumbar spine and bony pelvis.  Ultrasound of the left leg showed no evidence of DVT.  It was recommended based on the slight increase in her And lambda light chains to start weekly dexamethasone 20 mg p.o. weekly.  Follow-up visit and labs at [facility] on September 29, 2017 with Dr. Parnell.  Repeat beta-2 microglobulin came back at 2.4.  Serum IgG of 761, IgA 117 and IgM of 29.  Serum free kappa light chains of 24.9 and lambda of 23.5.  Counts were stable with hemoglobin of 11.1, WBC 4.9 and platelets of 210,000.  Recommendations were for continued Revlimid every other day with weekly dexamethasone along with aspirin for DVT prophylaxis.  Bilateral diagnostic MMG 12/19/17  noted 1.6cm coarse heterogeneous calcifications in posterior region of L breast at 3 o'clock position. Patient was scheduled for FNA which confirmed ID grade 3, single focus measuring 1.7cm with 2nd focus microinvasion DCIS grade 2 measuring 0.3cm. Left axillary LN biopsy showed no evidence of metastatic carcinoma. Complete staging: Stage IA, grade 3 ER+, Her 2 Niki + IDC/DCIS of left breast.  Ki-67 <17%.  Repeat myeloma labs showed rise in free lambda light chains noted at 68.69, kappa light chains at 27.22,  beta 2 microglobulin came back at 2.9. Serum protein electrophoresis showed no definitive evidence of an M protein peak. The pattern is consistent with an acute inflammatory reaction. Recommendations were made to maintain Revlimid at current dose of 5mg every other day to be taken continuously increase weekly dexamethasone to 16 mg.   Patient seen at [facility] with tentative plan for L breast lumpectomy on 3/13/18. 2/16/18- Prior to surgery she was seen by genetic counselor who ran genetic testing per patient reques, all of which were negative. She was then seen by cardiology at [facility] 2/16/18 for further evaluation of persistent bilateral lower extremity swelling-ECHO noted EF of 58%; diagnosed with paroxysmal atrial fibrillation and instructed to begin daily ASA. During preoperative asssessment per Rad/Onc 3/12/18, corresponding chest CT noted new bilateral pulmonary nodules concerning for metastatic disease- surgery was subsequently postponed pending pulmonary evaluation. Seen by Pulmonology on 3/21/18, PFTs within normal limits and cleared patient for surgery with recommended close CT follow up of nodules in 3 months. 3/21/18 seen by nephrology for management of mild CKD (GFR 55)-cleared for surgery with recommended follow up in 3 months. Left breast lumpectomy and SLNB performed per Dr. Washburn on 4/12/18- plan for follow up in clinic on 4/24/18  for postoperative assessment. Follow up with Dr. Poole (Med/Onc) on 4/25/18. Follow up with Dr. Parnell 4/26/18.   Patient seen at [facility] s/p Left breast lumpectomy with SLNB on 4/12/18. Following surgery she completed Left partial breast radiation x 10 fractions. She was then started on endocrine therapy with Femara after been deemedan inappropriate candidate for systemic chemotherapy/Herceptin.      She was seen by neurosugery at [facility] on  4/26/18 following repeat MRI of cervical thoracic lumbar spine which noted focal enhancement of T2 hyperintensity at the C2 level which may be due to degenerative changes. Signal abnormality within  the T12 and L1 may be secondary to myeloma. Plan to continue with observation for now with F/U scheduled in October 2018.      She was seen by Dr. Parnell at [facility] for management of her MM on 4/26/18. Patient was recommended to restart Revlimid 5mg PO every other day with notes that these recommendations would be communicated to collaborating Oncologist. Patient was also recommended to start on Zometa for her bone disease.      Seen by Dr. Parnell at [facility] for management of her MM on 6/28/18. Repeat light chains noted lambda 33.36 (previously 80.80), K/L ratio 0.91 (previously 0.49). Due to slight improvement in light chains, plan to continue on lenalidomide maintenance. Recommendations to continue anticoagulation with Eliquis. BLE venous doppler negative for DVT.   Seen by pulmonolgy on 6/28/18- repeat CT chest done 6/28/18 noted resolution of previous pulmonary nodules. Thought to be infectious in nature. Recommendations for discharge from pulmonary clinic.  Should MRI of her cervical thoracic lumbar spine on 2/12/2019 that demonstrated cervical spondylosis with canal stenosis most notable at C1 and 2. Cord signal abnormality at C1 and 2 with enhancement is stable. Lumbar spondylosis with central canal stenosis at multiple levels. No imaging features of bony metastatic disease.     For amyloidosis (Light chain type).    Patient presented with macroglossia and now with improvement of the swelling of her tongue. Her cardiac parameters are also better.   8/10/2020 proBNP  250   2/17/2020                616  8/9/2019                  190  11/14/2023  820        Chief Complaint:4 Week Follow Up (PT states her throat hurts when she eat or drink and being going on for a while on.)      Interval History:  She is  currently on Femara for breast cancer and Rev/Dex/Darzalex  for MM  s/p transplant in 2016. At her [facility] visit on 10/12/22, Ninlaro was discontinued due to it causing severe diarrhea and leukopenia. Diarrhea resolved after stopping Ninlaro (she admits to stopping prior to visit @ [facility]).       04/10/2024:  Patient is here today for follow up.  She is due for daratumumab today. She is here with her sister.  She reports she went back to hospital and diagnosed with the flu. She has 2 days left of antibiotics. She completed Tamiflu. She is feeling better, with the exception of being weak, she's in a wheelchair today.   Anemia stable.  Swelling to BLE noted during physical exam, L>R, she states improvement.  She has follow up with Dr. Parnell @ [facility] next week, scans will be done.  She is requesting  for PT.    ROS: All 14 points ROS taken and as per Interval History  Review of Systems   Constitutional:  Positive for malaise/fatigue. Negative for chills, fever and weight loss.   HENT:  Negative for congestion and nosebleeds.    Eyes:  Negative for blurred vision, double vision and photophobia.   Respiratory:  Negative for cough, hemoptysis and shortness of breath.    Cardiovascular:  Negative for chest pain, palpitations, leg swelling and PND.   Gastrointestinal:  Positive for diarrhea (Better), nausea and vomiting. Negative for abdominal pain, blood in stool, constipation and melena.   Genitourinary:  Negative for dysuria, frequency, hematuria and urgency.   Musculoskeletal:  Negative for back pain, falls and myalgias.   Skin:  Negative for itching and rash.   Neurological:  Positive for weakness. Negative for tremors, focal weakness, seizures and headaches.   Endo/Heme/Allergies:  Negative for environmental allergies. Does not bruise/bleed easily.   Psychiatric/Behavioral:  Negative for depression and suicidal ideas. The patient is not nervous/anxious.          Histories:  PMH/PSH/FH/SOCIAL/ALLERGIES AND MEDS REVIEWED  "AND UPDATED AS APPROPRIATE       Vitals:    04/10/24 1512   BP: (!) 143/78   BP Location: Left arm   Patient Position: Sitting   Pulse: 73   Resp: 17   Temp: 98.4 °F (36.9 °C)   TempSrc: Oral   SpO2: 98%   Weight: 72.6 kg (160 lb)   Height: 5' 3" (1.6 m)           Wt Readings from Last 6 Encounters:   04/10/24 72.6 kg (160 lb)   24 72.6 kg (160 lb)   24 72.8 kg (160 lb 7.9 oz)   24 76.2 kg (168 lb)   24 81.6 kg (180 lb)   24 81.6 kg (180 lb)   Vitals reviewed and stable       Physical Exam  Vitals and nursing note reviewed.   Constitutional:       General: She is not in acute distress.     Appearance: Normal appearance. She is well-developed. She is ill-appearing.      Comments: Uses walker for mobility   HENT:      Head: Normocephalic and atraumatic.      Mouth/Throat:      Mouth: Mucous membranes are moist.   Eyes:      General: No scleral icterus.     Extraocular Movements: Extraocular movements intact.      Conjunctiva/sclera: Conjunctivae normal.      Pupils: Pupils are equal, round, and reactive to light.   Neck:      Vascular: No JVD.   Cardiovascular:      Rate and Rhythm: Normal rate and regular rhythm.      Heart sounds: No murmur heard.  Pulmonary:      Effort: Pulmonary effort is normal.      Breath sounds: Normal breath sounds. No wheezing or rhonchi.   Abdominal:      General: Bowel sounds are normal. There is no distension.      Palpations: Abdomen is soft. There is no mass.      Tenderness: There is no abdominal tenderness.   Musculoskeletal:         General: No swelling or deformity.      Cervical back: Neck supple.      Right lower le+ Edema present.      Left lower le+ Edema present.   Lymphadenopathy:      Head:      Right side of head: No submandibular adenopathy.      Left side of head: No submandibular adenopathy.      Cervical: No cervical adenopathy.      Upper Body:      Right upper body: No supraclavicular or axillary adenopathy.      Left upper body: " No supraclavicular or axillary adenopathy.      Lower Body: No right inguinal adenopathy. No left inguinal adenopathy.   Skin:     General: Skin is warm.      Coloration: Skin is not jaundiced.      Findings: No lesion or rash.      Nails: There is no clubbing.   Neurological:      Mental Status: She is alert and oriented to person, place, and time.      Cranial Nerves: Cranial nerves 2-12 are intact.      Motor: Weakness present.      Gait: Gait abnormal.   Psychiatric:         Attention and Perception: Attention normal.         Behavior: Behavior is cooperative.         Cognition and Memory: Cognition normal.         Judgment: Judgment normal.       ECOG SCORE    3 - Capable of only limited selfcare, confined to bed or chair more than 50% of waking hours         Laboratory:  CBC with Differential:  Lab Results   Component Value Date    WBC 12.78 (H) 04/10/2024    RBC 2.88 (L) 04/10/2024    HGB 8.4 (L) 04/10/2024    HCT 27.7 (L) 04/10/2024    MCV 96.2 (H) 04/10/2024    MCH 29.2 04/10/2024    MCHC 30.3 (L) 04/10/2024    RDW 18.4 (H) 04/10/2024     04/10/2024    MPV 9.6 04/10/2024        CMP:  Sodium Level   Date Value Ref Range Status   04/10/2024 144 136 - 145 mmol/L Final     Potassium Level   Date Value Ref Range Status   04/10/2024 4.2 3.5 - 5.1 mmol/L Final     Carbon Dioxide   Date Value Ref Range Status   04/10/2024 23 23 - 31 mmol/L Final     Blood Urea Nitrogen   Date Value Ref Range Status   04/10/2024 36.1 (H) 9.8 - 20.1 mg/dL Final   07/18/2023 35 (H) 6 - 23 mg/dL Final     Creatinine   Date Value Ref Range Status   04/10/2024 1.87 (H) 0.55 - 1.02 mg/dL Final   07/18/2023 2.48 (H) 0.51 - 0.95 mg/dL Final     Calcium   Date Value Ref Range Status   07/18/2023 9.7 8.4 - 10.2 mg/dL Final     Calcium Level Total   Date Value Ref Range Status   04/10/2024 8.6 8.4 - 10.2 mg/dL Final     Albumin Level   Date Value Ref Range Status   04/10/2024 2.9 (L) 3.4 - 4.8 g/dL Final     Bilirubin Total   Date Value  Ref Range Status   04/10/2024 0.4 <=1.5 mg/dL Final     Alkaline Phosphatase   Date Value Ref Range Status   04/10/2024 93 40 - 150 unit/L Final     Aspartate Aminotransferase   Date Value Ref Range Status   04/10/2024 10 5 - 34 unit/L Final     Alanine Aminotransferase   Date Value Ref Range Status   04/10/2024 31 0 - 55 unit/L Final     Estimated GFR-Non    Date Value Ref Range Status   04/20/2022 25           Assessment:       1) Multiple Myeloma, IgA Lambda, FISH with del 13p, normal karyotype, ISS stg II Dx 2015; S/p Autologous stem cell transplant 02/08/16-remission-->slight rise in free light chains 02/08/17  2) Amyloidosis, Lambda light chain type, organ involvement with macroglossia and colon involvement. 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 Sx risks do not outweigh benefits. Pain meds given and encouraged Physical Therpay  8) Amyloid plaques, evaluated by Derm at [facility], recommend watchful waiting. Patient to follow up 12/2017  9) Stage IA grade 3, ER+, HER2 BERNA +,  IDC/DCIS of the left breast --- 2/7/18 at [facility]; s/p lumpectomy with SLNB 4/12, Grade 2 IDC measuring 4mm with second focus of microinvasive carcinoma measuring  0.4mm .IG DCIS, 0.3mm to posterior margin; 2 SLN negative for malignancy    10) Progressive swelling of R lower extremity--- US NIVA done 2/19/18 negative for evidence of DVT  11). Paroxysmal Afib (2/18/18) diagnosed at [facility]; ECHO noted EF 58%  12. Mild CKD with GFR 55 - managed per [facility] nephrology  13). Pulmonary nodules noted on pre-operative chest CT scan (3/12/18) noted new bilateral pulmonary nodules are nonspecific -- seen by Pulmonology at [facility] (3/21/18) thought to be inflammatory/infectious in nature,  repeat Chest CT 6/28/18 noted resolution of nodules  14). Treatment induced neutropenia  15) NON COMPLIANT patient-- please see above the no show, reschedule and cancelled visits/labs and infusion.         Plan:         Will HOLD Darzalex today due to recent flu diagnosis and hospitalization. Instructed to complete antibiotics.  RTC in 2 weeks for TD/MM labs/ infusion all same day - with MD ONLY, patient prefers PM appts on Wednesdays  Continue K+ BID  Continue Revlimid 5 mg QOD  Continue Eliquis 2.5 mg BID  Keep upcoming appointments at [facility] 4/2024, imaging will also be done  Keep cardiology follow up @ [facility] 4/2024  Will refer to  for PT  Discussed with the patient and her sister the importance of compliance.  They verbalized understanding.    Encourage to call or message us for any or problems  The patient was given ample opportunity to ask questions, and to the best of my abilities, all questions answered to satisfaction; patient demonstrated understanding of what we discussed and agreeable to the plan.     Natalie Meyers MD  Hematology/Oncology      Professional Services   I, Jagruti Booker LPN, acted solely as a scribe for and in the presence of Dr. Natalie Meyers, who performed these services.

## 2024-04-11 LAB
ALBUMIN % SPEP (OHS): 50.18
ALBUMIN SERPL-MCNC: 2.7 G/DL (ref 3.4–4.8)
ALBUMIN/GLOB SERPL: 1 RATIO (ref 1.1–2)
ALPHA 1 GLOB (OHS): 0.19 GM/DL
ALPHA 1 GLOB% (OHS): 3.59
ALPHA 2 GLOB % (OHS): 19.78
ALPHA 2 GLOB (OHS): 1.05 GM/DL
BETA GLOB (OHS): 0.79 GM/DL
BETA GLOB% (OHS): 14.88
GAMMA GLOBULIN % (OHS): 11.57
GAMMA GLOBULIN (OHS): 0.61 GM/DL
GLOBULIN SER-MCNC: 2.6 GM/DL (ref 2.4–3.5)
KAPPA LC FREE SER-MCNC: 2.22 MG/DL (ref 0.33–1.94)
KAPPA LC FREE/LAMBDA FREE SER: 0.47 {RATIO} (ref 0.26–1.65)
LAMBDA LC FREE SERPL-MCNC: 4.69 MG/DL (ref 0.57–2.63)
M SPIKE % (OHS): ABNORMAL
M SPIKE (OHS): ABNORMAL
PATH REV: NORMAL
PROT SERPL-MCNC: 5.3 GM/DL (ref 5.8–7.6)

## 2024-04-12 DIAGNOSIS — C90.00 MULTIPLE MYELOMA NOT HAVING ACHIEVED REMISSION: ICD-10-CM

## 2024-04-12 RX ORDER — ONDANSETRON HYDROCHLORIDE 8 MG/1
8 TABLET, FILM COATED ORAL EVERY 8 HOURS PRN
Qty: 90 TABLET | Refills: 2 | Status: SHIPPED | OUTPATIENT
Start: 2024-04-12

## 2024-04-16 DIAGNOSIS — I48.0 PAROXYSMAL ATRIAL FIBRILLATION: ICD-10-CM

## 2024-04-17 ENCOUNTER — TELEPHONE (OUTPATIENT)
Dept: INTERNAL MEDICINE | Facility: CLINIC | Age: 59
End: 2024-04-17
Payer: MEDICARE

## 2024-04-17 DIAGNOSIS — I10 PRIMARY HYPERTENSION: Primary | ICD-10-CM

## 2024-04-17 RX ORDER — HYDROCHLOROTHIAZIDE 12.5 MG/1
12.5 TABLET ORAL DAILY
Qty: 30 TABLET | Refills: 3 | Status: SHIPPED | OUTPATIENT
Start: 2024-04-17 | End: 2024-06-17

## 2024-04-17 RX ORDER — APIXABAN 2.5 MG/1
2.5 TABLET, FILM COATED ORAL 2 TIMES DAILY
Qty: 60 TABLET | Refills: 3 | Status: SHIPPED | OUTPATIENT
Start: 2024-04-17

## 2024-04-19 ENCOUNTER — PATIENT MESSAGE (OUTPATIENT)
Dept: INTERNAL MEDICINE | Facility: CLINIC | Age: 59
End: 2024-04-19
Payer: MEDICARE

## 2024-04-22 ENCOUNTER — TELEPHONE (OUTPATIENT)
Dept: HEMATOLOGY/ONCOLOGY | Facility: CLINIC | Age: 59
End: 2024-04-22
Payer: MEDICARE

## 2024-04-26 ENCOUNTER — TELEPHONE (OUTPATIENT)
Dept: INTERNAL MEDICINE | Facility: CLINIC | Age: 59
End: 2024-04-26
Payer: MEDICARE

## 2024-04-26 NOTE — TELEPHONE ENCOUNTER
----- Message from Danyell Briones sent at 4/26/2024 11:11 AM CDT -----  .Type:  Needs Medical Advice    Who Called: [facility]  Symptoms (please be specific):    How long has patient had these symptoms:    Pharmacy name and phone #:    Would the patient rather a call back or a response via [facility]? Call back   Best Call Back Number: [phone]  Additional Information: needs last office notes fax num [phone], will Dr Marlene osborn and follow for HH

## 2024-04-26 NOTE — TELEPHONE ENCOUNTER
----- Message from Danyell Briones sent at 4/26/2024 11:11 AM CDT -----  .Type:  Needs Medical Advice    Who Called: [facility]  Symptoms (please be specific):    How long has patient had these symptoms:    Pharmacy name and phone #:    Would the patient rather a call back or a response via [facility]? Call back   Best Call Back Number: [phone]  Additional Information: needs last office notes fax num [phone], will Dr Marlene osborn and follow for HH

## 2024-05-08 ENCOUNTER — PATIENT MESSAGE (OUTPATIENT)
Dept: INTERNAL MEDICINE | Facility: CLINIC | Age: 59
End: 2024-05-08
Payer: MEDICARE

## 2024-05-20 DIAGNOSIS — C90.00 MULTIPLE MYELOMA NOT HAVING ACHIEVED REMISSION: Primary | ICD-10-CM

## 2024-05-20 RX ORDER — LENALIDOMIDE 5 MG/1
1 CAPSULE ORAL EVERY OTHER DAY
Qty: 14 EACH | Refills: 0 | Status: SHIPPED | OUTPATIENT
Start: 2024-05-20 | End: 2024-06-12

## 2024-05-27 PROBLEM — A41.9 ACUTE ONSET SEPSIS: Status: RESOLVED | Noted: 2024-02-26 | Resolved: 2024-05-27

## 2024-06-03 ENCOUNTER — OFFICE VISIT (OUTPATIENT)
Dept: HEMATOLOGY/ONCOLOGY | Facility: CLINIC | Age: 59
End: 2024-06-03
Payer: MEDICARE

## 2024-06-03 ENCOUNTER — LAB VISIT (OUTPATIENT)
Dept: LAB | Facility: HOSPITAL | Age: 59
End: 2024-06-03
Attending: INTERNAL MEDICINE
Payer: MEDICARE

## 2024-06-03 VITALS
HEIGHT: 63 IN | RESPIRATION RATE: 17 BRPM | TEMPERATURE: 98 F | BODY MASS INDEX: 31.33 KG/M2 | SYSTOLIC BLOOD PRESSURE: 153 MMHG | WEIGHT: 176.81 LBS | DIASTOLIC BLOOD PRESSURE: 88 MMHG | OXYGEN SATURATION: 98 % | HEART RATE: 79 BPM

## 2024-06-03 DIAGNOSIS — C50.819 OVERLAPPING MALIGNANT NEOPLASM OF FEMALE BREAST, UNSPECIFIED ESTROGEN RECEPTOR STATUS, UNSPECIFIED LATERALITY: ICD-10-CM

## 2024-06-03 DIAGNOSIS — Z79.811 AROMATASE INHIBITOR USE: ICD-10-CM

## 2024-06-03 DIAGNOSIS — C90.00 MULTIPLE MYELOMA NOT HAVING ACHIEVED REMISSION: Primary | ICD-10-CM

## 2024-06-03 DIAGNOSIS — D63.8 ANEMIA OF CHRONIC DISEASE: ICD-10-CM

## 2024-06-03 DIAGNOSIS — C90.00 MULTIPLE MYELOMA NOT HAVING ACHIEVED REMISSION: ICD-10-CM

## 2024-06-03 LAB
ALBUMIN SERPL-MCNC: 3 G/DL (ref 3.4–4.8)
ALBUMIN/GLOB SERPL: 1.3 RATIO (ref 1.1–2)
ALP SERPL-CCNC: 87 UNIT/L (ref 40–150)
ALT SERPL-CCNC: 24 UNIT/L (ref 0–55)
ANION GAP SERPL CALC-SCNC: 10 MEQ/L
AST SERPL-CCNC: 16 UNIT/L (ref 5–34)
BASOPHILS # BLD AUTO: 0.01 X10(3)/MCL
BASOPHILS NFR BLD AUTO: 0.2 %
BILIRUB SERPL-MCNC: 0.3 MG/DL
BUN SERPL-MCNC: 24.4 MG/DL (ref 9.8–20.1)
CALCIUM SERPL-MCNC: 8.2 MG/DL (ref 8.4–10.2)
CHLORIDE SERPL-SCNC: 111 MMOL/L (ref 98–107)
CO2 SERPL-SCNC: 23 MMOL/L (ref 23–31)
CREAT SERPL-MCNC: 2.18 MG/DL (ref 0.55–1.02)
CREAT/UREA NIT SERPL: 11
EOSINOPHIL # BLD AUTO: 0.03 X10(3)/MCL (ref 0–0.9)
EOSINOPHIL NFR BLD AUTO: 0.5 %
ERYTHROCYTE [DISTWIDTH] IN BLOOD BY AUTOMATED COUNT: 16.3 % (ref 11.5–17)
GFR SERPLBLD CREATININE-BSD FMLA CKD-EPI: 25 ML/MIN/1.73/M2
GLOBULIN SER-MCNC: 2.4 GM/DL (ref 2.4–3.5)
GLUCOSE SERPL-MCNC: 77 MG/DL (ref 82–115)
HCT VFR BLD AUTO: 28.9 % (ref 37–47)
HGB BLD-MCNC: 9.6 G/DL (ref 12–16)
IGA SERPL-MCNC: 65 MG/DL (ref 69–517)
IGG SERPL-MCNC: 490 MG/DL (ref 522–1631)
IGM SERPL-MCNC: 15 MG/DL (ref 33–293)
IMM GRANULOCYTES # BLD AUTO: 0.07 X10(3)/MCL (ref 0–0.04)
IMM GRANULOCYTES NFR BLD AUTO: 1.3 %
LYMPHOCYTES # BLD AUTO: 0.81 X10(3)/MCL (ref 0.6–4.6)
LYMPHOCYTES NFR BLD AUTO: 14.5 %
MCH RBC QN AUTO: 30.6 PG (ref 27–31)
MCHC RBC AUTO-ENTMCNC: 33.2 G/DL (ref 33–36)
MCV RBC AUTO: 92 FL (ref 80–94)
MONOCYTES # BLD AUTO: 1 X10(3)/MCL (ref 0.1–1.3)
MONOCYTES NFR BLD AUTO: 17.9 %
NEUTROPHILS # BLD AUTO: 3.67 X10(3)/MCL (ref 2.1–9.2)
NEUTROPHILS NFR BLD AUTO: 65.6 %
PLATELET # BLD AUTO: 235 X10(3)/MCL (ref 130–400)
PMV BLD AUTO: 8.7 FL (ref 7.4–10.4)
POTASSIUM SERPL-SCNC: 3.6 MMOL/L (ref 3.5–5.1)
PROT SERPL-MCNC: 5.4 GM/DL (ref 5.8–7.6)
RBC # BLD AUTO: 3.14 X10(6)/MCL (ref 4.2–5.4)
SODIUM SERPL-SCNC: 144 MMOL/L (ref 136–145)
WBC # SPEC AUTO: 5.59 X10(3)/MCL (ref 4.5–11.5)

## 2024-06-03 PROCEDURE — 82784 ASSAY IGA/IGD/IGG/IGM EACH: CPT

## 2024-06-03 PROCEDURE — 99215 OFFICE O/P EST HI 40 MIN: CPT | Mod: S$PBB,,, | Performed by: INTERNAL MEDICINE

## 2024-06-03 PROCEDURE — 99215 OFFICE O/P EST HI 40 MIN: CPT | Mod: PBBFAC | Performed by: INTERNAL MEDICINE

## 2024-06-03 PROCEDURE — 99999 PR PBB SHADOW E&M-EST. PATIENT-LVL V: CPT | Mod: PBBFAC,,, | Performed by: INTERNAL MEDICINE

## 2024-06-03 PROCEDURE — 84165 PROTEIN E-PHORESIS SERUM: CPT

## 2024-06-03 PROCEDURE — 36415 COLL VENOUS BLD VENIPUNCTURE: CPT

## 2024-06-03 PROCEDURE — 83521 IG LIGHT CHAINS FREE EACH: CPT | Mod: 91

## 2024-06-03 PROCEDURE — 85025 COMPLETE CBC W/AUTO DIFF WBC: CPT

## 2024-06-03 PROCEDURE — 80053 COMPREHEN METABOLIC PANEL: CPT

## 2024-06-03 RX ORDER — CARBOXYMETHYLCELLULOSE SODIUM 5 MG/ML
1 SOLUTION/ DROPS OPHTHALMIC
COMMUNITY
Start: 2024-04-25

## 2024-06-03 NOTE — PROGRESS NOTES
HEMATOLOGY/ONCOLOGY OFFICE CLINIC VISIT    Visit Information:    No show/Reschedules/Cancellations  08/21/2018--> Rescheduled              07/20/2023-->canceled visit/labs/infusion  11/05/2019--> No Show/Reschedule 08/01/2023-->canceled visit/labs/infusion  06/24/2020--> No Show/Reschedule 08/31/2023-->canceled visit/labs/infusion  09/24/2020--> No Show/Reschedule 09/21/2023-->canceled visit/labs/infusion  03/15/2021--> No Show/Reschedule 10/24/2023-->canceled visit/labs/infusion  03/23/2021--> No Show/Reschedule 10/25/2023-->canceled visit/labs/infusion  04/13/2021--> No Show   10/26/2023-->canceled visit/labs/infusion  05/27/2021--> No Show   10/31/2023-->canceled visit/labs/infusion   08/19/2021--> No Show   11/01/2023-->canceled visit/labs/infusion  08/26/2021--> No Show/Rescheduled 11/29/2023-->canceled visit/labs/infusion  04/26/2022--> No Show   12/04/2023-->canceled visit/labs/infusion  10/03/2022--> Rescheduled  12/07/2023-->canceled visit/labs/infusion  10/11/2022--> Rescheduled  01/11/2024-->canceled visit/labs/infusion  03/22/2023--> No Show/Rescheduled 05/01/2024-->canceled visit/labs/infusion  4/26/2023->Rescheduled   05/22/2024-->canceled visit/labs/infusion  05/09/2023->Rescheduled  5/30/2023->Rescheduled  6/15/2023->Rescheduled      Referring Physician: Dr Farr  PCP: DR. Cardona  GI:   Rheumatologist: Dr. Luis Rea  Immunologist: Dr. Daniel Nava  ENT: Dr. Brice Williamson  [facility] Pain management: Dr.Chai [facility] Transplant: Dr. Adrian Naylor [facility] [facility] Onc: Dr. Zulema Rivera Breast Surg Onc: Dr.DeSnyder [facility] [place]: Dr. 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   8) Stage IA grade 3, ER+, HER2 BERNA +,  IDC/DCIS of the left breast --- 2/7/18 at [facility]   --s/p lumpectomy with SLNB 4/12, Grade 2 IDC 4mm with second focus of microinvasive carcinoma 0.4mm. 2 SLN negative for malignancy    9) Progressive swelling of R lower extremity--- US NIVA done 2/19/18 negative for evidence of DVT  10). Paroxysmal Afib (2/18/18) diagnosed at [facility]; ECHO noted EF 58% - on Eliquis  11) Mild CKD with GFR 55 - managed per [facility] nephrology  12) Treatment induced neutropenia     Present treatment:  -Maintenance Revlimid 5mg PO QOD, started 02/16/17-- was held for a few months while undergoing treatment for her breast cancer 02/18-05/18; Restarted 6/15/18   Dexamethasone 20 mg po weekly added early July 2017--> reduced to 12 mg PO weekly October 4, 2017---> increased to 16mg PO weekly 2/15/18---restarted 6/15/18 --> 20 mg weekly 7//8/2022  Darzalex 7/8/2022-present--On hold due to cellulitis    Eliquis 2.5mg PO BID     Treatment/Oncology history:  1) CyBorD x5 cycles 09/28/15-12/24/15  2) Autologous stem cell transplant 02/08/16, done at [facility]  3) Exploratory laparotomy with subtotal colectomy and end ileostomy done August 2, 2016--pathology specimen showed advanced colonic amyloidosis extensive involving the muscular wall submucosa and mucosa.  Appendix also involvement by amyloidosis with fibrous obliteration of the distal lumen.  4) Maintenance therapy with Revlimid 5mg-started 06/01/16--Discontinued shortly after 2/2 cytopenias  5) Left breast lumpectomy with SLNB at [facility] 4/12/18  6) Left partial breast RT x10 fractions  5/21/18-6/4/18  7) Femara 2.5 mg PO daily completed 5 yrs (6/2018- 6/2023)      Imaging:  NIVA 7/29/2021: Negative for deep venous thrombosis in  the left lower extremity.   MMG 3/21/2022: BI-RADS Category 2: Benign Finding(s)  MRI CTL spine 9/14/2022: No acute myelomatous findings. Severe spinal stenosis process detected within the upper cervical spine as well as the entirety of the lumbar spine segments similar to the prior imaging assessment.    CT C 1/24/2023: Right greater than left lower lobe opacification consistent with infectious process.  Lucent lesions throughout the osseous structures similar to 2015.    Pathology:    CLINICAL HISTORY:       Patient: Ninfa Candelario is a 58 y.o. female.  Ms. Cabrera Joseph was admitted on 08/16/15 for ileus versus partial SBO. CT A/P done 08/17/15 showed sigmoid colitis and a zone of transition at the junction of the descending and sigmoid colon which could indicate some degree of obstruction and an obstructing mass cannot be excluded. Numerous skeletal lytic lesions noted. CT chest done 08/19/15 showed Multiple skeletal lytic lesions involving the thoracic spine, left rib sternum consistent with either metastases or multiple myeloma. There is no evidence of pulmonary or mediastinal mass. Dr. Farr was consulted for possible MM/anemia.     Nuclear Bone scan done 8/20/15 showed mild to moderate increased activity in left rib and right second rib which correlates with fractures seen on CT.  Skeletal survey done 8/20/15showed lytic lesions in the calvarium and probably a lytic lesion in the left scapula. Lytic areas in the spine and pelvis seen on recent CT are not well defined on skeletal survey. Work up labs done 08/20/15: Negative for sickle cell and Thalessemia (reported history of thalessemia). Iron 54, Transferrin 118.0, Iron sat 34.6%, Folate 26.5, Vit B12 1,779  Peripheral smear resulted slightly macrocytic normochromic anemia without signifcant anisocytosis may reflect early B12/folate deficiency or marrow dysfunction, among others. No immature myeloid cells or blasts. Platlets adequate. Beta 2 mircoglobulin =  3.2.  SPEP/NADIA: M-spike in the beta region. The monoclonal protein peak accounts for 1.13 g/dL. Hypogammaglobulinemia. NADIA pattern shows the presence of a free lambda light chain monoclonal protein with additional faint band in IgA.  All immunoglobulin levels decreased. IgA=26, IgG=307, IgM<5.  Kappa Qnt 0.16, Lambda Qnt 4600.00, Ratio <0.01.  24hr Urine for Bence Mendez: Kappa 2.75, Lambda 1250.00, Ratio <0.01. NADIA: Urine is POSITIVE for monoclonal Free Lambda Light chains     Patient then opted to go to [facility] where she underwent a bone marrow biopsy on 09/18/15. BMBx showed 80% plasma cells, 13p deletion and normal karyotype. She underwent fat pad biopsy which came back positive for Congo red consistent with amyloidosis. Cardiac workup revealed no amyloid deposition within the heart.  PET/CT and skeletal survey done September 18, 2015 showed multiple lytic osseous lesions  The patient was started on Velcade while at [facility] with the plan to transition to autologous stem cell transplantation. They asked if we could give her could continue treatment here.   She completed CyborD x5 cycles on 12/24/15     Patient admitted 01/06/16 for colitis and C. Diff. Pt treated with Flagyl. Discharged home 01/08/16     Repeat BMBx done at [facility] 01/2016 did not show any morphologic or immnophenotypic evidence of plasama cell neoplasm. Patient underwent Autologous stem cell transplant with Busulfan and melphalan on 02/08/16 at [facility]. The only complication was recurrent C.diff infection for which she completed 10 days of Fidaxomycin.     Follow-up bone marrow biopsy done at [facility] done 5/16/16 showed cellular 30-40% bone marrow with trilineage hematopoiesis. No morphologic or immunophenotypic support for plasma cell neoplasm. Started maintenance Revlimid 5mg. Unable to continue therapy due to cytopenias.     On 08/02/16 patient underwent  Exploratory laparotomy with subtotal colectomy and end ileostomy for what was thought to be toxic  megacolon. Pathology showed extensive advanced colonic amyloidosis involving the muscular wall, submucosa and mucosa: With the appendix also involved with amyloidosis.  Positive lambda light chains were noted.     Follow-up at [facility] 8/31/16, Per Dr. Adrian Naylor, 6 months post autologous transplant. She has engrafted. Based on the latest restaging she is near complete remission with possible IgA lambda on serum immunofixation. Patient was instructed to discontinue prophylactic antibiotics. She received her 1st series of immunizations while at [facility]. Patient had a bilateral venous ultrasound to rule out DVT, negative B/L. In regards to amyloidosis the patient feels that her tongue is smaller in size and her BNP has come down some.  Patient had a follow-up appointment at [facility] November 30, 2016.  Dr. Parnell documented the patient's free lambda light chain remains at a lower level.  Therefore in light of her recent surgery they will continue with observation only.  The plan to see the patient back in 2-3 months with repeat restaging labs.  They recommended regular CBC checks to monitor anemia.  Patient returned to [facility] in December 2016 to meet with dermatology for numerous papillary lesions on eyelids, chest and posterior neck consistent with amyloid deposits. Recommendations were for watchful waiting.  Patient is less than 1 year out from bone marrow transplant and further improvement can be expected.  She will see the patient back in 1 year to discuss possible surgical resection of the plaques especially around the eyelid region.  Rogaine recommended for persistent hair loss. Retin-A recommended for acne vulgaris.  Patient returned to [facility] on 2/8/2017 for neurosurgery consult for chronic low back pain and difficulty walking.  Imaging showed extensive DJD with discovertebral bulges and thickening of the spinal laminar tissues creating spinal stenosis throughout, but greatest at  L2/L3.  Neurosurgery felt that surgery risks outweighed the benefits.  Patient was prescribed pain medicine and encouraged to participate in physical therapy.  Patient also met with Dr. Parnell on 02/08/17, who noted an elevation in free light chains (kappa 52.60/lambda 27.77/ratio 1.89).  Recommendation is to resume maintenance therapy with Revlimid at a low dose of 5 mg every other day.  Patient went back to [facility] first week of April 2017.  I do not have these notes.  Reportedly she was told to continue on every other day Revlimid at 5 mg.  She reportedly had repeat myeloma labs done as well.  Again I do not have these results.  Patient went back to [facility] and saw Dr. Parnell June 29, 2017.  Myeloma labs were done with serum protein electrophoresis showing irregularity in the fast gamma region.  IgG of 1291.  Free kappa light chains of 84.6 with lambda light chains of 66.9.  Beta-2 microglobulin of 4.5  CT scan of lumbar spine showed multiple lytic lesions once again in the lumbar spine and bony pelvis.  Ultrasound of the left leg showed no evidence of DVT.  It was recommended based on the slight increase in her And lambda light chains to start weekly dexamethasone 20 mg p.o. weekly.  Follow-up visit and labs at [facility] on September 29, 2017 with Dr. Parnell.  Repeat beta-2 microglobulin came back at 2.4.  Serum IgG of 761, IgA 117 and IgM of 29.  Serum free kappa light chains of 24.9 and lambda of 23.5.  Counts were stable with hemoglobin of 11.1, WBC 4.9 and platelets of 210,000.  Recommendations were for continued Revlimid every other day with weekly dexamethasone along with aspirin for DVT prophylaxis.  Bilateral diagnostic MMG 12/19/17  noted 1.6cm coarse heterogeneous calcifications in posterior region of L breast at 3 o'clock position. Patient was scheduled for FNA which confirmed ID grade 3, single focus measuring 1.7cm with 2nd focus microinvasion DCIS grade 2 measuring 0.3cm. Left axillary LN  biopsy showed no evidence of metastatic carcinoma. Complete staging: Stage IA, grade 3 ER+, Her 2 Niki + IDC/DCIS of left breast. Ki-67 <17%.  Repeat myeloma labs showed rise in free lambda light chains noted at 68.69, kappa light chains at 27.22,  beta 2 microglobulin came back at 2.9. Serum protein electrophoresis showed no definitive evidence of an M protein peak. The pattern is consistent with an acute inflammatory reaction. Recommendations were made to maintain Revlimid at current dose of 5mg every other day to be taken continuously increase weekly dexamethasone to 16 mg.   Patient seen at [facility] with tentative plan for L breast lumpectomy on 3/13/18. 2/16/18- Prior to surgery she was seen by genetic counselor who ran genetic testing per patient reques, all of which were negative. She was then seen by cardiology at [facility] 2/16/18 for further evaluation of persistent bilateral lower extremity swelling-ECHO noted EF of 58%; diagnosed with paroxysmal atrial fibrillation and instructed to begin daily ASA. During preoperative asssessment per Rad/Onc 3/12/18, corresponding chest CT noted new bilateral pulmonary nodules concerning for metastatic disease- surgery was subsequently postponed pending pulmonary evaluation. Seen by Pulmonology on 3/21/18, PFTs within normal limits and cleared patient for surgery with recommended close CT follow up of nodules in 3 months. 3/21/18 seen by nephrology for management of mild CKD (GFR 55)-cleared for surgery with recommended follow up in 3 months. Left breast lumpectomy and SLNB performed per Dr. Washburn on 4/12/18- plan for follow up in clinic on 4/24/18  for postoperative assessment. Follow up with Dr. Poole (Med/Onc) on 4/25/18. Follow up with Dr. Parnell 4/26/18.   Patient seen at [facility] s/p Left breast lumpectomy with SLNB on 4/12/18. Following surgery she completed Left partial breast radiation x 10 fractions. She was then started on endocrine therapy with Femara  after been deemedan inappropriate candidate for systemic chemotherapy/Herceptin.      She was seen by neurosugery at [facility] on 4/26/18 following repeat MRI of cervical thoracic lumbar spine which noted focal enhancement of T2 hyperintensity at the C2 level which may be due to degenerative changes. Signal abnormality within  the T12 and L1 may be secondary to myeloma. Plan to continue with observation for now with F/U scheduled in October 2018.      She was seen by Dr. Parnell at [facility] for management of her MM on 4/26/18. Patient was recommended to restart Revlimid 5mg PO every other day with notes that these recommendations would be communicated to collaborating Oncologist. Patient was also recommended to start on Zometa for her bone disease.      Seen by Dr. Parnell at [facility] for management of her MM on 6/28/18. Repeat light chains noted lambda 33.36 (previously 80.80), K/L ratio 0.91 (previously 0.49). Due to slight improvement in light chains, plan to continue on lenalidomide maintenance. Recommendations to continue anticoagulation with Eliquis. BLE venous doppler negative for DVT.   Seen by pulmonolgy on 6/28/18- repeat CT chest done 6/28/18 noted resolution of previous pulmonary nodules. Thought to be infectious in nature. Recommendations for discharge from pulmonary clinic.  Should MRI of her cervical thoracic lumbar spine on 2/12/2019 that demonstrated cervical spondylosis with canal stenosis most notable at C1 and 2. Cord signal abnormality at C1 and 2 with enhancement is stable. Lumbar spondylosis with central canal stenosis at multiple levels. No imaging features of bony metastatic disease.     For amyloidosis (Light chain type).    Patient presented with macroglossia and now with improvement of the swelling of her tongue. Her cardiac parameters are also better.   8/10/2020 proBNP  250   2/17/2020                616  8/9/2019                  190  11/14/2023  820        Chief Complaint:4 Week  Follow Up (PT states for 3 days she blood when wiping herself it was red color.)      Interval History:  She is currently on Femara for breast cancer and Rev/Dex/Darzalex  for MM  s/p transplant in 2016. At her [facility] visit on 10/12/22, Ninlaro was discontinued due to it causing severe diarrhea and leukopenia. Diarrhea resolved after stopping Ninlaro (she admits to stopping prior to visit @ [facility]).       6/3/2024:  Patient is here today for follow up of her multiple myeloma, amyloidosis and breast cancer.  She is due for daratumumab today. She was admitted to [facility] back in April 2024 for LLE cellulitis.  Dr. Parnell recommended Darzalex be held until LLE is better. Swelling to BLE noted during physical exam, L>R, she states improvement, discoloration also noted to LLE. She has follow up with Dr. Parnell @ [facility] on 6/19/24 and it will be determined if she can resume treatment.  She report bloody vaginal discharge when she wipes. She plans to call her OB @ [facility] to schedule an appointment while there for Dr. Parnell's appointment.   She reports her screening mammogram had to be rescheduled due to her being hospitalized.      ROS: All 14 points ROS taken and as per Interval History  Review of Systems   Constitutional:  Positive for malaise/fatigue. Negative for chills, fever and weight loss.   HENT:  Negative for congestion and nosebleeds.    Eyes:  Negative for blurred vision, double vision and photophobia.   Respiratory:  Negative for cough, hemoptysis and shortness of breath.    Cardiovascular:  Negative for chest pain, palpitations, leg swelling and PND.   Gastrointestinal:  Positive for diarrhea (Better), nausea and vomiting. Negative for abdominal pain, blood in stool, constipation and melena.   Genitourinary:  Negative for dysuria, frequency, hematuria and urgency.   Musculoskeletal:  Negative for back pain, falls and myalgias.   Skin:  Negative for itching and rash.   Neurological:  Positive for weakness. Negative for  "tremors, focal weakness, seizures and headaches.   Endo/Heme/Allergies:  Negative for environmental allergies. Does not bruise/bleed easily.   Psychiatric/Behavioral:  Negative for depression and suicidal ideas. The patient is not nervous/anxious.          Histories:  PMH/PSH/FH/SOCIAL/ALLERGIES AND MEDS REVIEWED AND UPDATED AS APPROPRIATE       Vitals:    24 1456   BP: (!) 153/88   BP Location: Left arm   Patient Position: Sitting   Pulse: 79   Resp: 17   Temp: 98.4 °F (36.9 °C)   TempSrc: Oral   SpO2: 98%   Weight: 80.2 kg (176 lb 12.8 oz)   Height: 5' 3" (1.6 m)             Wt Readings from Last 6 Encounters:   24 80.2 kg (176 lb 12.8 oz)   04/10/24 72.6 kg (160 lb)   24 72.6 kg (160 lb)   24 72.8 kg (160 lb 7.9 oz)   24 76.2 kg (168 lb)   24 81.6 kg (180 lb)   Vitals reviewed and stable       Physical Exam  Vitals and nursing note reviewed.   Constitutional:       General: She is not in acute distress.     Appearance: Normal appearance. She is well-developed. She is ill-appearing.      Comments: Uses walker for mobility   HENT:      Head: Normocephalic and atraumatic.      Mouth/Throat:      Mouth: Mucous membranes are moist.   Eyes:      General: No scleral icterus.     Extraocular Movements: Extraocular movements intact.      Conjunctiva/sclera: Conjunctivae normal.      Pupils: Pupils are equal, round, and reactive to light.   Neck:      Vascular: No JVD.   Cardiovascular:      Rate and Rhythm: Normal rate and regular rhythm.      Heart sounds: No murmur heard.  Pulmonary:      Effort: Pulmonary effort is normal.      Breath sounds: Normal breath sounds. No wheezing or rhonchi.   Abdominal:      General: Bowel sounds are normal. There is no distension.      Palpations: Abdomen is soft. There is no mass.      Tenderness: There is no abdominal tenderness.   Musculoskeletal:         General: No swelling or deformity.      Cervical back: Neck supple.      Right lower le+ " Edema present.      Left lower leg: 3+ Edema present.      Comments: Hyperpigmentation of left leg.    Lymphadenopathy:      Head:      Right side of head: No submandibular adenopathy.      Left side of head: No submandibular adenopathy.      Cervical: No cervical adenopathy.      Upper Body:      Right upper body: No supraclavicular or axillary adenopathy.      Left upper body: No supraclavicular or axillary adenopathy.      Lower Body: No right inguinal adenopathy. No left inguinal adenopathy.   Skin:     General: Skin is warm.      Coloration: Skin is not jaundiced.      Findings: Rash present.      Nails: There is no clubbing.      Comments: Rash: bilateral; LE left > right   Neurological:      Mental Status: She is alert and oriented to person, place, and time.      Cranial Nerves: Cranial nerves 2-12 are intact.      Motor: Weakness present.      Gait: Gait abnormal.   Psychiatric:         Attention and Perception: Attention normal.         Behavior: Behavior is cooperative.         Cognition and Memory: Cognition normal.         Judgment: Judgment normal.       ECOG SCORE    3 - Capable of only limited selfcare, confined to bed or chair more than 50% of waking hours         Laboratory:  CBC with Differential:  Lab Results   Component Value Date    WBC 5.59 06/03/2024    RBC 3.14 (L) 06/03/2024    HGB 9.6 (L) 06/03/2024    HCT 28.9 (L) 06/03/2024    MCV 92.0 06/03/2024    MCH 30.6 06/03/2024    MCHC 33.2 06/03/2024    RDW 16.3 06/03/2024     06/03/2024    MPV 8.7 06/03/2024        CMP:  Sodium   Date Value Ref Range Status   06/03/2024 144 136 - 145 mmol/L Final     Potassium   Date Value Ref Range Status   06/03/2024 3.6 3.5 - 5.1 mmol/L Final     Chloride   Date Value Ref Range Status   06/03/2024 111 (H) 98 - 107 mmol/L Final     CO2   Date Value Ref Range Status   06/03/2024 23 23 - 31 mmol/L Final     Blood Urea Nitrogen   Date Value Ref Range Status   06/03/2024 24.4 (H) 9.8 - 20.1 mg/dL Final    07/18/2023 35 (H) 6 - 23 mg/dL Final     Creatinine   Date Value Ref Range Status   06/03/2024 2.18 (H) 0.55 - 1.02 mg/dL Final   07/18/2023 2.48 (H) 0.51 - 0.95 mg/dL Final     Calcium   Date Value Ref Range Status   06/03/2024 8.2 (L) 8.4 - 10.2 mg/dL Final   07/18/2023 9.7 8.4 - 10.2 mg/dL Final     Albumin   Date Value Ref Range Status   06/03/2024 3.0 (L) 3.4 - 4.8 g/dL Final   06/03/2024 2.7 (L) 3.4 - 4.8 g/dL Final     Bilirubin Total   Date Value Ref Range Status   06/03/2024 0.3 <=1.5 mg/dL Final     ALP   Date Value Ref Range Status   06/03/2024 87 40 - 150 unit/L Final     AST   Date Value Ref Range Status   06/03/2024 16 5 - 34 unit/L Final     ALT   Date Value Ref Range Status   06/03/2024 24 0 - 55 unit/L Final     Estimated GFR-Non    Date Value Ref Range Status   04/20/2022 25           Assessment:       1) Multiple Myeloma, IgA Lambda, FISH with del 13p, normal karyotype, ISS stg II Dx 2015; S/p Autologous stem cell transplant 02/08/16-remission-->slight rise in free light chains 02/08/17  2) Amyloidosis, Lambda light chain type, organ involvement with macroglossia and colon involvement. 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 Sx risks do not outweigh benefits. Pain meds given and encouraged Physical Therpay  8) Amyloid plaques, evaluated by Derm at [facility], recommend watchful waiting. Patient to follow up 12/2017  9) Stage IA grade 3, ER+, HER2 BERNA +,  IDC/DCIS of the left breast cancer --- 2/7/18 at [facility]; s/p lumpectomy with SLNB 4/12, Grade 2 IDC measuring 4mm with second focus of microinvasive carcinoma measuring  0.4mm .IG DCIS, 0.3mm to posterior margin; 2 SLN negative for malignancy    10) Progressive swelling of R lower  extremity--- US NIVA done 2/19/18 negative for evidence of DVT  11). Paroxysmal Afib (2/18/18) diagnosed at [facility]; ECHO noted EF 58%  12. Mild CKD with GFR 55 - managed per [facility] nephrology  13). Pulmonary nodules noted on pre-operative chest CT scan (3/12/18) noted new bilateral pulmonary nodules are nonspecific -- seen by Pulmonology at [facility] (3/21/18) thought to be inflammatory/infectious in nature, repeat Chest CT 6/28/18 noted resolution of nodules  14). Treatment induced neutropenia  15) NON COMPLIANT patient-- please see above the no show, reschedule and cancelled visits/labs and infusion.         Plan:     Will HOLD Darzalex today as recommended by Dr. Parnell.  RTC in 4 weeks for TD/MM labs/ infusion all same day - with MD ONLY, patient prefers PM appts on Wednesdays  Continue K+ BID  Continue Revlimid 5 mg QOD  Continue Eliquis 2.5 mg BID  Keep upcoming appointments at [facility] 6/19/24 with Dr. Parnell  Keep cardiology follow up @ [facility] 4/2025  Instructed to call office if she is unable to schedule an appointment with OB @ [facility] and a referral will be sent  locally  Bone density was due 5/2024 - ordered  Keep scheduled appointment for mammogram @ [facility]    Encourage to call or message us for any or problems  The patient was given ample opportunity to ask questions, and to the best of my abilities, all questions answered to satisfaction; patient demonstrated understanding of what we discussed and agreeable to the plan.     Natalie Meyers MD  Hematology/Oncology      Professional Services   I, Jagruti Booker LPN, acted solely as a scribe for and in the presence of Dr. Natalie Meyers, who performed these services.

## 2024-06-04 LAB
ALBUMIN % SPEP (OHS): 49.16 (ref 48.1–59.5)
ALBUMIN SERPL-MCNC: 2.7 G/DL (ref 3.4–4.8)
ALBUMIN/GLOB SERPL: 1 RATIO (ref 1.1–2)
ALPHA 1 GLOB (OHS): 0.27 GM/DL (ref 0–0.4)
ALPHA 1 GLOB% (OHS): 5.06 (ref 2.3–4.9)
ALPHA 2 GLOB % (OHS): 19.97 (ref 6.9–13)
ALPHA 2 GLOB (OHS): 1.08 GM/DL (ref 0.4–1)
BETA GLOB (OHS): 0.86 GM/DL (ref 0.7–1.3)
BETA GLOB% (OHS): 15.84 (ref 13.8–19.7)
GAMMA GLOBULIN % (OHS): 9.98 (ref 10.1–21.9)
GAMMA GLOBULIN (OHS): 0.54 GM/DL (ref 0.4–1.8)
GLOBULIN SER-MCNC: 2.7 GM/DL (ref 2.4–3.5)
M SPIKE % (OHS): ABNORMAL
M SPIKE (OHS): ABNORMAL
PATH REV: NORMAL
PROT SERPL-MCNC: 5.4 GM/DL (ref 5.8–7.6)

## 2024-06-05 DIAGNOSIS — N89.8 VAGINAL DISCHARGE, BLOODY: Primary | ICD-10-CM

## 2024-06-05 LAB
KAPPA LC FREE SER NEPH-MCNC: 1.78 MG/DL (ref 0.33–1.94)
KAPPA LC FREE/LAMBDA FREE SER NEPH: 0.47 {RATIO} (ref 0.26–1.65)
LAMBDA LC FREE SER NEPH-MCNC: 3.78 MG/DL (ref 0.57–2.63)

## 2024-06-10 ENCOUNTER — TELEPHONE (OUTPATIENT)
Dept: HEMATOLOGY/ONCOLOGY | Facility: CLINIC | Age: 59
End: 2024-06-10
Payer: MEDICARE

## 2024-06-10 NOTE — TELEPHONE ENCOUNTER
ATTEMPTED TO CONTACT PT TO INFORM HER OF BELOW, NO ANSWER. LM ON VM ASKING HER TO RETURN MY CALL  TO DISCUSS.

## 2024-06-10 NOTE — TELEPHONE ENCOUNTER
RECEIVED A CALL FROM DR. ROMEO'S OFFICE THAT SHE IS NOT ACCEPTING ANY NEW MEDICARE PTS AT THIS TIME.

## 2024-06-12 DIAGNOSIS — C90.00 MULTIPLE MYELOMA NOT HAVING ACHIEVED REMISSION: ICD-10-CM

## 2024-06-12 RX ORDER — LENALIDOMIDE 5 MG/1
CAPSULE ORAL
Qty: 14 EACH | Refills: 0 | Status: SHIPPED | OUTPATIENT
Start: 2024-06-12

## 2024-06-13 NOTE — TELEPHONE ENCOUNTER
PT RETURNED MY CALL. I EXPLAINED I WAS TOLD BY DR. ROMEO'S OFFICE SHE IS NOT ACCEPTING ANY NEW MEDICARE PTS, WHETHER IT'S PRIMARY OR SECONDARY.  PT STATED UNDERSTANDING.  SHE WILL TRY TO CALL HER INSURANCE AND FIND A PHYSICIAN ACCEPTING HER INSURANCE AND CALL THE OFFICE BACK SO NEW REFERRAL CAN BE MADE.

## 2024-06-15 DIAGNOSIS — I10 PRIMARY HYPERTENSION: ICD-10-CM

## 2024-06-17 RX ORDER — HYDROCHLOROTHIAZIDE 12.5 MG/1
TABLET ORAL
Qty: 30 TABLET | Refills: 3 | Status: SHIPPED | OUTPATIENT
Start: 2024-06-17

## 2024-06-19 DIAGNOSIS — C90.00 MULTIPLE MYELOMA NOT HAVING ACHIEVED REMISSION: ICD-10-CM

## 2024-06-19 DIAGNOSIS — C50.819 OVERLAPPING MALIGNANT NEOPLASM OF FEMALE BREAST, UNSPECIFIED ESTROGEN RECEPTOR STATUS, UNSPECIFIED LATERALITY: ICD-10-CM

## 2024-06-19 RX ORDER — DEXAMETHASONE 20 MG/1
1 TABLET ORAL WEEKLY
Qty: 12 TABLET | Refills: 0 | Status: SHIPPED | OUTPATIENT
Start: 2024-06-19

## 2024-06-21 ENCOUNTER — TELEPHONE (OUTPATIENT)
Dept: INTERNAL MEDICINE | Facility: CLINIC | Age: 59
End: 2024-06-21
Payer: MEDICARE

## 2024-06-21 ENCOUNTER — TELEPHONE (OUTPATIENT)
Dept: HEMATOLOGY/ONCOLOGY | Facility: CLINIC | Age: 59
End: 2024-06-21
Payer: MEDICARE

## 2024-06-21 NOTE — TELEPHONE ENCOUNTER
PT CALLED REQUESTING A NOTATION BE MADE TO HER LAST VISIT NOTE (6/3/24) STATING THE FOLLOW UP WAS FOR MM AND AMYLOIDOSIS.    SHE NEEDS THIS FOR SOCIAL SECURITY.      PLEASE LET ME KNOW WHEN COMPLETED SO I CAN PRINT OUT FOR HER TO .    THANKS

## 2024-06-21 NOTE — TELEPHONE ENCOUNTER
----- Message from Michele Rivera sent at 6/21/2024 10:25 AM CDT -----  .Who Called: Ninfa Candelario    Caller is requesting assistance/information from provider's office.    Symptoms (please be specific): n/a   How long has patient had these symptoms:  n/a  List of preferred pharmacies on file (remove unneeded): [unfilled]  If different, enter pharmacy into here including location and phone number: n/a      Preferred Method of Contact: Phone Call  Patient's Preferred Phone Number on File: [phone]   Best Call Back Number, if different:  Additional Information: pt called requesting to speak with nurse to discuss a letter that she needs  written by Dr. Rosario

## 2024-06-21 NOTE — TELEPHONE ENCOUNTER
Patient provided number for medical records to have them pull her hospital visit information for social security.

## 2024-07-01 PROBLEM — J14 PNEUMONIA OF LEFT LOWER LOBE DUE TO HAEMOPHILUS INFLUENZAE: Status: RESOLVED | Noted: 2024-04-01 | Resolved: 2024-07-01

## 2024-07-03 ENCOUNTER — INFUSION (OUTPATIENT)
Dept: INFUSION THERAPY | Facility: HOSPITAL | Age: 59
End: 2024-07-03
Attending: INTERNAL MEDICINE
Payer: MEDICARE

## 2024-07-03 ENCOUNTER — OFFICE VISIT (OUTPATIENT)
Dept: HEMATOLOGY/ONCOLOGY | Facility: CLINIC | Age: 59
End: 2024-07-03
Payer: MEDICARE

## 2024-07-03 ENCOUNTER — LAB VISIT (OUTPATIENT)
Dept: LAB | Facility: HOSPITAL | Age: 59
End: 2024-07-03
Attending: INTERNAL MEDICINE
Payer: MEDICARE

## 2024-07-03 VITALS
TEMPERATURE: 99 F | HEART RATE: 96 BPM | RESPIRATION RATE: 18 BRPM | WEIGHT: 172.5 LBS | OXYGEN SATURATION: 97 % | BODY MASS INDEX: 30.56 KG/M2 | HEIGHT: 63 IN | SYSTOLIC BLOOD PRESSURE: 145 MMHG | DIASTOLIC BLOOD PRESSURE: 84 MMHG

## 2024-07-03 DIAGNOSIS — E85.9 AMYLOIDOSIS, UNSPECIFIED TYPE: Primary | ICD-10-CM

## 2024-07-03 DIAGNOSIS — C90.00 MULTIPLE MYELOMA NOT HAVING ACHIEVED REMISSION: Primary | ICD-10-CM

## 2024-07-03 DIAGNOSIS — C90.00 MULTIPLE MYELOMA NOT HAVING ACHIEVED REMISSION: ICD-10-CM

## 2024-07-03 DIAGNOSIS — E85.9 AMYLOIDOSIS, UNSPECIFIED TYPE: ICD-10-CM

## 2024-07-03 DIAGNOSIS — D63.8 ANEMIA OF CHRONIC DISEASE: ICD-10-CM

## 2024-07-03 DIAGNOSIS — D80.1 HYPOGAMMAGLOBULINEMIA: ICD-10-CM

## 2024-07-03 DIAGNOSIS — Z85.3 HISTORY OF BREAST CANCER: ICD-10-CM

## 2024-07-03 DIAGNOSIS — C90.00 MULTIPLE MYELOMA, REMISSION STATUS UNSPECIFIED: ICD-10-CM

## 2024-07-03 LAB
ALBUMIN SERPL-MCNC: 2.9 G/DL (ref 3.4–4.8)
ALBUMIN/GLOB SERPL: 1.2 RATIO (ref 1.1–2)
ALP SERPL-CCNC: 108 UNIT/L (ref 40–150)
ALT SERPL-CCNC: 12 UNIT/L (ref 0–55)
ANION GAP SERPL CALC-SCNC: 12 MEQ/L
AST SERPL-CCNC: 14 UNIT/L (ref 5–34)
BASOPHILS # BLD AUTO: 0.01 X10(3)/MCL
BASOPHILS NFR BLD AUTO: 0.2 %
BILIRUB SERPL-MCNC: 0.2 MG/DL
BUN SERPL-MCNC: 17.9 MG/DL (ref 9.8–20.1)
CALCIUM SERPL-MCNC: 9.3 MG/DL (ref 8.4–10.2)
CHLORIDE SERPL-SCNC: 110 MMOL/L (ref 98–107)
CO2 SERPL-SCNC: 22 MMOL/L (ref 23–31)
CREAT SERPL-MCNC: 2.41 MG/DL (ref 0.55–1.02)
CREAT/UREA NIT SERPL: 7
EOSINOPHIL # BLD AUTO: 0.46 X10(3)/MCL (ref 0–0.9)
EOSINOPHIL NFR BLD AUTO: 8.7 %
ERYTHROCYTE [DISTWIDTH] IN BLOOD BY AUTOMATED COUNT: 15.6 % (ref 11.5–17)
GFR SERPLBLD CREATININE-BSD FMLA CKD-EPI: 22 ML/MIN/1.73/M2
GLOBULIN SER-MCNC: 2.5 GM/DL (ref 2.4–3.5)
GLUCOSE SERPL-MCNC: 94 MG/DL (ref 82–115)
HCT VFR BLD AUTO: 31.1 % (ref 37–47)
HGB BLD-MCNC: 10.1 G/DL (ref 12–16)
IGA SERPL-MCNC: 62 MG/DL (ref 69–517)
IGG SERPL-MCNC: 487 MG/DL (ref 522–1631)
IGM SERPL-MCNC: 15 MG/DL (ref 33–293)
IMM GRANULOCYTES # BLD AUTO: 0.05 X10(3)/MCL (ref 0–0.04)
IMM GRANULOCYTES NFR BLD AUTO: 0.9 %
LYMPHOCYTES # BLD AUTO: 1.08 X10(3)/MCL (ref 0.6–4.6)
LYMPHOCYTES NFR BLD AUTO: 20.5 %
MCH RBC QN AUTO: 30.6 PG (ref 27–31)
MCHC RBC AUTO-ENTMCNC: 32.5 G/DL (ref 33–36)
MCV RBC AUTO: 94.2 FL (ref 80–94)
MONOCYTES # BLD AUTO: 0.67 X10(3)/MCL (ref 0.1–1.3)
MONOCYTES NFR BLD AUTO: 12.7 %
NEUTROPHILS # BLD AUTO: 3 X10(3)/MCL (ref 2.1–9.2)
NEUTROPHILS NFR BLD AUTO: 57 %
PLATELET # BLD AUTO: 224 X10(3)/MCL (ref 130–400)
PMV BLD AUTO: 9.7 FL (ref 7.4–10.4)
POTASSIUM SERPL-SCNC: 3.4 MMOL/L (ref 3.5–5.1)
PROT SERPL-MCNC: 5.4 GM/DL (ref 5.8–7.6)
RBC # BLD AUTO: 3.3 X10(6)/MCL (ref 4.2–5.4)
SODIUM SERPL-SCNC: 144 MMOL/L (ref 136–145)
WBC # BLD AUTO: 5.27 X10(3)/MCL (ref 4.5–11.5)

## 2024-07-03 PROCEDURE — 25000003 PHARM REV CODE 250: Performed by: INTERNAL MEDICINE

## 2024-07-03 PROCEDURE — 82784 ASSAY IGA/IGD/IGG/IGM EACH: CPT

## 2024-07-03 PROCEDURE — 99999 PR PBB SHADOW E&M-EST. PATIENT-LVL V: CPT | Mod: PBBFAC,,, | Performed by: INTERNAL MEDICINE

## 2024-07-03 PROCEDURE — 99215 OFFICE O/P EST HI 40 MIN: CPT | Mod: S$PBB,,, | Performed by: INTERNAL MEDICINE

## 2024-07-03 PROCEDURE — 96401 CHEMO ANTI-NEOPL SQ/IM: CPT

## 2024-07-03 PROCEDURE — 36415 COLL VENOUS BLD VENIPUNCTURE: CPT

## 2024-07-03 PROCEDURE — 99215 OFFICE O/P EST HI 40 MIN: CPT | Mod: PBBFAC,25 | Performed by: INTERNAL MEDICINE

## 2024-07-03 PROCEDURE — 85025 COMPLETE CBC W/AUTO DIFF WBC: CPT

## 2024-07-03 PROCEDURE — 0077U IG PARAPROTEIN QUAL BLD/UR: CPT

## 2024-07-03 PROCEDURE — 83521 IG LIGHT CHAINS FREE EACH: CPT

## 2024-07-03 PROCEDURE — 80053 COMPREHEN METABOLIC PANEL: CPT

## 2024-07-03 RX ORDER — SODIUM CHLORIDE 0.9 % (FLUSH) 0.9 %
10 SYRINGE (ML) INJECTION
Status: CANCELLED | OUTPATIENT
Start: 2024-07-03

## 2024-07-03 RX ORDER — DIPHENHYDRAMINE HCL 25 MG
25 CAPSULE ORAL
Status: CANCELLED | OUTPATIENT
Start: 2024-07-03

## 2024-07-03 RX ORDER — SODIUM CHLORIDE 0.9 % (FLUSH) 0.9 %
10 SYRINGE (ML) INJECTION
Status: DISCONTINUED | OUTPATIENT
Start: 2024-07-03 | End: 2024-07-03 | Stop reason: HOSPADM

## 2024-07-03 RX ORDER — EPINEPHRINE 0.3 MG/.3ML
0.3 INJECTION SUBCUTANEOUS ONCE AS NEEDED
Status: CANCELLED | OUTPATIENT
Start: 2024-07-03

## 2024-07-03 RX ORDER — DIPHENHYDRAMINE HYDROCHLORIDE 50 MG/ML
50 INJECTION INTRAMUSCULAR; INTRAVENOUS ONCE AS NEEDED
Status: DISCONTINUED | OUTPATIENT
Start: 2024-07-03 | End: 2024-07-03 | Stop reason: HOSPADM

## 2024-07-03 RX ORDER — EPINEPHRINE 0.3 MG/.3ML
0.3 INJECTION SUBCUTANEOUS ONCE AS NEEDED
Status: DISCONTINUED | OUTPATIENT
Start: 2024-07-03 | End: 2024-07-03 | Stop reason: HOSPADM

## 2024-07-03 RX ORDER — DIPHENHYDRAMINE HYDROCHLORIDE 50 MG/ML
50 INJECTION INTRAMUSCULAR; INTRAVENOUS ONCE AS NEEDED
Status: CANCELLED | OUTPATIENT
Start: 2024-07-03

## 2024-07-03 RX ORDER — HEPARIN 100 UNIT/ML
500 SYRINGE INTRAVENOUS
Status: CANCELLED | OUTPATIENT
Start: 2024-07-03

## 2024-07-03 RX ORDER — HEPARIN 100 UNIT/ML
500 SYRINGE INTRAVENOUS
Status: DISCONTINUED | OUTPATIENT
Start: 2024-07-03 | End: 2024-07-03 | Stop reason: HOSPADM

## 2024-07-03 RX ORDER — DIPHENHYDRAMINE HCL 25 MG
25 CAPSULE ORAL
Status: COMPLETED | OUTPATIENT
Start: 2024-07-03 | End: 2024-07-03

## 2024-07-03 RX ORDER — ACETAMINOPHEN 325 MG/1
650 TABLET ORAL
Status: COMPLETED | OUTPATIENT
Start: 2024-07-03 | End: 2024-07-03

## 2024-07-03 RX ORDER — ACETAMINOPHEN 325 MG/1
650 TABLET ORAL
Status: CANCELLED | OUTPATIENT
Start: 2024-07-03

## 2024-07-03 RX ADMIN — ACETAMINOPHEN 650 MG: 325 TABLET ORAL at 03:07

## 2024-07-03 RX ADMIN — DIPHENHYDRAMINE HYDROCHLORIDE 25 MG: 25 CAPSULE ORAL at 03:07

## 2024-07-03 NOTE — PROGRESS NOTES
HEMATOLOGY/ONCOLOGY OFFICE CLINIC VISIT    Visit Information:    No show/Reschedules/Cancellations  08/21/2018--> Rescheduled              07/20/2023-->canceled visit/labs/infusion  11/05/2019--> No Show/Reschedule 08/01/2023-->canceled visit/labs/infusion  06/24/2020--> No Show/Reschedule 08/31/2023-->canceled visit/labs/infusion  09/24/2020--> No Show/Reschedule 09/21/2023-->canceled visit/labs/infusion  03/15/2021--> No Show/Reschedule 10/24/2023-->canceled visit/labs/infusion  03/23/2021--> No Show/Reschedule 10/25/2023-->canceled visit/labs/infusion  04/13/2021--> No Show   10/26/2023-->canceled visit/labs/infusion  05/27/2021--> No Show   10/31/2023-->canceled visit/labs/infusion   08/19/2021--> No Show   11/01/2023-->canceled visit/labs/infusion  08/26/2021--> No Show/Rescheduled 11/29/2023-->canceled visit/labs/infusion  04/26/2022--> No Show   12/04/2023-->canceled visit/labs/infusion  10/03/2022--> Rescheduled  12/07/2023-->canceled visit/labs/infusion  10/11/2022--> Rescheduled  01/11/2024-->canceled visit/labs/infusion  03/22/2023--> No Show/Rescheduled 05/01/2024-->canceled visit/labs/infusion  4/26/2023->Rescheduled   05/22/2024-->canceled visit/labs/infusion  05/09/2023->Rescheduled  5/30/2023->Rescheduled  6/15/2023->Rescheduled      Referring Physician: Dr Farr  PCP: DR. Cardona  GI:   Rheumatologist: Dr. Luis Rea  Immunologist: Dr. Daniel Nava  ENT: Dr. Brice Williamson  [facility] Pain management: Dr.Chai [facility] Transplant: Dr. Adrian Naylor [facility] [place] Med Onc: Dr. Zulema Rivera Breast Surg Onc: Dr.DeSnyder LONG [place]: Dr. 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   8) Stage IA grade 3, ER+, HER2 BERNA +,  IDC/DCIS of the left breast --- 2/7/18 at [facility]   --s/p lumpectomy with SLNB 4/12, Grade 2 IDC 4mm with second focus of microinvasive carcinoma 0.4mm. 2 SLN negative for malignancy    9) Progressive swelling of R lower extremity--- US NIVA done 2/19/18 negative for evidence of DVT  10). Paroxysmal Afib (2/18/18) diagnosed at [facility]; ECHO noted EF 58% - on Eliquis  11) Mild CKD with GFR 55 - managed per [facility] nephrology  12) Treatment induced neutropenia     Present treatment:  -Maintenance Revlimid 5mg PO QOD, started 02/16/17-- was held for a few months while undergoing treatment for her breast cancer 02/18-05/18; Restarted 6/15/18   Dexamethasone 20 mg po weekly added early July 2017--> reduced to 12 mg PO weekly October 4, 2017---> increased to 16mg PO weekly 2/15/18---restarted 6/15/18 --> 20 mg weekly 7//8/2022  Darzalex 7/8/2022-present--On hold due to cellulitis    Eliquis 2.5mg PO BID     Treatment/Oncology history:  1) CyBorD x5 cycles 09/28/15-12/24/15  2) Autologous stem cell transplant 02/08/16, done at [facility]  3) Exploratory laparotomy with subtotal colectomy and end ileostomy done August 2, 2016--pathology specimen showed advanced colonic amyloidosis extensive involving the muscular wall submucosa and mucosa.  Appendix also involvement by amyloidosis with fibrous obliteration of the distal lumen.  4) Maintenance therapy with Revlimid 5mg-started 06/01/16--Discontinued shortly after 2/2 cytopenias  5) Left breast lumpectomy with SLNB at [facility] 4/12/18  6) Left partial breast RT x10 fractions  5/21/18-6/4/18  7) Femara 2.5 mg PO daily completed 5 yrs (6/2018- 6/2023)      Imaging:  NIVA 7/29/2021: Negative for deep venous thrombosis in  the left lower extremity.   MMG 3/21/2022: BI-RADS Category 2: Benign Finding(s)  MRI CTL spine 9/14/2022: No acute myelomatous findings. Severe spinal stenosis process detected within the upper cervical spine as well as the entirety of the lumbar spine segments similar to the prior imaging assessment.    CT C 1/24/2023: Right greater than left lower lobe opacification consistent with infectious process.  Lucent lesions throughout the osseous structures similar to 2015.    Pathology:    CLINICAL HISTORY:       Patient: Ninfa Candelario is a 58 y.o. female.  Ms. Cabrera Joseph was admitted on 08/16/15 for ileus versus partial SBO. CT A/P done 08/17/15 showed sigmoid colitis and a zone of transition at the junction of the descending and sigmoid colon which could indicate some degree of obstruction and an obstructing mass cannot be excluded. Numerous skeletal lytic lesions noted. CT chest done 08/19/15 showed Multiple skeletal lytic lesions involving the thoracic spine, left rib sternum consistent with either metastases or multiple myeloma. There is no evidence of pulmonary or mediastinal mass. Dr. Farr was consulted for possible MM/anemia.     Nuclear Bone scan done 8/20/15 showed mild to moderate increased activity in left rib and right second rib which correlates with fractures seen on CT.  Skeletal survey done 8/20/15showed lytic lesions in the calvarium and probably a lytic lesion in the left scapula. Lytic areas in the spine and pelvis seen on recent CT are not well defined on skeletal survey. Work up labs done 08/20/15: Negative for sickle cell and Thalessemia (reported history of thalessemia). Iron 54, Transferrin 118.0, Iron sat 34.6%, Folate 26.5, Vit B12 1,779  Peripheral smear resulted slightly macrocytic normochromic anemia without signifcant anisocytosis may reflect early B12/folate deficiency or marrow dysfunction, among others. No immature myeloid cells or blasts. Platlets adequate. Beta 2 mircoglobulin =  3.2.  SPEP/NADIA: M-spike in the beta region. The monoclonal protein peak accounts for 1.13 g/dL. Hypogammaglobulinemia. NADIA pattern shows the presence of a free lambda light chain monoclonal protein with additional faint band in IgA.  All immunoglobulin levels decreased. IgA=26, IgG=307, IgM<5.  Kappa Qnt 0.16, Lambda Qnt 4600.00, Ratio <0.01.  24hr Urine for Bence Mendez: Kappa 2.75, Lambda 1250.00, Ratio <0.01. NADIA: Urine is POSITIVE for monoclonal Free Lambda Light chains     Patient then opted to go to [facility] where she underwent a bone marrow biopsy on 09/18/15. BMBx showed 80% plasma cells, 13p deletion and normal karyotype. She underwent fat pad biopsy which came back positive for Congo red consistent with amyloidosis. Cardiac workup revealed no amyloid deposition within the heart.  PET/CT and skeletal survey done September 18, 2015 showed multiple lytic osseous lesions  The patient was started on Velcade while at [facility] with the plan to transition to autologous stem cell transplantation. They asked if we could give her could continue treatment here.   She completed CyborD x5 cycles on 12/24/15     Patient admitted 01/06/16 for colitis and C. Diff. Pt treated with Flagyl. Discharged home 01/08/16     Repeat BMBx done at [facility] 01/2016 did not show any morphologic or immnophenotypic evidence of plasama cell neoplasm. Patient underwent Autologous stem cell transplant with Busulfan and melphalan on 02/08/16 at [facility]. The only complication was recurrent C.diff infection for which she completed 10 days of Fidaxomycin.     Follow-up bone marrow biopsy done at [facility] done 5/16/16 showed cellular 30-40% bone marrow with trilineage hematopoiesis. No morphologic or immunophenotypic support for plasma cell neoplasm. Started maintenance Revlimid 5mg. Unable to continue therapy due to cytopenias.     On 08/02/16 patient underwent  Exploratory laparotomy with subtotal colectomy and end ileostomy for what was thought to be toxic  megacolon. Pathology showed extensive advanced colonic amyloidosis involving the muscular wall, submucosa and mucosa: With the appendix also involved with amyloidosis.  Positive lambda light chains were noted.     Follow-up at [facility] 8/31/16, Per Dr. Adrian Naylor, 6 months post autologous transplant. She has engrafted. Based on the latest restaging she is near complete remission with possible IgA lambda on serum immunofixation. Patient was instructed to discontinue prophylactic antibiotics. She received her 1st series of immunizations while at [facility]. Patient had a bilateral venous ultrasound to rule out DVT, negative B/L. In regards to amyloidosis the patient feels that her tongue is smaller in size and her BNP has come down some.  Patient had a follow-up appointment at [facility] November 30, 2016.  Dr. Parnell documented the patient's free lambda light chain remains at a lower level.  Therefore in light of her recent surgery they will continue with observation only.  The plan to see the patient back in 2-3 months with repeat restaging labs.  They recommended regular CBC checks to monitor anemia.  Patient returned to [facility] in December 2016 to meet with dermatology for numerous papillary lesions on eyelids, chest and posterior neck consistent with amyloid deposits. Recommendations were for watchful waiting.  Patient is less than 1 year out from bone marrow transplant and further improvement can be expected.  She will see the patient back in 1 year to discuss possible surgical resection of the plaques especially around the eyelid region.  Rogaine recommended for persistent hair loss. Retin-A recommended for acne vulgaris.  Patient returned to [facility] on 2/8/2017 for neurosurgery consult for chronic low back pain and difficulty walking.  Imaging showed extensive DJD with discovertebral bulges and thickening of the spinal laminar tissues creating spinal stenosis throughout, but greatest at  L2/L3.  Neurosurgery felt that surgery risks outweighed the benefits.  Patient was prescribed pain medicine and encouraged to participate in physical therapy.  Patient also met with Dr. Parnell on 02/08/17, who noted an elevation in free light chains (kappa 52.60/lambda 27.77/ratio 1.89).  Recommendation is to resume maintenance therapy with Revlimid at a low dose of 5 mg every other day.  Patient went back to [facility] first week of April 2017.  I do not have these notes.  Reportedly she was told to continue on every other day Revlimid at 5 mg.  She reportedly had repeat myeloma labs done as well.  Again I do not have these results.  Patient went back to [facility] and saw Dr. Parnell June 29, 2017.  Myeloma labs were done with serum protein electrophoresis showing irregularity in the fast gamma region.  IgG of 1291.  Free kappa light chains of 84.6 with lambda light chains of 66.9.  Beta-2 microglobulin of 4.5  CT scan of lumbar spine showed multiple lytic lesions once again in the lumbar spine and bony pelvis.  Ultrasound of the left leg showed no evidence of DVT.  It was recommended based on the slight increase in her And lambda light chains to start weekly dexamethasone 20 mg p.o. weekly.  Follow-up visit and labs at [facility] on September 29, 2017 with Dr. Parnell.  Repeat beta-2 microglobulin came back at 2.4.  Serum IgG of 761, IgA 117 and IgM of 29.  Serum free kappa light chains of 24.9 and lambda of 23.5.  Counts were stable with hemoglobin of 11.1, WBC 4.9 and platelets of 210,000.  Recommendations were for continued Revlimid every other day with weekly dexamethasone along with aspirin for DVT prophylaxis.  Bilateral diagnostic MMG 12/19/17  noted 1.6cm coarse heterogeneous calcifications in posterior region of L breast at 3 o'clock position. Patient was scheduled for FNA which confirmed ID grade 3, single focus measuring 1.7cm with 2nd focus microinvasion DCIS grade 2 measuring 0.3cm. Left axillary LN  biopsy showed no evidence of metastatic carcinoma. Complete staging: Stage IA, grade 3 ER+, Her 2 Niki + IDC/DCIS of left breast. Ki-67 <17%.  Repeat myeloma labs showed rise in free lambda light chains noted at 68.69, kappa light chains at 27.22,  beta 2 microglobulin came back at 2.9. Serum protein electrophoresis showed no definitive evidence of an M protein peak. The pattern is consistent with an acute inflammatory reaction. Recommendations were made to maintain Revlimid at current dose of 5mg every other day to be taken continuously increase weekly dexamethasone to 16 mg.   Patient seen at [facility] with tentative plan for L breast lumpectomy on 3/13/18. 2/16/18- Prior to surgery she was seen by genetic counselor who ran genetic testing per patient reques, all of which were negative. She was then seen by cardiology at [facility] 2/16/18 for further evaluation of persistent bilateral lower extremity swelling-ECHO noted EF of 58%; diagnosed with paroxysmal atrial fibrillation and instructed to begin daily ASA. During preoperative asssessment per Rad/Onc 3/12/18, corresponding chest CT noted new bilateral pulmonary nodules concerning for metastatic disease- surgery was subsequently postponed pending pulmonary evaluation. Seen by Pulmonology on 3/21/18, PFTs within normal limits and cleared patient for surgery with recommended close CT follow up of nodules in 3 months. 3/21/18 seen by nephrology for management of mild CKD (GFR 55)-cleared for surgery with recommended follow up in 3 months. Left breast lumpectomy and SLNB performed per Dr. Washburn on 4/12/18- plan for follow up in clinic on 4/24/18  for postoperative assessment. Follow up with Dr. Poole (Med/Onc) on 4/25/18. Follow up with Dr. Parnell 4/26/18.   Patient seen at [facility] s/p Left breast lumpectomy with SLNB on 4/12/18. Following surgery she completed Left partial breast radiation x 10 fractions. She was then started on endocrine therapy with Femara  after been deemedan inappropriate candidate for systemic chemotherapy/Herceptin.      She was seen by neurosugery at [facility] on 4/26/18 following repeat MRI of cervical thoracic lumbar spine which noted focal enhancement of T2 hyperintensity at the C2 level which may be due to degenerative changes. Signal abnormality within  the T12 and L1 may be secondary to myeloma. Plan to continue with observation for now with F/U scheduled in October 2018.      She was seen by Dr. Parnell at [facility] for management of her MM on 4/26/18. Patient was recommended to restart Revlimid 5mg PO every other day with notes that these recommendations would be communicated to collaborating Oncologist. Patient was also recommended to start on Zometa for her bone disease.      Seen by Dr. Parnell at [facility] for management of her MM on 6/28/18. Repeat light chains noted lambda 33.36 (previously 80.80), K/L ratio 0.91 (previously 0.49). Due to slight improvement in light chains, plan to continue on lenalidomide maintenance. Recommendations to continue anticoagulation with Eliquis. BLE venous doppler negative for DVT.   Seen by pulmonolgy on 6/28/18- repeat CT chest done 6/28/18 noted resolution of previous pulmonary nodules. Thought to be infectious in nature. Recommendations for discharge from pulmonary clinic.  Should MRI of her cervical thoracic lumbar spine on 2/12/2019 that demonstrated cervical spondylosis with canal stenosis most notable at C1 and 2. Cord signal abnormality at C1 and 2 with enhancement is stable. Lumbar spondylosis with central canal stenosis at multiple levels. No imaging features of bony metastatic disease.     For amyloidosis (Light chain type).    Patient presented with macroglossia and now with improvement of the swelling of her tongue. Her cardiac parameters are also better.   8/10/2020 proBNP  250   2/17/2020                616  8/9/2019                  190  11/14/2023  820        Chief Complaint:4 Week  Follow Up (PT states her legs are weak and she feeling weak.)      Interval History:  She is currently on Femara for breast cancer and Rev/Dex/Darzalex  for MM  s/p transplant in 2016. At her [facility] visit on 10/12/22, Ninlaro was discontinued due to it causing severe diarrhea and leukopenia. Diarrhea resolved after stopping Ninlaro (she admits to stopping prior to visit @ [facility]).       7/3/2024:  Patient is here today for follow up of her multiple myeloma, amyloidosis and breast cancer.  Daratumumab has been on hold since April 2024 for LLE cellulitis. She was admitted to [facility] per  Dr. Parnell recommendations until LLE is better. Swelling to BLE noted during physical exam, L>R. She has follow up with Dr. Parnell @ [facility] on 7/10/24.   She reports bloody vaginal discharge when she wipes have resolved. She was referred to Ob/Gyn, but she has not heard from Dr. Cronin's office yet. She reports her screening mammogram was rescheduled to 7/31/24 due to her being hospitalized.      ROS: All 14 points ROS taken and as per Interval History  Review of Systems   Constitutional:  Positive for malaise/fatigue. Negative for chills, fever and weight loss.   HENT:  Negative for congestion and nosebleeds.    Eyes:  Negative for blurred vision, double vision and photophobia.   Respiratory:  Negative for cough, hemoptysis and shortness of breath.    Cardiovascular:  Negative for chest pain, palpitations, leg swelling and PND.   Gastrointestinal:  Positive for diarrhea (Better), nausea and vomiting. Negative for abdominal pain, blood in stool, constipation and melena.   Genitourinary:  Negative for dysuria, frequency, hematuria and urgency.   Musculoskeletal:  Negative for back pain, falls and myalgias.   Skin:  Negative for itching and rash.   Neurological:  Positive for weakness. Negative for tremors, focal weakness, seizures and headaches.   Endo/Heme/Allergies:  Negative for environmental allergies. Does not bruise/bleed easily.  "  Psychiatric/Behavioral:  Negative for depression and suicidal ideas. The patient is not nervous/anxious.          Histories:  PMH/PSH/FH/SOCIAL/ALLERGIES AND MEDS REVIEWED AND UPDATED AS APPROPRIATE       Vitals:    24 1528   BP: (!) 145/84   BP Location: Left arm   Patient Position: Sitting   Pulse: 96   Resp: 18   Temp: 98.5 °F (36.9 °C)   TempSrc: Oral   SpO2: 97%   Weight: 78.2 kg (172 lb 8 oz)   Height: 5' 3" (1.6 m)               Wt Readings from Last 6 Encounters:   24 78.2 kg (172 lb 8 oz)   24 80.2 kg (176 lb 12.8 oz)   04/10/24 72.6 kg (160 lb)   24 72.6 kg (160 lb)   24 72.8 kg (160 lb 7.9 oz)   24 76.2 kg (168 lb)   Vitals reviewed and stable       Physical Exam  Vitals and nursing note reviewed.   Constitutional:       General: She is not in acute distress.     Appearance: Normal appearance. She is well-developed. She is ill-appearing.      Comments: Uses walker for mobility   HENT:      Head: Normocephalic and atraumatic.      Mouth/Throat:      Mouth: Mucous membranes are moist.   Eyes:      General: No scleral icterus.     Extraocular Movements: Extraocular movements intact.      Conjunctiva/sclera: Conjunctivae normal.      Pupils: Pupils are equal, round, and reactive to light.   Neck:      Vascular: No JVD.   Cardiovascular:      Rate and Rhythm: Normal rate and regular rhythm.      Heart sounds: No murmur heard.  Pulmonary:      Effort: Pulmonary effort is normal.      Breath sounds: Normal breath sounds. No wheezing or rhonchi.   Abdominal:      General: Bowel sounds are normal. There is no distension.      Palpations: Abdomen is soft. There is no mass.      Tenderness: There is no abdominal tenderness.   Musculoskeletal:         General: No swelling or deformity.      Cervical back: Neck supple.      Right lower le+ Edema present.      Left lower leg: 3+ Edema present.      Comments: Hyperpigmentation of left leg.    Lymphadenopathy:      Head:      " Right side of head: No submandibular adenopathy.      Left side of head: No submandibular adenopathy.      Cervical: No cervical adenopathy.      Upper Body:      Right upper body: No supraclavicular or axillary adenopathy.      Left upper body: No supraclavicular or axillary adenopathy.      Lower Body: No right inguinal adenopathy. No left inguinal adenopathy.   Skin:     General: Skin is warm.      Coloration: Skin is not jaundiced.      Findings: Rash present.      Nails: There is no clubbing.      Comments: Rash: bilateral; LE left > right   Neurological:      Mental Status: She is alert and oriented to person, place, and time.      Cranial Nerves: Cranial nerves 2-12 are intact.      Motor: Weakness present.      Gait: Gait abnormal.   Psychiatric:         Attention and Perception: Attention normal.         Behavior: Behavior is cooperative.         Cognition and Memory: Cognition normal.         Judgment: Judgment normal.       ECOG SCORE    3 - Capable of only limited selfcare, confined to bed or chair more than 50% of waking hours         Laboratory:  CBC with Differential:  Lab Results   Component Value Date    WBC 5.27 07/03/2024    RBC 3.30 (L) 07/03/2024    HGB 10.1 (L) 07/03/2024    HCT 31.1 (L) 07/03/2024    MCV 94.2 (H) 07/03/2024    MCH 30.6 07/03/2024    MCHC 32.5 (L) 07/03/2024    RDW 15.6 07/03/2024     07/03/2024    MPV 9.7 07/03/2024        CMP:  Sodium   Date Value Ref Range Status   06/03/2024 144 136 - 145 mmol/L Final     Potassium   Date Value Ref Range Status   06/03/2024 3.6 3.5 - 5.1 mmol/L Final     Chloride   Date Value Ref Range Status   06/03/2024 111 (H) 98 - 107 mmol/L Final     CO2   Date Value Ref Range Status   06/03/2024 23 23 - 31 mmol/L Final     Blood Urea Nitrogen   Date Value Ref Range Status   06/03/2024 24.4 (H) 9.8 - 20.1 mg/dL Final   07/18/2023 35 (H) 6 - 23 mg/dL Final     Creatinine   Date Value Ref Range Status   06/03/2024 2.18 (H) 0.55 - 1.02 mg/dL Final    07/18/2023 2.48 (H) 0.51 - 0.95 mg/dL Final     Calcium   Date Value Ref Range Status   06/03/2024 8.2 (L) 8.4 - 10.2 mg/dL Final   07/18/2023 9.7 8.4 - 10.2 mg/dL Final     Albumin   Date Value Ref Range Status   06/03/2024 3.0 (L) 3.4 - 4.8 g/dL Final   06/03/2024 2.7 (L) 3.4 - 4.8 g/dL Final     Bilirubin Total   Date Value Ref Range Status   06/03/2024 0.3 <=1.5 mg/dL Final     ALP   Date Value Ref Range Status   06/03/2024 87 40 - 150 unit/L Final     AST   Date Value Ref Range Status   06/03/2024 16 5 - 34 unit/L Final     ALT   Date Value Ref Range Status   06/03/2024 24 0 - 55 unit/L Final     Estimated GFR-Non    Date Value Ref Range Status   04/20/2022 25           Assessment:       1. Multiple myeloma not having achieved remission    2. Amyloidosis, unspecified type    3. Anemia of chronic disease    4. Multiple myeloma, remission status unspecified    5. Hypogammaglobulinemia    6. History of breast cancer        1) Multiple Myeloma, IgA Lambda, FISH with del 13p, normal karyotype, ISS stg II Dx 2015; S/p Autologous stem cell transplant 02/08/16-remission-->slight rise in free light chains 02/08/17  2) Amyloidosis, Lambda light chain type, organ involvement with macroglossia and colon involvement. 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. Sx risks do not outweigh benefits. Pain meds given and encouraged Physical Therpay  8) Amyloid plaques, evaluated by Derm at [facility], recommend watchful waiting. Patient to follow up 12/2017  9) Stage IA grade 3, ER+, HER2 BERNA +,  IDC/DCIS of the left breast cancer --- 2/7/18 at [facility]; s/p lumpectomy with SLNB 4/12, Grade 2 IDC measuring 4mm with second focus of microinvasive carcinoma measuring  0.4mm .IG  DCIS, 0.3mm to posterior margin; 2 SLN negative for malignancy    10) Progressive swelling of R lower extremity--- US NIVA done 2/19/18 negative for evidence of DVT  11). Paroxysmal Afib (2/18/18) diagnosed at [facility]; ECHO noted EF 58%  12. Mild CKD with GFR 55 - managed per [facility] nephrology  13). Pulmonary nodules noted on pre-operative chest CT scan (3/12/18) noted new bilateral pulmonary nodules are nonspecific -- seen by Pulmonology at [facility] (3/21/18) thought to be inflammatory/infectious in nature, repeat Chest CT 6/28/18 noted resolution of nodules  14). Treatment induced neutropenia  15) NON COMPLIANT patient-- please see above the no show, reschedule and cancelled visits/labs and infusion.         Plan:     Will resume Darzalex today   RTC in 4 weeks for TD/MM labs/ infusion all same day - with MD ONLY, patient prefers PM appts on Wednesdays  Continue K+ BID  Continue Revlimid 5 mg QOD  Continue Eliquis 2.5 mg BID  Keep upcoming appointments at [facility] 7/10//24 with Dr. Parnell  I will place call to Dr. Parnell's office to discuss case with her   Keep cardiology follow up @ [facility] 4/2025  Will follow up on referral to Ob/Gyn's office  Bone density scheduled for 8/1/24  Keep scheduled appointment for mammogram @ [facility] on 7/31/24    Encourage to call or message us for any or problems  The patient was given ample opportunity to ask questions, and to the best of my abilities, all questions answered to satisfaction; patient demonstrated understanding of what we discussed and agreeable to the plan.     Natalie Meyers MD  Hematology/Oncology      Professional Services   I, Jagruti Booker LPN, acted solely as a scribe for and in the presence of Dr. Natalie Meyers, who performed these services.       Addendum: Called Dr Parnell office and she is out of the office. She will be back next week. Will try again then

## 2024-07-08 PROBLEM — Z09 HOSPITAL DISCHARGE FOLLOW-UP: Status: RESOLVED | Noted: 2024-04-04 | Resolved: 2024-07-08

## 2024-07-08 LAB
KAPPA LC FREE SER NEPH-MCNC: 2.76 MG/DL (ref 0.33–1.94)
KAPPA LC FREE/LAMBDA FREE SER NEPH: 0.84 {RATIO} (ref 0.26–1.65)
LAMBDA LC FREE SER NEPH-MCNC: 3.3 MG/DL (ref 0.57–2.63)

## 2024-07-09 LAB — MAYO GENERIC ORDERABLE RESULT: ABNORMAL

## 2024-07-15 DIAGNOSIS — I48.0 PAROXYSMAL ATRIAL FIBRILLATION: ICD-10-CM

## 2024-07-15 DIAGNOSIS — I10 PRIMARY HYPERTENSION: Primary | ICD-10-CM

## 2024-07-15 DIAGNOSIS — N18.4 CHRONIC KIDNEY DISEASE, STAGE 4 (SEVERE): ICD-10-CM

## 2024-07-15 DIAGNOSIS — E78.2 MIXED HYPERLIPIDEMIA: ICD-10-CM

## 2024-07-15 DIAGNOSIS — C90.00 MULTIPLE MYELOMA NOT HAVING ACHIEVED REMISSION: ICD-10-CM

## 2024-07-15 RX ORDER — LENALIDOMIDE 5 MG/1
CAPSULE ORAL
Qty: 14 EACH | Refills: 0 | Status: SHIPPED | OUTPATIENT
Start: 2024-07-15

## 2024-08-02 DIAGNOSIS — C90.00 MULTIPLE MYELOMA NOT HAVING ACHIEVED REMISSION: ICD-10-CM

## 2024-08-02 RX ORDER — ONDANSETRON HYDROCHLORIDE 8 MG/1
8 TABLET, FILM COATED ORAL EVERY 8 HOURS PRN
Qty: 90 TABLET | Refills: 2 | Status: SHIPPED | OUTPATIENT
Start: 2024-08-02

## 2024-08-07 ENCOUNTER — OFFICE VISIT (OUTPATIENT)
Dept: INTERNAL MEDICINE | Facility: CLINIC | Age: 59
End: 2024-08-07
Payer: MEDICARE

## 2024-08-07 VITALS
HEART RATE: 109 BPM | RESPIRATION RATE: 16 BRPM | SYSTOLIC BLOOD PRESSURE: 130 MMHG | OXYGEN SATURATION: 98 % | WEIGHT: 180 LBS | HEIGHT: 63 IN | BODY MASS INDEX: 31.89 KG/M2 | DIASTOLIC BLOOD PRESSURE: 82 MMHG

## 2024-08-07 DIAGNOSIS — H61.23 BILATERAL IMPACTED CERUMEN: ICD-10-CM

## 2024-08-07 DIAGNOSIS — J06.9 UPPER RESPIRATORY TRACT INFECTION, UNSPECIFIED TYPE: Primary | ICD-10-CM

## 2024-08-07 DIAGNOSIS — U07.1 COVID-19 VIRUS DETECTED: ICD-10-CM

## 2024-08-07 DIAGNOSIS — H92.02 LEFT EAR PAIN: ICD-10-CM

## 2024-08-07 LAB
FLUAV AG UPPER RESP QL IA.RAPID: NOT DETECTED
FLUBV AG UPPER RESP QL IA.RAPID: NOT DETECTED
RSV A 5' UTR RNA NPH QL NAA+PROBE: NOT DETECTED
SARS-COV-2 RNA RESP QL NAA+PROBE: DETECTED

## 2024-08-07 PROCEDURE — 0241U COVID/RSV/FLU A&B PCR: CPT

## 2024-08-07 PROCEDURE — 99214 OFFICE O/P EST MOD 30 MIN: CPT | Mod: ,,,

## 2024-08-07 RX ORDER — IPRATROPIUM BROMIDE AND ALBUTEROL SULFATE 2.5; .5 MG/3ML; MG/3ML
3 SOLUTION RESPIRATORY (INHALATION) EVERY 6 HOURS PRN
Qty: 75 ML | Refills: 0 | Status: SHIPPED | OUTPATIENT
Start: 2024-08-07 | End: 2024-09-06

## 2024-08-07 RX ORDER — FLUTICASONE PROPIONATE 50 MCG
1 SPRAY, SUSPENSION (ML) NASAL DAILY
Qty: 9.9 ML | Refills: 0 | Status: SHIPPED | OUTPATIENT
Start: 2024-08-07 | End: 2024-08-21

## 2024-08-07 RX ORDER — GUAIFENESIN AND DEXTROMETHORPHAN HYDROBROMIDE 10; 100 MG/5ML; MG/5ML
5 SYRUP ORAL EVERY 4 HOURS PRN
Qty: 236 ML | Refills: 0 | Status: SHIPPED | OUTPATIENT
Start: 2024-08-07 | End: 2024-08-17

## 2024-08-09 ENCOUNTER — TELEPHONE (OUTPATIENT)
Dept: INTERNAL MEDICINE | Facility: CLINIC | Age: 59
End: 2024-08-09
Payer: MEDICARE

## 2024-08-10 DIAGNOSIS — I10 HYPERTENSION, UNSPECIFIED TYPE: ICD-10-CM

## 2024-08-12 RX ORDER — CARVEDILOL 3.12 MG/1
3.12 TABLET ORAL 2 TIMES DAILY WITH MEALS
Qty: 180 TABLET | Refills: 2 | Status: SHIPPED | OUTPATIENT
Start: 2024-08-12 | End: 2025-05-09

## 2024-08-13 ENCOUNTER — TELEPHONE (OUTPATIENT)
Dept: INTERNAL MEDICINE | Facility: CLINIC | Age: 59
End: 2024-08-13
Payer: MEDICARE

## 2024-08-13 NOTE — TELEPHONE ENCOUNTER
----- Message from Luanne Cosme sent at 8/13/2024  2:57 PM CDT -----  Regarding: medical advice  Who Called: Stephanie    Caller is requesting assistance/information from provider's office.    Symptoms (please be specific): coughing   How long has patient had these symptoms:    List of preferred pharmacies on file (remove unneeded): [unfilled]  If different, enter pharmacy into here including location and phone number:     Preferred Method of Contact: Phone Call    Patient's Preferred Phone Number on File: [phone]     Best Call Back Number, if different:[phone]    Additional Information:  Stephanie stated she was not made aware that Ninfa had covid. She stated no called her with results.  I did advise  phone calls was made and vm left.  She is expecting a phone call to discuss.

## 2024-08-13 NOTE — TELEPHONE ENCOUNTER
Pt sister Stephanie called upset thinking no one called with results of Pt Covid test. She was not called and Ms Ocasio did not remember getting a phone call. Ms Ocasio did receive a phone call 8-7-24 at 3:54. Informed Ms Brown that once we talked to the Pt there was no reason to call and talk with her. Per HIPAA Pt is to inform her family we do not need to call them. Ms Brown got a little upset, but was made aware the information was given to Ms Ocasio.

## 2024-08-15 DIAGNOSIS — C90.00 MULTIPLE MYELOMA NOT HAVING ACHIEVED REMISSION: ICD-10-CM

## 2024-08-16 RX ORDER — LENALIDOMIDE 5 MG/1
CAPSULE ORAL
Qty: 14 EACH | Refills: 6 | Status: SHIPPED | OUTPATIENT
Start: 2024-08-16

## 2024-08-19 DIAGNOSIS — C90.00 MULTIPLE MYELOMA NOT HAVING ACHIEVED REMISSION: ICD-10-CM

## 2024-08-19 RX ORDER — LENALIDOMIDE 5 MG/1
5 CAPSULE ORAL EVERY OTHER DAY
Qty: 14 EACH | Refills: 0 | Status: SHIPPED | OUTPATIENT
Start: 2024-08-19

## 2024-08-28 ENCOUNTER — TELEPHONE (OUTPATIENT)
Dept: INTERNAL MEDICINE | Facility: CLINIC | Age: 59
End: 2024-08-28
Payer: MEDICARE

## 2024-08-28 DIAGNOSIS — E78.2 MIXED HYPERLIPIDEMIA: Primary | ICD-10-CM

## 2024-08-28 RX ORDER — ROSUVASTATIN CALCIUM 10 MG/1
10 TABLET, COATED ORAL DAILY
Qty: 90 TABLET | Refills: 3 | Status: SHIPPED | OUTPATIENT
Start: 2024-08-28

## 2024-08-28 NOTE — TELEPHONE ENCOUNTER
----- Message from Nitza Fajardo sent at 8/28/2024  9:32 AM CDT -----  .Who Called: Ninfa Candelario    Refill or New Rx:Refill  RX Name and Strength:rosuvastatin (CRESTOR) 10 MG tablet  How is the patient currently taking it? (ex. 1XDay):1x day  Is this a 30 day or 90 day RX:90  Local or Mail Order:local  List of preferred pharmacies on file (remove unneeded): [unfilled]  If different Pharmacy is requested, enter Pharmacy information here including location and phone number: same   Ordering Provider:      Preferred Method of Contact: Phone Call  Patient's Preferred Phone Number on File: [phone]   Best Call Back Number, if different:Stephanie [phone]  Additional Information: calling for refills pt and sister said on last appt on 08/07 told them she needed a refill and pt and sister asking to have no dealing with  with her care pt asking for call back

## 2024-08-28 NOTE — TELEPHONE ENCOUNTER
Refill sent to the pharmacy, Spoke with Ms Ocasio informing her the medication has been sent. Pt understood.

## 2024-09-13 DIAGNOSIS — C90.00 MULTIPLE MYELOMA NOT HAVING ACHIEVED REMISSION: ICD-10-CM

## 2024-09-13 DIAGNOSIS — C90.00 MULTIPLE MYELOMA, REMISSION STATUS UNSPECIFIED: ICD-10-CM

## 2024-09-13 DIAGNOSIS — C50.819 OVERLAPPING MALIGNANT NEOPLASM OF FEMALE BREAST, UNSPECIFIED ESTROGEN RECEPTOR STATUS, UNSPECIFIED LATERALITY: ICD-10-CM

## 2024-09-13 RX ORDER — POTASSIUM CHLORIDE 750 MG/1
10 TABLET, EXTENDED RELEASE ORAL 2 TIMES DAILY
Qty: 180 TABLET | Refills: 3 | Status: SHIPPED | OUTPATIENT
Start: 2024-09-13

## 2024-09-13 RX ORDER — DEXAMETHASONE 20 MG/1
1 TABLET ORAL WEEKLY
Qty: 12 TABLET | Refills: 0 | Status: SHIPPED | OUTPATIENT
Start: 2024-09-13

## 2024-09-17 ENCOUNTER — OFFICE VISIT (OUTPATIENT)
Dept: INTERNAL MEDICINE | Facility: CLINIC | Age: 59
End: 2024-09-17
Payer: MEDICARE

## 2024-09-17 VITALS
TEMPERATURE: 98 F | SYSTOLIC BLOOD PRESSURE: 114 MMHG | DIASTOLIC BLOOD PRESSURE: 76 MMHG | BODY MASS INDEX: 29.77 KG/M2 | WEIGHT: 168 LBS | HEIGHT: 63 IN | HEART RATE: 85 BPM | OXYGEN SATURATION: 96 %

## 2024-09-17 DIAGNOSIS — R09.81 SINUS CONGESTION: Primary | ICD-10-CM

## 2024-09-17 PROCEDURE — 99213 OFFICE O/P EST LOW 20 MIN: CPT | Mod: ,,, | Performed by: INTERNAL MEDICINE

## 2024-09-17 RX ORDER — CEFDINIR 300 MG/1
300 CAPSULE ORAL EVERY 12 HOURS
Qty: 14 CAPSULE | Refills: 0 | Status: SHIPPED | OUTPATIENT
Start: 2024-09-17 | End: 2024-09-24

## 2024-09-17 NOTE — PROGRESS NOTES
Subjective:      Patient ID: Ninfa Candelario is a 58 y.o. female.    Chief Complaint: Sinus Problem, Cough, and Nasal Congestion    iNnfa Canedlario is a 58 y.o. female, who presents today for a requested visit.  Medical comorbidities include HTN, multiple myeloma, breast cancer, amyloidosis,  myelodysplastic syndrome, anemia of chronic disease, poorly controlled hypertension, hyperlipidemia, chronic back pain, atrial fibrillation and lumbar disc problems that is seen seen today for requested visit.   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  Being followed by oncology closely.      Today presents with complaints of sinus congestion, sore throat, cough, low-grade temps at home( 99*), and left ear pain.    The patient's Health Maintenance was reviewed and the following appears to be due at this time:   Health Maintenance Due   Topic Date Due    Annual UACr  Never done    HIV Screening  Never done    Hemoglobin A1c (Diabetic Prevention Screening)  Never done    TETANUS VACCINE  08/22/2013    RSV Vaccine (Age 60+ and Pregnant patients) (1 - 1-dose 60+ series) Never done    Mammogram  04/11/2024    Influenza Vaccine (1) 09/01/2024        Past Medical History:  Past Medical History:   Diagnosis Date    Amyloidosis     Anemia     Anxiety and depression     Diplopia     Hyperlipidemia     Hypertension     Impaired mobility     Lytic lesion of bone on x-ray     Multiple myeloma     Neuropathy     Obesity, unspecified     Paroxysmal atrial fibrillation     Primary cancer of left female breast      Past Surgical History:   Procedure Laterality Date    BONE MARROW TRANSPLANT  02/08/2016    BREAST SURGERY      CARPAL TUNNEL RELEASE      COLON SURGERY      COLONOSCOPY  12/11/2018    Dr. Eddie Jimenez    ENDOSCOPIC RELEASE OF TRIGGER FINGER      ESOPHAGEAL MOTILITY STUDY  2015    Excision Lipoma left elbow      Exploration Laparotomy  2016    FLEXIBLE SIGMOIDOSCOPY       MEDIPORT INSERTION, SINGLE  03/15/2016    PH Study 24 Hour  2015    SMALL INTESTINE SURGERY       Review of patient's allergies indicates:   Allergen Reactions    Baclofen Shortness Of Breath     Difficulty breathing      Penicillins Hives, Rash and Swelling    Shellfish containing products Hives, Rash, Swelling and Other (See Comments)     shell  She can shrimp      Clarithromycin Other (See Comments)    Iodinated contrast media      Allergic to shrimp    Nsaids (non-steroidal anti-inflammatory drug)     Other omega-3s     Penicillin      Other reaction(s): Unknown  Other reaction(s): Unknown  Other reaction(s): Unknown    Ace inhibitors Other (See Comments)     cough  Other reaction(s): Cough    Azithromycin Nausea Only     Upset stomach    Hydralazine analogues Anxiety    Meloxicam Tinitus     Other reaction(s): Unknown  Other reaction(s): Unknown    Prochlorperazine Anxiety     Restlessness/anxious    Tramadol Other (See Comments)     Increased Heart Rate    Other reaction(s): Unknown  Other reaction(s): Unknown     Social History     Socioeconomic History    Marital status: Single   Tobacco Use    Smoking status: Never    Smokeless tobacco: Never   Substance and Sexual Activity    Alcohol use: Not Currently    Drug use: Never     Social Determinants of Health     Financial Resource Strain: Low Risk  (8/7/2024)    Overall Financial Resource Strain (CARDIA)     Difficulty of Paying Living Expenses: Not hard at all   Food Insecurity: No Food Insecurity (8/7/2024)    Hunger Vital Sign     Worried About Running Out of Food in the Last Year: Never true     Ran Out of Food in the Last Year: Never true   Transportation Needs: No Transportation Needs (8/7/2024)    TRANSPORTATION NEEDS     Transportation : No   Physical Activity: Sufficiently Active (8/7/2024)    Exercise Vital Sign     Days of Exercise per Week: 4 days     Minutes of Exercise per Session: 50 min   Stress: No Stress Concern Present (8/7/2024)     "[employer] - Occupational Stress Questionnaire     Feeling of Stress : Not at all   Housing Stability: Low Risk  (8/7/2024)    Housing Stability Vital Sign     Unable to Pay for Housing in the Last Year: No     Homeless in the Last Year: No     Family History   Problem Relation Name Age of Onset    Hypertension Mother Potier     Stroke Mother Potier     Cancer Father Cabrera Aguirre     Alcohol abuse Father Cabrera Aguirre     Hypertension Sister Stephanie     Arthritis Sister Stephanie     Diabetes Sister Stephanie     Drug abuse Sister Stephanie     Stroke Sister Stephanie     Hypertension Brother Wesley     Drug abuse Brother Wesley     Diabetes Sister Caroline Potier     Heart disease Sister Caroline Potier     Hypertension Sister Caroline Potier     Drug abuse Sister Kandy     Hypertension Sister Kandy        Review of Systems    A comprehensive review of systems was performed and is negative except for that stated above  Objective:   /76 (BP Location: Right arm, Patient Position: Sitting, BP Method: Small (Manual))   Pulse 85   Temp 97.8 °F (36.6 °C)   Ht 5' 3" (1.6 m)   Wt 76.2 kg (168 lb)   SpO2 96%   BMI 29.76 kg/m²     Physical Exam  Constitutional:       Appearance: Normal appearance.   HENT:      Head: Normocephalic and atraumatic.      Nose: Nose normal.      Mouth/Throat:      Mouth: Mucous membranes are moist.      Pharynx: Oropharynx is clear.   Eyes:      Extraocular Movements: Extraocular movements intact.      Pupils: Pupils are equal, round, and reactive to light.   Cardiovascular:      Rate and Rhythm: Normal rate and regular rhythm.      Pulses: Normal pulses.   Pulmonary:      Effort: Pulmonary effort is normal.      Breath sounds: Normal breath sounds.   Abdominal:      General: Bowel sounds are normal.      Palpations: Abdomen is soft.   Musculoskeletal:         General: Normal range of motion.      Cervical back: Normal range of motion and neck supple.   Skin:     General: " Skin is warm.   Neurological:      General: No focal deficit present.      Mental Status: She is alert and oriented to person, place, and time. Mental status is at baseline.   Psychiatric:         Mood and Affect: Mood normal.       Assessment/ Plan:   1. Sinus congestion  Assessment & Plan:  -afebrile with mild symptoms   -significant body ache and sinus pain however   -was noted to have some sinusitis on the PET scan done recently   -will go ahead and treat with cefdinir 300 mg p.o. b.i.d. for 7 days   -encourage OTC fluticasone nasal b.i.d.  -encouraged taking vitamin-C and emphasized on importance of hydration   -steam inhalation encouraged as well      Other orders  -     cefdinir (OMNICEF) 300 MG capsule; Take 1 capsule (300 mg total) by mouth every 12 (twelve) hours. for 7 days  Dispense: 14 capsule; Refill: 0

## 2024-09-17 NOTE — ASSESSMENT & PLAN NOTE
-afebrile with mild symptoms   -significant body ache and sinus pain however   -was noted to have some sinusitis on the PET scan done recently   -will go ahead and treat with cefdinir 300 mg p.o. b.i.d. for 7 days   -encourage OTC fluticasone nasal b.i.d.  -encouraged taking vitamin-C and emphasized on importance of hydration   -steam inhalation encouraged as well

## 2024-09-18 DIAGNOSIS — K21.9 GASTROESOPHAGEAL REFLUX DISEASE, UNSPECIFIED WHETHER ESOPHAGITIS PRESENT: ICD-10-CM

## 2024-09-18 RX ORDER — OMEPRAZOLE 20 MG/1
CAPSULE, DELAYED RELEASE ORAL
Qty: 30 CAPSULE | Refills: 11 | Status: SHIPPED | OUTPATIENT
Start: 2024-09-18

## 2024-09-24 DIAGNOSIS — C90.00 MULTIPLE MYELOMA NOT HAVING ACHIEVED REMISSION: ICD-10-CM

## 2024-09-24 RX ORDER — LENALIDOMIDE 5 MG/1
CAPSULE ORAL
Qty: 14 EACH | Refills: 0 | Status: SHIPPED | OUTPATIENT
Start: 2024-09-24

## 2024-09-25 ENCOUNTER — TELEPHONE (OUTPATIENT)
Dept: INTERNAL MEDICINE | Facility: CLINIC | Age: 59
End: 2024-09-25

## 2024-10-01 ENCOUNTER — CLINICAL SUPPORT (OUTPATIENT)
Dept: INTERNAL MEDICINE | Facility: CLINIC | Age: 59
End: 2024-10-01
Payer: MEDICARE

## 2024-10-01 DIAGNOSIS — Z23 FLU VACCINE NEED: Primary | ICD-10-CM

## 2024-10-10 ENCOUNTER — INFUSION (OUTPATIENT)
Dept: INFUSION THERAPY | Facility: HOSPITAL | Age: 59
End: 2024-10-10
Attending: INTERNAL MEDICINE
Payer: MEDICARE

## 2024-10-10 ENCOUNTER — OFFICE VISIT (OUTPATIENT)
Dept: HEMATOLOGY/ONCOLOGY | Facility: CLINIC | Age: 59
End: 2024-10-10
Payer: MEDICARE

## 2024-10-10 VITALS
OXYGEN SATURATION: 97 % | TEMPERATURE: 99 F | DIASTOLIC BLOOD PRESSURE: 76 MMHG | BODY MASS INDEX: 31.52 KG/M2 | SYSTOLIC BLOOD PRESSURE: 151 MMHG | WEIGHT: 177.88 LBS | RESPIRATION RATE: 17 BRPM | HEART RATE: 76 BPM | HEIGHT: 63 IN

## 2024-10-10 VITALS — HEIGHT: 63 IN | BODY MASS INDEX: 31.52 KG/M2 | WEIGHT: 177.88 LBS

## 2024-10-10 DIAGNOSIS — D80.1 HYPOGAMMAGLOBULINEMIA: ICD-10-CM

## 2024-10-10 DIAGNOSIS — C90.00 MULTIPLE MYELOMA, REMISSION STATUS UNSPECIFIED: Primary | ICD-10-CM

## 2024-10-10 DIAGNOSIS — Z85.3 HISTORY OF BREAST CANCER: ICD-10-CM

## 2024-10-10 DIAGNOSIS — E85.81 LIGHT CHAIN (AL) AMYLOIDOSIS: ICD-10-CM

## 2024-10-10 DIAGNOSIS — E85.9 AMYLOIDOSIS, UNSPECIFIED TYPE: Primary | ICD-10-CM

## 2024-10-10 DIAGNOSIS — Z94.84 H/O AUTOLOGOUS STEM CELL TRANSPLANT: ICD-10-CM

## 2024-10-10 DIAGNOSIS — G63 POLYNEUROPATHY IN MALIGNANT DISEASE: ICD-10-CM

## 2024-10-10 DIAGNOSIS — C80.1 POLYNEUROPATHY IN MALIGNANT DISEASE: ICD-10-CM

## 2024-10-10 PROBLEM — R05.8 RECURRENT PRODUCTIVE COUGH: Status: RESOLVED | Noted: 2023-10-05 | Resolved: 2024-10-10

## 2024-10-10 PROBLEM — R09.81 SINUS CONGESTION: Status: RESOLVED | Noted: 2023-01-19 | Resolved: 2024-10-10

## 2024-10-10 PROCEDURE — 99214 OFFICE O/P EST MOD 30 MIN: CPT | Mod: PBBFAC,25 | Performed by: INTERNAL MEDICINE

## 2024-10-10 PROCEDURE — 99999 PR PBB SHADOW E&M-EST. PATIENT-LVL IV: CPT | Mod: PBBFAC,,, | Performed by: INTERNAL MEDICINE

## 2024-10-10 PROCEDURE — 63600175 PHARM REV CODE 636 W HCPCS: Mod: JZ,JG | Performed by: INTERNAL MEDICINE

## 2024-10-10 PROCEDURE — 96401 CHEMO ANTI-NEOPL SQ/IM: CPT

## 2024-10-10 PROCEDURE — 25000003 PHARM REV CODE 250: Performed by: INTERNAL MEDICINE

## 2024-10-10 RX ORDER — SODIUM CHLORIDE 0.9 % (FLUSH) 0.9 %
10 SYRINGE (ML) INJECTION
Status: DISCONTINUED | OUTPATIENT
Start: 2024-10-10 | End: 2024-10-10 | Stop reason: HOSPADM

## 2024-10-10 RX ORDER — ACETAMINOPHEN 325 MG/1
650 TABLET ORAL
Status: CANCELLED | OUTPATIENT
Start: 2024-10-10

## 2024-10-10 RX ORDER — HEPARIN 100 UNIT/ML
500 SYRINGE INTRAVENOUS
Status: CANCELLED | OUTPATIENT
Start: 2024-10-10

## 2024-10-10 RX ORDER — HEPARIN 100 UNIT/ML
500 SYRINGE INTRAVENOUS
Status: DISCONTINUED | OUTPATIENT
Start: 2024-10-10 | End: 2024-10-10 | Stop reason: HOSPADM

## 2024-10-10 RX ORDER — DIPHENHYDRAMINE HYDROCHLORIDE 50 MG/ML
50 INJECTION INTRAMUSCULAR; INTRAVENOUS ONCE AS NEEDED
Status: CANCELLED | OUTPATIENT
Start: 2024-10-10

## 2024-10-10 RX ORDER — SODIUM CHLORIDE 0.9 % (FLUSH) 0.9 %
10 SYRINGE (ML) INJECTION
Status: CANCELLED | OUTPATIENT
Start: 2024-10-10

## 2024-10-10 RX ORDER — DIPHENHYDRAMINE HCL 25 MG
25 CAPSULE ORAL
Status: CANCELLED | OUTPATIENT
Start: 2024-10-10

## 2024-10-10 RX ORDER — EPINEPHRINE 0.3 MG/.3ML
0.3 INJECTION SUBCUTANEOUS ONCE AS NEEDED
Status: CANCELLED | OUTPATIENT
Start: 2024-10-10

## 2024-10-10 RX ORDER — DIPHENHYDRAMINE HYDROCHLORIDE 50 MG/ML
50 INJECTION INTRAMUSCULAR; INTRAVENOUS ONCE AS NEEDED
Status: DISCONTINUED | OUTPATIENT
Start: 2024-10-10 | End: 2024-10-10 | Stop reason: HOSPADM

## 2024-10-10 RX ORDER — ACETAMINOPHEN 325 MG/1
650 TABLET ORAL
Status: COMPLETED | OUTPATIENT
Start: 2024-10-10 | End: 2024-10-10

## 2024-10-10 RX ORDER — EPINEPHRINE 0.3 MG/.3ML
0.3 INJECTION SUBCUTANEOUS ONCE AS NEEDED
Status: DISCONTINUED | OUTPATIENT
Start: 2024-10-10 | End: 2024-10-10 | Stop reason: HOSPADM

## 2024-10-10 RX ORDER — DIPHENHYDRAMINE HCL 25 MG
25 CAPSULE ORAL
Status: COMPLETED | OUTPATIENT
Start: 2024-10-10 | End: 2024-10-10

## 2024-10-10 RX ADMIN — DIPHENHYDRAMINE HYDROCHLORIDE 25 MG: 25 CAPSULE ORAL at 03:10

## 2024-10-10 RX ADMIN — ACETAMINOPHEN 650 MG: 325 TABLET ORAL at 03:10

## 2024-10-10 RX ADMIN — DARATUMUMAB AND HYALURONIDASE-FIHJ (HUMAN RECOMBINANT) 1800 MG: 1800; 30000 INJECTION SUBCUTANEOUS at 03:10

## 2024-10-10 NOTE — PROGRESS NOTES
HEMATOLOGY/ONCOLOGY OFFICE CLINIC VISIT    Visit Information:    No show/Reschedules/Cancellations  08/21/2018--> Rescheduled                07/20/2023-->canceled visit/labs/infusion 05/08/2024 11/05/2019--> No Show/Reschedule 08/01/2023-->canceled visit/labs/infusion 05/22/2024 06/24/2020--> No Show/Reschedule 08/31/2023-->canceled visit/labs/infusion 05/22/2024 09/24/2020--> No Show/Reschedule 09/21/2023-->canceled visit/labs/infusion 06/03/2024  03/15/2021--> No Show/Reschedule 10/24/2023-->canceled visit/labs/infusion 07/11/2024 03/23/2021--> No Show/Reschedule 10/25/2023-->canceled visit/labs/infusion 07/31/2024 04/13/2021--> No Show   10/26/2023-->canceled visit/labs/infusion 08/01/2024 05/27/2021--> No Show   10/31/2023-->canceled visit/labs/infusion 08/14/2024 08/19/2021--> No Show   11/01/2023-->canceled visit/labs/infusion 08/19/2024--> No Show   08/26/2021--> No Show/Rescheduled 11/29/2023-->canceled visit/labs/infusion 09/11/2024 04/26/2022--> No Show   12/04/2023-->canceled visit/labs/infusion 09/18/2024  10/03/2022--> Rescheduled  12/07/2023-->canceled visit/labs/infusion   10/11/2022--> Rescheduled  01/03/2024-->canceled visit/labs/infusion  03/22/2023--> No Show/Rescheduled 01/11/2024-->canceled visit/labs/infusion  4/26/2023->Rescheduled   01/25/2024-->canceled visit/labs/infusion  05/09/2023->Rescheduled   04/03/2024 5/30/2023->Rescheduled   04/24/2024  6/15/2023->Rescheduled   05/01/2024      Referring Physician: Dr Farr  PCP: DR. Cardona  GI:   Rheumatologist: Dr. Luis Rea  Immunologist: Dr. Daniel Nava  ENT: Dr. Brice Williamson  [facility] Pain management: Dr.Chai [facility] Transplant: Dr. Adrian Naylor [facility] Med Onc: Dr. Zulema [facility] Breast Surg Onc: Dr.DeSnyder LONG [place]: Dr. 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   8) Stage IA grade 3, ER+, HER2 BERNA +,  IDC/DCIS of the left breast --- 2/7/18 at [facility]   --s/p lumpectomy with SLNB 4/12, Grade 2 IDC 4mm with second focus of microinvasive carcinoma 0.4mm. 2 SLN negative for malignancy    9) Progressive swelling of R lower extremity--- US NIVA done 2/19/18 negative for evidence of DVT  10). Paroxysmal Afib (2/18/18) diagnosed at [facility]; ECHO noted EF 58% - on Eliquis  11) Mild CKD with GFR 55 - managed per [facility] nephrology  12) Treatment induced neutropenia     Present treatment:  -Maintenance Revlimid 5mg PO QOD, started 02/16/17-- was held for a few months while undergoing treatment for her breast cancer 02/18-05/18; Restarted 6/15/18   Dexamethasone 20 mg po weekly added early July 2017--> reduced to 12 mg PO weekly October 4, 2017---> increased to 16mg PO weekly 2/15/18---restarted 6/15/18 --> 20 mg weekly 7//8/2022  Darzalex 7/8/2022-present--On hold due to cellulitis    Eliquis 2.5mg PO BID     Treatment/Oncology history:  1) CyBorD x5 cycles 09/28/15-12/24/15  2) Autologous stem cell transplant 02/08/16, done at [facility]  3) Exploratory laparotomy with subtotal colectomy and end ileostomy done August 2, 2016--pathology specimen showed advanced colonic amyloidosis extensive involving the muscular wall submucosa and mucosa.  Appendix also involvement by amyloidosis with fibrous obliteration of the distal lumen.  4) Maintenance therapy with Revlimid 5mg-started 06/01/16--Discontinued shortly after 2/2 cytopenias  5) Left breast lumpectomy with SLNB at [facility] 4/12/18  6) Left  partial breast RT x10 fractions  5/21/18-6/4/18  7) Femara 2.5 mg PO daily completed 5 yrs (6/2018- 6/2023)      Imaging:  NIVA 7/29/2021: Negative for deep venous thrombosis in the left lower extremity.   MMG 3/21/2022: BI-RADS Category 2: Benign Finding(s)  MRI CTL spine 9/14/2022: No acute myelomatous findings. Severe spinal stenosis process detected within the upper cervical spine as well as the entirety of the lumbar spine segments similar to the prior imaging assessment.    CT C 1/24/2023: Right greater than left lower lobe opacification consistent with infectious process.  Lucent lesions throughout the osseous structures similar to 2015.    Pathology:    CLINICAL HISTORY:       Patient: Ninfa Candelario is a 59 y.o. female.  Ms. Cabrera Joseph was admitted on 08/16/15 for ileus versus partial SBO. CT A/P done 08/17/15 showed sigmoid colitis and a zone of transition at the junction of the descending and sigmoid colon which could indicate some degree of obstruction and an obstructing mass cannot be excluded. Numerous skeletal lytic lesions noted. CT chest done 08/19/15 showed Multiple skeletal lytic lesions involving the thoracic spine, left rib sternum consistent with either metastases or multiple myeloma. There is no evidence of pulmonary or mediastinal mass. Dr. Farr was consulted for possible MM/anemia.     Nuclear Bone scan done 8/20/15 showed mild to moderate increased activity in left rib and right second rib which correlates with fractures seen on CT.  Skeletal survey done 8/20/15showed lytic lesions in the calvarium and probably a lytic lesion in the left scapula. Lytic areas in the spine and pelvis seen on recent CT are not well defined on skeletal survey. Work up labs done 08/20/15: Negative for sickle cell and Thalessemia (reported history of thalessemia). Iron 54, Transferrin 118.0, Iron sat 34.6%, Folate 26.5, Vit B12 1,779  Peripheral smear resulted slightly macrocytic normochromic anemia without  signifcant anisocytosis may reflect early B12/folate deficiency or marrow dysfunction, among others. No immature myeloid cells or blasts. Platlets adequate. Beta 2 mircoglobulin = 3.2.  SPEP/NADIA: M-spike in the beta region. The monoclonal protein peak accounts for 1.13 g/dL. Hypogammaglobulinemia. NADIA pattern shows the presence of a free lambda light chain monoclonal protein with additional faint band in IgA.  All immunoglobulin levels decreased. IgA=26, IgG=307, IgM<5.  Kappa Qnt 0.16, Lambda Qnt 4600.00, Ratio <0.01.  24hr Urine for Bence Mendez: Kappa 2.75, Lambda 1250.00, Ratio <0.01. NADIA: Urine is POSITIVE for monoclonal Free Lambda Light chains     Patient then opted to go to [facility] where she underwent a bone marrow biopsy on 09/18/15. BMBx showed 80% plasma cells, 13p deletion and normal karyotype. She underwent fat pad biopsy which came back positive for Congo red consistent with amyloidosis. Cardiac workup revealed no amyloid deposition within the heart.  PET/CT and skeletal survey done September 18, 2015 showed multiple lytic osseous lesions  The patient was started on Velcade while at [facility] with the plan to transition to autologous stem cell transplantation. They asked if we could give her could continue treatment here.   She completed CyborD x5 cycles on 12/24/15     Patient admitted 01/06/16 for colitis and C. Diff. Pt treated with Flagyl. Discharged home 01/08/16     Repeat BMBx done at [facility] 01/2016 did not show any morphologic or immnophenotypic evidence of plasama cell neoplasm. Patient underwent Autologous stem cell transplant with Busulfan and melphalan on 02/08/16 at [facility]. The only complication was recurrent C.diff infection for which she completed 10 days of Fidaxomycin.     Follow-up bone marrow biopsy done at [facility] done 5/16/16 showed cellular 30-40% bone marrow with trilineage hematopoiesis. No morphologic or immunophenotypic support for plasma cell neoplasm. Started maintenance Revlimid 5mg.  Unable to continue therapy due to cytopenias.     On 08/02/16 patient underwent  Exploratory laparotomy with subtotal colectomy and end ileostomy for what was thought to be toxic megacolon. Pathology showed extensive advanced colonic amyloidosis involving the muscular wall, submucosa and mucosa: With the appendix also involved with amyloidosis.  Positive lambda light chains were noted.     Follow-up at [facility] 8/31/16, Per Dr. Adrian Naylor, 6 months post autologous transplant. She has engrafted. Based on the latest restaging she is near complete remission with possible IgA lambda on serum immunofixation. Patient was instructed to discontinue prophylactic antibiotics. She received her 1st series of immunizations while at [facility]. Patient had a bilateral venous ultrasound to rule out DVT, negative B/L. In regards to amyloidosis the patient feels that her tongue is smaller in size and her BNP has come down some.  Patient had a follow-up appointment at [facility] November 30, 2016.  Dr. Parnell documented the patient's free lambda light chain remains at a lower level.  Therefore in light of her recent surgery they will continue with observation only.  The plan to see the patient back in 2-3 months with repeat restaging labs.  They recommended regular CBC checks to monitor anemia.  Patient returned to [facility] in December 2016 to meet with dermatology for numerous papillary lesions on eyelids, chest and posterior neck consistent with amyloid deposits. Recommendations were for watchful waiting.  Patient is less than 1 year out from bone marrow transplant and further improvement can be expected.  She will see the patient back in 1 year to discuss possible surgical resection of the plaques especially around the eyelid region.  Rogaine recommended for persistent hair loss. Retin-A recommended for acne vulgaris.  Patient returned to [facility] on 2/8/2017 for neurosurgery consult for chronic low back pain  and difficulty walking.  Imaging showed extensive DJD with discovertebral bulges and thickening of the spinal laminar tissues creating spinal stenosis throughout, but greatest at L2/L3.  Neurosurgery felt that surgery risks outweighed the benefits.  Patient was prescribed pain medicine and encouraged to participate in physical therapy.  Patient also met with Dr. Parnell on 02/08/17, who noted an elevation in free light chains (kappa 52.60/lambda 27.77/ratio 1.89).  Recommendation is to resume maintenance therapy with Revlimid at a low dose of 5 mg every other day.  Patient went back to [facility] first week of April 2017.  I do not have these notes.  Reportedly she was told to continue on every other day Revlimid at 5 mg.  She reportedly had repeat myeloma labs done as well.  Again I do not have these results.  Patient went back to [facility] and saw Dr. Parnell June 29, 2017.  Myeloma labs were done with serum protein electrophoresis showing irregularity in the fast gamma region.  IgG of 1291.  Free kappa light chains of 84.6 with lambda light chains of 66.9.  Beta-2 microglobulin of 4.5  CT scan of lumbar spine showed multiple lytic lesions once again in the lumbar spine and bony pelvis.  Ultrasound of the left leg showed no evidence of DVT.  It was recommended based on the slight increase in her And lambda light chains to start weekly dexamethasone 20 mg p.o. weekly.  Follow-up visit and labs at [facility] on September 29, 2017 with Dr. Parnell.  Repeat beta-2 microglobulin came back at 2.4.  Serum IgG of 761, IgA 117 and IgM of 29.  Serum free kappa light chains of 24.9 and lambda of 23.5.  Counts were stable with hemoglobin of 11.1, WBC 4.9 and platelets of 210,000.  Recommendations were for continued Revlimid every other day with weekly dexamethasone along with aspirin for DVT prophylaxis.  Bilateral diagnostic MMG 12/19/17  noted 1.6cm coarse heterogeneous calcifications in posterior region of L breast at 3  o'clock position. Patient was scheduled for FNA which confirmed ID grade 3, single focus measuring 1.7cm with 2nd focus microinvasion DCIS grade 2 measuring 0.3cm. Left axillary LN biopsy showed no evidence of metastatic carcinoma. Complete staging: Stage IA, grade 3 ER+, Her 2 Niki + IDC/DCIS of left breast. Ki-67 <17%.  Repeat myeloma labs showed rise in free lambda light chains noted at 68.69, kappa light chains at 27.22,  beta 2 microglobulin came back at 2.9. Serum protein electrophoresis showed no definitive evidence of an M protein peak. The pattern is consistent with an acute inflammatory reaction. Recommendations were made to maintain Revlimid at current dose of 5mg every other day to be taken continuously increase weekly dexamethasone to 16 mg.   Patient seen at [facility] with tentative plan for L breast lumpectomy on 3/13/18. 2/16/18- Prior to surgery she was seen by genetic counselor who ran genetic testing per patient reques, all of which were negative. She was then seen by cardiology at [facility] 2/16/18 for further evaluation of persistent bilateral lower extremity swelling-ECHO noted EF of 58%; diagnosed with paroxysmal atrial fibrillation and instructed to begin daily ASA. During preoperative asssessment per Rad/Onc 3/12/18, corresponding chest CT noted new bilateral pulmonary nodules concerning for metastatic disease- surgery was subsequently postponed pending pulmonary evaluation. Seen by Pulmonology on 3/21/18, PFTs within normal limits and cleared patient for surgery with recommended close CT follow up of nodules in 3 months. 3/21/18 seen by nephrology for management of mild CKD (GFR 55)-cleared for surgery with recommended follow up in 3 months. Left breast lumpectomy and SLNB performed per Dr. Washburn on 4/12/18- plan for follow up in clinic on 4/24/18  for postoperative assessment. Follow up with Dr. Poole (Med/Onc) on 4/25/18. Follow up with Dr. Parnell 4/26/18.   Patient seen at [facility]  s/p Left breast lumpectomy with SLNB on 4/12/18. Following surgery she completed Left partial breast radiation x 10 fractions. She was then started on endocrine therapy with Femara after been deemedan inappropriate candidate for systemic chemotherapy/Herceptin.      She was seen by neurosugery at [facility] on 4/26/18 following repeat MRI of cervical thoracic lumbar spine which noted focal enhancement of T2 hyperintensity at the C2 level which may be due to degenerative changes. Signal abnormality within  the T12 and L1 may be secondary to myeloma. Plan to continue with observation for now with F/U scheduled in October 2018.      She was seen by Dr. Parnell at [facility] for management of her MM on 4/26/18. Patient was recommended to restart Revlimid 5mg PO every other day with notes that these recommendations would be communicated to collaborating Oncologist. Patient was also recommended to start on Zometa for her bone disease.      Seen by Dr. Parnell at [facility] for management of her MM on 6/28/18. Repeat light chains noted lambda 33.36 (previously 80.80), K/L ratio 0.91 (previously 0.49). Due to slight improvement in light chains, plan to continue on lenalidomide maintenance. Recommendations to continue anticoagulation with Eliquis. BLE venous doppler negative for DVT.   Seen by pulmonolgy on 6/28/18- repeat CT chest done 6/28/18 noted resolution of previous pulmonary nodules. Thought to be infectious in nature. Recommendations for discharge from pulmonary clinic.  Should MRI of her cervical thoracic lumbar spine on 2/12/2019 that demonstrated cervical spondylosis with canal stenosis most notable at C1 and 2. Cord signal abnormality at C1 and 2 with enhancement is stable. Lumbar spondylosis with central canal stenosis at multiple levels. No imaging features of bony metastatic disease.     For amyloidosis (Light chain type).    Patient presented with macroglossia and now with improvement of the swelling of her  tongue. Her cardiac parameters are also better.   8/10/2020 proBNP  250   2/17/2020                616  8/9/2019                  190  11/14/2023  820        Chief Complaint:Follow-up (PT is concern about a bruise on her upper left arm and when pressing on it she states that it hurts.)      Interval History:  She is currently on Rev/Dex/Darzalex  for MM  s/p transplant in 2016. At her [facility] visit on 10/12/22, Ninlaro was discontinued due to it causing severe diarrhea and leukopenia. Diarrhea resolved after stopping Ninlaro (she admits to stopping prior to visit @ [facility]).     I Called Dr Parnell office and she is out of the office. She will be back next week. Will try again then  07/11/2024:  Dr. Parnell kindly called me back to discuss patient.  She recommend to decrease the dose of dexamethasone to 10 mg to see if this can help to decrease the frequency of infections.  She does not opposed to IVIG if needed for her hypogammaglobulinemia.  She usually starts when IgG is less than 400.  In this case if she ever start the IVIG needs to be given in a very slow rate due to her heart and fluid retention risks.  We will continue Evangelina monthly and will decrease dexamethasone to 10 mg.    10/10/2024: Patient is here today for follow up of her multiple myeloma, amyloidosis and breast cancer.  Daratumumab has been on hold since April 2024 for LLE cellulitis. She was admitted to [facility] per  Dr. Parnell recommendations until LLE is better. Swelling to BLE noted during physical exam, L>R. She has follow up with Dr. Parnell @ [facility] on 11/15/24.        ROS: All 14 points ROS taken and as per Interval History  Review of Systems   Constitutional:  Positive for malaise/fatigue. Negative for chills, fever and weight loss.   HENT:  Negative for congestion and nosebleeds.    Eyes:  Negative for blurred vision, double vision and photophobia.   Respiratory:  Negative for cough, hemoptysis and shortness of breath.    Cardiovascular:  Negative for chest  "pain, palpitations, leg swelling and PND.   Gastrointestinal:  Positive for diarrhea (Better), nausea and vomiting. Negative for abdominal pain, blood in stool, constipation and melena.   Genitourinary:  Negative for dysuria, frequency, hematuria and urgency.   Musculoskeletal:  Negative for back pain, falls and myalgias.   Skin:  Negative for itching and rash.   Neurological:  Positive for weakness. Negative for tremors, focal weakness, seizures and headaches.   Endo/Heme/Allergies:  Negative for environmental allergies. Does not bruise/bleed easily.   Psychiatric/Behavioral:  Negative for depression and suicidal ideas. The patient is not nervous/anxious.          Histories:  PMH/PSH/FH/SOCIAL/ALLERGIES AND MEDS REVIEWED AND UPDATED AS APPROPRIATE       Vitals:    10/10/24 1502   BP: (!) 151/76   Pulse: 76   Resp: 17   Temp: 98.5 °F (36.9 °C)   TempSrc: Oral   SpO2: 97%   Weight: 80.7 kg (177 lb 14.4 oz)   Height: 5' 3" (1.6 m)       Wt Readings from Last 6 Encounters:   10/10/24 80.7 kg (177 lb 14.2 oz)   10/10/24 80.7 kg (177 lb 14.4 oz)   09/17/24 76.2 kg (168 lb)   08/07/24 81.6 kg (180 lb)   07/03/24 78.2 kg (172 lb 8 oz)   06/03/24 80.2 kg (176 lb 12.8 oz)   Vitals reviewed and stable       Physical Exam  Vitals and nursing note reviewed.   Constitutional:       General: She is not in acute distress.     Appearance: Normal appearance. She is well-developed. She is ill-appearing.      Comments: Uses walker for mobility   HENT:      Head: Normocephalic and atraumatic.      Mouth/Throat:      Mouth: Mucous membranes are moist.   Eyes:      General: No scleral icterus.     Extraocular Movements: Extraocular movements intact.      Conjunctiva/sclera: Conjunctivae normal.      Pupils: Pupils are equal, round, and reactive to light.   Neck:      Vascular: No JVD.   Cardiovascular:      Rate and Rhythm: Normal rate and regular rhythm.      Heart sounds: No murmur heard.  Pulmonary:      Effort: Pulmonary effort is " normal.      Breath sounds: Normal breath sounds. No wheezing or rhonchi.   Abdominal:      General: Bowel sounds are normal. There is no distension.      Palpations: Abdomen is soft. There is no mass.      Tenderness: There is no abdominal tenderness.   Musculoskeletal:         General: No swelling or deformity.      Cervical back: Neck supple.      Right lower le+ Edema present.      Left lower leg: 3+ Edema present.      Comments: Hyperpigmentation of left leg.    Lymphadenopathy:      Head:      Right side of head: No submandibular adenopathy.      Left side of head: No submandibular adenopathy.      Cervical: No cervical adenopathy.      Upper Body:      Right upper body: No supraclavicular or axillary adenopathy.      Left upper body: No supraclavicular or axillary adenopathy.      Lower Body: No right inguinal adenopathy. No left inguinal adenopathy.   Skin:     General: Skin is warm.      Coloration: Skin is not jaundiced.      Findings: Rash present.      Nails: There is no clubbing.      Comments: Rash: bilateral; LE left > right   Neurological:      Mental Status: She is alert and oriented to person, place, and time.      Cranial Nerves: Cranial nerves 2-12 are intact.      Motor: Weakness present.      Gait: Gait abnormal.   Psychiatric:         Attention and Perception: Attention normal.         Behavior: Behavior is cooperative.         Cognition and Memory: Cognition normal.         Judgment: Judgment normal.       ECOG SCORE    2 - Capable of all selfcare but unable to carry out any work activities, active > 50% of hours         Laboratory:  CBC with Differential:  Lab Results   Component Value Date    WBC 4.21 (L) 10/10/2024    RBC 3.22 (L) 10/10/2024    HGB 9.5 (L) 10/10/2024    HCT 30.7 (L) 10/10/2024    MCV 95.3 (H) 10/10/2024    MCH 29.5 10/10/2024    MCHC 30.9 (L) 10/10/2024    RDW 14.6 10/10/2024     10/10/2024    MPV 9.6 10/10/2024        CMP:  Sodium   Date Value Ref Range Status    07/03/2024 144 136 - 145 mmol/L Final     Potassium   Date Value Ref Range Status   07/03/2024 3.4 (L) 3.5 - 5.1 mmol/L Final     Chloride   Date Value Ref Range Status   07/03/2024 110 (H) 98 - 107 mmol/L Final     CO2   Date Value Ref Range Status   07/03/2024 22 (L) 23 - 31 mmol/L Final     Blood Urea Nitrogen   Date Value Ref Range Status   07/03/2024 17.9 9.8 - 20.1 mg/dL Final   07/18/2023 35 (H) 6 - 23 mg/dL Final     Creatinine   Date Value Ref Range Status   07/03/2024 2.41 (H) 0.55 - 1.02 mg/dL Final   07/18/2023 2.48 (H) 0.51 - 0.95 mg/dL Final     Calcium   Date Value Ref Range Status   07/03/2024 9.3 8.4 - 10.2 mg/dL Final   07/18/2023 9.7 8.4 - 10.2 mg/dL Final     Albumin   Date Value Ref Range Status   07/03/2024 2.9 (L) 3.4 - 4.8 g/dL Final     Bilirubin Total   Date Value Ref Range Status   07/03/2024 0.2 <=1.5 mg/dL Final     ALP   Date Value Ref Range Status   07/03/2024 108 40 - 150 unit/L Final     AST   Date Value Ref Range Status   07/03/2024 14 5 - 34 unit/L Final     ALT   Date Value Ref Range Status   07/03/2024 12 0 - 55 unit/L Final     Estimated GFR-Non    Date Value Ref Range Status   04/20/2022 25           Assessment:       1. Multiple myeloma, remission status unspecified    2. Hypogammaglobulinemia    3. H/O autologous stem cell transplant    4. Light chain (AL) amyloidosis    5. History of breast cancer    6. 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 Sx risks do not outweigh benefits. Pain meds given and encouraged Physical Therpay  8) Amyloid plaques, evaluated by Derm at [facility], recommend watchful waiting. Patient to follow up 12/2017  9) Stage IA grade 3, ER+, HER2 BERNA +,  IDC/DCIS of the left breast cancer --- 2/7/18 at [facility]; s/p lumpectomy with SLNB 4/12, Grade 2 IDC measuring 4mm with second focus of microinvasive carcinoma measuring  0.4mm .IG DCIS, 0.3mm to posterior margin; 2 SLN negative for malignancy    10) Progressive swelling of R lower extremity--- US NIVA done 2/19/18 negative for evidence of DVT  11). Paroxysmal Afib (2/18/18) diagnosed at [facility]; ECHO noted EF 58%  12. Mild CKD with GFR 55 - managed per [facility] nephrology  13). Pulmonary nodules noted on pre-operative chest CT scan (3/12/18) noted new bilateral pulmonary nodules are nonspecific -- seen by Pulmonology at [facility] (3/21/18) thought to be inflammatory/infectious in nature, repeat Chest CT 6/28/18 noted resolution of nodules  14). Treatment induced neutropenia  15) NON COMPLIANT patient-- please see above the no show, reschedule and cancelled visits/labs and infusion.       Plan:     Okay to proceed with Darzalex today   RTC in 4 weeks for TD/MM labs/ infusion all same day - with MD ONLY, patient prefers PM appts on Wednesdays  Continue K+ BID  Continue Revlimid 5 mg QOD  Continue Eliquis 2.5 mg BID  Keep upcoming appointments at [facility] 11/15/24 with Dr. Parnell  Keep cardiology follow up @ [facility] 4/2025  Bone density scheduled for 8/1/24 - missed appointment - will have staff reschedule  Keep scheduled appointment for mammogram @ [facility] 11/14/24    Encourage to call or message us for any or problems  The patient was given ample opportunity to ask questions, and to the best of my abilities, all questions answered to satisfaction; patient demonstrated understanding of what we discussed and agreeable to the plan.     Natalie CAIN  MD [facility]  Hematology/Oncology      Professional Services   I, Jagruti Booker LPN, acted solely as a scribe for and in the presence of Dr. Natalie Meyers, who performed these services.

## 2024-10-13 DIAGNOSIS — I48.0 PAROXYSMAL ATRIAL FIBRILLATION: ICD-10-CM

## 2024-10-14 RX ORDER — APIXABAN 2.5 MG/1
2.5 TABLET, FILM COATED ORAL 2 TIMES DAILY
Qty: 60 TABLET | Refills: 3 | Status: SHIPPED | OUTPATIENT
Start: 2024-10-14

## 2024-10-23 DIAGNOSIS — C90.00 MULTIPLE MYELOMA NOT HAVING ACHIEVED REMISSION: ICD-10-CM

## 2024-10-23 RX ORDER — LENALIDOMIDE 5 MG/1
CAPSULE ORAL
Qty: 14 EACH | Refills: 0 | Status: SHIPPED | OUTPATIENT
Start: 2024-10-23

## 2024-11-14 NOTE — TELEPHONE ENCOUNTER
PT SISTER CALLED TO INFORM US PT WAS ADMITTED TO [facility] FOR CELLULITIS/INFECTION OF LEFT LEG. SHE'S ON IV ANTIBIOTICS, NOT SURE WHEN SHE WILL BE DISCHARGED. SHE WILL CALL OFFICE AND LET US KNOW WHEN SHE IS SO HER APPTS ON 4/24/24 CAN BE RESCHEDULED.    
[Negative] : Genitourinary

## 2024-11-16 DIAGNOSIS — J32.9 BACTERIAL SINUSITIS: ICD-10-CM

## 2024-11-16 DIAGNOSIS — B96.89 BACTERIAL SINUSITIS: ICD-10-CM

## 2024-11-18 DIAGNOSIS — C90.00 MULTIPLE MYELOMA NOT HAVING ACHIEVED REMISSION: Primary | ICD-10-CM

## 2024-11-18 RX ORDER — LENALIDOMIDE 5 MG/1
1 CAPSULE ORAL EVERY OTHER DAY
Qty: 14 EACH | Refills: 0 | Status: SHIPPED | OUTPATIENT
Start: 2024-11-18

## 2024-11-18 RX ORDER — LEVOCETIRIZINE DIHYDROCHLORIDE 5 MG/1
5 TABLET, FILM COATED ORAL NIGHTLY
Qty: 30 TABLET | Refills: 6 | Status: SHIPPED | OUTPATIENT
Start: 2024-11-18

## 2024-12-02 ENCOUNTER — OFFICE VISIT (OUTPATIENT)
Dept: HEMATOLOGY/ONCOLOGY | Facility: CLINIC | Age: 59
End: 2024-12-02
Payer: MEDICARE

## 2024-12-02 ENCOUNTER — INFUSION (OUTPATIENT)
Dept: INFUSION THERAPY | Facility: HOSPITAL | Age: 59
End: 2024-12-02
Attending: INTERNAL MEDICINE
Payer: MEDICARE

## 2024-12-02 VITALS
HEART RATE: 82 BPM | BODY MASS INDEX: 31.13 KG/M2 | WEIGHT: 175.69 LBS | RESPIRATION RATE: 20 BRPM | HEIGHT: 63 IN | SYSTOLIC BLOOD PRESSURE: 168 MMHG | DIASTOLIC BLOOD PRESSURE: 81 MMHG | OXYGEN SATURATION: 98 %

## 2024-12-02 DIAGNOSIS — Z94.84 H/O AUTOLOGOUS STEM CELL TRANSPLANT: ICD-10-CM

## 2024-12-02 DIAGNOSIS — C90.00 MULTIPLE MYELOMA, REMISSION STATUS UNSPECIFIED: Primary | ICD-10-CM

## 2024-12-02 DIAGNOSIS — Z91.199 NON-COMPLIANT PATIENT: ICD-10-CM

## 2024-12-02 DIAGNOSIS — Z85.3 HISTORY OF BREAST CANCER: ICD-10-CM

## 2024-12-02 DIAGNOSIS — E85.9 AMYLOIDOSIS, UNSPECIFIED TYPE: ICD-10-CM

## 2024-12-02 DIAGNOSIS — E85.9 AMYLOIDOSIS, UNSPECIFIED TYPE: Primary | ICD-10-CM

## 2024-12-02 DIAGNOSIS — D63.8 ANEMIA OF CHRONIC DISEASE: ICD-10-CM

## 2024-12-02 DIAGNOSIS — D80.1 HYPOGAMMAGLOBULINEMIA: ICD-10-CM

## 2024-12-02 PROCEDURE — 99215 OFFICE O/P EST HI 40 MIN: CPT | Mod: S$PBB,,, | Performed by: INTERNAL MEDICINE

## 2024-12-02 PROCEDURE — 63600175 PHARM REV CODE 636 W HCPCS: Mod: JZ,JG | Performed by: INTERNAL MEDICINE

## 2024-12-02 PROCEDURE — 25000003 PHARM REV CODE 250: Performed by: INTERNAL MEDICINE

## 2024-12-02 PROCEDURE — 96401 CHEMO ANTI-NEOPL SQ/IM: CPT

## 2024-12-02 PROCEDURE — 99215 OFFICE O/P EST HI 40 MIN: CPT | Mod: PBBFAC,25 | Performed by: INTERNAL MEDICINE

## 2024-12-02 PROCEDURE — 99999 PR PBB SHADOW E&M-EST. PATIENT-LVL V: CPT | Mod: PBBFAC,,, | Performed by: INTERNAL MEDICINE

## 2024-12-02 RX ORDER — DIPHENHYDRAMINE HYDROCHLORIDE 50 MG/ML
50 INJECTION INTRAMUSCULAR; INTRAVENOUS ONCE AS NEEDED
Status: CANCELLED | OUTPATIENT
Start: 2024-12-02

## 2024-12-02 RX ORDER — DIPHENHYDRAMINE HCL 25 MG
25 CAPSULE ORAL
Status: CANCELLED | OUTPATIENT
Start: 2024-12-02

## 2024-12-02 RX ORDER — EPINEPHRINE 0.3 MG/.3ML
0.3 INJECTION SUBCUTANEOUS ONCE AS NEEDED
Status: DISCONTINUED | OUTPATIENT
Start: 2024-12-02 | End: 2024-12-02 | Stop reason: HOSPADM

## 2024-12-02 RX ORDER — EPINEPHRINE 0.3 MG/.3ML
0.3 INJECTION SUBCUTANEOUS ONCE AS NEEDED
Status: CANCELLED | OUTPATIENT
Start: 2024-12-02

## 2024-12-02 RX ORDER — SODIUM CHLORIDE 0.9 % (FLUSH) 0.9 %
10 SYRINGE (ML) INJECTION
Status: DISCONTINUED | OUTPATIENT
Start: 2024-12-02 | End: 2024-12-02 | Stop reason: HOSPADM

## 2024-12-02 RX ORDER — ACETAMINOPHEN 325 MG/1
650 TABLET ORAL
Status: COMPLETED | OUTPATIENT
Start: 2024-12-02 | End: 2024-12-02

## 2024-12-02 RX ORDER — HEPARIN 100 UNIT/ML
500 SYRINGE INTRAVENOUS
Status: CANCELLED | OUTPATIENT
Start: 2024-12-02

## 2024-12-02 RX ORDER — ACETAMINOPHEN 325 MG/1
650 TABLET ORAL
Status: CANCELLED | OUTPATIENT
Start: 2024-12-02

## 2024-12-02 RX ORDER — HEPARIN 100 UNIT/ML
500 SYRINGE INTRAVENOUS
Status: DISCONTINUED | OUTPATIENT
Start: 2024-12-02 | End: 2024-12-02 | Stop reason: HOSPADM

## 2024-12-02 RX ORDER — DIPHENHYDRAMINE HYDROCHLORIDE 50 MG/ML
50 INJECTION INTRAMUSCULAR; INTRAVENOUS ONCE AS NEEDED
Status: DISCONTINUED | OUTPATIENT
Start: 2024-12-02 | End: 2024-12-02 | Stop reason: HOSPADM

## 2024-12-02 RX ORDER — DIPHENHYDRAMINE HCL 25 MG
25 CAPSULE ORAL
Status: COMPLETED | OUTPATIENT
Start: 2024-12-02 | End: 2024-12-02

## 2024-12-02 RX ORDER — SODIUM CHLORIDE 0.9 % (FLUSH) 0.9 %
10 SYRINGE (ML) INJECTION
Status: CANCELLED | OUTPATIENT
Start: 2024-12-02

## 2024-12-02 RX ADMIN — ACETAMINOPHEN 650 MG: 325 TABLET ORAL at 04:12

## 2024-12-02 RX ADMIN — DIPHENHYDRAMINE HYDROCHLORIDE 25 MG: 25 CAPSULE ORAL at 04:12

## 2024-12-02 RX ADMIN — DARATUMUMAB AND HYALURONIDASE-FIHJ (HUMAN RECOMBINANT) 1800 MG: 1800; 30000 INJECTION SUBCUTANEOUS at 04:12

## 2024-12-02 NOTE — PROGRESS NOTES
HEMATOLOGY/ONCOLOGY OFFICE CLINIC VISIT    Visit Information:    No show/Reschedules/Cancellations  08/21/2018--> Rescheduled                07/20/2023-->canceled visit/labs/infusion 05/08/2024 11/05/2019--> No Show/Reschedule 08/01/2023-->canceled visit/labs/infusion 05/22/2024 06/24/2020--> No Show/Reschedule 08/31/2023-->canceled visit/labs/infusion 05/22/2024 09/24/2020--> No Show/Reschedule 09/21/2023-->canceled visit/labs/infusion 06/03/2024  03/15/2021--> No Show/Reschedule 10/24/2023-->canceled visit/labs/infusion 07/11/2024 03/23/2021--> No Show/Reschedule 10/25/2023-->canceled visit/labs/infusion 07/31/2024 04/13/2021--> No Show   10/26/2023-->canceled visit/labs/infusion 08/01/2024 05/27/2021--> No Show   10/31/2023-->canceled visit/labs/infusion 08/14/2024 08/19/2021--> No Show   11/01/2023-->canceled visit/labs/infusion 08/19/2024--> No Show   08/26/2021--> No Show/Rescheduled 11/29/2023-->canceled visit/labs/infusion 09/11/2024 04/26/2022--> No Show   12/04/2023-->canceled visit/labs/infusion 09/18/2024  10/03/2022--> Rescheduled  12/07/2023-->canceled visit/labs/infusion   10/11/2022--> Rescheduled  01/03/2024-->canceled visit/labs/infusion  03/22/2023--> No Show/Rescheduled 01/11/2024-->canceled visit/labs/infusion  4/26/2023->Rescheduled   01/25/2024-->canceled visit/labs/infusion  05/09/2023->Rescheduled   04/03/2024 5/30/2023->Rescheduled   04/24/2024  6/15/2023->Rescheduled   05/01/2024      Referring Physician: Dr Farr  PCP: DR. Cardona  GI:   Rheumatologist: Dr. Luis Rea  Immunologist: Dr. Daniel Nava  ENT: Dr. Brice Williamson  [facility] Pain management: Dr.Chai [facility] Transplant: Dr. Adrian Naylor [facility] Med Onc: Dr. Zulema [facility] Breast Surg Onc: Dr.DeSnyder LONG [place]: Dr. 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   8) Stage IA grade 3, ER+, HER2 BERNA +,  IDC/DCIS of the left breast --- 2/7/18 at [facility]   --s/p lumpectomy with SLNB 4/12, Grade 2 IDC 4mm with second focus of microinvasive carcinoma 0.4mm. 2 SLN negative for malignancy    9) Progressive swelling of R lower extremity--- US NIVA done 2/19/18 negative for evidence of DVT  10). Paroxysmal Afib (2/18/18) diagnosed at [facility]; ECHO noted EF 58% - on Eliquis  11) Mild CKD with GFR 55 - managed per [facility] nephrology  12) Treatment induced neutropenia     Present treatment:  -Maintenance Revlimid 5mg PO QOD, started 02/16/17-- was held for a few months while undergoing treatment for her breast cancer 02/18-05/18; Restarted 6/15/18   Dexamethasone 20 mg po weekly added early July 2017--> reduced to 12 mg PO weekly October 4, 2017---> increased to 16mg PO weekly 2/15/18---restarted 6/15/18 --> 20 mg weekly 7//8/2022  Darzalex 7/8/2022-present--On hold due to cellulitis    Eliquis 2.5mg PO BID     Treatment/Oncology history:  1) CyBorD x5 cycles 09/28/15-12/24/15  2) Autologous stem cell transplant 02/08/16, done at [facility]  3) Exploratory laparotomy with subtotal colectomy and end ileostomy done August 2, 2016--pathology specimen showed advanced colonic amyloidosis extensive involving the muscular wall submucosa and mucosa.  Appendix also involvement by amyloidosis with fibrous obliteration of the distal lumen.  4) Maintenance therapy with Revlimid 5mg-started 06/01/16--Discontinued shortly after 2/2 cytopenias  5) Left breast lumpectomy with SLNB at [facility] 4/12/18  6) Left  partial breast RT x10 fractions  5/21/18-6/4/18  7) Femara 2.5 mg PO daily completed 5 yrs (6/2018- 6/2023)      Imaging:  NIVA 7/29/2021: Negative for deep venous thrombosis in the left lower extremity.   MMG 3/21/2022: BI-RADS Category 2: Benign Finding(s)  MRI CTL spine 9/14/2022: No acute myelomatous findings. Severe spinal stenosis process detected within the upper cervical spine as well as the entirety of the lumbar spine segments similar to the prior imaging assessment.    CT C 1/24/2023: Right greater than left lower lobe opacification consistent with infectious process.  Lucent lesions throughout the osseous structures similar to 2015.    Pathology:    CLINICAL HISTORY:       Patient: Ninfa Candelario is a 59 y.o. female.  Ms. Cabrera Joseph was admitted on 08/16/15 for ileus versus partial SBO. CT A/P done 08/17/15 showed sigmoid colitis and a zone of transition at the junction of the descending and sigmoid colon which could indicate some degree of obstruction and an obstructing mass cannot be excluded. Numerous skeletal lytic lesions noted. CT chest done 08/19/15 showed Multiple skeletal lytic lesions involving the thoracic spine, left rib sternum consistent with either metastases or multiple myeloma. There is no evidence of pulmonary or mediastinal mass. Dr. Farr was consulted for possible MM/anemia.     Nuclear Bone scan done 8/20/15 showed mild to moderate increased activity in left rib and right second rib which correlates with fractures seen on CT.  Skeletal survey done 8/20/15showed lytic lesions in the calvarium and probably a lytic lesion in the left scapula. Lytic areas in the spine and pelvis seen on recent CT are not well defined on skeletal survey. Work up labs done 08/20/15: Negative for sickle cell and Thalessemia (reported history of thalessemia). Iron 54, Transferrin 118.0, Iron sat 34.6%, Folate 26.5, Vit B12 1,779  Peripheral smear resulted slightly macrocytic normochromic anemia without  signifcant anisocytosis may reflect early B12/folate deficiency or marrow dysfunction, among others. No immature myeloid cells or blasts. Platlets adequate. Beta 2 mircoglobulin = 3.2.  SPEP/NADIA: M-spike in the beta region. The monoclonal protein peak accounts for 1.13 g/dL. Hypogammaglobulinemia. NADIA pattern shows the presence of a free lambda light chain monoclonal protein with additional faint band in IgA.  All immunoglobulin levels decreased. IgA=26, IgG=307, IgM<5.  Kappa Qnt 0.16, Lambda Qnt 4600.00, Ratio <0.01.  24hr Urine for Bence Mendez: Kappa 2.75, Lambda 1250.00, Ratio <0.01. NADIA: Urine is POSITIVE for monoclonal Free Lambda Light chains     Patient then opted to go to [facility] where she underwent a bone marrow biopsy on 09/18/15. BMBx showed 80% plasma cells, 13p deletion and normal karyotype. She underwent fat pad biopsy which came back positive for Congo red consistent with amyloidosis. Cardiac workup revealed no amyloid deposition within the heart.  PET/CT and skeletal survey done September 18, 2015 showed multiple lytic osseous lesions  The patient was started on Velcade while at [facility] with the plan to transition to autologous stem cell transplantation. They asked if we could give her could continue treatment here.   She completed CyborD x5 cycles on 12/24/15     Patient admitted 01/06/16 for colitis and C. Diff. Pt treated with Flagyl. Discharged home 01/08/16     Repeat BMBx done at [facility] 01/2016 did not show any morphologic or immnophenotypic evidence of plasama cell neoplasm. Patient underwent Autologous stem cell transplant with Busulfan and melphalan on 02/08/16 at [facility]. The only complication was recurrent C.diff infection for which she completed 10 days of Fidaxomycin.     Follow-up bone marrow biopsy done at [facility] done 5/16/16 showed cellular 30-40% bone marrow with trilineage hematopoiesis. No morphologic or immunophenotypic support for plasma cell neoplasm. Started maintenance Revlimid 5mg.  Unable to continue therapy due to cytopenias.     On 08/02/16 patient underwent  Exploratory laparotomy with subtotal colectomy and end ileostomy for what was thought to be toxic megacolon. Pathology showed extensive advanced colonic amyloidosis involving the muscular wall, submucosa and mucosa: With the appendix also involved with amyloidosis.  Positive lambda light chains were noted.     Follow-up at [facility] 8/31/16, Per Dr. Adrian Naylor, 6 months post autologous transplant. She has engrafted. Based on the latest restaging she is near complete remission with possible IgA lambda on serum immunofixation. Patient was instructed to discontinue prophylactic antibiotics. She received her 1st series of immunizations while at [facility]. Patient had a bilateral venous ultrasound to rule out DVT, negative B/L. In regards to amyloidosis the patient feels that her tongue is smaller in size and her BNP has come down some.  Patient had a follow-up appointment at [facility] November 30, 2016.  Dr. Parnell documented the patient's free lambda light chain remains at a lower level.  Therefore in light of her recent surgery they will continue with observation only.  The plan to see the patient back in 2-3 months with repeat restaging labs.  They recommended regular CBC checks to monitor anemia.  Patient returned to [facility] in December 2016 to meet with dermatology for numerous papillary lesions on eyelids, chest and posterior neck consistent with amyloid deposits. Recommendations were for watchful waiting.  Patient is less than 1 year out from bone marrow transplant and further improvement can be expected.  She will see the patient back in 1 year to discuss possible surgical resection of the plaques especially around the eyelid region.  Rogaine recommended for persistent hair loss. Retin-A recommended for acne vulgaris.  Patient returned to [facility] on 2/8/2017 for neurosurgery consult for chronic low back pain  and difficulty walking.  Imaging showed extensive DJD with discovertebral bulges and thickening of the spinal laminar tissues creating spinal stenosis throughout, but greatest at L2/L3.  Neurosurgery felt that surgery risks outweighed the benefits.  Patient was prescribed pain medicine and encouraged to participate in physical therapy.  Patient also met with Dr. Parnell on 02/08/17, who noted an elevation in free light chains (kappa 52.60/lambda 27.77/ratio 1.89).  Recommendation is to resume maintenance therapy with Revlimid at a low dose of 5 mg every other day.  Patient went back to [facility] first week of April 2017.  I do not have these notes.  Reportedly she was told to continue on every other day Revlimid at 5 mg.  She reportedly had repeat myeloma labs done as well.  Again I do not have these results.  Patient went back to [facility] and saw Dr. Parnell June 29, 2017.  Myeloma labs were done with serum protein electrophoresis showing irregularity in the fast gamma region.  IgG of 1291.  Free kappa light chains of 84.6 with lambda light chains of 66.9.  Beta-2 microglobulin of 4.5  CT scan of lumbar spine showed multiple lytic lesions once again in the lumbar spine and bony pelvis.  Ultrasound of the left leg showed no evidence of DVT.  It was recommended based on the slight increase in her And lambda light chains to start weekly dexamethasone 20 mg p.o. weekly.  Follow-up visit and labs at [facility] on September 29, 2017 with Dr. Parnell.  Repeat beta-2 microglobulin came back at 2.4.  Serum IgG of 761, IgA 117 and IgM of 29.  Serum free kappa light chains of 24.9 and lambda of 23.5.  Counts were stable with hemoglobin of 11.1, WBC 4.9 and platelets of 210,000.  Recommendations were for continued Revlimid every other day with weekly dexamethasone along with aspirin for DVT prophylaxis.  Bilateral diagnostic MMG 12/19/17  noted 1.6cm coarse heterogeneous calcifications in posterior region of L breast at 3  o'clock position. Patient was scheduled for FNA which confirmed ID grade 3, single focus measuring 1.7cm with 2nd focus microinvasion DCIS grade 2 measuring 0.3cm. Left axillary LN biopsy showed no evidence of metastatic carcinoma. Complete staging: Stage IA, grade 3 ER+, Her 2 Niki + IDC/DCIS of left breast. Ki-67 <17%.  Repeat myeloma labs showed rise in free lambda light chains noted at 68.69, kappa light chains at 27.22,  beta 2 microglobulin came back at 2.9. Serum protein electrophoresis showed no definitive evidence of an M protein peak. The pattern is consistent with an acute inflammatory reaction. Recommendations were made to maintain Revlimid at current dose of 5mg every other day to be taken continuously increase weekly dexamethasone to 16 mg.   Patient seen at [facility] with tentative plan for L breast lumpectomy on 3/13/18. 2/16/18- Prior to surgery she was seen by genetic counselor who ran genetic testing per patient reques, all of which were negative. She was then seen by cardiology at [facility] 2/16/18 for further evaluation of persistent bilateral lower extremity swelling-ECHO noted EF of 58%; diagnosed with paroxysmal atrial fibrillation and instructed to begin daily ASA. During preoperative asssessment per Rad/Onc 3/12/18, corresponding chest CT noted new bilateral pulmonary nodules concerning for metastatic disease- surgery was subsequently postponed pending pulmonary evaluation. Seen by Pulmonology on 3/21/18, PFTs within normal limits and cleared patient for surgery with recommended close CT follow up of nodules in 3 months. 3/21/18 seen by nephrology for management of mild CKD (GFR 55)-cleared for surgery with recommended follow up in 3 months. Left breast lumpectomy and SLNB performed per Dr. Washburn on 4/12/18- plan for follow up in clinic on 4/24/18  for postoperative assessment. Follow up with Dr. Poole (Med/Onc) on 4/25/18. Follow up with Dr. Parnell 4/26/18.   Patient seen at [facility]  s/p Left breast lumpectomy with SLNB on 4/12/18. Following surgery she completed Left partial breast radiation x 10 fractions. She was then started on endocrine therapy with Femara after been deemedan inappropriate candidate for systemic chemotherapy/Herceptin.      She was seen by neurosugery at [facility] on 4/26/18 following repeat MRI of cervical thoracic lumbar spine which noted focal enhancement of T2 hyperintensity at the C2 level which may be due to degenerative changes. Signal abnormality within  the T12 and L1 may be secondary to myeloma. Plan to continue with observation for now with F/U scheduled in October 2018.      She was seen by Dr. Parnell at [facility] for management of her MM on 4/26/18. Patient was recommended to restart Revlimid 5mg PO every other day with notes that these recommendations would be communicated to collaborating Oncologist. Patient was also recommended to start on Zometa for her bone disease.      Seen by Dr. Parnell at [facility] for management of her MM on 6/28/18. Repeat light chains noted lambda 33.36 (previously 80.80), K/L ratio 0.91 (previously 0.49). Due to slight improvement in light chains, plan to continue on lenalidomide maintenance. Recommendations to continue anticoagulation with Eliquis. BLE venous doppler negative for DVT.   Seen by pulmonolgy on 6/28/18- repeat CT chest done 6/28/18 noted resolution of previous pulmonary nodules. Thought to be infectious in nature. Recommendations for discharge from pulmonary clinic.  Should MRI of her cervical thoracic lumbar spine on 2/12/2019 that demonstrated cervical spondylosis with canal stenosis most notable at C1 and 2. Cord signal abnormality at C1 and 2 with enhancement is stable. Lumbar spondylosis with central canal stenosis at multiple levels. No imaging features of bony metastatic disease.     For amyloidosis (Light chain type).    Patient presented with macroglossia and now with improvement of the swelling of her  "tongue. Her cardiac parameters are also better.   8/10/2020 proBNP  250   2/17/2020                616  8/9/2019                  190  11/14/2023  820        Chief Complaint:for Multiple Myeloma       Interval History:  She is currently on Rev/Dex/Darzalex  for MM  s/p transplant in 2016. At her [facility] visit on 10/12/22, Ninlaro was discontinued due to it causing severe diarrhea and leukopenia. Diarrhea resolved after stopping Ninlaro (she admits to stopping prior to visit @ [facility]).     I Called Dr Parnell office and she is out of the office. She will be back next week. Will try again then  07/11/2024:  Dr. Parnell kindly called me back to discuss patient.  She recommend to decrease the dose of dexamethasone to 10 mg to see if this can help to decrease the frequency of infections.  She does not opposed to IVIG if needed for her hypogammaglobulinemia.  She usually starts when IgG is less than 400.  In this case if she ever start the IVIG needs to be given in a very slow rate due to her heart and fluid retention risks.  We will continue Evangelina monthly and will decrease dexamethasone to 10 mg.    10/10/2024: Patient is here today for follow up of her multiple myeloma, amyloidosis and breast cancer.  Daratumumab has been on hold since April 2024 for LLE cellulitis. She was admitted to [facility] per  Dr. Parnell recommendations until LLE is better. Swelling to BLE noted during physical exam, L>R. She has follow up with Dr. Parnell @ [facility] on 11/15/24.    12/2/2024: Patient present today in followup. She rescheduled several appts. She reports that she was not feeling well.  She has not been taking her Revlimid as the pharmacy delivery company lost it. After several attempts to get the medication her pharmacy was "found it" and she will  after she d/c from the clinic.  Otherwise no fever, chills, sweats.      ROS: All 14 points ROS taken and as per Interval History  Review of Systems   Constitutional:  Positive for malaise/fatigue. " "Negative for chills, fever and weight loss.   HENT:  Negative for congestion and nosebleeds.    Eyes:  Negative for blurred vision, double vision and photophobia.   Respiratory:  Negative for cough, hemoptysis and shortness of breath.    Cardiovascular:  Negative for chest pain, palpitations, leg swelling and PND.   Gastrointestinal:  Positive for diarrhea (Better), nausea and vomiting. Negative for abdominal pain, blood in stool, constipation and melena.   Genitourinary:  Negative for dysuria, frequency, hematuria and urgency.   Musculoskeletal:  Negative for back pain, falls and myalgias.   Skin:  Negative for itching and rash.   Neurological:  Positive for weakness. Negative for tremors, focal weakness, seizures and headaches.   Endo/Heme/Allergies:  Negative for environmental allergies. Does not bruise/bleed easily.   Psychiatric/Behavioral:  Negative for depression and suicidal ideas. The patient is not nervous/anxious.          Histories:  PMH/PSH/FH/SOCIAL/ALLERGIES AND MEDS REVIEWED AND UPDATED AS APPROPRIATE       Vitals:    12/02/24 1456   BP: (!) 189/81   BP Location: Right arm   Patient Position: Sitting   Pulse: 76   Resp: 20   SpO2: 98%   Weight: 79.7 kg (175 lb 11.2 oz)   Height: 5' 3" (1.6 m)     Wt Readings from Last 6 Encounters:   12/02/24 79.7 kg (175 lb 11.2 oz)   10/10/24 80.7 kg (177 lb 14.2 oz)   10/10/24 80.7 kg (177 lb 14.4 oz)   09/17/24 76.2 kg (168 lb)   08/07/24 81.6 kg (180 lb)   07/03/24 78.2 kg (172 lb 8 oz)     Body mass index is 31.12 kg/m².  Body surface area is 1.88 meters squared.      Vitals reviewed and stable  Physical Exam  Vitals and nursing note reviewed.   Constitutional:       General: She is not in acute distress.     Appearance: Normal appearance. She is well-developed. She is ill-appearing.      Comments: Uses walker for mobility   HENT:      Head: Normocephalic and atraumatic.      Mouth/Throat:      Mouth: Mucous membranes are moist.   Eyes:      General: No scleral " icterus.     Extraocular Movements: Extraocular movements intact.      Conjunctiva/sclera: Conjunctivae normal.      Pupils: Pupils are equal, round, and reactive to light.   Neck:      Vascular: No JVD.   Cardiovascular:      Rate and Rhythm: Normal rate and regular rhythm.      Heart sounds: No murmur heard.  Pulmonary:      Effort: Pulmonary effort is normal.      Breath sounds: Normal breath sounds. No wheezing or rhonchi.   Abdominal:      General: Bowel sounds are normal. There is no distension.      Palpations: Abdomen is soft. There is no mass.      Tenderness: There is no abdominal tenderness.   Musculoskeletal:         General: No swelling or deformity.      Cervical back: Neck supple.      Right lower le+ Edema present.      Left lower leg: 3+ Edema present.      Comments: Hyperpigmentation of left leg.    Lymphadenopathy:      Head:      Right side of head: No submandibular adenopathy.      Left side of head: No submandibular adenopathy.      Cervical: No cervical adenopathy.      Upper Body:      Right upper body: No supraclavicular or axillary adenopathy.      Left upper body: No supraclavicular or axillary adenopathy.      Lower Body: No right inguinal adenopathy. No left inguinal adenopathy.   Skin:     General: Skin is warm.      Coloration: Skin is not jaundiced.      Findings: Rash present.      Nails: There is no clubbing.      Comments: Rash: bilateral; LE left > right   Neurological:      Mental Status: She is alert and oriented to person, place, and time.      Cranial Nerves: Cranial nerves 2-12 are intact.      Motor: Weakness present.      Gait: Gait abnormal.   Psychiatric:         Attention and Perception: Attention normal.         Behavior: Behavior is cooperative.         Cognition and Memory: Cognition normal.         Judgment: Judgment normal.       ECOG SCORE    2 - Capable of all selfcare but unable to carry out any work activities, active > 50% of hours         Laboratory:  CBC  with Differential:  Lab Results   Component Value Date    WBC 4.21 (L) 10/10/2024    RBC 3.22 (L) 10/10/2024    HGB 9.5 (L) 10/10/2024    HCT 30.7 (L) 10/10/2024    MCV 95.3 (H) 10/10/2024    MCH 29.5 10/10/2024    MCHC 30.9 (L) 10/10/2024    RDW 14.6 10/10/2024     10/10/2024    MPV 9.6 10/10/2024        CMP:  Sodium   Date Value Ref Range Status   10/10/2024 142 136 - 145 mmol/L Final     Potassium   Date Value Ref Range Status   10/10/2024 4.7 3.5 - 5.1 mmol/L Final     Chloride   Date Value Ref Range Status   10/10/2024 110 (H) 98 - 107 mmol/L Final     CO2   Date Value Ref Range Status   10/10/2024 20 (L) 23 - 31 mmol/L Final     Blood Urea Nitrogen   Date Value Ref Range Status   10/10/2024 24.0 (H) 9.8 - 20.1 mg/dL Final   07/18/2023 35 (H) 6 - 23 mg/dL Final     Creatinine   Date Value Ref Range Status   10/10/2024 2.16 (H) 0.55 - 1.02 mg/dL Final   07/18/2023 2.48 (H) 0.51 - 0.95 mg/dL Final     Calcium   Date Value Ref Range Status   10/10/2024 9.1 8.4 - 10.2 mg/dL Final   07/18/2023 9.7 8.4 - 10.2 mg/dL Final     Albumin   Date Value Ref Range Status   10/10/2024 3.4 3.4 - 4.8 g/dL Final   10/10/2024 3.0 (L) 3.4 - 4.8 g/dL Final     Bilirubin Total   Date Value Ref Range Status   10/10/2024 0.4 <=1.5 mg/dL Final     ALP   Date Value Ref Range Status   10/10/2024 81 40 - 150 unit/L Final     AST   Date Value Ref Range Status   10/10/2024 18 5 - 34 unit/L Final     ALT   Date Value Ref Range Status   10/10/2024 16 0 - 55 unit/L Final     Estimated GFR-Non    Date Value Ref Range Status   04/20/2022 25           Assessment:       1. Multiple myeloma, remission status unspecified    2. History of breast cancer    3. Amyloidosis, unspecified type    4. Anemia of chronic disease    5. Hypogammaglobulinemia    6. H/O autologous stem cell transplant    7. Non-compliant patient          1) Multiple Myeloma, IgA Lambda, FISH with del 13p, normal karyotype, ISS stg II Dx 2015; S/p Autologous  stem cell transplant 02/08/16-remission-->slight rise in free light chains 02/08/17  2) Amyloidosis, Lambda light chain type, organ involvement with macroglossia and colon involvement. 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 Sx risks do not outweigh benefits. Pain meds given and encouraged Physical Therpay  8) Amyloid plaques, evaluated by Derm at [facility], recommend watchful waiting. Patient to follow up 12/2017  9) Stage IA grade 3, ER+, HER2 BERNA +,  IDC/DCIS of the left breast cancer --- 2/7/18 at [facility]; s/p lumpectomy with SLNB 4/12, Grade 2 IDC measuring 4mm with second focus of microinvasive carcinoma measuring  0.4mm .IG DCIS, 0.3mm to posterior margin; 2 SLN negative for malignancy    10) Progressive swelling of R lower extremity--- US NIVA done 2/19/18 negative for evidence of DVT  11). Paroxysmal Afib (2/18/18) diagnosed at [facility]; ECHO noted EF 58%  12. Mild CKD with GFR 55 - managed per [facility] nephrology  13). Pulmonary nodules noted on pre-operative chest CT scan (3/12/18) noted new bilateral pulmonary nodules are nonspecific -- seen by Pulmonology at [facility] (3/21/18) thought to be inflammatory/infectious in nature, repeat Chest CT 6/28/18 noted resolution of nodules  14). Treatment induced neutropenia  15) NON COMPLIANT patient-- please see above the no show, reschedule and cancelled visits/labs and infusion.       Plan:     Reviewed MILEY  Okay to proceed with Darzalex today   RTC in 4 weeks for TD/MM labs/ infusion all same day - with MD ONLY, patient prefers PM appts   I had a long conversation with the patient regarding compliance.  Explained to her that if she does not feel good or does not have a ride to come to the office we can convert the visit to  telemedicine.  We have done telemedicine in the past.  She verbalized understanding.  Explained that is very important to keep it on track with the treatment.    Continue K+ BID  Continue Revlimid 5 mg QOD--patient will  the Revlimid once she left the office  Continue Eliquis 2.5 mg BID  Keep cardiology follow up @ [facility] 4/2025        Encourage to call or message us for any or problems  The patient was given ample opportunity to ask questions, and to the best of my abilities, all questions answered to satisfaction; patient demonstrated understanding of what we discussed and agreeable to the plan.     Natalie Meyers MD  Hematology/Oncology      Professional Services   I, Jena Alonso LPN, acted solely as a scribe for and in the presence of Dr. Natalie Meyers, who performed these services.

## 2024-12-02 NOTE — PLAN OF CARE
C17 D1 Darazalex Faspro given. Tolerated well. Next appts confirmed with pt. Stated uses portal to view next appts. Dc'd home in stable condition.

## 2024-12-04 DIAGNOSIS — C90.00 MULTIPLE MYELOMA NOT HAVING ACHIEVED REMISSION: ICD-10-CM

## 2024-12-04 RX ORDER — ONDANSETRON HYDROCHLORIDE 8 MG/1
8 TABLET, FILM COATED ORAL EVERY 8 HOURS PRN
Qty: 90 TABLET | Refills: 2 | Status: SHIPPED | OUTPATIENT
Start: 2024-12-04

## 2024-12-16 DIAGNOSIS — I48.0 PAROXYSMAL ATRIAL FIBRILLATION: ICD-10-CM

## 2024-12-16 RX ORDER — APIXABAN 2.5 MG/1
2.5 TABLET, FILM COATED ORAL 2 TIMES DAILY
Qty: 60 TABLET | Refills: 2 | Status: SHIPPED | OUTPATIENT
Start: 2024-12-16

## 2024-12-17 DIAGNOSIS — C90.00 MULTIPLE MYELOMA NOT HAVING ACHIEVED REMISSION: ICD-10-CM

## 2024-12-17 RX ORDER — LENALIDOMIDE 5 MG/1
CAPSULE ORAL
Qty: 14 CAPSULE | Refills: 0 | Status: SHIPPED | OUTPATIENT
Start: 2024-12-17

## 2025-01-10 DIAGNOSIS — C90.00 MULTIPLE MYELOMA NOT HAVING ACHIEVED REMISSION: Primary | ICD-10-CM

## 2025-01-10 RX ORDER — LENALIDOMIDE 5 MG/1
1 CAPSULE ORAL EVERY OTHER DAY
Qty: 14 CAPSULE | Refills: 0 | Status: SHIPPED | OUTPATIENT
Start: 2025-01-10

## 2025-01-10 RX ORDER — LENALIDOMIDE 5 MG/1
CAPSULE ORAL
Qty: 14 CAPSULE | Refills: 0 | OUTPATIENT
Start: 2025-01-10

## 2025-01-10 NOTE — PROGRESS NOTES
HEMATOLOGY/ONCOLOGY OFFICE CLINIC VISIT    Visit Information:    No show/Reschedules/Cancellations  08/21/2018--> Rescheduled                07/20/2023-->canceled visit/labs/infusion 05/08/2024 11/05/2019--> No Show/Reschedule 08/01/2023-->canceled visit/labs/infusion 05/22/2024 06/24/2020--> No Show/Reschedule 08/31/2023-->canceled visit/labs/infusion 05/22/2024 09/24/2020--> No Show/Reschedule 09/21/2023-->canceled visit/labs/infusion 06/03/2024  03/15/2021--> No Show/Reschedule 10/24/2023-->canceled visit/labs/infusion 07/11/2024 03/23/2021--> No Show/Reschedule 10/25/2023-->canceled visit/labs/infusion 07/31/2024 04/13/2021--> No Show   10/26/2023-->canceled visit/labs/infusion 08/01/2024 05/27/2021--> No Show   10/31/2023-->canceled visit/labs/infusion 08/14/2024 08/19/2021--> No Show   11/01/2023-->canceled visit/labs/infusion 08/19/2024--> No Show   08/26/2021--> No Show/Rescheduled 11/29/2023-->canceled visit/labs/infusion 09/11/2024 04/26/2022--> No Show   12/04/2023-->canceled visit/labs/infusion 09/18/2024  10/03/2022--> Rescheduled  12/07/2023-->canceled visit/labs/infusion 01/02/2025  10/11/2022--> Rescheduled  01/03/2024-->canceled visit/labs/infusion  03/22/2023--> No Show/Rescheduled 01/11/2024-->canceled visit/labs/infusion  4/26/2023->Rescheduled   01/25/2024-->canceled visit/labs/infusion  05/09/2023->Rescheduled   04/03/2024 5/30/2023->Rescheduled   04/24/2024  6/15/2023->Rescheduled   05/01/2024      Referring Physician: Dr Farr  PCP: DR. Cardona  GI:   Rheumatologist: Dr. Luis Rea  Immunologist: Dr. Daniel Nava  ENT: Dr. Brice Williamson  [facility] Pain management: Dr.Chai [facility] Transplant: Dr. Adrian Naylor [facility] Onc: Dr. Zulema [facility] Breast Surg Onc: Dr.DeSnyder LONG [place]: Dr. 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   8) Stage IA grade 3, ER+, HER2 BERNA +,  IDC/DCIS of the left breast --- 2/7/18 at [facility]   --s/p lumpectomy with SLNB 4/12, Grade 2 IDC 4mm with second focus of microinvasive carcinoma 0.4mm. 2 SLN negative for malignancy    9) Progressive swelling of R lower extremity--- US NIVA done 2/19/18 negative for evidence of DVT  10). Paroxysmal Afib (2/18/18) diagnosed at [facility]; ECHO noted EF 58% - on Eliquis  11) Mild CKD with GFR 55 - managed per [facility] nephrology  12) Treatment induced neutropenia     Present treatment:  -Maintenance Revlimid 5mg PO QOD, started 02/16/17-- was held for a few months while undergoing treatment for her breast cancer 02/18-05/18; Restarted 6/15/18   Dexamethasone 20 mg po weekly added early July 2017--> reduced to 12 mg PO weekly October 4, 2017---> increased to 16mg PO weekly 2/15/18---restarted 6/15/18 --> 20 mg weekly 7//8/2022  Darzalex 7/8/2022-present--On hold due to cellulitis    Eliquis 2.5mg PO BID     Treatment/Oncology history:  1) CyBorD x5 cycles 09/28/15-12/24/15  2) Autologous stem cell transplant 02/08/16, done at [facility]  3) Exploratory laparotomy with subtotal colectomy and end ileostomy done August 2, 2016--pathology specimen showed advanced colonic amyloidosis extensive involving the muscular wall submucosa and mucosa.  Appendix also involvement by amyloidosis with fibrous obliteration of the distal lumen.  4) Maintenance therapy with Revlimid 5mg-started 06/01/16--Discontinued shortly after 2/2 cytopenias  5) Left breast lumpectomy with SLNB at [facility] 4/12/18  6) Left  partial breast RT x10 fractions  5/21/18-6/4/18  7) Femara 2.5 mg PO daily completed 5 yrs (6/2018- 6/2023)      Imaging:  NIVA 7/29/2021: Negative for deep venous thrombosis in the left lower extremity.   MRI CTL spine 9/14/2022: No acute myelomatous findings. Severe spinal stenosis process detected within the upper cervical spine as well as the entirety of the lumbar spine segments similar to the prior imaging assessment.  PET CT 9/14/2020: No convincing evidence of hypermetabolic metastatic disease. Asymmetric uptake in the right nasopharynx may be infectious or inflammatory.    CT C 1/24/2023: Right greater than left lower lobe opacification consistent with infectious process.  Lucent lesions throughout the osseous structures similar to 2015.    PET 4/17/2024: 1.  No evidence of active skeletal disease. 2.  Acute nonpathological fractures of the right anterolateral 5th-7th ribs. No impending pathological fracture of the long bones. 3.  No extramedullary disease. 4.  Diffuse skin thickening of the left lower leg with multiple foci of increased cutaneous uptake and associated with FDG-avid left inguinal and iliac chain lymph nodes. These findings may be related to lymphedema; however, superimposed angiosarcoma is not excluded given the foci of increased uptake. Direct inspection and biopsy are recommended.   PET CT 09/04/2024: No evidence of active skeletal disease. No acute or impending pathological fracture. No extramedullary disease. New FDG-avid mucosal thickening in the left maxillary sinus, consistent with sinusitis. Lucency and increased uptake associated with the root of a left maxillary molar (likely tooth #15), consistent with periodontal disease.  Improving diffuse cutaneous and subcutaneous uptake in the left lower extremity, consistent with improving cellulitis. New and worsening focal nodular areas of increased uptake in the left lower extremity. Direct inspection is recommended.   Femur XR  12/9/2024: 1. No fracture or suspicious bone lesions are visualized in the left femur or left leg. 2. Subcutaneous edema in the left leg.   Tibia/fibula XR 12/9/2024: 1. No fracture or suspicious bone lesions are visualized in the left femur or left leg. 2. Subcutaneous edema in the left leg.       Breast imaging:  [facility] 12/29/2017: Calcifications in the left breast are suspicious. Stereotactic biopsy is recommended. BI-RADS Category 4: Suspicious Abnormality   [facility] 12/10/2018: Post surgical scar in the left breast is benign. BI-RADS Category 2: Benign Finding(s)   G 12/16/2019: There is no mammographic evidence of malignancy. BI-RADS Category 2: Benign Finding(s)   [facility] 12/14/2020: There is no mammographic evidence of malignancy. BI-RADS Category 2: Benign Finding(s)   [facility] 3/21/2022: BI-RADS Category 2: Benign Finding(s)  [facility] 4/11/2023: There is no mammographic evidence of malignancy. BI-RADS Category 2: Benign Finding(s)   [facility] 11/14/2024: Bilateral. There is no mammographic evidence of malignancy. Overall BI-RAD Category: 2 - Benign     Pathology:  9/17/2015:  A. Soft tissue, abdominal wall, FNA   No malignant cells identified   Mature adipose tissue   Congo red stain is positive for amyloid deposition     9/18/2015:  BONE MARROW DIAGNOSIS:  -PLASMA CELL NEOPLASM, REPRESENTING APPROXIMATELY 80% OF CELLULAR  ELEMENTS     1/29/2018:  STEREOTACTIC BIOPSY Left breast:  INVASIVE DUCTAL CARCINOMA, NO SPECIAL TYPE, SUZANNE GRADE 3 (3 + 2 + 2),   SINGLE FOCUS MEASURING 1.7 MM WITH SECOND FOCUS OF MICROINVASION MEASURING 0.3 MM.   DUCTAL CARCINOMA IN SITU, INTERMEDIATE NUCLEAR GRADE, SOLID TYPE WITH COMEDONECROSIS AND ASSOCIATED CALCIFICATIONS.   Atypical lobular hyperplasia with cytologic atypia, likely treatment effect.     CLINICAL HISTORY:       Patient: Ninfa Candelario is a 59 y.o. female.  Ms. Cabrera Joseph was admitted on 08/16/15 for ileus versus partial SBO. CT A/P done 08/17/15 showed sigmoid colitis and a zone  of transition at the junction of the descending and sigmoid colon which could indicate some degree of obstruction and an obstructing mass cannot be excluded. Numerous skeletal lytic lesions noted. CT chest done 08/19/15 showed Multiple skeletal lytic lesions involving the thoracic spine, left rib sternum consistent with either metastases or multiple myeloma. There is no evidence of pulmonary or mediastinal mass. Dr. Farr was consulted for possible MM/anemia.     Nuclear Bone scan done 8/20/15 showed mild to moderate increased activity in left rib and right second rib which correlates with fractures seen on CT.  Skeletal survey done 8/20/15showed lytic lesions in the calvarium and probably a lytic lesion in the left scapula. Lytic areas in the spine and pelvis seen on recent CT are not well defined on skeletal survey. Work up labs done 08/20/15: Negative for sickle cell and Thalessemia (reported history of thalessemia). Iron 54, Transferrin 118.0, Iron sat 34.6%, Folate 26.5, Vit B12 1,779  Peripheral smear resulted slightly macrocytic normochromic anemia without signifcant anisocytosis may reflect early B12/folate deficiency or marrow dysfunction, among others. No immature myeloid cells or blasts. Platlets adequate. Beta 2 mircoglobulin = 3.2.  SPEP/NADIA: M-spike in the beta region. The monoclonal protein peak accounts for 1.13 g/dL. Hypogammaglobulinemia. NADIA pattern shows the presence of a free lambda light chain monoclonal protein with additional faint band in IgA.  All immunoglobulin levels decreased. IgA=26, IgG=307, IgM<5.  Kappa Qnt 0.16, Lambda Qnt 4600.00, Ratio <0.01.  24hr Urine for Bence Mendez: Kappa 2.75, Lambda 1250.00, Ratio <0.01. NADIA: Urine is POSITIVE for monoclonal Free Lambda Light chains     [facility] marrow biopsy on 09/18/15. BMBx showed 80% plasma cells, 13p deletion and normal karyotype. She underwent fat pad biopsy which came back  positive for Congo red consistent with amyloidosis. Cardiac workup revealed no amyloid deposition within the heart.  PET/CT and skeletal survey done September 18, 2015 showed multiple lytic osseous lesions  The patient was started on Velcade while at [facility] with the plan to transition to autologous stem cell transplantation. They asked if we could give her could continue treatment here.   She completed CyborD x5 cycles on 12/24/15     Patient admitted 01/06/16 for colitis and C. Diff. Pt treated with Flagyl. Discharged home 01/08/16     Repeat BMBx done at [facility] 01/2016 did not show any morphologic or immnophenotypic evidence of plasama cell neoplasm. Patient underwent Autologous stem cell transplant with Busulfan and melphalan on 02/08/16 at [facility]. The only complication was recurrent C.diff infection for which she completed 10 days of Fidaxomycin.     Follow-up bone marrow biopsy done at [facility] done 5/16/16 showed cellular 30-40% bone marrow with trilineage hematopoiesis. No morphologic or immunophenotypic support for plasma cell neoplasm. Started maintenance Revlimid 5mg. Unable to continue therapy due to cytopenias.     On 08/02/16 patient underwent  Exploratory laparotomy with subtotal colectomy and end ileostomy for what was thought to be toxic megacolon. Pathology showed extensive advanced colonic amyloidosis involving the muscular wall, submucosa and mucosa: With the appendix also involved with amyloidosis.  Positive lambda light chains were noted.     Follow-up at [facility] 8/31/16, Per Dr. Adrian Naylor, 6 months post autologous transplant. She has engrafted. Based on the latest restaging she is near complete remission with possible IgA lambda on serum immunofixation. Patient was instructed to discontinue prophylactic antibiotics. She received her 1st series of immunizations while at [facility]. Patient had a bilateral venous ultrasound to rule out DVT, negative B/L. In regards to amyloidosis the patient  feels that her tongue is smaller in size and her BNP has come down some.  Patient had a follow-up appointment at [facility] November 30, 2016.  Dr. Parnell documented the patient's free lambda light chain remains at a lower level.  Therefore in light of her recent surgery they will continue with observation only.  The plan to see the patient back in 2-3 months with repeat restaging labs.  They recommended regular CBC checks to monitor anemia.  Patient returned to [facility] in December 2016 to meet with dermatology for numerous papillary lesions on eyelids, chest and posterior neck consistent with amyloid deposits. Recommendations were for watchful waiting.  Patient is less than 1 year out from bone marrow transplant and further improvement can be expected.  She will see the patient back in 1 year to discuss possible surgical resection of the plaques especially around the eyelid region.  Rogaine recommended for persistent hair loss. Retin-A recommended for acne vulgaris.  Patient returned to [facility] on 2/8/2017 for neurosurgery consult for chronic low back pain and difficulty walking.  Imaging showed extensive DJD with discovertebral bulges and thickening of the spinal laminar tissues creating spinal stenosis throughout, but greatest at L2/L3.  Neurosurgery felt that surgery risks outweighed the benefits.  Patient was prescribed pain medicine and encouraged to participate in physical therapy.  Patient also met with Dr. Parnell on 02/08/17, who noted an elevation in free light chains (kappa 52.60/lambda 27.77/ratio 1.89).  Recommendation is to resume maintenance therapy with Revlimid at a low dose of 5 mg every other day.  Patient went back to [facility] first week of April 2017.  I do not have these notes.  Reportedly she was told to continue on every other day Revlimid at 5 mg.  She reportedly had repeat myeloma labs done as well.  Again I do not have these results.  Patient went back to [facility] and saw   Parmjit June 29, 2017.  Myeloma labs were done with serum protein electrophoresis showing irregularity in the fast gamma region.  IgG of 1291.  Free kappa light chains of 84.6 with lambda light chains of 66.9.  Beta-2 microglobulin of 4.5  CT scan of lumbar spine showed multiple lytic lesions once again in the lumbar spine and bony pelvis.  Ultrasound of the left leg showed no evidence of DVT.  It was recommended based on the slight increase in her And lambda light chains to start weekly dexamethasone 20 mg p.o. weekly.  Follow-up visit and labs at [facility] on September 29, 2017 with Dr. Parnell.  Repeat beta-2 microglobulin came back at 2.4.  Serum IgG of 761, IgA 117 and IgM of 29.  Serum free kappa light chains of 24.9 and lambda of 23.5.  Counts were stable with hemoglobin of 11.1, WBC 4.9 and platelets of 210,000.  Recommendations were for continued Revlimid every other day with weekly dexamethasone along with aspirin for DVT prophylaxis.  Bilateral diagnostic MMG 12/19/17  noted 1.6cm coarse heterogeneous calcifications in posterior region of L breast at 3 o'clock position. Patient was scheduled for FNA which confirmed ID grade 3, single focus measuring 1.7cm with 2nd focus microinvasion DCIS grade 2 measuring 0.3cm. Left axillary LN biopsy showed no evidence of metastatic carcinoma. Complete staging: Stage IA, grade 3 ER+, Her 2 Niki + IDC/DCIS of left breast. Ki-67 <17%.  Repeat myeloma labs showed rise in free lambda light chains noted at 68.69, kappa light chains at 27.22,  beta 2 microglobulin came back at 2.9. Serum protein electrophoresis showed no definitive evidence of an M protein peak. The pattern is consistent with an acute inflammatory reaction. Recommendations were made to maintain Revlimid at current dose of 5mg every other day to be taken continuously increase weekly dexamethasone to 16 mg.   Patient seen at [facility] with tentative plan for L breast lumpectomy on 3/13/18. 2/16/18- Prior to  surgery she was seen by genetic counselor who ran genetic testing per patient reques, all of which were negative. She was then seen by cardiology at [facility] 2/16/18 for further evaluation of persistent bilateral lower extremity swelling-ECHO noted EF of 58%; diagnosed with paroxysmal atrial fibrillation and instructed to begin daily ASA. During preoperative asssessment per Rad/Onc 3/12/18, corresponding chest CT noted new bilateral pulmonary nodules concerning for metastatic disease- surgery was subsequently postponed pending pulmonary evaluation. Seen by Pulmonology on 3/21/18, PFTs within normal limits and cleared patient for surgery with recommended close CT follow up of nodules in 3 months. 3/21/18 seen by nephrology for management of mild CKD (GFR 55)-cleared for surgery with recommended follow up in 3 months. Left breast lumpectomy and SLNB performed per Dr. Washburn on 4/12/18- plan for follow up in clinic on 4/24/18  for postoperative assessment. Follow up with Dr. Poole (Med/Onc) on 4/25/18. Follow up with Dr. Parnell 4/26/18.   Patient seen at [facility] s/p Left breast lumpectomy with SLNB on 4/12/18. Following surgery she completed Left partial breast radiation x 10 fractions. She was then started on endocrine therapy with Femara after been deemedan inappropriate candidate for systemic chemotherapy/Herceptin.      She was seen by neurosugery at [facility] on 4/26/18 following repeat MRI of cervical thoracic lumbar spine which noted focal enhancement of T2 hyperintensity at the C2 level which may be due to degenerative changes. Signal abnormality within  the T12 and L1 may be secondary to myeloma. Plan to continue with observation for now with F/U scheduled in October 2018.      She was seen by Dr. Parnell at [facility] for management of her MM on 4/26/18. Patient was recommended to restart Revlimid 5mg PO every other day with notes that these recommendations would be communicated to collaborating  Oncologist. Patient was also recommended to start on Zometa for her bone disease.      Seen by Dr. Parnell at [facility] for management of her MM on 6/28/18. Repeat light chains noted lambda 33.36 (previously 80.80), K/L ratio 0.91 (previously 0.49). Due to slight improvement in light chains, plan to continue on lenalidomide maintenance. Recommendations to continue anticoagulation with Eliquis. BLE venous doppler negative for DVT.   Seen by pulmonolgy on 6/28/18- repeat CT chest done 6/28/18 noted resolution of previous pulmonary nodules. Thought to be infectious in nature. Recommendations for discharge from pulmonary clinic.  Should MRI of her cervical thoracic lumbar spine on 2/12/2019 that demonstrated cervical spondylosis with canal stenosis most notable at C1 and 2. Cord signal abnormality at C1 and 2 with enhancement is stable. Lumbar spondylosis with central canal stenosis at multiple levels. No imaging features of bony metastatic disease.     For amyloidosis (Light chain type).    Patient presented with macroglossia and now with improvement of the swelling of her tongue. Her cardiac parameters are also better.   8/10/2020 proBNP  250   2/17/2020                616  8/9/2019                  190  11/14/2023  820        Chief Complaint:for Multiple Myeloma       Interval History:  She is currently on Rev/Dex/Darzalex  for MM  s/p transplant in 2016. At her [facility] visit on 10/12/22, Ninlaro was discontinued due to it causing severe diarrhea and leukopenia. Diarrhea resolved after stopping Ninlaro (she admits to stopping prior to visit @ [facility]).     I Called Dr Parnell office and she is out of the office. She will be back next week. Will try again then  07/11/2024:  Dr. Parnell kindly called me back to discuss patient.  She recommend to decrease the dose of dexamethasone to 10 mg to see if this can help to decrease the frequency of infections.  She does not opposed to IVIG if needed for her hypogammaglobulinemia.  She  "usually starts when IgG is less than 400.  In this case if she ever start the IVIG needs to be given in a very slow rate due to her heart and fluid retention risks.  We will continue Evangelina monthly and will decrease dexamethasone to 10 mg.    10/10/2024: Patient is here today for follow up of her multiple myeloma, amyloidosis and breast cancer.  Daratumumab has been on hold since April 2024 for LLE cellulitis. She was admitted to [facility] per  Dr. Parnell recommendations until LLE is better. Swelling to BLE noted during physical exam, L>R. She has follow up with Dr. Parnell @ [facility] on 11/15/24.    12/2/2024: Patient present today in followup. She rescheduled several appts. She reports that she was not feeling well.  She has not been taking her Revlimid as the pharmacy delivery company lost it. After several attempts to get the medication her pharmacy was "found it" and she will  after she d/c from the clinic.  Otherwise no fever, chills, sweats.    01/13/2025: Patient present today for follow up and and is doing about the same.  She was seen at [facility] that recommend to hold Revlimid as she has not been taking it in about 2 months or so.  She has an appointment with them next month.  No fever, chills, sweats.  No chest pain or shortness of breath.      ROS: All 14 points ROS taken and as per Interval History  Review of Systems   Constitutional:  Positive for malaise/fatigue. Negative for chills, fever and weight loss.   HENT:  Negative for congestion and nosebleeds.    Eyes:  Negative for blurred vision, double vision and photophobia.   Respiratory:  Negative for cough, hemoptysis and shortness of breath.    Cardiovascular:  Negative for chest pain, palpitations, leg swelling and PND.   Gastrointestinal:  Positive for diarrhea (Better), nausea and vomiting. Negative for abdominal pain, blood in stool, constipation and melena.   Genitourinary:  Negative for dysuria, frequency, hematuria and urgency.   Musculoskeletal:  " "Negative for back pain, falls and myalgias.   Skin:  Negative for itching and rash.   Neurological:  Positive for weakness. Negative for tremors, focal weakness, seizures and headaches.   Endo/Heme/Allergies:  Negative for environmental allergies. Does not bruise/bleed easily.   Psychiatric/Behavioral:  Negative for depression and suicidal ideas. The patient is not nervous/anxious.          Histories:  PMH/PSH/FH/SOCIAL/ALLERGIES AND MEDS REVIEWED AND UPDATED AS APPROPRIATE       Vitals:    01/13/25 1112   BP: 130/83   BP Location: Left arm   Patient Position: Sitting   Pulse: 105   Resp: 18   Temp: 98.3 °F (36.8 °C)   TempSrc: Oral   SpO2: 95%   Weight: 75.8 kg (167 lb)   Height: 5' 3" (1.6 m)       Wt Readings from Last 6 Encounters:   01/13/25 75.8 kg (167 lb)   12/02/24 79.7 kg (175 lb 11.2 oz)   10/10/24 80.7 kg (177 lb 14.2 oz)   10/10/24 80.7 kg (177 lb 14.4 oz)   09/17/24 76.2 kg (168 lb)   08/07/24 81.6 kg (180 lb)     Body mass index is 29.58 kg/m².  Body surface area is 1.84 meters squared.      Vitals reviewed and stable  Physical Exam  Vitals and nursing note reviewed.   Constitutional:       General: She is not in acute distress.     Appearance: Normal appearance. She is well-developed. She is ill-appearing.      Comments: Uses walker for mobility   HENT:      Head: Normocephalic and atraumatic.      Mouth/Throat:      Mouth: Mucous membranes are moist.   Eyes:      General: No scleral icterus.     Extraocular Movements: Extraocular movements intact.      Conjunctiva/sclera: Conjunctivae normal.      Pupils: Pupils are equal, round, and reactive to light.   Neck:      Vascular: No JVD.   Cardiovascular:      Rate and Rhythm: Normal rate and regular rhythm.      Heart sounds: No murmur heard.  Pulmonary:      Effort: Pulmonary effort is normal.      Breath sounds: Normal breath sounds. No wheezing or rhonchi.   Abdominal:      General: Bowel sounds are normal. There is no distension.      Palpations: " Abdomen is soft. There is no mass.      Tenderness: There is no abdominal tenderness.   Musculoskeletal:         General: No swelling or deformity.      Cervical back: Neck supple.      Right lower le+ Edema present.      Left lower leg: 3+ Edema present.      Comments: Hyperpigmentation of left leg.    Lymphadenopathy:      Head:      Right side of head: No submandibular adenopathy.      Left side of head: No submandibular adenopathy.      Cervical: No cervical adenopathy.      Upper Body:      Right upper body: No supraclavicular or axillary adenopathy.      Left upper body: No supraclavicular or axillary adenopathy.      Lower Body: No right inguinal adenopathy. No left inguinal adenopathy.   Skin:     General: Skin is warm.      Coloration: Skin is not jaundiced.      Findings: Rash present.      Nails: There is no clubbing.      Comments: Rash: bilateral; LE left > right   Neurological:      Mental Status: She is alert and oriented to person, place, and time.      Cranial Nerves: Cranial nerves 2-12 are intact.      Motor: Weakness present.      Gait: Gait abnormal.   Psychiatric:         Attention and Perception: Attention normal.         Behavior: Behavior is cooperative.         Cognition and Memory: Cognition normal.         Judgment: Judgment normal.       ECOG SCORE    3 - Capable of only limited selfcare, confined to bed or chair more than 50% of waking hours         Laboratory:  CBC with Differential:  Lab Results   Component Value Date    WBC 3.34 (L) 2025    RBC 4.04 (L) 2025    HGB 11.6 (L) 2025    HCT 36.0 (L) 2025    MCV 89.1 2025    MCH 28.7 2025    MCHC 32.2 (L) 2025    RDW 14.0 2025     2025    MPV 9.2 2025        CMP:  Sodium   Date Value Ref Range Status   2025 137 136 - 145 mmol/L Final     Potassium   Date Value Ref Range Status   2025 4.2 3.5 - 5.1 mmol/L Final     Chloride   Date Value Ref Range Status  "  01/13/2025 103 98 - 107 mmol/L Final     CO2   Date Value Ref Range Status   01/13/2025 24 23 - 31 mmol/L Final     Blood Urea Nitrogen   Date Value Ref Range Status   01/13/2025 34.7 (H) 9.8 - 20.1 mg/dL Final   07/18/2023 35 (H) 6 - 23 mg/dL Final     Creatinine   Date Value Ref Range Status   01/13/2025 3.20 (H) 0.55 - 1.02 mg/dL Final   07/18/2023 2.48 (H) 0.51 - 0.95 mg/dL Final     Calcium   Date Value Ref Range Status   01/13/2025 9.2 8.4 - 10.2 mg/dL Final   07/18/2023 9.7 8.4 - 10.2 mg/dL Final     Albumin   Date Value Ref Range Status   01/13/2025 3.2 (L) 3.4 - 4.8 g/dL Final     Bilirubin Total   Date Value Ref Range Status   01/13/2025 0.2 <=1.5 mg/dL Final     ALP   Date Value Ref Range Status   01/13/2025 96 40 - 150 unit/L Final     AST   Date Value Ref Range Status   01/13/2025 17 5 - 34 unit/L Final     ALT   Date Value Ref Range Status   01/13/2025 19 0 - 55 unit/L Final     Estimated GFR-Non    Date Value Ref Range Status   04/20/2022 25           Assessment:       1. Multiple myeloma, remission status unspecified    2. H/O autologous stem cell transplant    3. Ileostomy status    4. Light chain (AL) amyloidosis    5. Chronic kidney disease, stage 4 (severe)    6. Anemia of chronic disease    7. History of breast cancer    8. Hypogammaglobulinemia    9. Non-compliant patient        1) Multiple Myeloma, IgA Lambda, FISH with del 13p, normal karyotype, ISS stg II Dx 2015; S/p Autologous stem cell transplant 02/08/16-remission-->slight rise in free light chains 02/08/17    2) Amyloidosis, Lambda light chain type, organ involvement with macroglossia and colon involvement. [place] red fat pad + Dx 2/2015  --Last seen by Dr Parnell at [facility] 12/20/2024: Her note "AL Amyloid/ MM: Currently available MM/AL amyloid parameters indicate a decrease in the NT Pro BNP and troponin T. We asked if she had taken any steroids during her URI to explain the reduction of Amyloid and MM parameters, but the " "pt indicated that she had not). Her MM parameters have improved as well.While there was a prior minimal rise in the FKLC this patient has had a lambda clonality in the past.. She had been off of therapy which has resumed but her parameters already demonstrate improvement over the last visit. We previously recommended stopping lenalidomide and seeing if the response can be maintained and trying to reduce the fluid retention and other side effects of dexamethasone. This has not improved then we recommended reinitiation of DRd. Most recent PET CT w/o progression or active skeletal or extramedullary disease. The patient will be scheduled for follow-up in 2-3 for continued monitoring of the chronic plasma cell dyscrasia and orders for CBC, CMP, serum and urine protein electrophoretic and immunofixation studies, free light chain studies, and immunoglobulins have been placed for that follow/up visit. 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 Sx risks do not outweigh benefits. Pain meds given and encouraged Physical Therpay    8) Amyloid plaques, evaluated by Derm at [facility], recommend watchful waiting. Patient to follow up 12/2017    9) Stage IA grade 3, ER+, HER2 BERNA +,  IDC/DCIS of the left breast cancer --- 2/7/18 at [facility]; s/p lumpectomy with SLNB 4/12, Grade 2 IDC measuring 4mm with second focus of microinvasive carcinoma measuring  0.4mm .IG DCIS, 0.3mm to posterior margin; 2 SLN negative for malignancy      10) Progressive swelling of R lower extremity--- US NIVA done 2/19/18 negative for evidence of DVT    11). Paroxysmal Afib (2/18/18) diagnosed at [facility]; ECHO noted EF " 58%    12. Mild CKD with GFR 55 - managed per [facility] nephrology    13). Pulmonary nodules noted on pre-operative chest CT scan (3/12/18) noted new bilateral pulmonary nodules are nonspecific -- seen by Pulmonology at [facility] (3/21/18) thought to be inflammatory/infectious in nature, repeat Chest CT 6/28/18 noted resolution of nodules    14). Treatment induced neutropenia    15) NON COMPLIANT patient-- please see above the no show, reschedule and cancelled visits/labs and infusion.     16) Neoplasm associated pain/polyneuropathy: continues w/ pain service on methadone/ oxycodone /duloxitene      Plan:       Okay to proceed with Darzalex C18 today     Continue K+ BID  Hold Revlimid for now  Continue Eliquis 2.5 mg BID  Keep cardiology follow up @ [facility] 4/2025    RTC  in 4 weeks for TD/MM labs/ infusion all same day - with MD ONLY, patient prefers PM appts   I had a long conversation with the patient regarding compliance.  Explained to her that if she does not feel good or does not have a ride to come to the office we can convert the visit to telemedicine.  We have done telemedicine in the past.  She verbalized understanding.  Explained that is very important to keep it on track with the treatment.        Encourage to call or message us for any or problems  The patient was given ample opportunity to ask questions, and to the best of my abilities, all questions answered to satisfaction; patient demonstrated understanding of what we discussed and agreeable to the plan.     Portions of this note may have been created using a voice-recognition transcribing system. Incorrect words or phrases may have been missed during proofreading. Please interpret accordingly.     Natalie Meyers MD  Hematology/Oncology    Visit today included increased complexity associated with the care of the episodic problem amyloidosis, myeloma, addressing and managing the longitudinal care of the patient's amyloidosis, myeloma,.

## 2025-01-13 ENCOUNTER — TELEPHONE (OUTPATIENT)
Dept: HEMATOLOGY/ONCOLOGY | Facility: CLINIC | Age: 60
End: 2025-01-13
Payer: MEDICARE

## 2025-01-13 ENCOUNTER — OFFICE VISIT (OUTPATIENT)
Dept: HEMATOLOGY/ONCOLOGY | Facility: CLINIC | Age: 60
End: 2025-01-13
Payer: MEDICARE

## 2025-01-13 ENCOUNTER — INFUSION (OUTPATIENT)
Dept: INFUSION THERAPY | Facility: HOSPITAL | Age: 60
End: 2025-01-13
Attending: INTERNAL MEDICINE
Payer: MEDICARE

## 2025-01-13 ENCOUNTER — TELEPHONE (OUTPATIENT)
Dept: HEMATOLOGY/ONCOLOGY | Facility: CLINIC | Age: 60
End: 2025-01-13

## 2025-01-13 VITALS
BODY MASS INDEX: 29.59 KG/M2 | SYSTOLIC BLOOD PRESSURE: 130 MMHG | DIASTOLIC BLOOD PRESSURE: 83 MMHG | HEIGHT: 63 IN | WEIGHT: 167 LBS | TEMPERATURE: 98 F | RESPIRATION RATE: 18 BRPM | HEART RATE: 105 BPM | OXYGEN SATURATION: 95 %

## 2025-01-13 DIAGNOSIS — D80.1 HYPOGAMMAGLOBULINEMIA: ICD-10-CM

## 2025-01-13 DIAGNOSIS — Z91.199 NON-COMPLIANT PATIENT: ICD-10-CM

## 2025-01-13 DIAGNOSIS — E85.81 LIGHT CHAIN (AL) AMYLOIDOSIS: Primary | ICD-10-CM

## 2025-01-13 DIAGNOSIS — Z85.3 HISTORY OF BREAST CANCER: ICD-10-CM

## 2025-01-13 DIAGNOSIS — D63.8 ANEMIA OF CHRONIC DISEASE: ICD-10-CM

## 2025-01-13 DIAGNOSIS — Z93.2 ILEOSTOMY STATUS: ICD-10-CM

## 2025-01-13 DIAGNOSIS — E85.81 LIGHT CHAIN (AL) AMYLOIDOSIS: ICD-10-CM

## 2025-01-13 DIAGNOSIS — E85.9 AMYLOIDOSIS, UNSPECIFIED TYPE: Primary | ICD-10-CM

## 2025-01-13 DIAGNOSIS — C90.00 MULTIPLE MYELOMA, REMISSION STATUS UNSPECIFIED: Primary | ICD-10-CM

## 2025-01-13 DIAGNOSIS — N18.4 CHRONIC KIDNEY DISEASE, STAGE 4 (SEVERE): ICD-10-CM

## 2025-01-13 DIAGNOSIS — Z94.84 H/O AUTOLOGOUS STEM CELL TRANSPLANT: ICD-10-CM

## 2025-01-13 PROCEDURE — G2211 COMPLEX E/M VISIT ADD ON: HCPCS | Mod: S$PBB,,, | Performed by: INTERNAL MEDICINE

## 2025-01-13 PROCEDURE — 99214 OFFICE O/P EST MOD 30 MIN: CPT | Mod: PBBFAC,25 | Performed by: INTERNAL MEDICINE

## 2025-01-13 PROCEDURE — 96401 CHEMO ANTI-NEOPL SQ/IM: CPT

## 2025-01-13 PROCEDURE — 63600175 PHARM REV CODE 636 W HCPCS: Mod: JZ,TB | Performed by: INTERNAL MEDICINE

## 2025-01-13 PROCEDURE — 25000003 PHARM REV CODE 250: Performed by: INTERNAL MEDICINE

## 2025-01-13 PROCEDURE — 99215 OFFICE O/P EST HI 40 MIN: CPT | Mod: S$PBB,,, | Performed by: INTERNAL MEDICINE

## 2025-01-13 PROCEDURE — 99999 PR PBB SHADOW E&M-EST. PATIENT-LVL IV: CPT | Mod: PBBFAC,,, | Performed by: INTERNAL MEDICINE

## 2025-01-13 RX ORDER — SODIUM CHLORIDE 0.9 % (FLUSH) 0.9 %
10 SYRINGE (ML) INJECTION
Status: DISCONTINUED | OUTPATIENT
Start: 2025-01-13 | End: 2025-01-13 | Stop reason: HOSPADM

## 2025-01-13 RX ORDER — HEPARIN 100 UNIT/ML
500 SYRINGE INTRAVENOUS
Status: CANCELLED | OUTPATIENT
Start: 2025-02-09

## 2025-01-13 RX ORDER — SODIUM CHLORIDE 0.9 % (FLUSH) 0.9 %
10 SYRINGE (ML) INJECTION
Status: CANCELLED | OUTPATIENT
Start: 2025-02-09

## 2025-01-13 RX ORDER — DIPHENHYDRAMINE HYDROCHLORIDE 50 MG/ML
50 INJECTION INTRAMUSCULAR; INTRAVENOUS ONCE AS NEEDED
Status: DISCONTINUED | OUTPATIENT
Start: 2025-01-13 | End: 2025-01-13 | Stop reason: HOSPADM

## 2025-01-13 RX ORDER — ACETAMINOPHEN 325 MG/1
650 TABLET ORAL
Status: CANCELLED | OUTPATIENT
Start: 2025-02-09

## 2025-01-13 RX ORDER — EPINEPHRINE 0.3 MG/.3ML
0.3 INJECTION SUBCUTANEOUS ONCE AS NEEDED
Status: DISCONTINUED | OUTPATIENT
Start: 2025-01-13 | End: 2025-01-13 | Stop reason: HOSPADM

## 2025-01-13 RX ORDER — DIPHENHYDRAMINE HCL 25 MG
25 CAPSULE ORAL
Status: CANCELLED | OUTPATIENT
Start: 2025-02-09

## 2025-01-13 RX ORDER — DIPHENHYDRAMINE HYDROCHLORIDE 50 MG/ML
50 INJECTION INTRAMUSCULAR; INTRAVENOUS ONCE AS NEEDED
Status: CANCELLED | OUTPATIENT
Start: 2025-02-09

## 2025-01-13 RX ORDER — EPINEPHRINE 0.3 MG/.3ML
0.3 INJECTION SUBCUTANEOUS ONCE AS NEEDED
Status: CANCELLED | OUTPATIENT
Start: 2025-02-09

## 2025-01-13 RX ORDER — ACETAMINOPHEN 325 MG/1
650 TABLET ORAL
Status: COMPLETED | OUTPATIENT
Start: 2025-01-13 | End: 2025-01-13

## 2025-01-13 RX ORDER — DIPHENHYDRAMINE HCL 25 MG
25 CAPSULE ORAL
Status: COMPLETED | OUTPATIENT
Start: 2025-01-13 | End: 2025-01-13

## 2025-01-13 RX ORDER — HEPARIN 100 UNIT/ML
500 SYRINGE INTRAVENOUS
Status: DISCONTINUED | OUTPATIENT
Start: 2025-01-13 | End: 2025-01-13 | Stop reason: HOSPADM

## 2025-01-13 RX ADMIN — ACETAMINOPHEN 650 MG: 325 TABLET ORAL at 01:01

## 2025-01-13 RX ADMIN — DIPHENHYDRAMINE HYDROCHLORIDE 25 MG: 25 CAPSULE ORAL at 01:01

## 2025-01-13 RX ADMIN — DARATUMUMAB AND HYALURONIDASE-FIHJ (HUMAN RECOMBINANT) 1800 MG: 1800; 30000 INJECTION SUBCUTANEOUS at 01:01

## 2025-01-13 NOTE — PLAN OF CARE
C18 Darzalex Faspro injection given; tolerated well; next appointments confirmed with patient; discharged home in stable condition.

## 2025-01-14 NOTE — TELEPHONE ENCOUNTER
Patient creatinine elevated today. Please send results and let her nephrologist know. Instructed to increase PO fluid intake as well.

## 2025-02-04 DIAGNOSIS — Z13.6 SCREENING FOR CARDIOVASCULAR, RESPIRATORY, AND GENITOURINARY DISEASES: ICD-10-CM

## 2025-02-04 DIAGNOSIS — Z00.00 MEDICARE ANNUAL WELLNESS VISIT, SUBSEQUENT: ICD-10-CM

## 2025-02-04 DIAGNOSIS — E78.2 MIXED HYPERLIPIDEMIA: Primary | ICD-10-CM

## 2025-02-04 DIAGNOSIS — E85.9 AMYLOIDOSIS, UNSPECIFIED TYPE: ICD-10-CM

## 2025-02-04 DIAGNOSIS — N18.4 CHRONIC KIDNEY DISEASE, STAGE 4 (SEVERE): ICD-10-CM

## 2025-02-04 DIAGNOSIS — I10 HYPERTENSION, UNSPECIFIED TYPE: ICD-10-CM

## 2025-02-04 DIAGNOSIS — Z13.0 SCREENING FOR ENDOCRINE, NUTRITIONAL, METABOLIC AND IMMUNITY DISORDER: ICD-10-CM

## 2025-02-04 DIAGNOSIS — Z13.29 SCREENING FOR ENDOCRINE, NUTRITIONAL, METABOLIC AND IMMUNITY DISORDER: ICD-10-CM

## 2025-02-04 DIAGNOSIS — Z13.228 SCREENING FOR ENDOCRINE, NUTRITIONAL, METABOLIC AND IMMUNITY DISORDER: ICD-10-CM

## 2025-02-04 DIAGNOSIS — Z79.899 ENCOUNTER FOR LONG-TERM (CURRENT) USE OF MEDICATIONS: ICD-10-CM

## 2025-02-04 DIAGNOSIS — Z13.83 SCREENING FOR CARDIOVASCULAR, RESPIRATORY, AND GENITOURINARY DISEASES: ICD-10-CM

## 2025-02-04 DIAGNOSIS — Z13.21 SCREENING FOR ENDOCRINE, NUTRITIONAL, METABOLIC AND IMMUNITY DISORDER: ICD-10-CM

## 2025-02-04 DIAGNOSIS — Z13.89 SCREENING FOR CARDIOVASCULAR, RESPIRATORY, AND GENITOURINARY DISEASES: ICD-10-CM

## 2025-02-04 DIAGNOSIS — D63.8 ANEMIA OF CHRONIC DISEASE: ICD-10-CM

## 2025-02-04 DIAGNOSIS — I48.0 PAROXYSMAL ATRIAL FIBRILLATION: ICD-10-CM

## 2025-02-05 ENCOUNTER — TELEPHONE (OUTPATIENT)
Dept: INTERNAL MEDICINE | Facility: CLINIC | Age: 60
End: 2025-02-05
Payer: MEDICARE

## 2025-02-05 NOTE — TELEPHONE ENCOUNTER
----- Message from Med Assistant Cortez sent at 2/4/2025  8:26 AM CST -----  Regarding: PV Wednesday 2-12-25  Wellness Appointment    Fasting wellness labs ordered and ready to do.     Last Wellness 10-3-23

## 2025-02-12 ENCOUNTER — OFFICE VISIT (OUTPATIENT)
Dept: INTERNAL MEDICINE | Facility: CLINIC | Age: 60
End: 2025-02-12
Payer: MEDICARE

## 2025-02-12 VITALS
BODY MASS INDEX: 28.7 KG/M2 | SYSTOLIC BLOOD PRESSURE: 124 MMHG | OXYGEN SATURATION: 97 % | HEART RATE: 95 BPM | HEIGHT: 63 IN | WEIGHT: 162 LBS | DIASTOLIC BLOOD PRESSURE: 76 MMHG

## 2025-02-12 DIAGNOSIS — M06.9 RHEUMATOID ARTHRITIS, INVOLVING UNSPECIFIED SITE, UNSPECIFIED WHETHER RHEUMATOID FACTOR PRESENT: ICD-10-CM

## 2025-02-12 DIAGNOSIS — C80.1 POLYNEUROPATHY IN MALIGNANT DISEASE: ICD-10-CM

## 2025-02-12 DIAGNOSIS — G63 POLYNEUROPATHY IN MALIGNANT DISEASE: ICD-10-CM

## 2025-02-12 DIAGNOSIS — Z72.3 LACK OF PHYSICAL ACTIVITY: ICD-10-CM

## 2025-02-12 DIAGNOSIS — I10 PRIMARY HYPERTENSION: ICD-10-CM

## 2025-02-12 DIAGNOSIS — I48.0 PAROXYSMAL ATRIAL FIBRILLATION: ICD-10-CM

## 2025-02-12 DIAGNOSIS — Z91.81 AT RISK FOR FALLS: ICD-10-CM

## 2025-02-12 DIAGNOSIS — I10 HYPERTENSION, UNSPECIFIED TYPE: ICD-10-CM

## 2025-02-12 DIAGNOSIS — R39.9 UTI SYMPTOMS: ICD-10-CM

## 2025-02-12 DIAGNOSIS — N18.4 CHRONIC KIDNEY DISEASE, STAGE 4 (SEVERE): ICD-10-CM

## 2025-02-12 DIAGNOSIS — E85.9 AMYLOIDOSIS, UNSPECIFIED TYPE: ICD-10-CM

## 2025-02-12 DIAGNOSIS — Z00.00 MEDICARE ANNUAL WELLNESS VISIT, SUBSEQUENT: Primary | ICD-10-CM

## 2025-02-12 DIAGNOSIS — F11.90 OPIOID USE: ICD-10-CM

## 2025-02-12 DIAGNOSIS — N39.46 MIXED INCONTINENCE URGE AND STRESS (MALE)(FEMALE): ICD-10-CM

## 2025-02-12 LAB
BACTERIA #/AREA URNS AUTO: ABNORMAL /HPF
BILIRUB UR QL STRIP.AUTO: NEGATIVE
CLARITY UR: CLEAR
COLOR UR AUTO: ABNORMAL
GLUCOSE UR QL STRIP: NORMAL
HGB UR QL STRIP: ABNORMAL
KETONES UR QL STRIP: NEGATIVE
LEUKOCYTE ESTERASE UR QL STRIP: 250
MUCOUS THREADS URNS QL MICRO: ABNORMAL /LPF
NITRITE UR QL STRIP: NEGATIVE
PH UR STRIP: 5.5 [PH]
PROT UR QL STRIP: ABNORMAL
RBC #/AREA URNS AUTO: ABNORMAL /HPF
SP GR UR STRIP.AUTO: 1.02 (ref 1–1.03)
SQUAMOUS #/AREA URNS LPF: ABNORMAL /HPF
UROBILINOGEN UR STRIP-ACNC: NORMAL
WBC #/AREA URNS AUTO: ABNORMAL /HPF

## 2025-02-12 PROCEDURE — 87086 URINE CULTURE/COLONY COUNT: CPT | Performed by: INTERNAL MEDICINE

## 2025-02-12 PROCEDURE — G0439 PPPS, SUBSEQ VISIT: HCPCS | Mod: ,,, | Performed by: INTERNAL MEDICINE

## 2025-02-12 PROCEDURE — 99214 OFFICE O/P EST MOD 30 MIN: CPT | Mod: 25,,, | Performed by: INTERNAL MEDICINE

## 2025-02-12 PROCEDURE — 81001 URINALYSIS AUTO W/SCOPE: CPT | Performed by: INTERNAL MEDICINE

## 2025-02-12 RX ORDER — CARVEDILOL 3.12 MG/1
3.12 TABLET ORAL 2 TIMES DAILY WITH MEALS
Qty: 180 TABLET | Refills: 2 | Status: SHIPPED | OUTPATIENT
Start: 2025-02-12 | End: 2025-11-09

## 2025-02-12 NOTE — ASSESSMENT & PLAN NOTE
CrCl cannot be calculated (Patient's most recent lab result is older than the maximum 7 days allowed.).  Last GFR - . Stable from renal standpoint.  Initiating patient on dapagliflozin, sending a referral to Nephrology  Follow renoprotective measures including Renal Diet (reduce intake of nuts, peanut butter, milk, cheese, dried beans, peas) and Low Sodium Diet (less than 2 grams per day).  Avoid NSAIDs (Aleve, Mobic, Celebrex, Ibuprofen, Advil, Toradol and Diclofenac). May take Tylenol as needed for headache/pain.  Control DM with goal A1C <7. BP goal <130/80. LDL goal < 100.  Stay well hydrated. Avoid alcohol and soda. Limit tea and coffee.

## 2025-02-12 NOTE — ASSESSMENT & PLAN NOTE
-patient is status post colon resection and ileostomy status   -followed by Oncology closely, treatment currently on hold however has an upcoming appointment to discuss getting restarted versus other options

## 2025-02-12 NOTE — ASSESSMENT & PLAN NOTE
Well controlled.   Continue triamterene hydrochlorothiazide 37.5-25 mg p.o. daily, carvedilol 3.125 mg p.o. b.i.d.  Low Sodium Diet (DASH Diet - Less than 2 grams of sodium per day).  Monitor blood pressure daily and log. Report consistent numbers greater than 140/90.  Maintain healthy weight with goal BMI <30. Exercise 30 minutes per day, 5 days per week.

## 2025-02-12 NOTE — ASSESSMENT & PLAN NOTE
-labs are reviewed and noted with worsening renal indices  -sending nephrology referral and starting on Farxiga   -patient is advised on importance of watching her carbohydrate intake and saturated fat intake, making the right nutritional choices and exercising on a regular basis  -up-to-date with the screening

## 2025-02-12 NOTE — PROGRESS NOTES
"   Internal Medicine    Ninfa Candelario is a 59 y.o. female here today for a Medicare Annual Wellness visit and comprehensive Health Risk Assessment.     Subjective     Ninfa Candelario is a 58 y.o. female, who presents today for a Medicare wellness visit.  Medical comorbidities include HTN, multiple myeloma, breast cancer, amyloidosis,  myelodysplastic syndrome, anemia of chronic disease, poorly controlled hypertension, hyperlipidemia, chronic back pain, atrial fibrillation and lumbar disc problems .  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  Being followed by oncology closely.     Currently off of the Revlimid for 2 months and being followed by Oncology closely, has an upcoming appointment to discuss treatment options in 1 week.      A separate E/M visit was performed for UTI like symptoms and for acute on chronic kidney failure   Patient has UTI like symptoms, does have significant urinary incontinence.  Has a previous history of uterine fibroids.  We discussed getting a urinalysis with reflex to culture and sending a referral to Urology for her ongoing symptoms.    Also considering her renal indices are continuing to worsen, she will be initiated on dapagliflozin for nephro protection as well as a referral will be sent to Nephrology.    The following components were reviewed and updated:  Medical history  Family History  Social history  Allergies  Current Medications  Immunizations  Health Maintenance  Patient Care Team    Review of Systems  A comprehensive review of systems was conducted and is negative except as noted above.     Objective   Visit Vitals  /76 (BP Location: Right arm, Patient Position: Sitting)   Pulse 95   Ht 5' 3" (1.6 m)   Wt 73.5 kg (162 lb)   SpO2 97%   BMI 28.70 kg/m²        Physical Exam  Constitutional:       Appearance: Normal appearance.   HENT:      Head: Normocephalic and atraumatic.      Nose: Nose normal.      " Mouth/Throat:      Mouth: Mucous membranes are moist.      Pharynx: Oropharynx is clear.   Eyes:      Extraocular Movements: Extraocular movements intact.      Pupils: Pupils are equal, round, and reactive to light.   Cardiovascular:      Rate and Rhythm: Normal rate and regular rhythm.      Pulses: Normal pulses.   Pulmonary:      Effort: Pulmonary effort is normal.      Breath sounds: Normal breath sounds.   Abdominal:      General: Bowel sounds are normal.      Palpations: Abdomen is soft.   Musculoskeletal:         General: Normal range of motion.      Cervical back: Normal range of motion and neck supple.   Skin:     General: Skin is warm.   Neurological:      General: No focal deficit present.      Mental Status: She is alert and oriented to person, place, and time. Mental status is at baseline.   Psychiatric:         Mood and Affect: Mood normal.          Assessment/Plan:  1. Medicare annual wellness visit, subsequent  Assessment & Plan:  -labs are reviewed and noted with worsening renal indices  -sending nephrology referral and starting on Farxiga   -patient is advised on importance of watching her carbohydrate intake and saturated fat intake, making the right nutritional choices and exercising on a regular basis  -up-to-date with the screening       2. Lack of physical activity  Assessment & Plan:  -with patient at risk of falls   -sending a referral to PT/OT to eval and treat    Orders:  -     Ambulatory Referral/Consult to Physical Therapy/Occupational Therapy; Future; Expected date: 02/12/2025    3. At risk for falls    4. Opioid use    5. Mixed incontinence urge and stress (male)(female)  Assessment & Plan:  -sending a referral to Urology      6. UTI symptoms  Assessment & Plan:  -checking urinalysis with reflex to culture   -holding off on starting antibiotics until results are back    Orders:  -     Urinalysis, Reflex to Urine Culture; Future; Expected date: 02/12/2025    7. Rheumatoid arthritis,  involving unspecified site, unspecified whether rheumatoid factor present    8. Paroxysmal atrial fibrillation  Overview:  Last Assessment & Plan:   Formatting of this note might be different from the original.  The patient denies palpitations.  She is in sinus rhythm during today's exam.  I recommend continuation of carvedilol 25 mg twice daily for rate control and Eliquis 2.5 mg twice daily for anticoagulation.      9. 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 Amyloidosis, unspecified type  Assessment & Plan:  -patient is status post colon resection and ileostomy status   -followed by Oncology closely, treatment currently on hold however has an upcoming appointment to discuss getting restarted versus other options    Orders:  -     Ambulatory referral/consult to Urology; Future; Expected date: 02/12/2025    11. Chronic kidney disease, stage 4 (severe)  Assessment & Plan:  CrCl cannot be calculated (Patient's most recent lab result is older than the maximum 7 days allowed.).  Last GFR - . Stable from renal standpoint.  Initiating patient on dapagliflozin, sending a referral to Nephrology  Follow renoprotective measures including Renal Diet (reduce intake of nuts, peanut butter, milk, cheese, dried beans, peas) and Low Sodium Diet (less than 2 grams per day).  Avoid NSAIDs (Aleve, Mobic, Celebrex, Ibuprofen, Advil, Toradol and Diclofenac). May take Tylenol as needed for headache/pain.  Control DM with goal A1C <7. BP goal <130/80. LDL goal < 100.  Stay well hydrated. Avoid alcohol and soda. Limit tea and coffee.       Orders:  -     Ambulatory referral/consult to Urology; Future; Expected date: 02/12/2025    12. Primary hypertension  Overview:  Last Assessment & Plan:   Formatting of this note might be different from the original.  Today's blood pressure is 132/70 9 mmHg and heart rate 70.  The patient informed me that she has been taking losartan 25 mg daily prescribed by her local PCP for the past year in  addition to carvedilol and hydrochlorothiazide.  Due to intermittent episodes of hypotension with systolic blood pressure as low as 80s at home, the patient stopped taking hydrochlorothiazide 4 months ago.  She continues to have hypotensive episodes despite that.  Additionally, she has been having intermittent lower extremity edema bilaterally which on occasion does not resolve with leg elevations or salt restrictions.  I advised Ms Andree to discontinue losartan, restart hydrochlorothiazide 12.5 mg daily, and continue carvedilol 25 mg twice daily.  She will monitor her her blood pressure and symptoms for the next 2 weeks.  I will follow up with the patient in 2 weeks over the phone and see if further adjustment of antihypertensives is needed.    Assessment & Plan:  Well controlled.   Continue triamterene hydrochlorothiazide 37.5-25 mg p.o. daily, carvedilol 3.125 mg p.o. b.i.d.  Low Sodium Diet (DASH Diet - Less than 2 grams of sodium per day).  Monitor blood pressure daily and log. Report consistent numbers greater than 140/90.  Maintain healthy weight with goal BMI <30. Exercise 30 minutes per day, 5 days per week.               A comprehensive HEALTH RISK ASSESSMENT was completed today. Results are summarized below:    There are NO EMOTIONAL/SOCIAL CONCERNS identified on today's screening for Social Isolation, Depression and Anxiety.    There are NO COGNITIVE FUNCTION CONCERNS identified on today's screening.    The following FUNCTIONAL AND/OR SAFETY CONCERNS were identified on today's screening for Physical Symptoms, Nutritional, Home Safety/Living Situation, Fall Risk, Activities of Daily Living, Independent Activities of Daily Living, Physical Activity,Timed Up and Go test and Whisper test::  *Patient reports PROBLEMS WITH BLADDER FUNCTION. (Do you have any problems with urination like going too frequently, trouble urinating, leakage or accidents?: (!) Yes)  *Patient reports she NEEDS AMBULATION  ASSISTANCE. (Do you use an assistance device to easily get around?: (!) Yes)(Ambulation Assistance: Walker (wheeled))     The patient has a PRESCRIPTION FOR OPIOIDS. The Opioid Risk Tool score was 0 which indicates LOW RISK for future opioid use disorder.    The patient is NOT A TOBACCO USER.  The patient reports NO SIGNIFICANT ALCOHOL USE.     All Questions regarding food, transportation or housing were not answered today.      I provided Ninfa Candelario with a 5-10 year written Screening Schedule per USPSTF age appropriate recommendations and a Personal Prevention Plan based on the results of today's Health Risk Assessment. Education, counseling, and referrals were provided as documented above and can be viewed in the After Visit Summary.    Follow up in about 6 months (around 8/12/2025) for BP Check. In addition to this scheduled follow up, the patient has also been instructed to follow up on as needed basis.     none  The patient was asked and declined the use of a free .  Advance Care Planning   Today we discussed advance care planning. She is interested in learning more about how to make Advance Directives. Information was provided and I offered to discuss more at her discretion.     Advance Care Planning     Date: 02/12/2025  Patient did not wish or was not able to name a surrogate decision maker or provide an Advance Care Plan.

## 2025-02-12 NOTE — ASSESSMENT & PLAN NOTE
-checking urinalysis with reflex to culture   -holding off on starting antibiotics until results are back

## 2025-02-12 NOTE — PATIENT INSTRUCTIONS
Medicare Annual Wellness Visit      Patient Name: Ninfa Candelario  Today's Date: 2/12/2025    Below you will find your 5-10 year Screening Plan Recommendations:  Health Maintenance       Date Due Completion Date    Annual UACr Never done ---    HIV Screening Never done ---    Hemoglobin A1c (Diabetic Prevention Screening) Never done ---    TETANUS VACCINE 08/22/2013 8/22/2003    RSV Vaccine (Age 60+ and Pregnant patients) (1 - Risk 60-74 years 1-dose series) Never done ---    Cervical Cancer Screening 02/18/2025 2/18/2020    Mammogram 11/14/2025 11/14/2024    Lipid Panel 07/29/2026 7/29/2021    Pneumococcal Vaccines (Age 50+) (4 of 4 - PCV20 or PCV21) 01/27/2028 1/27/2023    Colorectal Cancer Screening 12/11/2028 12/11/2018          Below is your summarized Personalized Prevention Plan that addresses any concerns we discussed today at your visit. Please see attached detailed information specific to your Health Concerns.  No orders of the defined types were placed in this encounter.        The following information is provided to all patients.  This information is to help you find additional resources for any problems that may be affecting your health: Living healthy guide: [email or web address]      Understanding Diabetes: www.diabetes.org      Eating healthy: www.cdc.gov/healthyweight      CDC home safety checklist: www.cdc.gov/steadi/patient.html      Agency on Aging: www.goea.[place].[facility]      Alcoholics anonymous (AA): www.aa.org      Physical Activity: www.cristian.nih.gov/hm7qrxr      Tobacco use: www.quitwithusla.org                                 Patient Education       Exercise and Aging   General   Exercise can help older adults prevent falls and stay independent longer. Exercise may also help:  You have stronger muscles and bones and better balance  You lose weight and have more energy  Make your heart and lungs stronger  Lower your chance of health problems like heart disease, high blood sugar, or breast  or colon cancer  Control conditions like high blood pressure and diabetes  You manage stress and get better sleep  Your mood, self-esteem, and self-image  How you think, plan, and pay attention  There are four types of exercise: endurance, strength, balance, and flexibility.  Endurance exercises get your heart rate up and keep it up for a while. Most people should get at least 30 minutes of exercise that gets your heart rate up on 5 or more days each week. Walking, swimming, biking, or going up and down stairs are kinds of exercises to get your heart rate up. You do not have to do all 30 minutes at one time. Try doing 10 minutes 3 times a day.  Strength exercises build muscle. Lifting weights or doing knee bends are kinds of strength exercises.  Balance exercises help to prevent falls. Raise up on your toes or stand on one leg as a kind of balance exercise.  Flexibility exercises move your joints through a full range of motion and stretch your muscles. Bend forward in a chair to stretch your back, do stretches, or bend and extend your arms or legs as a kind of flexibility exercise. Try doing 10 minutes twice a week.  Plan to include all these exercise types in your exercise program. Always check with your doctor before you start a new exercise program.  Some people do not like formal exercise classes, but there are many ways to work your muscles each day. Here are some things you can do.  Use steps instead of elevators.  Park far away in parking lots to walk farther.  Use the time while you wait. Do knee bends as you hold onto the kitchen counter while you wait for your coffee to brew.  When you unload groceries, lift the bags or a container of milk a few times to exercise your arms and legs.  Walk the long way when you go somewhere.  After you walk to the bathroom, walk a few laps around the house before sitting down.  Try doing different standing exercises after you wash your hands each time in the bathroom.  Do  balance exercises while you brush your teeth.  Do an exercise or get up and walk during TV commercials.  Don't forget to exercise small muscle groups like your hands, fingers, ankles, toes, and neck. Wiggle, bend, flex, and rotate these joints from left to right and back to front to help to keep these joints flexible.  When do I need to call the doctor?   Stop exercising and talk to your doctor if you have any of these problems:  Dizziness  Shortness of breath  Pain or pressure in the chest, arms, throat, jaw, or back  Nausea or vomiting  Blood clots  Infection  Joint swelling  Open wounds  Recent hip, back, or eye surgery  New problems that start during exercise  Helpful tips   If you have a health problem like heart disease or diabetes, talk with your doctor about the best exercises for you.  Always warm up before you stretch. Heated muscles stretch much easier than cool muscles. Try to walk or bike at an easy pace for a few minutes to warm up your muscles.  Slow your pace again after you exercise to cool down and bring your heart rate down slowly.  Be sure you do not hold your breath when you exercise because it can raise your blood pressure. If you tend to hold your breath, try to count out loud when you exercise.  Never bounce when doing stretches. Use slow and steady motions and hold your stretch for 20 to 30 seconds.  Have a routine. Doing exercises before a meal may be a good way to get into a routine.  Set small goals for yourself when you start to exercise. Use a chart to see how much you are doing. Ask someone to exercise with you.  Exercise may be slightly uncomfortable, but you should not have sharp pains. If you do get sharp pains, stop what you are doing. If the sharp pains continue, call your doctor.  Drink plenty of fluids without caffeine when you exercise and afterwards. Avoid outdoor exercise if it is too cold or too hot.  Wear the right clothes and shoes. Try layers of clothes, so that you can  take them off if you get too hot. Shoes should fit well and support your feet.  Where can I learn more?   National [place] on Aging  https://www.cristian.nih.gov/health/publication/exercise-physical-activity/introduction   Last Reviewed Date   2021-03-18  Consumer Information Use and Disclaimer   This information is not specific medical advice and does not replace information you receive from your health care provider. This is only a brief summary of general information. It does NOT include all information about conditions, illnesses, injuries, tests, procedures, treatments, therapies, discharge instructions or life-style choices that may apply to you. You must talk with your health care provider for complete information about your health and treatment options. This information should not be used to decide whether or not to accept your health care providers advice, instructions or recommendations. Only your health care provider has the knowledge and training to provide advice that is right for you.  Copyright   Copyright © 2021 UpTo[employer], Inc. and its affiliates and/or licensors. All rights reserved.    Patient Education       Preventing Falls in the Older Adult   About this topic   A fall is the sudden loss of balance that causes a person to drop to the ground or floor. Falls are a serious health risk and they happen more often as we get older. Many things may increase your risk of falling, like:  Problems that come with getting older  Muscle weakness  Balance problems  More trouble seeing  Personal health factors  Health conditions such as arthritis, Parkinson's disease, low blood pressure, or stroke  Medicines you take  Loss of feeling in your feet  Being less active  Taking drugs that makes you dizzy or drowsy  Habits like alcohol use  Things around your house  Slippery floors  Unsecured rugs  Stairs  Wearing improper fitting shoes  Areas where it is dark and difficult to see  Incorrect size or type of assistive  devices  Clutter and items on the floor that block your walkway     What will the results be?   Prevent future falls  Avoid injuries and disabilities  Improve overall health  Will there be any other care needed?   Ask your doctor if you need to take vitamin D to help keep your bones strong.  Make your home safer. Get rid of things that might make you trip or slip. These are things like loose rugs, electrical cords, or clutter. Add grab bars, a shower seat, and handrails.  Wear sturdy shoes that fit well. Shoes should fit well, have a low heel, and the soles of the shoe should not be slippery. Walking in socks or with bare feet can raise your chance for falling.  Stay active. Walk, garden, swim, or do something active on a regular basis. These activities may prevent you from getting hurt if you do fall. They also help with your strength and balance.  Use a cane, walker, or other safety device. Be sure it is the right size for you and that you know how to use it safely. Be sure to wear your eyeglasses if they have been ordered for you.  Get up slowly after you sit or lie down. Try to change positions slowly.  What to do if you fall:  Stay calm and do not panic.  Look for signs to decide if you have been hurt or have an injury.  If you think you can get up safely, try to get up.  If you are hurt or cannot get up on your own, try to get help.  If no one is available to help, try to get comfortable and wait for someone to arrive who can help you.  Stay warm and move regularly as you are able. Avoid putting too much pressure on any one area.  After a fall, tell family and friends that you have fallen. It is also important to talk to your doctor about your fall right away.  What problems could happen?   A fall can lead to broken bones and other serious injuries in older adults. Problems that happen because of a fall may even lead to death in older adults. Many people are not able to return to their former level of activity  after a fall.  What can be done to prevent this health problem?   Lower Your Risk of Falling  Wear your eyeglasses. Have regular eye checkups. Do not use reading glasses when you walk around.  Quit smoking and limit alcohol intake. Smoking and too much alcohol can decrease bone mass and increase the chance of broken bones.  Know the side effects of the drugs you are taking. Some drugs may affect your balance and cause confusion or sleepiness.  Get up slowly after you sit or lie down. Do not change positions quickly. Do not rush when you need to go to the bathroom or to answer the phone.  Stay Physically Active  Be physically active. This will help to improve your strength and balance.  Fear of falling may lead you to avoid activities. Talk to your doctor. You may be sent to a physical therapist. This person can help you improve balance and build your confidence. Getting rid of your fear of falling can help you stay active and prevent future falls.  Join an exercise program. Ask your doctor what exercise is safe for you. Be sure to ask before you do any exercises, especially if you have illnesses like arthritis. Exercise can help you keep muscles strong and help with your balance. It is also a good way to learn proper ways to do each activity or exercise.  Safety Tips at Home  Keep your floors and walking areas clear from clutter. Remove furniture that blocks your way. Secure cords and wires near the wall to avoid tripping over them. Get rid of throw rugs.  Be sure the lights in your house are working well and provide good lighting throughout your home. Make sure you can reach switches and lamps easily. Place a lamp close to your bed that is easy to reach.  Fix all steps and sidewalks to make them smooth and even. Put handrails and lights on stairs.  Keep all the things you use often on low shelves or in cabinets that are at about waist level. Ask for help to move items off of high shelves. Do not use a chair as a  step stool.  Keep your bathroom area safe. Use nonslip rubber mats on the floor and in the tub or shower.  Keep a phone near you in case of emergency. Keep a list of your emergency contact numbers in large print near your phone. Carry a phone with you when you go for a walk. Consider using a personal alarm device that could call for help in case you fall and cannot get up.  Think about protecting your hip. Hip protectors may be needed if you have a higher chance for falling. Ask your doctor about this.  Where can I learn more?   American Academy of Family Physicians  https://familydoctor.org/yyeqs-zin-dw-lower-your-risk/   NHS  https://www.nhs.uk/conditions/falls/prevention/   Last Reviewed Date   2021-06-07  Consumer Information Use and Disclaimer   This information is not specific medical advice and does not replace information you receive from your health care provider. This is only a brief summary of general information. It does NOT include all information about conditions, illnesses, injuries, tests, procedures, treatments, therapies, discharge instructions or life-style choices that may apply to you. You must talk with your health care provider for complete information about your health and treatment options. This information should not be used to decide whether or not to accept your health care providers advice, instructions or recommendations. Only your health care provider has the knowledge and training to provide advice that is right for you.  Copyright   Copyright © 2021 UpToDate, Inc. and its affiliates and/or licensors. All rights reserved.    Patient Education       Taking Opioids Safely   About this topic   When you have very bad pain, your doctor may order a strong drug known as an opioid (also known as a narcotic). Your doctor may have ordered an opioid to treat short-term pain like after a surgery or long-term pain like after an injury. There are also other kinds of opioids that are not legal and  "sold on the street. These are drugs like heroin.  Opioids act on parts of your brain to block pain. These drugs can also cause other reactions in your brain to slow your breathing, change your mood, and make it hard for you to think and make decisions. Most often, your doctor will want you to use an opioid pain drug only for a short time. There is a risk of needing more of the drug to get the same effects if you use the drug for a long time. This can put you more at risk for overdosing or taking too much of the drug. It is important to take the dose your doctor has prescribed for you. If your pain is not relieved by the prescribed dose, talk to your doctor.  Most drugs are safe when taken the right way. There is also the chance of harm when mistakes happen. Mistakes can happen at home, at the pharmacy, and in the hospital. A serious mistake could mean a visit to the ER or even cause death. The more you know about the drugs you are taking the better your chance of avoiding a very serious problem. Mistakes with drugs are also called drug errors or adverse drug events.  General   Know if you are at a high risk for side effects:   Some people are at a higher risk for having a problem when taking an opioid. You are more likely to have problems breathing or becoming too sleepy if you:  Are an older adult  Snore or have sleep apnea  Have recently had surgery, especially on your belly or chest  Have breathing problems or heart disease  Smoke  Are overweight or underweight  Are taking any other kind of drugs that may make you sleepy  Know your drugs:   Keep a list of all the drugs you take. This includes prescription and over-the-counter (OTC) drugs, natural products, and vitamins. Update your list when your drugs change. Show this list to all of your doctors.  Learn about your drug. Ask the doctor, "Am I taking a long acting drug or an extended release drug?" Take extra care if you are taking a long acting or extended " "release drug.  Make sure you know why you are taking the drug. Ask the doctor, "What does this drug treat?"  Make sure you know the right dose and when you should take the drug. Ask the doctor, "How much should I take? When do I need to take this drug?" Learn if this drug is only for very bad pain and what you should take for mild pain.  Make sure you look at any color and markings on the drug. Ask at the pharmacy if the drugs look different after a refill.  Keep your drugs in the containers they came in. Do not mix drugs in the same container.  Take your drugs safely:   If you or someone in your house uses opioids, you might want to keep naloxone at home. You may be able to buy it at the drug store or your doctor can order it. This drug stops the effects of opioids and may help stop death from an opioid overdose. It comes as a nose spray or a shot that you can give to someone who has overdosed. Learn how and when to use it in case of an overdose.  Always read the label on the opioid drug container. Do this each time before you take a drug to be sure you have the right one and are taking it at the correct time.  Do not crush, chew, or break any pills or capsules unless your doctor tells you to. Do not cut drug patches unless your doctor or pharmacist says it is OK.  If you are taking a liquid drug, make sure to use the measuring device that came with the drug. Using other spoons or cups could cause an error in the amount of drug you are taking.  If you are in the hospital, make sure anyone who is giving you drugs checks your ID band first. This will keep you from getting someone else's drugs.  Take drugs only as directed. If you feel you need more of a drug to help your pain or need to take the drug sooner than ordered, call your doctor right away.  Opioids are habit forming. Take them only as needed.  Know how you react to your drugs. You may be unsteady on your feet and have problems walking. Do not drive or " operate machinery while taking opioids.  Store your drugs safely:   Keep drugs that come in tubes, like cream or ointment, away from other products like toothpaste. This can help avoid a serious mistake.  Store drugs away from direct sunlight. Do not store drugs in places where there is lots of humidity like in a bathroom. Your drugs may be less effective.  Do not store your drugs with other family members' drugs. Use separate areas or shelves to avoid mistakes.  Make sure opioids are kept in a locked or other secure place, away from visitors and out of reach of children and pets.     What are the causes?   You may be harmed if you:  Take a drug at the wrong time  Take too much of a drug  Take the wrong drug  Take drugs not prescribed for you  Take over-the-counter drugs and prescription drugs together  Have more than one prescription for drugs that treat the same thing  When do I need to call the doctor?   Signs of an overdose. These include very slow breathing, shallow breathing or no breathing, unable to awaken patient, slurred speech. Call for emergency help right away.  Signs of a very bad reaction. These include wheezing; chest tightness; fever; itching; bad cough; blue skin color; seizures; or swelling of face, lips, tongue, or throat. Call for emergency help right away.  If you feel you are having side effects from a drug. In the [place], you may also call the Food and Drug Administration at 7-022-FDA-0132 to report a severe drug reaction.  If you have started taking a new drug and notice changes in the way you feel, like feeling dizzy or confused  If you have questions about any of the drugs you take  You are not feeling better in 2 to 3 days or you are feeling worse  Helpful tips   Make sure all of your doctors know about every drug you are taking. Give them the list of your drugs. Ask if any new drugs will interact with your current drugs.  Be aware that your doctor may need to get a report on what drugs you  take from a state reporting agency. You may also need to have a drug screen test before your doctor orders opioids or other drugs that can be misused or abused.  Let all doctors know about any drug allergies you have.  If you have a very bad allergy, wear an allergy ID at all times.  Never share your drugs. Never take anyone else's drugs.  Talk with your doctor or pharmacist about how to get rid of extra opioids. Do not keep them lying around the house.  Read the drug package insert for more details.  When in doubt, ask questions before taking any drug.  Where can I learn more?   Family Doctor  https://familydoctor.org/condition/opioid-addiction   Family Doctor  https://familydoctor.org/safe-use-storage-and-disposal-of-opioid-drugs/   Last Reviewed Date   2021-03-24  Consumer Information Use and Disclaimer   This information is not specific medical advice and does not replace information you receive from your health care provider. This is only a brief summary of general information. It does NOT include all information about conditions, illnesses, injuries, tests, procedures, treatments, therapies, discharge instructions or life-style choices that may apply to you. You must talk with your health care provider for complete information about your health and treatment options. This information should not be used to decide whether or not to accept your health care providers advice, instructions or recommendations. Only your health care provider has the knowledge and training to provide advice that is right for you.  Copyright   Copyright © 2021 UpToDate, Inc. and its affiliates and/or licensors. All rights reserved.    Safely and Effectively Managing Pain Without Opioids    Pain whether chronic or acute, can be devastating, and unfortunately it is a reality for many people. Effective pain management can reduce pain and help improve function so people can enjoy doing what matters to them most.     Making Decisions About  How to Manage Your Pain  To find effective treatment options, talk to your doctor about managing your pain safely. A conversation with your doctor can help you understand nonopioid pain management options. Discuss  your health history,  how your activities have been impacted by pain, and  what you hope to gain from managing your pain.  Having detailed discussions with your doctor about your pain management and function goals can help your doctor identify the best treatment with the lowest level of risk.    Know about the different types of pain:  Acute pain: usually starts suddenly and has a known cause, like an injury or surgery. It normally gets better as your body heals.  Chronic pain: lasts 3 months or more and can be caused by a disease or condition, injury, medical treatment, inflammation, or even an unknown reason.    Three ways you can help safely manage your pain:  Work with your doctor to make and follow a plan  Set realistic goals to return to an activity that pain prevents you from doing  Choose low risk pain relief options    Know Your Options for Pain Management Without Opioids  There are many options for pain management that do not include prescription opioids. Some options may work better and have fewer risks and side effects than opioids.    DEPENDING ON THE TYPE OF PAIN YOU ARE EXPERIENCING, THESE OPTIONS INCLUDE:  Acetaminophen (Tylenol®) or ibuprofen (Advil®)  Topical Ointments (for example lidocaine)  Exercise therapy, including physical therapy  Interventional therapies (injections)  Exercise and weight loss  Medications for depression or for seizures- some anti-depressants and anti-seizure medications have been shown to relieve chronic pain  Cognitive behavioral therapy - a psychological, goal-directed approach in which patients learn how to alter physical, behavioral, and emotional responses to pain and stress  Other therapies such as acupuncture and massage    Develop a Pain Management Plan  "with Your Doctor  Now that you know some of the options that may help you meet your pain management goals, work with your doctor to make and follow a pain management plan. Here are some things to remember when making a pain management plan:  Be open to managing pain without opioids  Be informed and know your options  Talk to your doctor about which options may work for you; work with a specialist if needed  Follow up regularly with your doctor about your pain and whether your plan is working or not  Understand that it can take time to reduce your pain    For more information, visit Safely and Effectively Managing Pain Without Opioids  Feature Topics  Drug Overdose (cdc.gov)     Last Reviewed: June 11, 2021  Source: Centers for Disease Control and Prevention, National Center for Injury Prevention and Control    Patient Education       Urinary Incontinence   The Basics   Written by the doctors and editors at [facility]   What is urinary incontinence? -- "Urinary incontinence" is the medical term for when a person leaks urine or loses bladder control. Often it is called just "incontinence."  Incontinence is a very common problem. If you have this problem, there are treatments that can help. There are also things you can do on your own to stop or reduce urine leakage so you don't have to "just live with it."   What are the symptoms of incontinence? -- There are different types of incontinence. Each causes different symptoms. The 3 most common types are:  Stress incontinence - People with stress incontinence leak urine when they laugh, cough, sneeze, or do anything that "stresses" the belly. Stress incontinence is most common in women, especially those who have had a baby.  Urgency incontinence - People with urgency incontinence feel a strong need to urinate all of a sudden. Often the "urge" is so strong that they can't make it to the bathroom in time. "Overactive bladder" is another term for having a sudden, frequent " "urge to urinate. People with overactive bladder might or might not actually leak urine.  Mixed incontinence - People with mixed incontinence have symptoms of both stress and urgency incontinence.  Is there anything I can do on my own to feel better? -- Yes. Here are some steps that can help reduce urine leaks:  Reduce the amount of liquid you drink, especially a few hours before bed.  Cut down on any foods or drinks that make your symptoms worse. Some people find that alcohol, caffeine, or spicy or acidic foods irritate the bladder.  If you are overweight, lose weight.  If you have diabetes, keep your blood sugar as close to normal as possible.  If you take medicines called diuretics, plan ahead. These medicines increase the need to urinate. Take them when you know you will be near a bathroom for a few hours.  These techniques can also help improve bladder control:  Bladder retraining - During bladder retraining, you go to the bathroom at scheduled times. For instance, you might decide that you will go every hour. You would make yourself go every hour, even if you didn't feel like you needed to. And you would try to wait until a whole hour had passed if you needed to go sooner. Then, once you got used to going every hour, you would increase the amount of time you waited in between bathroom visits. Over time, you might be able to "retrain" your bladder to wait 3 or 4 hours between bathroom visits.  Pelvic muscle exercises - Pelvic muscle exercises strengthen the muscles that control the flow of urine. These exercises can help, but people often do them wrong. Ask your doctor or nurse how to do them right. Your doctor might suggest working with a physical therapist who has special training in these exercises.  Should I see my doctor or nurse? -- Yes. Your doctor or nurse can find out what might be causing your incontinence. They can also suggest ways to relieve the problem.  When you speak to your doctor or nurse, ask " if any of the medicines you take could be causing your symptoms. Some medicines can cause incontinence or make it worse.  How is incontinence treated? -- The treatment options differ depending on what type of incontinence you have, and whether you are a man or a woman. Some of the treatment options include:  Medicines to relax the bladder  Surgery to repair the tissues that support the bladder or to improve the flow of urine  Electrical stimulation of the nerves that relax the bladder  What will my life be like? -- Many people with incontinence can regain bladder control or at least reduce the amount of leakage they have. The key is to speak up about it to your doctor or nurse. Then work with them to find an approach that helps you.  All topics are updated as new evidence becomes available and our peer review process is complete.  This topic retrieved from [employer] on: Sep 21, 2021.  Topic 18440 Version 12.0  Release: 29.4.2 - C29.263  © 2021 UpToDate, Inc[employer] and/or its affiliates. All rights reserved.  figure 1: Location of the bladder     This drawing shows the side view of a woman's body. The bladder is in front of the vagina. The urethra is the tube that carries urine from the bladder out of the body.  Graphic 302130 Version 1.0    Consumer Information Use and Disclaimer   This information is not specific medical advice and does not replace information you receive from your health care provider. This is only a brief summary of general information. It does NOT include all information about conditions, illnesses, injuries, tests, procedures, treatments, therapies, discharge instructions or life-style choices that may apply to you. You must talk with your health care provider for complete information about your health and treatment options. This information should not be used to decide whether or not to accept your health care provider's advice, instructions or recommendations. Only your health care provider has the  knowledge and training to provide advice that is right for you. The use of this information is governed by the [employer] End User License Agreement, available at [email or web address].The use of [employer] content is governed by the [employer] Terms of Use. ©2021 [employer] Inc. All rights reserved.  Copyright   © 2021 [employer], Inc. and/or its affiliates. All rights reserved.

## 2025-02-13 ENCOUNTER — OFFICE VISIT (OUTPATIENT)
Dept: HEMATOLOGY/ONCOLOGY | Facility: CLINIC | Age: 60
End: 2025-02-13
Payer: MEDICARE

## 2025-02-13 ENCOUNTER — PATIENT MESSAGE (OUTPATIENT)
Dept: INTERNAL MEDICINE | Facility: CLINIC | Age: 60
End: 2025-02-13
Payer: MEDICARE

## 2025-02-13 ENCOUNTER — LAB VISIT (OUTPATIENT)
Dept: LAB | Facility: HOSPITAL | Age: 60
End: 2025-02-13
Attending: INTERNAL MEDICINE
Payer: MEDICARE

## 2025-02-13 VITALS
SYSTOLIC BLOOD PRESSURE: 124 MMHG | HEIGHT: 63 IN | DIASTOLIC BLOOD PRESSURE: 71 MMHG | OXYGEN SATURATION: 95 % | WEIGHT: 175.5 LBS | TEMPERATURE: 98 F | RESPIRATION RATE: 18 BRPM | BODY MASS INDEX: 31.1 KG/M2 | HEART RATE: 76 BPM

## 2025-02-13 DIAGNOSIS — M54.50 CHRONIC LOW BACK PAIN, UNSPECIFIED BACK PAIN LATERALITY, UNSPECIFIED WHETHER SCIATICA PRESENT: ICD-10-CM

## 2025-02-13 DIAGNOSIS — N39.0 URINARY TRACT INFECTION WITHOUT HEMATURIA, SITE UNSPECIFIED: Primary | ICD-10-CM

## 2025-02-13 DIAGNOSIS — G89.29 CHRONIC LOW BACK PAIN, UNSPECIFIED BACK PAIN LATERALITY, UNSPECIFIED WHETHER SCIATICA PRESENT: ICD-10-CM

## 2025-02-13 DIAGNOSIS — E85.9 AMYLOIDOSIS, UNSPECIFIED TYPE: ICD-10-CM

## 2025-02-13 DIAGNOSIS — E85.9 AMYLOIDOSIS, UNSPECIFIED TYPE: Primary | ICD-10-CM

## 2025-02-13 DIAGNOSIS — D63.8 ANEMIA OF CHRONIC DISEASE: ICD-10-CM

## 2025-02-13 DIAGNOSIS — N18.4 CHRONIC KIDNEY DISEASE, STAGE 4 (SEVERE): ICD-10-CM

## 2025-02-13 LAB
ABS NEUT CALC (OHS): 3.64 X10(3)/MCL (ref 2.1–9.2)
ANISOCYTOSIS BLD QL SMEAR: ABNORMAL
EOSINOPHIL NFR BLD MANUAL: 0.2 X10(3)/MCL (ref 0–0.9)
EOSINOPHIL NFR BLD MANUAL: 4 % (ref 0–8)
ERYTHROCYTE [DISTWIDTH] IN BLOOD BY AUTOMATED COUNT: 14.5 % (ref 11.5–17)
HCT VFR BLD AUTO: 29.7 % (ref 37–47)
HGB BLD-MCNC: 9.3 G/DL (ref 12–16)
LYMPHOCYTES NFR BLD MANUAL: 0.97 X10(3)/MCL
LYMPHOCYTES NFR BLD MANUAL: 19 % (ref 13–40)
MCH RBC QN AUTO: 28.4 PG (ref 27–31)
MCHC RBC AUTO-ENTMCNC: 31.3 G/DL (ref 33–36)
MCV RBC AUTO: 90.8 FL (ref 80–94)
MICROCYTES BLD QL SMEAR: ABNORMAL
MONOCYTES NFR BLD MANUAL: 0.31 X10(3)/MCL (ref 0.1–1.3)
MONOCYTES NFR BLD MANUAL: 6 % (ref 2–11)
NEUTROPHILS NFR BLD MANUAL: 71 % (ref 47–80)
PLATELET # BLD AUTO: 187 X10(3)/MCL (ref 130–400)
PLATELET # BLD EST: NORMAL 10*3/UL
PMV BLD AUTO: 9.2 FL (ref 7.4–10.4)
POIKILOCYTOSIS BLD QL SMEAR: ABNORMAL
RBC # BLD AUTO: 3.27 X10(6)/MCL (ref 4.2–5.4)
RBC MORPH BLD: ABNORMAL
TEAR DROP CELL (OLG): ABNORMAL
WBC # BLD AUTO: 5.12 X10(3)/MCL (ref 4.5–11.5)

## 2025-02-13 PROCEDURE — 85025 COMPLETE CBC W/AUTO DIFF WBC: CPT

## 2025-02-13 PROCEDURE — 99214 OFFICE O/P EST MOD 30 MIN: CPT | Mod: S$PBB,,, | Performed by: NURSE PRACTITIONER

## 2025-02-13 PROCEDURE — 99999 PR PBB SHADOW E&M-EST. PATIENT-LVL IV: CPT | Mod: PBBFAC,,, | Performed by: NURSE PRACTITIONER

## 2025-02-13 PROCEDURE — 99214 OFFICE O/P EST MOD 30 MIN: CPT | Mod: PBBFAC | Performed by: NURSE PRACTITIONER

## 2025-02-13 PROCEDURE — 36415 COLL VENOUS BLD VENIPUNCTURE: CPT

## 2025-02-13 RX ORDER — NITROFURANTOIN 25; 75 MG/1; MG/1
100 CAPSULE ORAL 2 TIMES DAILY
Qty: 20 CAPSULE | Refills: 0 | Status: SHIPPED | OUTPATIENT
Start: 2025-02-13

## 2025-02-13 NOTE — TELEPHONE ENCOUNTER
Spoke with patient and she was given results and recommendations. Verbalized understanding. Macrobid BID X 10 days sent to pharmacy

## 2025-02-13 NOTE — PROGRESS NOTES
HEMATOLOGY/ONCOLOGY OFFICE CLINIC VISIT    Visit Information:    No show/Reschedules/Cancellations  08/21/2018--> Rescheduled                07/20/2023-->canceled visit/labs/infusion 05/08/2024 11/05/2019--> No Show/Reschedule 08/01/2023-->canceled visit/labs/infusion 05/22/2024 06/24/2020--> No Show/Reschedule 08/31/2023-->canceled visit/labs/infusion 05/22/2024 09/24/2020--> No Show/Reschedule 09/21/2023-->canceled visit/labs/infusion 06/03/2024  03/15/2021--> No Show/Reschedule 10/24/2023-->canceled visit/labs/infusion 07/11/2024 03/23/2021--> No Show/Reschedule 10/25/2023-->canceled visit/labs/infusion 07/31/2024 04/13/2021--> No Show   10/26/2023-->canceled visit/labs/infusion 08/01/2024 05/27/2021--> No Show   10/31/2023-->canceled visit/labs/infusion 08/14/2024 08/19/2021--> No Show   11/01/2023-->canceled visit/labs/infusion 08/19/2024--> No Show   08/26/2021--> No Show/Rescheduled 11/29/2023-->canceled visit/labs/infusion 09/11/2024 04/26/2022--> No Show   12/04/2023-->canceled visit/labs/infusion 09/18/2024  10/03/2022--> Rescheduled  12/07/2023-->canceled visit/labs/infusion 01/02/2025  10/11/2022--> Rescheduled  01/03/2024-->canceled visit/labs/infusion  03/22/2023--> No Show/Rescheduled 01/11/2024-->canceled visit/labs/infusion  4/26/2023->Rescheduled   01/25/2024-->canceled visit/labs/infusion  05/09/2023->Rescheduled   04/03/2024 5/30/2023->Rescheduled   04/24/2024  6/15/2023->Rescheduled   05/01/2024      Referring Physician: Dr Farr  PCP: DR. Cardona  GI:   Rheumatologist: Dr. Luis Rea  Immunologist: Dr. Daniel Nava  ENT: Dr. Brice Williamson  [facility] Pain management: Dr.Chai [facility] Transplant: Dr. Adrian Naylor [facility] Med Onc: Dr. Zule[facility] Breast Surg Onc: Dr.DeSnyder [place]: Dr. 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   8) Stage IA grade 3, ER+, HER2 BERNA +,  IDC/DCIS of the left breast --- 2/7/18 at [facility]   --s/p lumpectomy with SLNB 4/12, Grade 2 IDC 4mm with second focus of microinvasive carcinoma 0.4mm. 2 SLN negative for malignancy    9) Progressive swelling of R lower extremity--- US NIVA done 2/19/18 negative for evidence of DVT  10). Paroxysmal Afib (2/18/18) diagnosed at [facility]; ECHO noted EF 58% - on Eliquis  11) Mild CKD with GFR 55 - managed per [facility] nephrology  12) Treatment induced neutropenia     Present treatment:  -Maintenance Revlimid 5mg PO QOD, started 02/16/17-- was held for a few months while undergoing treatment for her breast cancer 02/18-05/18; Restarted 6/15/18   Dexamethasone 20 mg po weekly added early July 2017--> reduced to 12 mg PO weekly October 4, 2017---> increased to 16mg PO weekly 2/15/18---restarted 6/15/18 --> 20 mg weekly 7//8/2022  Darzalex 7/8/2022-present--On hold due to cellulitis    Eliquis 2.5mg PO BID     Treatment/Oncology history:  1) CyBorD x5 cycles 09/28/15-12/24/15  2) Autologous stem cell transplant 02/08/16, done at [facility]  3) Exploratory laparotomy with subtotal colectomy and end ileostomy done August 2, 2016--pathology specimen showed advanced colonic amyloidosis extensive involving the muscular wall submucosa and mucosa.  Appendix also involvement by amyloidosis with fibrous obliteration of the distal lumen.  4) Maintenance therapy with Revlimid 5mg-started 06/01/16--Discontinued shortly after 2/2 cytopenias  5) Left breast lumpectomy with SLNB at [facility] 4/12/18  6) Left  partial breast RT x10 fractions  5/21/18-6/4/18  7) Femara 2.5 mg PO daily completed 5 yrs (6/2018- 6/2023)      Imaging:  NIVA 7/29/2021: Negative for deep venous thrombosis in the left lower extremity.   MRI CTL spine 9/14/2022: No acute myelomatous findings. Severe spinal stenosis process detected within the upper cervical spine as well as the entirety of the lumbar spine segments similar to the prior imaging assessment.  PET CT 9/14/2020: No convincing evidence of hypermetabolic metastatic disease. Asymmetric uptake in the right nasopharynx may be infectious or inflammatory.    CT C 1/24/2023: Right greater than left lower lobe opacification consistent with infectious process.  Lucent lesions throughout the osseous structures similar to 2015.    PET 4/17/2024: 1.  No evidence of active skeletal disease. 2.  Acute nonpathological fractures of the right anterolateral 5th-7th ribs. No impending pathological fracture of the long bones. 3.  No extramedullary disease. 4.  Diffuse skin thickening of the left lower leg with multiple foci of increased cutaneous uptake and associated with FDG-avid left inguinal and iliac chain lymph nodes. These findings may be related to lymphedema; however, superimposed angiosarcoma is not excluded given the foci of increased uptake. Direct inspection and biopsy are recommended.   PET CT 09/04/2024: No evidence of active skeletal disease. No acute or impending pathological fracture. No extramedullary disease. New FDG-avid mucosal thickening in the left maxillary sinus, consistent with sinusitis. Lucency and increased uptake associated with the root of a left maxillary molar (likely tooth #15), consistent with periodontal disease.  Improving diffuse cutaneous and subcutaneous uptake in the left lower extremity, consistent with improving cellulitis. New and worsening focal nodular areas of increased uptake in the left lower extremity. Direct inspection is recommended.   Femur XR  12/9/2024: 1. No fracture or suspicious bone lesions are visualized in the left femur or left leg. 2. Subcutaneous edema in the left leg.   Tibia/fibula XR 12/9/2024: 1. No fracture or suspicious bone lesions are visualized in the left femur or left leg. 2. Subcutaneous edema in the left leg.       Breast imaging:  [facility] 12/29/2017: Calcifications in the left breast are suspicious. Stereotactic biopsy is recommended. BI-RADS Category 4: Suspicious Abnormality   [facility] 12/10/2018: Post surgical scar in the left breast is benign. BI-RADS Category 2: Benign Finding(s)   G 12/16/2019: There is no mammographic evidence of malignancy. BI-RADS Category 2: Benign Finding(s)   [facility] 12/14/2020: There is no mammographic evidence of malignancy. BI-RADS Category 2: Benign Finding(s)   [facility] 3/21/2022: BI-RADS Category 2: Benign Finding(s)  [facility] 4/11/2023: There is no mammographic evidence of malignancy. BI-RADS Category 2: Benign Finding(s)   [facility] 11/14/2024: Bilateral. There is no mammographic evidence of malignancy. Overall BI-RAD Category: 2 - Benign     Pathology:  9/17/2015:  A. Soft tissue, abdominal wall, FNA   No malignant cells identified   Mature adipose tissue   Congo red stain is positive for amyloid deposition     9/18/2015:  BONE MARROW DIAGNOSIS:  -PLASMA CELL NEOPLASM, REPRESENTING APPROXIMATELY 80% OF CELLULAR  ELEMENTS     1/29/2018:  STEREOTACTIC BIOPSY Left breast:  INVASIVE DUCTAL CARCINOMA, NO SPECIAL TYPE, SUZANNE GRADE 3 (3 + 2 + 2),   SINGLE FOCUS MEASURING 1.7 MM WITH SECOND FOCUS OF MICROINVASION MEASURING 0.3 MM.   DUCTAL CARCINOMA IN SITU, INTERMEDIATE NUCLEAR GRADE, SOLID TYPE WITH COMEDONECROSIS AND ASSOCIATED CALCIFICATIONS.   Atypical lobular hyperplasia with cytologic atypia, likely treatment effect.     CLINICAL HISTORY:       Patient: Ninfa Candelario is a 59 y.o. female.  Ms. Cabrera Joseph was admitted on 08/16/15 for ileus versus partial SBO. CT A/P done 08/17/15 showed sigmoid colitis and a zone  of transition at the junction of the descending and sigmoid colon which could indicate some degree of obstruction and an obstructing mass cannot be excluded. Numerous skeletal lytic lesions noted. CT chest done 08/19/15 showed Multiple skeletal lytic lesions involving the thoracic spine, left rib sternum consistent with either metastases or multiple myeloma. There is no evidence of pulmonary or mediastinal mass. Dr. Farr was consulted for possible MM/anemia.     Nuclear Bone scan done 8/20/15 showed mild to moderate increased activity in left rib and right second rib which correlates with fractures seen on CT.  Skeletal survey done 8/20/15showed lytic lesions in the calvarium and probably a lytic lesion in the left scapula. Lytic areas in the spine and pelvis seen on recent CT are not well defined on skeletal survey. Work up labs done 08/20/15: Negative for sickle cell and Thalessemia (reported history of thalessemia). Iron 54, Transferrin 118.0, Iron sat 34.6%, Folate 26.5, Vit B12 1,779  Peripheral smear resulted slightly macrocytic normochromic anemia without signifcant anisocytosis may reflect early B12/folate deficiency or marrow dysfunction, among others. No immature myeloid cells or blasts. Platlets adequate. Beta 2 mircoglobulin = 3.2.  SPEP/NADIA: M-spike in the beta region. The monoclonal protein peak accounts for 1.13 g/dL. Hypogammaglobulinemia. NADIA pattern shows the presence of a free lambda light chain monoclonal protein with additional faint band in IgA.  All immunoglobulin levels decreased. IgA=26, IgG=307, IgM<5.  Kappa Qnt 0.16, Lambda Qnt 4600.00, Ratio <0.01.  24hr Urine for Bence Mendez: Kappa 2.75, Lambda 1250.00, Ratio <0.01. NADIA: Urine is POSITIVE for monoclonal Free Lambda Light chains     [facility] bone marrow biopsy on 09/18/15. BMBx showed 80% plasma cells, 13p deletion and normal karyotype. She underwent fat pad biopsy which came back  positive for Congo red consistent with amyloidosis. Cardiac workup revealed no amyloid deposition within the heart.  PET/CT and skeletal survey done September 18, 2015 showed multiple lytic osseous lesions  The patient was started on Velcade while at [facility] with the plan to transition to autologous stem cell transplantation. They asked if we could give her could continue treatment here.   She completed CyborD x5 cycles on 12/24/15     Patient admitted 01/06/16 for colitis and C. Diff. Pt treated with Flagyl. Discharged home 01/08/16     Repeat BMBx done at [facility] 01/2016 did not show any morphologic or immnophenotypic evidence of plasama cell neoplasm. Patient underwent Autologous stem cell transplant with Busulfan and melphalan on 02/08/16 at [facility]. The only complication was recurrent C.diff infection for which she completed 10 days of Fidaxomycin.     Follow-up bone marrow biopsy done at [facility] done 5/16/16 showed cellular 30-40% bone marrow with trilineage hematopoiesis. No morphologic or immunophenotypic support for plasma cell neoplasm. Started maintenance Revlimid 5mg. Unable to continue therapy due to cytopenias.     On 08/02/16 patient underwent  Exploratory laparotomy with subtotal colectomy and end ileostomy for what was thought to be toxic megacolon. Pathology showed extensive advanced colonic amyloidosis involving the muscular wall, submucosa and mucosa: With the appendix also involved with amyloidosis.  Positive lambda light chains were noted.     Follow-up at [facility] 8/31/16, Per Dr. Adrian Naylor, 6 months post autologous transplant. She has engrafted. Based on the latest restaging she is near complete remission with possible IgA lambda on serum immunofixation. Patient was instructed to discontinue prophylactic antibiotics. She received her 1st series of immunizations while at [facility]. Patient had a bilateral venous ultrasound to rule out DVT, negative B/L. In regards to amyloidosis the patient  feels that her tongue is smaller in size and her BNP has come down some.  Patient had a follow-up appointment at [facility] November 30, 2016.  Dr. Parnell documented the patient's free lambda light chain remains at a lower level.  Therefore in light of her recent surgery they will continue with observation only.  The plan to see the patient back in 2-3 months with repeat restaging labs.  They recommended regular CBC checks to monitor anemia.  Patient returned to [facility] in December 2016 to meet with dermatology for numerous papillary lesions on eyelids, chest and posterior neck consistent with amyloid deposits. Recommendations were for watchful waiting.  Patient is less than 1 year out from bone marrow transplant and further improvement can be expected.  She will see the patient back in 1 year to discuss possible surgical resection of the plaques especially around the eyelid region.  Rogaine recommended for persistent hair loss. Retin-A recommended for acne vulgaris.  Patient returned to [facility] on 2/8/2017 for neurosurgery consult for chronic low back pain and difficulty walking.  Imaging showed extensive DJD with discovertebral bulges and thickening of the spinal laminar tissues creating spinal stenosis throughout, but greatest at L2/L3.  Neurosurgery felt that surgery risks outweighed the benefits.  Patient was prescribed pain medicine and encouraged to participate in physical therapy.  Patient also met with Dr. Parnell on 02/08/17, who noted an elevation in free light chains (kappa 52.60/lambda 27.77/ratio 1.89).  Recommendation is to resume maintenance therapy with Revlimid at a low dose of 5 mg every other day.  Patient went back to [facility] first week of April 2017.  I do not have these notes.  Reportedly she was told to continue on every other day Revlimid at 5 mg.  She reportedly had repeat myeloma labs done as well.  Again I do not have these results.  Patient went back to [facility] and saw   Parmjit June 29, 2017.  Myeloma labs were done with serum protein electrophoresis showing irregularity in the fast gamma region.  IgG of 1291.  Free kappa light chains of 84.6 with lambda light chains of 66.9.  Beta-2 microglobulin of 4.5  CT scan of lumbar spine showed multiple lytic lesions once again in the lumbar spine and bony pelvis.  Ultrasound of the left leg showed no evidence of DVT.  It was recommended based on the slight increase in her And lambda light chains to start weekly dexamethasone 20 mg p.o. weekly.  Follow-up visit and labs at [facility] on September 29, 2017 with Dr. Parnell.  Repeat beta-2 microglobulin came back at 2.4.  Serum IgG of 761, IgA 117 and IgM of 29.  Serum free kappa light chains of 24.9 and lambda of 23.5.  Counts were stable with hemoglobin of 11.1, WBC 4.9 and platelets of 210,000.  Recommendations were for continued Revlimid every other day with weekly dexamethasone along with aspirin for DVT prophylaxis.  Bilateral diagnostic MMG 12/19/17  noted 1.6cm coarse heterogeneous calcifications in posterior region of L breast at 3 o'clock position. Patient was scheduled for FNA which confirmed ID grade 3, single focus measuring 1.7cm with 2nd focus microinvasion DCIS grade 2 measuring 0.3cm. Left axillary LN biopsy showed no evidence of metastatic carcinoma. Complete staging: Stage IA, grade 3 ER+, Her 2 Niki + IDC/DCIS of left breast. Ki-67 <17%.  Repeat myeloma labs showed rise in free lambda light chains noted at 68.69, kappa light chains at 27.22,  beta 2 microglobulin came back at 2.9. Serum protein electrophoresis showed no definitive evidence of an M protein peak. The pattern is consistent with an acute inflammatory reaction. Recommendations were made to maintain Revlimid at current dose of 5mg every other day to be taken continuously increase weekly dexamethasone to 16 mg.   Patient seen at [facility] with tentative plan for L breast lumpectomy on 3/13/18. 2/16/18- Prior to  surgery she was seen by genetic counselor who ran genetic testing per patient reques, all of which were negative. She was then seen by cardiology at [facility] 2/16/18 for further evaluation of persistent bilateral lower extremity swelling-ECHO noted EF of 58%; diagnosed with paroxysmal atrial fibrillation and instructed to begin daily ASA. During preoperative asssessment per Rad/Onc 3/12/18, corresponding chest CT noted new bilateral pulmonary nodules concerning for metastatic disease- surgery was subsequently postponed pending pulmonary evaluation. Seen by Pulmonology on 3/21/18, PFTs within normal limits and cleared patient for surgery with recommended close CT follow up of nodules in 3 months. 3/21/18 seen by nephrology for management of mild CKD (GFR 55)-cleared for surgery with recommended follow up in 3 months. Left breast lumpectomy and SLNB performed per Dr. Washburn on 4/12/18- plan for follow up in clinic on 4/24/18  for postoperative assessment. Follow up with Dr. Poole (Med/Onc) on 4/25/18. Follow up with Dr. Parnell 4/26/18.   Patient seen at [facility] s/p Left breast lumpectomy with SLNB on 4/12/18. Following surgery she completed Left partial breast radiation x 10 fractions. She was then started on endocrine therapy with Femara after been deemedan inappropriate candidate for systemic chemotherapy/Herceptin.      She was seen by neurosugery at [facility] on 4/26/18 following repeat MRI of cervical thoracic lumbar spine which noted focal enhancement of T2 hyperintensity at the C2 level which may be due to degenerative changes. Signal abnormality within  the T12 and L1 may be secondary to myeloma. Plan to continue with observation for now with F/U scheduled in October 2018.      She was seen by Dr. Parnell at [facility] for management of her MM on 4/26/18. Patient was recommended to restart Revlimid 5mg PO every other day with notes that these recommendations would be communicated to collaborating  Oncologist. Patient was also recommended to start on Zometa for her bone disease.      Seen by Dr. Parnell at [facility] for management of her MM on 6/28/18. Repeat light chains noted lambda 33.36 (previously 80.80), K/L ratio 0.91 (previously 0.49). Due to slight improvement in light chains, plan to continue on lenalidomide maintenance. Recommendations to continue anticoagulation with Eliquis. BLE venous doppler negative for DVT.   Seen by pulmonolgy on 6/28/18- repeat CT chest done 6/28/18 noted resolution of previous pulmonary nodules. Thought to be infectious in nature. Recommendations for discharge from pulmonary clinic.  Should MRI of her cervical thoracic lumbar spine on 2/12/2019 that demonstrated cervical spondylosis with canal stenosis most notable at C1 and 2. Cord signal abnormality at C1 and 2 with enhancement is stable. Lumbar spondylosis with central canal stenosis at multiple levels. No imaging features of bony metastatic disease.     For amyloidosis (Light chain type).    Patient presented with macroglossia and now with improvement of the swelling of her tongue. Her cardiac parameters are also better.   8/10/2020 proBNP  250   2/17/2020                616  8/9/2019                  190  11/14/2023  820        Chief Complaint:for Multiple Myeloma       Interval History:  She is currently on Rev/Dex/Darzalex  for MM  s/p transplant in 2016. At her [facility] visit on 10/12/22, Ninlaro was discontinued due to it causing severe diarrhea and leukopenia. Diarrhea resolved after stopping Ninlaro (she admits to stopping prior to visit @ [facility]).     I Called Dr Parnell office and she is out of the office. She will be back next week. Will try again then  07/11/2024:  Dr. Parnell kindly called me back to discuss patient.  She recommend to decrease the dose of dexamethasone to 10 mg to see if this can help to decrease the frequency of infections.  She does not opposed to IVIG if needed for her hypogammaglobulinemia.  She  "usually starts when IgG is less than 400.  In this case if she ever start the IVIG needs to be given in a very slow rate due to her heart and fluid retention risks.  We will continue Evangelina monthly and will decrease dexamethasone to 10 mg.    10/10/2024: Patient is here today for follow up of her multiple myeloma, amyloidosis and breast cancer.  Daratumumab has been on hold since April 2024 for LLE cellulitis. She was admitted to [facility] per  Dr. Parnell recommendations until LLE is better. Swelling to BLE noted during physical exam, L>R. She has follow up with Dr. Parnell @ [facility] on 11/15/24.    12/2/2024: Patient present today in followup. She rescheduled several appts. She reports that she was not feeling well.  She has not been taking her Revlimid as the pharmacy delivery company lost it. After several attempts to get the medication her pharmacy was "found it" and she will  after she d/c from the clinic.  Otherwise no fever, chills, sweats.    01/13/2025: Patient present today for follow up and and is doing about the same.  She was seen at [facility] that recommend to hold Revlimid as she has not been taking it in about 2 months or so.  She has an appointment with them next month.  No fever, chills, sweats.  No chest pain or shortness of breath.    02/12/25: Patient present today for follow up     Interval history  2/13/2025:  February 12, 2025 patient was seen and evaluated by Internal Medicine for multiple chronic conditions.  She was referred to Nephrology and the patient was initiated on dapagliflozin    On today's visit the patient tells me that she continues to have fatigue, back pain.  She is planning to see her amyloidosis and myeloma specialist at [facility] on Wednesday.      ROS: All 14 points ROS taken and as per Interval History  Review of Systems   Constitutional:  Positive for malaise/fatigue. Negative for chills, fever and weight loss.   HENT:  Negative for congestion and nosebleeds.    Eyes:  " "Negative for blurred vision, double vision and photophobia.   Respiratory:  Negative for cough, hemoptysis and shortness of breath.    Cardiovascular:  Negative for chest pain, palpitations, leg swelling and PND.   Gastrointestinal:  Positive for diarrhea (Better), nausea and vomiting. Negative for abdominal pain, blood in stool, constipation and melena.   Genitourinary:  Negative for dysuria, frequency, hematuria and urgency.   Musculoskeletal:  Negative for back pain, falls and myalgias.   Skin:  Negative for itching and rash.   Neurological:  Positive for weakness. Negative for tremors, focal weakness, seizures and headaches.   Endo/Heme/Allergies:  Negative for environmental allergies. Does not bruise/bleed easily.   Psychiatric/Behavioral:  Negative for depression and suicidal ideas. The patient is not nervous/anxious.          Histories:  PMH/PSH/FH/SOCIAL/ALLERGIES AND MEDS REVIEWED AND UPDATED AS APPROPRIATE       Vitals:    02/13/25 1530   BP: 124/71   BP Location: Left arm   Patient Position: Sitting   Pulse: 76   Resp: 18   Temp: 98 °F (36.7 °C)   TempSrc: Oral   SpO2: 95%   Weight: 79.6 kg (175 lb 8 oz)   Height: 5' 3" (1.6 m)         Wt Readings from Last 6 Encounters:   02/13/25 79.6 kg (175 lb 8 oz)   02/12/25 73.5 kg (162 lb)   01/13/25 75.8 kg (167 lb)   12/02/24 79.7 kg (175 lb 11.2 oz)   10/10/24 80.7 kg (177 lb 14.2 oz)   10/10/24 80.7 kg (177 lb 14.4 oz)     Body mass index is 31.09 kg/m².  Body surface area is 1.88 meters squared.      Vitals reviewed and stable  Physical Exam  Vitals and nursing note reviewed.   Constitutional:       General: She is not in acute distress.     Appearance: Normal appearance. She is well-developed. She is ill-appearing.      Comments: Uses walker for mobility   HENT:      Head: Normocephalic and atraumatic.      Mouth/Throat:      Mouth: Mucous membranes are moist.   Eyes:      General: No scleral icterus.     Extraocular Movements: Extraocular movements intact. "      Conjunctiva/sclera: Conjunctivae normal.      Pupils: Pupils are equal, round, and reactive to light.   Neck:      Vascular: No JVD.   Cardiovascular:      Rate and Rhythm: Normal rate and regular rhythm.      Heart sounds: No murmur heard.  Pulmonary:      Effort: Pulmonary effort is normal.      Breath sounds: Normal breath sounds. No wheezing or rhonchi.   Abdominal:      General: Bowel sounds are normal. There is no distension.      Palpations: Abdomen is soft. There is no mass.      Tenderness: There is no abdominal tenderness.   Musculoskeletal:         General: No swelling or deformity.      Cervical back: Neck supple.      Right lower le+ Edema present.      Left lower leg: 3+ Edema present.      Comments: Hyperpigmentation of left leg.    Lymphadenopathy:      Head:      Right side of head: No submandibular adenopathy.      Left side of head: No submandibular adenopathy.      Cervical: No cervical adenopathy.      Upper Body:      Right upper body: No supraclavicular or axillary adenopathy.      Left upper body: No supraclavicular or axillary adenopathy.      Lower Body: No right inguinal adenopathy. No left inguinal adenopathy.   Skin:     General: Skin is warm.      Coloration: Skin is not jaundiced.      Findings: Rash present.      Nails: There is no clubbing.      Comments: Rash: bilateral; LE left > right   Neurological:      Mental Status: She is alert and oriented to person, place, and time.      Cranial Nerves: Cranial nerves 2-12 are intact.      Motor: Weakness present.      Gait: Gait abnormal.   Psychiatric:         Attention and Perception: Attention normal.         Behavior: Behavior is cooperative.         Cognition and Memory: Cognition normal.         Judgment: Judgment normal.       ECOG SCORE             Laboratory:  CBC with Differential:  Lab Results   Component Value Date    WBC 3.34 (L) 2025    RBC 4.04 (L) 2025    HGB 11.6 (L) 2025    HCT 36.0 (L)  "01/13/2025    MCV 89.1 01/13/2025    MCH 28.7 01/13/2025    MCHC 32.2 (L) 01/13/2025    RDW 14.0 01/13/2025     01/13/2025    MPV 9.2 01/13/2025        CMP:  Sodium   Date Value Ref Range Status   01/13/2025 137 136 - 145 mmol/L Final     Potassium   Date Value Ref Range Status   01/13/2025 4.2 3.5 - 5.1 mmol/L Final     Chloride   Date Value Ref Range Status   01/13/2025 103 98 - 107 mmol/L Final     CO2   Date Value Ref Range Status   01/13/2025 24 23 - 31 mmol/L Final     Blood Urea Nitrogen   Date Value Ref Range Status   01/13/2025 34.7 (H) 9.8 - 20.1 mg/dL Final   07/18/2023 35 (H) 6 - 23 mg/dL Final     Creatinine   Date Value Ref Range Status   01/13/2025 3.20 (H) 0.55 - 1.02 mg/dL Final   07/18/2023 2.48 (H) 0.51 - 0.95 mg/dL Final     Calcium   Date Value Ref Range Status   01/13/2025 9.2 8.4 - 10.2 mg/dL Final   07/18/2023 9.7 8.4 - 10.2 mg/dL Final     Albumin   Date Value Ref Range Status   01/13/2025 3.2 (L) 3.4 - 4.8 g/dL Final     Bilirubin Total   Date Value Ref Range Status   01/13/2025 0.2 <=1.5 mg/dL Final     ALP   Date Value Ref Range Status   01/13/2025 96 40 - 150 unit/L Final     AST   Date Value Ref Range Status   01/13/2025 17 5 - 34 unit/L Final     ALT   Date Value Ref Range Status   01/13/2025 19 0 - 55 unit/L Final     Estimated GFR-Non    Date Value Ref Range Status   04/20/2022 25           Assessment:       No diagnosis found.      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 [place] red fat pad + Dx 2/2015  --Last seen by Dr Parnell at [facility] [place] 12/20/2024: Her note "AL Amyloid/ MM: Currently available MM/AL amyloid parameters indicate a decrease in the NT Pro BNP and troponin T. We asked if she had taken any steroids during " "her URI to explain the reduction of Amyloid and MM parameters, but the pt indicated that she had not). Her MM parameters have improved as well.While there was a prior minimal rise in the FKLC this patient has had a lambda clonality in the past.. She had been off of therapy which has resumed but her parameters already demonstrate improvement over the last visit. We previously recommended stopping lenalidomide and seeing if the response can be maintained and trying to reduce the fluid retention and other side effects of dexamethasone. This has not improved then we recommended reinitiation of DRd. Most recent PET CT w/o progression or active skeletal or extramedullary disease. The patient will be scheduled for follow-up in 2-3 for continued monitoring of the chronic plasma cell dyscrasia and orders for CBC, CMP, serum and urine protein electrophoretic and immunofixation studies, free light chain studies, and immunoglobulins have been placed for that follow/up visit. 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 Sx risks do not outweigh benefits. Pain meds given and encouraged Physical Therpay    8) Amyloid plaques, evaluated by Derm at [facility], recommend watchful waiting. Patient to follow up 12/2017    9) Stage IA grade 3, ER+, HER2 BERNA +,  IDC/DCIS of the left breast cancer --- 2/7/18 at [facility]; s/p lumpectomy with SLNB 4/12, Grade 2 IDC measuring 4mm with second focus of microinvasive carcinoma measuring  0.4mm .IG DCIS, 0.3mm to posterior margin; 2 SLN negative for malignancy      10) Progressive swelling of R lower extremity--- US NIVA done 2/19/18 negative for evidence of " DVT    11). Paroxysmal Afib (2/18/18) diagnosed at [facility]; ECHO noted EF 58%    12. Mild CKD with GFR 55 - managed per [facility] nephrology    13). Pulmonary nodules noted on pre-operative chest CT scan (3/12/18) noted new bilateral pulmonary nodules are nonspecific -- seen by Pulmonology at [facility] (3/21/18) thought to be inflammatory/infectious in nature, repeat Chest CT 6/28/18 noted resolution of nodules    14). Treatment induced neutropenia    15) NON COMPLIANT patient-- please see above the no show, reschedule and cancelled visits/labs and infusion.     16) Neoplasm associated pain/polyneuropathy: continues w/ pain service on methadone/ oxycodone /duloxitene      Plan:       Dx:  Amyloidosis  Lambda light chain type  Status:  Ongoing  We will hold daratumumab today given increase in creatinine.  Reviewed labs with the patient  Patient will follow-up with myeloma specialist and amyloidosis specialist at [facility] on Wednesday  We will wait for recommendations from [facility] before proceeding with any sort of treatment  Dx:  Kidney dysfunction  Status:  Worsening  Patient had an abrupt change on kidney function.  This was identified by PCP yesterday who referred the patient to nephrologist and I started her on Farxiga.      Patient instructions and visit orders.       -Follow-up:  F/U with MD- 2/24/25  -Labs:  CBC, CMP- 2/24/25  -Imaging:    -Other orders:      43 were spent on this encounter    Kit Lebron, MSN, FNP-BC, APRN, AOCNP   Department of Hematology/Oncology.

## 2025-02-14 ENCOUNTER — TELEPHONE (OUTPATIENT)
Dept: INTERNAL MEDICINE | Facility: CLINIC | Age: 60
End: 2025-02-14
Payer: MEDICARE

## 2025-02-14 DIAGNOSIS — N39.0 URINARY TRACT INFECTION WITHOUT HEMATURIA, SITE UNSPECIFIED: Primary | ICD-10-CM

## 2025-02-14 LAB — BACTERIA UR CULT: ABNORMAL

## 2025-02-14 RX ORDER — CIPROFLOXACIN 500 MG/1
500 TABLET ORAL 2 TIMES DAILY
Qty: 14 TABLET | Refills: 0 | Status: SHIPPED | OUTPATIENT
Start: 2025-02-14

## 2025-02-14 NOTE — TELEPHONE ENCOUNTER
----- Message from Adry Rosario MD sent at 2/14/2025  9:14 AM CST -----  Please contact the patient and let them know that their urinalysis results were noted with acute UTI and patient needs a prescription for ciprofloxacin.  Please send 500 mg p.o. b.i.d. for 7 days, no refills

## 2025-02-20 ENCOUNTER — TELEPHONE (OUTPATIENT)
Dept: HEMATOLOGY/ONCOLOGY | Facility: CLINIC | Age: 60
End: 2025-02-20
Payer: MEDICARE

## 2025-02-20 NOTE — TELEPHONE ENCOUNTER
Patient left message that physician @ [facility] would like us to order a renal u/s. I attempted to contact patient to get more information, but unable to reach. I see that tx is on hold due to elevated creatinine. Please advise.

## 2025-02-24 ENCOUNTER — PATIENT MESSAGE (OUTPATIENT)
Dept: HEMATOLOGY/ONCOLOGY | Facility: CLINIC | Age: 60
End: 2025-02-24
Payer: MEDICARE

## 2025-02-24 DIAGNOSIS — E78.2 MIXED HYPERLIPIDEMIA: ICD-10-CM

## 2025-02-25 RX ORDER — ROSUVASTATIN CALCIUM 10 MG/1
10 TABLET, COATED ORAL DAILY
Qty: 90 TABLET | Refills: 3 | Status: SHIPPED | OUTPATIENT
Start: 2025-02-25

## 2025-02-28 DIAGNOSIS — C90.00 MULTIPLE MYELOMA, REMISSION STATUS UNSPECIFIED: Primary | ICD-10-CM

## 2025-02-28 DIAGNOSIS — Z85.3 HISTORY OF BREAST CANCER: ICD-10-CM

## 2025-03-03 ENCOUNTER — PATIENT MESSAGE (OUTPATIENT)
Dept: HEMATOLOGY/ONCOLOGY | Facility: CLINIC | Age: 60
End: 2025-03-03
Payer: MEDICARE

## 2025-03-03 ENCOUNTER — TELEPHONE (OUTPATIENT)
Dept: INTERNAL MEDICINE | Facility: CLINIC | Age: 60
End: 2025-03-03
Payer: MEDICARE

## 2025-03-03 DIAGNOSIS — N18.4 CHRONIC KIDNEY DISEASE, STAGE 4 (SEVERE): Primary | ICD-10-CM

## 2025-03-03 RX ORDER — DAPAGLIFLOZIN 10 MG/1
10 TABLET, FILM COATED ORAL DAILY
Qty: 90 TABLET | Refills: 3 | Status: SHIPPED | OUTPATIENT
Start: 2025-03-03

## 2025-03-03 NOTE — TELEPHONE ENCOUNTER
Prescription sent to Pt pharmacy, samples are up front for Pt to . Pt informed and expressed understanding.

## 2025-03-03 NOTE — TELEPHONE ENCOUNTER
----- Message from Maliha sent at 3/3/2025 11:16 AM CST -----  Who Called: Ninfa Patsy is requesting assistance/information from provider's office.Symptoms (please be specific): n/a How long has patient had these symptoms:  n/aList of preferred pharmacies on file (remove unneeded): [unfilled]If different, enter pharmacy into here including location and phone number: n/aPreferred Method of Contact: Phone CallPatient's Preferred Phone Number on File: [phone] Best Call Back Number, if different:Additional Information: medical advice, pt called to discuss samples she had received for Parxiga and that she has ran out of samples and would like to know what is her next step before seeing Dr Gleason,Nephrology  on 3/13/25, please advise, thanks

## 2025-03-03 NOTE — TELEPHONE ENCOUNTER
Per Dr Rosario    We should be writing her a prescription.  However okay to provide samples for 2 weeks

## 2025-03-03 NOTE — TELEPHONE ENCOUNTER
Pt was given samples of Farxiga at her last appt 2-12-25. Pt appt with Dr Gleason is 3-13-25. Pt has run out of the Farxiga. Please advise if we should give Pt more samples, or can she wait until her appt with Dr Gleason.

## 2025-03-06 DIAGNOSIS — N18.4 CHRONIC KIDNEY DISEASE, STAGE 4 (SEVERE): Primary | ICD-10-CM

## 2025-03-11 ENCOUNTER — PATIENT MESSAGE (OUTPATIENT)
Dept: NEPHROLOGY | Facility: CLINIC | Age: 60
End: 2025-03-11
Payer: MEDICARE

## 2025-03-12 DIAGNOSIS — C50.819 OVERLAPPING MALIGNANT NEOPLASM OF FEMALE BREAST, UNSPECIFIED ESTROGEN RECEPTOR STATUS, UNSPECIFIED LATERALITY: ICD-10-CM

## 2025-03-12 DIAGNOSIS — C90.00 MULTIPLE MYELOMA NOT HAVING ACHIEVED REMISSION: ICD-10-CM

## 2025-03-12 RX ORDER — DEXAMETHASONE 20 MG/1
1 TABLET ORAL WEEKLY
Qty: 12 TABLET | Refills: 0 | Status: SHIPPED | OUTPATIENT
Start: 2025-03-12

## 2025-03-13 ENCOUNTER — OUTPATIENT CASE MANAGEMENT (OUTPATIENT)
Dept: ADMINISTRATIVE | Facility: OTHER | Age: 60
End: 2025-03-13
Payer: MEDICARE

## 2025-03-13 ENCOUNTER — SSC ENCOUNTER (OUTPATIENT)
Dept: ADMINISTRATIVE | Facility: OTHER | Age: 60
End: 2025-03-13
Payer: MEDICARE

## 2025-03-14 NOTE — PROGRESS NOTES
HEMATOLOGY/ONCOLOGY OFFICE CLINIC VISIT    Visit Information:    No show/Reschedules/Cancellations  08/21/2018--> Rescheduled                07/20/2023-->canceled visit/labs/infusion 05/08/2024 11/05/2019--> No Show/Reschedule 08/01/2023-->canceled visit/labs/infusion 05/22/2024 06/24/2020--> No Show/Reschedule 08/31/2023-->canceled visit/labs/infusion 05/22/2024 09/24/2020--> No Show/Reschedule 09/21/2023-->canceled visit/labs/infusion 06/03/2024  03/15/2021--> No Show/Reschedule 10/24/2023-->canceled visit/labs/infusion 07/11/2024 03/23/2021--> No Show/Reschedule 10/25/2023-->canceled visit/labs/infusion 07/31/2024 04/13/2021--> No Show   10/26/2023-->canceled visit/labs/infusion 08/01/2024 05/27/2021--> No Show   10/31/2023-->canceled visit/labs/infusion 08/14/2024 08/19/2021--> No Show   11/01/2023-->canceled visit/labs/infusion 08/19/2024--> No Show   08/26/2021--> No Show/Rescheduled 11/29/2023-->canceled visit/labs/infusion 09/11/2024 04/26/2022--> No Show   12/04/2023-->canceled visit/labs/infusion 09/18/2024  10/03/2022--> Rescheduled  12/07/2023-->canceled visit/labs/infusion 01/02/2025  10/11/2022--> Rescheduled  01/03/2024-->canceled visit/labs/infusion       02/12/2025 -->canceled visit/labs/infusion  03/22/2023--> No Show/Rescheduled 01/11/2024-->canceled visit/labs/infusion       02/17/2025--->canceled visit/labs/infusion  4/26/2023->Rescheduled   01/25/2024-->canceled visit/labs/infusion       03/03/2025--->canceled visit/labs/infusion  05/09/2023->Rescheduled   04/03/2024 03/05/2025--->Canceled  5/30/2023->Rescheduled   04/24/2024                                                      03/10/2025---> No Show/Rescheduled  6/15/2023->Rescheduled   05/01/2024 03/17/2025---> Canceled      Referring Physician: Dr Farr  PCP: DR. Cardona  GI:   Rheumatologist: Dr. Luis Rea  Immunologist:   Daniel Nava  ENT: Dr. Brice Williamson  [facility] Pain management: Dr.Chai [facility] Transplant: Dr. [facility] Med Onc: Dr. Zulema [facility] Breast Surg Onc: Dr.DeSnyder LONG [place]: Dr. 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   8) Stage IA grade 3, ER+, HER2 BERNA +,  IDC/DCIS of the left breast --- 2/7/18 at [facility]   --s/p lumpectomy with SLNB 4/12, Grade 2 IDC 4mm with second focus of microinvasive carcinoma 0.4mm. 2 SLN negative for malignancy    9) Progressive swelling of R lower extremity--- US NIVA done 2/19/18 negative for evidence of DVT  10). Paroxysmal Afib (2/18/18) diagnosed at [facility]; ECHO noted EF 58% - on Eliquis  11) Mild CKD with GFR 55 - managed per [facility] nephrology  12) Treatment induced neutropenia     Present treatment:  -Maintenance Revlimid 5mg PO QOD, started 02/16/17-- was held for a few months while undergoing treatment for her breast cancer 02/18-05/18; Restarted 6/15/18   Dexamethasone 20 mg po weekly added early July 2017--> reduced to 12 mg PO weekly October 4, 2017---> increased to 16mg PO weekly 2/15/18---restarted 6/15/18 --> 20 mg weekly 7//8/2022  Darzalex 7/8/2022-present--On hold due to cellulitis    Eliquis 2.5mg PO BID     Treatment/Oncology history:  1) CyBorD x5 cycles 09/28/15-12/24/15  2) Autologous stem cell transplant 02/08/16, done at [facility]  3) Exploratory laparotomy with subtotal colectomy and end ileostomy  done August 2, 2016--pathology specimen showed advanced colonic amyloidosis extensive involving the muscular wall submucosa and mucosa.  Appendix also involvement by amyloidosis with fibrous obliteration of the distal lumen.  4) Maintenance therapy with Revlimid 5mg-started 06/01/16--Discontinued shortly after 2/2 cytopenias  5) Left breast lumpectomy with SLNB at [facility] 4/12/18  6) Left partial breast RT x10 fractions  5/21/18-6/4/18  7) Femara 2.5 mg PO daily completed 5 yrs (6/2018- 6/2023)      Imaging:  NIVA 7/29/2021: Negative for deep venous thrombosis in the left lower extremity.   MRI CTL spine 9/14/2022: No acute myelomatous findings. Severe spinal stenosis process detected within the upper cervical spine as well as the entirety of the lumbar spine segments similar to the prior imaging assessment.  PET CT 9/14/2020: No convincing evidence of hypermetabolic metastatic disease. Asymmetric uptake in the right nasopharynx may be infectious or inflammatory.    CT C 1/24/2023: Right greater than left lower lobe opacification consistent with infectious process.  Lucent lesions throughout the osseous structures similar to 2015.    PET 4/17/2024: 1.  No evidence of active skeletal disease. 2.  Acute nonpathological fractures of the right anterolateral 5th-7th ribs. No impending pathological fracture of the long bones. 3.  No extramedullary disease. 4.  Diffuse skin thickening of the left lower leg with multiple foci of increased cutaneous uptake and associated with FDG-avid left inguinal and iliac chain lymph nodes. These findings may be related to lymphedema; however, superimposed angiosarcoma is not excluded given the foci of increased uptake. Direct inspection and biopsy are recommended.   PET CT 09/04/2024: No evidence of active skeletal disease. No acute or impending pathological fracture. No extramedullary disease. New FDG-avid mucosal thickening in the left maxillary sinus, consistent with sinusitis. Lucency  and increased uptake associated with the root of a left maxillary molar (likely tooth #15), consistent with periodontal disease.  Improving diffuse cutaneous and subcutaneous uptake in the left lower extremity, consistent with improving cellulitis. New and worsening focal nodular areas of increased uptake in the left lower extremity. Direct inspection is recommended.   Femur XR 12/9/2024: 1. No fracture or suspicious bone lesions are visualized in the left femur or left leg. 2. Subcutaneous edema in the left leg.   Tibia/fibula XR 12/9/2024: 1. No fracture or suspicious bone lesions are visualized in the left femur or left leg. 2. Subcutaneous edema in the left leg.       Breast imaging:  [facility] 12/29/2017: Calcifications in the left breast are suspicious. Stereotactic biopsy is recommended. BI-RADS Category 4: Suspicious Abnormality   [facility] 12/10/2018: Post surgical scar in the left breast is benign. BI-RADS Category 2: Benign Finding(s)   [facility] 12/16/2019: There is no mammographic evidence of malignancy. BI-RADS Category 2: Benign Finding(s)   [facility] 12/14/2020: There is no mammographic evidence of malignancy. BI-RADS Category 2: Benign Finding(s)   [facility] 3/21/2022: BI-RADS Category 2: Benign Finding(s)  [facility] 4/11/2023: There is no mammographic evidence of malignancy. BI-RADS Category 2: Benign Finding(s)   [facility] 11/14/2024: Bilateral. There is no mammographic evidence of malignancy. Overall BI-RAD Category: 2 - Benign     Pathology:  9/17/2015:  A. Soft tissue, abdominal wall, FNA   No malignant cells identified   Mature adipose tissue   Congo red stain is positive for amyloid deposition     9/18/2015:  BONE MARROW DIAGNOSIS:  -PLASMA CELL NEOPLASM, REPRESENTING APPROXIMATELY 80% OF CELLULAR  ELEMENTS     1/29/2018:  STEREOTACTIC BIOPSY Left breast:  INVASIVE DUCTAL CARCINOMA, NO SPECIAL TYPE, SUZANNE GRADE 3 (3 + 2 + 2),   SINGLE FOCUS MEASURING 1.7 MM WITH SECOND FOCUS OF MICROINVASION MEASURING 0.3 MM.   DUCTAL CARCINOMA  IN SITU, INTERMEDIATE NUCLEAR GRADE, SOLID TYPE WITH COMEDONECROSIS AND ASSOCIATED CALCIFICATIONS.   Atypical lobular hyperplasia with cytologic atypia, likely treatment effect.     CLINICAL HISTORY:       Patient: Ninfa Candelario is a 59 y.o. female.  Ms. Cabrera Joseph was admitted on 08/16/15 for ileus versus partial SBO. CT A/P done 08/17/15 showed sigmoid colitis and a zone of transition at the junction of the descending and sigmoid colon which could indicate some degree of obstruction and an obstructing mass cannot be excluded. Numerous skeletal lytic lesions noted. CT chest done 08/19/15 showed Multiple skeletal lytic lesions involving the thoracic spine, left rib sternum consistent with either metastases or multiple myeloma. There is no evidence of pulmonary or mediastinal mass. Dr. Farr was consulted for possible MM/anemia.     Nuclear Bone scan done 8/20/15 showed mild to moderate increased activity in left rib and right second rib which correlates with fractures seen on CT.  Skeletal survey done 8/20/15showed lytic lesions in the calvarium and probably a lytic lesion in the left scapula. Lytic areas in the spine and pelvis seen on recent CT are not well defined on skeletal survey. Work up labs done 08/20/15: Negative for sickle cell and Thalessemia (reported history of thalessemia). Iron 54, Transferrin 118.0, Iron sat 34.6%, Folate 26.5, Vit B12 1,779  Peripheral smear resulted slightly macrocytic normochromic anemia without signifcant anisocytosis may reflect early B12/folate deficiency or marrow dysfunction, among others. No immature myeloid cells or blasts. Platlets adequate. Beta 2 mircoglobulin = 3.2.  SPEP/NADIA: M-spike in the beta region. The monoclonal protein peak accounts for 1.13 g/dL. Hypogammaglobulinemia. NADIA pattern shows the presence of a free lambda light chain monoclonal protein with additional faint band in IgA.  All immunoglobulin levels decreased. IgA=26, IgG=307, IgM<5.  Kappa Qnt  0.16, Lambda Qnt 4600.00, Ratio <0.01.  24hr Urine for Bence Mendez: Kappa 2.75, Lambda 1250.00, Ratio <0.01. NADIA: Urine is POSITIVE for monoclonal Free Lambda Light chains     Patient then opted to go to [facility] where she underwent a bone marrow biopsy on 09/18/15. BMBx showed 80% plasma cells, 13p deletion and normal karyotype. She underwent fat pad biopsy which came back positive for Congo red consistent with amyloidosis. Cardiac workup revealed no amyloid deposition within the heart.  PET/CT and skeletal survey done September 18, 2015 showed multiple lytic osseous lesions  The patient was started on Velcade while at [facility] with the plan to transition to autologous stem cell transplantation. They asked if we could give her could continue treatment here.   She completed CyborD x5 cycles on 12/24/15     Patient admitted 01/06/16 for colitis and C. Diff. Pt treated with Flagyl. Discharged home 01/08/16     Repeat BMBx done at [facility] 01/2016 did not show any morphologic or immnophenotypic evidence of plasama cell neoplasm. Patient underwent Autologous stem cell transplant with Busulfan and melphalan on 02/08/16 at [facility]. The only complication was recurrent C.diff infection for which she completed 10 days of Fidaxomycin.     Follow-up bone marrow biopsy done at [facility] done 5/16/16 showed cellular 30-40% bone marrow with trilineage hematopoiesis. No morphologic or immunophenotypic support for plasma cell neoplasm. Started maintenance Revlimid 5mg. Unable to continue therapy due to cytopenias.     On 08/02/16 patient underwent  Exploratory laparotomy with subtotal colectomy and end ileostomy for what was thought to be toxic megacolon. Pathology showed extensive advanced colonic amyloidosis involving the muscular wall, submucosa and mucosa: With the appendix also involved with amyloidosis.  Positive lambda light chains were noted.     Follow-up at [facility] 8/31/16, Per Dr. Adrian Naylor, 6 months post autologous  transplant. She has engrafted. Based on the latest restaging she is near complete remission with possible IgA lambda on serum immunofixation. Patient was instructed to discontinue prophylactic antibiotics. She received her 1st series of immunizations while at [facility]. Patient had a bilateral venous ultrasound to rule out DVT, negative B/L. In regards to amyloidosis the patient feels that her tongue is smaller in size and her BNP has come down some.  Patient had a follow-up appointment at [facility] November 30, 2016.  Dr. Parnell documented the patient's free lambda light chain remains at a lower level.  Therefore in light of her recent surgery they will continue with observation only.  The plan to see the patient back in 2-3 months with repeat restaging labs.  They recommended regular CBC checks to monitor anemia.  Patient returned to [facility] in December 2016 to meet with dermatology for numerous papillary lesions on eyelids, chest and posterior neck consistent with amyloid deposits. Recommendations were for watchful waiting.  Patient is less than 1 year out from bone marrow transplant and further improvement can be expected.  She will see the patient back in 1 year to discuss possible surgical resection of the plaques especially around the eyelid region.  Rogaine recommended for persistent hair loss. Retin-A recommended for acne vulgaris.  Patient returned to [facility] on 2/8/2017 for neurosurgery consult for chronic low back pain and difficulty walking.  Imaging showed extensive DJD with discovertebral bulges and thickening of the spinal laminar tissues creating spinal stenosis throughout, but greatest at L2/L3.  Neurosurgery felt that surgery risks outweighed the benefits.  Patient was prescribed pain medicine and encouraged to participate in physical therapy.  Patient also met with Dr. Parnell on 02/08/17, who noted an elevation in free light chains (kappa 52.60/lambda 27.77/ratio 1.89).   Recommendation is to resume maintenance therapy with Revlimid at a low dose of 5 mg every other day.  Patient went back to [facility] first week of April 2017.  I do not have these notes.  Reportedly she was told to continue on every other day Revlimid at 5 mg.  She reportedly had repeat myeloma labs done as well.  Again I do not have these results.  Patient went back to [facility] and saw Dr. Parnell June 29, 2017.  Myeloma labs were done with serum protein electrophoresis showing irregularity in the fast gamma region.  IgG of 1291.  Free kappa light chains of 84.6 with lambda light chains of 66.9.  Beta-2 microglobulin of 4.5  CT scan of lumbar spine showed multiple lytic lesions once again in the lumbar spine and bony pelvis.  Ultrasound of the left leg showed no evidence of DVT.  It was recommended based on the slight increase in her And lambda light chains to start weekly dexamethasone 20 mg p.o. weekly.  Follow-up visit and labs at [facility] on September 29, 2017 with Dr. Parnell.  Repeat beta-2 microglobulin came back at 2.4.  Serum IgG of 761, IgA 117 and IgM of 29.  Serum free kappa light chains of 24.9 and lambda of 23.5.  Counts were stable with hemoglobin of 11.1, WBC 4.9 and platelets of 210,000.  Recommendations were for continued Revlimid every other day with weekly dexamethasone along with aspirin for DVT prophylaxis.  Bilateral diagnostic MMG 12/19/17  noted 1.6cm coarse heterogeneous calcifications in posterior region of L breast at 3 o'clock position. Patient was scheduled for FNA which confirmed ID grade 3, single focus measuring 1.7cm with 2nd focus microinvasion DCIS grade 2 measuring 0.3cm. Left axillary LN biopsy showed no evidence of metastatic carcinoma. Complete staging: Stage IA, grade 3 ER+, Her 2 Niki + IDC/DCIS of left breast. Ki-67 <17%.  Repeat myeloma labs showed rise in free lambda light chains noted at 68.69, kappa light chains at 27.22,  beta 2 microglobulin came back at 2.9.  Serum protein electrophoresis showed no definitive evidence of an M protein peak. The pattern is consistent with an acute inflammatory reaction. Recommendations were made to maintain Revlimid at current dose of 5mg every other day to be taken continuously increase weekly dexamethasone to 16 mg.   Patient seen at [facility] with tentative plan for L breast lumpectomy on 3/13/18. 2/16/18- Prior to surgery she was seen by genetic counselor who ran genetic testing per patient reques, all of which were negative. She was then seen by cardiology at [facility] 2/16/18 for further evaluation of persistent bilateral lower extremity swelling-ECHO noted EF of 58%; diagnosed with paroxysmal atrial fibrillation and instructed to begin daily ASA. During preoperative asssessment per Rad/Onc 3/12/18, corresponding chest CT noted new bilateral pulmonary nodules concerning for metastatic disease- surgery was subsequently postponed pending pulmonary evaluation. Seen by Pulmonology on 3/21/18, PFTs within normal limits and cleared patient for surgery with recommended close CT follow up of nodules in 3 months. 3/21/18 seen by nephrology for management of mild CKD (GFR 55)-cleared for surgery with recommended follow up in 3 months. Left breast lumpectomy and SLNB performed per Dr. Washburn on 4/12/18- plan for follow up in clinic on 4/24/18  for postoperative assessment. Follow up with Dr. Poole (Med/Onc) on 4/25/18. Follow up with Dr. Parnell 4/26/18.   Patient seen at [facility] s/p Left breast lumpectomy with SLNB on 4/12/18. Following surgery she completed Left partial breast radiation x 10 fractions. She was then started on endocrine therapy with Femara after been deemedan inappropriate candidate for systemic chemotherapy/Herceptin.      She was seen by neurosugery at [facility] on 4/26/18 following repeat MRI of cervical thoracic lumbar spine which noted focal enhancement of T2 hyperintensity at the C2 level which may be due to  degenerative changes. Signal abnormality within  the T12 and L1 may be secondary to myeloma. Plan to continue with observation for now with F/U scheduled in October 2018.      She was seen by Dr. Parnell at [facility] for management of her MM on 4/26/18. Patient was recommended to restart Revlimid 5mg PO every other day with notes that these recommendations would be communicated to collaborating Oncologist. Patient was also recommended to start on Zometa for her bone disease.      Se[facility] by [facility]ell at [facility] for management of her MM on 6/28/18. Repeat light chains noted lambda 33.36 (previously 80.80), K/L ratio 0.91 (previously 0.49). Due to slight improvement in light chains, plan to continue on lenalidomide maintenance. Recommendations to continue anticoagulation with Eliquis. BLE venous doppler negative for DVT.   Seen by pulmonolgy on 6/28/18- repeat CT chest done 6/28/18 noted resolution of previous pulmonary nodules. Thought to be infectious in nature. Recommendations for discharge from pulmonary clinic.  Should MRI of her cervical thoracic lumbar spine on 2/12/2019 that demonstrated cervical spondylosis with canal stenosis most notable at C1 and 2. Cord signal abnormality at C1 and 2 with enhancement is stable. Lumbar spondylosis with central canal stenosis at multiple levels. No imaging features of bony metastatic disease.     For amyloidosis (Light chain type).    Patient presented with macroglossia and now with improvement of the swelling of her tongue. Her cardiac parameters are also better.   8/10/2020 proBNP  250   2/17/2020                616  8/9/2019                  190  11/14/2023  820        Chief Complaint:for Multiple Myeloma       Interval History:  She is currently on Rev/Dex/Darzalex  for MM  s/p transplant in 2016. At her [facility] visit on 10/12/22, Ninlaro was discontinued due to it causing severe diarrhea and leukopenia. Diarrhea resolved after stopping Ninlaro (she admits to stopping  prior to visit @ [facility]).     I Called Dr Parnell office and she is out of the office. She will be back next week. Will try again then  07/11/2024:  Dr. Parnell kindly called me back to discuss patient.  She recommend to decrease the dose of dexamethasone to 10 mg to see if this can help to decrease the frequency of infections.  She does not opposed to IVIG if needed for her hypogammaglobulinemia.  She usually starts when IgG is less than 400.  In this case if she ever start the IVIG needs to be given in a very slow rate due to her heart and fluid retention risks.  We will continue Evangelina monthly and will decrease dexamethasone to 10 mg.    10/10/2024: Patient is here today for follow up of her multiple myeloma, amyloidosis and breast cancer.  Daratumumab has been on hold since April 2024 for LLE cellulitis. She was admitted to [facility] per  Dr. Parnell recommendations until LLE is better. Swelling to BLE noted during physical exam, L>R. She has follow up with Dr. Parnell @ [facility] on 11/15/24.      01/13/2025: She was seen at [facility] that recommend to hold Revlimid as she has not been taking it in about 2 months or so.       2/13/2025:  February 12, 2025 patient was seen and evaluated by Internal Medicine for multiple chronic conditions.  She was referred to Nephrology and the patient was initiated on dapagliflozin       03/19/25: Patient presents today for a follow up, accompanied by her sister. Pt states she had a fall on Monday, still feels weak. Patient scheduled to have a biopsy on her knee 04/01/25. Patient reports having headaches on and off, advised to drink plenty of water.     Patient was last seen by Dr Parnell ([facility]) on 2/19/2025 at which time:   Left lower extremity edema/Abnormal finding on prior PET CT (performed for possible cellulitis): The prior PET CT also showed subcutaneous uptake that could be lymphedema, possibly infection (question of past cellulitis) but angiosarcomna is not r/o. Subsequent repeated PET  (9/2024) since her cellulitis has improved and Improving diffuse cutaneous and subcutaneous uptake in the left lower extremity, consistent with improving cellulitis was noted. New and worsening focal nodules w/ increased uptake in the left lower extremity are noted, but no skeletal or extramedullary disease was noted. Pt referred to ortho, but pt canceled appt. and delayed until later.   She has a biopsy of her left LE schedule next dora at [facility].     On physical LLE is swollen and mild erythema. Will do NIVA to eval for DVT even if she is on anticoagulation.    Last daratumumab 01/13/2025 she was scheduled to have it again on 03/09/2025 but was held due to worsening on her kidney function and GFR was 16.  As per medication insert need to be held if creatinine clearance  < 15.     ROS: All 14 points ROS taken and as per Interval History  Review of Systems   Constitutional:  Positive for malaise/fatigue. Negative for chills, fever and weight loss.   HENT:  Negative for congestion and nosebleeds.    Eyes:  Negative for blurred vision, double vision and photophobia.   Respiratory:  Negative for cough, hemoptysis and shortness of breath.    Cardiovascular:  Negative for chest pain, palpitations, leg swelling and PND.   Gastrointestinal:  Positive for diarrhea (Better), nausea and vomiting. Negative for abdominal pain, blood in stool, constipation and melena.   Genitourinary:  Negative for dysuria, frequency, hematuria and urgency.   Musculoskeletal:  Negative for back pain, falls and myalgias.   Skin:  Negative for itching and rash.   Neurological:  Positive for weakness. Negative for tremors, focal weakness, seizures and headaches.   Endo/Heme/Allergies:  Negative for environmental allergies. Does not bruise/bleed easily.   Psychiatric/Behavioral:  Negative for depression and suicidal ideas. The patient is not nervous/anxious.          Histories:  PMH/PSH/FH/SOCIAL/ALLERGIES AND MEDS REVIEWED AND UPDATED AS  "APPROPRIATE       Vitals:    25 1134   BP: 111/71   BP Location: Left arm   Patient Position: Sitting   Pulse: 92   Resp: 20   Temp: 98 °F (36.7 °C)   TempSrc: Oral   SpO2: 97%   Weight: 75.8 kg (167 lb)  Comment: Reported. Would not get on scale   Height: 5' 3" (1.6 m)           Wt Readings from Last 6 Encounters:   25 75.8 kg (167 lb)   25 79.6 kg (175 lb 8 oz)   25 73.5 kg (162 lb)   25 75.8 kg (167 lb)   24 79.7 kg (175 lb 11.2 oz)   10/10/24 80.7 kg (177 lb 14.2 oz)     Body mass index is 29.58 kg/m².  Body surface area is 1.84 meters squared.      Vitals reviewed and stable  Physical Exam  Vitals and nursing note reviewed.   Constitutional:       General: She is not in acute distress.     Appearance: Normal appearance. She is well-developed. She is ill-appearing.      Comments: Uses walker for mobility   HENT:      Head: Normocephalic and atraumatic.      Mouth/Throat:      Mouth: Mucous membranes are moist.   Eyes:      General: No scleral icterus.     Extraocular Movements: Extraocular movements intact.      Conjunctiva/sclera: Conjunctivae normal.      Pupils: Pupils are equal, round, and reactive to light.   Neck:      Vascular: No JVD.   Cardiovascular:      Rate and Rhythm: Normal rate and regular rhythm.      Heart sounds: No murmur heard.  Pulmonary:      Effort: Pulmonary effort is normal.      Breath sounds: Normal breath sounds. No wheezing or rhonchi.   Abdominal:      General: Bowel sounds are normal. There is no distension.      Palpations: Abdomen is soft. There is no mass.      Tenderness: There is no abdominal tenderness.   Musculoskeletal:         General: No swelling or deformity.      Cervical back: Neck supple.      Right lower le+ Edema present.      Left lower leg: 3+ Edema present.      Comments: Hyperpigmentation of left leg.    Lymphadenopathy:      Head:      Right side of head: No submandibular adenopathy.      Left side of head: No " submandibular adenopathy.      Cervical: No cervical adenopathy.      Upper Body:      Right upper body: No supraclavicular or axillary adenopathy.      Left upper body: No supraclavicular or axillary adenopathy.      Lower Body: No right inguinal adenopathy. No left inguinal adenopathy.   Skin:     General: Skin is warm.      Coloration: Skin is not jaundiced.      Findings: Rash present.      Nails: There is no clubbing.      Comments: Rash: bilateral; LE left > right   Neurological:      Mental Status: She is alert and oriented to person, place, and time.      Cranial Nerves: Cranial nerves 2-12 are intact.      Motor: Weakness present.      Gait: Gait abnormal.   Psychiatric:         Attention and Perception: Attention normal.         Behavior: Behavior is cooperative.         Cognition and Memory: Cognition normal.         Judgment: Judgment normal.       ECOG SCORE    2 - Capable of all selfcare but unable to carry out any work activities, active > 50% of hours         Laboratory:  CBC with Differential:  Lab Results   Component Value Date    WBC 5.03 03/19/2025    RBC 3.45 (L) 03/19/2025    HGB 9.6 (L) 03/19/2025    HCT 31.0 (L) 03/19/2025    MCV 89.9 03/19/2025    MCH 27.8 03/19/2025    MCHC 31.0 (L) 03/19/2025    RDW 14.4 03/19/2025     03/19/2025    MPV 9.1 03/19/2025        CMP:  Sodium   Date Value Ref Range Status   01/13/2025 137 136 - 145 mmol/L Final     Potassium   Date Value Ref Range Status   01/13/2025 4.2 3.5 - 5.1 mmol/L Final     Chloride   Date Value Ref Range Status   01/13/2025 103 98 - 107 mmol/L Final     CO2   Date Value Ref Range Status   01/13/2025 24 23 - 31 mmol/L Final     Blood Urea Nitrogen   Date Value Ref Range Status   01/13/2025 34.7 (H) 9.8 - 20.1 mg/dL Final   07/18/2023 35 (H) 6 - 23 mg/dL Final     Creatinine   Date Value Ref Range Status   01/13/2025 3.20 (H) 0.55 - 1.02 mg/dL Final   07/18/2023 2.48 (H) 0.51 - 0.95 mg/dL Final     Calcium   Date Value Ref Range  "Status   01/13/2025 9.2 8.4 - 10.2 mg/dL Final   07/18/2023 9.7 8.4 - 10.2 mg/dL Final     Albumin   Date Value Ref Range Status   01/13/2025 3.2 (L) 3.4 - 4.8 g/dL Final     Bilirubin Total   Date Value Ref Range Status   01/13/2025 0.2 <=1.5 mg/dL Final     ALP   Date Value Ref Range Status   01/13/2025 96 40 - 150 unit/L Final     AST   Date Value Ref Range Status   01/13/2025 17 5 - 34 unit/L Final     ALT   Date Value Ref Range Status   01/13/2025 19 0 - 55 unit/L Final     Estimated GFR-Non    Date Value Ref Range Status   04/20/2022 25           Assessment:       1. Multiple myeloma not having achieved remission    2. Cellulitis of left lower extremity    3. 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 [place] red fat pad + Dx 2/2015  --Last seen by Dr Parnell at [facility] 12/20/2024: Her note "AL Amyloid/ MM: Currently available MM/AL amyloid parameters indicate a decrease in the NT Pro BNP and troponin T. We asked if she had taken any steroids during her URI to explain the reduction of Amyloid and MM parameters, but the pt indicated that she had not). Her MM parameters have improved as well.While there was a prior minimal rise in the FKLC this patient has had a lambda clonality in the past.. She had been off of therapy which has resumed but her parameters already demonstrate improvement over the last visit. We previously recommended stopping lenalidomide and seeing if the response can be maintained and trying to reduce the fluid retention and other side effects of dexamethasone. This has not improved then we recommended reinitiation of DRd. Most recent PET CT w/o progression or active skeletal or extramedullary disease. The " "patient will be scheduled for follow-up in 2-3 for continued monitoring of the chronic plasma cell dyscrasia and orders for CBC, CMP, serum and urine protein electrophoretic and immunofixation studies, free light chain studies, and immunoglobulins have been placed for that follow/up visit. 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 Sx risks do not outweigh benefits. Pain meds given and encouraged Physical Therpay    8) Amyloid plaques, evaluated by Derm at [facility], recommend watchful waiting. Patient to follow up 12/2017    9) Stage IA grade 3, ER+, HER2 BERNA +,  IDC/DCIS of the left breast cancer --- 2/7/18 at [facility]; s/p lumpectomy with SLNB 4/12, Grade 2 IDC measuring 4mm with second focus of microinvasive carcinoma measuring  0.4mm .IG DCIS, 0.3mm to posterior margin; 2 SLN negative for malignancy      10) Progressive swelling of R lower extremity--- US NIVA done 2/19/18 negative for evidence of DVT    11). Paroxysmal Afib (2/18/18) diagnosed at [facility]; ECHO noted EF 58%    12. Mild CKD with GFR 55 - managed per [facility] nephrology    13). Pulmonary nodules noted on pre-operative chest CT scan (3/12/18) noted new bilateral pulmonary nodules are nonspecific -- seen by Pulmonology at [facility] (3/21/18) thought to be inflammatory/infectious in nature, repeat Chest CT 6/28/18 noted resolution of nodules    14). Treatment induced neutropenia    15) NON COMPLIANT patient-- please see above the no show, reschedule and cancelled visits/labs and infusion.     16) Neoplasm associated pain/polyneuropathy: continues w/ pain service on methadone/ oxycodone /duloxitene      Plan:     Will defer " Renal US to Nephrology   Will call pt after lab results are in.  If kidney function improve and at or close to baseline then she will be able to have treatment tomorrow  US left lower extremity  RTC in 4 weeks MD/same day labs  Labs: CBC, CMP  The patient was seen, interviewed and examined. Pertinent lab and radiology studies were reviewed.   The patient was given ample opportunity to ask questions, and to the best of my abilities, all questions answered to satisfaction; patient demonstrated understanding of what we discussed and agreeable to the plan. Pt instructed to call should develop concerning signs/symptoms or have further questions.   Visit today included increased complexity associated with the care of the episodic problem MM, amyloidosis, breast cancer, addressing and managing the longitudinal care of the patient's MM, amyloidosis, breast cancer.       Natalie Meyers MD  Hematology/Oncology  [facility]     Professional Services   I, Christy Garcia LPN, acted solely as a scribe for and in the presence of Dr. Natalie Meyers, who performed these services.

## 2025-03-19 ENCOUNTER — LAB VISIT (OUTPATIENT)
Dept: LAB | Facility: HOSPITAL | Age: 60
End: 2025-03-19
Attending: INTERNAL MEDICINE
Payer: MEDICARE

## 2025-03-19 ENCOUNTER — OFFICE VISIT (OUTPATIENT)
Dept: HEMATOLOGY/ONCOLOGY | Facility: CLINIC | Age: 60
End: 2025-03-19
Payer: MEDICARE

## 2025-03-19 VITALS
TEMPERATURE: 98 F | SYSTOLIC BLOOD PRESSURE: 111 MMHG | HEART RATE: 92 BPM | DIASTOLIC BLOOD PRESSURE: 71 MMHG | RESPIRATION RATE: 20 BRPM | BODY MASS INDEX: 29.59 KG/M2 | OXYGEN SATURATION: 97 % | HEIGHT: 63 IN | WEIGHT: 167 LBS

## 2025-03-19 DIAGNOSIS — R60.9 EDEMA, UNSPECIFIED TYPE: ICD-10-CM

## 2025-03-19 DIAGNOSIS — Z85.3 HISTORY OF BREAST CANCER: ICD-10-CM

## 2025-03-19 DIAGNOSIS — L03.116 CELLULITIS OF LEFT LOWER EXTREMITY: ICD-10-CM

## 2025-03-19 DIAGNOSIS — C90.00 MULTIPLE MYELOMA, REMISSION STATUS UNSPECIFIED: ICD-10-CM

## 2025-03-19 DIAGNOSIS — C90.00 MULTIPLE MYELOMA NOT HAVING ACHIEVED REMISSION: Primary | ICD-10-CM

## 2025-03-19 LAB
ALBUMIN SERPL-MCNC: 3.1 G/DL (ref 3.4–4.8)
ALBUMIN/GLOB SERPL: 1 RATIO (ref 1.1–2)
ALP SERPL-CCNC: 93 UNIT/L (ref 40–150)
ALT SERPL-CCNC: 14 UNIT/L (ref 0–55)
ANION GAP SERPL CALC-SCNC: 7 MEQ/L
AST SERPL-CCNC: 26 UNIT/L (ref 5–34)
BASOPHILS # BLD AUTO: 0.01 X10(3)/MCL
BASOPHILS NFR BLD AUTO: 0.2 %
BILIRUB SERPL-MCNC: 0.2 MG/DL
BUN SERPL-MCNC: 31.8 MG/DL (ref 9.8–20.1)
CALCIUM SERPL-MCNC: 9.3 MG/DL (ref 8.4–10.2)
CHLORIDE SERPL-SCNC: 110 MMOL/L (ref 98–107)
CO2 SERPL-SCNC: 24 MMOL/L (ref 23–31)
CREAT SERPL-MCNC: 2.13 MG/DL (ref 0.55–1.02)
CREAT/UREA NIT SERPL: 15
EOSINOPHIL # BLD AUTO: 0.05 X10(3)/MCL (ref 0–0.9)
EOSINOPHIL NFR BLD AUTO: 1 %
ERYTHROCYTE [DISTWIDTH] IN BLOOD BY AUTOMATED COUNT: 14.4 % (ref 11.5–17)
GFR SERPLBLD CREATININE-BSD FMLA CKD-EPI: 26 ML/MIN/1.73/M2
GLOBULIN SER-MCNC: 3.2 GM/DL (ref 2.4–3.5)
GLUCOSE SERPL-MCNC: 100 MG/DL (ref 82–115)
HCT VFR BLD AUTO: 31 % (ref 37–47)
HGB BLD-MCNC: 9.6 G/DL (ref 12–16)
IMM GRANULOCYTES # BLD AUTO: 0.01 X10(3)/MCL (ref 0–0.04)
IMM GRANULOCYTES NFR BLD AUTO: 0.2 %
LYMPHOCYTES # BLD AUTO: 1.16 X10(3)/MCL (ref 0.6–4.6)
LYMPHOCYTES NFR BLD AUTO: 23.1 %
MCH RBC QN AUTO: 27.8 PG (ref 27–31)
MCHC RBC AUTO-ENTMCNC: 31 G/DL (ref 33–36)
MCV RBC AUTO: 89.9 FL (ref 80–94)
MONOCYTES # BLD AUTO: 0.72 X10(3)/MCL (ref 0.1–1.3)
MONOCYTES NFR BLD AUTO: 14.3 %
NEUTROPHILS # BLD AUTO: 3.08 X10(3)/MCL (ref 2.1–9.2)
NEUTROPHILS NFR BLD AUTO: 61.2 %
PLATELET # BLD AUTO: 215 X10(3)/MCL (ref 130–400)
PMV BLD AUTO: 9.1 FL (ref 7.4–10.4)
POTASSIUM SERPL-SCNC: 4 MMOL/L (ref 3.5–5.1)
PROT SERPL-MCNC: 6.3 GM/DL (ref 5.8–7.6)
RBC # BLD AUTO: 3.45 X10(6)/MCL (ref 4.2–5.4)
SODIUM SERPL-SCNC: 141 MMOL/L (ref 136–145)
WBC # BLD AUTO: 5.03 X10(3)/MCL (ref 4.5–11.5)

## 2025-03-19 PROCEDURE — 80053 COMPREHEN METABOLIC PANEL: CPT

## 2025-03-19 PROCEDURE — 99215 OFFICE O/P EST HI 40 MIN: CPT | Mod: PBBFAC | Performed by: INTERNAL MEDICINE

## 2025-03-19 PROCEDURE — 99999 PR PBB SHADOW E&M-EST. PATIENT-LVL V: CPT | Mod: PBBFAC,,, | Performed by: INTERNAL MEDICINE

## 2025-03-19 PROCEDURE — G2211 COMPLEX E/M VISIT ADD ON: HCPCS | Mod: S$PBB,,, | Performed by: INTERNAL MEDICINE

## 2025-03-19 PROCEDURE — 99215 OFFICE O/P EST HI 40 MIN: CPT | Mod: S$PBB,,, | Performed by: INTERNAL MEDICINE

## 2025-03-19 PROCEDURE — 85025 COMPLETE CBC W/AUTO DIFF WBC: CPT

## 2025-03-19 PROCEDURE — 36415 COLL VENOUS BLD VENIPUNCTURE: CPT

## 2025-03-20 ENCOUNTER — INFUSION (OUTPATIENT)
Dept: INFUSION THERAPY | Facility: HOSPITAL | Age: 60
End: 2025-03-20
Attending: INTERNAL MEDICINE
Payer: MEDICARE

## 2025-03-20 VITALS
DIASTOLIC BLOOD PRESSURE: 79 MMHG | WEIGHT: 167 LBS | HEART RATE: 102 BPM | BODY MASS INDEX: 29.59 KG/M2 | RESPIRATION RATE: 18 BRPM | HEIGHT: 63 IN | TEMPERATURE: 98 F | OXYGEN SATURATION: 98 % | SYSTOLIC BLOOD PRESSURE: 113 MMHG

## 2025-03-20 DIAGNOSIS — E85.9 AMYLOIDOSIS, UNSPECIFIED TYPE: Primary | ICD-10-CM

## 2025-03-20 PROCEDURE — 25000003 PHARM REV CODE 250: Performed by: INTERNAL MEDICINE

## 2025-03-20 PROCEDURE — 96401 CHEMO ANTI-NEOPL SQ/IM: CPT

## 2025-03-20 PROCEDURE — 63600175 PHARM REV CODE 636 W HCPCS: Mod: JZ,TB | Performed by: INTERNAL MEDICINE

## 2025-03-20 RX ORDER — DIPHENHYDRAMINE HCL 25 MG
25 CAPSULE ORAL
Status: COMPLETED | OUTPATIENT
Start: 2025-03-20 | End: 2025-03-20

## 2025-03-20 RX ORDER — ACETAMINOPHEN 325 MG/1
650 TABLET ORAL
Status: COMPLETED | OUTPATIENT
Start: 2025-03-20 | End: 2025-03-20

## 2025-03-20 RX ORDER — SODIUM CHLORIDE 0.9 % (FLUSH) 0.9 %
10 SYRINGE (ML) INJECTION
Status: DISCONTINUED | OUTPATIENT
Start: 2025-03-20 | End: 2025-03-20 | Stop reason: HOSPADM

## 2025-03-20 RX ORDER — HEPARIN 100 UNIT/ML
500 SYRINGE INTRAVENOUS
Status: DISCONTINUED | OUTPATIENT
Start: 2025-03-20 | End: 2025-03-20 | Stop reason: HOSPADM

## 2025-03-20 RX ORDER — EPINEPHRINE 0.3 MG/.3ML
0.3 INJECTION SUBCUTANEOUS ONCE AS NEEDED
Status: DISCONTINUED | OUTPATIENT
Start: 2025-03-20 | End: 2025-03-20 | Stop reason: HOSPADM

## 2025-03-20 RX ORDER — DIPHENHYDRAMINE HYDROCHLORIDE 50 MG/ML
50 INJECTION, SOLUTION INTRAMUSCULAR; INTRAVENOUS ONCE AS NEEDED
Status: DISCONTINUED | OUTPATIENT
Start: 2025-03-20 | End: 2025-03-20 | Stop reason: HOSPADM

## 2025-03-20 RX ORDER — SODIUM CHLORIDE 0.9 % (FLUSH) 0.9 %
10 SYRINGE (ML) INJECTION
Status: CANCELLED | OUTPATIENT
Start: 2025-03-20

## 2025-03-20 RX ORDER — ACETAMINOPHEN 325 MG/1
650 TABLET ORAL
Status: CANCELLED | OUTPATIENT
Start: 2025-03-20

## 2025-03-20 RX ORDER — DIPHENHYDRAMINE HYDROCHLORIDE 50 MG/ML
50 INJECTION, SOLUTION INTRAMUSCULAR; INTRAVENOUS ONCE AS NEEDED
Status: CANCELLED | OUTPATIENT
Start: 2025-03-20

## 2025-03-20 RX ORDER — HEPARIN 100 UNIT/ML
500 SYRINGE INTRAVENOUS
Status: CANCELLED | OUTPATIENT
Start: 2025-03-20

## 2025-03-20 RX ORDER — DIPHENHYDRAMINE HCL 25 MG
25 CAPSULE ORAL
Status: CANCELLED | OUTPATIENT
Start: 2025-03-20

## 2025-03-20 RX ORDER — EPINEPHRINE 0.3 MG/.3ML
0.3 INJECTION SUBCUTANEOUS ONCE AS NEEDED
Status: CANCELLED | OUTPATIENT
Start: 2025-03-20

## 2025-03-20 RX ADMIN — DARATUMUMAB AND HYALURONIDASE-FIHJ (HUMAN RECOMBINANT) 1800 MG: 1800; 30000 INJECTION SUBCUTANEOUS at 03:03

## 2025-03-20 RX ADMIN — ACETAMINOPHEN 650 MG: 325 TABLET ORAL at 02:03

## 2025-03-20 RX ADMIN — DIPHENHYDRAMINE HYDROCHLORIDE 25 MG: 25 CAPSULE ORAL at 02:03

## 2025-03-24 ENCOUNTER — OUTPATIENT CASE MANAGEMENT (OUTPATIENT)
Dept: ADMINISTRATIVE | Facility: OTHER | Age: 60
End: 2025-03-24
Payer: MEDICARE

## 2025-03-24 NOTE — LETTER
Ninfa Candelario  [address] [address]    Dear Ninfa,    Welcome to [facility]s Complex Care Management Program.  It was a pleasure talking with you today.  My name is Caroline Enriquze, and I look forward to being your Care Manager.  My goal is to help you function at the healthiest and highest level possible.  You can contact me directly at [phone].    As an [facility] patient, some of the services we may be able to provide include:     Development of an individualized care plan with a Registered Nurse   Connection with a   Connection with available resources and services    Coordinate communication among your care team members   Provide coaching and education   Help you understand your doctors treatment plan  Help you obtain information about your insurance coverage.     All services provided by [facility]s Complex Care Managers and other care team members are coordinated with and communicated to your primary care team.      As part of your enrollment, you will be receiving education materials and more information about these services in your [facility] account, by phone or through the mail.  If you do not wish to participate or receive information, please contact our office at [phone].      Sincerely,        Caroline Enriquez, RN  [facility]   Outpatient RN Complex Care Manager

## 2025-03-25 DIAGNOSIS — R60.1 ANASARCA: Primary | ICD-10-CM

## 2025-03-25 NOTE — PROGRESS NOTES
Outpatient Care Management  Initial Patient Assessment    Patient: Ninfa Candelario  MRN: [id number]  Date of Service: 03/24/2025  Completed by: Caroline Enriquez RN  Referral Date: 01/27/2023  Date of Eligibility: 3/13/2025  Program:   High Risk  Status: Ongoing  Effective Dates: 3/24/2025 - present  Responsible Staff: Caroline Enriquez RN        Reason for Visit   Patient presents with    OPCM Enrollment Call    Nursing Assessment    OPCM Chart Review    Other     Mailed Welcome Ltr via Patient Portal       Brief Summary:  Ninfa Candelario was referred by Administration for OPCM. Patient qualifies for program based on Risk Score of 81.2% and need for further education on Diagnosis. Patient lives with her sister and her 3 grandchildren. Doesn't engage in any form of aerobic exercising. Mobile with assistance of Rollator, wheelchair and/or Electric Scooter. Patient able to tend to her personal hygiene however, unable to perform ADLS without assistance in the home.   Active problem list, medical, surgical and social history reviewed. Active comorbidities include Anemia, Chronic Low Back Pain, GERD, CKD Stg 4, HTN and Hyperlipidemia. Areas of need identified by patient include further Education on Coping Mechanism for Pain Control.   Next steps: Patient agrees to a follow up call in approximately 1weeks.     Disability Status  Is the patient alert and oriented (person, place, time, and situation)?: Alert and oriented x 4  Hearing Difficulty or Deaf: no  Visual Difficulty or Blind: yes  Visual and Hearing Conclusion Statement: Denies any hearing issues however, does wear corrective lense for visual acuity. Follows yearly with medical visits.  Vision Management: Other (wears corrective lense for visual improved visual acuity.)  Difficulty Concentrating, Remembering or Making Decisions: no  Communication Difficulty: no  Eating/Swallowing Difficulty: no  Walking or Climbing Stairs Difficulty: yes  Walking or Climbing Stairs:  "ambulation difficulty, requires equipment; stair climbing difficulty, requires equipment; stair climbing difficulty, assistance 1 person  Mobility Management: -- (Patient voiced she has to use "a Rollator or my wheelchair because I've been falling so much.")  Dressing/Bathing Difficulty: no  Grooming: independent  Transferring (e.g., getting in and out of chairs): independent  Toileting : Independent  Continence : Continence - Not a problem  Difficulty Managing Errands Independently: yes  Errands Management: -- (Betty (sister) assists Patient with runnings errands.)  Equipment Currently Used at Home: blood pressure machine; grab bar; rollator; shower chair; wheelchair (also has electric scooter)  ADL Conclusion Statement: Patient able to tends to hygiene care however needs assistance with ADLs. Has had recent falls in the home without harm to self.  Change in Functional Status Since Onset of Current Illness/Injury: yes (Patient unable to perform her activities as previously able to.)        Spiritual Beliefs  Spiritual, Cultural Beliefs, [employer]itan Practices, Values that Affect Care: no      Social History     Socioeconomic History    Marital status: Single   Tobacco Use    Smoking status: Never    Smokeless tobacco: Never   Substance and Sexual Activity    Alcohol use: Not Currently    Drug use: Never     Social Drivers of Health     Financial Resource Strain: Low Risk  (8/7/2024)    Overall Financial Resource Strain (CARDIA)     Difficulty of Paying Living Expenses: Not hard at all   Food Insecurity: No Food Insecurity (8/7/2024)    Hunger Vital Sign     Worried About Running Out of Food in the Last Year: Never true     Ran Out of Food in the Last Year: Never true   Transportation Needs: No Transportation Needs (8/7/2024)    TRANSPORTATION NEEDS     Transportation : No   Physical Activity: Sufficiently Active (8/7/2024)    Exercise Vital Sign     Days of Exercise per Week: 4 days     Minutes of Exercise per " Session: 50 min   Stress: No Stress Concern Present (8/7/2024)    [place] [facility] [employer] - Occupational Stress Questionnaire     Feeling of Stress : Not at all   Housing Stability: Unknown (8/7/2024)    Housing Stability Vital Sign     Unable to Pay for Housing in the Last Year: No     Homeless in the Last Year: No       Roles and Relationships  Primary Source of Support/Comfort: sibling(s)  Name of Support/Comfort Primary Source: Betty Andree  Secondary Source of Support/Comfort: no one           Patient Reported Insurance  Verified current insurance plan:: Medicare            3/25/2025     4:17 PM 3/19/2025    11:38 AM 2/13/2025     3:33 PM 2/12/2025     1:26 PM 1/13/2025    11:16 AM 10/10/2024     3:05 PM 9/17/2024     2:27 PM   Depression Patient Health Questionnaire   Over the last two weeks how often have you been bothered by little interest or pleasure in doing things Several days Not at all Not at all Not at all Not at all Not at all Not at all   Over the last two weeks how often have you been bothered by feeling down, depressed or hopeless Not at all Not at all Not at all Not at all Not at all Not at all Not at all   PHQ-2 Total Score 1 0 0 0 0 0 0       Learning Assessment       04/02/2024 0338 [facility] - 4th Floor Medical Telemetry (4/1/2024 - 4/2/2024)   Created by Salvador Caba, RN - RN (Nurse) Status: Complete                 PRIMARY LEARNER     Primary Learner Name:  Ninfa Candelario RR - 04/02/2024 0338    Relationship:  Patient, Mother RR - 04/02/2024 0338    Does the primary learner have any barriers to learning?:  No Barriers RR - 04/02/2024 0338    What is the preferred language of the primary learner?:  English RR - 04/02/2024 0338    Is an  required?:  No RR - 04/02/2024 0338    How does the primary learner prefer to learn new concepts?:  Listening, Reading, Demonstration RR - 04/02/2024 0338    How often do you need to have someone help you  read instructions, pamphlets, or written material from your doctor or pharmacy?:  Sometimes RR - 04/02/2024 0338        CO-LEARNER #1     No question answered        CO-LEARNER #2     No question answered        SPECIAL TOPICS     No question answered        ANSWERED BY:     No question answered        Salvador Coyle, RN - RN (Nurse)   04/02/2024 0337

## 2025-04-10 ENCOUNTER — OUTPATIENT CASE MANAGEMENT (OUTPATIENT)
Dept: ADMINISTRATIVE | Facility: OTHER | Age: 60
End: 2025-04-10
Payer: MEDICARE

## 2025-04-11 NOTE — PROGRESS NOTES
HEMATOLOGY/ONCOLOGY OFFICE CLINIC VISIT    Visit Information:      Referring Physician: Dr Farr  PCP: DR. Cardona  GI:   Rheumatologist: Dr. Luis Rea  Immunologist: Dr. Daniel Nava  ENT: Dr. Brice Williamson  [facility] Pain management: Dr.Chai LONG Miguel Transplant: Dr. Adrian Naylor MD [facility] Med Onc: Dr. Zulema [facility] Breast Surg Onc: Dr.DeSnyder LONG [place]: Dr. 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   8) Stage IA grade 3, ER+, HER2 BERNA +,  IDC/DCIS of the left breast --- 2/7/18 at [facility]   --s/p lumpectomy with SLNB 4/12, Grade 2 IDC 4mm with second focus of microinvasive carcinoma 0.4mm. 2 SLN negative for malignancy    9) Progressive swelling of R lower extremity--- US NIVA done 2/19/18 negative for evidence of DVT  10). Paroxysmal Afib (2/18/18) diagnosed at [facility]; ECHO noted EF 58% - on Eliquis  11) Mild CKD with GFR 55 - managed per [facility] nephrology  12) Treatment induced neutropenia     Present treatment:  -Maintenance Revlimid 5mg PO QOD, started 02/16/17-- was held for a few months while undergoing treatment for her breast cancer 02/18-05/18; Restarted 6/15/18   Dexamethasone 20 mg po weekly added early July 2017--> reduced to 12 mg PO weekly October 4, 2017---> increased to 16mg PO weekly 2/15/18---restarted 6/15/18 --> 20 mg weekly 7//8/2022  Darzalex 7/8/2022-present--On hold due to cellulitis    Eliquis 2.5mg PO BID     Treatment/Oncology  history:  1) CyBorD x5 cycles 09/28/15-12/24/15  2) Autologous stem cell transplant 02/08/16, done at [facility]  3) Exploratory laparotomy with subtotal colectomy and end ileostomy done August 2, 2016--pathology specimen showed advanced colonic amyloidosis extensive involving the muscular wall submucosa and mucosa.  Appendix also involvement by amyloidosis with fibrous obliteration of the distal lumen.  4) Maintenance therapy with Revlimid 5mg-started 06/01/16--Discontinued shortly after 2/2 cytopenias  5) Left breast lumpectomy with SLNB at [facility] 4/12/18  6) Left partial breast RT x10 fractions  5/21/18-6/4/18  7) Femara 2.5 mg PO daily completed 5 yrs (6/2018- 6/2023)      Imaging:  NIVA 7/29/2021: Negative for deep venous thrombosis in the left lower extremity.   MRI CTL spine 9/14/2022: No acute myelomatous findings. Severe spinal stenosis process detected within the upper cervical spine as well as the entirety of the lumbar spine segments similar to the prior imaging assessment.  PET CT 9/14/2020: No convincing evidence of hypermetabolic metastatic disease. Asymmetric uptake in the right nasopharynx may be infectious or inflammatory.    CT C 1/24/2023: Right greater than left lower lobe opacification consistent with infectious process.  Lucent lesions throughout the osseous structures similar to 2015.    PET 4/17/2024: 1.  No evidence of active skeletal disease. 2.  Acute nonpathological fractures of the right anterolateral 5th-7th ribs. No impending pathological fracture of the long bones. 3.  No extramedullary disease. 4.  Diffuse skin thickening of the left lower leg with multiple foci of increased cutaneous uptake and associated with FDG-avid left inguinal and iliac chain lymph nodes. These findings may be related to lymphedema; however, superimposed angiosarcoma is not excluded given the foci of increased uptake. Direct inspection and biopsy are recommended.   PET CT 09/04/2024: No evidence of active  skeletal disease. No acute or impending pathological fracture. No extramedullary disease. New FDG-avid mucosal thickening in the left maxillary sinus, consistent with sinusitis. Lucency and increased uptake associated with the root of a left maxillary molar (likely tooth #15), consistent with periodontal disease.  Improving diffuse cutaneous and subcutaneous uptake in the left lower extremity, consistent with improving cellulitis. New and worsening focal nodular areas of increased uptake in the left lower extremity. Direct inspection is recommended.   Femur XR 12/9/2024: 1. No fracture or suspicious bone lesions are visualized in the left femur or left leg. 2. Subcutaneous edema in the left leg.   Tibia/fibula XR 12/9/2024: 1. No fracture or suspicious bone lesions are visualized in the left femur or left leg. 2. Subcutaneous edema in the left leg.   US Lower Extremity 04/08/25: No left lower extremity DVT identified. Mild subcutaneous edema at the left calf.    Breast imaging:  [facility] 12/29/2017: Calcifications in the left breast are suspicious. Stereotactic biopsy is recommended. BI-RADS Category 4: Suspicious Abnormality   [facility] 12/10/2018: Post surgical scar in the left breast is benign. BI-RADS Category 2: Benign Finding(s)   [facility] 12/16/2019: There is no mammographic evidence of malignancy. BI-RADS Category 2: Benign Finding(s)   [facility] 12/14/2020: There is no mammographic evidence of malignancy. BI-RADS Category 2: Benign Finding(s)   [facility] 3/21/2022: BI-RADS Category 2: Benign Finding(s)  [facility] 4/11/2023: There is no mammographic evidence of malignancy. BI-RADS Category 2: Benign Finding(s)   [facility] 11/14/2024: Bilateral. There is no mammographic evidence of malignancy. Overall BI-RAD Category: 2 - Benign     Pathology:  9/17/2015:  A. Soft tissue, abdominal wall, FNA   No malignant cells identified   Mature adipose tissue   Congo red stain is positive for amyloid deposition     9/18/2015:  BONE MARROW DIAGNOSIS:  -PLASMA  CELL NEOPLASM, REPRESENTING APPROXIMATELY 80% OF CELLULAR  ELEMENTS     1/29/2018:  STEREOTACTIC BIOPSY Left breast:  INVASIVE DUCTAL CARCINOMA, NO SPECIAL TYPE, SUZANNE GRADE 3 (3 + 2 + 2),   SINGLE FOCUS MEASURING 1.7 MM WITH SECOND FOCUS OF MICROINVASION MEASURING 0.3 MM.   DUCTAL CARCINOMA IN SITU, INTERMEDIATE NUCLEAR GRADE, SOLID TYPE WITH COMEDONECROSIS AND ASSOCIATED CALCIFICATIONS.   Atypical lobular hyperplasia with cytologic atypia, likely treatment effect.     CLINICAL HISTORY:       Patient: Ninfa Candelario is a 59 y.o. female.  Ms. Cabrera Joseph was admitted on 08/16/15 for ileus versus partial SBO. CT A/P done 08/17/15 showed sigmoid colitis and a zone of transition at the junction of the descending and sigmoid colon which could indicate some degree of obstruction and an obstructing mass cannot be excluded. Numerous skeletal lytic lesions noted. CT chest done 08/19/15 showed Multiple skeletal lytic lesions involving the thoracic spine, left rib sternum consistent with either metastases or multiple myeloma. There is no evidence of pulmonary or mediastinal mass. Dr. Farr was consulted for possible MM/anemia.     Nuclear Bone scan done 8/20/15 showed mild to moderate increased activity in left rib and right second rib which correlates with fractures seen on CT.  Skeletal survey done 8/20/15showed lytic lesions in the calvarium and probably a lytic lesion in the left scapula. Lytic areas in the spine and pelvis seen on recent CT are not well defined on skeletal survey. Work up labs done 08/20/15: Negative for sickle cell and Thalessemia (reported history of thalessemia). Iron 54, Transferrin 118.0, Iron sat 34.6%, Folate 26.5, Vit B12 1,779  Peripheral smear resulted slightly macrocytic normochromic anemia without signifcant anisocytosis may reflect early B12/folate deficiency or marrow dysfunction, among others. No immature myeloid cells or blasts. Platlets adequate. Beta 2 mircoglobulin = 3.2.   SPEP/NADIA: M-spike in the beta region. The monoclonal protein peak accounts for 1.13 g/dL. Hypogammaglobulinemia. NADIA pattern shows the presence of a free lambda light chain monoclonal protein with additional faint band in IgA.  All immunoglobulin levels decreased. IgA=26, IgG=307, IgM<5.  Kappa Qnt 0.16, Lambda Qnt 4600.00, Ratio <0.01.  24hr Urine for Bence Mendez: Kappa 2.75, Lambda 1250.00, Ratio <0.01. NADIA: Urine is POSITIVE for monoclonal Free Lambda Light chains     Patient then opted to go to [facility] where she underwent a bone marrow biopsy on 09/18/15. BMBx showed 80% plasma cells, 13p deletion and normal karyotype. She underwent fat pad biopsy which came back positive for Congo red consistent with amyloidosis. Cardiac workup revealed no amyloid deposition within the heart.  PET/CT and skeletal survey done September 18, 2015 showed multiple lytic osseous lesions  The patient was started on Velcade while at [facility] with the plan to transition to autologous stem cell transplantation. They asked if we could give her could continue treatment here.   She completed CyborD x5 cycles on 12/24/15     Patient admitted 01/06/16 for colitis and C. Diff. Pt treated with Flagyl. Discharged home 01/08/16     Repeat BMBx done at [facility] 01/2016 did not show any morphologic or immnophenotypic evidence of plasama cell neoplasm. Patient underwent Autologous stem cell transplant with Busulfan and melphalan on 02/08/16 at [facility]. The only complication was recurrent C.diff infection for which she completed 10 days of Fidaxomycin.     Follow-up bone marrow biopsy done at [facility] done 5/16/16 showed cellular 30-40% bone marrow with trilineage hematopoiesis. No morphologic or immunophenotypic support for plasma cell neoplasm. Started maintenance Revlimid 5mg. Unable to continue therapy due to cytopenias.     On 08/02/16 patient underwent  Exploratory laparotomy with subtotal colectomy and end ileostomy for what was thought to be toxic  megacolon. Pathology showed extensive advanced colonic amyloidosis involving the muscular wall, submucosa and mucosa: With the appendix also involved with amyloidosis.  Positive lambda light chains were noted.     Follow-up at [facility] 8/31/16, Per Dr. Adrian Naylor, 6 months post autologous transplant. She has engrafted. Based on the latest restaging she is near complete remission with possible IgA lambda on serum immunofixation. Patient was instructed to discontinue prophylactic antibiotics. She received her 1st series of immunizations while at [facility]. Patient had a bilateral venous ultrasound to rule out DVT, negative B/L. In regards to amyloidosis the patient feels that her tongue is smaller in size and her BNP has come down some.  Patient had a follow-up appointment at [facility] November 30, 2016.  Dr. Parnell documented the patient's free lambda light chain remains at a lower level.  Therefore in light of her recent surgery they will continue with observation only.  The plan to see the patient back in 2-3 months with repeat restaging labs.  They recommended regular CBC checks to monitor anemia.  Patient returned to [facility] in December 2016 to meet with dermatology for numerous papillary lesions on eyelids, chest and posterior neck consistent with amyloid deposits. Recommendations were for watchful waiting.  Patient is less than 1 year out from bone marrow transplant and further improvement can be expected.  She will see the patient back in 1 year to discuss possible surgical resection of the plaques especially around the eyelid region.  Rogaine recommended for persistent hair loss. Retin-A recommended for acne vulgaris.  Patient returned to [facility] on 2/8/2017 for neurosurgery consult for chronic low back pain and difficulty walking.  Imaging showed extensive DJD with discovertebral bulges and thickening of the spinal laminar tissues creating spinal stenosis throughout, but greatest at  L2/L3.  Neurosurgery felt that surgery risks outweighed the benefits.  Patient was prescribed pain medicine and encouraged to participate in physical therapy.  Patient also met with Dr. Parnell on 02/08/17, who noted an elevation in free light chains (kappa 52.60/lambda 27.77/ratio 1.89).  Recommendation is to resume maintenance therapy with Revlimid at a low dose of 5 mg every other day.  Patient went back to [facility] first week of April 2017.  I do not have these notes.  Reportedly she was told to continue on every other day Revlimid at 5 mg.  She reportedly had repeat myeloma labs done as well.  Again I do not have these results.  Patient went back to [facility] and saw Dr. Parnell June 29, 2017.  Myeloma labs were done with serum protein electrophoresis showing irregularity in the fast gamma region.  IgG of 1291.  Free kappa light chains of 84.6 with lambda light chains of 66.9.  Beta-2 microglobulin of 4.5  CT scan of lumbar spine showed multiple lytic lesions once again in the lumbar spine and bony pelvis.  Ultrasound of the left leg showed no evidence of DVT.  It was recommended based on the slight increase in her And lambda light chains to start weekly dexamethasone 20 mg p.o. weekly.  Follow-up visit and labs at [facility] on September 29, 2017 with Dr. Parnell.  Repeat beta-2 microglobulin came back at 2.4.  Serum IgG of 761, IgA 117 and IgM of 29.  Serum free kappa light chains of 24.9 and lambda of 23.5.  Counts were stable with hemoglobin of 11.1, WBC 4.9 and platelets of 210,000.  Recommendations were for continued Revlimid every other day with weekly dexamethasone along with aspirin for DVT prophylaxis.  Bilateral diagnostic MMG 12/19/17  noted 1.6cm coarse heterogeneous calcifications in posterior region of L breast at 3 o'clock position. Patient was scheduled for FNA which confirmed ID grade 3, single focus measuring 1.7cm with 2nd focus microinvasion DCIS grade 2 measuring 0.3cm. Left axillary LN  biopsy showed no evidence of metastatic carcinoma. Complete staging: Stage IA, grade 3 ER+, Her 2 Niki + IDC/DCIS of left breast. Ki-67 <17%.  Repeat myeloma labs showed rise in free lambda light chains noted at 68.69, kappa light chains at 27.22,  beta 2 microglobulin came back at 2.9. Serum protein electrophoresis showed no definitive evidence of an M protein peak. The pattern is consistent with an acute inflammatory reaction. Recommendations were made to maintain Revlimid at current dose of 5mg every other day to be taken continuously increase weekly dexamethasone to 16 mg.   Patient seen at [facility] with tentative plan for L breast lumpectomy on 3/13/18. 2/16/18- Prior to surgery she was seen by genetic counselor who ran genetic testing per patient reques, all of which were negative. She was then seen by cardiology at [facility] 2/16/18 for further evaluation of persistent bilateral lower extremity swelling-ECHO noted EF of 58%; diagnosed with paroxysmal atrial fibrillation and instructed to begin daily ASA. During preoperative asssessment per Rad/Onc 3/12/18, corresponding chest CT noted new bilateral pulmonary nodules concerning for metastatic disease- surgery was subsequently postponed pending pulmonary evaluation. Seen by Pulmonology on 3/21/18, PFTs within normal limits and cleared patient for surgery with recommended close CT follow up of nodules in 3 months. 3/21/18 seen by nephrology for management of mild CKD (GFR 55)-cleared for surgery with recommended follow up in 3 months. Left breast lumpectomy and SLNB performed per Dr. Washburn on 4/12/18- plan for follow up in clinic on 4/24/18  for postoperative assessment. Follow up with Dr. Poole (Med/Onc) on 4/25/18. Follow up with Dr. Parnell 4/26/18.   Patient seen at [facility] s/p Left breast lumpectomy with SLNB on 4/12/18. Following surgery she completed Left partial breast radiation x 10 fractions. She was then started on endocrine therapy with Femara  after been deemedan inappropriate candidate for systemic chemotherapy/Herceptin.      She was seen by neurosugery at [facility] on 4/26/18 following repeat MRI of cervical thoracic lumbar spine which noted focal enhancement of T2 hyperintensity at the C2 level which may be due to degenerative changes. Signal abnormality within  the T12 and L1 may be secondary to myeloma. Plan to continue with observation for now with F/U scheduled in October 2018.      She was seen by Dr. Parnell at [facility] for management of her MM on 4/26/18. Patient was recommended to restart Revlimid 5mg PO every other day with notes that these recommendations would be communicated to collaborating Oncologist. Patient was also recommended to start on Zometa for her bone disease.      Seen by Dr. Parnell at [facility] for management of her MM on 6/28/18. Repeat light chains noted lambda 33.36 (previously 80.80), K/L ratio 0.91 (previously 0.49). Due to slight improvement in light chains, plan to continue on lenalidomide maintenance. Recommendations to continue anticoagulation with Eliquis. BLE venous doppler negative for DVT.   Seen by pulmonolgy on 6/28/18- repeat CT chest done 6/28/18 noted resolution of previous pulmonary nodules. Thought to be infectious in nature. Recommendations for discharge from pulmonary clinic.  Should MRI of her cervical thoracic lumbar spine on 2/12/2019 that demonstrated cervical spondylosis with canal stenosis most notable at C1 and 2. Cord signal abnormality at C1 and 2 with enhancement is stable. Lumbar spondylosis with central canal stenosis at multiple levels. No imaging features of bony metastatic disease.     For amyloidosis (Light chain type).    Patient presented with macroglossia and now with improvement of the swelling of her tongue. Her cardiac parameters are also better.   8/10/2020 proBNP  250   2/17/2020                616  8/9/2019                  190  11/14/2023  820        Chief Complaint:for  Multiple Myeloma       Interval History:  She is currently on Rev/Dex/Darzalex  for MM  s/p transplant in 2016. At her [facility] visit on 10/12/22, Ninlaro was discontinued due to it causing severe diarrhea and leukopenia. Diarrhea resolved after stopping Ninlaro (she admits to stopping prior to visit @ [facility]).     I Called Dr Parnell office and she is out of the office. She will be back next week. Will try again then  07/11/2024:  Dr. Parnell kindly called me back to discuss patient.  She recommend to decrease the dose of dexamethasone to 10 mg to see if this can help to decrease the frequency of infections.  She does not opposed to IVIG if needed for her hypogammaglobulinemia.  She usually starts when IgG is less than 400.  In this case if she ever start the IVIG needs to be given in a very slow rate due to her heart and fluid retention risks.  We will continue Evangelina monthly and will decrease dexamethasone to 10 mg.    10/10/2024: Patient is here today for follow up of her multiple myeloma, amyloidosis and breast cancer.  Daratumumab has been on hold since April 2024 for LLE cellulitis. She was admitted to [facility] per  Dr. Parnell recommendations until LLE is better. Swelling to BLE noted during physical exam, L>R. She has follow up with Dr. Parnell @ [facility] on 11/15/24.      01/13/2025: She was seen at [facility] that recommend to hold Revlimid as she has not been taking it in about 2 months or so.        On February 12, 2025 patient was seen and evaluated by Internal Medicine for multiple chronic conditions.  She was referred to Nephrology and the patient was initiated on dapagliflozin       03/19/25: Patient presents today for a follow up, accompanied by her sister. Pt states she had a fall on Monday, still feels weak. Patient scheduled to have a biopsy on her knee 04/01/25. Patient reports having headaches on and off, advised to drink plenty of water.   Patient was last seen by Dr Parnell ([facility]) on 2/19/2025 at which time:   Left  lower extremity edema/Abnormal finding on prior PET CT (performed for possible cellulitis): The prior PET CT also showed subcutaneous uptake that could be lymphedema, possibly infection (question of past cellulitis) but angiosarcomna is not r/o. Subsequent repeated PET (9/2024) since her cellulitis has improved and Improving diffuse cutaneous and subcutaneous uptake in the left lower extremity, consistent with improving cellulitis was noted. New and worsening focal nodules w/ increased uptake in the left lower extremity are noted, but no skeletal or extramedullary disease was noted. Pt referred to ortho, but pt canceled appt. and delayed until later. She has a biopsy of her left LE schedule next dora at [facility]. On physical LLE is swollen and mild erythema. Will do NIVA to eval for DVT even if she is on anticoagulation.  Last daratumumab 01/13/2025 she was scheduled to have it again on 03/09/2025 but was held due to worsening on her kidney function and GFR was 16.  As per medication insert need to be held if creatinine clearance  < 15.     04/16/25: Patient presents today for 4 week follow up. Pt had a recent US lower extremity, No left lower extremity DVT identified. Mild subcutaneous edema at the left calf. She was seen by a dermatologist  at [facility] and underwent right lower extremity skin biopsy that was negative for malignancy. Pt had biopsy on left leg, MD has given the okay to remove suture.  Pt is due for daratumumab C20 on 4/17/25.  Overall pt is doing well, denies any fever, chills, sweats.       ROS: All 14 points ROS taken and as per Interval History  Review of Systems   Constitutional:  Positive for malaise/fatigue. Negative for chills, fever and weight loss.   HENT:  Negative for congestion and nosebleeds.    Eyes:  Negative for blurred vision, double vision and photophobia.   Respiratory:  Negative for cough, hemoptysis and shortness of breath.    Cardiovascular:  Negative for chest pain,  "palpitations, leg swelling and PND.   Gastrointestinal:  Positive for diarrhea (Better), nausea and vomiting. Negative for abdominal pain, blood in stool, constipation and melena.   Genitourinary:  Negative for dysuria, frequency, hematuria and urgency.   Musculoskeletal:  Negative for back pain, falls and myalgias.   Skin:  Negative for itching and rash.   Neurological:  Positive for weakness. Negative for tremors, focal weakness, seizures and headaches.   Endo/Heme/Allergies:  Negative for environmental allergies. Does not bruise/bleed easily.   Psychiatric/Behavioral:  Negative for depression and suicidal ideas. The patient is not nervous/anxious.          Histories:  PMH/PSH/FH/SOCIAL/ALLERGIES AND MEDS REVIEWED AND UPDATED AS APPROPRIATE       Vitals:    04/16/25 1514   BP: 125/73   BP Location: Left arm   Patient Position: Sitting   Pulse: 86   Resp: 18   Temp: 98.3 °F (36.8 °C)   TempSrc: Oral   SpO2: 95%   Weight: 78.4 kg (172 lb 12.8 oz)   Height: 5' 3" (1.6 m)       Wt Readings from Last 6 Encounters:   04/16/25 78.4 kg (172 lb 12.8 oz)   03/20/25 75.8 kg (167 lb)   03/19/25 75.8 kg (167 lb)   02/13/25 79.6 kg (175 lb 8 oz)   02/12/25 73.5 kg (162 lb)   01/13/25 75.8 kg (167 lb)     Body mass index is 30.61 kg/m².  Body surface area is 1.87 meters squared.      Vitals reviewed and stable  Physical Exam  Vitals and nursing note reviewed.   Constitutional:       General: She is not in acute distress.     Appearance: Normal appearance. She is well-developed. She is ill-appearing.      Comments: Uses walker for mobility   HENT:      Head: Normocephalic and atraumatic.      Mouth/Throat:      Mouth: Mucous membranes are moist.   Eyes:      General: No scleral icterus.     Extraocular Movements: Extraocular movements intact.      Conjunctiva/sclera: Conjunctivae normal.      Pupils: Pupils are equal, round, and reactive to light.   Neck:      Vascular: No JVD.   Cardiovascular:      Rate and Rhythm: Normal rate " and regular rhythm.      Heart sounds: No murmur heard.  Pulmonary:      Effort: Pulmonary effort is normal.      Breath sounds: Normal breath sounds. No wheezing or rhonchi.   Abdominal:      General: Bowel sounds are normal. There is no distension.      Palpations: Abdomen is soft. There is no mass.      Tenderness: There is no abdominal tenderness.   Musculoskeletal:         General: No swelling or deformity.      Cervical back: Neck supple.      Right lower le+ Edema present.      Left lower leg: 3+ Edema present.      Comments: Hyperpigmentation of left leg.    Lymphadenopathy:      Head:      Right side of head: No submandibular adenopathy.      Left side of head: No submandibular adenopathy.      Cervical: No cervical adenopathy.      Upper Body:      Right upper body: No supraclavicular or axillary adenopathy.      Left upper body: No supraclavicular or axillary adenopathy.      Lower Body: No right inguinal adenopathy. No left inguinal adenopathy.   Skin:     General: Skin is warm.      Coloration: Skin is not jaundiced.      Findings: Rash present.      Nails: There is no clubbing.      Comments: Rash: bilateral; LE left > right   Neurological:      Mental Status: She is alert and oriented to person, place, and time.      Cranial Nerves: Cranial nerves 2-12 are intact.      Motor: Weakness present.      Gait: Gait abnormal.   Psychiatric:         Attention and Perception: Attention normal.         Behavior: Behavior is cooperative.         Cognition and Memory: Cognition normal.         Judgment: Judgment normal.       ECOG SCORE    1 - Restricted in strenuous activity-ambulatory and able to carry out work of a light nature         Laboratory:  CBC with Differential:  Lab Results   Component Value Date    WBC 4.68 2025    RBC 3.46 (L) 2025    HGB 9.7 (L) 2025    HCT 31.2 (L) 2025    MCV 90.2 2025    MCH 28.0 2025    MCHC 31.1 (L) 2025    RDW 13.9 2025      04/16/2025    MPV 8.7 04/16/2025        CMP:  Sodium   Date Value Ref Range Status   04/16/2025 144 136 - 145 mmol/L Final     Potassium   Date Value Ref Range Status   04/16/2025 4.1 3.5 - 5.1 mmol/L Final     Chloride   Date Value Ref Range Status   04/16/2025 112 (H) 98 - 107 mmol/L Final     CO2   Date Value Ref Range Status   04/16/2025 25 23 - 31 mmol/L Final     Blood Urea Nitrogen   Date Value Ref Range Status   04/16/2025 22.0 (H) 9.8 - 20.1 mg/dL Final   07/18/2023 35 (H) 6 - 23 mg/dL Final     Creatinine   Date Value Ref Range Status   04/16/2025 2.30 (H) 0.55 - 1.02 mg/dL Final   07/18/2023 2.48 (H) 0.51 - 0.95 mg/dL Final     Calcium   Date Value Ref Range Status   04/16/2025 9.6 8.4 - 10.2 mg/dL Final   07/18/2023 9.7 8.4 - 10.2 mg/dL Final     Albumin   Date Value Ref Range Status   04/16/2025 3.1 (L) 3.4 - 4.8 g/dL Final     Bilirubin Total   Date Value Ref Range Status   04/16/2025 0.2 <=1.5 mg/dL Final     ALP   Date Value Ref Range Status   04/16/2025 95 40 - 150 unit/L Final     AST   Date Value Ref Range Status   04/16/2025 17 11 - 45 unit/L Final     ALT   Date Value Ref Range Status   04/16/2025 8 0 - 55 unit/L Final     Estimated GFR-Non    Date Value Ref Range Status   04/20/2022 25                 Component  Ref Range & Units (hover) 15:06 3 mo ago 4 mo ago 6 mo ago 9 mo ago 10 mo ago 1 yr ago   IgG Level 537.00 445.00 Low  392.00 Low  518.00 Low  487.00 Low  490.00 Low  591.00   IgA Level 94.0 83.0 117.0 162.0 62.0 Low  65.0 Low  63.0 Low    IgM Level 13.0 Low  40.0 6.0 Low  8.0 Low  15.0 Low  15.0 Low  8.0 Low             Assessment:       1. Multiple myeloma in remission    2. Light chain (AL) amyloidosis    3. Anemia of chronic disease    4. Chronic kidney disease, stage 4 (severe)    5. History of breast cancer    6. History of aromatase inhibitor therapy    7. S/P colostomy    8. Maintenance antineoplastic immunotherapy          1) Multiple Myeloma, IgA Lambda,  "FISH with del 13p, normal karyotype, ISS stg II Dx 2015;   --S/p Autologous stem cell transplant 02/08/16-remission-->slight rise in free light chains 02/08/17    2) Amyloidosis, Lambda light chain type, organ involvement with macroglossia and colon involvement. [place] red fat pad + Dx 2/2015  --Last seen by Dr Parnell at [facility] 12/20/2024: Her note "AL Amyloid/ MM: Currently available MM/AL amyloid parameters indicate a decrease in the NT Pro BNP and troponin T. We asked if she had taken any steroids during her URI to explain the reduction of Amyloid and MM parameters, but the pt indicated that she had not). Her MM parameters have improved as well.While there was a prior minimal rise in the FKLC this patient has had a lambda clonality in the past.. She had been off of therapy which has resumed but her parameters already demonstrate improvement over the last visit. We previously recommended stopping lenalidomide and seeing if the response can be maintained and trying to reduce the fluid retention and other side effects of dexamethasone. This has not improved then we recommended reinitiation of DRd. Most recent PET CT w/o progression or active skeletal or extramedullary disease. The patient will be scheduled for follow-up in 2-3 for continued monitoring of the chronic plasma cell dyscrasia and orders for CBC, CMP, serum and urine protein electrophoretic and immunofixation studies, free light chain studies, and immunoglobulins have been placed for that follow/up visit. 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 Sx risks do not outweigh benefits. Pain meds given and encouraged " Physical Therpay    8) Amyloid plaques, evaluated by Derm at [facility], recommend watchful waiting. Patient to follow up 12/2017    9) Stage IA grade 3, ER+, HER2 BERNA +,  IDC/DCIS of the left breast cancer --- 2/7/18 at [facility]; s/p lumpectomy with SLNB 4/12, Grade 2 IDC measuring 4mm with second focus of microinvasive carcinoma measuring  0.4mm .IG DCIS, 0.3mm to posterior margin; 2 SLN negative for malignancy      10) Progressive swelling of R lower extremity--- US NIVA done 2/19/18 negative for evidence of DVT    11). Paroxysmal Afib (2/18/18) diagnosed at [facility]; ECHO noted EF 58%    12. Mild CKD with GFR 55 - managed per [facility] nephrology    13). Pulmonary nodules noted on pre-operative chest CT scan (3/12/18) noted new bilateral pulmonary nodules are nonspecific -- seen by Pulmonology at [facility] (3/21/18) thought to be inflammatory/infectious in nature, repeat Chest CT 6/28/18 noted resolution of nodules    14). Treatment induced neutropenia    15) NON COMPLIANT patient-- please see above the no show, reschedule and cancelled visits/labs and infusion.     16) Neoplasm associated pain/polyneuropathy: continues w/ pain service on methadone/ oxycodone /duloxitene      Plan:       04/16/25  Okay to proceed with daratumumab C20 - 4/17/25  RTC in 4 weeks MD/same day labs/infusion (Afternoon)  Labs: MM workup  We will start IVIG for IgG <400    The patient was seen, interviewed and examined. Pertinent lab and radiology studies were reviewed.   The patient was given ample opportunity to ask questions, and to the best of my abilities, all questions answered to satisfaction; patient demonstrated understanding of what we discussed and agreeable to the plan. Pt instructed to call should develop concerning signs/symptoms or have further questions.   Visit today included increased complexity associated with the care of the episodic problem MM, amyloidosis, breast cancer, addressing and managing the longitudinal care of the patient's MM,  amyloidosis, breast cancer.       Natalie Meyers MD  Hematology/Oncology  [facility]     Professional Services   I, Christy Garcia LPN, acted solely as a scribe for and in the presence of Dr. Natalie Meyers, who performed these services.

## 2025-04-15 ENCOUNTER — OUTPATIENT CASE MANAGEMENT (OUTPATIENT)
Dept: ADMINISTRATIVE | Facility: OTHER | Age: 60
End: 2025-04-15
Payer: MEDICARE

## 2025-04-15 NOTE — PROGRESS NOTES
Outpatient Care Management  Plan of Care Follow Up Visit    Patient: Ninfa Candelario  MRN: [id number]  Date of Service: 04/15/2025  Completed by: Caroline Enriquez RN  Referral Date: 01/27/2023    Reason for Visit   Patient presents with    OPCM Chart Review    OPCM RN Follow Up Call       Brief Summary: OPCM follow up call completed with Patient. Continued Education on Importance of fully engaging in PT sessions and Safety while ambulating.   Next Steps: Patient agrees to follow up in approximately 1 week.

## 2025-04-16 ENCOUNTER — DOCUMENTATION ONLY (OUTPATIENT)
Dept: HEMATOLOGY/ONCOLOGY | Facility: CLINIC | Age: 60
End: 2025-04-16
Payer: MEDICARE

## 2025-04-16 ENCOUNTER — OFFICE VISIT (OUTPATIENT)
Dept: HEMATOLOGY/ONCOLOGY | Facility: CLINIC | Age: 60
End: 2025-04-16
Payer: MEDICARE

## 2025-04-16 ENCOUNTER — LAB VISIT (OUTPATIENT)
Dept: LAB | Facility: HOSPITAL | Age: 60
End: 2025-04-16
Attending: INTERNAL MEDICINE
Payer: MEDICARE

## 2025-04-16 VITALS
TEMPERATURE: 98 F | WEIGHT: 172.81 LBS | SYSTOLIC BLOOD PRESSURE: 125 MMHG | HEIGHT: 63 IN | RESPIRATION RATE: 18 BRPM | HEART RATE: 86 BPM | DIASTOLIC BLOOD PRESSURE: 73 MMHG | OXYGEN SATURATION: 95 % | BODY MASS INDEX: 30.62 KG/M2

## 2025-04-16 DIAGNOSIS — Z85.3 HISTORY OF BREAST CANCER: ICD-10-CM

## 2025-04-16 DIAGNOSIS — C90.01 MULTIPLE MYELOMA IN REMISSION: Primary | ICD-10-CM

## 2025-04-16 DIAGNOSIS — Z93.3 S/P COLOSTOMY: ICD-10-CM

## 2025-04-16 DIAGNOSIS — E85.81 LIGHT CHAIN (AL) AMYLOIDOSIS: ICD-10-CM

## 2025-04-16 DIAGNOSIS — Z92.21 HISTORY OF AROMATASE INHIBITOR THERAPY: ICD-10-CM

## 2025-04-16 DIAGNOSIS — Z51.12 MAINTENANCE ANTINEOPLASTIC IMMUNOTHERAPY: ICD-10-CM

## 2025-04-16 DIAGNOSIS — N18.4 CHRONIC KIDNEY DISEASE, STAGE 4 (SEVERE): ICD-10-CM

## 2025-04-16 DIAGNOSIS — C90.00 MULTIPLE MYELOMA NOT HAVING ACHIEVED REMISSION: ICD-10-CM

## 2025-04-16 DIAGNOSIS — D63.8 ANEMIA OF CHRONIC DISEASE: ICD-10-CM

## 2025-04-16 LAB
ABS NEUT CALC (OHS): 3.09 X10(3)/MCL (ref 2.1–9.2)
ALBUMIN SERPL-MCNC: 3.1 G/DL (ref 3.4–4.8)
ALBUMIN/GLOB SERPL: 1 RATIO (ref 1.1–2)
ALP SERPL-CCNC: 95 UNIT/L (ref 40–150)
ALT SERPL-CCNC: 8 UNIT/L (ref 0–55)
ANION GAP SERPL CALC-SCNC: 7 MEQ/L
ANISOCYTOSIS BLD QL SMEAR: ABNORMAL
AST SERPL-CCNC: 17 UNIT/L (ref 11–45)
BASOPHILS NFR BLD MANUAL: 0.14 X10(3)/MCL (ref 0–0.2)
BASOPHILS NFR BLD MANUAL: 3 % (ref 0–2)
BILIRUB SERPL-MCNC: 0.2 MG/DL
BUN SERPL-MCNC: 22 MG/DL (ref 9.8–20.1)
CALCIUM SERPL-MCNC: 9.6 MG/DL (ref 8.4–10.2)
CHLORIDE SERPL-SCNC: 112 MMOL/L (ref 98–107)
CO2 SERPL-SCNC: 25 MMOL/L (ref 23–31)
CREAT SERPL-MCNC: 2.3 MG/DL (ref 0.55–1.02)
CREAT/UREA NIT SERPL: 10
EOSINOPHIL NFR BLD MANUAL: 0.09 X10(3)/MCL (ref 0–0.9)
EOSINOPHIL NFR BLD MANUAL: 2 % (ref 0–8)
ERYTHROCYTE [DISTWIDTH] IN BLOOD BY AUTOMATED COUNT: 13.9 % (ref 11.5–17)
GFR SERPLBLD CREATININE-BSD FMLA CKD-EPI: 24 ML/MIN/1.73/M2
GLOBULIN SER-MCNC: 3.2 GM/DL (ref 2.4–3.5)
GLUCOSE SERPL-MCNC: 100 MG/DL (ref 82–115)
HCT VFR BLD AUTO: 31.2 % (ref 37–47)
HGB BLD-MCNC: 9.7 G/DL (ref 12–16)
IGA SERPL-MCNC: 94 MG/DL (ref 69–517)
IGG SERPL-MCNC: 537 MG/DL (ref 522–1631)
IGM SERPL-MCNC: 13 MG/DL (ref 33–293)
LYMPHOCYTES NFR BLD MANUAL: 1.12 X10(3)/MCL (ref 0.6–4.6)
LYMPHOCYTES NFR BLD MANUAL: 24 % (ref 13–40)
MACROCYTES BLD QL SMEAR: ABNORMAL
MCH RBC QN AUTO: 28 PG (ref 27–31)
MCHC RBC AUTO-ENTMCNC: 31.1 G/DL (ref 33–36)
MCV RBC AUTO: 90.2 FL (ref 80–94)
MICROCYTES BLD QL SMEAR: ABNORMAL
MONOCYTES NFR BLD MANUAL: 0.23 X10(3)/MCL (ref 0.1–1.3)
MONOCYTES NFR BLD MANUAL: 5 % (ref 2–11)
NEUTROPHILS NFR BLD MANUAL: 66 % (ref 47–80)
PLATELET # BLD AUTO: 212 X10(3)/MCL (ref 130–400)
PLATELET # BLD EST: NORMAL 10*3/UL
PMV BLD AUTO: 8.7 FL (ref 7.4–10.4)
POIKILOCYTOSIS BLD QL SMEAR: ABNORMAL
POTASSIUM SERPL-SCNC: 4.1 MMOL/L (ref 3.5–5.1)
PROT SERPL-MCNC: 6.3 GM/DL (ref 5.8–7.6)
RBC # BLD AUTO: 3.46 X10(6)/MCL (ref 4.2–5.4)
RBC MORPH BLD: ABNORMAL
SODIUM SERPL-SCNC: 144 MMOL/L (ref 136–145)
TEAR DROP CELL (OLG): ABNORMAL
WBC # BLD AUTO: 4.68 X10(3)/MCL (ref 4.5–11.5)

## 2025-04-16 PROCEDURE — 99999 PR PBB SHADOW E&M-EST. PATIENT-LVL V: CPT | Mod: PBBFAC,,, | Performed by: INTERNAL MEDICINE

## 2025-04-16 PROCEDURE — 80053 COMPREHEN METABOLIC PANEL: CPT

## 2025-04-16 PROCEDURE — 36415 COLL VENOUS BLD VENIPUNCTURE: CPT

## 2025-04-16 PROCEDURE — 85025 COMPLETE CBC W/AUTO DIFF WBC: CPT

## 2025-04-16 PROCEDURE — 82784 ASSAY IGA/IGD/IGG/IGM EACH: CPT | Mod: 59

## 2025-04-16 PROCEDURE — 99215 OFFICE O/P EST HI 40 MIN: CPT | Mod: PBBFAC | Performed by: INTERNAL MEDICINE

## 2025-04-16 RX ORDER — HEPARIN 100 UNIT/ML
500 SYRINGE INTRAVENOUS
Status: CANCELLED | OUTPATIENT
Start: 2025-04-17

## 2025-04-16 RX ORDER — DIPHENHYDRAMINE HYDROCHLORIDE 50 MG/ML
50 INJECTION, SOLUTION INTRAMUSCULAR; INTRAVENOUS ONCE AS NEEDED
Status: CANCELLED | OUTPATIENT
Start: 2025-04-17

## 2025-04-16 RX ORDER — ACETAMINOPHEN 325 MG/1
650 TABLET ORAL
Status: CANCELLED | OUTPATIENT
Start: 2025-04-17

## 2025-04-16 RX ORDER — EPINEPHRINE 0.3 MG/.3ML
0.3 INJECTION SUBCUTANEOUS ONCE AS NEEDED
Status: CANCELLED | OUTPATIENT
Start: 2025-04-17

## 2025-04-16 RX ORDER — CLINDAMYCIN HYDROCHLORIDE 300 MG/1
CAPSULE ORAL
COMMUNITY
Start: 2025-04-03

## 2025-04-16 RX ORDER — SODIUM CHLORIDE 0.9 % (FLUSH) 0.9 %
10 SYRINGE (ML) INJECTION
Status: CANCELLED | OUTPATIENT
Start: 2025-04-17

## 2025-04-16 RX ORDER — DIPHENHYDRAMINE HCL 25 MG
25 CAPSULE ORAL
Status: CANCELLED | OUTPATIENT
Start: 2025-04-17

## 2025-04-16 NOTE — PROGRESS NOTES
No show/Reschedules/Cancellations  08/21/2018--> Rescheduled                07/20/2023-->canceled visit/labs/infusion 05/08/2024 11/05/2019--> No Show/Reschedule 08/01/2023-->canceled visit/labs/infusion 05/22/2024 06/24/2020--> No Show/Reschedule 08/31/2023-->canceled visit/labs/infusion 05/22/2024 09/24/2020--> No Show/Reschedule 09/21/2023-->canceled visit/labs/infusion 06/03/2024  03/15/2021--> No Show/Reschedule 10/24/2023-->canceled visit/labs/infusion 07/11/2024 03/23/2021--> No Show/Reschedule 10/25/2023-->canceled visit/labs/infusion 07/31/2024 04/13/2021--> No Show   10/26/2023-->canceled visit/labs/infusion 08/01/2024 05/27/2021--> No Show   10/31/2023-->canceled visit/labs/infusion 08/14/2024 08/19/2021--> No Show   11/01/2023-->canceled visit/labs/infusion 08/19/2024--> No Show   08/26/2021--> No Show/Rescheduled 11/29/2023-->canceled visit/labs/infusion 09/11/2024 04/26/2022--> No Show   12/04/2023-->canceled visit/labs/infusion 09/18/2024  10/03/2022--> Rescheduled  12/07/2023-->canceled visit/labs/infusion 01/02/2025  10/11/2022--> Rescheduled  01/03/2024-->canceled visit/labs/infusion       02/12/2025 -->canceled visit/labs/infusion  03/22/2023--> No Show/Rescheduled 01/11/2024-->canceled visit/labs/infusion       02/17/2025--->canceled visit/labs/infusion  4/26/2023->Rescheduled   01/25/2024-->canceled visit/labs/infusion       03/03/2025--->canceled visit/labs/infusion  05/09/2023->Rescheduled   04/03/2024 03/05/2025--->Canceled  5/30/2023->Rescheduled   04/24/2024                                                      03/10/2025---> No Show/Rescheduled  6/15/2023->Rescheduled   05/01/2024 03/17/2025---> Canceled

## 2025-04-17 ENCOUNTER — INFUSION (OUTPATIENT)
Dept: INFUSION THERAPY | Facility: HOSPITAL | Age: 60
End: 2025-04-17
Attending: INTERNAL MEDICINE
Payer: MEDICARE

## 2025-04-17 VITALS
TEMPERATURE: 98 F | SYSTOLIC BLOOD PRESSURE: 137 MMHG | OXYGEN SATURATION: 97 % | HEART RATE: 94 BPM | RESPIRATION RATE: 18 BRPM | DIASTOLIC BLOOD PRESSURE: 70 MMHG

## 2025-04-17 DIAGNOSIS — E85.9 AMYLOIDOSIS, UNSPECIFIED TYPE: Primary | ICD-10-CM

## 2025-04-17 PROCEDURE — 63600175 PHARM REV CODE 636 W HCPCS: Mod: JZ,TB | Performed by: INTERNAL MEDICINE

## 2025-04-17 PROCEDURE — 25000003 PHARM REV CODE 250: Performed by: INTERNAL MEDICINE

## 2025-04-17 PROCEDURE — 96401 CHEMO ANTI-NEOPL SQ/IM: CPT

## 2025-04-17 RX ORDER — EPINEPHRINE 0.3 MG/.3ML
0.3 INJECTION SUBCUTANEOUS ONCE AS NEEDED
Status: DISCONTINUED | OUTPATIENT
Start: 2025-04-17 | End: 2025-04-17 | Stop reason: HOSPADM

## 2025-04-17 RX ORDER — DIPHENHYDRAMINE HCL 25 MG
25 CAPSULE ORAL
Status: COMPLETED | OUTPATIENT
Start: 2025-04-17 | End: 2025-04-17

## 2025-04-17 RX ORDER — SODIUM CHLORIDE 0.9 % (FLUSH) 0.9 %
10 SYRINGE (ML) INJECTION
Status: DISCONTINUED | OUTPATIENT
Start: 2025-04-17 | End: 2025-04-17 | Stop reason: HOSPADM

## 2025-04-17 RX ORDER — HEPARIN 100 UNIT/ML
500 SYRINGE INTRAVENOUS
Status: DISCONTINUED | OUTPATIENT
Start: 2025-04-17 | End: 2025-04-17 | Stop reason: HOSPADM

## 2025-04-17 RX ORDER — DIPHENHYDRAMINE HYDROCHLORIDE 50 MG/ML
50 INJECTION, SOLUTION INTRAMUSCULAR; INTRAVENOUS ONCE AS NEEDED
Status: DISCONTINUED | OUTPATIENT
Start: 2025-04-17 | End: 2025-04-17 | Stop reason: HOSPADM

## 2025-04-17 RX ORDER — ACETAMINOPHEN 325 MG/1
650 TABLET ORAL
Status: COMPLETED | OUTPATIENT
Start: 2025-04-17 | End: 2025-04-17

## 2025-04-17 RX ADMIN — ACETAMINOPHEN 650 MG: 325 TABLET ORAL at 02:04

## 2025-04-17 RX ADMIN — DARATUMUMAB AND HYALURONIDASE-FIHJ (HUMAN RECOMBINANT) 1800 MG: 1800; 30000 INJECTION SUBCUTANEOUS at 02:04

## 2025-04-17 RX ADMIN — DIPHENHYDRAMINE HYDROCHLORIDE 25 MG: 25 CAPSULE ORAL at 02:04

## 2025-04-17 NOTE — PLAN OF CARE
C20 Darzalex injection given (Q4W); tolerated well; next appointments discussed with patient; discharged home in stable condition.

## 2025-04-18 ENCOUNTER — PATIENT MESSAGE (OUTPATIENT)
Dept: INTERNAL MEDICINE | Facility: CLINIC | Age: 60
End: 2025-04-18
Payer: MEDICARE

## 2025-04-30 ENCOUNTER — OUTPATIENT CASE MANAGEMENT (OUTPATIENT)
Dept: ADMINISTRATIVE | Facility: OTHER | Age: 60
End: 2025-04-30
Payer: MEDICARE

## 2025-04-30 DIAGNOSIS — C90.00 MULTIPLE MYELOMA NOT HAVING ACHIEVED REMISSION: ICD-10-CM

## 2025-04-30 RX ORDER — ONDANSETRON HYDROCHLORIDE 8 MG/1
8 TABLET, FILM COATED ORAL EVERY 8 HOURS PRN
Qty: 90 TABLET | Refills: 1 | Status: SHIPPED | OUTPATIENT
Start: 2025-04-30

## 2025-05-01 ENCOUNTER — PATIENT MESSAGE (OUTPATIENT)
Dept: NEPHROLOGY | Facility: CLINIC | Age: 60
End: 2025-05-01
Payer: MEDICARE

## 2025-05-07 ENCOUNTER — LAB VISIT (OUTPATIENT)
Dept: LAB | Facility: HOSPITAL | Age: 60
End: 2025-05-07
Attending: INTERNAL MEDICINE
Payer: MEDICARE

## 2025-05-07 ENCOUNTER — PATIENT MESSAGE (OUTPATIENT)
Dept: INTERNAL MEDICINE | Facility: CLINIC | Age: 60
End: 2025-05-07
Payer: MEDICARE

## 2025-05-07 DIAGNOSIS — N18.4 CHRONIC KIDNEY DISEASE, STAGE 4 (SEVERE): ICD-10-CM

## 2025-05-07 LAB
ALBUMIN SERPL-MCNC: 3.2 G/DL (ref 3.4–4.8)
ALBUMIN/GLOB SERPL: 0.9 RATIO (ref 1.1–2)
ALP SERPL-CCNC: 94 UNIT/L (ref 40–150)
ALT SERPL-CCNC: 11 UNIT/L (ref 0–55)
ANION GAP SERPL CALC-SCNC: 10 MEQ/L
AST SERPL-CCNC: 18 UNIT/L (ref 11–45)
BACTERIA #/AREA URNS AUTO: ABNORMAL /HPF
BASOPHILS # BLD AUTO: 0.03 X10(3)/MCL
BASOPHILS NFR BLD AUTO: 0.6 %
BILIRUB SERPL-MCNC: 0.2 MG/DL
BILIRUB UR QL STRIP.AUTO: NEGATIVE
BUN SERPL-MCNC: 31.6 MG/DL (ref 9.8–20.1)
CALCIUM SERPL-MCNC: 9.5 MG/DL (ref 8.4–10.2)
CHLORIDE SERPL-SCNC: 113 MMOL/L (ref 98–107)
CLARITY UR: ABNORMAL
CO2 SERPL-SCNC: 19 MMOL/L (ref 23–31)
COLOR UR AUTO: ABNORMAL
CREAT SERPL-MCNC: 2.56 MG/DL (ref 0.55–1.02)
CREAT UR-MCNC: 207.1 MG/DL (ref 45–106)
CREAT/UREA NIT SERPL: 12
EOSINOPHIL # BLD AUTO: 0.04 X10(3)/MCL (ref 0–0.9)
EOSINOPHIL NFR BLD AUTO: 0.8 %
ERYTHROCYTE [DISTWIDTH] IN BLOOD BY AUTOMATED COUNT: 13.9 % (ref 11.5–17)
GFR SERPLBLD CREATININE-BSD FMLA CKD-EPI: 21 ML/MIN/1.73/M2
GLOBULIN SER-MCNC: 3.6 GM/DL (ref 2.4–3.5)
GLUCOSE SERPL-MCNC: 94 MG/DL (ref 82–115)
GLUCOSE UR QL STRIP: NORMAL
HCT VFR BLD AUTO: 33.8 % (ref 37–47)
HGB BLD-MCNC: 10.4 G/DL (ref 12–16)
HGB UR QL STRIP: ABNORMAL
HYALINE CASTS #/AREA URNS LPF: ABNORMAL /LPF
IMM GRANULOCYTES # BLD AUTO: 0.01 X10(3)/MCL (ref 0–0.04)
IMM GRANULOCYTES NFR BLD AUTO: 0.2 %
KETONES UR QL STRIP: NEGATIVE
LEUKOCYTE ESTERASE UR QL STRIP: 500
LYMPHOCYTES # BLD AUTO: 1.07 X10(3)/MCL (ref 0.6–4.6)
LYMPHOCYTES NFR BLD AUTO: 21.1 %
MCH RBC QN AUTO: 27.4 PG (ref 27–31)
MCHC RBC AUTO-ENTMCNC: 30.8 G/DL (ref 33–36)
MCV RBC AUTO: 88.9 FL (ref 80–94)
MONOCYTES # BLD AUTO: 0.62 X10(3)/MCL (ref 0.1–1.3)
MONOCYTES NFR BLD AUTO: 12.2 %
NEUTROPHILS # BLD AUTO: 3.31 X10(3)/MCL (ref 2.1–9.2)
NEUTROPHILS NFR BLD AUTO: 65.1 %
NITRITE UR QL STRIP: NEGATIVE
NRBC BLD AUTO-RTO: 0 %
PH UR STRIP: 5.5 [PH]
PHOSPHATE SERPL-MCNC: 3.7 MG/DL (ref 2.3–4.7)
PLATELET # BLD AUTO: 205 X10(3)/MCL (ref 130–400)
PMV BLD AUTO: 9.1 FL (ref 7.4–10.4)
POTASSIUM SERPL-SCNC: 4.1 MMOL/L (ref 3.5–5.1)
PROT SERPL-MCNC: 6.8 GM/DL (ref 5.8–7.6)
PROT UR QL STRIP: ABNORMAL
PROT UR STRIP-MCNC: 42.5 MG/DL
RBC # BLD AUTO: 3.8 X10(6)/MCL (ref 4.2–5.4)
RBC #/AREA URNS AUTO: ABNORMAL /HPF
SODIUM SERPL-SCNC: 142 MMOL/L (ref 136–145)
SP GR UR STRIP.AUTO: 1.02 (ref 1–1.03)
SQUAMOUS #/AREA URNS LPF: ABNORMAL /HPF
URINE PROTEIN/CREATININE RATIO (OLG): 0.2
UROBILINOGEN UR STRIP-ACNC: NORMAL
WBC # BLD AUTO: 5.08 X10(3)/MCL (ref 4.5–11.5)
WBC #/AREA URNS AUTO: ABNORMAL /HPF

## 2025-05-07 PROCEDURE — 80053 COMPREHEN METABOLIC PANEL: CPT

## 2025-05-07 PROCEDURE — 85025 COMPLETE CBC W/AUTO DIFF WBC: CPT

## 2025-05-07 PROCEDURE — 36415 COLL VENOUS BLD VENIPUNCTURE: CPT

## 2025-05-07 PROCEDURE — 84100 ASSAY OF PHOSPHORUS: CPT

## 2025-05-07 PROCEDURE — 82570 ASSAY OF URINE CREATININE: CPT

## 2025-05-07 PROCEDURE — 81015 MICROSCOPIC EXAM OF URINE: CPT

## 2025-05-07 PROCEDURE — 87186 SC STD MICRODIL/AGAR DIL: CPT

## 2025-05-08 ENCOUNTER — OFFICE VISIT (OUTPATIENT)
Dept: NEPHROLOGY | Facility: CLINIC | Age: 60
End: 2025-05-08
Payer: MEDICARE

## 2025-05-08 VITALS
DIASTOLIC BLOOD PRESSURE: 81 MMHG | WEIGHT: 164.81 LBS | SYSTOLIC BLOOD PRESSURE: 132 MMHG | TEMPERATURE: 99 F | RESPIRATION RATE: 20 BRPM | HEART RATE: 109 BPM | HEIGHT: 63 IN | OXYGEN SATURATION: 96 % | BODY MASS INDEX: 29.2 KG/M2

## 2025-05-08 DIAGNOSIS — N18.4 CHRONIC KIDNEY DISEASE, STAGE 4 (SEVERE): Primary | ICD-10-CM

## 2025-05-08 PROCEDURE — 99213 OFFICE O/P EST LOW 20 MIN: CPT | Mod: PBBFAC | Performed by: INTERNAL MEDICINE

## 2025-05-08 PROCEDURE — 99999 PR PBB SHADOW E&M-EST. PATIENT-LVL III: CPT | Mod: PBBFAC,,, | Performed by: INTERNAL MEDICINE

## 2025-05-08 RX ORDER — HYDROCORTISONE 25 MG/G
CREAM TOPICAL
COMMUNITY

## 2025-05-08 RX ORDER — SODIUM FLUORIDE 5 MG/G
PASTE, DENTIFRICE DENTAL
COMMUNITY
Start: 2025-04-30

## 2025-05-08 RX ORDER — BUTYROSPERMUM PARKII(SHEA BUTTER), SIMMONDSIA CHINENSIS (JOJOBA) SEED OIL, ALOE BARBADENSIS LEAF EXTRACT .01; 1; 3.5 G/100G; G/100G; G/100G
250 LIQUID TOPICAL
COMMUNITY

## 2025-05-08 RX ORDER — CHLORHEXIDINE GLUCONATE ORAL RINSE 1.2 MG/ML
15 SOLUTION DENTAL 2 TIMES DAILY
COMMUNITY
Start: 2024-11-21

## 2025-05-08 RX ORDER — PANTOPRAZOLE SODIUM 40 MG/1
40 TABLET, DELAYED RELEASE ORAL DAILY
COMMUNITY

## 2025-05-08 RX ORDER — IRON,CARB/VIT C/VIT B12/FOLIC 100-250-1
TABLET ORAL
COMMUNITY

## 2025-05-08 NOTE — PROGRESS NOTES
[facility] New Referral Office Note    HPI  Ninfa Candelario, 59 y.o. female, presents to office as a new patient   Patient with multiple myeloma status post stem cell transplant complicated with a amyloidosis with multiorgan involvement  She is not CKD 3B to 4  She has a renal biopsy also at [facility]  Patient also has a colostomy related to intestinal obstruction  Patient has been managed with the [facility] and locally with Dr. Yo adjusting her chemotherapy regimen for amyloidosis  She is referred to us for renal management  Patient has been recently started on Farxiga  Denies any major complaints          Medical Diagnoses:   Past Medical History:   Diagnosis Date    Amyloidosis     Anemia     Anxiety and depression     Diplopia     Hyperlipidemia     Hypertension     Impaired mobility     Lytic lesion of bone on x-ray     Multiple myeloma     Neuropathy     Obesity, unspecified     Paroxysmal atrial fibrillation     Primary cancer of left female breast      Problem List[1]    Surgical History:   Past Surgical History:   Procedure Laterality Date    BONE MARROW TRANSPLANT  02/08/2016    BREAST SURGERY      CARPAL TUNNEL RELEASE      COLON SURGERY      COLONOSCOPY  12/11/2018    Dr. Eddie Jimenez    ENDOSCOPIC RELEASE OF TRIGGER FINGER      ESOPHAGEAL MOTILITY STUDY  2015    Excision Lipoma left elbow      Exploration Laparotomy  2016    FLEXIBLE SIGMOIDOSCOPY      MEDIPORT INSERTION, SINGLE  03/15/2016    PH Study 24 Hour  2015    SMALL INTESTINE SURGERY         Family History:   Family History   Problem Relation Name Age of Onset    Hypertension Mother Potier     Stroke Mother Potier     Cancer Father Cabrera Aguirre     Alcohol abuse Father Cabrera Aguirre     Hypertension Sister Stephanie     Arthritis Sister Stephanie     Diabetes Sister Stephanie     Drug abuse Sister Stephanie     Stroke Sister Stephanie     Hypertension Brother Wesley     Drug abuse Brother Wesley     Diabetes Sister Caroline Potier     Heart  disease Sister Caroline Huddleston     Hypertension Sister Caroline Huddleston     Drug abuse Sister Kandy     Hypertension Sister Kandy        Social History:   Social History     Tobacco Use    Smoking status: Never    Smokeless tobacco: Never   Substance Use Topics    Alcohol use: Not Currently       Allergies:  Review of patient's allergies indicates:   Allergen Reactions    Baclofen Shortness Of Breath     Difficulty breathing      Penicillins Hives, Rash and Swelling    Shellfish containing products Hives, Rash, Swelling and Other (See Comments)     shell  She can shrimp      Clarithromycin Other (See Comments)    Iodinated contrast media      Allergic to shrimp    Nsaids (non-steroidal anti-inflammatory drug)     Other omega-3s     Penicillin      Other reaction(s): Unknown  Other reaction(s): Unknown  Other reaction(s): Unknown    Ace inhibitors Other (See Comments)     cough  Other reaction(s): Cough    Azithromycin Nausea Only     Upset stomach    Hydralazine analogues Anxiety    Meloxicam Tinitus     Other reaction(s): Unknown  Other reaction(s): Unknown    Prochlorperazine Anxiety     Restlessness/anxious    Tramadol Other (See Comments)     Increased Heart Rate    Other reaction(s): Unknown  Other reaction(s): Unknown       Medications:  Current Medications[2]       Review of Systems:    Constitutional: Denies fever, occasional fatigue  Skin: Denies wounds, no rashes, no itching, no new skin lesions  Respiratory:  Denies cough, shortness of breath, or wheezing  Cardiovascular: Denies chest pain, palpitations, or swelling  Gastrointestional:  Right lower quadrant ostomy  Genitourinary: Denies dysuria, hematuria, foamy urine, or incontinence; reports able to empty bladder  Musculoskeletal: Denies back or flank pain  Neurological: Denies headaches, dizziness, paresthesias, tremors or focal weakness      Vital Signs:  /81 (BP Location: Right arm, Patient Position: Sitting)   Pulse 109   Temp 98.5 °F (36.9 °C)  "(Oral)   Resp 20   Ht 5' 2.99" (1.6 m)   Wt 74.8 kg (164 lb 12.8 oz)   SpO2 96%   BMI 29.20 kg/m²   Body mass index is 29.2 kg/m².      Physical Exam:    General: no acute distress, awake, alert, pain  Eyes: conjunctiva clear, eyelids without swelling  HENT: atraumatic, oropharynx and nasal mucosa patent  Neck: supple, trache midline, full ROM, no JVD  Respiratory: equal, unlabored, clear to auscultation A/P  Cardiovascular: RRR without  rub  Edema:  Trace  Gastrointestinal:  Right lower quadrant ostomy    Musculoskeletal: ROM without new limitation or discomfort  Integumentary:  Left lower extremity skin granulation  Neurological: oriented x4, appropriate, no acute deficits; no asterixis      Labs:  Estimated Creatinine Clearance: 22.4 mL/min (A) (based on SCr of 2.56 mg/dL (H)).         Component Value Date/Time     05/07/2025 1628     04/16/2025 1506    K 4.1 05/07/2025 1628    K 4.1 04/16/2025 1506     (H) 05/07/2025 1628     (H) 04/16/2025 1506    CO2 19 (L) 05/07/2025 1628    CO2 25 04/16/2025 1506    BUN 31.6 (H) 05/07/2025 1628    BUN 22.0 (H) 04/16/2025 1506    BUN 35 (H) 07/18/2023 1729    BUN 39 (H) 04/26/2023 1705    CREATININE 2.56 (H) 05/07/2025 1628    CREATININE 2.30 (H) 04/16/2025 1506    CREATININE 2.13 (H) 03/19/2025 1123    CREATININE 3.20 (H) 01/13/2025 1058    CREATININE 2.48 (H) 07/18/2023 1729    CREATININE 2.63 (H) 04/26/2023 1705    CREATININE 2.14 (H) 12/14/2022 2203    CREATININE 2.45 (H) 12/12/2022 1908    CALCIUM 9.5 05/07/2025 1628    CALCIUM 9.6 04/16/2025 1506    CALCIUM 9.7 07/18/2023 1729    CALCIUM 9.4 04/26/2023 1705    PHOS 3.7 05/07/2025 1628           Component Value Date/Time    WBC 5.08 05/07/2025 1628    WBC 4.68 04/16/2025 1506    WBC 5.5 04/04/2024 1729    WBC 5.06 04/02/2024 0429    HGB 10.4 (L) 05/07/2025 1628    HGB 9.7 (L) 04/16/2025 1506    HCT 33.8 (L) 05/07/2025 1628    HCT 31.2 (L) 04/16/2025 1506    HCT 33.0 (L) 12/06/2021 1349    HCT " 33.0 (L) 10/08/2020 1119     05/07/2025 1628     04/16/2025 1506         Imaging:  Retroperitoneal US:      Impression:    1. Chronic kidney disease, stage 4 (severe)  Ambulatory referral/consult to Nephrology      Amyloidosis with multiorgan involvement  Multiple myeloma in remission   paroxysmal AFib        Plan:  Patient to follow-up with the [facility] and Dr. Meyers for myeloma/amyloid management  Explained to her that renal function may deteriorate further  We will work on slowing down rate of deterioration by avoiding nephrotoxins and blood pressure control  Not opposed to SGLT2 inhibitors at this point that have been started  We will check labs and follow-up visit in 6 weeks    Chica Gleason      This note was created with the assistance of [employer] voice recognition software or phone dictation. There may be transcription errors as a result of using this technology however minimal. Effort has been made to assure accuracy of transcription but any obvious errors or omissions should be clarified with the author of the document.          [1]   Patient Active Problem List  Diagnosis    Amyloidosis    Anemia of chronic disease    Chronic low back pain    Chronic kidney disease, stage 4 (severe)    Gastroesophageal reflux disease    History of colostomy    Hypertension    Long term current use of opiate analgesic    Mixed anxiety depressive disorder    Mixed hyperlipidemia    Multiple nodules of lung    Overlapping malignant neoplasm of female breast    Paroxysmal atrial fibrillation    Multiple myeloma    Medicare annual wellness visit, subsequent    Osteopenia    Aromatase inhibitor use    Acute bronchitis    Cellulitis of left lower extremity    Bacterial sinusitis    Pain and swelling of left lower leg    Influenza B    Rheumatoid arthritis, involving unspecified site, unspecified whether rheumatoid factor present    Stem cells transplant status    Ileostomy status    Drug-induced polyneuropathy     Hypogammaglobulinemia    History of breast cancer    COVID    Upper respiratory tract infection    Left ear pain    Bilateral impacted cerumen    Polyneuropathy in malignant disease    UTI symptoms    Lack of physical activity    Mixed incontinence urge and stress (male)(female)   [2]   Current Outpatient Medications:     albuterol-ipratropium (DUO-NEB) 2.5 mg-0.5 mg/3 mL nebulizer solution, Take 3 mLs by nebulization every 6 (six) hours as needed for Wheezing or Shortness of Breath. Rescue, Disp: 75 mL, Rfl: 0    apixaban (ELIQUIS) 2.5 mg Tab, TAKE 1 TABLET(2.5 MG) BY MOUTH TWICE DAILY, Disp: 60 tablet, Rfl: 2    carvediloL (COREG) 3.125 MG tablet, Take 1 tablet (3.125 mg total) by mouth 2 (two) times daily with meals., Disp: 180 tablet, Rfl: 2    chlorhexidine (PERIDEX) 0.12 % solution, Use as directed 15 mLs in the mouth or throat 2 (two) times daily., Disp: , Rfl:     clindamycin (CLEOCIN) 300 MG capsule, TAKE 1 CAPSULE BY MOUTH FOUR TIMES DAILY UNTIL ALL TAKEN., Disp: , Rfl:     dapagliflozin propanediol (FARXIGA) 10 mg tablet, Take 1 tablet (10 mg total) by mouth once daily., Disp: 90 tablet, Rfl: 3    DULoxetine (CYMBALTA) 60 MG capsule, Take 60 mg by mouth once daily., Disp: , Rfl:     famotidine (PEPCID) 40 MG tablet, Take 1 tablet (40 mg total) by mouth 2 (two) times daily., Disp: 60 tablet, Rfl: 5    fluoride, sodium, (PREVIDENT 5000 PLUS) 1.1 % Crea, Place onto teeth., Disp: , Rfl:     HEMADY 20 mg Tab, TAKE 1 TABLET BY MOUTH EVERY WEEK, Disp: 12 tablet, Rfl: 0    hydrocortisone 2.5 % cream, as needed., Disp: , Rfl:     levocetirizine (XYZAL) 5 MG tablet, TAKE 1 TABLET(5 MG) BY MOUTH EVERY EVENING, Disp: 30 tablet, Rfl: 6    methadone (DOLOPHINE) 10 MG tablet, Take 10 mg by mouth every 8 (eight) hours while awake. PRN, Disp: , Rfl:     multivitamin (THERAGRAN) per tablet, Take 1 tablet by mouth once daily., Disp: , Rfl:     omeprazole (PRILOSEC) 20 MG capsule, TAKE 1 CAPSULE(20 MG) BY MOUTH EVERY DAY,  Disp: 30 capsule, Rfl: 11    ondansetron (ZOFRAN) 8 MG tablet, Take 1 tablet (8 mg total) by mouth every 8 (eight) hours as needed for Nausea., Disp: 90 tablet, Rfl: 1    oxyCODONE (ROXICODONE) 10 mg Tab immediate release tablet, Take 1 tablet (10 mg total) by mouth every 12 (twelve) hours as needed., Disp: 10 tablet, Rfl: 0    pantoprazole (PROTONIX) 40 MG tablet, Take 40 mg by mouth once daily., Disp: , Rfl:     potassium chloride (KLOR-CON) 10 MEQ TbSR, TAKE 1 TABLET(10 MEQ) BY MOUTH TWICE DAILY, Disp: 180 tablet, Rfl: 3    rosuvastatin (CRESTOR) 10 MG tablet, Take 1 tablet (10 mg total) by mouth Daily., Disp: 90 tablet, Rfl: 3    Saccharomyces boulardii (FLORASTOR) 250 mg capsule, Take 250 mg by mouth as needed., Disp: , Rfl:     tretinoin (RETIN-A) 0.1 % cream, Apply 1 application topically every evening., Disp: , Rfl:     iron-vit c-b12-folic acid (ICAR-C PLUS) Tab, 1 tab(s) orally once a day for 30 day(s) (Patient not taking: Reported on 5/8/2025), Disp: , Rfl:     REVLIMID 5 mg Cap, Take 1 capsule (5 mg total) by mouth every other day. (Patient not taking: Reported on 5/8/2025), Disp: 14 capsule, Rfl: 0    triamterene-hydrochlorothiazide 37.5-25 mg (MAXZIDE-25) 37.5-25 mg per tablet, Take 1 tablet by mouth once daily. (Patient not taking: Reported on 5/8/2025), Disp: 30 tablet, Rfl: 11  No current facility-administered medications for this visit.    Facility-Administered Medications Ordered in Other Visits:     heparin, porcine (PF) 100 unit/mL injection flush 500 Units, 500 Units, Intravenous, PRN, Natalie Meyers MD    ondansetron (ZOFRAN) 16 mg in sodium chloride 0.9% 50 mL IVPB, 16 mg, Intravenous, 1 time in Clinic/HOD, Natalie Meyers MD    sodium chloride 0.9% 250 mL flush bag, , Intravenous, 1 time in Clinic/HOD, Natalie Meyers MD    sodium chloride 0.9% flush 10 mL, 10 mL, Intravenous, PRN, Natalie Meyers MD

## 2025-05-09 ENCOUNTER — RESULTS FOLLOW-UP (OUTPATIENT)
Dept: NEPHROLOGY | Facility: CLINIC | Age: 60
End: 2025-05-09
Payer: MEDICARE

## 2025-05-09 DIAGNOSIS — N18.4 CHRONIC KIDNEY DISEASE, STAGE 4 (SEVERE): ICD-10-CM

## 2025-05-09 DIAGNOSIS — N39.0 UTI (URINARY TRACT INFECTION), UNCOMPLICATED: Primary | ICD-10-CM

## 2025-05-09 LAB — BACTERIA UR CULT: ABNORMAL

## 2025-05-09 RX ORDER — NITROFURANTOIN MACROCRYSTALS 50 MG/1
50 CAPSULE ORAL DAILY
Qty: 5 CAPSULE | Refills: 0 | Status: SHIPPED | OUTPATIENT
Start: 2025-05-09 | End: 2025-05-14

## 2025-05-14 ENCOUNTER — TELEPHONE (OUTPATIENT)
Dept: INTERNAL MEDICINE | Facility: CLINIC | Age: 60
End: 2025-05-14
Payer: MEDICARE

## 2025-05-14 NOTE — TELEPHONE ENCOUNTER
Copied from CRM #[id number]. Topic: Medications - Medication Refill  >> May 14, 2025 11:37 AM Jacey wrote:  Who Called: Ninfa Candelario    Caller is requesting assistance/information from provider's office.    Symptoms (please be specific): n/a   How long has patient had these symptoms:  n/a  List of preferred pharmacies on file (remove unneeded): "[facility]" [id number] [place], [place] - [address] [place] [address] & [facility]        Preferred Method of Contact: Phone Call  Patient's Preferred Phone Number on File: [phone]   Best Call Back Number, if different:  Additional Information: Pt stated that  dapagliflozin propanediol (FARXIGA) 10 mg tablet needs a PA.

## 2025-05-19 ENCOUNTER — OUTPATIENT CASE MANAGEMENT (OUTPATIENT)
Dept: ADMINISTRATIVE | Facility: OTHER | Age: 60
End: 2025-05-19
Payer: MEDICARE

## 2025-05-19 DIAGNOSIS — B96.89 BACTERIAL SINUSITIS: ICD-10-CM

## 2025-05-19 DIAGNOSIS — J32.9 BACTERIAL SINUSITIS: ICD-10-CM

## 2025-05-19 RX ORDER — LEVOCETIRIZINE DIHYDROCHLORIDE 5 MG/1
5 TABLET, FILM COATED ORAL NIGHTLY
Qty: 30 TABLET | Refills: 6 | Status: SHIPPED | OUTPATIENT
Start: 2025-05-19

## 2025-05-19 NOTE — PROGRESS NOTES
5-19-25  UTR #4, Case Closed  5-8-25   Unsuccessful Reach Out #3  4-29-25  UTR #2  UTR letter via Patient Portal  4-24-25  UTR #1, No voicemail

## 2025-05-26 ENCOUNTER — TELEPHONE (OUTPATIENT)
Dept: INFUSION THERAPY | Facility: HOSPITAL | Age: 60
End: 2025-05-26
Payer: MEDICARE

## 2025-05-28 NOTE — PROGRESS NOTES
HEMATOLOGY/ONCOLOGY OFFICE CLINIC VISIT    Visit Information:      Referring Physician: Dr Farr  PCP: DR. Cardona  GI:   Rheumatologist: Dr. Luis Rea  Immunologist: Dr. Daniel Nava  ENT: Dr. Brice Williamson  [facility] Pain management: Dr.Chai LONG Miguel Transplant: Dr. Adrian Naylor MD Miguel Med Onc: Dr. Zulema [facility] Breast Surg Onc: Dr.DeSnyder LONG [place]: Dr. 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   8) Stage IA grade 3, ER+, HER2 BERNA +,  IDC/DCIS of the left breast --- 2/7/18 at [facility]   --s/p lumpectomy with SLNB 4/12, Grade 2 IDC 4mm with second focus of microinvasive carcinoma 0.4mm. 2 SLN negative for malignancy    9) Progressive swelling of R lower extremity--- US NIVA done 2/19/18 negative for evidence of DVT  10). Paroxysmal Afib (2/18/18) diagnosed at [facility]; ECHO noted EF 58% - on Eliquis  11) Mild CKD with GFR 55 - managed per [facility] nephrology  12) Treatment induced neutropenia     Present treatment:  -Maintenance Revlimid 5mg PO QOD, started 02/16/17-- was held for a few months while undergoing treatment for her breast cancer 02/18-05/18; Restarted 6/15/18   Dexamethasone 20 mg po weekly added early July 2017--> reduced to 12 mg PO weekly October 4, 2017---> increased to 16mg PO weekly 2/15/18---restarted 6/15/18 --> 20 mg weekly 7//8/2022  Darzalex 7/8/2022-present--On hold due to cellulitis    Eliquis 2.5mg PO BID     Treatment/Oncology  history:  1) CyBorD x5 cycles 09/28/15-12/24/15  2) Autologous stem cell transplant 02/08/16, done at [facility]  3) Exploratory laparotomy with subtotal colectomy and end ileostomy done August 2, 2016--pathology specimen showed advanced colonic amyloidosis extensive involving the muscular wall submucosa and mucosa.  Appendix also involvement by amyloidosis with fibrous obliteration of the distal lumen.  4) Maintenance therapy with Revlimid 5mg-started 06/01/16--Discontinued shortly after 2/2 cytopenias  5) Left breast lumpectomy with SLNB at [facility] 4/12/18  6) Left partial breast RT x10 fractions  5/21/18-6/4/18  7) Femara 2.5 mg PO daily completed 5 yrs (6/2018- 6/2023)      Imaging:  NIVA 7/29/2021: Negative for deep venous thrombosis in the left lower extremity.   MRI CTL spine 9/14/2022: No acute myelomatous findings. Severe spinal stenosis process detected within the upper cervical spine as well as the entirety of the lumbar spine segments similar to the prior imaging assessment.  PET CT 9/14/2020: No convincing evidence of hypermetabolic metastatic disease. Asymmetric uptake in the right nasopharynx may be infectious or inflammatory.    CT C 1/24/2023: Right greater than left lower lobe opacification consistent with infectious process.  Lucent lesions throughout the osseous structures similar to 2015.    PET 4/17/2024: 1.  No evidence of active skeletal disease. 2.  Acute nonpathological fractures of the right anterolateral 5th-7th ribs. No impending pathological fracture of the long bones. 3.  No extramedullary disease. 4.  Diffuse skin thickening of the left lower leg with multiple foci of increased cutaneous uptake and associated with FDG-avid left inguinal and iliac chain lymph nodes. These findings may be related to lymphedema; however, superimposed angiosarcoma is not excluded given the foci of increased uptake. Direct inspection and biopsy are recommended.   PET CT 09/04/2024: No evidence of active  skeletal disease. No acute or impending pathological fracture. No extramedullary disease. New FDG-avid mucosal thickening in the left maxillary sinus, consistent with sinusitis. Lucency and increased uptake associated with the root of a left maxillary molar (likely tooth #15), consistent with periodontal disease.  Improving diffuse cutaneous and subcutaneous uptake in the left lower extremity, consistent with improving cellulitis. New and worsening focal nodular areas of increased uptake in the left lower extremity. Direct inspection is recommended.   Femur XR 12/9/2024: 1. No fracture or suspicious bone lesions are visualized in the left femur or left leg. 2. Subcutaneous edema in the left leg.   Tibia/fibula XR 12/9/2024: 1. No fracture or suspicious bone lesions are visualized in the left femur or left leg. 2. Subcutaneous edema in the left leg.   US Lower Extremity 04/08/25: No left lower extremity DVT identified. Mild subcutaneous edema at the left calf.    Breast imaging:  [facility] 12/29/2017: Calcifications in the left breast are suspicious. Stereotactic biopsy is recommended. BI-RADS Category 4: Suspicious Abnormality   [facility] 12/10/2018: Post surgical scar in the left breast is benign. BI-RADS Category 2: Benign Finding(s)   [facility] 12/16/2019: There is no mammographic evidence of malignancy. BI-RADS Category 2: Benign Finding(s)   [facility] 12/14/2020: There is no mammographic evidence of malignancy. BI-RADS Category 2: Benign Finding(s)   [facility] 3/21/2022: BI-RADS Category 2: Benign Finding(s)  [facility] 4/11/2023: There is no mammographic evidence of malignancy. BI-RADS Category 2: Benign Finding(s)   [facility] 11/14/2024: Bilateral. There is no mammographic evidence of malignancy. Overall BI-RAD Category: 2 - Benign     Pathology:  9/17/2015:  A. Soft tissue, abdominal wall, FNA   No malignant cells identified   Mature adipose tissue   Congo red stain is positive for amyloid deposition     9/18/2015:  BONE MARROW DIAGNOSIS:  -PLASMA  CELL NEOPLASM, REPRESENTING APPROXIMATELY 80% OF CELLULAR  ELEMENTS     1/29/2018:  STEREOTACTIC BIOPSY Left breast:  INVASIVE DUCTAL CARCINOMA, NO SPECIAL TYPE, SUZANNE GRADE 3 (3 + 2 + 2),   SINGLE FOCUS MEASURING 1.7 MM WITH SECOND FOCUS OF MICROINVASION MEASURING 0.3 MM.   DUCTAL CARCINOMA IN SITU, INTERMEDIATE NUCLEAR GRADE, SOLID TYPE WITH COMEDONECROSIS AND ASSOCIATED CALCIFICATIONS.   Atypical lobular hyperplasia with cytologic atypia, likely treatment effect.     CLINICAL HISTORY:       Patient: Ninfa Candelario is a 59 y.o. female.  Ms. Cabrera Joseph was admitted on 08/16/15 for ileus versus partial SBO. CT A/P done 08/17/15 showed sigmoid colitis and a zone of transition at the junction of the descending and sigmoid colon which could indicate some degree of obstruction and an obstructing mass cannot be excluded. Numerous skeletal lytic lesions noted. CT chest done 08/19/15 showed Multiple skeletal lytic lesions involving the thoracic spine, left rib sternum consistent with either metastases or multiple myeloma. There is no evidence of pulmonary or mediastinal mass. Dr. Farr was consulted for possible MM/anemia.     Nuclear Bone scan done 8/20/15 showed mild to moderate increased activity in left rib and right second rib which correlates with fractures seen on CT.  Skeletal survey done 8/20/15showed lytic lesions in the calvarium and probably a lytic lesion in the left scapula. Lytic areas in the spine and pelvis seen on recent CT are not well defined on skeletal survey. Work up labs done 08/20/15: Negative for sickle cell and Thalessemia (reported history of thalessemia). Iron 54, Transferrin 118.0, Iron sat 34.6%, Folate 26.5, Vit B12 1,779  Peripheral smear resulted slightly macrocytic normochromic anemia without signifcant anisocytosis may reflect early B12/folate deficiency or marrow dysfunction, among others. No immature myeloid cells or blasts. Platlets adequate. Beta 2 mircoglobulin = 3.2.   SPEP/NADIA: M-spike in the beta region. The monoclonal protein peak accounts for 1.13 g/dL. Hypogammaglobulinemia. NADIA pattern shows the presence of a free lambda light chain monoclonal protein with additional faint band in IgA.  All immunoglobulin levels decreased. IgA=26, IgG=307, IgM<5.  Kappa Qnt 0.16, Lambda Qnt 4600.00, Ratio <0.01.  24hr Urine for Bence Mendez: Kappa 2.75, Lambda 1250.00, Ratio <0.01. NADIA: Urine is POSITIVE for monoclonal Free Lambda Light chains     Patient then opted to go to [facility] where she underwent a bone marrow biopsy on 09/18/15. BMBx showed 80% plasma cells, 13p deletion and normal karyotype. She underwent fat pad biopsy which came back positive for Congo red consistent with amyloidosis. Cardiac workup revealed no amyloid deposition within the heart.  PET/CT and skeletal survey done September 18, 2015 showed multiple lytic osseous lesions  The patient was started on Velcade while at [facility] with the plan to transition to autologous stem cell transplantation. They asked if we could give her could continue treatment here.   She completed CyborD x5 cycles on 12/24/15     Patient admitted 01/06/16 for colitis and C. Diff. Pt treated with Flagyl. Discharged home 01/08/16     Repeat BMBx done at [facility] 01/2016 did not show any morphologic or immnophenotypic evidence of plasama cell neoplasm. Patient underwent Autologous stem cell transplant with Busulfan and melphalan on 02/08/16 at [facility]. The only complication was recurrent C.diff infection for which she completed 10 days of Fidaxomycin.     Follow-up bone marrow biopsy done at [facility] done 5/16/16 showed cellular 30-40% bone marrow with trilineage hematopoiesis. No morphologic or immunophenotypic support for plasma cell neoplasm. Started maintenance Revlimid 5mg. Unable to continue therapy due to cytopenias.     On 08/02/16 patient underwent  Exploratory laparotomy with subtotal colectomy and end ileostomy for what was thought to be toxic  megacolon. Pathology showed extensive advanced colonic amyloidosis involving the muscular wall, submucosa and mucosa: With the appendix also involved with amyloidosis.  Positive lambda light chains were noted.     Follow-up at [facility] 8/31/16, Per Dr. Adrian Naylor, 6 months post autologous transplant. She has engrafted. Based on the latest restaging she is near complete remission with possible IgA lambda on serum immunofixation. Patient was instructed to discontinue prophylactic antibiotics. She received her 1st series of immunizations while at [facility]. Patient had a bilateral venous ultrasound to rule out DVT, negative B/L. In regards to amyloidosis the patient feels that her tongue is smaller in size and her BNP has come down some.  Patient had a follow-up appointment at [facility] November 30, 2016.  Dr. Parnell documented the patient's free lambda light chain remains at a lower level.  Therefore in light of her recent surgery they will continue with observation only.  The plan to see the patient back in 2-3 months with repeat restaging labs.  They recommended regular CBC checks to monitor anemia.  Patient returned to [facility] in December 2016 to meet with dermatology for numerous papillary lesions on eyelids, chest and posterior neck consistent with amyloid deposits. Recommendations were for watchful waiting.  Patient is less than 1 year out from bone marrow transplant and further improvement can be expected.  She will see the patient back in 1 year to discuss possible surgical resection of the plaques especially around the eyelid region.  Rogaine recommended for persistent hair loss. Retin-A recommended for acne vulgaris.  Patient returned to [facility] on 2/8/2017 for neurosurgery consult for chronic low back pain and difficulty walking.  Imaging showed extensive DJD with discovertebral bulges and thickening of the spinal laminar tissues creating spinal stenosis throughout, but greatest at  L2/L3.  Neurosurgery felt that surgery risks outweighed the benefits.  Patient was prescribed pain medicine and encouraged to participate in physical therapy.  Patient also met with Dr. Parnell on 02/08/17, who noted an elevation in free light chains (kappa 52.60/lambda 27.77/ratio 1.89).  Recommendation is to resume maintenance therapy with Revlimid at a low dose of 5 mg every other day.  Patient went back to [facility] first week of April 2017.  I do not have these notes.  Reportedly she was told to continue on every other day Revlimid at 5 mg.  She reportedly had repeat myeloma labs done as well.  Again I do not have these results.  Patient went back to [facility] and saw Dr. Parnell June 29, 2017.  Myeloma labs were done with serum protein electrophoresis showing irregularity in the fast gamma region.  IgG of 1291.  Free kappa light chains of 84.6 with lambda light chains of 66.9.  Beta-2 microglobulin of 4.5  CT scan of lumbar spine showed multiple lytic lesions once again in the lumbar spine and bony pelvis.  Ultrasound of the left leg showed no evidence of DVT.  It was recommended based on the slight increase in her And lambda light chains to start weekly dexamethasone 20 mg p.o. weekly.  Follow-up visit and labs at [facility] on September 29, 2017 with Dr. Parnell.  Repeat beta-2 microglobulin came back at 2.4.  Serum IgG of 761, IgA 117 and IgM of 29.  Serum free kappa light chains of 24.9 and lambda of 23.5.  Counts were stable with hemoglobin of 11.1, WBC 4.9 and platelets of 210,000.  Recommendations were for continued Revlimid every other day with weekly dexamethasone along with aspirin for DVT prophylaxis.  Bilateral diagnostic MMG 12/19/17  noted 1.6cm coarse heterogeneous calcifications in posterior region of L breast at 3 o'clock position. Patient was scheduled for FNA which confirmed ID grade 3, single focus measuring 1.7cm with 2nd focus microinvasion DCIS grade 2 measuring 0.3cm. Left axillary LN  biopsy showed no evidence of metastatic carcinoma. Complete staging: Stage IA, grade 3 ER+, Her 2 Niki + IDC/DCIS of left breast. Ki-67 <17%.  Repeat myeloma labs showed rise in free lambda light chains noted at 68.69, kappa light chains at 27.22,  beta 2 microglobulin came back at 2.9. Serum protein electrophoresis showed no definitive evidence of an M protein peak. The pattern is consistent with an acute inflammatory reaction. Recommendations were made to maintain Revlimid at current dose of 5mg every other day to be taken continuously increase weekly dexamethasone to 16 mg.   Patient seen at [facility] with tentative plan for L breast lumpectomy on 3/13/18. 2/16/18- Prior to surgery she was seen by genetic counselor who ran genetic testing per patient reques, all of which were negative. She was then seen by cardiology at [facility] 2/16/18 for further evaluation of persistent bilateral lower extremity swelling-ECHO noted EF of 58%; diagnosed with paroxysmal atrial fibrillation and instructed to begin daily ASA. During preoperative asssessment per Rad/Onc 3/12/18, corresponding chest CT noted new bilateral pulmonary nodules concerning for metastatic disease- surgery was subsequently postponed pending pulmonary evaluation. Seen by Pulmonology on 3/21/18, PFTs within normal limits and cleared patient for surgery with recommended close CT follow up of nodules in 3 months. 3/21/18 seen by nephrology for management of mild CKD (GFR 55)-cleared for surgery with recommended follow up in 3 months. Left breast lumpectomy and SLNB performed per Dr. Washburn on 4/12/18- plan for follow up in clinic on 4/24/18  for postoperative assessment. Follow up with Dr. Poole (Med/Onc) on 4/25/18. Follow up with Dr. Parnell 4/26/18.   Patient seen at [facility] s/p Left breast lumpectomy with SLNB on 4/12/18. Following surgery she completed Left partial breast radiation x 10 fractions. She was then started on endocrine therapy with Femara  after been deemedan inappropriate candidate for systemic chemotherapy/Herceptin.      She was seen by neurosugery at [facility] on 4/26/18 following repeat MRI of cervical thoracic lumbar spine which noted focal enhancement of T2 hyperintensity at the C2 level which may be due to degenerative changes. Signal abnormality within  the T12 and L1 may be secondary to myeloma. Plan to continue with observation for now with F/U scheduled in October 2018.      She was seen by Dr. Parnell at [facility] for management of her MM on 4/26/18. Patient was recommended to restart Revlimid 5mg PO every other day with notes that these recommendations would be communicated to collaborating Oncologist. Patient was also recommended to start on Zometa for her bone disease.      Seen by Dr. Parnell at [facility] for management of her MM on 6/28/18. Repeat light chains noted lambda 33.36 (previously 80.80), K/L ratio 0.91 (previously 0.49). Due to slight improvement in light chains, plan to continue on lenalidomide maintenance. Recommendations to continue anticoagulation with Eliquis. BLE venous doppler negative for DVT.   Seen by pulmonolgy on 6/28/18- repeat CT chest done 6/28/18 noted resolution of previous pulmonary nodules. Thought to be infectious in nature. Recommendations for discharge from pulmonary clinic.  Should MRI of her cervical thoracic lumbar spine on 2/12/2019 that demonstrated cervical spondylosis with canal stenosis most notable at C1 and 2. Cord signal abnormality at C1 and 2 with enhancement is stable. Lumbar spondylosis with central canal stenosis at multiple levels. No imaging features of bony metastatic disease.     For amyloidosis (Light chain type).    Patient presented with macroglossia and now with improvement of the swelling of her tongue. Her cardiac parameters are also better.   8/10/2020 proBNP  250   2/17/2020                616  8/9/2019                  190  11/14/2023  820        Chief Complaint:for  Multiple Myeloma       Interval History:  She is currently on Rev/Dex/Darzalex  for MM  s/p transplant in 2016. At her [facility] visit on 10/12/22, Ninlaro was discontinued due to it causing severe diarrhea and leukopenia. Diarrhea resolved after stopping Ninlaro (she admits to stopping prior to visit @ [facility]).     I Called Dr Parnell office and she is out of the office. She will be back next week. Will try again then  07/11/2024:  Dr. Parnell kindly called me back to discuss patient.  She recommend to decrease the dose of dexamethasone to 10 mg to see if this can help to decrease the frequency of infections.  She does not opposed to IVIG if needed for her hypogammaglobulinemia.  She usually starts when IgG is less than 400.  In this case if she ever start the IVIG needs to be given in a very slow rate due to her heart and fluid retention risks.  We will continue Evangelina monthly and will decrease dexamethasone to 10 mg.    10/10/2024: Patient is here today for follow up of her multiple myeloma, amyloidosis and breast cancer.  Daratumumab has been on hold since April 2024 for LLE cellulitis. She was admitted to [facility] per  Dr. Parnell recommendations until LLE is better. Swelling to BLE noted during physical exam, L>R. She has follow up with Dr. Parnell @ [facility] on 11/15/24.      01/13/2025: She was seen at [facility] that recommend to hold Revlimid as she has not been taking it in about 2 months or so.        On February 12, 2025 patient was seen and evaluated by Internal Medicine for multiple chronic conditions.  She was referred to Nephrology and the patient was initiated on dapagliflozin       03/19/25: Patient presents today for a follow up, accompanied by her sister. Pt states she had a fall on Monday, still feels weak. Patient scheduled to have a biopsy on her knee 04/01/25. Patient reports having headaches on and off, advised to drink plenty of water.   Patient was last seen by Dr Parnell ([facility]) on 2/19/2025 at which time:   Left  lower extremity edema/Abnormal finding on prior PET CT (performed for possible cellulitis): The prior PET CT also showed subcutaneous uptake that could be lymphedema, possibly infection (question of past cellulitis) but angiosarcomna is not r/o. Subsequent repeated PET (9/2024) since her cellulitis has improved and Improving diffuse cutaneous and subcutaneous uptake in the left lower extremity, consistent with improving cellulitis was noted. New and worsening focal nodules w/ increased uptake in the left lower extremity are noted, but no skeletal or extramedullary disease was noted. Pt referred to ortho, but pt canceled appt. and delayed until later. She has a biopsy of her left LE schedule next dora at [facility]. On physical LLE is swollen and mild erythema. Will do NIVA to eval for DVT even if she is on anticoagulation.  Last daratumumab 01/13/2025 she was scheduled to have it again on 03/09/2025 but was held due to worsening on her kidney function and GFR was 16.  As per medication insert need to be held if creatinine clearance  < 15.     04/16/25: Patient presents today for 4 week follow up. Pt had a recent US lower extremity, No left lower extremity DVT identified. Mild subcutaneous edema at the left calf. She was seen by a dermatologist  at [facility] and underwent right lower extremity skin biopsy that was negative for malignancy. Pt had biopsy on left leg, MD has given the okay to remove suture.  Pt is due for daratumumab C20 on 4/17/25.  Overall pt is doing well, denies any fever, chills, sweats.     05/29/25: **  ROS: All 14 points ROS taken and as per Interval History  Review of Systems   Constitutional:  Positive for malaise/fatigue. Negative for chills, fever and weight loss.   HENT:  Negative for congestion and nosebleeds.    Eyes:  Negative for blurred vision, double vision and photophobia.   Respiratory:  Negative for cough, hemoptysis and shortness of breath.    Cardiovascular:  Negative for chest  pain, palpitations, leg swelling and PND.   Gastrointestinal:  Positive for diarrhea (Better), nausea and vomiting. Negative for abdominal pain, blood in stool, constipation and melena.   Genitourinary:  Negative for dysuria, frequency, hematuria and urgency.   Musculoskeletal:  Negative for back pain, falls and myalgias.   Skin:  Negative for itching and rash.   Neurological:  Positive for weakness. Negative for tremors, focal weakness, seizures and headaches.   Endo/Heme/Allergies:  Negative for environmental allergies. Does not bruise/bleed easily.   Psychiatric/Behavioral:  Negative for depression and suicidal ideas. The patient is not nervous/anxious.          Histories:  PMH/PSH/FH/SOCIAL/ALLERGIES AND MEDS REVIEWED AND UPDATED AS APPROPRIATE       There were no vitals filed for this visit.      Wt Readings from Last 6 Encounters:   05/08/25 74.8 kg (164 lb 12.8 oz)   04/16/25 78.4 kg (172 lb 12.8 oz)   03/20/25 75.8 kg (167 lb)   03/19/25 75.8 kg (167 lb)   02/13/25 79.6 kg (175 lb 8 oz)   02/12/25 73.5 kg (162 lb)     There is no height or weight on file to calculate BMI.  There is no height or weight on file to calculate BSA.      Vitals reviewed and stable  Physical Exam  Vitals and nursing note reviewed.   Constitutional:       General: She is not in acute distress.     Appearance: Normal appearance. She is well-developed. She is ill-appearing.      Comments: Uses walker for mobility   HENT:      Head: Normocephalic and atraumatic.      Mouth/Throat:      Mouth: Mucous membranes are moist.   Eyes:      General: No scleral icterus.     Extraocular Movements: Extraocular movements intact.      Conjunctiva/sclera: Conjunctivae normal.      Pupils: Pupils are equal, round, and reactive to light.   Neck:      Vascular: No JVD.   Cardiovascular:      Rate and Rhythm: Normal rate and regular rhythm.      Heart sounds: No murmur heard.  Pulmonary:      Effort: Pulmonary effort is normal.      Breath sounds:  Normal breath sounds. No wheezing or rhonchi.   Abdominal:      General: Bowel sounds are normal. There is no distension.      Palpations: Abdomen is soft. There is no mass.      Tenderness: There is no abdominal tenderness.   Musculoskeletal:         General: No swelling or deformity.      Cervical back: Neck supple.      Right lower le+ Edema present.      Left lower leg: 3+ Edema present.      Comments: Hyperpigmentation of left leg.    Lymphadenopathy:      Head:      Right side of head: No submandibular adenopathy.      Left side of head: No submandibular adenopathy.      Cervical: No cervical adenopathy.      Upper Body:      Right upper body: No supraclavicular or axillary adenopathy.      Left upper body: No supraclavicular or axillary adenopathy.      Lower Body: No right inguinal adenopathy. No left inguinal adenopathy.   Skin:     General: Skin is warm.      Coloration: Skin is not jaundiced.      Findings: Rash present.      Nails: There is no clubbing.      Comments: Rash: bilateral; LE left > right   Neurological:      Mental Status: She is alert and oriented to person, place, and time.      Cranial Nerves: Cranial nerves 2-12 are intact.      Motor: Weakness present.      Gait: Gait abnormal.   Psychiatric:         Attention and Perception: Attention normal.         Behavior: Behavior is cooperative.         Cognition and Memory: Cognition normal.         Judgment: Judgment normal.       ECOG SCORE             Laboratory:  CBC with Differential:  Lab Results   Component Value Date    WBC 5.08 2025    RBC 3.80 (L) 2025    HGB 10.4 (L) 2025    HCT 33.8 (L) 2025    MCV 88.9 2025    MCH 27.4 2025    MCHC 30.8 (L) 2025    RDW 13.9 2025     2025    MPV 9.1 2025        CMP:  Sodium   Date Value Ref Range Status   2025 142 136 - 145 mmol/L Final     Potassium   Date Value Ref Range Status   2025 4.1 3.5 - 5.1 mmol/L Final      Chloride   Date Value Ref Range Status   05/07/2025 113 (H) 98 - 107 mmol/L Final     CO2   Date Value Ref Range Status   05/07/2025 19 (L) 23 - 31 mmol/L Final     Glucose   Date Value Ref Range Status   05/07/2025 94 82 - 115 mg/dL Final     Blood Urea Nitrogen   Date Value Ref Range Status   05/07/2025 31.6 (H) 9.8 - 20.1 mg/dL Final   07/18/2023 35 (H) 6 - 23 mg/dL Final     Creatinine   Date Value Ref Range Status   05/07/2025 2.56 (H) 0.55 - 1.02 mg/dL Final   07/18/2023 2.48 (H) 0.51 - 0.95 mg/dL Final     Calcium   Date Value Ref Range Status   05/07/2025 9.5 8.4 - 10.2 mg/dL Final   07/18/2023 9.7 8.4 - 10.2 mg/dL Final     Protein Total   Date Value Ref Range Status   05/07/2025 6.8 5.8 - 7.6 gm/dL Final     Albumin   Date Value Ref Range Status   05/07/2025 3.2 (L) 3.4 - 4.8 g/dL Final     Bilirubin Total   Date Value Ref Range Status   05/07/2025 0.2 <=1.5 mg/dL Final     ALP   Date Value Ref Range Status   05/07/2025 94 40 - 150 unit/L Final     AST   Date Value Ref Range Status   05/07/2025 18 11 - 45 unit/L Final     ALT   Date Value Ref Range Status   05/07/2025 11 0 - 55 unit/L Final     Estimated GFR-Non    Date Value Ref Range Status   04/20/2022 25                 Component  Ref Range & Units (hover) 15:06 3 mo ago 4 mo ago 6 mo ago 9 mo ago 10 mo ago 1 yr ago   IgG Level 537.00 445.00 Low  392.00 Low  518.00 Low  487.00 Low  490.00 Low  591.00   IgA Level 94.0 83.0 117.0 162.0 62.0 Low  65.0 Low  63.0 Low    IgM Level 13.0 Low  40.0 6.0 Low  8.0 Low  15.0 Low  15.0 Low  8.0 Low             Assessment:       No diagnosis found.        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 [place] red fat pad + Dx 2/2015  --Last seen by   "Parmjit at [facility] 12/20/2024: Her note "AL Amyloid/ MM: Currently available MM/AL amyloid parameters indicate a decrease in the NT Pro BNP and troponin T. We asked if she had taken any steroids during her URI to explain the reduction of Amyloid and MM parameters, but the pt indicated that she had not). Her MM parameters have improved as well.While there was a prior minimal rise in the FKLC this patient has had a lambda clonality in the past.. She had been off of therapy which has resumed but her parameters already demonstrate improvement over the last visit. We previously recommended stopping lenalidomide and seeing if the response can be maintained and trying to reduce the fluid retention and other side effects of dexamethasone. This has not improved then we recommended reinitiation of DRd. Most recent PET CT w/o progression or active skeletal or extramedullary disease. The patient will be scheduled for follow-up in 2-3 for continued monitoring of the chronic plasma cell dyscrasia and orders for CBC, CMP, serum and urine protein electrophoretic and immunofixation studies, free light chain studies, and immunoglobulins have been placed for that follow/up visit. 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 Sx risks do not outweigh benefits. Pain meds given and encouraged Physical Therpay    8) Amyloid plaques, evaluated by Derm at [facility], recommend watchful waiting. Patient to follow up 12/2017    9) Stage IA grade 3, ER+, HER2 BERNA +,  IDC/DCIS of the left breast cancer --- 2/7/18 at [facility]; s/p lumpectomy with SLNB 4/12, Grade 2 IDC measuring 4mm with second focus of microinvasive " carcinoma measuring  0.4mm .IG DCIS, 0.3mm to posterior margin; 2 SLN negative for malignancy      10) Progressive swelling of R lower extremity--- US NIVA done 2/19/18 negative for evidence of DVT    11). Paroxysmal Afib (2/18/18) diagnosed at [facility]; ECHO noted EF 58%    12. Mild CKD with GFR 55 - managed per [facility] nephrology    13). Pulmonary nodules noted on pre-operative chest CT scan (3/12/18) noted new bilateral pulmonary nodules are nonspecific -- seen by Pulmonology at [facility] (3/21/18) thought to be inflammatory/infectious in nature, repeat Chest CT 6/28/18 noted resolution of nodules    14). Treatment induced neutropenia    15) NON COMPLIANT patient-- please see above the no show, reschedule and cancelled visits/labs and infusion.     16) Neoplasm associated pain/polyneuropathy: continues w/ pain service on methadone/ oxycodone /duloxitene      Plan:       04/16/25  Okay to proceed with daratumumab C20 - 4/17/25  RTC in 4 weeks MD/same day labs/infusion (Afternoon)  Labs: MM workup  We will start IVIG for IgG <400    05/29/25  ***    The patient was seen, interviewed and examined. Pertinent lab and radiology studies were reviewed.   The patient was given ample opportunity to ask questions, and to the best of my abilities, all questions answered to satisfaction; patient demonstrated understanding of what we discussed and agreeable to the plan. Pt instructed to call should develop concerning signs/symptoms or have further questions.   Visit today included increased complexity associated with the care of the episodic problem MM, amyloidosis, breast cancer, addressing and managing the longitudinal care of the patient's MM, amyloidosis, breast cancer.       Natalie Meyers MD  Hematology/Oncology  [facility]     Professional Services   I, Christy Garcia LPN, acted solely as a scribe for and in the presence of Dr. Natalie Meyers, who performed these services.

## 2025-05-29 ENCOUNTER — OFFICE VISIT (OUTPATIENT)
Dept: HEMATOLOGY/ONCOLOGY | Facility: CLINIC | Age: 60
End: 2025-05-29
Payer: MEDICARE

## 2025-05-29 ENCOUNTER — LAB VISIT (OUTPATIENT)
Dept: LAB | Facility: HOSPITAL | Age: 60
End: 2025-05-29
Attending: INTERNAL MEDICINE
Payer: MEDICARE

## 2025-05-29 VITALS
WEIGHT: 168 LBS | HEIGHT: 63 IN | TEMPERATURE: 99 F | DIASTOLIC BLOOD PRESSURE: 82 MMHG | HEART RATE: 112 BPM | OXYGEN SATURATION: 98 % | RESPIRATION RATE: 18 BRPM | BODY MASS INDEX: 29.77 KG/M2 | SYSTOLIC BLOOD PRESSURE: 133 MMHG

## 2025-05-29 DIAGNOSIS — R91.8 MULTIPLE NODULES OF LUNG: ICD-10-CM

## 2025-05-29 DIAGNOSIS — G89.29 CHRONIC LOW BACK PAIN, UNSPECIFIED BACK PAIN LATERALITY, UNSPECIFIED WHETHER SCIATICA PRESENT: ICD-10-CM

## 2025-05-29 DIAGNOSIS — E85.9 AMYLOIDOSIS, UNSPECIFIED TYPE: ICD-10-CM

## 2025-05-29 DIAGNOSIS — C90.01 MULTIPLE MYELOMA IN REMISSION: ICD-10-CM

## 2025-05-29 DIAGNOSIS — D63.8 ANEMIA OF CHRONIC DISEASE: ICD-10-CM

## 2025-05-29 DIAGNOSIS — M54.50 CHRONIC LOW BACK PAIN, UNSPECIFIED BACK PAIN LATERALITY, UNSPECIFIED WHETHER SCIATICA PRESENT: ICD-10-CM

## 2025-05-29 DIAGNOSIS — N18.4 CHRONIC KIDNEY DISEASE, STAGE 4 (SEVERE): ICD-10-CM

## 2025-05-29 DIAGNOSIS — C90.01 MULTIPLE MYELOMA IN REMISSION: Primary | ICD-10-CM

## 2025-05-29 LAB
ALBUMIN SERPL-MCNC: 3.1 G/DL (ref 3.4–4.8)
ALBUMIN/GLOB SERPL: 0.8 RATIO (ref 1.1–2)
ALP SERPL-CCNC: 96 UNIT/L (ref 40–150)
ALT SERPL-CCNC: 12 UNIT/L (ref 0–55)
ANION GAP SERPL CALC-SCNC: 13 MEQ/L
AST SERPL-CCNC: 18 UNIT/L (ref 11–45)
BASOPHILS # BLD AUTO: 0.02 X10(3)/MCL
BASOPHILS NFR BLD AUTO: 0.3 %
BILIRUB SERPL-MCNC: 0.2 MG/DL
BUN SERPL-MCNC: 24.5 MG/DL (ref 9.8–20.1)
CALCIUM SERPL-MCNC: 9.4 MG/DL (ref 8.4–10.2)
CHLORIDE SERPL-SCNC: 105 MMOL/L (ref 98–107)
CO2 SERPL-SCNC: 24 MMOL/L (ref 23–31)
CREAT SERPL-MCNC: 2.2 MG/DL (ref 0.55–1.02)
CREAT/UREA NIT SERPL: 11
EOSINOPHIL # BLD AUTO: 0.05 X10(3)/MCL (ref 0–0.9)
EOSINOPHIL NFR BLD AUTO: 0.8 %
ERYTHROCYTE [DISTWIDTH] IN BLOOD BY AUTOMATED COUNT: 13.6 % (ref 11.5–17)
GFR SERPLBLD CREATININE-BSD FMLA CKD-EPI: 25 ML/MIN/1.73/M2
GLOBULIN SER-MCNC: 3.8 GM/DL (ref 2.4–3.5)
GLUCOSE SERPL-MCNC: 123 MG/DL (ref 82–115)
HCT VFR BLD AUTO: 36 % (ref 37–47)
HGB BLD-MCNC: 11.3 G/DL (ref 12–16)
IGA SERPL-MCNC: 109 MG/DL (ref 69–517)
IGG SERPL-MCNC: 574 MG/DL (ref 522–1631)
IGM SERPL-MCNC: <25 MG/DL (ref 33–293)
IMM GRANULOCYTES # BLD AUTO: 0.02 X10(3)/MCL (ref 0–0.04)
IMM GRANULOCYTES NFR BLD AUTO: 0.3 %
LYMPHOCYTES # BLD AUTO: 0.97 X10(3)/MCL (ref 0.6–4.6)
LYMPHOCYTES NFR BLD AUTO: 14.9 %
MCH RBC QN AUTO: 27.3 PG (ref 27–31)
MCHC RBC AUTO-ENTMCNC: 31.4 G/DL (ref 33–36)
MCV RBC AUTO: 87 FL (ref 80–94)
MONOCYTES # BLD AUTO: 0.57 X10(3)/MCL (ref 0.1–1.3)
MONOCYTES NFR BLD AUTO: 8.7 %
NEUTROPHILS # BLD AUTO: 4.9 X10(3)/MCL (ref 2.1–9.2)
NEUTROPHILS NFR BLD AUTO: 75 %
PLATELET # BLD AUTO: 196 X10(3)/MCL (ref 130–400)
PMV BLD AUTO: 8.8 FL (ref 7.4–10.4)
POTASSIUM SERPL-SCNC: 3.8 MMOL/L (ref 3.5–5.1)
PROT SERPL-MCNC: 6.9 GM/DL (ref 5.8–7.6)
RBC # BLD AUTO: 4.14 X10(6)/MCL (ref 4.2–5.4)
SODIUM SERPL-SCNC: 142 MMOL/L (ref 136–145)
WBC # BLD AUTO: 6.53 X10(3)/MCL (ref 4.5–11.5)

## 2025-05-29 PROCEDURE — 83521 IG LIGHT CHAINS FREE EACH: CPT

## 2025-05-29 PROCEDURE — 80053 COMPREHEN METABOLIC PANEL: CPT

## 2025-05-29 PROCEDURE — 36415 COLL VENOUS BLD VENIPUNCTURE: CPT

## 2025-05-29 PROCEDURE — 82784 ASSAY IGA/IGD/IGG/IGM EACH: CPT | Mod: 59

## 2025-05-29 PROCEDURE — 99215 OFFICE O/P EST HI 40 MIN: CPT | Mod: PBBFAC | Performed by: NURSE PRACTITIONER

## 2025-05-29 PROCEDURE — 85025 COMPLETE CBC W/AUTO DIFF WBC: CPT

## 2025-05-29 PROCEDURE — 86334 IMMUNOFIX E-PHORESIS SERUM: CPT

## 2025-05-29 PROCEDURE — 99999 PR PBB SHADOW E&M-EST. PATIENT-LVL V: CPT | Mod: PBBFAC,,, | Performed by: NURSE PRACTITIONER

## 2025-05-29 NOTE — PROGRESS NOTES
No show/Reschedules/Cancellations  08/21/2018--> Rescheduled                07/20/2023-->canceled visit/labs/infusion 05/08/2024 11/05/2019--> No Show/Reschedule 08/01/2023-->canceled visit/labs/infusion 05/22/2024 06/24/2020--> No Show/Reschedule 08/31/2023-->canceled visit/labs/infusion 05/22/2024 09/24/2020--> No Show/Reschedule 09/21/2023-->canceled visit/labs/infusion 06/03/2024  03/15/2021--> No Show/Reschedule 10/24/2023-->canceled visit/labs/infusion 07/11/2024 03/23/2021--> No Show/Reschedule 10/25/2023-->canceled visit/labs/infusion 07/31/2024 04/13/2021--> No Show   10/26/2023-->canceled visit/labs/infusion 08/01/2024 05/27/2021--> No Show   10/31/2023-->canceled visit/labs/infusion 08/14/2024 08/19/2021--> No Show   11/01/2023-->canceled visit/labs/infusion 08/19/2024--> No Show   08/26/2021--> No Show/Rescheduled 11/29/2023-->canceled visit/labs/infusion 09/11/2024 04/26/2022--> No Show   12/04/2023-->canceled visit/labs/infusion 09/18/2024  10/03/2022--> Rescheduled  12/07/2023-->canceled visit/labs/infusion 01/02/2025  10/11/2022--> Rescheduled  01/03/2024-->canceled visit/labs/infusion       02/12/2025 -->canceled visit/labs/infusion  03/22/2023--> No Show/Rescheduled 01/11/2024-->canceled visit/labs/infusion       02/17/2025--->canceled visit/labs/infusion  4/26/2023->Rescheduled   01/25/2024-->canceled visit/labs/infusion       03/03/2025--->canceled visit/labs/infusion  05/09/2023->Rescheduled   04/03/2024 03/05/2025--->Canceled  5/30/2023->Rescheduled   04/24/2024                                                      03/10/2025---> No Show/Rescheduled  6/15/2023->Rescheduled   05/01/2024 03/17/2025---> Canceled  HEMATOLOGY/ONCOLOGY OFFICE CLINIC VISIT    Visit Information:      Referring Physician: Dr Farr  PCP: DR. Cardona  GI:   Rheumatologist: Dr. Luis Rea  Immunologist:   Daniel Nava  ENT: Dr. Brice Williamson  [facility] Pain management: Dr.Chai [facility] Transplant: Dr. [facility] Med Onc: Dr. Zulema [facility] Breast Surg Onc: Dr.DeSnyder [place]: Dr. 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   8) Stage IA grade 3, ER+, HER2 BERNA +,  IDC/DCIS of the left breast --- 2/7/18 at [facility]   --s/p lumpectomy with SLNB 4/12, Grade 2 IDC 4mm with second focus of microinvasive carcinoma 0.4mm. 2 SLN negative for malignancy    9) Progressive swelling of R lower extremity--- US NIVA done 2/19/18 negative for evidence of DVT  10). Paroxysmal Afib (2/18/18) diagnosed at [facility]; ECHO noted EF 58% - on Eliquis  11) Mild CKD with GFR 55 - managed per [facility] nephrology  12) Treatment induced neutropenia     Present treatment:  -Maintenance Revlimid 5mg PO QOD, started 02/16/17-- was held for a few months while undergoing treatment for her breast cancer 02/18-05/18; Restarted 6/15/18   Dexamethasone 20 mg po weekly added early July 2017--> reduced to 12 mg PO weekly October 4, 2017---> increased to 16mg PO weekly 2/15/18---restarted 6/15/18 --> 20 mg weekly 7//8/2022  Darzalex 7/8/2022-present--On hold due to cellulitis    Eliquis 2.5mg PO BID     Treatment/Oncology history:  1) CyBorD x5 cycles 09/28/15-12/24/15  2) Autologous stem cell transplant 02/08/16, done at [facility]  3) Exploratory laparotomy with subtotal colectomy and end ileostomy  done August 2, 2016--pathology specimen showed advanced colonic amyloidosis extensive involving the muscular wall submucosa and mucosa.  Appendix also involvement by amyloidosis with fibrous obliteration of the distal lumen.  4) Maintenance therapy with Revlimid 5mg-started 06/01/16--Discontinued shortly after 2/2 cytopenias  5) Left breast lumpectomy with SLNB at [facility] 4/12/18  6) Left partial breast RT x10 fractions  5/21/18-6/4/18  7) Femara 2.5 mg PO daily completed 5 yrs (6/2018- 6/2023)      Imaging:  NIVA 7/29/2021: Negative for deep venous thrombosis in the left lower extremity.   MRI CTL spine 9/14/2022: No acute myelomatous findings. Severe spinal stenosis process detected within the upper cervical spine as well as the entirety of the lumbar spine segments similar to the prior imaging assessment.  PET CT 9/14/2020: No convincing evidence of hypermetabolic metastatic disease. Asymmetric uptake in the right nasopharynx may be infectious or inflammatory.    CT C 1/24/2023: Right greater than left lower lobe opacification consistent with infectious process.  Lucent lesions throughout the osseous structures similar to 2015.    PET 4/17/2024: 1.  No evidence of active skeletal disease. 2.  Acute nonpathological fractures of the right anterolateral 5th-7th ribs. No impending pathological fracture of the long bones. 3.  No extramedullary disease. 4.  Diffuse skin thickening of the left lower leg with multiple foci of increased cutaneous uptake and associated with FDG-avid left inguinal and iliac chain lymph nodes. These findings may be related to lymphedema; however, superimposed angiosarcoma is not excluded given the foci of increased uptake. Direct inspection and biopsy are recommended.   PET CT 09/04/2024: No evidence of active skeletal disease. No acute or impending pathological fracture. No extramedullary disease. New FDG-avid mucosal thickening in the left maxillary sinus, consistent with sinusitis. Lucency  and increased uptake associated with the root of a left maxillary molar (likely tooth #15), consistent with periodontal disease.  Improving diffuse cutaneous and subcutaneous uptake in the left lower extremity, consistent with improving cellulitis. New and worsening focal nodular areas of increased uptake in the left lower extremity. Direct inspection is recommended.   Femur XR 12/9/2024: 1. No fracture or suspicious bone lesions are visualized in the left femur or left leg. 2. Subcutaneous edema in the left leg.   Tibia/fibula XR 12/9/2024: 1. No fracture or suspicious bone lesions are visualized in the left femur or left leg. 2. Subcutaneous edema in the left leg.   US Lower Extremity 04/08/25: No left lower extremity DVT identified. Mild subcutaneous edema at the left calf.    Breast imaging:  [facility] 12/29/2017: Calcifications in the left breast are suspicious. Stereotactic biopsy is recommended. BI-RADS Category 4: Suspicious Abnormality   [facility] 12/10/2018: Post surgical scar in the left breast is benign. BI-RADS Category 2: Benign Finding(s)   [facility] 12/16/2019: There is no mammographic evidence of malignancy. BI-RADS Category 2: Benign Finding(s)   [facility] 12/14/2020: There is no mammographic evidence of malignancy. BI-RADS Category 2: Benign Finding(s)   [facility] 3/21/2022: BI-RADS Category 2: Benign Finding(s)  [facility] 4/11/2023: There is no mammographic evidence of malignancy. BI-RADS Category 2: Benign Finding(s)   [facility] 11/14/2024: Bilateral. There is no mammographic evidence of malignancy. Overall BI-RAD Category: 2 - Benign     Pathology:  9/17/2015:  A. Soft tissue, abdominal wall, FNA   No malignant cells identified   Mature adipose tissue   Congo red stain is positive for amyloid deposition     9/18/2015:  BONE MARROW DIAGNOSIS:  -PLASMA CELL NEOPLASM, REPRESENTING APPROXIMATELY 80% OF CELLULAR  ELEMENTS     1/29/2018:  STEREOTACTIC BIOPSY Left breast:  INVASIVE DUCTAL CARCINOMA, NO SPECIAL TYPE, SUZANNE GRADE 3 (3 + 2  + 2),   SINGLE FOCUS MEASURING 1.7 MM WITH SECOND FOCUS OF MICROINVASION MEASURING 0.3 MM.   DUCTAL CARCINOMA IN SITU, INTERMEDIATE NUCLEAR GRADE, SOLID TYPE WITH COMEDONECROSIS AND ASSOCIATED CALCIFICATIONS.   Atypical lobular hyperplasia with cytologic atypia, likely treatment effect.     CLINICAL HISTORY:       Patient: Ninfa Candelario is a 59 y.o. female.  Ms. Cabrera Joseph was admitted on 08/16/15 for ileus versus partial SBO. CT A/P done 08/17/15 showed sigmoid colitis and a zone of transition at the junction of the descending and sigmoid colon which could indicate some degree of obstruction and an obstructing mass cannot be excluded. Numerous skeletal lytic lesions noted. CT chest done 08/19/15 showed Multiple skeletal lytic lesions involving the thoracic spine, left rib sternum consistent with either metastases or multiple myeloma. There is no evidence of pulmonary or mediastinal mass. Dr. Farr was consulted for possible MM/anemia.     Nuclear Bone scan done 8/20/15 showed mild to moderate increased activity in left rib and right second rib which correlates with fractures seen on CT.  Skeletal survey done 8/20/15showed lytic lesions in the calvarium and probably a lytic lesion in the left scapula. Lytic areas in the spine and pelvis seen on recent CT are not well defined on skeletal survey. Work up labs done 08/20/15: Negative for sickle cell and Thalessemia (reported history of thalessemia). Iron 54, Transferrin 118.0, Iron sat 34.6%, Folate 26.5, Vit B12 1,779  Peripheral smear resulted slightly macrocytic normochromic anemia without signifcant anisocytosis may reflect early B12/folate deficiency or marrow dysfunction, among others. No immature myeloid cells or blasts. Platlets adequate. Beta 2 mircoglobulin = 3.2.  SPEP/NADIA: M-spike in the beta region. The monoclonal protein peak accounts for 1.13 g/dL. Hypogammaglobulinemia. NADIA pattern shows the presence of a free lambda light chain monoclonal protein  with additional faint band in IgA.  All immunoglobulin levels decreased. IgA=26, IgG=307, IgM<5.  Kappa Qnt 0.16, Lambda Qnt 4600.00, Ratio <0.01.  24hr Urine for Bence Mendez: Kappa 2.75, Lambda 1250.00, Ratio <0.01. NADIA: Urine is POSITIVE for monoclonal Free Lambda Light chains     Patient then opted to go to [facility] where she underwent a bone marrow biopsy on 09/18/15. BMBx showed 80% plasma cells, 13p deletion and normal karyotype. She underwent fat pad biopsy which came back positive for Congo red consistent with amyloidosis. Cardiac workup revealed no amyloid deposition within the heart.  PET/CT and skeletal survey done September 18, 2015 showed multiple lytic osseous lesions  The patient was started on Velcade while at [facility] with the plan to transition to autologous stem cell transplantation. They asked if we could give her could continue treatment here.   She completed CyborD x5 cycles on 12/24/15     Patient admitted 01/06/16 for colitis and C. Diff. Pt treated with Flagyl. Discharged home 01/08/16     Repeat BMBx done at [facility] 01/2016 did not show any morphologic or immnophenotypic evidence of plasama cell neoplasm. Patient underwent Autologous stem cell transplant with Busulfan and melphalan on 02/08/16 at [facility]. The only complication was recurrent C.diff infection for which she completed 10 days of Fidaxomycin.     Follow-up bone marrow biopsy done at [facility] done 5/16/16 showed cellular 30-40% bone marrow with trilineage hematopoiesis. No morphologic or immunophenotypic support for plasma cell neoplasm. Started maintenance Revlimid 5mg. Unable to continue therapy due to cytopenias.     On 08/02/16 patient underwent  Exploratory laparotomy with subtotal colectomy and end ileostomy for what was thought to be toxic megacolon. Pathology showed extensive advanced colonic amyloidosis involving the muscular wall, submucosa and mucosa: With the appendix also involved with amyloidosis.  Positive lambda light chains  were noted.     Follow-up at [facility] 8/31/16, Per Dr. Adrian Naylor, 6 months post autologous transplant. She has engrafted. Based on the latest restaging she is near complete remission with possible IgA lambda on serum immunofixation. Patient was instructed to discontinue prophylactic antibiotics. She received her 1st series of immunizations while at [facility]. Patient had a bilateral venous ultrasound to rule out DVT, negative B/L. In regards to amyloidosis the patient feels that her tongue is smaller in size and her BNP has come down some.  Patient had a follow-up appointment at [facility] November 30, 2016.  Dr. Parnell documented the patient's free lambda light chain remains at a lower level.  Therefore in light of her recent surgery they will continue with observation only.  The plan to see the patient back in 2-3 months with repeat restaging labs.  They recommended regular CBC checks to monitor anemia.  Patient returned to [facility] in December 2016 to meet with dermatology for numerous papillary lesions on eyelids, chest and posterior neck consistent with amyloid deposits. Recommendations were for watchful waiting.  Patient is less than 1 year out from bone marrow transplant and further improvement can be expected.  She will see the patient back in 1 year to discuss possible surgical resection of the plaques especially around the eyelid region.  Rogaine recommended for persistent hair loss. Retin-A recommended for acne vulgaris.  Patient returned to [facility] on 2/8/2017 for neurosurgery consult for chronic low back pain and difficulty walking.  Imaging showed extensive DJD with discovertebral bulges and thickening of the spinal laminar tissues creating spinal stenosis throughout, but greatest at L2/L3.  Neurosurgery felt that surgery risks outweighed the benefits.  Patient was prescribed pain medicine and encouraged to participate in physical therapy.  Patient also met with Dr. Parnell on  02/08/17, who noted an elevation in free light chains (kappa 52.60/lambda 27.77/ratio 1.89).  Recommendation is to resume maintenance therapy with Revlimid at a low dose of 5 mg every other day.  Patient went back to [facility] first week of April 2017.  I do not have these notes.  Reportedly she was told to continue on every other day Revlimid at 5 mg.  She reportedly had repeat myeloma labs done as well.  Again I do not have these results.  Patient went back to [facility] and saw Dr. Parnell June 29, 2017.  Myeloma labs were done with serum protein electrophoresis showing irregularity in the fast gamma region.  IgG of 1291.  Free kappa light chains of 84.6 with lambda light chains of 66.9.  Beta-2 microglobulin of 4.5  CT scan of lumbar spine showed multiple lytic lesions once again in the lumbar spine and bony pelvis.  Ultrasound of the left leg showed no evidence of DVT.  It was recommended based on the slight increase in her And lambda light chains to start weekly dexamethasone 20 mg p.o. weekly.  Follow-up visit and labs at [facility] on September 29, 2017 with Dr. Parnell.  Repeat beta-2 microglobulin came back at 2.4.  Serum IgG of 761, IgA 117 and IgM of 29.  Serum free kappa light chains of 24.9 and lambda of 23.5.  Counts were stable with hemoglobin of 11.1, WBC 4.9 and platelets of 210,000.  Recommendations were for continued Revlimid every other day with weekly dexamethasone along with aspirin for DVT prophylaxis.  Bilateral diagnostic MMG 12/19/17  noted 1.6cm coarse heterogeneous calcifications in posterior region of L breast at 3 o'clock position. Patient was scheduled for FNA which confirmed ID grade 3, single focus measuring 1.7cm with 2nd focus microinvasion DCIS grade 2 measuring 0.3cm. Left axillary LN biopsy showed no evidence of metastatic carcinoma. Complete staging: Stage IA, grade 3 ER+, Her 2 Niki + IDC/DCIS of left breast. Ki-67 <17%.  Repeat myeloma labs showed rise in free lambda light  chains noted at 68.69, kappa light chains at 27.22,  beta 2 microglobulin came back at 2.9. Serum protein electrophoresis showed no definitive evidence of an M protein peak. The pattern is consistent with an acute inflammatory reaction. Recommendations were made to maintain Revlimid at current dose of 5mg every other day to be taken continuously increase weekly dexamethasone to 16 mg.   Patient seen at [facility] with tentative plan for L breast lumpectomy on 3/13/18. 2/16/18- Prior to surgery she was seen by genetic counselor who ran genetic testing per patient reques, all of which were negative. She was then seen by cardiology at [facility] 2/16/18 for further evaluation of persistent bilateral lower extremity swelling-ECHO noted EF of 58%; diagnosed with paroxysmal atrial fibrillation and instructed to begin daily ASA. During preoperative asssessment per Rad/Onc 3/12/18, corresponding chest CT noted new bilateral pulmonary nodules concerning for metastatic disease- surgery was subsequently postponed pending pulmonary evaluation. Seen by Pulmonology on 3/21/18, PFTs within normal limits and cleared patient for surgery with recommended close CT follow up of nodules in 3 months. 3/21/18 seen by nephrology for management of mild CKD (GFR 55)-cleared for surgery with recommended follow up in 3 months. Left breast lumpectomy and SLNB performed per Dr. Washburn on 4/12/18- plan for follow up in clinic on 4/24/18  for postoperative assessment. Follow up with Dr. Poole (Med/Onc) on 4/25/18. Follow up with Dr. Parnell 4/26/18.   Patient seen at [facility] s/p Left breast lumpectomy with SLNB on 4/12/18. Following surgery she completed Left partial breast radiation x 10 fractions. She was then started on endocrine therapy with Femara after been deemedan inappropriate candidate for systemic chemotherapy/Herceptin.      She was seen by neurosugery at [facility] on 4/26/18 following repeat MRI of cervical thoracic lumbar spine  which noted focal enhancement of T2 hyperintensity at the C2 level which may be due to degenerative changes. Signal abnormality within  the T12 and L1 may be secondary to myeloma. Plan to continue with observation for now with F/U scheduled in October 2018.      She was seen by Dr. Parnell at [facility] for management of her MM on 4/26/18. Patient was recommended to restart Revlimid 5mg PO every other day with notes that these recommendations would be communicated to collaborating Oncologist. Patient was also recommended to start on Zometa for her bone disease.      Seen by Dr. Parnell at [facility] for management of her MM on 6/28/18. Repeat light chains noted lambda 33.36 (previously 80.80), K/L ratio 0.91 (previously 0.49). Due to slight improvement in light chains, plan to continue on lenalidomide maintenance. Recommendations to continue anticoagulation with Eliquis. BLE venous doppler negative for DVT.   Seen by pulmonolgy on 6/28/18- repeat CT chest done 6/28/18 noted resolution of previous pulmonary nodules. Thought to be infectious in nature. Recommendations for discharge from pulmonary clinic.  Should MRI of her cervical thoracic lumbar spine on 2/12/2019 that demonstrated cervical spondylosis with canal stenosis most notable at C1 and 2. Cord signal abnormality at C1 and 2 with enhancement is stable. Lumbar spondylosis with central canal stenosis at multiple levels. No imaging features of bony metastatic disease.     For amyloidosis (Light chain type).    Patient presented with macroglossia and now with improvement of the swelling of her tongue. Her cardiac parameters are also better.   8/10/2020 proBNP  250   2/17/2020                616  8/9/2019                  190  11/14/2023  820        Chief Complaint:for Multiple Myeloma       Interval History:  She is currently on Rev/Dex/Darzalex  for MM  s/p transplant in 2016. At her [facility] visit on 10/12/22, Ninlaro was discontinued due to it causing severe  diarrhea and leukopenia. Diarrhea resolved after stopping Ninlaro (she admits to stopping prior to visit @ [facility]).     I Called Dr Parnell office and she is out of the office. She will be back next week. Will try again then  07/11/2024:  Dr. Parnell kindly called me back to discuss patient.  She recommend to decrease the dose of dexamethasone to 10 mg to see if this can help to decrease the frequency of infections.  She does not opposed to IVIG if needed for her hypogammaglobulinemia.  She usually starts when IgG is less than 400.  In this case if she ever start the IVIG needs to be given in a very slow rate due to her heart and fluid retention risks.  We will continue Evangelina monthly and will decrease dexamethasone to 10 mg.    10/10/2024: Patient is here today for follow up of her multiple myeloma, amyloidosis and breast cancer.  Daratumumab has been on hold since April 2024 for LLE cellulitis. She was admitted to [facility] per  Dr. Parnell recommendations until LLE is better. Swelling to BLE noted during physical exam, L>R. She has follow up with Dr. Parnell @ [facility] on 11/15/24.      01/13/2025: She was seen at [facility] that recommend to hold Revlimid as she has not been taking it in about 2 months or so.        On February 12, 2025 patient was seen and evaluated by Internal Medicine for multiple chronic conditions.  She was referred to Nephrology and the patient was initiated on dapagliflozin       03/19/25: Patient presents today for a follow up, accompanied by her sister. Pt states she had a fall on Monday, still feels weak. Patient scheduled to have a biopsy on her knee 04/01/25. Patient reports having headaches on and off, advised to drink plenty of water.   Patient was last seen by Dr Parnell ([facility]) on 2/19/2025 at which time:   Left lower extremity edema/Abnormal finding on prior PET CT (performed for possible cellulitis): The prior PET CT also showed subcutaneous uptake that could be lymphedema, possibly infection (question  "of past cellulitis) but angiosarcomna is not r/o. Subsequent repeated PET (9/2024) since her cellulitis has improved and Improving diffuse cutaneous and subcutaneous uptake in the left lower extremity, consistent with improving cellulitis was noted. New and worsening focal nodules w/ increased uptake in the left lower extremity are noted, but no skeletal or extramedullary disease was noted. Pt referred to ortho, but pt canceled appt. and delayed until later. She has a biopsy of her left LE schedule next dora at [facility]. On physical LLE is swollen and mild erythema. Will do NIVA to eval for DVT even if she is on anticoagulation.  Last daratumumab 01/13/2025 she was scheduled to have it again on 03/09/2025 but was held due to worsening on her kidney function and GFR was 16.  As per medication insert need to be held if creatinine clearance  < 15.     04/16/25: Patient presents today for 4 week follow up. Pt had a recent US lower extremity, No left lower extremity DVT identified. Mild subcutaneous edema at the left calf. She was seen by a dermatologist  at [facility] and underwent right lower extremity skin biopsy that was negative for malignancy. Pt had biopsy on left leg, [facility] has given the okay to remove suture.  Pt is due for daratumumab C20 on 4/17/25.  Overall pt is doing well, denies any fever, chills, sweats.     Interval history  5/29/2025:    Ninfa Candelario presents for follow-up of multiple myeloma and amyloidosis. Reports feeling "a little bit better" today.    Current health status shows well-controlled disease with normalized IgA levels at 94 (normal range ), indicating successful response to Daratumumab treatment. Hemoglobin improving at 11.3 (previously 9, then 10), approaching normal level of 12.    Reports shoulder pain, not entirely new. X-rays showed some arthritis. Also mentions "spot" on lung that requires further evaluation. Recently experienced urinary tract infection, currently resolving " but pending urine analysis for kidney doctor. Taking antibiotics that caused stomach discomfort, using Pepcid and probiotics to manage side effects.    Denies other new symptoms.    Current medications include antibiotics for UTI, Pepcid for stomach discomfort, and probiotics. Daratumumab injections currently on hold.      ROS: All 14 points ROS taken and as per Interval History  Review of Systems   Constitutional:  Positive for malaise/fatigue. Negative for chills, fever and weight loss.   HENT:  Negative for congestion and nosebleeds.    Eyes:  Negative for blurred vision, double vision and photophobia.   Respiratory:  Negative for cough, hemoptysis and shortness of breath.    Cardiovascular:  Negative for chest pain, palpitations, leg swelling and PND.   Gastrointestinal:  Positive for diarrhea (Better), nausea and vomiting. Negative for abdominal pain, blood in stool, constipation and melena.   Genitourinary:  Negative for dysuria, frequency, hematuria and urgency.   Musculoskeletal:  Negative for back pain, falls and myalgias.   Skin:  Negative for itching and rash.   Neurological:  Positive for weakness. Negative for tremors, focal weakness, seizures and headaches.   Endo/Heme/Allergies:  Negative for environmental allergies. Does not bruise/bleed easily.   Psychiatric/Behavioral:  Negative for depression and suicidal ideas. The patient is not nervous/anxious.          Histories:  PMH/PSH/FH/SOCIAL/ALLERGIES AND MEDS REVIEWED AND UPDATED AS APPROPRIATE       There were no vitals filed for this visit.      Wt Readings from Last 6 Encounters:   05/08/25 74.8 kg (164 lb 12.8 oz)   04/16/25 78.4 kg (172 lb 12.8 oz)   03/20/25 75.8 kg (167 lb)   03/19/25 75.8 kg (167 lb)   02/13/25 79.6 kg (175 lb 8 oz)   02/12/25 73.5 kg (162 lb)     There is no height or weight on file to calculate BMI.  There is no height or weight on file to calculate BSA.      Vitals reviewed and stable  Physical Exam  Vitals and nursing note  reviewed.   Constitutional:       General: She is not in acute distress.     Appearance: Normal appearance. She is well-developed. She is ill-appearing.      Comments: Uses walker for mobility   HENT:      Head: Normocephalic and atraumatic.      Mouth/Throat:      Mouth: Mucous membranes are moist.   Eyes:      General: No scleral icterus.     Extraocular Movements: Extraocular movements intact.      Conjunctiva/sclera: Conjunctivae normal.      Pupils: Pupils are equal, round, and reactive to light.   Neck:      Vascular: No JVD.   Cardiovascular:      Rate and Rhythm: Normal rate and regular rhythm.      Heart sounds: No murmur heard.  Pulmonary:      Effort: Pulmonary effort is normal.      Breath sounds: Normal breath sounds. No wheezing or rhonchi.   Abdominal:      General: Bowel sounds are normal. There is no distension.      Palpations: Abdomen is soft. There is no mass.      Tenderness: There is no abdominal tenderness.   Musculoskeletal:         General: No swelling or deformity.      Cervical back: Neck supple.      Right lower le+ Edema present.      Left lower leg: 3+ Edema present.      Comments: Hyperpigmentation of left leg.    Lymphadenopathy:      Head:      Right side of head: No submandibular adenopathy.      Left side of head: No submandibular adenopathy.      Cervical: No cervical adenopathy.      Upper Body:      Right upper body: No supraclavicular or axillary adenopathy.      Left upper body: No supraclavicular or axillary adenopathy.      Lower Body: No right inguinal adenopathy. No left inguinal adenopathy.   Skin:     General: Skin is warm.      Coloration: Skin is not jaundiced.      Findings: Rash present.      Nails: There is no clubbing.      Comments: Rash: bilateral; LE left > right   Neurological:      Mental Status: She is alert and oriented to person, place, and time.      Cranial Nerves: Cranial nerves 2-12 are intact.      Motor: Weakness present.      Gait: Gait abnormal.    Psychiatric:         Attention and Perception: Attention normal.         Behavior: Behavior is cooperative.         Cognition and Memory: Cognition normal.         Judgment: Judgment normal.       ECOG SCORE             Laboratory:  CBC with Differential:  Lab Results   Component Value Date    WBC 6.53 05/29/2025    RBC 4.14 (L) 05/29/2025    HGB 11.3 (L) 05/29/2025    HCT 36.0 (L) 05/29/2025    MCV 87.0 05/29/2025    MCH 27.3 05/29/2025    MCHC 31.4 (L) 05/29/2025    RDW 13.6 05/29/2025     05/29/2025    MPV 8.8 05/29/2025        CMP:  Sodium   Date Value Ref Range Status   05/07/2025 142 136 - 145 mmol/L Final     Potassium   Date Value Ref Range Status   05/07/2025 4.1 3.5 - 5.1 mmol/L Final     Chloride   Date Value Ref Range Status   05/07/2025 113 (H) 98 - 107 mmol/L Final     CO2   Date Value Ref Range Status   05/07/2025 19 (L) 23 - 31 mmol/L Final     Glucose   Date Value Ref Range Status   05/07/2025 94 82 - 115 mg/dL Final     Blood Urea Nitrogen   Date Value Ref Range Status   05/07/2025 31.6 (H) 9.8 - 20.1 mg/dL Final   07/18/2023 35 (H) 6 - 23 mg/dL Final     Creatinine   Date Value Ref Range Status   05/07/2025 2.56 (H) 0.55 - 1.02 mg/dL Final   07/18/2023 2.48 (H) 0.51 - 0.95 mg/dL Final     Calcium   Date Value Ref Range Status   05/07/2025 9.5 8.4 - 10.2 mg/dL Final   07/18/2023 9.7 8.4 - 10.2 mg/dL Final     Protein Total   Date Value Ref Range Status   05/07/2025 6.8 5.8 - 7.6 gm/dL Final     Albumin   Date Value Ref Range Status   05/07/2025 3.2 (L) 3.4 - 4.8 g/dL Final     Bilirubin Total   Date Value Ref Range Status   05/07/2025 0.2 <=1.5 mg/dL Final     ALP   Date Value Ref Range Status   05/07/2025 94 40 - 150 unit/L Final     AST   Date Value Ref Range Status   05/07/2025 18 11 - 45 unit/L Final     ALT   Date Value Ref Range Status   05/07/2025 11 0 - 55 unit/L Final     Estimated GFR-Non    Date Value Ref Range Status   04/20/2022 25                "  Component  Ref Range & Units (hover) 15:06 3 mo ago 4 mo ago 6 mo ago 9 mo ago 10 mo ago 1 yr ago   IgG Level 537.00 445.00 Low  392.00 Low  518.00 Low  487.00 Low  490.00 Low  591.00   IgA Level 94.0 83.0 117.0 162.0 62.0 Low  65.0 Low  63.0 Low    IgM Level 13.0 Low  40.0 6.0 Low  8.0 Low  15.0 Low  15.0 Low  8.0 Low             Assessment:       No diagnosis found.        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 [place] red fat pad + Dx 2/2015  --Last seen by Dr Parnell at [facility] 12/20/2024: Her note "AL Amyloid/ MM: Currently available MM/AL amyloid parameters indicate a decrease in the NT Pro BNP and troponin T. We asked if she had taken any steroids during her URI to explain the reduction of Amyloid and MM parameters, but the pt indicated that she had not). Her MM parameters have improved as well.While there was a prior minimal rise in the FKLC this patient has had a lambda clonality in the past.. She had been off of therapy which has resumed but her parameters already demonstrate improvement over the last visit. We previously recommended stopping lenalidomide and seeing if the response can be maintained and trying to reduce the fluid retention and other side effects of dexamethasone. This has not improved then we recommended reinitiation of DRd. Most recent PET CT w/o progression or active skeletal or extramedullary disease. The patient will be scheduled for follow-up in 2-3 for continued monitoring of the chronic plasma cell dyscrasia and orders for CBC, CMP, serum and urine protein electrophoretic and immunofixation studies, free light chain studies, and immunoglobulins have been placed for that follow/up visit. 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 Sx risks do not outweigh benefits. Pain meds given and encouraged Physical Therpay    8) Amyloid plaques, evaluated by Derm at [facility], recommend watchful waiting. Patient to follow up 12/2017    9) Stage IA grade 3, ER+, HER2 BERNA +,  IDC/DCIS of the left breast cancer --- 2/7/18 at [facility]; s/p lumpectomy with SLNB 4/12, Grade 2 IDC measuring 4mm with second focus of microinvasive carcinoma measuring  0.4mm .IG DCIS, 0.3mm to posterior margin; 2 SLN negative for malignancy      10) Progressive swelling of R lower extremity--- US NIVA done 2/19/18 negative for evidence of DVT    11). Paroxysmal Afib (2/18/18) diagnosed at [facility]; ECHO noted EF 58%    12. Mild CKD with GFR 55 - managed per [facility] nephrology    13). Pulmonary nodules noted on pre-operative chest CT scan (3/12/18) noted new bilateral pulmonary nodules are nonspecific -- seen by Pulmonology at [facility] (3/21/18) thought to be inflammatory/infectious in nature, repeat Chest CT 6/28/18 noted resolution of nodules    14). Treatment induced neutropenia    15) NON COMPLIANT patient-- please see above the no show, reschedule and cancelled visits/labs and infusion.     16) Neoplasm associated pain/polyneuropathy: continues w/ pain service on methadone/ oxycodone /duloxitene      Plan:   Dx: Multiple Myeloma (IgA type)  Status: Well-controlled with normalized IgA levels (94, within normal range )  Disease currently in remission with excellent response to Daratumumab.   Will review additional lab results when available.   Plan to continue monthly monitoring.   Discussed intermittent nature of treatment, explaining disease  is not curable but can be managed with periods of remission and retreatment as needed.  Patient tells me daratumumab is on hold.  I told her based on the results of the lab work we may need to resume therapy.  Patient verbalized understanding.  I will order for myeloma panel evaluation prior to the next appointment.    Dx: Amyloidosis  Status: Stable  Continue current management approach.   Monthly monitoring with lab work.    Dx: Chronic Kidney Disease  Status: Stable with creatinine 2.5  Contributes to mild anemia.   Pending urine analysis for kidney doctor.   Continue monitoring renal function.    Dx: Anemia  Status: Improving (Hgb 11.3, previously 9, then 10)  Likely multifactorial due to kidney dysfunction and history of myeloma.   Trending in positive direction, approaching normal range (12).    Dx: Shoulder Pain  Status: Active  X-rays show arthritis.   Will coordinate with primary team regarding pain management.  She has been told that perhaps there is a new lesion in the bone that could be related to myeloma.  An MRI has been ordered according to the patient's report.  If this lesion is myeloma, considerations of radiation therapy for palliative pain control might be appropriate.    Dx: Urinary Tract Infection  Status: Resolving  Currently on antibiotics with GI side effects.  (does not recall the name of the antibiotic)  Recommended continuing probiotics and Pepcid for symptom management.   Pending urine analysis.          Patient instructions and visit orders.       -Follow-up:  F/U with MD 1 month  -Labs:  CBC, CMP,MM labs 1 month  -Imaging:    -Other orders:      40 were spent on this encounter      Visit today included increased complexity associated with the care of the episodic problem MM, amyloidosis, breast cancer, addressing and managing the longitudinal care of the patient's MM, amyloidosis, breast cancer.     Kit Lebron, MSN, FNP-BC, APRN, AOCNP   Department of Hematology/Oncology.

## 2025-05-30 ENCOUNTER — LAB VISIT (OUTPATIENT)
Dept: LAB | Facility: HOSPITAL | Age: 60
End: 2025-05-30
Attending: INTERNAL MEDICINE
Payer: MEDICARE

## 2025-05-30 DIAGNOSIS — N18.4 CHRONIC KIDNEY DISEASE, STAGE 4 (SEVERE): ICD-10-CM

## 2025-05-30 DIAGNOSIS — N39.0 UTI (URINARY TRACT INFECTION), UNCOMPLICATED: ICD-10-CM

## 2025-05-30 LAB
ALBUMIN SERPL-MCNC: 3.2 G/DL (ref 3.4–4.8)
ALBUMIN/GLOB SERPL: 1.1 RATIO (ref 1.1–2)
ALP SERPL-CCNC: 87 UNIT/L (ref 40–150)
ALT SERPL-CCNC: 11 UNIT/L (ref 0–55)
ANION GAP SERPL CALC-SCNC: 13 MEQ/L
AST SERPL-CCNC: 16 UNIT/L (ref 11–45)
BACTERIA #/AREA URNS AUTO: ABNORMAL /HPF
BASOPHILS # BLD AUTO: 0.01 X10(3)/MCL
BASOPHILS NFR BLD AUTO: 0.1 %
BILIRUB SERPL-MCNC: 0.3 MG/DL
BILIRUB UR QL STRIP.AUTO: NEGATIVE
BUN SERPL-MCNC: 32.2 MG/DL (ref 9.8–20.1)
CALCIUM SERPL-MCNC: 9.1 MG/DL (ref 8.4–10.2)
CHLORIDE SERPL-SCNC: 109 MMOL/L (ref 98–107)
CLARITY UR: CLEAR
CO2 SERPL-SCNC: 21 MMOL/L (ref 23–31)
COLOR UR AUTO: ABNORMAL
CREAT SERPL-MCNC: 2.19 MG/DL (ref 0.55–1.02)
CREAT UR-MCNC: 266.5 MG/DL (ref 45–106)
CREAT/UREA NIT SERPL: 15
EOSINOPHIL # BLD AUTO: 0 X10(3)/MCL (ref 0–0.9)
EOSINOPHIL NFR BLD AUTO: 0 %
ERYTHROCYTE [DISTWIDTH] IN BLOOD BY AUTOMATED COUNT: 13.8 % (ref 11.5–17)
GFR SERPLBLD CREATININE-BSD FMLA CKD-EPI: 25 ML/MIN/1.73/M2
GLOBULIN SER-MCNC: 3 GM/DL (ref 2.4–3.5)
GLUCOSE SERPL-MCNC: 116 MG/DL (ref 82–115)
GLUCOSE UR QL STRIP: ABNORMAL
HCT VFR BLD AUTO: 34.6 % (ref 37–47)
HGB BLD-MCNC: 10.7 G/DL (ref 12–16)
HGB UR QL STRIP: ABNORMAL
HYALINE CASTS #/AREA URNS LPF: ABNORMAL /LPF
IMM GRANULOCYTES # BLD AUTO: 0.09 X10(3)/MCL (ref 0–0.04)
IMM GRANULOCYTES NFR BLD AUTO: 1.1 %
KAPPA LC FREE SER NEPH-MCNC: 2.18 MG/DL (ref 0.33–1.94)
KAPPA LC FREE/LAMBDA FREE SER NEPH: 1.19 {RATIO} (ref 0.26–1.65)
KETONES UR QL STRIP: ABNORMAL
LAMBDA LC FREE SERPL-MCNC: 1.83 MG/DL (ref 0.57–2.63)
LEUKOCYTE ESTERASE UR QL STRIP: 250
LYMPHOCYTES # BLD AUTO: 0.52 X10(3)/MCL (ref 0.6–4.6)
LYMPHOCYTES NFR BLD AUTO: 6.4 %
MCH RBC QN AUTO: 27.2 PG (ref 27–31)
MCHC RBC AUTO-ENTMCNC: 30.9 G/DL (ref 33–36)
MCV RBC AUTO: 88 FL (ref 80–94)
MONOCYTES # BLD AUTO: 0.25 X10(3)/MCL (ref 0.1–1.3)
MONOCYTES NFR BLD AUTO: 3.1 %
NEUTROPHILS # BLD AUTO: 7.31 X10(3)/MCL (ref 2.1–9.2)
NEUTROPHILS NFR BLD AUTO: 89.3 %
NITRITE UR QL STRIP: NEGATIVE
NRBC BLD AUTO-RTO: 0 %
PH UR STRIP: 6 [PH]
PHOSPHATE SERPL-MCNC: 2.7 MG/DL (ref 2.3–4.7)
PLATELET # BLD AUTO: 223 X10(3)/MCL (ref 130–400)
PMV BLD AUTO: 9.6 FL (ref 7.4–10.4)
POTASSIUM SERPL-SCNC: 4.5 MMOL/L (ref 3.5–5.1)
PROT SERPL-MCNC: 6.2 GM/DL (ref 5.8–7.6)
PROT UR QL STRIP: ABNORMAL
PROT UR STRIP-MCNC: 46.2 MG/DL
PTH-INTACT SERPL-MCNC: 169.2 PG/ML (ref 8.7–77)
RBC # BLD AUTO: 3.93 X10(6)/MCL (ref 4.2–5.4)
RBC #/AREA URNS AUTO: ABNORMAL /HPF
SODIUM SERPL-SCNC: 143 MMOL/L (ref 136–145)
SP GR UR STRIP.AUTO: 1.02 (ref 1–1.03)
SQUAMOUS #/AREA URNS LPF: ABNORMAL /HPF
URINE PROTEIN/CREATININE RATIO (OLG): 0.2
UROBILINOGEN UR STRIP-ACNC: NORMAL
WBC # BLD AUTO: 8.18 X10(3)/MCL (ref 4.5–11.5)
WBC #/AREA URNS AUTO: ABNORMAL /HPF

## 2025-05-30 PROCEDURE — 36415 COLL VENOUS BLD VENIPUNCTURE: CPT

## 2025-05-30 PROCEDURE — 83970 ASSAY OF PARATHORMONE: CPT

## 2025-05-30 PROCEDURE — 87086 URINE CULTURE/COLONY COUNT: CPT

## 2025-05-30 PROCEDURE — 84100 ASSAY OF PHOSPHORUS: CPT

## 2025-05-30 PROCEDURE — 81015 MICROSCOPIC EXAM OF URINE: CPT

## 2025-05-30 PROCEDURE — 82570 ASSAY OF URINE CREATININE: CPT

## 2025-05-30 PROCEDURE — 85025 COMPLETE CBC W/AUTO DIFF WBC: CPT

## 2025-05-30 PROCEDURE — 80053 COMPREHEN METABOLIC PANEL: CPT

## 2025-06-01 LAB — BACTERIA UR CULT: ABNORMAL

## 2025-06-02 ENCOUNTER — RESULTS FOLLOW-UP (OUTPATIENT)
Dept: NEPHROLOGY | Facility: CLINIC | Age: 60
End: 2025-06-02
Payer: MEDICARE

## 2025-06-02 DIAGNOSIS — N39.0 UTI (URINARY TRACT INFECTION), UNCOMPLICATED: Primary | ICD-10-CM

## 2025-06-02 LAB
ALBUMIN % SPEP (OHS): 50.59 (ref 48.1–59.5)
ALBUMIN SERPL-MCNC: 3.2 G/DL (ref 3.4–4.8)
ALBUMIN/GLOB SERPL: 1 RATIO (ref 1.1–2)
ALPHA 1 GLOB (OHS): 0.27 GM/DL (ref 0–0.4)
ALPHA 1 GLOB% (OHS): 4.24 (ref 2.3–4.9)
ALPHA 2 GLOB % (OHS): 18.26 (ref 6.9–13)
ALPHA 2 GLOB (OHS): 1.15 GM/DL (ref 0.4–1)
BETA GLOB (OHS): 1.14 GM/DL (ref 0.7–1.3)
BETA GLOB% (OHS): 18.12 (ref 13.8–19.7)
GAMMA GLOBULIN % (OHS): 8.78 (ref 10.1–21.9)
GAMMA GLOBULIN (OHS): 0.55 GM/DL (ref 0.4–1.8)
GLOBULIN SER-MCNC: 3.1 GM/DL (ref 2.4–3.5)
M SPIKE % (OHS): ABNORMAL
M SPIKE (OHS): ABNORMAL
PATH REV: NORMAL
PROT SERPL-MCNC: 6.3 GM/DL (ref 5.8–7.6)

## 2025-06-02 RX ORDER — NITROFURANTOIN MACROCRYSTALS 50 MG/1
50 CAPSULE ORAL DAILY
Qty: 5 CAPSULE | Refills: 0 | Status: SHIPPED | OUTPATIENT
Start: 2025-06-02 | End: 2025-06-07

## 2025-06-05 ENCOUNTER — TELEPHONE (OUTPATIENT)
Dept: NEPHROLOGY | Facility: CLINIC | Age: 60
End: 2025-06-05
Payer: MEDICARE

## 2025-06-23 ENCOUNTER — LAB VISIT (OUTPATIENT)
Dept: LAB | Facility: HOSPITAL | Age: 60
End: 2025-06-23
Payer: MEDICARE

## 2025-06-23 DIAGNOSIS — I10 PRIMARY HYPERTENSION: ICD-10-CM

## 2025-06-23 DIAGNOSIS — N18.4 CHRONIC KIDNEY DISEASE, STAGE 4 (SEVERE): ICD-10-CM

## 2025-06-23 DIAGNOSIS — N39.0 UTI (URINARY TRACT INFECTION), UNCOMPLICATED: ICD-10-CM

## 2025-06-23 DIAGNOSIS — N18.4 CHRONIC KIDNEY DISEASE, STAGE 4 (SEVERE): Primary | ICD-10-CM

## 2025-06-23 DIAGNOSIS — E78.2 MIXED HYPERLIPIDEMIA: ICD-10-CM

## 2025-06-23 DIAGNOSIS — I48.0 PAROXYSMAL ATRIAL FIBRILLATION: ICD-10-CM

## 2025-06-23 LAB
ALBUMIN SERPL-MCNC: 3.1 G/DL (ref 3.4–4.8)
ALBUMIN/GLOB SERPL: 0.9 RATIO (ref 1.1–2)
ALP SERPL-CCNC: 89 UNIT/L (ref 40–150)
ALT SERPL-CCNC: 9 UNIT/L (ref 0–55)
ANION GAP SERPL CALC-SCNC: 11 MEQ/L
AST SERPL-CCNC: 18 UNIT/L (ref 11–45)
BACTERIA #/AREA URNS AUTO: ABNORMAL /HPF
BASOPHILS # BLD AUTO: 0.03 X10(3)/MCL
BASOPHILS NFR BLD AUTO: 0.5 %
BILIRUB SERPL-MCNC: 0.2 MG/DL
BILIRUB UR QL STRIP.AUTO: NEGATIVE
BUN SERPL-MCNC: 24.5 MG/DL (ref 9.8–20.1)
CALCIUM SERPL-MCNC: 9.2 MG/DL (ref 8.4–10.2)
CHLORIDE SERPL-SCNC: 110 MMOL/L (ref 98–107)
CLARITY UR: CLEAR
CO2 SERPL-SCNC: 21 MMOL/L (ref 23–31)
COLOR UR AUTO: ABNORMAL
CREAT SERPL-MCNC: 2.24 MG/DL (ref 0.55–1.02)
CREAT UR-MCNC: 145.5 MG/DL (ref 45–106)
CREAT/UREA NIT SERPL: 11
EOSINOPHIL # BLD AUTO: 0.09 X10(3)/MCL (ref 0–0.9)
EOSINOPHIL NFR BLD AUTO: 1.5 %
ERYTHROCYTE [DISTWIDTH] IN BLOOD BY AUTOMATED COUNT: 13.9 % (ref 11.5–17)
GFR SERPLBLD CREATININE-BSD FMLA CKD-EPI: 24 ML/MIN/1.73/M2
GLOBULIN SER-MCNC: 3.5 GM/DL (ref 2.4–3.5)
GLUCOSE SERPL-MCNC: 94 MG/DL (ref 82–115)
GLUCOSE UR QL STRIP: NEGATIVE
GRAN CASTS URNS QL MICRO: ABNORMAL /LPF
HCT VFR BLD AUTO: 32.4 % (ref 37–47)
HGB BLD-MCNC: 10.3 G/DL (ref 12–16)
HGB UR QL STRIP: ABNORMAL
IMM GRANULOCYTES # BLD AUTO: 0.03 X10(3)/MCL (ref 0–0.04)
IMM GRANULOCYTES NFR BLD AUTO: 0.5 %
KETONES UR QL STRIP: NEGATIVE
LEUKOCYTE ESTERASE UR QL STRIP: NEGATIVE
LYMPHOCYTES # BLD AUTO: 1.37 X10(3)/MCL (ref 0.6–4.6)
LYMPHOCYTES NFR BLD AUTO: 22.2 %
MCH RBC QN AUTO: 27.7 PG (ref 27–31)
MCHC RBC AUTO-ENTMCNC: 31.8 G/DL (ref 33–36)
MCV RBC AUTO: 87.1 FL (ref 80–94)
MONOCYTES # BLD AUTO: 0.77 X10(3)/MCL (ref 0.1–1.3)
MONOCYTES NFR BLD AUTO: 12.5 %
NEUTROPHILS # BLD AUTO: 3.89 X10(3)/MCL (ref 2.1–9.2)
NEUTROPHILS NFR BLD AUTO: 62.8 %
NITRITE UR QL STRIP: POSITIVE
PH UR STRIP: 6 [PH]
PHOSPHATE SERPL-MCNC: 3.5 MG/DL (ref 2.3–4.7)
PLATELET # BLD AUTO: 194 X10(3)/MCL (ref 130–400)
PMV BLD AUTO: 9.6 FL (ref 7.4–10.4)
POTASSIUM SERPL-SCNC: 4 MMOL/L (ref 3.5–5.1)
PROT SERPL-MCNC: 6.6 GM/DL (ref 5.8–7.6)
PROT UR QL STRIP: ABNORMAL
PROT UR STRIP-MCNC: 24.3 MG/DL
RBC # BLD AUTO: 3.72 X10(6)/MCL (ref 4.2–5.4)
RBC #/AREA URNS AUTO: ABNORMAL /HPF
SODIUM SERPL-SCNC: 142 MMOL/L (ref 136–145)
SP GR UR STRIP.AUTO: >=1.03 (ref 1–1.03)
SQUAMOUS #/AREA URNS AUTO: ABNORMAL /HPF
TSH SERPL-ACNC: 4.68 UIU/ML (ref 0.35–4.94)
URINE PROTEIN/CREATININE RATIO (OLG): 0.2
UROBILINOGEN UR STRIP-ACNC: 0.2
WBC # BLD AUTO: 6.18 X10(3)/MCL (ref 4.5–11.5)
WBC #/AREA URNS AUTO: ABNORMAL /HPF

## 2025-06-23 PROCEDURE — 84100 ASSAY OF PHOSPHORUS: CPT

## 2025-06-23 PROCEDURE — 84443 ASSAY THYROID STIM HORMONE: CPT

## 2025-06-23 PROCEDURE — 81003 URINALYSIS AUTO W/O SCOPE: CPT

## 2025-06-23 PROCEDURE — 85025 COMPLETE CBC W/AUTO DIFF WBC: CPT

## 2025-06-23 PROCEDURE — 80053 COMPREHEN METABOLIC PANEL: CPT

## 2025-06-23 PROCEDURE — 82570 ASSAY OF URINE CREATININE: CPT

## 2025-06-23 PROCEDURE — 83970 ASSAY OF PARATHORMONE: CPT

## 2025-06-23 PROCEDURE — 36415 COLL VENOUS BLD VENIPUNCTURE: CPT

## 2025-06-23 PROCEDURE — 87186 SC STD MICRODIL/AGAR DIL: CPT | Mod: 59

## 2025-06-24 ENCOUNTER — OFFICE VISIT (OUTPATIENT)
Dept: NEPHROLOGY | Facility: CLINIC | Age: 60
End: 2025-06-24
Payer: MEDICARE

## 2025-06-24 VITALS
HEART RATE: 97 BPM | WEIGHT: 156 LBS | DIASTOLIC BLOOD PRESSURE: 69 MMHG | OXYGEN SATURATION: 98 % | SYSTOLIC BLOOD PRESSURE: 110 MMHG | BODY MASS INDEX: 28.71 KG/M2 | RESPIRATION RATE: 18 BRPM | TEMPERATURE: 98 F | HEIGHT: 62 IN

## 2025-06-24 DIAGNOSIS — D63.8 ANEMIA OF CHRONIC DISEASE: ICD-10-CM

## 2025-06-24 DIAGNOSIS — E85.9 AMYLOIDOSIS, UNSPECIFIED TYPE: ICD-10-CM

## 2025-06-24 DIAGNOSIS — N25.81 SECONDARY HYPERPARATHYROIDISM OF RENAL ORIGIN: ICD-10-CM

## 2025-06-24 DIAGNOSIS — E87.20 METABOLIC ACIDOSIS: ICD-10-CM

## 2025-06-24 DIAGNOSIS — N18.4 CHRONIC KIDNEY DISEASE, STAGE 4 (SEVERE): Primary | ICD-10-CM

## 2025-06-24 DIAGNOSIS — I10 PRIMARY HYPERTENSION: ICD-10-CM

## 2025-06-24 LAB — PTH-INTACT SERPL-MCNC: 147.8 PG/ML (ref 8.7–77)

## 2025-06-24 PROCEDURE — 99999 PR PBB SHADOW E&M-EST. PATIENT-LVL III: CPT | Mod: PBBFAC,,,

## 2025-06-24 PROCEDURE — 99214 OFFICE O/P EST MOD 30 MIN: CPT | Mod: S$PBB,,,

## 2025-06-24 PROCEDURE — 99213 OFFICE O/P EST LOW 20 MIN: CPT | Mod: PBBFAC

## 2025-06-24 RX ORDER — SODIUM BICARBONATE 650 MG/1
650 TABLET ORAL 2 TIMES DAILY
Qty: 60 TABLET | Refills: 11 | Status: SHIPPED | OUTPATIENT
Start: 2025-06-24 | End: 2026-06-24

## 2025-06-24 NOTE — PATIENT INSTRUCTIONS
Make sure you are not taking pantoprazole as well as omeprazole    Start sodium bicarbonate 650 mg 1 tablet twice daily.    You may be able to stop omeprazole with the addition of the sodium bicarbonate    Blood pressure goal: consistently less than 130/85    Avoid NSAIDs and COX2 inhibitors: Advil (ibuprofen), Aleve (naproxen), Mobic (meloxicam), Celebrex (celecoxib), Toradol (ketorolac) and Diclofenac (voltaren), Indomethacin (indocin).    Only take tylenol (acetaminophen) occasionally if needed for aches/pains.    Stay well hydrated with water: goal is around 64 ounces per day of pure water    Recommend low sodium diet:  Less than 2,000 mg per day  Avoid high salt foods (olives, pickles, smoked meats, deli meats, chips, fast food, etc.).   Do not add salt to your food at the table.   Use only small amounts of salt when cooking.    Renal Diet:  Reduce intake of nuts, peanut butter, milk, cheese, dried beans, and peas   Low protein intake, especially animal meat    Avoid alcohol, soda, and energy drinks. Limit tea and coffee.       Call our office with concerns prior to next appointment.    ---------------------------------------------------------------------------------------------------------------------------------    CHRONIC KIDNEY DISEASE BASIC INFORMATION:    Your kidneys do many important jobs. Some of the ways they keep your whole body in balance include:  Removing natural waste products and extra water from your body  Helping make red blood cells  Balancing important minerals in your body  Helping maintain your blood pressure  Keeping your bones healthy    Chronic kidney disease (CKD) is when the kidneys have become damaged over time (for at least 3 months) and have a hard time doing all their important jobs. CKD also increases the risk of other health problems like heart disease and stroke. Developing CKD is usually a very slow process with very few symptoms at first.    Risk Factors  Anyone can develop  CKD - at any age. However, some people are at a higher risk than others. The most common CKD risk factors are:  Diabetes  High blood pressure (hypertension)  Heart disease and/or heart failure  Obesity  Over the age of 60  Family history of CKD or kidney failure  Personal history of acute kidney injury (BENNY)  Smoking and/or use of tobacco products  Use of NSAIDs    For many people, CKD is not caused by just one reason. Instead, it is a result of many physical, environmental, and social factors.      For more education on kidney disease you can visit:  https://[email or web address].org/kidney-basics    https://www.[email or web address].org/kidney-topics/understanding-your-lab-values-and-other-ckd-health-numbers

## 2025-06-24 NOTE — PROGRESS NOTES
[facility] New Referral Office Note    HPI  Ninfa Candelario, 59 y.o. female, presents to office for her 2nd visit   Patient with multiple myeloma status post stem cell transplant complicated with amyloidosis with multiorgan involvement  She is now CKD  4  She has a renal biopsy also at [facility]  Patient also has a colostomy related to intestinal obstruction  Patient has been managed with the [facility] and locally with Dr. Meyers adjusting her chemotherapy regimen for amyloidosis  Started on Farxiga about 2-3 months ago.  Renal function has remained unchanged.  Discussed the benefits of Farxiga in the long term.  She denies any skin changes since starting Farxiga.  She has had multiple UTI since 2024 so we will keep a close eye on this, may need to stop Farxiga..  Denies any major complaints  Admits to only drinking multiple cokes daily and not drinking any water      Medical Diagnoses:   Past Medical History:   Diagnosis Date    Amyloidosis     Anemia     Anxiety and depression     Diplopia     Hyperlipidemia     Hypertension     Impaired mobility     Lytic lesion of bone on x-ray     Multiple myeloma     Neuropathy     Obesity, unspecified     Paroxysmal atrial fibrillation     Primary cancer of left female breast      Problem List[1]    Surgical History:   Past Surgical History:   Procedure Laterality Date    BONE MARROW TRANSPLANT  02/08/2016    BREAST SURGERY      CARPAL TUNNEL RELEASE      COLON SURGERY      COLONOSCOPY  12/11/2018    Dr. Eddie Jimenez    ENDOSCOPIC RELEASE OF TRIGGER FINGER      ESOPHAGEAL MOTILITY STUDY  2015    Excision Lipoma left elbow      Exploration Laparotomy  2016    FLEXIBLE SIGMOIDOSCOPY      MEDIPORT INSERTION, SINGLE  03/15/2016    PH Study 24 Hour  2015    SMALL INTESTINE SURGERY         Family History:   Family History   Problem Relation Name Age of Onset    Hypertension Mother Potier     Stroke Mother Potier     Cancer Father Cabrera Aguirre     Alcohol abuse Father Cabrera  Timothy     Hypertension Sister Stephanie     Arthritis Sister Stephanie     Diabetes Sister Stephanie     Drug abuse Sister Stephanie     Stroke Sister Stephanie     Hypertension Brother Wesley     Drug abuse Brother Wesley     Diabetes Sister Caroline Huddleston     Heart disease Sister Caroline Huddleston     Hypertension Sister Caroline Potshahnaz     Drug abuse Sister Kandy     Hypertension Sister Kandy        Social History:   Social History     Tobacco Use    Smoking status: Never    Smokeless tobacco: Never   Substance Use Topics    Alcohol use: Not Currently       Allergies:  Review of patient's allergies indicates:   Allergen Reactions    Baclofen Shortness Of Breath     Difficulty breathing      Penicillins Hives, Rash and Swelling    Shellfish containing products Hives, Rash, Swelling and Other (See Comments)     shell  She can shrimp      Clarithromycin Other (See Comments)    Iodinated contrast media      Allergic to shrimp    Nsaids (non-steroidal anti-inflammatory drug)     Other omega-3s     Penicillin      Other reaction(s): Unknown  Other reaction(s): Unknown  Other reaction(s): Unknown    Ace inhibitors Other (See Comments)     cough  Other reaction(s): Cough    Azithromycin Nausea Only     Upset stomach    Hydralazine analogues Anxiety    Meloxicam Tinitus     Other reaction(s): Unknown  Other reaction(s): Unknown    Prochlorperazine Anxiety     Restlessness/anxious    Tramadol Other (See Comments)     Increased Heart Rate    Other reaction(s): Unknown  Other reaction(s): Unknown       Medications:  Current Medications[2]       Review of Systems:    Constitutional: Denies fever, occasional fatigue  Skin: Denies wounds, no rashes, no itching, no new skin lesions  Respiratory:  Denies cough, shortness of breath, or wheezing  Cardiovascular: Denies chest pain, palpitations; + some increase in lower extremity edema  Gastrointestional:  Right lower quadrant ostomy  Genitourinary: Denies dysuria, hematuria, foamy urine, or  "incontinence; reports able to empty bladder  Musculoskeletal: Denies back or flank pain;+ Rollator for ambulation assistance  Neurological: Denies headaches, dizziness, paresthesias, tremors or focal weakness      Vital Signs:  /69 (BP Location: Right arm, Patient Position: Sitting)   Pulse 97   Temp 98.2 °F (36.8 °C) (Oral)   Resp 18   Ht 5' 2" (1.575 m)   Wt 70.8 kg (156 lb)   SpO2 98%   BMI 28.53 kg/m²   Body mass index is 28.53 kg/m².      Physical Exam:    General: no acute distress, awake, alert, pain  Eyes: conjunctiva clear, eyelids without swelling  HENT: atraumatic, oropharynx and nasal mucosa patent  Neck: supple, trache midline, full ROM, no JVD  Respiratory: equal, unlabored, clear to auscultation A/P  Cardiovascular: RRR without  rub  Edema:  +1 bilateral lower extremity  Gastrointestinal:  Right lower quadrant ostomy  Musculoskeletal: ROM without new limitation or discomfort; + Rollator for ambulation assistance  Integumentary:  Left lower extremity skin granulation  Neurological: oriented x4, appropriate, no acute deficits; no asterixis      Labs:  Estimated Creatinine Clearance: 24.3 mL/min (A) (based on SCr of 2.24 mg/dL (H)).         Component Value Date/Time     06/23/2025 1522     05/30/2025 1410    K 4.0 06/23/2025 1522    K 4.5 05/30/2025 1410     (H) 06/23/2025 1522     (H) 05/30/2025 1410    CO2 21 (L) 06/23/2025 1522    CO2 21 (L) 05/30/2025 1410    BUN 24.5 (H) 06/23/2025 1522    BUN 32.2 (H) 05/30/2025 1410    BUN 35 (H) 07/18/2023 1729    BUN 39 (H) 04/26/2023 1705    CREATININE 2.24 (H) 06/23/2025 1522    CREATININE 2.19 (H) 05/30/2025 1410    CREATININE 2.20 (H) 05/29/2025 1248    CREATININE 2.56 (H) 05/07/2025 1628    CREATININE 2.48 (H) 07/18/2023 1729    CREATININE 2.63 (H) 04/26/2023 1705    CREATININE 2.14 (H) 12/14/2022 2203    CREATININE 2.45 (H) 12/12/2022 1908    CALCIUM 9.2 06/23/2025 1522    CALCIUM 9.1 05/30/2025 1410    CALCIUM 9.7 " 07/18/2023 1729    CALCIUM 9.4 04/26/2023 1705    PHOS 3.5 06/23/2025 1522    PHOS 2.7 05/30/2025 1410    .8 (H) 06/23/2025 1522    .2 (H) 05/30/2025 1410           Component Value Date/Time    WBC 6.18 06/23/2025 1522    WBC 8.18 05/30/2025 1410    WBC 5.5 04/04/2024 1729    WBC 5.06 04/02/2024 0429    HGB 10.3 (L) 06/23/2025 1522    HGB 10.7 (L) 05/30/2025 1410    HCT 32.4 (L) 06/23/2025 1522    HCT 34.6 (L) 05/30/2025 1410    HCT 33.0 (L) 12/06/2021 1349    HCT 33.0 (L) 10/08/2020 1119     06/23/2025 1522     05/30/2025 1410     CT abdomen pelvis 2018 negative for renal abnormality    Impression:    1. Chronic kidney disease, stage 4 (severe)        2. Primary hypertension        3. Amyloidosis, unspecified type        4. Anemia of chronic disease        5. Metabolic acidosis        6. Secondary hyperparathyroidism of renal origin          Amyloidosis with multiorgan involvement  Multiple myeloma in remission   paroxysmal Afib  CKD stage 4.  At baseline.  GFR 20 to 28 cc/minute since at least 2022  Mild metabolic acidosis  PTH less than 200  Hemoglobin and hematocrit stable  No significant proteinuria    Pending UA culture.  If patient continues to have UTIs would benefit from  referral..  Would also consider discontinuing Farxiga for concerns of recurrent infections      Plan:  Patient to follow-up with the [facility] and Dr. Meyers for myeloma/amyloid management  We will work on slowing down rate of deterioration by avoiding nephrotoxins, blood pressure control, and needs to avoid sodas and drink water    Rx: sodium bicarb 650 mg BID    Labs and FU in 3 months    LIZETT Jiang      This note was created with the assistance of [employer] voice recognition software or phone dictation. There may be transcription errors as a result of using this technology however minimal. Effort has been made to assure accuracy of transcription but any obvious errors or omissions should be  clarified with the author of the document.            [1]   Patient Active Problem List  Diagnosis    Amyloidosis    Anemia of chronic disease    Chronic low back pain    Chronic kidney disease, stage 4 (severe)    Gastroesophageal reflux disease    History of colostomy    Hypertension    Long term current use of opiate analgesic    Mixed anxiety depressive disorder    Mixed hyperlipidemia    Multiple nodules of lung    Overlapping malignant neoplasm of female breast    Paroxysmal atrial fibrillation    Multiple myeloma    Medicare annual wellness visit, subsequent    Osteopenia    Aromatase inhibitor use    Acute bronchitis    Cellulitis of left lower extremity    Bacterial sinusitis    Pain and swelling of left lower leg    Influenza B    Rheumatoid arthritis, involving unspecified site, unspecified whether rheumatoid factor present    Stem cells transplant status    Ileostomy status    Drug-induced polyneuropathy    Hypogammaglobulinemia    History of breast cancer    COVID    Upper respiratory tract infection    Left ear pain    Bilateral impacted cerumen    Polyneuropathy in malignant disease    UTI symptoms    Lack of physical activity    Mixed incontinence urge and stress (male)(female)    Secondary hyperparathyroidism of renal origin    Metabolic acidosis   [2]   Current Outpatient Medications:     albuterol-ipratropium (DUO-NEB) 2.5 mg-0.5 mg/3 mL nebulizer solution, Take 3 mLs by nebulization every 6 (six) hours as needed for Wheezing or Shortness of Breath. Rescue, Disp: 75 mL, Rfl: 0    apixaban (ELIQUIS) 2.5 mg Tab, TAKE 1 TABLET(2.5 MG) BY MOUTH TWICE DAILY, Disp: 60 tablet, Rfl: 2    carvediloL (COREG) 3.125 MG tablet, Take 1 tablet (3.125 mg total) by mouth 2 (two) times daily with meals., Disp: 180 tablet, Rfl: 2    chlorhexidine (PERIDEX) 0.12 % solution, Use as directed 15 mLs in the mouth or throat 2 (two) times daily., Disp: , Rfl:     dapagliflozin propanediol (FARXIGA) 10 mg tablet, Take 1  tablet (10 mg total) by mouth once daily., Disp: 90 tablet, Rfl: 3    DULoxetine (CYMBALTA) 60 MG capsule, Take 60 mg by mouth once daily., Disp: , Rfl:     famotidine (PEPCID) 40 MG tablet, Take 1 tablet (40 mg total) by mouth 2 (two) times daily., Disp: 60 tablet, Rfl: 5    fluoride, sodium, (PREVIDENT 5000 PLUS) 1.1 % Crea, Place onto teeth., Disp: , Rfl:     hydrocortisone 2.5 % cream, as needed., Disp: , Rfl:     levocetirizine (XYZAL) 5 MG tablet, TAKE 1 TABLET(5 MG) BY MOUTH EVERY EVENING, Disp: 30 tablet, Rfl: 6    methadone (DOLOPHINE) 10 MG tablet, Take 10 mg by mouth every 8 (eight) hours while awake. PRN, Disp: , Rfl:     multivitamin (THERAGRAN) per tablet, Take 1 tablet by mouth once daily., Disp: , Rfl:     omeprazole (PRILOSEC) 20 MG capsule, TAKE 1 CAPSULE(20 MG) BY MOUTH EVERY DAY, Disp: 30 capsule, Rfl: 11    ondansetron (ZOFRAN) 8 MG tablet, Take 1 tablet (8 mg total) by mouth every 8 (eight) hours as needed for Nausea., Disp: 90 tablet, Rfl: 1    oxyCODONE (ROXICODONE) 10 mg Tab immediate release tablet, Take 1 tablet (10 mg total) by mouth every 12 (twelve) hours as needed., Disp: 10 tablet, Rfl: 0    pantoprazole (PROTONIX) 40 MG tablet, Take 40 mg by mouth once daily., Disp: , Rfl:     potassium chloride (KLOR-CON) 10 MEQ TbSR, TAKE 1 TABLET(10 MEQ) BY MOUTH TWICE DAILY, Disp: 180 tablet, Rfl: 3    rosuvastatin (CRESTOR) 10 MG tablet, Take 1 tablet (10 mg total) by mouth Daily., Disp: 90 tablet, Rfl: 3    Saccharomyces boulardii (FLORASTOR) 250 mg capsule, Take 250 mg by mouth as needed., Disp: , Rfl:     clindamycin (CLEOCIN) 300 MG capsule, TAKE 1 CAPSULE BY MOUTH FOUR TIMES DAILY UNTIL ALL TAKEN. (Patient not taking: Reported on 6/24/2025), Disp: , Rfl:     HEMADY 20 mg Tab, TAKE 1 TABLET BY MOUTH EVERY WEEK (Patient not taking: Reported on 6/24/2025), Disp: 12 tablet, Rfl: 0    iron-vit c-b12-folic acid (ICAR-C PLUS) Tab, 1 tab(s) orally once a day for 30 day(s) (Patient not taking:  Reported on 5/8/2025), Disp: , Rfl:     REVLIMID 5 mg Cap, Take 1 capsule (5 mg total) by mouth every other day. (Patient not taking: Reported on 6/24/2025), Disp: 14 capsule, Rfl: 0    tretinoin (RETIN-A) 0.1 % cream, Apply 1 application topically every evening. (Patient not taking: Reported on 6/24/2025), Disp: , Rfl:     triamterene-hydrochlorothiazide 37.5-25 mg (MAXZIDE-25) 37.5-25 mg per tablet, Take 1 tablet by mouth once daily. (Patient not taking: Reported on 5/8/2025), Disp: 30 tablet, Rfl: 11  No current facility-administered medications for this visit.    Facility-Administered Medications Ordered in Other Visits:     heparin, porcine (PF) 100 unit/mL injection flush 500 Units, 500 Units, Intravenous, PRN, Natalie Meyers MD    ondansetron (ZOFRAN) 16 mg in sodium chloride 0.9% 50 mL IVPB, 16 mg, Intravenous, 1 time in Clinic/HOD, Natalie Meyers MD    sodium chloride 0.9% 250 mL flush bag, , Intravenous, 1 time in Clinic/HOD, Natalie Meyers MD    sodium chloride 0.9% flush 10 mL, 10 mL, Intravenous, PRN, Natalie Meyers MD

## 2025-06-25 ENCOUNTER — RESULTS FOLLOW-UP (OUTPATIENT)
Dept: NEPHROLOGY | Facility: HOSPITAL | Age: 60
End: 2025-06-25
Payer: MEDICARE

## 2025-06-25 RX ORDER — CIPROFLOXACIN 500 MG/1
250 TABLET, FILM COATED ORAL EVERY 12 HOURS
Qty: 7 TABLET | Refills: 0 | Status: SHIPPED | OUTPATIENT
Start: 2025-06-25 | End: 2025-06-27

## 2025-06-27 LAB
BACTERIA UR CULT: ABNORMAL
BACTERIA UR CULT: ABNORMAL

## 2025-06-27 RX ORDER — CEPHALEXIN 500 MG/1
500 CAPSULE ORAL DAILY
Qty: 7 CAPSULE | Refills: 0 | Status: SHIPPED | OUTPATIENT
Start: 2025-06-27 | End: 2025-07-04

## 2025-06-30 ENCOUNTER — HOSPITAL ENCOUNTER (EMERGENCY)
Facility: HOSPITAL | Age: 60
Discharge: HOME | End: 2025-06-30
Attending: EMERGENCY MEDICINE
Payer: MEDICARE

## 2025-06-30 VITALS
SYSTOLIC BLOOD PRESSURE: 135 MMHG | DIASTOLIC BLOOD PRESSURE: 71 MMHG | HEIGHT: 63 IN | HEART RATE: 86 BPM | BODY MASS INDEX: 29.23 KG/M2 | RESPIRATION RATE: 18 BRPM | WEIGHT: 165 LBS | OXYGEN SATURATION: 100 % | TEMPERATURE: 98 F
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
## 2025-06-30 PROCEDURE — 99284 EMERGENCY DEPT VISIT MOD MDM: CPT | Mod: 25

## 2025-06-30 RX ORDER — METHOCARBAMOL 750 MG/1
1500 TABLET, FILM COATED ORAL 3 TIMES DAILY
Qty: 30 TABLET | Refills: 0 | Status: SHIPPED | OUTPATIENT
Start: 2025-06-30 | End: 2025-07-05
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
## 2025-07-08 NOTE — PROGRESS NOTES
No show/Reschedules/Cancellations  08/21/2018--> Rescheduled                07/20/2023-->canceled visit/labs/infusion 05/08/2024 11/05/2019--> No Show/Reschedule 08/01/2023-->canceled visit/labs/infusion 05/22/2024 06/24/2020--> No Show/Reschedule 08/31/2023-->canceled visit/labs/infusion 05/22/2024 09/24/2020--> No Show/Reschedule 09/21/2023-->canceled visit/labs/infusion 06/03/2024  03/15/2021--> No Show/Reschedule 10/24/2023-->canceled visit/labs/infusion 07/11/2024 03/23/2021--> No Show/Reschedule 10/25/2023-->canceled visit/labs/infusion 07/31/2024 04/13/2021--> No Show   10/26/2023-->canceled visit/labs/infusion 08/01/2024 05/27/2021--> No Show   10/31/2023-->canceled visit/labs/infusion 08/14/2024 08/19/2021--> No Show   11/01/2023-->canceled visit/labs/infusion 08/19/2024--> No Show   08/26/2021--> No Show/Rescheduled 11/29/2023-->canceled visit/labs/infusion 09/11/2024 04/26/2022--> No Show   12/04/2023-->canceled visit/labs/infusion 09/18/2024  10/03/2022--> Rescheduled  12/07/2023-->canceled visit/labs/infusion 01/02/2025  10/11/2022--> Rescheduled  01/03/2024-->canceled visit/labs/infusion       02/12/2025 -->canceled visit/labs/infusion  03/22/2023--> No Show/Rescheduled 01/11/2024-->canceled visit/labs/infusion       02/17/2025--->canceled visit/labs/infusion  4/26/2023->Rescheduled   01/25/2024-->canceled visit/labs/infusion       03/03/2025--->canceled visit/labs/infusion  05/09/2023->Rescheduled   04/03/2024 03/05/2025--->Canceled  5/30/2023->Rescheduled   04/24/2024                                                      03/10/2025---> No Show/Rescheduled  6/15/2023->Rescheduled   05/01/2024 03/17/2025---> Canceled  HEMATOLOGY/ONCOLOGY OFFICE CLINIC VISIT    Visit Information:      Referring Physician: Dr Farr  PCP: DR. Cardona  GI:   Rheumatologist: Dr. Luis Rea  Immunologist:   Daniel Nava  ENT: Dr. Brice Williamson  [facility] Pain management: Dr.Chai [facility] Transplant: Dr. [facility] Med Onc: Dr. Zulema [facility] Breast Surg Onc: Dr.DeSnyder LONG Nabb: Dr. 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   8) Stage IA grade 3, ER+, HER2 BERNA +,  IDC/DCIS of the left breast --- 2/7/18 at [facility]   --s/p lumpectomy with SLNB 4/12, Grade 2 IDC 4mm with second focus of microinvasive carcinoma 0.4mm. 2 SLN negative for malignancy    9) Progressive swelling of R lower extremity--- US NIVA done 2/19/18 negative for evidence of DVT  10). Paroxysmal Afib (2/18/18) diagnosed at [facility]; ECHO noted EF 58% - on Eliquis  11) Mild CKD with GFR 55 - managed per [facility] nephrology  12) Treatment induced neutropenia     Present treatment:  -Maintenance Revlimid 5mg PO QOD, started 02/16/17-- was held for a few months while undergoing treatment for her breast cancer 02/18-05/18; Restarted 6/15/18   Dexamethasone 20 mg po weekly added early July 2017--> reduced to 12 mg PO weekly October 4, 2017---> increased to 16mg PO weekly 2/15/18---restarted 6/15/18 --> 20 mg weekly 7//8/2022  Darzalex 7/8/2022-present--On hold due to cellulitis    Eliquis 2.5mg PO BID     Treatment/Oncology history:  1) CyBorD x5 cycles 09/28/15-12/24/15  2) Autologous stem cell transplant 02/08/16, done at [facility]  3) Exploratory laparotomy with subtotal colectomy and end ileostomy  done August 2, 2016--pathology specimen showed advanced colonic amyloidosis extensive involving the muscular wall submucosa and mucosa.  Appendix also involvement by amyloidosis with fibrous obliteration of the distal lumen.  4) Maintenance therapy with Revlimid 5mg-started 06/01/16--Discontinued shortly after 2/2 cytopenias  5) Left breast lumpectomy with SLNB at [facility] 4/12/18  6) Left partial breast RT x10 fractions  5/21/18-6/4/18  7) Femara 2.5 mg PO daily completed 5 yrs (6/2018- 6/2023)      Imaging:  NIVA 7/29/2021: Negative for deep venous thrombosis in the left lower extremity.   MRI CTL spine 9/14/2022: No acute myelomatous findings. Severe spinal stenosis process detected within the upper cervical spine as well as the entirety of the lumbar spine segments similar to the prior imaging assessment.  PET CT 9/14/2020: No convincing evidence of hypermetabolic metastatic disease. Asymmetric uptake in the right nasopharynx may be infectious or inflammatory.    CT C 1/24/2023: Right greater than left lower lobe opacification consistent with infectious process.  Lucent lesions throughout the osseous structures similar to 2015.    PET 4/17/2024: 1.  No evidence of active skeletal disease. 2.  Acute nonpathological fractures of the right anterolateral 5th-7th ribs. No impending pathological fracture of the long bones. 3.  No extramedullary disease. 4.  Diffuse skin thickening of the left lower leg with multiple foci of increased cutaneous uptake and associated with FDG-avid left inguinal and iliac chain lymph nodes. These findings may be related to lymphedema; however, superimposed angiosarcoma is not excluded given the foci of increased uptake. Direct inspection and biopsy are recommended.   PET CT 09/04/2024: No evidence of active skeletal disease. No acute or impending pathological fracture. No extramedullary disease. New FDG-avid mucosal thickening in the left maxillary sinus, consistent with sinusitis. Lucency  and increased uptake associated with the root of a left maxillary molar (likely tooth #15), consistent with periodontal disease.  Improving diffuse cutaneous and subcutaneous uptake in the left lower extremity, consistent with improving cellulitis. New and worsening focal nodular areas of increased uptake in the left lower extremity. Direct inspection is recommended.   Femur XR 12/9/2024: 1. No fracture or suspicious bone lesions are visualized in the left femur or left leg. 2. Subcutaneous edema in the left leg.   Tibia/fibula XR 12/9/2024: 1. No fracture or suspicious bone lesions are visualized in the left femur or left leg. 2. Subcutaneous edema in the left leg.   US Lower Extremity 04/08/25: No left lower extremity DVT identified. Mild subcutaneous edema at the left calf.    Breast imaging:  [facility] 12/29/2017: Calcifications in the left breast are suspicious. Stereotactic biopsy is recommended. BI-RADS Category 4: Suspicious Abnormality   [facility] 12/10/2018: Post surgical scar in the left breast is benign. BI-RADS Category 2: Benign Finding(s)   [facility] 12/16/2019: There is no mammographic evidence of malignancy. BI-RADS Category 2: Benign Finding(s)   [facility] 12/14/2020: There is no mammographic evidence of malignancy. BI-RADS Category 2: Benign Finding(s)   [facility] 3/21/2022: BI-RADS Category 2: Benign Finding(s)  [facility] 4/11/2023: There is no mammographic evidence of malignancy. BI-RADS Category 2: Benign Finding(s)   [facility] 11/14/2024: Bilateral. There is no mammographic evidence of malignancy. Overall BI-RAD Category: 2 - Benign     Pathology:  9/17/2015:  A. Soft tissue, abdominal wall, FNA   No malignant cells identified   Mature adipose tissue   Congo red stain is positive for amyloid deposition     9/18/2015:  BONE MARROW DIAGNOSIS:  -PLASMA CELL NEOPLASM, REPRESENTING APPROXIMATELY 80% OF CELLULAR  ELEMENTS     1/29/2018:  STEREOTACTIC BIOPSY Left breast:  INVASIVE DUCTAL CARCINOMA, NO SPECIAL TYPE, SUZANNE GRADE 3 (3 + 2  + 2),   SINGLE FOCUS MEASURING 1.7 MM WITH SECOND FOCUS OF MICROINVASION MEASURING 0.3 MM.   DUCTAL CARCINOMA IN SITU, INTERMEDIATE NUCLEAR GRADE, SOLID TYPE WITH COMEDONECROSIS AND ASSOCIATED CALCIFICATIONS.   Atypical lobular hyperplasia with cytologic atypia, likely treatment effect.     CLINICAL HISTORY:       Patient: Ninfa Candelario is a 59 y.o. female.  Ms. Cabrera Joseph was admitted on 08/16/15 for ileus versus partial SBO. CT A/P done 08/17/15 showed sigmoid colitis and a zone of transition at the junction of the descending and sigmoid colon which could indicate some degree of obstruction and an obstructing mass cannot be excluded. Numerous skeletal lytic lesions noted. CT chest done 08/19/15 showed Multiple skeletal lytic lesions involving the thoracic spine, left rib sternum consistent with either metastases or multiple myeloma. There is no evidence of pulmonary or mediastinal mass. Dr. Farr was consulted for possible MM/anemia.     Nuclear Bone scan done 8/20/15 showed mild to moderate increased activity in left rib and right second rib which correlates with fractures seen on CT.  Skeletal survey done 8/20/15showed lytic lesions in the calvarium and probably a lytic lesion in the left scapula. Lytic areas in the spine and pelvis seen on recent CT are not well defined on skeletal survey. Work up labs done 08/20/15: Negative for sickle cell and Thalessemia (reported history of thalessemia). Iron 54, Transferrin 118.0, Iron sat 34.6%, Folate 26.5, Vit B12 1,779  Peripheral smear resulted slightly macrocytic normochromic anemia without signifcant anisocytosis may reflect early B12/folate deficiency or marrow dysfunction, among others. No immature myeloid cells or blasts. Platlets adequate. Beta 2 mircoglobulin = 3.2.  SPEP/NADIA: M-spike in the beta region. The monoclonal protein peak accounts for 1.13 g/dL. Hypogammaglobulinemia. NADIA pattern shows the presence of a free lambda light chain monoclonal protein  with additional faint band in IgA.  All immunoglobulin levels decreased. IgA=26, IgG=307, IgM<5.  Kappa Qnt 0.16, Lambda Qnt 4600.00, Ratio <0.01.  24hr Urine for Bence Mendez: Kappa 2.75, Lambda 1250.00, Ratio <0.01. NADIA: Urine is POSITIVE for monoclonal Free Lambda Light chains     Patient then opted to go to [facility] where she underwent a bone marrow biopsy on 09/18/15. BMBx showed 80% plasma cells, 13p deletion and normal karyotype. She underwent fat pad biopsy which came back positive for Congo red consistent with amyloidosis. Cardiac workup revealed no amyloid deposition within the heart.  PET/CT and skeletal survey done September 18, 2015 showed multiple lytic osseous lesions  The patient was started on Velcade while at [facility] with the plan to transition to autologous stem cell transplantation. They asked if we could give her could continue treatment here.   She completed CyborD x5 cycles on 12/24/15     Patient admitted 01/06/16 for colitis and C. Diff. Pt treated with Flagyl. Discharged home 01/08/16     Repeat BMBx done at [facility] 01/2016 did not show any morphologic or immnophenotypic evidence of plasama cell neoplasm. Patient underwent Autologous stem cell transplant with Busulfan and melphalan on 02/08/16 at [facility]. The only complication was recurrent C.diff infection for which she completed 10 days of Fidaxomycin.     Follow-up bone marrow biopsy done at [facility] done 5/16/16 showed cellular 30-40% bone marrow with trilineage hematopoiesis. No morphologic or immunophenotypic support for plasma cell neoplasm. Started maintenance Revlimid 5mg. Unable to continue therapy due to cytopenias.     On 08/02/16 patient underwent  Exploratory laparotomy with subtotal colectomy and end ileostomy for what was thought to be toxic megacolon. Pathology showed extensive advanced colonic amyloidosis involving the muscular wall, submucosa and mucosa: With the appendix also involved with amyloidosis.  Positive lambda light chains  were noted.     Follow-up at [facility] 8/31/16, Per Dr. Adrian Naylor, 6 months post autologous transplant. She has engrafted. Based on the latest restaging she is near complete remission with possible IgA lambda on serum immunofixation. Patient was instructed to discontinue prophylactic antibiotics. She received her 1st series of immunizations while at [facility]. Patient had a bilateral venous ultrasound to rule out DVT, negative B/L. In regards to amyloidosis the patient feels that her tongue is smaller in size and her BNP has come down some.  Patient had a follow-up appointment at [facility] November 30, 2016.  Dr. Parnell documented the patient's free lambda light chain remains at a lower level.  Therefore in light of her recent surgery they will continue with observation only.  The plan to see the patient back in 2-3 months with repeat restaging labs.  They recommended regular CBC checks to monitor anemia.  Patient returned to [facility] in December 2016 to meet with dermatology for numerous papillary lesions on eyelids, chest and posterior neck consistent with amyloid deposits. Recommendations were for watchful waiting.  Patient is less than 1 year out from bone marrow transplant and further improvement can be expected.  She will see the patient back in 1 year to discuss possible surgical resection of the plaques especially around the eyelid region.  Rogaine recommended for persistent hair loss. Retin-A recommended for acne vulgaris.  Patient returned to [facility] on 2/8/2017 for neurosurgery consult for chronic low back pain and difficulty walking.  Imaging showed extensive DJD with discovertebral bulges and thickening of the spinal laminar tissues creating spinal stenosis throughout, but greatest at L2/L3.  Neurosurgery felt that surgery risks outweighed the benefits.  Patient was prescribed pain medicine and encouraged to participate in physical therapy.  Patient also met with Dr. Parnell on  02/08/17, who noted an elevation in free light chains (kappa 52.60/lambda 27.77/ratio 1.89).  Recommendation is to resume maintenance therapy with Revlimid at a low dose of 5 mg every other day.  Patient went back to [facility] first week of April 2017.  I do not have these notes.  Reportedly she was told to continue on every other day Revlimid at 5 mg.  She reportedly had repeat myeloma labs done as well.  Again I do not have these results.  Patient went back to [facility] and saw Dr. Parnell June 29, 2017.  Myeloma labs were done with serum protein electrophoresis showing irregularity in the fast gamma region.  IgG of 1291.  Free kappa light chains of 84.6 with lambda light chains of 66.9.  Beta-2 microglobulin of 4.5  CT scan of lumbar spine showed multiple lytic lesions once again in the lumbar spine and bony pelvis.  Ultrasound of the left leg showed no evidence of DVT.  It was recommended based on the slight increase in her And lambda light chains to start weekly dexamethasone 20 mg p.o. weekly.  Follow-up visit and labs at [facility] on September 29, 2017 with Dr. Parnell.  Repeat beta-2 microglobulin came back at 2.4.  Serum IgG of 761, IgA 117 and IgM of 29.  Serum free kappa light chains of 24.9 and lambda of 23.5.  Counts were stable with hemoglobin of 11.1, WBC 4.9 and platelets of 210,000.  Recommendations were for continued Revlimid every other day with weekly dexamethasone along with aspirin for DVT prophylaxis.  Bilateral diagnostic MMG 12/19/17  noted 1.6cm coarse heterogeneous calcifications in posterior region of L breast at 3 o'clock position. Patient was scheduled for FNA which confirmed ID grade 3, single focus measuring 1.7cm with 2nd focus microinvasion DCIS grade 2 measuring 0.3cm. Left axillary LN biopsy showed no evidence of metastatic carcinoma. Complete staging: Stage IA, grade 3 ER+, Her 2 Niki + IDC/DCIS of left breast. Ki-67 <17%.  Repeat myeloma labs showed rise in free lambda light  chains noted at 68.69, kappa light chains at 27.22,  beta 2 microglobulin came back at 2.9. Serum protein electrophoresis showed no definitive evidence of an M protein peak. The pattern is consistent with an acute inflammatory reaction. Recommendations were made to maintain Revlimid at current dose of 5mg every other day to be taken continuously increase weekly dexamethasone to 16 mg.   Patient seen at [facility] with tentative plan for L breast lumpectomy on 3/13/18. 2/16/18- Prior to surgery she was seen by genetic counselor who ran genetic testing per patient reques, all of which were negative. She was then seen by cardiology at [facility] 2/16/18 for further evaluation of persistent bilateral lower extremity swelling-ECHO noted EF of 58%; diagnosed with paroxysmal atrial fibrillation and instructed to begin daily ASA. During preoperative asssessment per Rad/Onc 3/12/18, corresponding chest CT noted new bilateral pulmonary nodules concerning for metastatic disease- surgery was subsequently postponed pending pulmonary evaluation. Seen by Pulmonology on 3/21/18, PFTs within normal limits and cleared patient for surgery with recommended close CT follow up of nodules in 3 months. 3/21/18 seen by nephrology for management of mild CKD (GFR 55)-cleared for surgery with recommended follow up in 3 months. Left breast lumpectomy and SLNB performed per Dr. Washburn on 4/12/18- plan for follow up in clinic on 4/24/18  for postoperative assessment. Follow up with Dr. Poole (Med/Onc) on 4/25/18. Follow up with Dr. Parnell 4/26/18.   Patient seen at [facility] s/p Left breast lumpectomy with SLNB on 4/12/18. Following surgery she completed Left partial breast radiation x 10 fractions. She was then started on endocrine therapy with Femara after been deemedan inappropriate candidate for systemic chemotherapy/Herceptin.      She was seen by neurosugery at [facility] on 4/26/18 following repeat MRI of cervical thoracic lumbar spine  which noted focal enhancement of T2 hyperintensity at the C2 level which may be due to degenerative changes. Signal abnormality within  the T12 and L1 may be secondary to myeloma. Plan to continue with observation for now with F/U scheduled in October 2018.      She was seen by Dr. Parnell at [facility] for management of her MM on 4/26/18. Patient was recommended to restart Revlimid 5mg PO every other day with notes that these recommendations would be communicated to collaborating Oncologist. Patient was also recommended to start on Zometa for her bone disease.      Seen by Dr. Parnell at [facility] for management of her MM on 6/28/18. Repeat light chains noted lambda 33.36 (previously 80.80), K/L ratio 0.91 (previously 0.49). Due to slight improvement in light chains, plan to continue on lenalidomide maintenance. Recommendations to continue anticoagulation with Eliquis. BLE venous doppler negative for DVT.   Seen by pulmonolgy on 6/28/18- repeat CT chest done 6/28/18 noted resolution of previous pulmonary nodules. Thought to be infectious in nature. Recommendations for discharge from pulmonary clinic.  Should MRI of her cervical thoracic lumbar spine on 2/12/2019 that demonstrated cervical spondylosis with canal stenosis most notable at C1 and 2. Cord signal abnormality at C1 and 2 with enhancement is stable. Lumbar spondylosis with central canal stenosis at multiple levels. No imaging features of bony metastatic disease.     For amyloidosis (Light chain type).    Patient presented with macroglossia and now with improvement of the swelling of her tongue. Her cardiac parameters are also better.   8/10/2020 proBNP  250   2/17/2020                616  8/9/2019                  190  11/14/2023  820        Chief Complaint:for Multiple Myeloma       Interval History:  She is currently on Rev/Dex/Darzalex  for MM  s/p transplant in 2016. At her [facility] visit on 10/12/22, Ninlaro was discontinued due to it causing severe  diarrhea and leukopenia. Diarrhea resolved after stopping Ninlaro (she admits to stopping prior to visit @ [facility]).     I Called Dr Parnell office and she is out of the office. She will be back next week. Will try again then  07/11/2024:  Dr. Parnell kindly called me back to discuss patient.  She recommend to decrease the dose of dexamethasone to 10 mg to see if this can help to decrease the frequency of infections.  She does not opposed to IVIG if needed for her hypogammaglobulinemia.  She usually starts when IgG is less than 400.  In this case if she ever start the IVIG needs to be given in a very slow rate due to her heart and fluid retention risks.  We will continue Evangelina monthly and will decrease dexamethasone to 10 mg.    10/10/2024: Patient is here today for follow up of her multiple myeloma, amyloidosis and breast cancer.  Daratumumab has been on hold since April 2024 for LLE cellulitis. She was admitted to [facility] per  Dr. Parnell recommendations until LLE is better. Swelling to BLE noted during physical exam, L>R. She has follow up with Dr. Parnell @ [facility] on 11/15/24.    01/13/2025: She was seen at [facility] that recommend to hold Revlimid as she has not been taking it in about 2 months or so.    On February 12, 2025 patient was seen and evaluated by Internal Medicine for multiple chronic conditions.  She was referred to Nephrology and the patient was initiated on dapagliflozin     03/19/25: Patient presents today for a follow up, accompanied by her sister. Pt states she had a fall on Monday, still feels weak. Patient scheduled to have a biopsy on her knee 04/01/25. Patient reports having headaches on and off, advised to drink plenty of water.   Patient was last seen by Dr Parnell ([facility]) on 2/19/2025 at which time:   Left lower extremity edema/Abnormal finding on prior PET CT (performed for possible cellulitis): The prior PET CT also showed subcutaneous uptake that could be lymphedema, possibly infection (question of past  "cellulitis) but angiosarcomna is not r/o. Subsequent repeated PET (9/2024) since her cellulitis has improved and Improving diffuse cutaneous and subcutaneous uptake in the left lower extremity, consistent with improving cellulitis was noted. New and worsening focal nodules w/ increased uptake in the left lower extremity are noted, but no skeletal or extramedullary disease was noted. Pt referred to ortho, but pt canceled appt. and delayed until later. She has a biopsy of her left LE schedule next dora at [facility]. On physical LLE is swollen and mild erythema. Will do NIVA to eval for DVT even if she is on anticoagulation.  Last daratumumab 01/13/2025 she was scheduled to have it again on 03/09/2025 but was held due to worsening on her kidney function and GFR was 16.  As per medication insert need to be held if creatinine clearance  < 15.     04/16/25: Patient presents today for 4 week follow up. Pt had a recent US lower extremity, No left lower extremity DVT identified. Mild subcutaneous edema at the left calf. She was seen by a dermatologist  at [facility] and underwent right lower extremity skin biopsy that was negative for malignancy. Pt had biopsy on left leg, [facility] has given the okay to remove suture.  Pt is due for daratumumab C20 on 4/17/25.  Overall pt is doing well, denies any fever, chills, sweats.     5/29/2025:  Ninfa Candelario presents for follow-up of multiple myeloma and amyloidosis. Reports feeling "a little bit better" today.  Current health status shows well-controlled disease with normalized IgA levels at 94 (normal range ), indicating successful response to Daratumumab treatment. Hemoglobin improving at 11.3 (previously 9, then 10), approaching normal level of 12.  Reports shoulder pain, not entirely new. X-rays showed some arthritis. Also mentions "spot" on lung that requires further evaluation. Recently experienced urinary tract infection, currently resolving but pending urine analysis for " kidney doctor. Taking antibiotics that caused stomach discomfort, using Pepcid and probiotics to manage side effects.  Denies other new symptoms.  Current medications include antibiotics for UTI, Pepcid for stomach discomfort, and probiotics. Daratumumab injections currently on hold.    07/10/25: Patient presents today for follow up of MM.  Patient arrived to the office light for her labs, visit on treatment.  She did not show to previous visit.  She was at [facility] recently and a bone marrow biopsy was performed that showed no evidence of myeloma, PET-CT with no evidence of bone disease.  She was seen by Dr. Parnell that recommend to start daratumumab with dexamethasone.  At the time of the recommendation bone marrow biopsy was not back yet.  I will contact her up to clarify recommendations.  Patient states she is doing well today, no new concerns. Pt due for Daratumumab C21 today. Pt denies fever, chills, or sweats. Pt is due to return to [facility] 08/09/25.     ROS: All 14 points ROS taken and as per Interval History  Review of Systems   Constitutional:  Positive for malaise/fatigue. Negative for chills, fever and weight loss.   HENT:  Negative for congestion and nosebleeds.    Eyes:  Negative for blurred vision, double vision and photophobia.   Respiratory:  Negative for cough, hemoptysis and shortness of breath.    Cardiovascular:  Negative for chest pain, palpitations, leg swelling and PND.   Gastrointestinal:  Positive for diarrhea (Better), nausea and vomiting. Negative for abdominal pain, blood in stool, constipation and melena.   Genitourinary:  Negative for dysuria, frequency, hematuria and urgency.   Musculoskeletal:  Negative for back pain, falls and myalgias.   Skin:  Negative for itching and rash.   Neurological:  Positive for weakness. Negative for tremors, focal weakness, seizures and headaches.   Endo/Heme/Allergies:  Negative for environmental allergies. Does not bruise/bleed easily.  "  Psychiatric/Behavioral:  Negative for depression and suicidal ideas. The patient is not nervous/anxious.          Histories:  PMH/PSH/FH/SOCIAL/ALLERGIES AND MEDS REVIEWED AND UPDATED AS APPROPRIATE       Vitals:    07/10/25 1554   BP: (!) 155/84   BP Location: Left arm   Patient Position: Sitting   Pulse: 88   Resp: 18   Temp: 97.7 °F (36.5 °C)   TempSrc: Oral   SpO2: 98%   Weight: 78.5 kg (173 lb 1.6 oz)   Height: 5' 3" (1.6 m)         Wt Readings from Last 6 Encounters:   07/10/25 78.5 kg (173 lb 1.6 oz)   25 74.8 kg (165 lb)   25 70.8 kg (156 lb)   25 76.2 kg (168 lb)   25 74.8 kg (164 lb 12.8 oz)   25 78.4 kg (172 lb 12.8 oz)     Body mass index is 30.66 kg/m².  Body surface area is 1.87 meters squared.      Vitals reviewed and stable  Physical Exam  Vitals and nursing note reviewed.   Constitutional:       General: She is not in acute distress.     Appearance: Normal appearance. She is well-developed. She is ill-appearing.      Comments: Uses walker for mobility   HENT:      Head: Normocephalic and atraumatic.      Mouth/Throat:      Mouth: Mucous membranes are moist.   Eyes:      General: No scleral icterus.     Extraocular Movements: Extraocular movements intact.      Conjunctiva/sclera: Conjunctivae normal.      Pupils: Pupils are equal, round, and reactive to light.   Neck:      Vascular: No JVD.   Cardiovascular:      Rate and Rhythm: Normal rate and regular rhythm.      Heart sounds: No murmur heard.  Pulmonary:      Effort: Pulmonary effort is normal.      Breath sounds: Normal breath sounds. No wheezing or rhonchi.   Abdominal:      General: Bowel sounds are normal. There is no distension.      Palpations: Abdomen is soft. There is no mass.      Tenderness: There is no abdominal tenderness.   Musculoskeletal:         General: No swelling or deformity.      Cervical back: Neck supple.      Right lower le+ Edema present.      Left lower leg: 3+ Edema present.      " Comments: Hyperpigmentation of left leg.    Lymphadenopathy:      Head:      Right side of head: No submandibular adenopathy.      Left side of head: No submandibular adenopathy.      Cervical: No cervical adenopathy.      Upper Body:      Right upper body: No supraclavicular or axillary adenopathy.      Left upper body: No supraclavicular or axillary adenopathy.      Lower Body: No right inguinal adenopathy. No left inguinal adenopathy.   Skin:     General: Skin is warm.      Coloration: Skin is not jaundiced.      Findings: Rash present.      Nails: There is no clubbing.      Comments: Rash: bilateral; LE left > right   Neurological:      Mental Status: She is alert and oriented to person, place, and time.      Cranial Nerves: Cranial nerves 2-12 are intact.      Motor: Weakness present.      Gait: Gait abnormal.   Psychiatric:         Attention and Perception: Attention normal.         Behavior: Behavior is cooperative.         Cognition and Memory: Cognition normal.         Judgment: Judgment normal.       ECOG SCORE    3 - Capable of only limited selfcare, confined to bed or chair more than 50% of waking hours         Laboratory:  CBC with Differential:  Lab Results   Component Value Date    WBC 9.42 07/10/2025    RBC 3.68 (L) 07/10/2025    HGB 10.0 (L) 07/10/2025    HCT 32.5 (L) 07/10/2025    MCV 88.3 07/10/2025    MCH 27.2 07/10/2025    MCHC 30.8 (L) 07/10/2025    RDW 14.3 07/10/2025     07/10/2025    MPV 9.3 07/10/2025        CMP:  Sodium   Date Value Ref Range Status   07/10/2025 143 136 - 145 mmol/L Final     Potassium   Date Value Ref Range Status   07/10/2025 4.6 3.5 - 5.1 mmol/L Final     Chloride   Date Value Ref Range Status   07/10/2025 107 98 - 107 mmol/L Final     CO2   Date Value Ref Range Status   07/10/2025 24 23 - 31 mmol/L Final     Glucose   Date Value Ref Range Status   07/10/2025 121 (H) 82 - 115 mg/dL Final     Blood Urea Nitrogen   Date Value Ref Range Status   07/10/2025 33.5 (H)  "9.8 - 20.1 mg/dL Final   07/18/2023 35 (H) 6 - 23 mg/dL Final     Creatinine   Date Value Ref Range Status   07/10/2025 2.40 (H) 0.55 - 1.02 mg/dL Final   07/18/2023 2.48 (H) 0.51 - 0.95 mg/dL Final     Calcium   Date Value Ref Range Status   07/10/2025 9.5 8.4 - 10.2 mg/dL Final   07/18/2023 9.7 8.4 - 10.2 mg/dL Final     Protein Total   Date Value Ref Range Status   07/10/2025 6.9 5.8 - 7.6 gm/dL Final     Albumin   Date Value Ref Range Status   07/10/2025 3.3 (L) 3.4 - 4.8 g/dL Final     Bilirubin Total   Date Value Ref Range Status   07/10/2025 0.2 <=1.5 mg/dL Final     ALP   Date Value Ref Range Status   07/10/2025 82 40 - 150 unit/L Final     AST   Date Value Ref Range Status   07/10/2025 19 11 - 45 unit/L Final     ALT   Date Value Ref Range Status   07/10/2025 11 0 - 55 unit/L Final     Estimated GFR-Non    Date Value Ref Range Status   04/20/2022 25                 Component  Ref Range & Units (hover) 15:06 3 mo ago 4 mo ago 6 mo ago 9 mo ago 10 mo ago 1 yr ago   IgG Level 537.00 445.00 Low  392.00 Low  518.00 Low  487.00 Low  490.00 Low  591.00   IgA Level 94.0 83.0 117.0 162.0 62.0 Low  65.0 Low  63.0 Low    IgM Level 13.0 Low  40.0 6.0 Low  8.0 Low  15.0 Low  15.0 Low  8.0 Low             Assessment:       1. Multiple myeloma in remission    2. Anemia of chronic disease    3. Amyloidosis, unspecified type    4. Chronic kidney disease, stage 4 (severe)    5. History of breast cancer    6. S/P colostomy    7. 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 [place] red fat pad + Dx 2/2015  --Last seen by Dr Parnell at [facility] 12/20/2024: Her note "AL Amyloid/ MM: Currently available MM/AL amyloid " "parameters indicate a decrease in the NT Pro BNP and troponin T. We asked if she had taken any steroids during her URI to explain the reduction of Amyloid and MM parameters, but the pt indicated that she had not). Her MM parameters have improved as well.While there was a prior minimal rise in the FKLC this patient has had a lambda clonality in the past.. She had been off of therapy which has resumed but her parameters already demonstrate improvement over the last visit. We previously recommended stopping lenalidomide and seeing if the response can be maintained and trying to reduce the fluid retention and other side effects of dexamethasone. This has not improved then we recommended reinitiation of DRd. Most recent PET CT w/o progression or active skeletal or extramedullary disease. The patient will be scheduled for follow-up in 2-3 for continued monitoring of the chronic plasma cell dyscrasia and orders for CBC, CMP, serum and urine protein electrophoretic and immunofixation studies, free light chain studies, and immunoglobulins have been placed for that follow/up visit. 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 Sx risks do not outweigh benefits. Pain meds given and encouraged Physical Therpay    8) Amyloid plaques, evaluated by Derm at [facility], recommend watchful waiting. Patient to follow up 12/2017    9) Stage IA grade 3, ER+, HER2 BERNA +,  IDC/DCIS of the left breast cancer --- 2/7/18 at [facility]; s/p lumpectomy with SLNB 4/12, Grade 2 IDC measuring 4mm with second focus of microinvasive carcinoma measuring  0.4mm .IG DCIS, 0.3mm to posterior margin; 2 SLN negative for " malignancy      10) Progressive swelling of R lower extremity--- US NIVA done 2/19/18 negative for evidence of DVT    11). Paroxysmal Afib (2/18/18) diagnosed at [facility]; ECHO noted EF 58%    12. Mild CKD with GFR 55 - managed per [facility] nephrology    13). Pulmonary nodules noted on pre-operative chest CT scan (3/12/18) noted new bilateral pulmonary nodules are nonspecific -- seen by Pulmonology at [facility] (3/21/18) thought to be inflammatory/infectious in nature, repeat Chest CT 6/28/18 noted resolution of nodules    14). Treatment induced neutropenia    15) NON COMPLIANT patient-- please see above the no show, reschedule and cancelled visits/labs and infusion.     16) Neoplasm associated pain/polyneuropathy: continues w/ pain service on methadone/ oxycodone /duloxitene      Plan:     Daratumumab C21 today  RTC in 1 month with MD/same day labs/infusion  Labs: MM workup    The patient was seen, interviewed and examined. Pertinent lab and radiology studies were reviewed.   The patient was given ample opportunity to ask questions, and to the best of my abilities, all questions answered to satisfaction; patient demonstrated understanding of what we discussed and agreeable to the plan. Pt instructed to call should develop concerning signs/symptoms or have further questions.       Visit today included increased complexity associated with the care of the episodic problem MM, amyloidosis, breast cancer, addressing and managing the longitudinal care of the patient's MM, amyloidosis, breast cancer.     Natalie Meyers MD  Hematology/Oncology  [facility]      Professional Services   I, Christy Garcia LPN, acted solely as a scribe for and in the presence of Dr. Natalie Meyers, who performed these services.

## 2025-07-10 ENCOUNTER — OFFICE VISIT (OUTPATIENT)
Dept: HEMATOLOGY/ONCOLOGY | Facility: CLINIC | Age: 60
End: 2025-07-10
Payer: MEDICARE

## 2025-07-10 ENCOUNTER — LAB VISIT (OUTPATIENT)
Dept: LAB | Facility: HOSPITAL | Age: 60
End: 2025-07-10
Attending: INTERNAL MEDICINE
Payer: MEDICARE

## 2025-07-10 VITALS
SYSTOLIC BLOOD PRESSURE: 155 MMHG | OXYGEN SATURATION: 98 % | HEIGHT: 63 IN | TEMPERATURE: 98 F | RESPIRATION RATE: 18 BRPM | BODY MASS INDEX: 30.68 KG/M2 | DIASTOLIC BLOOD PRESSURE: 84 MMHG | WEIGHT: 173.13 LBS | HEART RATE: 88 BPM

## 2025-07-10 DIAGNOSIS — D63.8 ANEMIA OF CHRONIC DISEASE: ICD-10-CM

## 2025-07-10 DIAGNOSIS — E85.9 AMYLOIDOSIS, UNSPECIFIED TYPE: ICD-10-CM

## 2025-07-10 DIAGNOSIS — C90.01 MULTIPLE MYELOMA IN REMISSION: ICD-10-CM

## 2025-07-10 DIAGNOSIS — Z93.3 S/P COLOSTOMY: ICD-10-CM

## 2025-07-10 DIAGNOSIS — N18.4 CHRONIC KIDNEY DISEASE, STAGE 4 (SEVERE): ICD-10-CM

## 2025-07-10 DIAGNOSIS — Z85.3 HISTORY OF BREAST CANCER: ICD-10-CM

## 2025-07-10 DIAGNOSIS — C90.01 MULTIPLE MYELOMA IN REMISSION: Primary | ICD-10-CM

## 2025-07-10 DIAGNOSIS — Z92.21 HISTORY OF AROMATASE INHIBITOR THERAPY: ICD-10-CM

## 2025-07-10 LAB
ALBUMIN SERPL-MCNC: 3.3 G/DL (ref 3.4–4.8)
ALBUMIN/GLOB SERPL: 0.9 RATIO (ref 1.1–2)
ALP SERPL-CCNC: 82 UNIT/L (ref 40–150)
ALT SERPL-CCNC: 11 UNIT/L (ref 0–55)
ANION GAP SERPL CALC-SCNC: 12 MEQ/L
AST SERPL-CCNC: 19 UNIT/L (ref 11–45)
BASOPHILS # BLD AUTO: 0.01 X10(3)/MCL
BASOPHILS NFR BLD AUTO: 0.1 %
BILIRUB SERPL-MCNC: 0.2 MG/DL
BUN SERPL-MCNC: 33.5 MG/DL (ref 9.8–20.1)
CALCIUM SERPL-MCNC: 9.5 MG/DL (ref 8.4–10.2)
CHLORIDE SERPL-SCNC: 107 MMOL/L (ref 98–107)
CO2 SERPL-SCNC: 24 MMOL/L (ref 23–31)
CREAT SERPL-MCNC: 2.4 MG/DL (ref 0.55–1.02)
CREAT/UREA NIT SERPL: 14
EOSINOPHIL # BLD AUTO: 0 X10(3)/MCL (ref 0–0.9)
EOSINOPHIL NFR BLD AUTO: 0 %
ERYTHROCYTE [DISTWIDTH] IN BLOOD BY AUTOMATED COUNT: 14.3 % (ref 11.5–17)
GFR SERPLBLD CREATININE-BSD FMLA CKD-EPI: 22 ML/MIN/1.73/M2
GLOBULIN SER-MCNC: 3.6 GM/DL (ref 2.4–3.5)
GLUCOSE SERPL-MCNC: 121 MG/DL (ref 82–115)
HCT VFR BLD AUTO: 32.5 % (ref 37–47)
HGB BLD-MCNC: 10 G/DL (ref 12–16)
IGA SERPL-MCNC: 89 MG/DL (ref 69–517)
IGG SERPL-MCNC: 534 MG/DL (ref 522–1631)
IGM SERPL-MCNC: 11 MG/DL (ref 33–293)
IMM GRANULOCYTES # BLD AUTO: 0.03 X10(3)/MCL (ref 0–0.04)
IMM GRANULOCYTES NFR BLD AUTO: 0.3 %
LYMPHOCYTES # BLD AUTO: 0.46 X10(3)/MCL (ref 0.6–4.6)
LYMPHOCYTES NFR BLD AUTO: 4.9 %
MCH RBC QN AUTO: 27.2 PG (ref 27–31)
MCHC RBC AUTO-ENTMCNC: 30.8 G/DL (ref 33–36)
MCV RBC AUTO: 88.3 FL (ref 80–94)
MONOCYTES # BLD AUTO: 0.6 X10(3)/MCL (ref 0.1–1.3)
MONOCYTES NFR BLD AUTO: 6.4 %
NEUTROPHILS # BLD AUTO: 8.32 X10(3)/MCL (ref 2.1–9.2)
NEUTROPHILS NFR BLD AUTO: 88.3 %
PLATELET # BLD AUTO: 236 X10(3)/MCL (ref 130–400)
PMV BLD AUTO: 9.3 FL (ref 7.4–10.4)
POTASSIUM SERPL-SCNC: 4.6 MMOL/L (ref 3.5–5.1)
PROT SERPL-MCNC: 6.9 GM/DL (ref 5.8–7.6)
RBC # BLD AUTO: 3.68 X10(6)/MCL (ref 4.2–5.4)
SODIUM SERPL-SCNC: 143 MMOL/L (ref 136–145)
WBC # BLD AUTO: 9.42 X10(3)/MCL (ref 4.5–11.5)

## 2025-07-10 PROCEDURE — G2211 COMPLEX E/M VISIT ADD ON: HCPCS | Mod: ,,, | Performed by: INTERNAL MEDICINE

## 2025-07-10 PROCEDURE — 85025 COMPLETE CBC W/AUTO DIFF WBC: CPT

## 2025-07-10 PROCEDURE — 83521 IG LIGHT CHAINS FREE EACH: CPT

## 2025-07-10 PROCEDURE — 36415 COLL VENOUS BLD VENIPUNCTURE: CPT

## 2025-07-10 PROCEDURE — 99215 OFFICE O/P EST HI 40 MIN: CPT | Mod: S$PBB,,, | Performed by: INTERNAL MEDICINE

## 2025-07-10 PROCEDURE — 99999 PR PBB SHADOW E&M-EST. PATIENT-LVL V: CPT | Mod: PBBFAC,,, | Performed by: INTERNAL MEDICINE

## 2025-07-10 PROCEDURE — 82784 ASSAY IGA/IGD/IGG/IGM EACH: CPT

## 2025-07-10 PROCEDURE — 99215 OFFICE O/P EST HI 40 MIN: CPT | Mod: PBBFAC | Performed by: INTERNAL MEDICINE

## 2025-07-10 PROCEDURE — 80053 COMPREHEN METABOLIC PANEL: CPT

## 2025-07-10 RX ORDER — DIPHENHYDRAMINE HYDROCHLORIDE 50 MG/ML
50 INJECTION, SOLUTION INTRAMUSCULAR; INTRAVENOUS ONCE AS NEEDED
Status: CANCELLED | OUTPATIENT
Start: 2025-08-06

## 2025-07-10 RX ORDER — SODIUM CHLORIDE 0.9 % (FLUSH) 0.9 %
10 SYRINGE (ML) INJECTION
Status: CANCELLED | OUTPATIENT
Start: 2025-08-06

## 2025-07-10 RX ORDER — EPINEPHRINE 0.3 MG/.3ML
0.3 INJECTION SUBCUTANEOUS ONCE AS NEEDED
Status: CANCELLED | OUTPATIENT
Start: 2025-08-06

## 2025-07-10 RX ORDER — DIPHENHYDRAMINE HCL 25 MG
25 CAPSULE ORAL
Status: CANCELLED | OUTPATIENT
Start: 2025-08-06

## 2025-07-10 RX ORDER — HEPARIN 100 UNIT/ML
500 SYRINGE INTRAVENOUS
Status: CANCELLED | OUTPATIENT
Start: 2025-08-06

## 2025-07-10 RX ORDER — ACETAMINOPHEN 325 MG/1
650 TABLET ORAL
Status: CANCELLED | OUTPATIENT
Start: 2025-08-06

## 2025-07-11 ENCOUNTER — INFUSION (OUTPATIENT)
Dept: INFUSION THERAPY | Facility: HOSPITAL | Age: 60
End: 2025-07-11
Attending: INTERNAL MEDICINE
Payer: MEDICARE

## 2025-07-11 VITALS
BODY MASS INDEX: 30.68 KG/M2 | SYSTOLIC BLOOD PRESSURE: 146 MMHG | HEIGHT: 63 IN | WEIGHT: 173.13 LBS | DIASTOLIC BLOOD PRESSURE: 76 MMHG | HEART RATE: 66 BPM | TEMPERATURE: 98 F | RESPIRATION RATE: 18 BRPM

## 2025-07-11 DIAGNOSIS — E85.9 AMYLOIDOSIS, UNSPECIFIED TYPE: Primary | ICD-10-CM

## 2025-07-11 PROCEDURE — 96401 CHEMO ANTI-NEOPL SQ/IM: CPT

## 2025-07-11 PROCEDURE — 25000003 PHARM REV CODE 250: Performed by: INTERNAL MEDICINE

## 2025-07-11 PROCEDURE — 63600175 PHARM REV CODE 636 W HCPCS: Mod: JZ,TB | Performed by: INTERNAL MEDICINE

## 2025-07-11 RX ORDER — EPINEPHRINE 0.3 MG/.3ML
0.3 INJECTION SUBCUTANEOUS ONCE AS NEEDED
Status: DISCONTINUED | OUTPATIENT
Start: 2025-07-11 | End: 2025-07-11 | Stop reason: HOSPADM

## 2025-07-11 RX ORDER — DIPHENHYDRAMINE HCL 25 MG
25 CAPSULE ORAL
Status: COMPLETED | OUTPATIENT
Start: 2025-07-11 | End: 2025-07-11

## 2025-07-11 RX ORDER — DIPHENHYDRAMINE HYDROCHLORIDE 50 MG/ML
50 INJECTION, SOLUTION INTRAMUSCULAR; INTRAVENOUS ONCE AS NEEDED
Status: DISCONTINUED | OUTPATIENT
Start: 2025-07-11 | End: 2025-07-11 | Stop reason: HOSPADM

## 2025-07-11 RX ORDER — SODIUM CHLORIDE 0.9 % (FLUSH) 0.9 %
10 SYRINGE (ML) INJECTION
Status: DISCONTINUED | OUTPATIENT
Start: 2025-07-11 | End: 2025-07-11 | Stop reason: HOSPADM

## 2025-07-11 RX ORDER — ACETAMINOPHEN 325 MG/1
650 TABLET ORAL
Status: COMPLETED | OUTPATIENT
Start: 2025-07-11 | End: 2025-07-11

## 2025-07-11 RX ORDER — HEPARIN 100 UNIT/ML
500 SYRINGE INTRAVENOUS
Status: DISCONTINUED | OUTPATIENT
Start: 2025-07-11 | End: 2025-07-11 | Stop reason: HOSPADM

## 2025-07-11 RX ADMIN — DIPHENHYDRAMINE HYDROCHLORIDE 25 MG: 25 CAPSULE ORAL at 11:07

## 2025-07-11 RX ADMIN — ACETAMINOPHEN 650 MG: 325 TABLET ORAL at 11:07

## 2025-07-11 RX ADMIN — DARATUMUMAB AND HYALURONIDASE-FIHJ (HUMAN RECOMBINANT) 1800 MG: 1800; 30000 INJECTION SUBCUTANEOUS at 11:07

## 2025-07-28 ENCOUNTER — TELEPHONE (OUTPATIENT)
Dept: INTERNAL MEDICINE | Facility: CLINIC | Age: 60
End: 2025-07-28
Payer: MEDICARE

## 2025-07-28 DIAGNOSIS — I10 PRIMARY HYPERTENSION: Primary | ICD-10-CM

## 2025-07-28 RX ORDER — TRIAMTERENE AND HYDROCHLOROTHIAZIDE 37.5; 25 MG/1; MG/1
1 TABLET ORAL DAILY
Qty: 30 TABLET | Refills: 3 | Status: SHIPPED | OUTPATIENT
Start: 2025-07-28 | End: 2026-07-28

## 2025-07-28 NOTE — TELEPHONE ENCOUNTER
Copied from CRM #[id number]. Topic: General Inquiry - Patient Advice  >> Jul 28, 2025  1:05 PM Binu wrote:  Who Called: Ninfa Candelario    Caller is requesting assistance/information from provider's office.    Symptoms (please be specific):    How long has patient had these symptoms:    List of preferred pharmacies on file (remove unneeded): [unfilled]  If different, enter pharmacy into here including location and phone number:     Patient's Preferred Phone Number on File: [phone]   Best Call Back Number, if different:    Additional Information: pt stated she called last week to req refill on BP medication , was told by pharmacy  it was denied  (pt doesn't know name of medication )

## 2025-07-29 DIAGNOSIS — I48.0 PAROXYSMAL ATRIAL FIBRILLATION: ICD-10-CM

## 2025-08-04 DIAGNOSIS — I10 PRIMARY HYPERTENSION: Primary | ICD-10-CM

## 2025-08-04 DIAGNOSIS — N18.4 CHRONIC KIDNEY DISEASE, STAGE 4 (SEVERE): ICD-10-CM

## 2025-08-04 DIAGNOSIS — D63.8 ANEMIA OF CHRONIC DISEASE: ICD-10-CM

## 2025-08-05 ENCOUNTER — TELEPHONE (OUTPATIENT)
Dept: INTERNAL MEDICINE | Facility: CLINIC | Age: 60
End: 2025-08-05
Payer: MEDICARE

## 2025-08-05 NOTE — TELEPHONE ENCOUNTER
----- Message from Med Assistant Cortez sent at 8/4/2025 11:18 AM CDT -----  Regarding: PV Tuesday 8-12-25       1. Are there any outstanding Labs, imaging or referrals in the patient's chart?      6 month f/u    Fasting labs ordered    Last wellness 2-12-25         2. . Has the patient been seen in an ER, urgent care clinic, or any other health care    provider since their last visit? If yes when and where?

## 2025-08-12 ENCOUNTER — OFFICE VISIT (OUTPATIENT)
Dept: INTERNAL MEDICINE | Facility: CLINIC | Age: 60
End: 2025-08-12
Payer: MEDICARE

## 2025-08-12 VITALS
OXYGEN SATURATION: 94 % | WEIGHT: 164 LBS | BODY MASS INDEX: 30.18 KG/M2 | HEART RATE: 116 BPM | SYSTOLIC BLOOD PRESSURE: 126 MMHG | HEIGHT: 62 IN | DIASTOLIC BLOOD PRESSURE: 80 MMHG

## 2025-08-12 DIAGNOSIS — G62.0 DRUG-INDUCED POLYNEUROPATHY: ICD-10-CM

## 2025-08-12 DIAGNOSIS — Z93.2 ILEOSTOMY STATUS: ICD-10-CM

## 2025-08-12 DIAGNOSIS — I10 PRIMARY HYPERTENSION: ICD-10-CM

## 2025-08-12 DIAGNOSIS — F41.8 MIXED ANXIETY DEPRESSIVE DISORDER: Primary | ICD-10-CM

## 2025-08-12 DIAGNOSIS — I48.0 PAROXYSMAL ATRIAL FIBRILLATION: ICD-10-CM

## 2025-08-12 DIAGNOSIS — M25.512 ACUTE PAIN OF LEFT SHOULDER: ICD-10-CM

## 2025-08-12 DIAGNOSIS — Z94.84 STEM CELLS TRANSPLANT STATUS: ICD-10-CM

## 2025-08-12 PROCEDURE — 99214 OFFICE O/P EST MOD 30 MIN: CPT | Mod: ,,, | Performed by: INTERNAL MEDICINE

## 2025-08-12 RX ORDER — DEXAMETHASONE SODIUM PHOSPHATE 4 MG/ML
4 INJECTION, SOLUTION INTRA-ARTICULAR; INTRALESIONAL; INTRAMUSCULAR; INTRAVENOUS; SOFT TISSUE
Status: COMPLETED | OUTPATIENT
Start: 2025-08-12 | End: 2025-08-12

## 2025-08-12 RX ADMIN — DEXAMETHASONE SODIUM PHOSPHATE 4 MG: 4 INJECTION, SOLUTION INTRA-ARTICULAR; INTRALESIONAL; INTRAMUSCULAR; INTRAVENOUS; SOFT TISSUE at 02:08

## 2025-08-15 DIAGNOSIS — C90.00 MULTIPLE MYELOMA NOT HAVING ACHIEVED REMISSION: ICD-10-CM

## 2025-08-15 RX ORDER — ONDANSETRON 8 MG/1
8 TABLET, FILM COATED ORAL EVERY 8 HOURS PRN
Qty: 90 TABLET | Refills: 1 | Status: SHIPPED | OUTPATIENT
Start: 2025-08-15

## 2025-08-16 DIAGNOSIS — I10 HYPERTENSION, UNSPECIFIED TYPE: ICD-10-CM

## 2025-08-18 RX ORDER — CARVEDILOL 3.12 MG/1
3.12 TABLET ORAL
Qty: 180 TABLET | Refills: 2 | Status: SHIPPED | OUTPATIENT
Start: 2025-08-18